# Patient Record
Sex: FEMALE | Race: WHITE | NOT HISPANIC OR LATINO | Employment: UNEMPLOYED
[De-identification: names, ages, dates, MRNs, and addresses within clinical notes are randomized per-mention and may not be internally consistent; named-entity substitution may affect disease eponyms.]

---

## 2017-10-08 ENCOUNTER — HEALTH MAINTENANCE LETTER (OUTPATIENT)
Age: 22
End: 2017-10-08

## 2020-12-23 NOTE — PROGRESS NOTES
"Subjective     Caprice LAFLEUR is a 25 year old female who presents to clinic today for the following health issues:    HPI         Concern - bruising on right shin  Onset: 2 months - 10/20/20  Description: slipped and ran into a picnic table   Intensity: moderate  Progression of Symptoms:  intermittent and waxing and waning  Accompanying Signs & Symptoms: none  Previous history of similar problem: none  Precipitating factors:        Worsened by: none  Alleviating factors:        Improved by: icing   Therapies tried and outcome:  none     - Happened 2 months ago, she hit her shin on the picnic table.  She had bruising immediately afterwards above and below the area she hit her shin.   - She says the pain has been on and off since then.  She'll notice it some more than other days.  She did just recently re-develop some bruising without new injury but it is starting to yellow.   - Occasionally will have some numbness/tingling down into the foot.  She'll get pains that will go up to the middle side of the knee and down into the foot as well.   - This is her first evaluation of the leg.     Review of Systems   Constitutional, musculoskeletal, neuro, skin systems are negative, except as otherwise noted.      Objective    /64   Pulse 94   Temp 98.1  F (36.7  C) (Temporal)   Resp 16   Ht 1.746 m (5' 8.75\")   Wt 76.7 kg (169 lb)   LMP 12/28/2020 (Exact Date)   SpO2 99%   BMI 25.14 kg/m    Body mass index is 25.14 kg/m .  Physical Exam   GENERAL: healthy, alert and no distress  MS: normal muscle tone and RLE - full active and passive ROM of the knee and ankle without significant pain, tenderness to palpation over the midshaft of the tibia and over the lateral malleolus. Non-tender over the knee joint, medial malleolus, foot.   SKIN: small yellow bruising mid shaft of the RLE.   NEURO: Normal strength and tone, mentation intact and speech normal    Xray -   XR RIGHT TIBIA AND FIBULA TWO VIEWS 12/28/2020 " 4:11 PM     HISTORY: Injury October anterior mid shaft tibia. Leg injury, right,  initial encounter.     COMPARISON: None.     FINDINGS: Subtle increased density in the subcutaneous adipose tissues  of the anterior mid shin on the lateral view only could represent  contusion/scarring. No acute fracture is identified. Visualized joint  spaces are grossly well-maintained.                                                                      IMPRESSION: Possible soft tissue contusion along the anterior mid  right shin on the lateral view. No underlying fracture.        Assessment & Plan     Caprice was seen today for bleeding/bruising.    Diagnoses and all orders for this visit:    Leg injury, right, initial encounter  -     XR Tibia & Fibula Right 2 Views; Future       X-ray obtained, no signs of previous fracture or acute fracture.  There is some swelling that is consistent with the current bruising. Recommended activity modification, NSAIDs consistently for 3-5 days.  Discussed bone contusions can take a while to completely resolve.  If not improving over the next 2-4 weeks recommend follow-up with Ortho.     Return in about 2 weeks (around 1/11/2021) for If not improving, sooner if worse or new concerns.     Options for treatment and follow-up care were reviewed with the patient and/or guardian. Patient and/or guardian engaged in the decision making process and verbalized understanding of the options discussed and agreed with the final plan.     Junior Roe PA-C  [employer]

## 2020-12-28 ENCOUNTER — ANCILLARY PROCEDURE (OUTPATIENT)
Dept: GENERAL RADIOLOGY | Facility: OTHER | Age: 25
End: 2020-12-28
Attending: PHYSICIAN ASSISTANT
Payer: COMMERCIAL

## 2020-12-28 ENCOUNTER — OFFICE VISIT (OUTPATIENT)
Dept: FAMILY MEDICINE | Facility: OTHER | Age: 25
End: 2020-12-28
Payer: COMMERCIAL

## 2020-12-28 VITALS
DIASTOLIC BLOOD PRESSURE: 64 MMHG | HEART RATE: 94 BPM | RESPIRATION RATE: 16 BRPM | OXYGEN SATURATION: 99 % | WEIGHT: 169 LBS | HEIGHT: 69 IN | TEMPERATURE: 98.1 F | SYSTOLIC BLOOD PRESSURE: 108 MMHG | BODY MASS INDEX: 25.03 KG/M2

## 2020-12-28 DIAGNOSIS — S89.91XA LEG INJURY, RIGHT, INITIAL ENCOUNTER: ICD-10-CM

## 2020-12-28 DIAGNOSIS — S89.91XA LEG INJURY, RIGHT, INITIAL ENCOUNTER: Primary | ICD-10-CM

## 2020-12-28 PROCEDURE — 99213 OFFICE O/P EST LOW 20 MIN: CPT | Performed by: PHYSICIAN ASSISTANT

## 2020-12-28 PROCEDURE — 73590 X-RAY EXAM OF LOWER LEG: CPT | Mod: RT | Performed by: RADIOLOGY

## 2020-12-28 ASSESSMENT — PATIENT HEALTH QUESTIONNAIRE - PHQ9
10. IF YOU CHECKED OFF ANY PROBLEMS, HOW DIFFICULT HAVE THESE PROBLEMS MADE IT FOR YOU TO DO YOUR WORK, TAKE CARE OF THINGS AT HOME, OR GET ALONG WITH OTHER PEOPLE: EXTREMELY DIFFICULT
SUM OF ALL RESPONSES TO PHQ QUESTIONS 1-9: 19
SUM OF ALL RESPONSES TO PHQ QUESTIONS 1-9: 19

## 2020-12-28 ASSESSMENT — MIFFLIN-ST. JEOR: SCORE: 1566.99

## 2020-12-28 ASSESSMENT — PAIN SCALES - GENERAL: PAINLEVEL: MODERATE PAIN (5)

## 2020-12-29 ENCOUNTER — TELEPHONE (OUTPATIENT)
Dept: FAMILY MEDICINE | Facility: OTHER | Age: 25
End: 2020-12-29

## 2020-12-29 ASSESSMENT — PATIENT HEALTH QUESTIONNAIRE - PHQ9: SUM OF ALL RESPONSES TO PHQ QUESTIONS 1-9: 19

## 2020-12-29 NOTE — TELEPHONE ENCOUNTER
LM for patient to return phone call to clinic about message below.  Maylin Colon CMA ([facility])      ----- Message from Junior Roe PA-C sent at 12/29/2020  7:59 AM CST -----  Please call patient.  The Radiologist report shows soft tissue bruise as is obvious and no signs of fracture which is good.  Recommend follow up with Ortho if not improving over the next 2-4 weeks but in the meantime utilize the dosing of ibuprofen discussed yesterday.     Junior Roe PA-C

## 2020-12-29 NOTE — TELEPHONE ENCOUNTER
Return call received from patient.  Message given as below.  Naomi Funes RN  [facility]

## 2021-02-21 ENCOUNTER — HEALTH MAINTENANCE LETTER (OUTPATIENT)
Age: 26
End: 2021-02-21
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
## 2021-06-29 ENCOUNTER — ANCILLARY PROCEDURE (OUTPATIENT)
Dept: GENERAL RADIOLOGY | Facility: OTHER | Age: 26
End: 2021-06-29
Attending: PHYSICIAN ASSISTANT
Payer: COMMERCIAL

## 2021-06-29 ENCOUNTER — OFFICE VISIT (OUTPATIENT)
Dept: FAMILY MEDICINE | Facility: OTHER | Age: 26
End: 2021-06-29
Payer: COMMERCIAL

## 2021-06-29 VITALS
RESPIRATION RATE: 14 BRPM | BODY MASS INDEX: 24.85 KG/M2 | DIASTOLIC BLOOD PRESSURE: 84 MMHG | OXYGEN SATURATION: 96 % | HEART RATE: 96 BPM | HEIGHT: 69 IN | TEMPERATURE: 98 F | WEIGHT: 167.8 LBS | SYSTOLIC BLOOD PRESSURE: 118 MMHG

## 2021-06-29 DIAGNOSIS — M54.42 ACUTE BILATERAL LOW BACK PAIN WITH LEFT-SIDED SCIATICA: ICD-10-CM

## 2021-06-29 DIAGNOSIS — M54.42 ACUTE BILATERAL LOW BACK PAIN WITH LEFT-SIDED SCIATICA: Primary | ICD-10-CM

## 2021-06-29 PROCEDURE — 72100 X-RAY EXAM L-S SPINE 2/3 VWS: CPT | Performed by: RADIOLOGY

## 2021-06-29 PROCEDURE — 99214 OFFICE O/P EST MOD 30 MIN: CPT | Performed by: PHYSICIAN ASSISTANT

## 2021-06-29 RX ORDER — PREDNISONE 20 MG/1
40 TABLET ORAL DAILY
Qty: 10 TABLET | Refills: 0 | Status: SHIPPED | OUTPATIENT
Start: 2021-06-29 | End: 2021-07-04

## 2021-06-29 RX ORDER — CYCLOBENZAPRINE HCL 5 MG
5-10 TABLET ORAL 3 TIMES DAILY PRN
Qty: 60 TABLET | Refills: 0 | Status: ON HOLD | OUTPATIENT
Start: 2021-06-29 | End: 2021-08-24

## 2021-06-29 ASSESSMENT — ANXIETY QUESTIONNAIRES
4. TROUBLE RELAXING: NEARLY EVERY DAY
2. NOT BEING ABLE TO STOP OR CONTROL WORRYING: MORE THAN HALF THE DAYS
7. FEELING AFRAID AS IF SOMETHING AWFUL MIGHT HAPPEN: SEVERAL DAYS
1. FEELING NERVOUS, ANXIOUS, OR ON EDGE: NEARLY EVERY DAY
5. BEING SO RESTLESS THAT IT IS HARD TO SIT STILL: SEVERAL DAYS
GAD7 TOTAL SCORE: 15
7. FEELING AFRAID AS IF SOMETHING AWFUL MIGHT HAPPEN: SEVERAL DAYS
3. WORRYING TOO MUCH ABOUT DIFFERENT THINGS: NEARLY EVERY DAY
6. BECOMING EASILY ANNOYED OR IRRITABLE: MORE THAN HALF THE DAYS

## 2021-06-29 ASSESSMENT — MIFFLIN-ST. JEOR: SCORE: 1570.13

## 2021-06-29 ASSESSMENT — PATIENT HEALTH QUESTIONNAIRE - PHQ9
10. IF YOU CHECKED OFF ANY PROBLEMS, HOW DIFFICULT HAVE THESE PROBLEMS MADE IT FOR YOU TO DO YOUR WORK, TAKE CARE OF THINGS AT HOME, OR GET ALONG WITH OTHER PEOPLE: VERY DIFFICULT
SUM OF ALL RESPONSES TO PHQ QUESTIONS 1-9: 17
SUM OF ALL RESPONSES TO PHQ QUESTIONS 1-9: 17

## 2021-06-29 ASSESSMENT — PAIN SCALES - GENERAL: PAINLEVEL: SEVERE PAIN (6)

## 2021-06-30 ASSESSMENT — PATIENT HEALTH QUESTIONNAIRE - PHQ9: SUM OF ALL RESPONSES TO PHQ QUESTIONS 1-9: 17

## 2021-06-30 ASSESSMENT — ANXIETY QUESTIONNAIRES: GAD7 TOTAL SCORE: 15

## 2021-07-13 ENCOUNTER — TELEPHONE (OUTPATIENT)
Dept: FAMILY MEDICINE | Facility: OTHER | Age: 26
End: 2021-07-13

## 2021-07-13 ENCOUNTER — ANCILLARY PROCEDURE (OUTPATIENT)
Dept: MRI IMAGING | Facility: CLINIC | Age: 26
End: 2021-07-13
Attending: PHYSICIAN ASSISTANT
Payer: COMMERCIAL

## 2021-07-13 DIAGNOSIS — M54.42 ACUTE BILATERAL LOW BACK PAIN WITH LEFT-SIDED SCIATICA: ICD-10-CM

## 2021-07-13 DIAGNOSIS — M54.42 ACUTE BILATERAL LOW BACK PAIN WITH LEFT-SIDED SCIATICA: Primary | ICD-10-CM

## 2021-07-13 DIAGNOSIS — M54.16 LUMBAR RADICULOPATHY: ICD-10-CM

## 2021-07-13 PROCEDURE — 72148 MRI LUMBAR SPINE W/O DYE: CPT | Performed by: RADIOLOGY

## 2021-07-13 NOTE — TELEPHONE ENCOUNTER
Patient was informed and would like to go to neurosurgery. Please paced appropriate referral.     Diane Turner RN, BSN  [place]  July 13, 2021

## 2021-07-13 NOTE — TELEPHONE ENCOUNTER
----- Message from Junior Roe PA-C sent at 7/13/2021 12:52 PM CDT -----  Please call patient. Her MRI showed fissure of the left disc at the L5-S1 which abuts the S1 nerve root on the left side which is likely causing her symptoms. If she is still having symptoms I recommend she see Neurosurgery for discussion of options.     Junior Roe PA-C

## 2021-07-19 ENCOUNTER — OFFICE VISIT (OUTPATIENT)
Dept: NEUROSURGERY | Facility: CLINIC | Age: 26
End: 2021-07-19
Attending: PHYSICIAN ASSISTANT
Payer: COMMERCIAL

## 2021-07-19 VITALS — OXYGEN SATURATION: 98 % | HEART RATE: 96 BPM | SYSTOLIC BLOOD PRESSURE: 144 MMHG | DIASTOLIC BLOOD PRESSURE: 94 MMHG

## 2021-07-19 DIAGNOSIS — M54.42 ACUTE BILATERAL LOW BACK PAIN WITH LEFT-SIDED SCIATICA: ICD-10-CM

## 2021-07-19 DIAGNOSIS — M54.16 LUMBAR RADICULOPATHY: Primary | ICD-10-CM

## 2021-07-19 DIAGNOSIS — Z01.818 PREOP TESTING: ICD-10-CM

## 2021-07-19 DIAGNOSIS — Z11.59 ENCOUNTER FOR SCREENING FOR OTHER VIRAL DISEASES: ICD-10-CM

## 2021-07-19 PROCEDURE — 99204 OFFICE O/P NEW MOD 45 MIN: CPT | Performed by: NEUROLOGICAL SURGERY

## 2021-07-19 PROCEDURE — G0463 HOSPITAL OUTPT CLINIC VISIT: HCPCS

## 2021-07-19 NOTE — PROGRESS NOTES
I have seen and examined the patient with Simona Mcguire NP and agree with the attached note.    25F w/ severe left sided radiculopathy and left L5-S1 disc herniation, failure to improve with non-surgical management.  Plan for MIS microdiscectomy.  Risks and benefits discussed

## 2021-07-19 NOTE — PATIENT INSTRUCTIONS
Patient Instructions    Surgery scheduled at [facility] for Microdiscectomy with Dr. Junior    Pre-Operative    Surgical risks: blood clots in the leg or lung, problems urinating, nerve damage, drainage from the incision, infection,stiffness    Pre-operative physical with primary care physician within 30 days of surgical date.     Likely same day procedure with discharge home day of surgery, may stay for 23 hour observation hospitalization for monitoring.       Shower procedure  o Please shower with antimicrobial soap the night before surgery and morning of surgery. Please refer to showering instruction sheet in folder.    Eating/Drinking  o Stop all solid foods 8 hours before surgery.  o Keep drinking clear liquids until 4 hours before surgery  - Clear liquids include water, clear juice, black coffee, or clear tea without milk, Gatorade, clear soda.     Medications  o Hold Aspirin, NSAIDs (Advil/Ibuprofen, Indocin, Naproxen,Nuprin,Relafen/Nabumetone, Diclofenac,Meloxicam, Aleve, Celebrex) x 7 days prior to surgical date  o You can take Tylenol (Acetaminophen) for pain, 1000 mg  - Do not exceed 3,000 mg per day   o Any other medications prescribed, please discuss with your primary care provider at your pre-operative physical     As part of preparation for your upcoming procedure you are required to have a test for the novel Coronavirus, COVID-19  o Please call the drive-up testing number to schedule your appointment. The test needs to be completed within 4 days (96) hours of surgery.   o Scheduling Number: [phone]     Post-Operative    Pain Management    Dealing with pain  o As your body heals, you might feel a stabbing, burning, or aching pain. You may also have some numbness.  o Everyone feels pain differently, we may ask you to rate your pain using a pain scale. This will let us know how much pain you feel.   o Keep in mind that medicine won't take away all of your pain. It helps to try other  ways to relax and ease pain.     Things to help with pain  o After surgery, we will give you medicine for your pain. These medications work well, but they can make you drowsy, itchy, or sick to your stomach. If we give you narcotics for pain, try to take the pills with food.   o For mild to moderate pain, you can take medication such as Tylenol. These can be used with narcotics, but make sure that your narcotic does not contain Tylenol.   - Do NOT drive while taking narcotic pain medication  - Do NOT drink alcohol while using any pain medication  o You can utilize ice as needed (no longer than 20 minutes at one time)    You may resume taking NSAIDs (ex. Ibuprofen, aleve, naproxen) 2 weeks after surgery as it may cause bleeding and interfere with bone healing     Incision Care  o No submerging incision in water such as pools, hot tubs, baths for at least 8 weeks or until incision is healed  o It is okay to shower, just pat the incision dry   o Remove dressing as instructed upon discharge  o Watch for signs of infection  - Redness, swelling, warmth, drainage, and fever of 101 degrees or higher  - [facility] [phone]    Bowel Care  o Many people have constipation (hard stools) after surgery.  To help prevent constipation: Drink plenty of fluid (8-10 glasses/day); Eat more fiber, such as whole grain bread, bran cereal, and fruits and vegetables; Stay active by walking; Over the counter stool softener may also help.      Activity Restrictions  o For the first 4-6 weeks, no lifting > 10 pounds, limited bending, twisting, or overhead reaching.  o Take stairs in moderation     Walking: Walking is the best way to start exercise after surgery. Take short frequent walks. You may gradually increase the distance as tolerated. If you feel pain, decrease your activity, but DO NOT stop walking. Walking will help you gain strength and prevent muscle soreness and spasms.   o Avoid bed rest and prolonged sitting for longer than  30 minutes (change positions frequently while awake)  o No contact sports until after follow up visit  o No high impact activities such as; running/jogging, snowmobile or 4 leach riding or any other recreational vehicles  o Please call the clinic if you develop any of the following symptoms:  - Swelling and/or warmth in one or both legs  - Pain or tenderness in your leg, ankle, foot, or arm   - Red or discolored skin     Post-Op Follow Up Appointments    2 week incision check with RN    6 week post op visit with Physical Assistant or Nurse Practitioner    3 months post op with Physical Assistant or Nurse Practitioner    Please call to schedule follow up appointment at [phone]    Resources    If you are currently employed, you will need to be off work for 2-4 weeks for post op recovery and healing.    Please fax any FMLA/short term disability paperwork to [phone]    You may call our clinic when you'd like to return to work and we can provide a work letter    To allow staff adequate time to complete paperwork, please send as soon as possible

## 2021-07-19 NOTE — PROGRESS NOTES
"Dr. Destin AVITIA [facility] Neurosurgery Clinic Visit      CC: back pain, leg pain/numbness     Primary care Provider: No Ref-Primary, Physician    Reason For Visit:   I was asked by Junior Roe PA-C to consult on the patient for: Lumbar radiculopathy, Acute bilateral low back pain with left-sided sciatica      HPI: Caprice Kline is a 25 year old female who presents for evaluation of back pain that started about 1 year ago when doing some housework. She was given oral steroids at that time. About 3 months ago she developed increased back pain with leg pain/numbess that continues to worsen. Pain is located in the bilateral low back and radiates to left>right legs to feet with numbness in this pattern as well. Reports mild weakness in BLE. Describes the pain as sharp, shooting, aching. Pain is worsened with prolonged standing and sitting. Pain is alleviated with lying flat. She has tried oral steroids, flexeril, tylenol, NSAIDS, yoga and acupuncture but symptoms persist. Denies any recent falls or saddle anesthesia, but she reports bladder incontinence x 2 that occurred on Friday.    Current pain: 6/10   At worst: 9/10    Past Medical History reviewed with patient during visit.  Past Surgical History reviewed with patient during visit.    Current Outpatient Medications   Medication     cyclobenzaprine (FLEXERIL) 5 MG tablet     No current facility-administered medications for this visit.       Allergies   Allergen Reactions     Metronidazole Anaphylaxis and Hives     Other reaction(s): Throat swelling     Other Drug Allergy (See Comments) Hives, Itching and Swelling     Throat swelling 6 hrs after exposure  Itching and hives 2 hrs after exposure     Hydrocodone-Acetaminophen Other (See Comments)     Sertraline Other (See Comments)     Patient was foggy and \"high\" feeling       Social History     Socioeconomic History     Marital status: Single     Spouse name: Not on file     Number of children: Not " on file     Years of education: Not on file     Highest education level: Not on file   Occupational History     Not on file   Tobacco Use     Smoking status: Never Smoker     Smokeless tobacco: Never Used   Substance and Sexual Activity     Alcohol use: Yes     Drug use: Never     Sexual activity: Yes     Partners: Male   Other Topics Concern     Not on file   Social History Narrative     Not on file     Social Determinants of Health     Financial Resource Strain:      Difficulty of Paying Living Expenses:    Food Insecurity:      Worried About Running Out of Food in the Last Year:      Ran Out of Food in the Last Year:    Transportation Needs:      Lack of Transportation (Medical):      Lack of Transportation (Non-Medical):    Physical Activity:      Days of Exercise per Week:      Minutes of Exercise per Session:    Stress:      Feeling of Stress :    Social Connections:      Frequency of Communication with Friends and Family:      Frequency of Social Gatherings with Friends and Family:      Attends Religion Services:      Active Member of Clubs or Organizations:      Attends Club or Organization Meetings:      Marital Status:    Intimate Partner Violence:      Fear of Current or Ex-Partner:      Emotionally Abused:      Physically Abused:      Sexually Abused:      ROS: 10 point ROS neg other than the symptoms noted above in the HPI.    Vital Signs: BP (!) 144/94   Pulse 96   LMP 06/23/2021 (Approximate)   SpO2 98%     Examination:  Constitutional:  Alert, well nourished, NAD.  HEENT: Normocephalic, atraumatic.   Pulmonary:  Without shortness of breath, normal effort.   Lymph: No lymphadenopathy to low back or LE.   Integumentary: Skin is free of rashes or lesions.   Cardiovascular:  No pitting edema of BLE.      Neurological:  Awake  Alert  Oriented x 3  Speech clear  Cranial nerves II - XII grossly intact  PERRL  EOMI  Face symmetric  Tongue midline  Motor exam   Hip Flexor:                 Right: 5/5   Left:  4/5  Quadriceps:               Right:  5/5  Left:  5/5  Hamstrings:               Right:  5/5  Left:  4/5  Gastroc Soleus:         Right:  5/5  Left:  5/5  Tib/Ant:                      Right:  5/5  Left:  4/5  EHL:                          Right:  5/5  Left:  4/5         Sensation normal to bilateral lower extremities.    Reflexes are 2+ in the patellar and Achilles. There is no clonus. Downgoing Babinski.    Musculoskeletal:  Gait: Able to stand from a seated position. Antalgic gait noted.   Tenderness to palpation of the lumbar spine.   Straight leg raise is positive on the left.     Imaging:   MR LUMBAR SPINE W/O CONTRAST 7/13/2021 8:35 AM                                                                   Impression:   Annular fissure and left central disc protrusion at L5-S1 abutting the  descending S1 nerve root.    Assessment/Plan:   Lumbar radiculopathy  Lumbar disc herniation     25 year old female who presents for evaluation of acute on chronic back and leg pain. Pain is located in the bilateral low back and radiates to left>right legs to feet with numbness in this pattern as well. She has tried oral steroids, flexeril, tylenol, NSAIDS, yoga and acupuncture but symptoms persist. MRI lumbar spine reveals a left L5-S1 disc protrusion abutting the S1 nerve root.     Dr. Junior also met with patient, reviewed imaging, and discussed conservative measures versus surgery which would include a minimally invasive left L5-S1 microdiscectomy. Patient would like to proceed with surgery. Risks and benefits discussed. Pre op education provided per nursing.    Advised patient to call our clinic with any questions or concerns. Discussed red flag symptoms and advised to seek medical attention if these develop. Patient voiced understanding and agreement.      Simona Snyder [facility]  [address] [address]  Tel [phone]  Pager  [phone]

## 2021-07-19 NOTE — NURSING NOTE
Reviewed pre- and post-operative instructions as outlined in the After Visit Summary/Patient Instructions with patient.   Surgery folder was given to patient    Patient Education Topic: Procedure with Dr. Junior     Learner(s): Patient    Knowledge Level: Basic    Readiness to Learn: Ready    Method:  Verbal explanation    Outcome: Able to verbalize instructions    Barriers to Learning: No barrier    Covid Testing: patient educated they will need to have a test for the novel Coronavirus, COVID-19.The test needs to be completed within 4 days (96) hours of surgery. Order Placed.    Scheduling Number: [phone]    Patient had the opportunity for questions to be answered. Advised Patient to call clinic with any questions/concerns. Gave patient antibacterial soap for pre-surgery skin preparation.

## 2021-07-19 NOTE — LETTER
7/19/2021         RE: Caprice Kline  [address] [facility] [address]        Dear Colleague,    Thank you for referring your patient, Caprice Kline, to the [facility] NEUROSURGERY [facility]INIC [place]. Please see a copy of my visit note below.    Dr. Destin Junior   [facility] Neurosurgery Clinic Visit      CC: back pain, leg pain/numbness     Primary care Provider: No Ref-Primary, Physician    Reason For Visit:   I was asked by Junior Roe PA-C to consult on the patient for: Lumbar radiculopathy, Acute bilateral low back pain with left-sided sciatica      HPI: Caprice Kline is a 25 year old female who presents for evaluation of back pain that started about 1 year ago when doing some housework. She was given oral steroids at that time. About 3 months ago she developed increased back pain with leg pain/numbess that continues to worsen. Pain is located in the bilateral low back and radiates to left>right legs to feet with numbness in this pattern as well. Reports mild weakness in BLE. Describes the pain as sharp, shooting, aching. Pain is worsened with prolonged standing and sitting. Pain is alleviated with lying flat. She has tried oral steroids, flexeril, tylenol, NSAIDS, yoga and acupuncture but symptoms persist. Denies any recent falls or saddle anesthesia, but she reports bladder incontinence x 2 that occurred on Friday.    Current pain: 6/10   At worst: 9/10    Past Medical History reviewed with patient during visit.  Past Surgical History reviewed with patient during visit.    Current Outpatient Medications   Medication     cyclobenzaprine (FLEXERIL) 5 MG tablet     No current facility-administered medications for this visit.       Allergies   Allergen Reactions     Metronidazole Anaphylaxis and Hives     Other reaction(s): Throat swelling     Other Drug Allergy (See Comments) Hives, Itching and Swelling     Throat swelling 6 hrs after exposure  Itching and hives 2 hrs  "after exposure     Hydrocodone-Acetaminophen Other (See Comments)     Sertraline Other (See Comments)     Patient was foggy and \"high\" feeling       Social History     Socioeconomic History     Marital status: Single     Spouse name: Not on file     Number of children: Not on file     Years of education: Not on file     Highest education level: Not on file   Occupational History     Not on file   Tobacco Use     Smoking status: Never Smoker     Smokeless tobacco: Never Used   Substance and Sexual Activity     Alcohol use: Yes     Drug use: Never     Sexual activity: Yes     Partners: Male   Other Topics Concern     Not on file   Social History Narrative     Not on file     Social Determinants of Health     Financial Resource Strain:      Difficulty of Paying Living Expenses:    Food Insecurity:      Worried About Running Out of Food in the Last Year:      Ran Out of Food in the Last Year:    Transportation Needs:      Lack of Transportation (Medical):      Lack of Transportation (Non-Medical):    Physical Activity:      Days of Exercise per Week:      Minutes of Exercise per Session:    Stress:      Feeling of Stress :    Social Connections:      Frequency of Communication with Friends and Family:      Frequency of Social Gatherings with Friends and Family:      Attends Orthodox Services:      Active Member of Clubs or Organizations:      Attends Club or Organization Meetings:      Marital Status:    Intimate Partner Violence:      Fear of Current or Ex-Partner:      Emotionally Abused:      Physically Abused:      Sexually Abused:      ROS: 10 point ROS neg other than the symptoms noted above in the HPI.    Vital Signs: BP (!) 144/94   Pulse 96   LMP 06/23/2021 (Approximate)   SpO2 98%     Examination:  Constitutional:  Alert, well nourished, NAD.  HEENT: Normocephalic, atraumatic.   Pulmonary:  Without shortness of breath, normal effort.   Lymph: No lymphadenopathy to low back or LE.   Integumentary: Skin is " free of rashes or lesions.   Cardiovascular:  No pitting edema of BLE.      Neurological:  Awake  Alert  Oriented x 3  Speech clear  Cranial nerves II - XII grossly intact  PERRL  EOMI  Face symmetric  Tongue midline  Motor exam   Hip Flexor:                 Right: 5/5  Left:  4/5  Quadriceps:               Right:  5/5  Left:  5/5  Hamstrings:               Right:  5/5  Left:  4/5  Gastroc Soleus:         Right:  5/5  Left:  5/5  Tib/Ant:                      Right:  5/5  Left:  4/5  EHL:                          Right:  5/5  Left:  4/5         Sensation normal to bilateral lower extremities.    Reflexes are 2+ in the patellar and Achilles. There is no clonus. Downgoing Babinski.    Musculoskeletal:  Gait: Able to stand from a seated position. Antalgic gait noted.   Tenderness to palpation of the lumbar spine.   Straight leg raise is positive on the left.     Imaging:   MR LUMBAR SPINE W/O CONTRAST 7/13/2021 8:35 AM                                                                   Impression:   Annular fissure and left central disc protrusion at L5-S1 abutting the  descending S1 nerve root.    Assessment/Plan:   Lumbar radiculopathy  Lumbar disc herniation     25 year old female who presents for evaluation of acute on chronic back and leg pain. Pain is located in the bilateral low back and radiates to left>right legs to feet with numbness in this pattern as well. She has tried oral steroids, flexeril, tylenol, NSAIDS, yoga and acupuncture but symptoms persist. MRI lumbar spine reveals a left L5-S1 disc protrusion abutting the S1 nerve root.     Dr. Junior also met with patient, reviewed imaging, and discussed conservative measures versus surgery which would include a minimally invasive left L5-S1 microdiscectomy. Patient would like to proceed with surgery. Risks and benefits discussed. Pre op education provided per nursing.    Advised patient to call our clinic with any questions or concerns. Discussed red flag  symptoms and advised to seek medical attention if these develop. Patient voiced understanding and agreement.      Simona Snyder CNP  [facility] Neurosurgery  [address] [address]  Tel [phone]  Pager [phone]        I have seen and examined the patient with Simona Mcguire NP and agree with the attached note.    25F w/ severe left sided radiculopathy and left L5-S1 disc herniation, failure to improve with non-surgical management.  Plan for MIS microdiscectomy.  Risks and benefits discussed      Again, thank you for allowing me to participate in the care of your patient.        Sincerely,        Destin Junior MD

## 2021-08-09 NOTE — PROGRESS NOTES
[address]  [facility] [address]  Phone: [phone]  Primary Provider: No Ref-Primary, Physician  Pre-op Performing Provider: INGE PURCELL    PREOPERATIVE EVALUATION:  Today's date: 8/10/2021    Caprice Kline is a 25 year old female who presents for a preoperative evaluation.    Surgical Information:  Surgery/Procedure: Minimally invasive left Lumbar 5 to Sacral 1 microdiscectomy   Surgery Location: [facility]  Surgeon: Dr. Junior  Surgery Date: 8/24/2021  Time of Surgery: 730AM  Where patient plans to recover: At home with family  Fax number for surgical facility: Note does not need to be faxed, will be available electronically in Epic.    Type of Anesthesia Anticipated: General    Assessment & Plan     The proposed surgical procedure is considered INTERMEDIATE risk.    Preop general physical exam  Lumbar radiculopathy  Acute bilateral low back pain with left-sided sciatica  - CBC with platelets  - CBC with platelets    Advanced care planning/counseling discussion      Risks and Recommendations:  The patient has the following additional risks and recommendations for perioperative complications:   - No identified additional risk factors other than previously addressed    Medication Instructions:  Patient is on no chronic medications Hold tylenol and flexeril day before and day of procedure    RECOMMENDATION:  APPROVAL GIVEN to proceed with proposed procedure, without further diagnostic evaluation.    Subjective     HPI related to upcoming procedure: Low back pain that has been ongoing for 1 year. Previously treated with oral steroids, pain and lef leg numbness continue to worsen. Plan for microdiscectomy for low of L5-S1. No new changes, saddle anesthesia, or bladder/ bowel changes.     Preop Questions 8/6/2021   1. Have you ever had a heart attack or stroke? No   2. Have you ever had surgery on your heart or blood vessels, such as a stent  placement, a coronary artery bypass, or surgery on an artery in your head, neck, heart, or legs? No   3. Do you have chest pain with activity? No   4. Do you have a history of  heart failure? No   5. Do you currently have a cold, bronchitis or symptoms of other infection? No   6. Do you have a cough, shortness of breath, or wheezing? No   7. Do you or anyone in your family have previous history of blood clots? No   8. Do you or does anyone in your family have a serious bleeding problem such as prolonged bleeding following surgeries or cuts? No   9. Have you ever had problems with anemia or been told to take iron pills? No   10. Have you had any abnormal blood loss such as black, tarry or bloody stools, or abnormal vaginal bleeding? No   11. Have you ever had a blood transfusion? No   12. Are you willing to have a blood transfusion if it is medically needed before, during, or after your surgery? Yes   13. Have you or any of your relatives ever had problems with anesthesia? No   14. Do you have sleep apnea, excessive snoring or daytime drowsiness? No   15. Do you have any artifical heart valves or other implanted medical devices like a pacemaker, defibrillator, or continuous glucose monitor? No   16. Do you have artificial joints? No   17. Are you allergic to latex? No   18. Is there any chance that you may be pregnant? No       Health Care Directive:  Patient does not have a Health Care Directive or Living Will: Discussed advance care planning with patient; information given to patient to review.    Preoperative Review of :   reviewed - no record of controlled substances prescribed.      Status of Chronic Conditions:  See problem list for active medical problems.  Problems all longstanding and stable, except as noted/documented.  See ROS for pertinent symptoms related to these conditions.      Review of Systems  Constitutional, neuro, ENT, endocrine, pulmonary, cardiac, gastrointestinal, genitourinary,  "musculoskeletal, integument and psychiatric systems are negative, except as otherwise noted.    Patient Active Problem List    Diagnosis Date Noted     Lumbar radiculopathy 07/19/2021     Priority: Medium     Added automatically from request for surgery [id number]        History reviewed. No pertinent past medical history.  History reviewed. No pertinent surgical history.  Current Outpatient Medications   Medication Sig Dispense Refill     cyclobenzaprine (FLEXERIL) 5 MG tablet Take 1-2 tablets (5-10 mg) by mouth 3 times daily as needed for muscle spasms 60 tablet 0       Allergies   Allergen Reactions     Metronidazole Anaphylaxis and Hives     Other reaction(s): Throat swelling     Other Drug Allergy (See Comments) Hives, Itching and Swelling     Throat swelling 6 hrs after exposure  Itching and hives 2 hrs after exposure     Hydrocodone-Acetaminophen Other (See Comments)     Sertraline Other (See Comments)     Patient was foggy and \"high\" feeling        Social History     Tobacco Use     Smoking status: Never Smoker     Smokeless tobacco: Never Used   Substance Use Topics     Alcohol use: Yes     History reviewed. No pertinent family history.  History   Drug Use Unknown         Objective     /84   Pulse 86   Temp 97.8  F (36.6  C) (Temporal)   Resp 18   Ht 1.75 m (5' 8.9\")   Wt 76.5 kg (168 lb 9.6 oz)   LMP 08/08/2021 (Approximate)   SpO2 99%   BMI 24.97 kg/m      Physical Exam    GENERAL APPEARANCE: healthy, alert and no distress. Patient standing bent over exam table due to pain.     EYES: EOMI, PERRL     HENT: ear canals and TM's normal and nose and mouth without ulcers or lesions     NECK: no adenopathy, no asymmetry, masses, or scars and thyroid normal to palpation     RESP: lungs clear to auscultation - no rales, rhonchi or wheezes     CV: regular rates and rhythm, normal S1 S2, no S3 or S4 and no murmur, click or rub     ABDOMEN:  soft, nontender, no HSM or masses and bowel sounds normal     MS: " extremities normal- no gross deformities noted, no evidence of inflammation in joints, FROM in all extremities.     SKIN: no suspicious lesions or rashes     NEURO: Normal strength and tone, sensory exam grossly normal, mentation intact and speech normal     PSYCH: mentation appears normal. and affect normal/bright     LYMPHATICS: No cervical adenopathy    No results for input(s): HGB, PLT, INR, NA, POTASSIUM, CR, A1C in the last 20814 hours.     Diagnostics:  Recent Results (from the past 24 hour(s))   CBC with platelets    Collection Time: 08/10/21 10:58 AM   Result Value Ref Range    WBC Count 3.3 (L) 4.0 - 11.0 10e3/uL    RBC Count 4.75 3.80 - 5.20 10e6/uL    Hemoglobin 14.4 11.7 - 15.7 g/dL    Hematocrit 41.7 35.0 - 47.0 %    MCV 88 78 - 100 fL    MCH 30.3 26.5 - 33.0 pg    MCHC 34.5 31.5 - 36.5 g/dL    RDW 12.8 10.0 - 15.0 %    Platelet Count 258 150 - 450 10e3/uL      No EKG required, no history of coronary heart disease, significant arrhythmia, peripheral arterial disease or other structural heart disease.    Revised Cardiac Risk Index (RCRI):  The patient has the following serious cardiovascular risks for perioperative complications:   - No serious cardiac risks = 0 points     RCRI Interpretation: 0 points: Class I (very low risk - 0.4% complication rate)            I, Junior Roe PA-C, was present with the Physician Assistant student who participated in the service and in the documentation of the note.  I have verified the history and personally performed the physical exam and medical decision making.  I agree with the assessment and plan of care as documented in the note.     STEWART MadrigalS2  Signed Electronically by: Junior Roe PA-C  Copy of this evaluation report is provided to requesting physician.

## 2021-08-10 ENCOUNTER — OFFICE VISIT (OUTPATIENT)
Dept: FAMILY MEDICINE | Facility: OTHER | Age: 26
End: 2021-08-10
Payer: COMMERCIAL

## 2021-08-10 VITALS
RESPIRATION RATE: 18 BRPM | DIASTOLIC BLOOD PRESSURE: 84 MMHG | TEMPERATURE: 97.8 F | HEART RATE: 86 BPM | BODY MASS INDEX: 24.97 KG/M2 | OXYGEN SATURATION: 99 % | WEIGHT: 168.6 LBS | HEIGHT: 69 IN | SYSTOLIC BLOOD PRESSURE: 100 MMHG

## 2021-08-10 DIAGNOSIS — Z71.89 ADVANCED CARE PLANNING/COUNSELING DISCUSSION: ICD-10-CM

## 2021-08-10 DIAGNOSIS — Z01.818 PREOP GENERAL PHYSICAL EXAM: Primary | ICD-10-CM

## 2021-08-10 DIAGNOSIS — M54.16 LUMBAR RADICULOPATHY: ICD-10-CM

## 2021-08-10 DIAGNOSIS — M54.42 ACUTE BILATERAL LOW BACK PAIN WITH LEFT-SIDED SCIATICA: ICD-10-CM

## 2021-08-10 LAB
ERYTHROCYTE [DISTWIDTH] IN BLOOD BY AUTOMATED COUNT: 12.8 % (ref 10–15)
HCT VFR BLD AUTO: 41.7 % (ref 35–47)
HGB BLD-MCNC: 14.4 G/DL (ref 11.7–15.7)
MCH RBC QN AUTO: 30.3 PG (ref 26.5–33)
MCHC RBC AUTO-ENTMCNC: 34.5 G/DL (ref 31.5–36.5)
MCV RBC AUTO: 88 FL (ref 78–100)
PLATELET # BLD AUTO: 258 10E3/UL (ref 150–450)
RBC # BLD AUTO: 4.75 10E6/UL (ref 3.8–5.2)
WBC # BLD AUTO: 3.3 10E3/UL (ref 4–11)

## 2021-08-10 PROCEDURE — 99214 OFFICE O/P EST MOD 30 MIN: CPT | Performed by: PHYSICIAN ASSISTANT

## 2021-08-10 PROCEDURE — 85027 COMPLETE CBC AUTOMATED: CPT | Performed by: PHYSICIAN ASSISTANT

## 2021-08-10 PROCEDURE — 36415 COLL VENOUS BLD VENIPUNCTURE: CPT | Performed by: PHYSICIAN ASSISTANT

## 2021-08-10 ASSESSMENT — MIFFLIN-ST. JEOR: SCORE: 1567.52

## 2021-08-10 ASSESSMENT — PAIN SCALES - GENERAL: PAINLEVEL: EXTREME PAIN (8)

## 2021-08-10 NOTE — PATIENT INSTRUCTIONS

## 2021-08-12 ENCOUNTER — VIRTUAL VISIT (OUTPATIENT)
Dept: FAMILY MEDICINE | Facility: OTHER | Age: 26
End: 2021-08-12
Payer: COMMERCIAL

## 2021-08-12 ENCOUNTER — NURSE TRIAGE (OUTPATIENT)
Dept: NURSING | Facility: CLINIC | Age: 26
End: 2021-08-12

## 2021-08-12 DIAGNOSIS — Z20.822 EXPOSURE TO COVID-19 VIRUS: Primary | ICD-10-CM

## 2021-08-12 PROCEDURE — 99213 OFFICE O/P EST LOW 20 MIN: CPT | Mod: GT | Performed by: FAMILY MEDICINE

## 2021-08-12 ASSESSMENT — PAIN SCALES - GENERAL: PAINLEVEL: EXTREME PAIN (8)

## 2021-08-12 NOTE — PROGRESS NOTES
Caprice is a 25 year old who is being evaluated via a billable telephone visit.      What phone number would you like to be contacted at? [phone]  How would you like to obtain your AVS? MyChart    Assessment & Plan     Exposure to COVID-19 virus  Advised testing in 3-5 days from exposure. Currently asymptomatic. Can return to work masked. Should self isolate if she develops any symptoms.   Discussed home care  Reportable signs and symptoms discussed  RTC if symptoms persist or fail to improve    - Asymptomatic COVID-19 Virus (Coronavirus) by PCR; Future         See Patient Instructions    No follow-ups on file.    Marti Valiente MD  [facility]   Caprice is a 25 year old who presents for the following health issues     HPI   Exposed to covid last night, wants to talk about next steps- having surgery in 2 weeks.      Review of Systems         Objective    Vitals - Patient Reported  Pain Score: Extreme Pain (8)  Pain Loc: Low Back        Physical Exam   healthy, alert and no distress  PSYCH: Alert and oriented times 3; coherent speech, normal   rate and volume, able to articulate logical thoughts, able   to abstract reason, no tangential thoughts, no hallucinations   or delusions  Her affect is normal  RESP: No cough, no audible wheezing, able to talk in full sentences  Remainder of exam unable to be completed due to telephone visits      Phone call duration : 6 minutes

## 2021-08-12 NOTE — TELEPHONE ENCOUNTER
Pt is calling in about being exposed to a person at work last night who was having symptoms of Covid 19, but has not yet been tested. This person is getting tested today.   Pt has been vaccinated for Covid 19. Pt has had symptoms of runny nose, cough, sore throat, that started about a week ago. Pt denies any chesty tightness, difficulty breathing, fever, body aches, chills,headache, or fatigue. Pt is also concerned because she is having surgery on 8/24/2021, and will need a test before that.   Care advice given, and per protocol pt should be evaluated for the need for Covid 19 testing, and was advised to do a virtual visit. Pt was transferred to scheduling to make a telephone or virtual visit with a provider. Pt was advised to call back if she develops chest tightness, difficulty breathing, of fever that goes over 103, or lasts for 3 days. Pt verbalized understanding.    Sadiq Ng RN on 8/12/2021 at 11:01 AM      COVID 19 Nurse Triage Plan/Patient Instructions    Please be aware that novel coronavirus (COVID-19) may be circulating in the community. If you develop symptoms such as fever, cough, or SOB or if you have concerns about the presence of another infection including coronavirus (COVID-19), please contact your health care provider or visit [email or web address].     Disposition/Instructions    Virtual Visit with provider recommended. Reference Visit Selection Guide.    Thank you for taking steps to prevent the spread of this virus.  o Limit your contact with others.  o Wear a simple mask to cover your cough.  o Wash your hands well and often.    Resources    [facility]: About COVID-19: [email or web address]    CDC: What to Do If You're Sick: www.cdc.gov/coronavirus/2019-ncov/about/steps-when-sick.html    CDC: Ending Home Isolation: www.cdc.gov/coronavirus/2019-ncov/hcp/disposition-in-home-patients.html     CDC: Caring for Someone:  www.cdc.gov/coronavirus/2019-ncov/if-you-are-sick/care-for-someone.html     [facility]: Interim Guidance for Hospital Discharge to Home: www.health.[facility].[place]/diseases/coronavirus/hcp/hospdischarge.pdf    [facility] clinical trials (COVID-19 research studies): [email or web address]     Below are the COVID-19 hotlines at the [place] Department of Health ([facility]). Interpreters are available.   o For health questions: Call 877-410-5924 or 1-975.497.1055 (7 a.m. to 7 p.m.)  o For questions about schools and childcare: Call 502-595-2848 or 1-131.613.9036 (7 a.m. to 7 p.m.)     Reason for Disposition    [1] COVID-19 infection suspected by caller or triager AND [2] mild symptoms (cough, fever, or others) AND [3] no complications or SOB    Additional Information    Negative: SEVERE difficulty breathing (e.g., struggling for each breath, speaks in single words)    Negative: Difficult to awaken or acting confused (e.g., disoriented, slurred speech)    Negative: Bluish (or gray) lips or face now    Negative: Shock suspected (e.g., cold/pale/clammy skin, too weak to stand, low BP, rapid pulse)    Negative: Sounds like a life-threatening emergency to the triager    Negative: [1] COVID-19 exposure AND [2] has not completed COVID-19 vaccine series AND [3] no symptoms    Negative: [1] COVID-19 exposure AND [2] completed COVID-19 vaccine series (fully vaccinated) AND [3] no symptoms    Negative: COVID-19 vaccine reaction suspected (e.g., fever, headache, muscle aches) occurring during days 1-3 after getting vaccine    Negative: COVID-19 vaccine, questions about    Negative: [1] COVID-19 vaccine series completed (fully vaccinated) in past 3 months AND [2] new-onset of COVID-19 symptoms BUT [3] no known exposure    Negative: [1] Had lab test confirmed COVID-19 infection within last 3 monthsAND [2] new-onset of COVID-19 symptoms BUT [3] no known exposure    Negative: [1] Lives with someone known to have  influenza (flu test positive) AND [2] flu-like symptoms (e.g., cough, runny nose, sore throat, SOB; with or without fever)    Negative: [1] Adult with possible COVID-19 symptoms AND [2] triager concerned about severity of symptoms or other causes    Negative: COVID-19 and breastfeeding, questions about    Negative: SEVERE or constant chest pain or pressure (Exception: mild central chest pain, present only when coughing)    Negative: MODERATE difficulty breathing (e.g., speaks in phrases, SOB even at rest, pulse 100-120)    Negative: [1] Headache AND [2] stiff neck (can't touch chin to chest)    Negative: MILD difficulty breathing (e.g., minimal/no SOB at rest, SOB with walking, pulse <100)    Negative: Chest pain or pressure    Negative: Patient sounds very sick or weak to the triager    Negative: Fever > 103 F (39.4 C)    Negative: [1] Fever > 101 F (38.3 C) AND [2] age > 60 years    Negative: [1] Fever > 100.0 F (37.8 C) AND [2] bedridden (e.g., nursing home patient, CVA, chronic illness, recovering from surgery)    Negative: [1] HIGH RISK patient (e.g., age > 64 years, diabetes, heart or lung disease, weak immune system) AND [2] new or worsening symptoms    Negative: [1] HIGH RISK patient AND [2] influenza is widespread in the community AND [3] ONE OR MORE respiratory symptoms: cough, sore throat, runny or stuffy nose    Negative: [1] HIGH RISK patient AND [2] influenza exposure within the last 7 days AND [3] ONE OR MORE respiratory symptoms: cough, sore throat, runny or stuffy nose    Negative: Fever present > 3 days (72 hours)    Negative: [1] Fever returns after gone for over 24 hours AND [2] symptoms worse or not improved    Negative: [1] Continuous (nonstop) coughing interferes with work or school AND [2] no improvement using cough treatment per protocol    Protocols used: CORONAVIRUS (COVID-19) DIAGNOSED OR [id number] 3.

## 2021-08-14 DIAGNOSIS — Z20.822 EXPOSURE TO COVID-19 VIRUS: ICD-10-CM

## 2021-08-14 PROCEDURE — U0005 INFEC AGEN DETEC AMPLI PROBE: HCPCS

## 2021-08-14 PROCEDURE — U0003 INFECTIOUS AGENT DETECTION BY NUCLEIC ACID (DNA OR RNA); SEVERE ACUTE RESPIRATORY SYNDROME CORONAVIRUS 2 (SARS-COV-2) (CORONAVIRUS DISEASE [COVID-19]), AMPLIFIED PROBE TECHNIQUE, MAKING USE OF HIGH THROUGHPUT TECHNOLOGIES AS DESCRIBED BY CMS-2020-01-R: HCPCS

## 2021-08-15 LAB — SARS-COV-2 RNA RESP QL NAA+PROBE: NEGATIVE

## 2021-08-16 NOTE — RESULT ENCOUNTER NOTE
MsAngela Raisa    Your recent test results are attached.  Negative COVID-19 test.    If you have any questions or concerns please contact me via My Chart or call the clinic at [phone]     Thank You  Marti Valiente MD.

## 2021-08-21 ENCOUNTER — LAB (OUTPATIENT)
Dept: LAB | Facility: CLINIC | Age: 26
End: 2021-08-21
Attending: NEUROLOGICAL SURGERY
Payer: COMMERCIAL

## 2021-08-21 DIAGNOSIS — Z11.59 ENCOUNTER FOR SCREENING FOR OTHER VIRAL DISEASES: ICD-10-CM

## 2021-08-21 PROCEDURE — U0003 INFECTIOUS AGENT DETECTION BY NUCLEIC ACID (DNA OR RNA); SEVERE ACUTE RESPIRATORY SYNDROME CORONAVIRUS 2 (SARS-COV-2) (CORONAVIRUS DISEASE [COVID-19]), AMPLIFIED PROBE TECHNIQUE, MAKING USE OF HIGH THROUGHPUT TECHNOLOGIES AS DESCRIBED BY CMS-2020-01-R: HCPCS

## 2021-08-21 PROCEDURE — U0005 INFEC AGEN DETEC AMPLI PROBE: HCPCS

## 2021-08-22 LAB — SARS-COV-2 RNA RESP QL NAA+PROBE: NEGATIVE

## 2021-08-23 ENCOUNTER — ANESTHESIA EVENT (OUTPATIENT)
Dept: SURGERY | Facility: CLINIC | Age: 26
End: 2021-08-23
Payer: COMMERCIAL

## 2021-08-23 NOTE — ANESTHESIA PREPROCEDURE EVALUATION
"Anesthesia Pre-Procedure Evaluation    Patient: Caprice Kline   MRN: [id number] : 1995        Preoperative Diagnosis: Lumbar radiculopathy [M54.16]   Procedure : Procedure(s):  Minimally invasive left Lumbar 5 to Sacral 1 microdiscectomy       No past medical history on file.   No past surgical history on file.   Allergies   Allergen Reactions     Metronidazole Anaphylaxis and Hives     Other reaction(s): Throat swelling     Other Drug Allergy (See Comments) Hives, Itching and Swelling     Throat swelling 6 hrs after exposure  Itching and hives 2 hrs after exposure     Hydrocodone-Acetaminophen Other (See Comments)     Sertraline Other (See Comments)     Patient was foggy and \"high\" feeling      Social History     Tobacco Use     Smoking status: Never Smoker     Smokeless tobacco: Never Used   Substance Use Topics     Alcohol use: Yes      Wt Readings from Last 1 Encounters:   08/10/21 76.5 kg (168 lb 9.6 oz)        Anesthesia Evaluation   Pt has had prior anesthetic.     No history of anesthetic complications       ROS/MED HX  ENT/Pulmonary:    (-) sleep apnea   Neurologic:       Cardiovascular:       METS/Exercise Tolerance:     Hematologic:       Musculoskeletal: Comment: Lumbar radiculopathy      GI/Hepatic:    (-) GERD   Renal/Genitourinary:       Endo:       Psychiatric/Substance Use:       Infectious Disease:       Malignancy:       Other:            Physical Exam    Airway        Mallampati: II   TM distance: > 3 FB   Neck ROM: full   Mouth opening: > 3 cm    Respiratory Devices and Support         Dental     Comment: Baby tooth left lower that is somewhat loose          Cardiovascular   cardiovascular exam normal          Pulmonary   pulmonary exam normal                OUTSIDE LABS:  CBC:   Lab Results   Component Value Date    WBC 3.3 (L) 08/10/2021    HGB 14.4 08/10/2021    HCT 41.7 08/10/2021     08/10/2021     BMP: No results found for: NA, POTASSIUM, CHLORIDE, CO2, BUN, CR, " GLC  COAGS: No results found for: PTT, INR, FIBR  POC: No results found for: BGM, HCG, HCGS  HEPATIC: No results found for: ALBUMIN, PROTTOTAL, ALT, AST, GGT, ALKPHOS, BILITOTAL, BILIDIRECT, MARI  OTHER: No results found for: PH, LACT, A1C, REAM, PHOS, MAG, LIPASE, AMYLASE, TSH, T4, T3, CRP, SED    Anesthesia Plan    ASA Status:  2   NPO Status:  NPO Appropriate    Anesthesia Type: General.     - Airway: ETT   Induction: Intravenous.   Maintenance: Balanced.        Consents    Anesthesia Plan(s) and associated risks, benefits, and realistic alternatives discussed. Questions answered and patient/representative(s) expressed understanding.     - Discussed with:  Patient         Postoperative Care    Pain management: IV analgesics, Multi-modal analgesia.   PONV prophylaxis: Ondansetron (or other 5HT-3), Background Propofol Infusion, Dexamethasone or Solumedrol     Comments:    H&P reviewed            Louie Jesus MD

## 2021-08-24 ENCOUNTER — ANESTHESIA (OUTPATIENT)
Dept: SURGERY | Facility: CLINIC | Age: 26
End: 2021-08-24
Payer: COMMERCIAL

## 2021-08-24 ENCOUNTER — HOSPITAL ENCOUNTER (OUTPATIENT)
Facility: CLINIC | Age: 26
Discharge: HOME | End: 2021-08-24
Attending: NEUROLOGICAL SURGERY | Admitting: NEUROLOGICAL SURGERY
Payer: COMMERCIAL

## 2021-08-24 ENCOUNTER — APPOINTMENT (OUTPATIENT)
Dept: GENERAL RADIOLOGY | Facility: CLINIC | Age: 26
End: 2021-08-24
Attending: NEUROLOGICAL SURGERY
Payer: COMMERCIAL

## 2021-08-24 VITALS
TEMPERATURE: 97.2 F | OXYGEN SATURATION: 96 % | DIASTOLIC BLOOD PRESSURE: 91 MMHG | HEART RATE: 90 BPM | RESPIRATION RATE: 14 BRPM | SYSTOLIC BLOOD PRESSURE: 128 MMHG | BODY MASS INDEX: 24.63 KG/M2 | HEIGHT: 69 IN | WEIGHT: 166.3 LBS

## 2021-08-24 DIAGNOSIS — M54.42 ACUTE BILATERAL LOW BACK PAIN WITH LEFT-SIDED SCIATICA: ICD-10-CM

## 2021-08-24 DIAGNOSIS — M54.16 LUMBAR RADICULOPATHY: ICD-10-CM

## 2021-08-24 DIAGNOSIS — Z98.890 S/P LUMBAR MICRODISCECTOMY: Primary | ICD-10-CM

## 2021-08-24 LAB — HCG UR QL: NEGATIVE

## 2021-08-24 PROCEDURE — 250N000009 HC RX 250: Performed by: NEUROLOGICAL SURGERY

## 2021-08-24 PROCEDURE — 710N000012 HC RECOVERY PHASE 2, PER MINUTE: Performed by: NEUROLOGICAL SURGERY

## 2021-08-24 PROCEDURE — 63030 LAMOT DCMPRN NRV RT 1 LMBR: CPT | Mod: LT | Performed by: NEUROLOGICAL SURGERY

## 2021-08-24 PROCEDURE — 250N000025 HC SEVOFLURANE, PER MIN: Performed by: NEUROLOGICAL SURGERY

## 2021-08-24 PROCEDURE — 250N000011 HC RX IP 250 OP 636: Performed by: ANESTHESIOLOGY

## 2021-08-24 PROCEDURE — 258N000003 HC RX IP 258 OP 636: Performed by: ANESTHESIOLOGY

## 2021-08-24 PROCEDURE — 250N000011 HC RX IP 250 OP 636: Performed by: NURSE ANESTHETIST, CERTIFIED REGISTERED

## 2021-08-24 PROCEDURE — 250N000013 HC RX MED GY IP 250 OP 250 PS 637: Performed by: PHYSICIAN ASSISTANT

## 2021-08-24 PROCEDURE — 81025 URINE PREGNANCY TEST: CPT | Performed by: ANESTHESIOLOGY

## 2021-08-24 PROCEDURE — 272N000001 HC OR GENERAL SUPPLY STERILE: Performed by: NEUROLOGICAL SURGERY

## 2021-08-24 PROCEDURE — 360N000084 HC SURGERY LEVEL 4 W/ FLUORO, PER MIN: Performed by: NEUROLOGICAL SURGERY

## 2021-08-24 PROCEDURE — 710N000009 HC RECOVERY PHASE 1, LEVEL 1, PER MIN: Performed by: NEUROLOGICAL SURGERY

## 2021-08-24 PROCEDURE — 250N000011 HC RX IP 250 OP 636: Performed by: PHYSICIAN ASSISTANT

## 2021-08-24 PROCEDURE — 250N000011 HC RX IP 250 OP 636: Performed by: NEUROLOGICAL SURGERY

## 2021-08-24 PROCEDURE — 999N000179 XR SURGERY CARM FLUORO LESS THAN 5 MIN W STILLS

## 2021-08-24 PROCEDURE — 999N000141 HC STATISTIC PRE-PROCEDURE NURSING ASSESSMENT: Performed by: NEUROLOGICAL SURGERY

## 2021-08-24 PROCEDURE — 370N000017 HC ANESTHESIA TECHNICAL FEE, PER MIN: Performed by: NEUROLOGICAL SURGERY

## 2021-08-24 PROCEDURE — 250N000009 HC RX 250: Performed by: NURSE ANESTHETIST, CERTIFIED REGISTERED

## 2021-08-24 PROCEDURE — 63030 LAMOT DCMPRN NRV RT 1 LMBR: CPT | Mod: AS | Performed by: PHYSICIAN ASSISTANT

## 2021-08-24 PROCEDURE — 250N000013 HC RX MED GY IP 250 OP 250 PS 637: Performed by: ANESTHESIOLOGY

## 2021-08-24 RX ORDER — FENTANYL CITRATE 50 UG/ML
INJECTION, SOLUTION INTRAMUSCULAR; INTRAVENOUS PRN
Status: DISCONTINUED | OUTPATIENT
Start: 2021-08-24 | End: 2021-08-24

## 2021-08-24 RX ORDER — ONDANSETRON 2 MG/ML
INJECTION INTRAMUSCULAR; INTRAVENOUS PRN
Status: DISCONTINUED | OUTPATIENT
Start: 2021-08-24 | End: 2021-08-24

## 2021-08-24 RX ORDER — LABETALOL HYDROCHLORIDE 5 MG/ML
10 INJECTION, SOLUTION INTRAVENOUS
Status: DISCONTINUED | OUTPATIENT
Start: 2021-08-24 | End: 2021-08-24 | Stop reason: HOSPADM

## 2021-08-24 RX ORDER — ACETAMINOPHEN 500 MG
500-1000 TABLET ORAL EVERY 6 HOURS PRN
COMMUNITY
End: 2022-06-22

## 2021-08-24 RX ORDER — HYDROMORPHONE HCL IN WATER/PF 6 MG/30 ML
0.2 PATIENT CONTROLLED ANALGESIA SYRINGE INTRAVENOUS EVERY 5 MIN PRN
Status: DISCONTINUED | OUTPATIENT
Start: 2021-08-24 | End: 2021-08-24 | Stop reason: HOSPADM

## 2021-08-24 RX ORDER — FENTANYL CITRATE 0.05 MG/ML
50 INJECTION, SOLUTION INTRAMUSCULAR; INTRAVENOUS
Status: DISCONTINUED | OUTPATIENT
Start: 2021-08-24 | End: 2021-08-24 | Stop reason: HOSPADM

## 2021-08-24 RX ORDER — METHOCARBAMOL 750 MG/1
750 TABLET, FILM COATED ORAL
Status: COMPLETED | OUTPATIENT
Start: 2021-08-24 | End: 2021-08-24

## 2021-08-24 RX ORDER — HYDROXYZINE HYDROCHLORIDE 10 MG/1
10 TABLET, FILM COATED ORAL
Status: DISCONTINUED | OUTPATIENT
Start: 2021-08-24 | End: 2021-08-24 | Stop reason: HOSPADM

## 2021-08-24 RX ORDER — PROPOFOL 10 MG/ML
INJECTION, EMULSION INTRAVENOUS CONTINUOUS PRN
Status: DISCONTINUED | OUTPATIENT
Start: 2021-08-24 | End: 2021-08-24

## 2021-08-24 RX ORDER — LIDOCAINE HYDROCHLORIDE 20 MG/ML
INJECTION, SOLUTION INFILTRATION; PERINEURAL PRN
Status: DISCONTINUED | OUTPATIENT
Start: 2021-08-24 | End: 2021-08-24

## 2021-08-24 RX ORDER — PROPOFOL 10 MG/ML
INJECTION, EMULSION INTRAVENOUS PRN
Status: DISCONTINUED | OUTPATIENT
Start: 2021-08-24 | End: 2021-08-24

## 2021-08-24 RX ORDER — MEPERIDINE HYDROCHLORIDE 25 MG/ML
12.5 INJECTION INTRAMUSCULAR; INTRAVENOUS; SUBCUTANEOUS
Status: DISCONTINUED | OUTPATIENT
Start: 2021-08-24 | End: 2021-08-24 | Stop reason: HOSPADM

## 2021-08-24 RX ORDER — KETOROLAC TROMETHAMINE 30 MG/ML
15-30 INJECTION, SOLUTION INTRAMUSCULAR; INTRAVENOUS ONCE
Status: DISCONTINUED | OUTPATIENT
Start: 2021-08-24 | End: 2021-08-24 | Stop reason: HOSPADM

## 2021-08-24 RX ORDER — CEFAZOLIN SODIUM 2 G/100ML
2 INJECTION, SOLUTION INTRAVENOUS
Status: COMPLETED | OUTPATIENT
Start: 2021-08-24 | End: 2021-08-24

## 2021-08-24 RX ORDER — BUPIVACAINE HYDROCHLORIDE AND EPINEPHRINE 5; 5 MG/ML; UG/ML
INJECTION, SOLUTION PERINEURAL PRN
Status: DISCONTINUED | OUTPATIENT
Start: 2021-08-24 | End: 2021-08-24 | Stop reason: HOSPADM

## 2021-08-24 RX ORDER — CYCLOBENZAPRINE HCL 5 MG
5-10 TABLET ORAL 3 TIMES DAILY PRN
Qty: 60 TABLET | Refills: 1 | Status: SHIPPED | OUTPATIENT
Start: 2021-08-24 | End: 2022-02-28

## 2021-08-24 RX ORDER — ONDANSETRON 4 MG/1
4 TABLET, ORALLY DISINTEGRATING ORAL
Status: DISCONTINUED | OUTPATIENT
Start: 2021-08-24 | End: 2021-08-24 | Stop reason: HOSPADM

## 2021-08-24 RX ORDER — OXYCODONE HYDROCHLORIDE 5 MG/1
5 TABLET ORAL EVERY 4 HOURS PRN
Status: DISCONTINUED | OUTPATIENT
Start: 2021-08-24 | End: 2021-08-24 | Stop reason: HOSPADM

## 2021-08-24 RX ORDER — AMOXICILLIN 250 MG
1-2 CAPSULE ORAL 2 TIMES DAILY
Qty: 30 TABLET | Refills: 0 | Status: SHIPPED | OUTPATIENT
Start: 2021-08-24 | End: 2021-09-22

## 2021-08-24 RX ORDER — OXYCODONE HYDROCHLORIDE 5 MG/1
5-10 TABLET ORAL
Status: COMPLETED | OUTPATIENT
Start: 2021-08-24 | End: 2021-08-24

## 2021-08-24 RX ORDER — SODIUM CHLORIDE, SODIUM LACTATE, POTASSIUM CHLORIDE, CALCIUM CHLORIDE 600; 310; 30; 20 MG/100ML; MG/100ML; MG/100ML; MG/100ML
INJECTION, SOLUTION INTRAVENOUS CONTINUOUS
Status: DISCONTINUED | OUTPATIENT
Start: 2021-08-24 | End: 2021-08-24 | Stop reason: HOSPADM

## 2021-08-24 RX ORDER — GABAPENTIN 300 MG/1
300 CAPSULE ORAL
Status: COMPLETED | OUTPATIENT
Start: 2021-08-24 | End: 2021-08-24

## 2021-08-24 RX ORDER — ONDANSETRON 2 MG/ML
4 INJECTION INTRAMUSCULAR; INTRAVENOUS EVERY 30 MIN PRN
Status: DISCONTINUED | OUTPATIENT
Start: 2021-08-24 | End: 2021-08-24 | Stop reason: HOSPADM

## 2021-08-24 RX ORDER — FENTANYL CITRATE 50 UG/ML
25 INJECTION, SOLUTION INTRAMUSCULAR; INTRAVENOUS EVERY 5 MIN PRN
Status: DISCONTINUED | OUTPATIENT
Start: 2021-08-24 | End: 2021-08-24 | Stop reason: HOSPADM

## 2021-08-24 RX ORDER — ACETAMINOPHEN 500 MG
1000 TABLET ORAL ONCE
Status: COMPLETED | OUTPATIENT
Start: 2021-08-24 | End: 2021-08-24

## 2021-08-24 RX ORDER — HYDROXYZINE HYDROCHLORIDE 25 MG/1
25 TABLET, FILM COATED ORAL
Status: DISCONTINUED | OUTPATIENT
Start: 2021-08-24 | End: 2021-08-24 | Stop reason: HOSPADM

## 2021-08-24 RX ORDER — ONDANSETRON 4 MG/1
4 TABLET, ORALLY DISINTEGRATING ORAL EVERY 30 MIN PRN
Status: DISCONTINUED | OUTPATIENT
Start: 2021-08-24 | End: 2021-08-24 | Stop reason: HOSPADM

## 2021-08-24 RX ORDER — GLYCOPYRROLATE 0.2 MG/ML
INJECTION, SOLUTION INTRAMUSCULAR; INTRAVENOUS PRN
Status: DISCONTINUED | OUTPATIENT
Start: 2021-08-24 | End: 2021-08-24

## 2021-08-24 RX ORDER — CEFAZOLIN SODIUM 2 G/100ML
2 INJECTION, SOLUTION INTRAVENOUS SEE ADMIN INSTRUCTIONS
Status: DISCONTINUED | OUTPATIENT
Start: 2021-08-24 | End: 2021-08-24 | Stop reason: HOSPADM

## 2021-08-24 RX ORDER — OXYCODONE HYDROCHLORIDE 5 MG/1
5-10 TABLET ORAL EVERY 4 HOURS PRN
Qty: 20 TABLET | Refills: 0 | Status: SHIPPED | OUTPATIENT
Start: 2021-08-24 | End: 2021-09-22

## 2021-08-24 RX ORDER — NEOSTIGMINE METHYLSULFATE 1 MG/ML
VIAL (ML) INJECTION PRN
Status: DISCONTINUED | OUTPATIENT
Start: 2021-08-24 | End: 2021-08-24

## 2021-08-24 RX ORDER — HYDRALAZINE HYDROCHLORIDE 20 MG/ML
2.5-5 INJECTION INTRAMUSCULAR; INTRAVENOUS EVERY 10 MIN PRN
Status: DISCONTINUED | OUTPATIENT
Start: 2021-08-24 | End: 2021-08-24 | Stop reason: HOSPADM

## 2021-08-24 RX ORDER — DEXAMETHASONE SODIUM PHOSPHATE 4 MG/ML
INJECTION, SOLUTION INTRA-ARTICULAR; INTRALESIONAL; INTRAMUSCULAR; INTRAVENOUS; SOFT TISSUE PRN
Status: DISCONTINUED | OUTPATIENT
Start: 2021-08-24 | End: 2021-08-24

## 2021-08-24 RX ORDER — EPHEDRINE SULFATE 50 MG/ML
INJECTION, SOLUTION INTRAMUSCULAR; INTRAVENOUS; SUBCUTANEOUS PRN
Status: DISCONTINUED | OUTPATIENT
Start: 2021-08-24 | End: 2021-08-24

## 2021-08-24 RX ORDER — METHYLPREDNISOLONE ACETATE 40 MG/ML
INJECTION, SUSPENSION INTRA-ARTICULAR; INTRALESIONAL; INTRAMUSCULAR; SOFT TISSUE PRN
Status: DISCONTINUED | OUTPATIENT
Start: 2021-08-24 | End: 2021-08-24 | Stop reason: HOSPADM

## 2021-08-24 RX ADMIN — FENTANYL CITRATE 50 MCG: 50 INJECTION, SOLUTION INTRAMUSCULAR; INTRAVENOUS at 07:56

## 2021-08-24 RX ADMIN — ROCURONIUM BROMIDE 50 MG: 10 INJECTION INTRAVENOUS at 07:45

## 2021-08-24 RX ADMIN — Medication 10 MG: at 08:37

## 2021-08-24 RX ADMIN — HYDROMORPHONE HYDROCHLORIDE 0.2 MG: 0.2 INJECTION, SOLUTION INTRAMUSCULAR; INTRAVENOUS; SUBCUTANEOUS at 10:10

## 2021-08-24 RX ADMIN — PROPOFOL 50 MCG/KG/MIN: 10 INJECTION, EMULSION INTRAVENOUS at 07:56

## 2021-08-24 RX ADMIN — GABAPENTIN 300 MG: 300 CAPSULE ORAL at 06:34

## 2021-08-24 RX ADMIN — OXYCODONE HYDROCHLORIDE 5 MG: 5 TABLET ORAL at 10:12

## 2021-08-24 RX ADMIN — HYDROMORPHONE HYDROCHLORIDE 0.5 MG: 1 INJECTION, SOLUTION INTRAMUSCULAR; INTRAVENOUS; SUBCUTANEOUS at 08:12

## 2021-08-24 RX ADMIN — SODIUM CHLORIDE, POTASSIUM CHLORIDE, SODIUM LACTATE AND CALCIUM CHLORIDE: 600; 310; 30; 20 INJECTION, SOLUTION INTRAVENOUS at 07:43

## 2021-08-24 RX ADMIN — Medication 5 MG: at 08:32

## 2021-08-24 RX ADMIN — LIDOCAINE HYDROCHLORIDE 80 MG: 20 INJECTION, SOLUTION INFILTRATION; PERINEURAL at 07:45

## 2021-08-24 RX ADMIN — NEOSTIGMINE METHYLSULFATE 3 MG: 1 INJECTION, SOLUTION INTRAVENOUS at 08:51

## 2021-08-24 RX ADMIN — ONDANSETRON 4 MG: 2 INJECTION INTRAMUSCULAR; INTRAVENOUS at 08:46

## 2021-08-24 RX ADMIN — HYDROMORPHONE HYDROCHLORIDE 0.2 MG: 0.2 INJECTION, SOLUTION INTRAMUSCULAR; INTRAVENOUS; SUBCUTANEOUS at 09:57

## 2021-08-24 RX ADMIN — PROPOFOL 200 MG: 10 INJECTION, EMULSION INTRAVENOUS at 07:45

## 2021-08-24 RX ADMIN — METHOCARBAMOL 750 MG: 750 TABLET ORAL at 11:08

## 2021-08-24 RX ADMIN — MIDAZOLAM 2 MG: 1 INJECTION INTRAMUSCULAR; INTRAVENOUS at 07:43

## 2021-08-24 RX ADMIN — CEFAZOLIN SODIUM 2 G: 2 INJECTION, SOLUTION INTRAVENOUS at 07:56

## 2021-08-24 RX ADMIN — HYDROMORPHONE HYDROCHLORIDE 0.2 MG: 0.2 INJECTION, SOLUTION INTRAMUSCULAR; INTRAVENOUS; SUBCUTANEOUS at 09:45

## 2021-08-24 RX ADMIN — GLYCOPYRROLATE 0.6 MG: 0.2 INJECTION, SOLUTION INTRAMUSCULAR; INTRAVENOUS at 08:51

## 2021-08-24 RX ADMIN — DEXAMETHASONE SODIUM PHOSPHATE 4 MG: 4 INJECTION, SOLUTION INTRA-ARTICULAR; INTRALESIONAL; INTRAMUSCULAR; INTRAVENOUS; SOFT TISSUE at 07:56

## 2021-08-24 RX ADMIN — FENTANYL CITRATE 50 MCG: 50 INJECTION, SOLUTION INTRAMUSCULAR; INTRAVENOUS at 07:45

## 2021-08-24 RX ADMIN — ACETAMINOPHEN 1000 MG: 500 TABLET, FILM COATED ORAL at 10:12

## 2021-08-24 ASSESSMENT — MIFFLIN-ST. JEOR: SCORE: 1554.74

## 2021-08-24 NOTE — DISCHARGE INSTRUCTIONS
Today you were given 975 mg of Tylenol at 10:12 a.m. The recommended daily maximum dose is 4000 mg.     Same Day Surgery Discharge Instructions for  Sedation and General Anesthesia       It's not unusual to feel dizzy, light-headed or faint for up to 24 hours after surgery or while taking pain medication.  If you have these symptoms: sit for a few minutes before standing and have someone assist you when you get up to walk or use the bathroom.      You should rest and relax for the next 24 hours. We recommend you make arrangements to have an adult stay with you for at least 24 hours after your discharge.  Avoid hazardous and strenuous activity.      DO NOT DRIVE any vehicle or operate mechanical equipment for 24 hours following the end of your surgery.  Even though you may feel normal, your reactions may be affected by the medication you have received.      Do not drink alcoholic beverages for 24 hours following surgery.       Slowly progress to your regular diet as you feel able. It's not unusual to feel nauseated and/or vomit after receiving anesthesia.  If you develop these symptoms, drink clear liquids (apple juice, ginger ale, broth, 7-up, etc. ) until you feel better.  If your nausea and vomiting persists for 24 hours, please notify your surgeon.        All narcotic pain medications, along with inactivity and anesthesia, can cause constipation. Drinking plenty of liquids and increasing fiber intake will help.      For any questions of a medical nature, call your surgeon.      Do not make important decisions for 24 hours.      If you had general anesthesia, you may have a sore throat for a couple of days related to the breathing tube used during surgery.  You may use Cepacol lozenges to help with this discomfort.  If it worsens or if you develop a fever, contact your surgeon.       If you feel your pain is not well managed with the pain medications prescribed by your surgeon, please contact your surgeon's office  to let them know so they can address your concerns.       CoVid 19 Information    We want to give you information regarding Covid. Please consult your primary care provider with any questions you might have.     Patient who have symptoms (cough, fever, or shortness of breath), need to isolate for 7 days from when symptoms started OR 72 hours after fever resolves (without fever reducing medications) AND improvement of respiratory symptoms (whichever is longer).      Isolate yourself at home (in own room/own bathroom if possible)    Do Not allow any visitors    Do Not go to work or school    Do Not go to Taoism,  centers, shopping, or other public places.    Do Not shake hands.    Avoid close and intimate contact with others (hugging, kissing).    Follow CDC recommendations for household cleaning of frequently touched services.     After the initial 7 days, continue to isolate yourself from household members as much as possible. To continue decrease the risk of community spread and exposure, you and any members of your household should limit activities in public for 14 days after starting home isolation.     You can reference the following CDC link for helpful home isolation/care tips:  https://www.cdc.gov/coronavirus/2019-ncov/downloads/10Things.pdf    Protect Others:    Cover Your Mouth and Nose with a mask, disposable tissue or wash cloth to avoid spreading germs to others.    Wash your hands and face frequently with soap and water    Call Your Primary Doctor If: Breathing difficulty develops or you become worse.    For more information about COVID19 and options for caring for yourself at home, please visit the CDC website at https://www.cdc.gov/coronavirus/2019-ncov/about/steps-when-sick.html  For more options for care at [facility], please visit our website at [email or web address]    [facility] at [facility]  Dr. Junior Discharge Instructions  Following Spine Surgery  [phone]  Monday - Friday; 8:00 AM - 4:00 PM    In General:   After you have had surgery on your spine, remember do not twist, or excessively flex or extend the area that you had surgery.  These activities can prevent healing.  Pain is normal and to be expected following surgery.  Please call our office to schedule your appointment follow up appointment.      Bowel Care:  Many people have constipation (hard stools) after surgery.  To help prevent constipation: Drink plenty of fluid (8-10 glasses/day); Eat more fiber, such as whole grain bread, bran cereal, and fruits and vegetables; Stay active by walking; Over the counter stool softener may also help.      Medications:  Spine surgery and pain management is unique to all patients.  You will generally be given medications for pain, muscle spasms or tightness, and for constipation during the immediate post op period.  It is important that you use these as prescribed.  Please remember to bring your pill bottles to all of your appointments. Avoid driving while taking narcotic pain medications.  Avoid alcoholic beverages while taking narcotic pain medications. You can use ice to areas of pain as needed, 20 minutes at a time.  Changing positions and walking will help loosen your muscles as well.  No NSAIDs (Ibuprofen, Advil, Motrin, Aleve, Naproxen) for 7 days.    Driving:  No driving while on narcotic pain medications.  It is state law not to drive while under the influence of a drug to a degree which renders you incapable of safely driving.  The narcotic medication you will be taking after surgery falls under this category.     Activity:   After surgery, most people feel less pain than they have had in a long time.  Walking and light activities will help you regain the use of your muscles.  You are encouraged to walk: start with short walks 5-10 minutes at a time for 4-5 times per day and increase as tolerated.  Stair climbing as tolerated,  we recommend you use the railing. No lifting greater than 10 pounds: approximately equal to one gallon of milk. No twisting, bending in the area you have had surgery. No housework, vacuuming, laundry, leaf raking, lawn mowing, or snow removal. Wear your brace (if ordered) as directed.    Showers:  If you have sutures or staples you may shower two days after surgery. It is ok to let water run over your incision but do not touch or scrub on the incision. Pat dry immediately after showering. If there is a dressing in place, you may remove it 2 days after surgery. If you were closed with Derma grace (glue), you may shower without covering the incision. No baths, hot tubs, or pool activity for at least 6 weeks.     Nutrition:  In general, your diet restrictions will not change with your surgery.  You may need to eat small frequent meals initially until your appetite returns.  Eat plenty of high fiber foods and drink plenty of fluids. If you do not have a fluid restriction from or prior to surgery, we recommend 6-8 (8oz) glasses of water per day. Other fluids are fine, but water is best. Nausea is not uncommon; it is a common side effect to many pain medications.  We recommend that you take the pain medications with food, if this does not improve your symptoms, please call us.     Smoking:  For proper healing it is required that you quit using all tobacco products.  This includes smoking, chewing, nicotine gums, and nicotine patches.  Follow-Up Appointment  Neurosurgery Appointment with Hazel Short PA-C or Hernan Major PA-C at the clinic in 6 weeks.  Call [phone] for appointment.  Call Dr. Junior at [phone] if these occur: Drainage from your incision, increased pain/redness/swelling, temperatures greater than 101.5, increased leg pain or swelling or unrelieved headaches    Go to the nearest Emergency Room if you experience: chest pain, shortness of breath, neck swelling or swallowing problems

## 2021-08-24 NOTE — ANESTHESIA CARE TRANSFER NOTE
Patient: Caprice Kline    Procedure(s):  Minimally invasive left Lumbar 5 to Sacral 1 microdiscectomy      Diagnosis: Lumbar radiculopathy [M54.16]  Diagnosis Additional Information: No value filed.    Anesthesia Type:   General     Note:    Oropharynx: oral airway in place and spontaneously breathing  Level of Consciousness: drowsy  Oxygen Supplementation: face mask  Level of Supplemental Oxygen (L/min / FiO2): 8  Independent Airway: airway patency satisfactory and stable  Dentition: dentition unchanged  Vital Signs Stable: post-procedure vital signs reviewed and stable  Report to RN Given: handoff report given  Patient transferred to: PACU  Comments: Neuromuscular blockade reversed after TOF 4/4, spontaneous respirations, adequate tidal volumes, followed commands to voice, oropharynx suctioned with soft flexible catheter, extubated atraumatically, extubated with suction, airway patent after extubation.  Oxygen via facemask at 8 liters per minute to PACU. Oxygen tubing connected to wall O2 in PACU, SpO2, NiBP, and EKG monitors and alarms on and functioning, Leonard Hugger warmer connected to patient gown, report on patient's clinical status given to PACU RN, RN questions answered.     Handoff Report: Identifed the Patient, Identified the Reponsible Provider, Reviewed the pertinent medical history, Discussed the surgical course, Reviewed Intra-OP anesthesia mangement and issues during anesthesia, Set expectations for post-procedure period and Allowed opportunity for questions and acknowledgement of understanding      Vitals:  Vitals Value Taken Time   BP     Temp     Pulse     Resp     SpO2         Electronically Signed By: JULIUS Rudolph CRNA  August 24, 2021  9:15 AM

## 2021-08-24 NOTE — ANESTHESIA PROCEDURE NOTES
Airway       Patient location during procedure: OR       Procedure Start/Stop Times: 8/24/2021 7:49 AM  Staff -        CRNA: Deandre Perez APRN CRNA       Performed By: CRNA  Consent for Airway        Urgency: elective  Indications and Patient Condition       Indications for airway management: immanuel-procedural       Induction type:intravenous       Mask difficulty assessment: 2 - vent by mask + OA or adjuvant +/- NMBA    Final Airway Details       Final airway type: endotracheal airway       Successful airway: ETT - single  Endotracheal Airway Details        ETT size (mm): 7.0       Cuffed: yes       Successful intubation technique: video laryngoscopy (elective)       VL Blade Size: Mcdonald 3       Grade View of Cords: 1       Adjucts: stylet       Position: Right       Measured from: lips       Secured at (cm): 22       Bite block used: Soft    Post intubation assessment        Placement verified by: capnometry and equal breath sounds        Number of attempts at approach: 1       Number of other approaches attempted: 0       Secured with: pink tape and plastic tape       Ease of procedure: easy       Dentition: Intact and Unchanged

## 2021-08-24 NOTE — ANESTHESIA POSTPROCEDURE EVALUATION
Patient: Caprice Kline    Procedure(s):  Minimally invasive left Lumbar 5 to Sacral 1 microdiscectomy      Diagnosis:Lumbar radiculopathy [M54.16]  Diagnosis Additional Information: No value filed.    Anesthesia Type:  General    Note:  Disposition: Outpatient   Postop Pain Control: Uneventful            Sign Out: Well controlled pain   PONV: No   Neuro/Psych: Uneventful            Sign Out: Acceptable/Baseline neuro status   Airway/Respiratory: Uneventful            Sign Out: Acceptable/Baseline resp. status   CV/Hemodynamics: Uneventful            Sign Out: Acceptable CV status; No obvious hypovolemia; No obvious fluid overload   Other NRE: NONE   DID A NON-ROUTINE EVENT OCCUR? No           Last vitals:  Vitals Value Taken Time   /86 08/24/21 1030   Temp 36.2  C (97.2  F) 08/24/21 1010   Pulse 89 08/24/21 1030   Resp 17 08/24/21 1030   SpO2 98 % 08/24/21 1030   Vitals shown include unvalidated device data.    Electronically Signed By: Louie Jesus MD  August 24, 2021  2:54 PM

## 2021-08-24 NOTE — OP NOTE
Date of surgery: 8/24/2021    Surgeon: Destin Junior MD  Assistant: SAUL Diane  Note: Hernan Major was present for and assisted with the entire surgery, and his/her role as an assistant was crucial for aid in positioning, exposure, suctioning, retraction, and closure    Preoperative diagnosis: Lumbar disc herniation  Postoperative diagnosis: Lumbar disc herniation    Procedure:  1.  Left L5-S1 MIS microdiscectomy  2.  Use of Medtronic MetrNusirt minimally invasive system  3.  Use of intraoperative microscope and fluoroscopy    EBL: 25 mL    Indications: 25 year-old female who presented with severe leg pain and disc herniation.  Underwent non-operative management with failure to improve.  Risks, benefits, indications, and alternatives were discussed with the patient and family in detail, and they wished to proceed.    Description of surgery: The patient was positioned prone.  Sterile prepping and draping procedures were performed.  Antibiotics were administered and timeout was performed.  A paramedian lumbar incision was performed.  The minimally invasive dilating system was used to dock on the hemilamina.  L5-S1 laminotomies and medial facetectomy were performed with the high speed drill.  The kerrison rongeur was used to remove the ligamentum flavum, and the thecal sac and nerve root were exposed.  The nerve root was retracted medially, and the extruded disk fragment was removed with the pituitary rongeurs.  Hemostasis was achieved and antibiotic irrigation was performed.  The fascia was closed with 0-Vicryl sutures, and the dermal layer was closed with 2-0 vicryl sutures.  The skin was closed with a running subcuticular stitch.  There were no intraprocedural complications.

## 2021-08-24 NOTE — OR NURSING
Dr. Junior requested the OR room temperature to be lowered below the 68-72 degree level.  Request submitted to [facility] facilities to accommodate this request.

## 2021-08-25 ENCOUNTER — TELEPHONE (OUTPATIENT)
Dept: NEUROSURGERY | Facility: CLINIC | Age: 26
End: 2021-08-25

## 2021-08-25 NOTE — TELEPHONE ENCOUNTER
Post-Op Call    DOS: 8/24/21  Surgery: Minimally invasive left Lumbar 5 to Sacral 1 microdiscectomy    Surgeon: Dr. Junior     Called patient for post-operative follow-up.     Attempted to reach out to patient, no answer. Left voice message for patient to call clinic back to further discuss.

## 2021-09-09 ENCOUNTER — VIRTUAL VISIT (OUTPATIENT)
Dept: NEUROSURGERY | Facility: CLINIC | Age: 26
End: 2021-09-09
Attending: NEUROLOGICAL SURGERY
Payer: COMMERCIAL

## 2021-09-09 ENCOUNTER — MYC MEDICAL ADVICE (OUTPATIENT)
Dept: NEUROSURGERY | Facility: CLINIC | Age: 26
End: 2021-09-09

## 2021-09-09 ENCOUNTER — TELEPHONE (OUTPATIENT)
Dept: NEUROSURGERY | Facility: CLINIC | Age: 26
End: 2021-09-09

## 2021-09-09 VITALS — BODY MASS INDEX: 25.16 KG/M2 | WEIGHT: 166 LBS | HEIGHT: 68 IN

## 2021-09-09 DIAGNOSIS — Z98.890 S/P LUMBAR MICRODISCECTOMY: Primary | ICD-10-CM

## 2021-09-09 DIAGNOSIS — M54.42 ACUTE BILATERAL LOW BACK PAIN WITH LEFT-SIDED SCIATICA: ICD-10-CM

## 2021-09-09 PROCEDURE — 999N000103 HC STATISTIC NO CHARGE FACILITY FEE: Mod: TEL

## 2021-09-09 PROCEDURE — 99207 PR NO CHARGE NURSE ONLY: CPT

## 2021-09-09 ASSESSMENT — MIFFLIN-ST. JEOR: SCORE: 1536.47

## 2021-09-09 ASSESSMENT — PAIN SCALES - GENERAL: PAINLEVEL: MILD PAIN (3)

## 2021-09-09 NOTE — PROGRESS NOTES
Patient presents for 2 week incision nurse visit check.     DOS: 8/24/21  Procedure: Minimally invasive left Lumbar 5 to Sacral 1 microdiscectomy    Surgeon: Destin Junior MD       Pt is doing well she states the first week was a little difficult but walking has helped. Pain is described as a sore muscle pain.  States she is getting used to using more muscles that she has not been using prior to surgery. Her pain is averaging a 2-3/10.    Tylenol BID and Flexeril at night. She has been off the narcotics for a week.     Encouraged icing for at least 5-7 times throughout the day for 20-30 minutes at a time, avoiding heat to the incision area. Discussed maximum dose of Acetaminophen in 24 hours.    Patient is walking frequently without difficulty. Legs examined in clinic; no redness, swelling, or warmth noted. Patient denies any pain in bilateral calves. Activity restrictions reinforced at this time as outlined the After Visit Summary.     Patient's appetite is normal  Bowel/bladder problems? No  Taking stool softeners? No  Discussed reasons for constipation post-operatively and encouraged stool softeners while taking narcotic pain medication.     Patient denies signs of infection at incision site. Incision inspected by boyfriend. Edges well-approximated. No redness, swelling, drainage, or warmth noted. Steri strips have all fallen off. Pt will send a picture through my chart of her incision.     Refills given at this appointment? Yes  Sent for x-rays after this appointment? No  Return to work discussed at this appointment? 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     All of patient's questions addressed today. She was instructed to call with any additional questions/concerns.

## 2021-09-09 NOTE — PATIENT INSTRUCTIONS
Instructions for Patient    Keep your incision clean and dry at all times.     No bathing, swimming, or submerging in water until incision is well healed.      No lifting greater than 10-15 pounds. No bending, twisting, or overhead reaching.    Return in 4 weeks for follow up appointment    For refills, please call our clinic. A nurse will call you back to obtain a pain assessment. Please call 3-4 business days before you run out so we can ensure there is a provider available at the location you prefer for .    Call the clinic or go to Emergency Room if you develop any new pain, drainage, swelling, or fever. Go to the Emergency Room if sudden onset of severe headache, weakness, confusion, change in level of consciousness, pain, or loss of movement.    Post-operative appointments    You will need a 6 week post op, 3 months post op, 6 months post op, 1 year post-op appointments     Please call clinic with any further questions or concerns    [facility]  [address] [address]  T:  [phone]  F:  [phone]

## 2021-09-09 NOTE — LETTER
9/9/2021         RE: Caprice Kline  [address] [address] [facility] [address]        Dear Colleague,    Thank you for referring your patient, Caprice Kline, to the [facility]. Please see a copy of my visit note below.    Patient presents for 2 week incision nurse visit check.     DOS: 8/24/21  Procedure: Minimally invasive left Lumbar 5 to Sacral 1 microdiscectomy    Surgeon: Destin Junior MD       Pt is doing well she states the first week was a little difficult but walking has helped. Pain is described as a sore muscle pain.  States she is getting used to using more muscles that she has not been using prior to surgery. Her pain is averaging a 2-3/10.    Tylenol BID and Flexeril at night. She has been off the narcotics for a week.     Encouraged icing for at least 5-7 times throughout the day for 20-30 minutes at a time, avoiding heat to the incision area. Discussed maximum dose of Acetaminophen in 24 hours.    Patient is walking frequently without difficulty. Legs examined in clinic; no redness, swelling, or warmth noted. Patient denies any pain in bilateral calves. Activity restrictions reinforced at this time as outlined the After Visit Summary.     Patient's appetite is normal  Bowel/bladder problems? No  Taking stool softeners? No  Discussed reasons for constipation post-operatively and encouraged stool softeners while taking narcotic pain medication.     Patient denies signs of infection at incision site. Incision inspected by boyfriend. Edges well-approximated. No redness, swelling, drainage, or warmth noted. Steri strips have all fallen off. Pt will send a picture through my chart of her incision.     Refills given at this appointment? Yes  Sent for x-rays after this appointment? No  Return to work discussed at this appointment? 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     All of patient's questions addressed  today. She was instructed to call with any additional questions/concerns.           Again, thank you for allowing me to participate in the care of your patient.        Sincerely,         Neurosurgery Nurse

## 2021-09-13 ENCOUNTER — MYC MEDICAL ADVICE (OUTPATIENT)
Dept: NEUROSURGERY | Facility: CLINIC | Age: 26
End: 2021-09-13

## 2021-09-13 DIAGNOSIS — Z98.890 S/P LUMBAR MICRODISCECTOMY: Primary | ICD-10-CM

## 2021-09-13 RX ORDER — METHYLPREDNISOLONE 4 MG
TABLET, DOSE PACK ORAL
Qty: 21 TABLET | Refills: 0 | Status: SHIPPED | OUTPATIENT
Start: 2021-09-13 | End: 2021-09-22

## 2021-09-13 NOTE — TELEPHONE ENCOUNTER
Per Harriet Adamson CNP, patient should take a Medrol Dosepack, then if symptoms don't get better, patient should come back in for evaluation.    Patient inform and agrees with the plan. Dosepack ordered and sent to pharmacy.    Ariana Holliday RN on 9/13/2021 at 4:33 PM

## 2021-09-19 ENCOUNTER — MYC MEDICAL ADVICE (OUTPATIENT)
Dept: NEUROSURGERY | Facility: CLINIC | Age: 26
End: 2021-09-19

## 2021-09-22 ENCOUNTER — OFFICE VISIT (OUTPATIENT)
Dept: NEUROSURGERY | Facility: CLINIC | Age: 26
End: 2021-09-22
Attending: NURSE PRACTITIONER
Payer: COMMERCIAL

## 2021-09-22 VITALS
HEART RATE: 85 BPM | TEMPERATURE: 97.7 F | SYSTOLIC BLOOD PRESSURE: 139 MMHG | WEIGHT: 170 LBS | OXYGEN SATURATION: 99 % | BODY MASS INDEX: 25.85 KG/M2 | DIASTOLIC BLOOD PRESSURE: 72 MMHG

## 2021-09-22 DIAGNOSIS — Z98.890 S/P LUMBAR MICRODISCECTOMY: Primary | ICD-10-CM

## 2021-09-22 PROCEDURE — G0463 HOSPITAL OUTPT CLINIC VISIT: HCPCS

## 2021-09-22 PROCEDURE — 99024 POSTOP FOLLOW-UP VISIT: CPT | Performed by: NURSE PRACTITIONER

## 2021-09-22 ASSESSMENT — PAIN SCALES - GENERAL: PAINLEVEL: SEVERE PAIN (6)

## 2021-09-22 NOTE — NURSING NOTE
"Caprice Kline is a 26 year old female who presents for:  Chief Complaint   Patient presents with     RECHECK     LBP; post surgery        Initial Vitals:  /72   Pulse 85   Temp 97.7  F (36.5  C)   Wt 170 lb (77.1 kg)   SpO2 99%   BMI 25.85 kg/m   Estimated body mass index is 25.85 kg/m  as calculated from the following:    Height as of 9/9/21: 5' 8\" (1.727 m).    Weight as of this encounter: 170 lb (77.1 kg).. Body surface area is 1.92 meters squared. BP completed using cuff size: regular  Severe Pain (6)    Nile Raymond MA    "

## 2021-09-22 NOTE — PATIENT INSTRUCTIONS
-Lumbar MRI ordered. Please contact [phone] to schedule. I will contact you with the results and further recommendations.  -Please contact our clinic with questions or concerns at [phone].

## 2021-09-22 NOTE — PROGRESS NOTES
[facility] Neurosurgery  Neurosurgery Follow Up:    HPI: 1 month s/p MIS left L5-S1 microdiscectomy with Dr. Junior on 8/24/2021. Was doing well with complete resolution of preoperative symptoms until 9/10/2021. She denies injury at the onset. She reports acute onset of left-sided low back pain that radiates down her left posterior thigh to the foot. She reports intermittent paresthesias in her left leg as well that disrupt her sleep. She reports some weakness in her leg as well. She had a MDP with no benefit. She states symptoms continue without improvement since onset.     Medical, surgical, family, and social history unchanged since prior exam.  Exam:  Constitutional:  Alert, well nourished, NAD.  HEENT: Normocephalic, atraumatic.   Pulm:  Without shortness of breath   CV:  No pitting edema of BLE.      Vital Signs:  /72   Pulse 85   Temp 97.7  F (36.5  C)   Wt 170 lb (77.1 kg)   SpO2 99%   BMI 25.85 kg/m      Neurological:  Awake  Alert  Oriented x 3  Motor exam:        IP Q DF PF EHL  R   5  5   5   5    5  L   4  5   5   5    5     Able to spontaneously move L/E bilaterally  Sensation intact throughout all L/E dermatomes    A/P: acute onset pain s/p MIS left L5-S1 microdiscectomy. Due to acute onset after complete resolution of symptoms, would recommend lumbar MRI at this time. I will contact her with the results and further recommendations. She verbalized understanding and agreement.  Patient Instructions   -Lumbar MRI ordered. Please contact [phone] to schedule. I will contact you with the results and further recommendations.  -Please contact our clinic with questions or concerns at [phone].      Harriet Adamson, THOMPSON  [facility]surgery  [address] [place] [address]  [address] [place] [address]  Tel [phone]  Fax [phone]

## 2021-09-22 NOTE — LETTER
9/22/2021         RE: Caprice Kline  [address] [facility] [address]        Dear Colleague,    Thank you for referring your patient, Caprice Kline, to the [facility] [place]. Please see a copy of my visit note below.    [facility]surgery  Neurosurgery Follow Up:    HPI: 1 month s/p MIS left L5-S1 microdiscectomy with Dr. Junior on 8/24/2021. Was doing well with complete resolution of preoperative symptoms until 9/10/2021. She denies injury at the onset. She reports acute onset of left-sided low back pain that radiates down her left posterior thigh to the foot. She reports intermittent paresthesias in her left leg as well that disrupt her sleep. She reports some weakness in her leg as well. She had a MDP with no benefit. She states symptoms continue without improvement since onset.     Medical, surgical, family, and social history unchanged since prior exam.  Exam:  Constitutional:  Alert, well nourished, NAD.  HEENT: Normocephalic, atraumatic.   Pulm:  Without shortness of breath   CV:  No pitting edema of BLE.      Vital Signs:  /72   Pulse 85   Temp 97.7  F (36.5  C)   Wt 170 lb (77.1 kg)   SpO2 99%   BMI 25.85 kg/m      Neurological:  Awake  Alert  Oriented x 3  Motor exam:        IP Q DF PF EHL  R   5  5   5   5    5  L   4  5   5   5    5     Able to spontaneously move L/E bilaterally  Sensation intact throughout all L/E dermatomes    A/P: acute onset pain s/p MIS left L5-S1 microdiscectomy. Due to acute onset after complete resolution of symptoms, would recommend lumbar MRI at this time. I will contact her with the results and further recommendations. She verbalized understanding and agreement.  Patient Instructions   -Lumbar MRI ordered. Please contact [phone] to schedule. I will contact you with the results and further recommendations.  -Please contact our clinic with questions or concerns at [phone].      Harriet Adamson, THOMPSON  M  [facility]surgery  [address] [place] [address]  Tel [phone]  Fax [phone]        Again, thank you for allowing me to participate in the care of your patient.        Sincerely,        Harriet Adamson NP

## 2021-09-26 ENCOUNTER — HEALTH MAINTENANCE LETTER (OUTPATIENT)
Age: 26
End: 2021-09-26

## 2021-09-27 ENCOUNTER — ANCILLARY PROCEDURE (OUTPATIENT)
Dept: MRI IMAGING | Facility: CLINIC | Age: 26
End: 2021-09-27
Attending: NURSE PRACTITIONER
Payer: COMMERCIAL

## 2021-09-27 DIAGNOSIS — Z98.890 S/P LUMBAR MICRODISCECTOMY: ICD-10-CM

## 2021-09-27 PROCEDURE — A9585 GADOBUTROL INJECTION: HCPCS | Mod: JW | Performed by: RADIOLOGY

## 2021-09-27 PROCEDURE — 72158 MRI LUMBAR SPINE W/O & W/DYE: CPT | Mod: TC | Performed by: RADIOLOGY

## 2021-09-27 RX ORDER — GADOBUTROL 604.72 MG/ML
7.5 INJECTION INTRAVENOUS ONCE
Status: COMPLETED | OUTPATIENT
Start: 2021-09-27 | End: 2021-09-27

## 2021-09-27 RX ADMIN — GADOBUTROL 7.5 ML: 604.72 INJECTION INTRAVENOUS at 12:58

## 2021-09-28 DIAGNOSIS — M54.16 LUMBAR RADICULOPATHY: ICD-10-CM

## 2021-09-28 DIAGNOSIS — Z98.890 S/P LUMBAR MICRODISCECTOMY: Primary | ICD-10-CM

## 2021-10-04 ENCOUNTER — MYC MEDICAL ADVICE (OUTPATIENT)
Dept: NEUROSURGERY | Facility: CLINIC | Age: 26
End: 2021-10-04

## 2021-10-08 ENCOUNTER — THERAPY VISIT (OUTPATIENT)
Dept: PHYSICAL THERAPY | Facility: CLINIC | Age: 26
End: 2021-10-08
Payer: COMMERCIAL

## 2021-10-08 DIAGNOSIS — M54.42 CHRONIC LEFT-SIDED LOW BACK PAIN WITH LEFT-SIDED SCIATICA: ICD-10-CM

## 2021-10-08 DIAGNOSIS — M54.16 LUMBAR RADICULOPATHY: ICD-10-CM

## 2021-10-08 DIAGNOSIS — Z98.890 S/P LUMBAR MICRODISCECTOMY: ICD-10-CM

## 2021-10-08 DIAGNOSIS — G89.29 CHRONIC LEFT-SIDED LOW BACK PAIN WITH LEFT-SIDED SCIATICA: ICD-10-CM

## 2021-10-08 DIAGNOSIS — Z47.89 AFTERCARE FOLLOWING SURGERY OF THE MUSCULOSKELETAL SYSTEM: ICD-10-CM

## 2021-10-08 PROCEDURE — 97162 PT EVAL MOD COMPLEX 30 MIN: CPT | Mod: GP | Performed by: PHYSICAL THERAPIST

## 2021-10-08 PROCEDURE — 97110 THERAPEUTIC EXERCISES: CPT | Mod: GP | Performed by: PHYSICAL THERAPIST

## 2021-10-08 NOTE — PROGRESS NOTES
"Physical Therapy Initial Evaluation  Subjective:  The history is provided by the patient. No  was used.   Patient Health History  Caprice Kline being seen for Physical therapy for pain after back surgery.     Problem began: 8/24/2021.   Problem occurred: Surgery   Pain is reported as 6/10 on pain scale.  General health as reported by patient is good.  Pertinent medical history includes: mental illness, migraines/headaches, numbness/tingling, pain at night/rest and weakness.     Medical allergies: none and other. Other medical allergies details: In medications shown on my chart.   Surgeries include:  Other. Other surgery history details: Microdiscectomy.    Current medications:  Muscle relaxants, sleep medication and other. Other medications details: Tylenol.    Current occupation is / manager.   Primary job tasks include:  Lifting/carrying, prolonged standing, repetitive tasks and other.   Other job/home tasks details: Teisting, bending.                Therapist Generated HPI Evaluation  Problem details: Pt reports pre surgically was \"walking slanted\" with constant pain up to a 10 and led to 08/24/2021 L5/S1 decompression.  Pt reports had two good weeks after surgery.  Has had return of L lower extremity symptoms that feel different compared to before surgery..         Type of problem:  Lumbar.    This is a chronic condition.  Condition occurred with:  Insidious onset.  Where condition occurred: for unknown reasons.  Patient reports pain:  Lumbar spine left.  and is constant.  Pain radiates to:  Knee left, thigh left and gluteals left (and occasionally to calf but usually only while lying down). Pain is the same all the time.  Since onset symptoms are gradually worsening.  Exacerbated by: L sidelying, prolonged sitting, prolonged walking.  Relieved by: ice, walking.  Imaging testing: see imaging in chart.    Restrictions due to condition include:  Currently not working due to present " "treatment.      Pt states her goal is to \"not be in pain.\"    Oswestry Score: (P) 38 %                 Objective:  Standing Alignment:        Lumbar deviations alignment: midline incisional scar tender to palpation.                           Lumbar/SI Evaluation  ROM:      Strength: TA MMT 1/5  Lumbar Myotomes:    T12-L3 (Hip Flex):  Left: 4+    Right: 5  L2-4 (Quads):  Left:  4+    Right:  5  L4 (Ankle DF):  Left:  4+    Right:  5  L5 (Great Toe Ext): Left: 4+    Right: 5   S1 (Toe Raise):  Left: 4+    Right: 5  Lumbar DTR's:    L4 (Quad):  Left:  2   Right:  2  S1 (Achilles):  Left:  2   Right:  2    Lumbar Dermtomes:  Lumbar dermatomes: sensation to light touch grossly WNL R but non dermatomaly pattern of reduced sensation L lower extremity diffusely.                Neural Tension/Mobility:    Left side:  SLR and SLR w/DF positive.     Right side:   SLR w/DF or SLR  negative.   Lumbar Palpation:  Palpation (lumbar): diffuse tenderness to palpation without precision L lower back.        Lumbar Provocation:      Left negative with:  PROM hip    Right negative with:  PROM hip                                                   [place] Lumbar Evaluation    Posture:  Sitting: fair  Standing: fair  Lordosis: Reduced  Lateral Shift: no  Correction of Posture: no effect    Movement Loss:  Flexion (Flex): major and pain  Extension (EXT): major and pain  Side Alexander R (SG R): major  Side Glide L (SG L): major and pain                                               ROS    Assessment/Plan:    Patient is a 26 year old female with lumbar complaints.    Patient has the following significant findings with corresponding treatment plan.                Diagnosis 1:  Back, leg pain s/p lumbar decompression  Pain -  hot/cold therapy  Decreased ROM/flexibility - manual therapy and therapeutic exercise  Decreased strength - therapeutic exercise and therapeutic activities  Decreased function - therapeutic activities  Impaired posture - " neuro re-education    Therapy Evaluation Codes:   1) History comprised of:   Personal factors that impact the plan of care:      Age, Time since onset of symptoms and Work status.    Comorbidity factors that impact the plan of care are:      Mental illness.     Medications impacting care: Muscle relaxant and Pain.  2) Examination of Body Systems comprised of:   Body structures and functions that impact the plan of care:      Lumbar spine.   Activity limitations that impact the plan of care are:      Sitting, Walking and Sleeping.  3) Clinical presentation characteristics are:   Evolving/Changing.  4) Decision-Making    Moderate complexity using standardized patient assessment instrument and/or measureable assessment of functional outcome.  Cumulative Therapy Evaluation is: Moderate complexity.    Previous and current functional limitations:  (See Goal Flow Sheet for this information)    Short term and Long term goals: (See Goal Flow Sheet for this information)     Communication ability:  Patient appears to be able to clearly communicate and understand verbal and written communication and follow directions correctly.  Treatment Explanation - The following has been discussed with the patient:   RX ordered/plan of care  Anticipated outcomes  Possible risks and side effects  This patient would benefit from PT intervention to resume normal activities.   Rehab potential is good.    Frequency:  1 X week, once daily  Duration:  for 8 weeks  Discharge Plan:  Achieve all LTG.  Independent in home treatment program.  Reach maximal therapeutic benefit.    Please refer to the daily flowsheet for treatment today, total treatment time and time spent performing 1:1 timed codes.

## 2021-10-08 NOTE — Clinical Note
Pt will be following with you based upon schedule.  Will plan to connect with you before you see her.

## 2021-10-13 ENCOUNTER — THERAPY VISIT (OUTPATIENT)
Dept: PHYSICAL THERAPY | Facility: CLINIC | Age: 26
End: 2021-10-13
Payer: COMMERCIAL

## 2021-10-13 DIAGNOSIS — M54.42 CHRONIC LEFT-SIDED LOW BACK PAIN WITH LEFT-SIDED SCIATICA: ICD-10-CM

## 2021-10-13 DIAGNOSIS — G89.29 CHRONIC LEFT-SIDED LOW BACK PAIN WITH LEFT-SIDED SCIATICA: ICD-10-CM

## 2021-10-13 DIAGNOSIS — Z47.89 AFTERCARE FOLLOWING SURGERY OF THE MUSCULOSKELETAL SYSTEM: ICD-10-CM

## 2021-10-13 PROCEDURE — 97112 NEUROMUSCULAR REEDUCATION: CPT | Mod: GP | Performed by: PHYSICAL THERAPIST

## 2021-10-13 PROCEDURE — 97110 THERAPEUTIC EXERCISES: CPT | Mod: GP | Performed by: PHYSICAL THERAPIST

## 2021-10-13 ASSESSMENT — PATIENT HEALTH QUESTIONNAIRE - PHQ9: SUM OF ALL RESPONSES TO PHQ QUESTIONS 1-9: 18

## 2021-10-14 ASSESSMENT — PATIENT HEALTH QUESTIONNAIRE - PHQ9: SUM OF ALL RESPONSES TO PHQ QUESTIONS 1-9: 18

## 2021-10-15 ENCOUNTER — OFFICE VISIT (OUTPATIENT)
Dept: FAMILY MEDICINE | Facility: OTHER | Age: 26
End: 2021-10-15
Payer: COMMERCIAL

## 2021-10-15 VITALS
OXYGEN SATURATION: 94 % | HEART RATE: 99 BPM | HEIGHT: 69 IN | SYSTOLIC BLOOD PRESSURE: 126 MMHG | RESPIRATION RATE: 14 BRPM | BODY MASS INDEX: 26.07 KG/M2 | DIASTOLIC BLOOD PRESSURE: 84 MMHG | TEMPERATURE: 99.2 F | WEIGHT: 176 LBS

## 2021-10-15 DIAGNOSIS — F33.2 SEVERE EPISODE OF RECURRENT MAJOR DEPRESSIVE DISORDER, WITHOUT PSYCHOTIC FEATURES (H): ICD-10-CM

## 2021-10-15 DIAGNOSIS — Z00.00 ROUTINE GENERAL MEDICAL EXAMINATION AT A HEALTH CARE FACILITY: Primary | ICD-10-CM

## 2021-10-15 DIAGNOSIS — D72.819 LEUKOPENIA, UNSPECIFIED TYPE: ICD-10-CM

## 2021-10-15 DIAGNOSIS — Z28.21 INFLUENZA VACCINATION DECLINED BY PATIENT: ICD-10-CM

## 2021-10-15 DIAGNOSIS — Z12.4 PAP SMEAR FOR CERVICAL CANCER SCREENING: ICD-10-CM

## 2021-10-15 LAB
BASOPHILS # BLD AUTO: 0 10E3/UL (ref 0–0.2)
BASOPHILS NFR BLD AUTO: 1 %
EOSINOPHIL # BLD AUTO: 0.1 10E3/UL (ref 0–0.7)
EOSINOPHIL NFR BLD AUTO: 2 %
ERYTHROCYTE [DISTWIDTH] IN BLOOD BY AUTOMATED COUNT: 12.8 % (ref 10–15)
HCT VFR BLD AUTO: 40.7 % (ref 35–47)
HGB BLD-MCNC: 14.2 G/DL (ref 11.7–15.7)
LYMPHOCYTES # BLD AUTO: 1.5 10E3/UL (ref 0.8–5.3)
LYMPHOCYTES NFR BLD AUTO: 36 %
MCH RBC QN AUTO: 30.9 PG (ref 26.5–33)
MCHC RBC AUTO-ENTMCNC: 34.9 G/DL (ref 31.5–36.5)
MCV RBC AUTO: 89 FL (ref 78–100)
MONOCYTES # BLD AUTO: 0.4 10E3/UL (ref 0–1.3)
MONOCYTES NFR BLD AUTO: 10 %
NEUTROPHILS # BLD AUTO: 2.1 10E3/UL (ref 1.6–8.3)
NEUTROPHILS NFR BLD AUTO: 51 %
PLATELET # BLD AUTO: 263 10E3/UL (ref 150–450)
RBC # BLD AUTO: 4.6 10E6/UL (ref 3.8–5.2)
WBC # BLD AUTO: 4.2 10E3/UL (ref 4–11)

## 2021-10-15 PROCEDURE — 36415 COLL VENOUS BLD VENIPUNCTURE: CPT | Performed by: PHYSICIAN ASSISTANT

## 2021-10-15 PROCEDURE — 99213 OFFICE O/P EST LOW 20 MIN: CPT | Mod: 25 | Performed by: PHYSICIAN ASSISTANT

## 2021-10-15 PROCEDURE — G0145 SCR C/V CYTO,THINLAYER,RESCR: HCPCS | Performed by: PHYSICIAN ASSISTANT

## 2021-10-15 PROCEDURE — 99395 PREV VISIT EST AGE 18-39: CPT | Performed by: PHYSICIAN ASSISTANT

## 2021-10-15 PROCEDURE — 85025 COMPLETE CBC W/AUTO DIFF WBC: CPT | Performed by: PHYSICIAN ASSISTANT

## 2021-10-15 RX ORDER — FLUOXETINE 10 MG/1
CAPSULE ORAL
Qty: 60 CAPSULE | Refills: 1 | Status: SHIPPED | OUTPATIENT
Start: 2021-10-15 | End: 2021-12-01 | Stop reason: DRUGHIGH

## 2021-10-15 ASSESSMENT — PAIN SCALES - GENERAL: PAINLEVEL: MODERATE PAIN (4)

## 2021-10-15 ASSESSMENT — ENCOUNTER SYMPTOMS
HEMATOCHEZIA: 0
ABDOMINAL PAIN: 0
JOINT SWELLING: 0
PALPITATIONS: 0
HEARTBURN: 0
CONSTIPATION: 0
NERVOUS/ANXIOUS: 1
FREQUENCY: 0
HEMATURIA: 0
DYSURIA: 0
SHORTNESS OF BREATH: 0
EYE PAIN: 0
HEADACHES: 0
ARTHRALGIAS: 0
NAUSEA: 0
SORE THROAT: 0
COUGH: 0
DIZZINESS: 0
MYALGIAS: 0
FEVER: 0
BREAST MASS: 0
CHILLS: 0
PARESTHESIAS: 0
WEAKNESS: 1
DIARRHEA: 0

## 2021-10-15 ASSESSMENT — PATIENT HEALTH QUESTIONNAIRE - PHQ9
SUM OF ALL RESPONSES TO PHQ QUESTIONS 1-9: 18
10. IF YOU CHECKED OFF ANY PROBLEMS, HOW DIFFICULT HAVE THESE PROBLEMS MADE IT FOR YOU TO DO YOUR WORK, TAKE CARE OF THINGS AT HOME, OR GET ALONG WITH OTHER PEOPLE: VERY DIFFICULT
SUM OF ALL RESPONSES TO PHQ QUESTIONS 1-9: 18

## 2021-10-15 ASSESSMENT — MIFFLIN-ST. JEOR: SCORE: 1592.96

## 2021-10-15 NOTE — PROGRESS NOTES
SUBJECTIVE:   CC: Caprice Kline is an 26 year old woman who presents for preventive health visit.     Patient has been advised of split billing requirements and indicates understanding: Yes     Healthy Habits:     Getting at least 3 servings of Calcium per day:  Yes    Bi-annual eye exam:  Yes    Dental care twice a year:  NO    Sleep apnea or symptoms of sleep apnea:  None    Diet:  Regular (no restrictions)    Frequency of exercise:  4-5 days/week    Duration of exercise:  15-30 minutes    Taking medications regularly:  Yes    Medication side effects:  None    PHQ-2 Total Score: 4    Additional concerns today:  No     Recheck CBC from low white count at her preop appointment.       Has had long standing history of depression and anxiety.  Has been on and off medication all her life.  Notices anxiety is pretty severe.    Zoloft - didn't work  Prozac - no bad reactions  Citalopram - not sure   Buspar - helped anxiety a little bit.   Lexapro - thinks so but not on long enough      Today's PHQ-2 Score:   PHQ-2 ( 1999 [employer]) 10/15/2021   Q1: Little interest or pleasure in doing things 2   Q2: Feeling down, depressed or hopeless 2   PHQ-2 Score 4   Q1: Little interest or pleasure in doing things More than half the days   Q2: Feeling down, depressed or hopeless More than half the days   PHQ-2 Score 4     Abuse: Current or Past (Physical, Sexual or Emotional) - No  Do you feel safe in your environment? Yes    Social History     Tobacco Use     Smoking status: Former Smoker     Packs/day: 0.00     Years: 0.00     Pack years: 0.00     Smokeless tobacco: Never Used   Substance Use Topics     Alcohol use: Yes     Comment: occasional     Alcohol Use 10/15/2021   Prescreen: >3 drinks/day or >7 drinks/week? No     Reviewed orders with patient.  Reviewed health maintenance and updated orders accordingly - Yes  BP Readings from Last 3 Encounters:   10/15/21 126/84   09/22/21 139/72   08/24/21 (!) 128/91    Wt Readings from  Last 3 Encounters:   10/15/21 79.8 kg (176 lb)   09/22/21 77.1 kg (170 lb)   09/09/21 75.3 kg (166 lb)                  Patient Active Problem List   Diagnosis     Lumbar radiculopathy     Chronic left-sided low back pain with left-sided sciatica     Aftercare following surgery of the musculoskeletal system     Severe episode of recurrent major depressive disorder, without psychotic features (H)     Past Surgical History:   Procedure Laterality Date     DISCECTOMY LUMBAR POSTERIOR MICROSCOPIC ONE LEVEL Left 8/24/2021    Procedure: Minimally invasive left Lumbar 5 to Sacral 1 microdiscectomy  ;  Surgeon: Destin Junior MD;  Location: [facility] OR     GENITOURINARY SURGERY  2012    Fallopian tube removal and cyst removal     GYN SURGERY      cystectomy - fallopian tube     HEAD & NECK SURGERY      tonsillectomy     HEAD & NECK SURGERY      wisdom teeth extraction       Social History     Tobacco Use     Smoking status: Former Smoker     Packs/day: 0.00     Years: 0.00     Pack years: 0.00     Smokeless tobacco: Never Used   Substance Use Topics     Alcohol use: Yes     Comment: occasional     Family History   Problem Relation Age of Onset     Diabetes Maternal Grandmother      Hypertension Maternal Grandmother      Colon Cancer Maternal Grandmother      Diabetes Paternal Grandmother      Hypertension Mother      Substance Abuse Father          Current Outpatient Medications   Medication Sig Dispense Refill     acetaminophen (TYLENOL) 500 MG tablet Take 500-1,000 mg by mouth every 6 hours as needed for mild pain       cyclobenzaprine (FLEXERIL) 5 MG tablet Take 1-2 tablets (5-10 mg) by mouth 3 times daily as needed for muscle spasms 60 tablet 1     FLUoxetine (PROZAC) 10 MG capsule Take 1 capsule daily for a week and then increase to 2 capsules daily. 60 capsule 1     Allergies   Allergen Reactions     Metronidazole Anaphylaxis and Hives     Other reaction(s): Throat swelling     Other Drug Allergy (See Comments) Hives,  "Itching and Swelling     Throat swelling 6 hrs after exposure  Itching and hives 2 hrs after exposure     Hydrocodone-Acetaminophen Other (See Comments)     Sertraline Other (See Comments)     Patient was foggy and \"high\" feeling       Breast Cancer Screening:    FHS-7:   Breast CA Risk Assessment (FHS-7) 10/13/2021 10/15/2021   Did any of your first-degree relatives have breast or ovarian cancer? Unknown Unknown   Did any of your relatives have bilateral breast cancer? Unknown Unknown   Did any man in your family have breast cancer? No No   Did any woman in your family have breast and ovarian cancer? Unknown Unknown   Did any woman in your family have breast cancer before age 50 y? Yes Yes   Do you have 2 or more relatives with breast and/or ovarian cancer? No No   Do you have 2 or more relatives with breast and/or bowel cancer? Yes Yes       Patient under 40 years of age: Routine Mammogram Screening not recommended.   Pertinent mammograms are reviewed under the imaging tab.    History of abnormal Pap smear: NO - age 21-29 PAP every 3 years recommended     Reviewed and updated as needed this visit by clinical staff  Tobacco  Allergies  Meds   Med Hx  Surg Hx  Fam Hx  Soc Hx        Reviewed and updated as needed this visit by Provider                    Review of Systems   Constitutional: Negative for chills and fever.   HENT: Negative for congestion, ear pain, hearing loss and sore throat.    Eyes: Negative for pain and visual disturbance.   Respiratory: Negative for cough and shortness of breath.    Cardiovascular: Negative for chest pain, palpitations and peripheral edema.   Gastrointestinal: Negative for abdominal pain, constipation, diarrhea, heartburn, hematochezia and nausea.   Breasts:  Negative for tenderness, breast mass and discharge.   Genitourinary: Negative for dysuria, frequency, genital sores, hematuria, pelvic pain, urgency, vaginal bleeding and vaginal discharge.   Musculoskeletal: Negative " "for arthralgias, joint swelling and myalgias.   Skin: Negative for rash.   Neurological: Positive for weakness. Negative for dizziness, headaches and paresthesias.   Psychiatric/Behavioral: Negative for mood changes. The patient is nervous/anxious.           OBJECTIVE:   /84   Pulse 99   Temp 99.2  F (37.3  C) (Temporal)   Resp 14   Ht 1.745 m (5' 8.7\")   Wt 79.8 kg (176 lb)   LMP 09/20/2021 (Exact Date)   SpO2 94%   BMI 26.22 kg/m    Physical Exam  GENERAL: healthy, alert and no distress  EYES: Eyes grossly normal to inspection, PERRL and conjunctivae and sclerae normal  HENT: ear canals and TM's normal, nose and mouth without ulcers or lesions  NECK: no adenopathy, no asymmetry, masses, or scars and thyroid normal to palpation  RESP: lungs clear to auscultation - no rales, rhonchi or wheezes  BREAST: normal without masses, tenderness or nipple discharge and no palpable axillary masses or adenopathy  CV: regular rate and rhythm, normal S1 S2, no S3 or S4, no murmur, click or rub, no peripheral edema and peripheral pulses strong  ABDOMEN: soft, nontender, no hepatosplenomegaly, no masses and bowel sounds normal   (female): normal female external genitalia, normal urethral meatus, vaginal mucosa pink, moist, well rugated, and normal cervix/adnexa/uterus without masses or discharge  MS: no gross musculoskeletal defects noted, no edema  SKIN: no suspicious lesions or rashes  NEURO: Normal strength and tone, mentation intact and speech normal  PSYCH: mentation appears normal, affect normal/bright    Diagnostic Test Results:  Labs reviewed in Epic  Results for orders placed or performed in visit on 10/15/21 (from the past 24 hour(s))   CBC with platelets and differential    Narrative    The following orders were created for panel order CBC with platelets and differential.  Procedure                               Abnormality         Status                     ---------                               " -----------         ------                     CBC with platelets and d...[[id number]]                      Final result                 Please view results for these tests on the individual orders.   CBC with platelets and differential   Result Value Ref Range    WBC Count 4.2 4.0 - 11.0 10e3/uL    RBC Count 4.60 3.80 - 5.20 10e6/uL    Hemoglobin 14.2 11.7 - 15.7 g/dL    Hematocrit 40.7 35.0 - 47.0 %    MCV 89 78 - 100 fL    MCH 30.9 26.5 - 33.0 pg    MCHC 34.9 31.5 - 36.5 g/dL    RDW 12.8 10.0 - 15.0 %    Platelet Count 263 150 - 450 10e3/uL    % Neutrophils 51 %    % Lymphocytes 36 %    % Monocytes 10 %    % Eosinophils 2 %    % Basophils 1 %    Absolute Neutrophils 2.1 1.6 - 8.3 10e3/uL    Absolute Lymphocytes 1.5 0.8 - 5.3 10e3/uL    Absolute Monocytes 0.4 0.0 - 1.3 10e3/uL    Absolute Eosinophils 0.1 0.0 - 0.7 10e3/uL    Absolute Basophils 0.0 0.0 - 0.2 10e3/uL       ASSESSMENT/PLAN:       ICD-10-CM    1. Routine general medical examination at a health care facility  Z00.00    2. Pap smear for cervical cancer screening  Z12.4 Pap screen reflex to HPV if ASCUS - recommend age 25 - 29   3. Leukopenia, unspecified type  D72.819 CBC with platelets and differential     CBC with platelets and differential   4. Severe episode of recurrent major depressive disorder, without psychotic features (H)  F33.2 FLUoxetine (PROZAC) 10 MG capsule   5. Influenza vaccination declined by patient  Z28.21      Mental Health:  - Will restart on Prozac. 10 mg once daily for a week then increase to 20 mg daily.   Discussed with patient the risks and benefits of anti-depressant/anti-anxiety medications. We discussed the common side effects with the medication that most resolve within 2 weeks of starting the medication.  We discussed the black box warning of increased risk of suicidal ideation.  Patient today is not experiencing suicidal ideation.  Discussed importance of taking medication once daily and not missing doses.  Also how we  "slowly titrate medication to limit side effects to the most effective dose and if they are ever going to stop the medication they should taper off the medication.  Reminded patient these medications can take 4-6 weeks to see their maximum potential.  Recommend titration to highest dose tolerated before switching medication types.    - Encouraged counseling in addition to medication management.       Patient has been advised of split billing requirements and indicates understanding: Yes  COUNSELING:  Reviewed preventive health counseling, as reflected in patient instructions    Estimated body mass index is 26.22 kg/m  as calculated from the following:    Height as of this encounter: 1.745 m (5' 8.7\").    Weight as of this encounter: 79.8 kg (176 lb).    Weight management plan: Discussed healthy diet and exercise guidelines    She reports that she has quit smoking. She smoked 0.00 packs per day for 0.00 years. She has never used smokeless tobacco.      Counseling Resources:  ATP IV Guidelines  Pooled Cohorts Equation Calculator  Breast Cancer Risk Calculator  BRCA-Related Cancer Risk Assessment: FHS-7 Tool  FRAX Risk Assessment  ICSI Preventive Guidelines  Dietary Guidelines for Americans, 2010  USDA's MyPlate  ASA Prophylaxis  Lung CA Screening    Junior Roe PA-C  [facility]  Answers for HPI/ROS submitted by the patient on 10/13/2021  If you checked off any problems, how difficult have these problems made it for you to do your work, take care of things at home, or get along with other people?: Very difficult  PHQ9 TOTAL SCORE: 18      "

## 2021-10-15 NOTE — LETTER
My Depression Action Plan  Name: Caprice Kline   Date of Birth 1995  Date: 10/15/2021    My doctor: No Ref-Primary, Physician   My clinic: [address]  [facility] [id number] [phone]          GREEN    ZONE   Good Control    What it looks like:     Things are going generally well. You have normal ups and downs. You may even feel depressed from time to time, but bad moods usually last less than a day.   What you need to do:  1. Continue to care for yourself (see self care plan)  2. Check your depression survival kit and update it as needed  3. Follow your physician s recommendations including any medication.  4. Do not stop taking medication unless you consult with your physician first.           YELLOW         ZONE Getting Worse    What it looks like:     Depression is starting to interfere with your life.     It may be hard to get out of bed; you may be starting to isolate yourself from others.    Symptoms of depression are starting to last most all day and this has happened for several days.     You may have suicidal thoughts but they are not constant.   What you need to do:     1. Call your care team. Your response to treatment will improve if you keep your care team informed of your progress. Yellow periods are signs an adjustment may need to be made.     2. Continue your self-care.  Just get dressed and ready for the day.  Don't give yourself time to talk yourself out of it.    3. Talk to someone in your support network.    4. Open up your Depression Self-Care Plan/Wellness Kit.           RED    ZONE Medical Alert - Get Help    What it looks like:     Depression is seriously interfering with your life.     You may experience these or other symptoms: You can t get out of bed most days, can t work or engage in other necessary activities, you have trouble taking care of basic hygiene, or basic responsibilities, thoughts of suicide or  death that will not go away, self-injurious behavior.     What you need to do:  1. Call your care team and request a same-day appointment. If they are not available (weekends or after hours) call your local crisis line, emergency room or 911.          Depression Self-Care Plan / Wellness Kit    Many people find that medication and therapy are helpful treatments for managing depression. In addition, making small changes to your everyday life can help to boost your mood and improve your wellbeing. Below are some tips for you to consider. Be sure to talk with your medical provider and/or behavioral health consultant if your symptoms are worsening or not improving.     Sleep   Sleep hygiene  means all of the habits that support good, restful sleep. It includes maintaining a consistent bedtime and wake time, using your bedroom only for sleeping or sex, and keeping the bedroom dark and free of distractions like a computer, smartphone, or television.     Develop a Healthy Routine  Maintain good hygiene. Get out of bed in the morning, make your bed, brush your teeth, take a shower, and get dressed. Don t spend too much time viewing media that makes you feel stressed. Find time to relax each day.    Exercise  Get some form of exercise every day. This will help reduce pain and release endorphins, the  feel good  chemicals in your brain. It can be as simple as just going for a walk or doing some gardening, anything that will get you moving.      Diet  Strive to eat healthy foods, including fruits and vegetables. Drink plenty of water. Avoid excessive sugar, caffeine, alcohol, and other mood-altering substances.     Stay Connected with Others  Stay in touch with friends and family members.    Manage Your Mood  Try deep breathing, massage therapy, biofeedback, or meditation. Take part in fun activities when you can. Try to find something to smile about each day.     Psychotherapy  Be open to working with a therapist if your  provider recommends it.     Medication  Be sure to take your medication as prescribed. Most anti-depressants need to be taken every day. It usually takes several weeks for medications to work. Not all medicines work for all people. It is important to follow-up with your provider to make sure you have a treatment plan that is working for you. Do not stop your medication abruptly without first discussing it with your provider.    Crisis Resources   These hotlines are for both adults and children. They and are open 24 hours a day, 7 days a week unless noted otherwise.      National Suicide Prevention Lifeline   1-774-808-YBPK (6073)      Crisis Text Line    www.crisistextline.org  Text HOME to 658195 from anywhere in the [place], anytime, about any type of crisis. A live, trained crisis counselor will receive the text and respond quickly.      [facility] Lifeline for LGBTQ Youth  A national crisis intervention and suicide lifeline for LGBTQ youth under 25. Provides a safe place to talk without judgement. Call 1-817.131.5693; text START to 046872 or visit www.thetrevorproject.org to talk to a trained counselor.      For [facility] crisis numbers, visit the [facility] website at:  https://[place].gov/dhs/people-we-serve/adults/health-care/mental-health/resources/crisis-contacts.jsp

## 2021-10-20 ENCOUNTER — THERAPY VISIT (OUTPATIENT)
Dept: PHYSICAL THERAPY | Facility: CLINIC | Age: 26
End: 2021-10-20
Payer: COMMERCIAL

## 2021-10-20 DIAGNOSIS — Z47.89 AFTERCARE FOLLOWING SURGERY OF THE MUSCULOSKELETAL SYSTEM: ICD-10-CM

## 2021-10-20 DIAGNOSIS — M54.42 CHRONIC LEFT-SIDED LOW BACK PAIN WITH LEFT-SIDED SCIATICA: ICD-10-CM

## 2021-10-20 DIAGNOSIS — G89.29 CHRONIC LEFT-SIDED LOW BACK PAIN WITH LEFT-SIDED SCIATICA: ICD-10-CM

## 2021-10-20 LAB
BKR LAB AP GYN ADEQUACY: NORMAL
BKR LAB AP GYN INTERPRETATION: NORMAL
BKR LAB AP HPV REFLEX: NORMAL
BKR LAB AP PREVIOUS ABNORMAL: NORMAL
PATH REPORT.COMMENTS IMP SPEC: NORMAL
PATH REPORT.RELEVANT HX SPEC: NORMAL

## 2021-10-20 PROCEDURE — 97140 MANUAL THERAPY 1/> REGIONS: CPT | Mod: GP | Performed by: PHYSICAL THERAPIST

## 2021-10-20 PROCEDURE — 97110 THERAPEUTIC EXERCISES: CPT | Mod: GP | Performed by: PHYSICAL THERAPIST

## 2021-10-20 PROCEDURE — 97112 NEUROMUSCULAR REEDUCATION: CPT | Mod: GP | Performed by: PHYSICAL THERAPIST

## 2021-10-25 ENCOUNTER — MYC MEDICAL ADVICE (OUTPATIENT)
Dept: FAMILY MEDICINE | Facility: OTHER | Age: 26
End: 2021-10-25

## 2021-10-26 NOTE — TELEPHONE ENCOUNTER
Patient saw you on 10/15, anxiety reviewed and restarted on Prozac 10mg x 1 week and then increase to 20mg. Also encouraged counseling.     Pt messaged today reporting that her anxiety has not improved over the last 10 days.     Any other recommendations?

## 2021-10-26 NOTE — TELEPHONE ENCOUNTER
If she thinks her anxiety is worse due to the medication then we should taper off and try something else.     Yes it takes about 4-6 weeks to see max benefit so it hasn't been that long yet.     If she wants to increase another 10 mg after being on 20 mg for a week she can.     Junior Roe PA-C

## 2021-10-26 NOTE — TELEPHONE ENCOUNTER
RN Triage    Patient Contact    Attempt # 1    Was call answered?  No.  Left message on voicemail with information to call me back. [employer] message sent with Junior Roe's message.    [facility]

## 2021-10-29 ENCOUNTER — MYC MEDICAL ADVICE (OUTPATIENT)
Dept: FAMILY MEDICINE | Facility: OTHER | Age: 26
End: 2021-10-29

## 2021-10-29 DIAGNOSIS — F33.2 SEVERE EPISODE OF RECURRENT MAJOR DEPRESSIVE DISORDER, WITHOUT PSYCHOTIC FEATURES (H): Primary | ICD-10-CM

## 2021-11-02 RX ORDER — BUPROPION HYDROCHLORIDE 150 MG/1
150 TABLET ORAL EVERY MORNING
Qty: 60 TABLET | Refills: 0 | Status: SHIPPED | OUTPATIENT
Start: 2021-11-02 | End: 2021-11-05

## 2021-11-04 ENCOUNTER — MYC MEDICAL ADVICE (OUTPATIENT)
Dept: FAMILY MEDICINE | Facility: OTHER | Age: 26
End: 2021-11-04

## 2021-11-04 DIAGNOSIS — F33.2 SEVERE EPISODE OF RECURRENT MAJOR DEPRESSIVE DISORDER, WITHOUT PSYCHOTIC FEATURES (H): ICD-10-CM

## 2021-11-05 RX ORDER — BUPROPION HYDROCHLORIDE 150 MG/1
150 TABLET ORAL EVERY MORNING
Qty: 60 TABLET | Refills: 0 | Status: SHIPPED | OUTPATIENT
Start: 2021-11-05 | End: 2021-12-01 | Stop reason: SINTOL

## 2021-11-09 ENCOUNTER — MYC MEDICAL ADVICE (OUTPATIENT)
Dept: FAMILY MEDICINE | Facility: OTHER | Age: 26
End: 2021-11-09

## 2021-11-09 DIAGNOSIS — F33.2 SEVERE EPISODE OF RECURRENT MAJOR DEPRESSIVE DISORDER, WITHOUT PSYCHOTIC FEATURES (H): ICD-10-CM

## 2021-11-11 ENCOUNTER — TELEPHONE (OUTPATIENT)
Dept: FAMILY MEDICINE | Facility: OTHER | Age: 26
End: 2021-11-11

## 2021-11-11 ENCOUNTER — E-VISIT (OUTPATIENT)
Dept: URGENT CARE | Facility: URGENT CARE | Age: 26
End: 2021-11-11
Payer: COMMERCIAL

## 2021-11-11 ENCOUNTER — MYC MEDICAL ADVICE (OUTPATIENT)
Dept: FAMILY MEDICINE | Facility: OTHER | Age: 26
End: 2021-11-11

## 2021-11-11 DIAGNOSIS — Z20.822 SUSPECTED COVID-19 VIRUS INFECTION: Primary | ICD-10-CM

## 2021-11-11 PROCEDURE — 99207 PR NO CHARGE LOS: CPT | Performed by: PHYSICIAN ASSISTANT

## 2021-11-11 NOTE — TELEPHONE ENCOUNTER
Patient informed of providers notes below.  Will stop taking medication and follow up after a few days via mychart or call or sooner if symptoms to not improve    Albin Eisenberg, JORDANN, RN, PHN  [facility] ~ Registered Nurse  Clinic Triage ~ [facility]  November 11, 2021

## 2021-11-11 NOTE — TELEPHONE ENCOUNTER
If symptoms developed with starting or changing dose of medication then it can be medication related.  If it is not associated then less likely and could be viral.  If continuously vomiting should be seen to discuss options for managing the symptoms.  If due to medication starting or adjustment can reduce down to previous dose or discontinue if on lowest dose.     Junior Roe PA-C

## 2021-11-11 NOTE — PATIENT INSTRUCTIONS
Dear Caprice Kline,    Your symptoms show that you may have coronavirus (COVID-19). This illness can cause fever, cough and trouble breathing. Many people get a mild case and get better on their own. Some people can get very sick.    Will I be tested for COVID-19?  We would like to test you for Covid-19 virus. I have placed orders for this test.     To schedule: go to your [employer] home page and scroll down to the section that says  You have an appointment that needs to be scheduled  and click the large green button that says  Schedule Now  and follow the steps to find the next available openings.    If you are unable to complete these [employer] scheduling steps, please call [phone] to schedule your testing.     Return to work/school/ guidance:  Please let your workplace manager and staffing office know when your quarantine ends     We can t give you an exact date as it depends on the above. You can calculate this on your own or work with your manager/staffing office to calculate this. (For example if you were exposed on 10/4, you would have to quarantine for 14 full days. That would be through 10/18. You could return on 10/19.)      If you receive a positive COVID-19 test result, follow the guidance of the those who are giving you the results. Usually the return to work is 10 (or in some cases 20 days from symptom onset.) If you work at [facility], you must also be cleared by Employee Occupational Health and Safety to return to work.        If you receive a negative COVID-19 test result and did not have a high risk exposure to someone with a known positive COVID-19 test, you can return to work once you're free of fever for 24 hours without fever-reducing medication and your symptoms are improving or resolved.      If you receive a negative COVID-19 test and If you had a high risk exposure to someone who has tested positive for COVID-19 then you can return to work 14 days after your last contact  with the positive individual    Note: If you have ongoing exposure to the covid positive person, this quarantine period may be more than 14 days. (For example, if you are continued to be exposed to your child who tested positive and cannot isolate from them, then the quarantine of 7-14 days can't start until your child is no longer contagious. This is typically 10 days from onset of the child's symptoms. So the total duration may be 17-24 days in this case.)    Sign up for [employer].   We know it's scary to hear that you might have COVID-19. We want to track your symptoms to make sure you're okay over the next 2 weeks. Please look for an email from [employer]--this is a free, online program that we'll use to keep in touch. To sign up, follow the link in the email you will receive. Learn more at [email or web address]    How can I take care of myself?    Get lots of rest. Drink extra fluids (unless a doctor has told you not to)    Take Tylenol (acetaminophen) or ibuprofen for fever or pain. If you have liver or kidney problems, ask your family doctor if it's okay to take Tylenol o ibuprofen    If you have other health problems (like cancer, heart failure, an organ transplant or severe kidney disease): Call your specialty clinic if you don't feel better in the next 2 days.    Know when to call 911. Emergency warning signs include:  o Trouble breathing or shortness of breath  o Pain or pressure in the chest that doesn't go away  o Feeling confused like you haven't felt before, or not being able to wake up  o Bluish-colored lips or face    Where can I get more information?   [employer] [place] - About COVID-19:   [email or web address]    CDC - What to Do If You're Sick:   www.cdc.gov/coronavirus/2019-ncov/about/steps-when-sick.html    November 11, 2021  RE:  Caprice Kline                                                                                                                  [address]  [facility] [address]      To whom it may concern:    I evaluated Caprice Kline on November 11, 2021. Caprice Kline should be excused from work/school.     They should let their workplace manager and staffing office know when their quarantine ends.    We can not give an exact date as it depends on the information below. They can calculate this on their own or work with their manager/staffing office to calculate this. (For example if they were exposed on 10/04, they would have to quarantine for 14 full days. That would be through 10/18. They could return on 10/19.)    Quarantine Guidelines:      If patient receives a positive COVID-19 test result, they should follow the guidance of those who are giving the results. Usually the return to work is 10 (or in some cases 20 days from symptom onset.) If they work at [employer], they must be cleared by Employee Occupational Health and Safety to return to work.        If patient receives a negative COVID-19 test result and did not have a high risk exposure to someone with a known positive COVID-19 test, they can return to work once they're free of fever for 24 hours without fever-reducing medication and their symptoms are improving or resolved.      If patient receives a negative COVID-19 test and if they had a high risk exposure to someone who has tested positive for COVID-19 then they can return to work 14 days after their last contact with the positive individual    Note: If there is ongoing exposure to the covid positive person, this quarantine period may be longer than 14 days. (For example, if they are continually exposed to their child, who tested positive and cannot isolate from them, then the quarantine of 7-14 days can't start until their child is no longer contagious. This is typically 10 days from onset to the child's symptoms. So the total duration may be 17-24 days in this case.)     Sincerely,  Annie Wright PA-C

## 2021-11-11 NOTE — TELEPHONE ENCOUNTER
S-(situation): Nausea/vomiting and headache     B-(background): Started on Tuesday evening 11/9/21    A-(assessment): can't keep anything down-not even water; no possibility that she could be pregnant; rates headache at 4-5/10; taking anti nausea OTC med; taking tylenol for the headache; helps somewhat; started taking the wellbutrin on Sunday; has been taking it daily at approximately 11 but on Tuesday had to take it at 4pm      R-(recommendations): Stay hydrated; try Pedialyte; if develop other symptoms, or worsening pain, go to urgent care or ER; discussed that this could be viral but will check with provider regarding potential side effect from medication.     Minerva Egan RN on 11/11/2021 at 12:14 PM

## 2021-11-12 ENCOUNTER — MYC MEDICAL ADVICE (OUTPATIENT)
Dept: FAMILY MEDICINE | Facility: OTHER | Age: 26
End: 2021-11-12
Payer: COMMERCIAL

## 2021-11-12 RX ORDER — FLUOXETINE 10 MG/1
CAPSULE ORAL
Qty: 60 CAPSULE | Refills: 1 | OUTPATIENT
Start: 2021-11-12

## 2021-11-12 NOTE — TELEPHONE ENCOUNTER
Pending Prescriptions:                       Disp   Refills    FLUoxetine (PROZAC) 10 MG capsule          60 cap*1        Sig: Take 1 capsule daily for a week and then increase to           2 capsules daily.    Routing refill request to provider for review/approval because:  Labs out of range:  PHQ-9 score:    PHQ 10/15/2021   PHQ-9 Total Score 18   Q9: Thoughts of better off dead/self-harm past 2 weeks Not at all

## 2021-11-30 ENCOUNTER — MYC MEDICAL ADVICE (OUTPATIENT)
Dept: FAMILY MEDICINE | Facility: OTHER | Age: 26
End: 2021-11-30
Payer: COMMERCIAL

## 2021-11-30 DIAGNOSIS — F33.2 SEVERE EPISODE OF RECURRENT MAJOR DEPRESSIVE DISORDER, WITHOUT PSYCHOTIC FEATURES (H): Primary | ICD-10-CM

## 2021-11-30 NOTE — TELEPHONE ENCOUNTER
Routing to provider for review-    Patient is taking more than prescribed dose. Needs refill and looking for dose increase.     Patient is almost out of medication due to taking more than prescribed.    Advised patient to schedule an appointment for virtual visit to discuss. May need refill to get to that visit.     Patient stopped taking Wellbutrin. See [employer] message.    [facility]

## 2021-12-01 RX ORDER — BUSPIRONE HYDROCHLORIDE 5 MG/1
5 TABLET ORAL 2 TIMES DAILY
Qty: 60 TABLET | Refills: 0 | Status: SHIPPED | OUTPATIENT
Start: 2021-12-01 | End: 2021-12-27

## 2021-12-01 RX ORDER — FLUOXETINE 40 MG/1
40 CAPSULE ORAL DAILY
Qty: 30 CAPSULE | Refills: 2 | Status: SHIPPED | OUTPATIENT
Start: 2021-12-01 | End: 2022-01-23

## 2021-12-02 NOTE — PROGRESS NOTES
"Cparice is a 26 year old who is being evaluated via a billable video visit.      How would you like to obtain your AVS? MyChart  If the video visit is dropped, the invitation should be resent by: Text to cell phone: [phone]  Will anyone else be joining your video visit? No  {If patient encounters technical issues they should call [phone] :303406}    Video Start Time: {video visit start/end time for provider to select:495928}    {PROVIDER CHARTING PREFERENCE:470677}    Subjective   Caprice is a 26 year old who presents for the following health issues     HPI     Depression and Anxiety Follow-Up    How are you doing with your depression since your last visit? Improved     How are you doing with your anxiety since your last visit?  Improved     Are you having other symptoms that might be associated with depression or anxiety? No    Have you had a significant life event? No     Do you have any concerns with your use of alcohol or other drugs? No    Social History     Tobacco Use     Smoking status: Former Smoker     Packs/day: 0.00     Years: 0.00     Pack years: 0.00     Smokeless tobacco: Never Used   Vaping Use     Vaping Use: Some days   Substance Use Topics     Alcohol use: Yes     Comment: occasional     Drug use: Never     PHQ 6/29/2021 10/13/2021 10/15/2021   PHQ-9 Total Score 17 18 18   Q9: Thoughts of better off dead/self-harm past 2 weeks Not at all Not at all Not at all     AKI-7 SCORE 6/29/2021   Total Score 15 (severe anxiety)   Total Score 15     {Last PHQ9 or GAD7 Responses (Optional):310417}    Suicide Assessment Five-step Evaluation and Treatment (SAFE-T)    {Provider  Link to Depression Care Package SmartSet :1    {additonal problems for provider to add (Optional):803773}    Review of Systems   {ROS COMP (Optional):882404}      Objective           Vitals:  No vitals were obtained today due to virtual visit.    Physical Exam   {video visit exam brief selected:448576::\"GENERAL: Healthy, alert and " Serafin Jensen is a 76year old male   HPI:   Pt.presents for the following problems. Rafat comes in for physical.    He feels well. He will see Dr. Kraig Glaser sometime this year for his yearly visit.   He does have history of elevated coronary artery c "no distress\",\"EYES: Eyes grossly normal to inspection.  No discharge or erythema, or obvious scleral/conjunctival abnormalities.\",\"RESP: No audible wheeze, cough, or visible cyanosis.  No visible retractions or increased work of breathing.  \",\"SKIN: Visible skin clear. No significant rash, abnormal pigmentation or lesions.\",\"NEURO: Cranial nerves grossly intact.  Mentation and speech appropriate for age.\",\"PSYCH: Mentation appears normal, affect normal/bright, judgement and insight intact, normal speech and appearance well-groomed.\"}    {Diagnostic Test Results (Optional):315943}    {AMBULATORY ATTESTATION (Optional):307410}        Video-Visit Details    Type of service:  Video Visit    Video End Time:{video visit start/end time for provider to select:152948}    Originating Location (pt. Location): {video visit patient location:992381::\"Home\"}    Distant Location (provider location):  [facility]     Platform used for Video Visit: {Virtual Visit Platforms:449936::\"Pavlok\"}  " Mother    • Pulmonary Disease Sister    • Other (MVA) Brother       Social History:  Social History    Tobacco Use      Smoking status: Never Smoker      Smokeless tobacco: Never Used    Vaping Use      Vaping Use: Never used    Alcohol use: Yes      Comme maintenance  He has had Covid vaccination. 5. Prostate cancer screening  Check PSA.  - PSA SCREEN; Future    This visit was 30 minutes. I spent 10 minutes before visit preparing and reviewing old records.   Greater than 50% of the visit was engaged in c

## 2021-12-03 ENCOUNTER — VIRTUAL VISIT (OUTPATIENT)
Dept: FAMILY MEDICINE | Facility: OTHER | Age: 26
End: 2021-12-03
Payer: COMMERCIAL

## 2021-12-03 DIAGNOSIS — Z91.199 FAILURE TO ATTEND APPOINTMENT: Primary | ICD-10-CM

## 2021-12-03 ASSESSMENT — ANXIETY QUESTIONNAIRES
5. BEING SO RESTLESS THAT IT IS HARD TO SIT STILL: NEARLY EVERY DAY
IF YOU CHECKED OFF ANY PROBLEMS ON THIS QUESTIONNAIRE, HOW DIFFICULT HAVE THESE PROBLEMS MADE IT FOR YOU TO DO YOUR WORK, TAKE CARE OF THINGS AT HOME, OR GET ALONG WITH OTHER PEOPLE: VERY DIFFICULT
1. FEELING NERVOUS, ANXIOUS, OR ON EDGE: NEARLY EVERY DAY
3. WORRYING TOO MUCH ABOUT DIFFERENT THINGS: NEARLY EVERY DAY
GAD7 TOTAL SCORE: 20
6. BECOMING EASILY ANNOYED OR IRRITABLE: MORE THAN HALF THE DAYS
7. FEELING AFRAID AS IF SOMETHING AWFUL MIGHT HAPPEN: NEARLY EVERY DAY
2. NOT BEING ABLE TO STOP OR CONTROL WORRYING: NEARLY EVERY DAY

## 2021-12-03 ASSESSMENT — PATIENT HEALTH QUESTIONNAIRE - PHQ9: 5. POOR APPETITE OR OVEREATING: NEARLY EVERY DAY

## 2021-12-03 ASSESSMENT — PAIN SCALES - GENERAL: PAINLEVEL: MODERATE PAIN (5)

## 2021-12-04 ASSESSMENT — ANXIETY QUESTIONNAIRES: GAD7 TOTAL SCORE: 20

## 2021-12-04 ASSESSMENT — PATIENT HEALTH QUESTIONNAIRE - PHQ9: SUM OF ALL RESPONSES TO PHQ QUESTIONS 1-9: 10

## 2021-12-22 PROBLEM — M54.42 CHRONIC LEFT-SIDED LOW BACK PAIN WITH LEFT-SIDED SCIATICA: Status: RESOLVED | Noted: 2021-10-08 | Resolved: 2021-12-22

## 2021-12-22 PROBLEM — G89.29 CHRONIC LEFT-SIDED LOW BACK PAIN WITH LEFT-SIDED SCIATICA: Status: RESOLVED | Noted: 2021-10-08 | Resolved: 2021-12-22

## 2021-12-22 PROBLEM — Z47.89 AFTERCARE FOLLOWING SURGERY OF THE MUSCULOSKELETAL SYSTEM: Status: RESOLVED | Noted: 2021-10-08 | Resolved: 2021-12-22

## 2021-12-22 NOTE — PROGRESS NOTES
Discharge Note    Progress reporting period is from initial evaluation date (please see noted date below) to Oct 20, 2021.  Linked Episodes   Type: Episode: Status: Noted: Resolved: Last update: Updated by:   PHYSICAL THERAPY Back [id number] Active 10/8/2021  10/20/2021  1:07 PM Enrqiue Forde, PT      Comments:       Caprice failed to follow up and current status is unknown, she was dealing with some other health issues.  Please see information below for last relevant information on current status.  Patient seen for 3 visits.    SUBJECTIVE  Subjective changes noted by patient:  The following is from last visit seen-States the exercises were all fine but that she feels about the same  States sleep is really difficult, trouble shooting for positions today and will try her recliner. The pain is constant L lateral hip, aches and intermittent sharp pain.   .  Current pain level is  (3-4/10).     Previous pain level was  6/10.   Changes in function:  Yes (See Goal flowsheet attached for changes in current functional level)  Adverse reaction to treatment or activity: None    OBJECTIVE  Changes noted in objective findings: The following is from last visit seen-8 wks PO progressing balance and core ex. Added MFR to lumbar paraspinals     ASSESSMENT/PLAN  Diagnosis: Back, leg pain L   Updated problem list and treatment plan:   Continue with HEP  STG/LTGs have been met or progress has been made towards goals:  Yes, please see goal flowsheet for most current information  Assessment of Progress: current status is unknown.    Last current status: Pt is progressing slower than anticipated   Self Management Plans:  HEP  I have re-evaluated this patient and find that the nature, scope, duration and intensity of the therapy is appropriate for the medical condition of the patient.  Caprice continues to require the following intervention to meet STG and LTG's:  HEP.    Recommendations:  Discharge with current home program.  Patient to  follow up with MD as needed.    Please refer to the daily flowsheet for treatment today, total treatment time and time spent performing 1:1 timed codes.

## 2021-12-29 ENCOUNTER — MYC MEDICAL ADVICE (OUTPATIENT)
Dept: FAMILY MEDICINE | Facility: OTHER | Age: 26
End: 2021-12-29

## 2021-12-29 ENCOUNTER — VIRTUAL VISIT (OUTPATIENT)
Dept: FAMILY MEDICINE | Facility: OTHER | Age: 26
End: 2021-12-29
Payer: COMMERCIAL

## 2021-12-29 DIAGNOSIS — U07.1 INFECTION DUE TO 2019 NOVEL CORONAVIRUS: Primary | ICD-10-CM

## 2021-12-29 PROCEDURE — 99214 OFFICE O/P EST MOD 30 MIN: CPT | Mod: GT | Performed by: PHYSICIAN ASSISTANT

## 2021-12-29 RX ORDER — CODEINE PHOSPHATE AND GUAIFENESIN 10; 100 MG/5ML; MG/5ML
1-2 SOLUTION ORAL AT BEDTIME
Qty: 100 ML | Refills: 0 | Status: SHIPPED | OUTPATIENT
Start: 2021-12-29 | End: 2022-02-09

## 2021-12-29 RX ORDER — PREDNISONE 20 MG/1
40 TABLET ORAL DAILY
Qty: 10 TABLET | Refills: 0 | Status: SHIPPED | OUTPATIENT
Start: 2021-12-29 | End: 2022-01-03

## 2021-12-29 RX ORDER — BENZONATATE 100 MG/1
100 CAPSULE ORAL 3 TIMES DAILY PRN
Qty: 30 CAPSULE | Refills: 0 | Status: SHIPPED | OUTPATIENT
Start: 2021-12-29 | End: 2022-02-09

## 2021-12-29 ASSESSMENT — PAIN SCALES - GENERAL: PAINLEVEL: MODERATE PAIN (4)

## 2021-12-29 NOTE — PROGRESS NOTES
Caprice is a 26 year old who is being evaluated via a billable video visit.      How would you like to obtain your AVS? MyChart  If the video visit is dropped, the invitation should be resent by: Text to cell phone: [phone]  Will anyone else be joining your video visit? No    Video Start Time: 9:34 AM    Assessment & Plan     Infection due to 2019 novel coronavirus  At this time she has mild COVID symptoms she is very bothered by the cough and generalized bodyaches.  We will try to suppress cough with Tessalon and ok to use Cheratussin at bedtime only advised no driving on medication.  OK to Use OTC cough suppressants as needed.  Did send over prednisone as she is already using albuterol inhaler she has 3-4 times per day.  Discussed risks and side effects with steroids, discussed how to properly take medication.  At this time low suspicion for any other bacterial illnesses.  Can also use decongestants to help with congestion. Discussed symptoms which warrant re-evaluation, encouraged isolation until fever free 24 hours at this point and significantly improved.    - benzonatate (TESSALON) 100 MG capsule; Take 1 capsule (100 mg) by mouth 3 times daily as needed for cough  - guaiFENesin-codeine (ROBITUSSIN AC) 100-10 MG/5ML solution; Take 5-10 mLs by mouth At Bedtime  - predniSONE (DELTASONE) 20 MG tablet; Take 2 tablets (40 mg) by mouth daily for 5 days      No follow-ups on file.    Options for treatment and follow-up care were reviewed with the patient and/or guardian. Patient and/or guardian engaged in the decision making process and verbalized understanding of the options discussed and agreed with the final plan.    Junior Roe PA-C  [facility]    Hector Vasques is a 26 year old who presents for the following health issues     HPI   Concern for COVID-19  About how many days ago did these symptoms start? 6 days-Patient did at home Covid test which was Positive  Is this your first  "visit for this illness? Yes  In the 14 days before your symptoms started, have you had close contact with someone with COVID-19 (Coronavirus)? Mom took at home test same day and was positive  Do you have a fever or chills? Yes, the highest temperature was 102.3  Are you having new or worsening difficulty breathing? No  Do you have new or worsening cough? Yes, it's a dry cough.   Have you had any new or unexplained body aches? YES    Have you experienced any of the following NEW symptoms?    Headache: YES    Sore throat: YES    Loss of taste or smell: YES - on some things.     Chest pain: No    Diarrhea: YES    Rash: YES-left wrist yesterday but has now resolved  What treatments have you tried? Dayquil, Theraflu, Mucinex, Tylenol  Who do you live with? Boyfriend  Are you, or a household member, a healthcare worker or a ? No  Do you live in a nursing home, group home, or shelter? No  Do you have a way to get food/medications if quarantined? Yes, I have a friend or family member who can help me.    - Started last Wednesday.  Had a fever and congestion and then has \"gone down hill\" since then .  - Chest has felt fine.   - Very congested.   - Last fever yesterday @ 101.  It will go down a little.  Taking Tylenol 1000 mg every 7 hours or so.   - nausea/vomiting only with taking Mucinex.   - Diarrhea couple of times per day, no blood.   - no concerns with urination.     Review of Systems   Constitutional, HEENT, cardiovascular, pulmonary, gi and gu systems are negative, except as otherwise noted.      Objective    Vitals - Patient Reported  Pain Score: Moderate Pain (4)  Pain Loc:  (bodyaches)      Vitals:  No vitals were obtained today due to virtual visit.    Physical Exam   GENERAL: alert and no distress  EYES: Eyes grossly normal to inspection.  No discharge or erythema, or obvious scleral/conjunctival abnormalities.  RESP: intermittent dry cough, no wheezing, able to talk in full sentences.   SKIN: " Visible skin clear. No significant rash, abnormal pigmentation or lesions.  NEURO: Cranial nerves grossly intact.  Mentation and speech appropriate for age.  PSYCH: Mentation appears normal, affect normal/bright, judgement and insight intact, normal speech and appearance well-groomed.          Video-Visit Details    Type of service:  Video Visit    Video End Time:9:43 AM    Originating Location (pt. Location): Home    Distant Location (provider location):  [facility]     Platform used for Video Visit: [employer]

## 2022-01-03 RX ORDER — ONDANSETRON 4 MG/1
4 TABLET, FILM COATED ORAL EVERY 8 HOURS PRN
Qty: 10 TABLET | Refills: 0 | Status: SHIPPED | OUTPATIENT
Start: 2022-01-03 | End: 2022-01-04

## 2022-01-04 RX ORDER — ONDANSETRON 4 MG/1
4 TABLET, FILM COATED ORAL EVERY 8 HOURS PRN
Qty: 10 TABLET | Refills: 0 | Status: SHIPPED | OUTPATIENT
Start: 2022-01-04 | End: 2022-02-28

## 2022-01-23 ENCOUNTER — MYC MEDICAL ADVICE (OUTPATIENT)
Dept: FAMILY MEDICINE | Facility: OTHER | Age: 27
End: 2022-01-23
Payer: COMMERCIAL

## 2022-01-23 DIAGNOSIS — F33.2 SEVERE EPISODE OF RECURRENT MAJOR DEPRESSIVE DISORDER, WITHOUT PSYCHOTIC FEATURES (H): ICD-10-CM

## 2022-01-23 RX ORDER — BUSPIRONE HYDROCHLORIDE 15 MG/1
15 TABLET ORAL 2 TIMES DAILY
Qty: 180 TABLET | Refills: 0 | Status: SHIPPED | OUTPATIENT
Start: 2022-01-23 | End: 2022-02-09 | Stop reason: DRUGHIGH

## 2022-01-23 RX ORDER — FLUOXETINE 40 MG/1
40 CAPSULE ORAL DAILY
Qty: 90 CAPSULE | Refills: 0 | Status: SHIPPED | OUTPATIENT
Start: 2022-01-23 | End: 2022-02-09

## 2022-02-09 ENCOUNTER — VIRTUAL VISIT (OUTPATIENT)
Dept: FAMILY MEDICINE | Facility: OTHER | Age: 27
End: 2022-02-09
Payer: COMMERCIAL

## 2022-02-09 DIAGNOSIS — F33.2 SEVERE EPISODE OF RECURRENT MAJOR DEPRESSIVE DISORDER, WITHOUT PSYCHOTIC FEATURES (H): ICD-10-CM

## 2022-02-09 PROCEDURE — 99214 OFFICE O/P EST MOD 30 MIN: CPT | Mod: GT | Performed by: PHYSICIAN ASSISTANT

## 2022-02-09 RX ORDER — ALPRAZOLAM 0.5 MG
0.5 TABLET ORAL 3 TIMES DAILY PRN
Qty: 30 TABLET | Refills: 0 | Status: SHIPPED | OUTPATIENT
Start: 2022-02-09 | End: 2022-02-28

## 2022-02-09 RX ORDER — BUSPIRONE HYDROCHLORIDE 30 MG/1
30 TABLET ORAL 2 TIMES DAILY
Qty: 60 TABLET | Refills: 1 | Status: SHIPPED | OUTPATIENT
Start: 2022-02-09 | End: 2022-02-28

## 2022-02-09 RX ORDER — FLUOXETINE 40 MG/1
40 CAPSULE ORAL DAILY
Qty: 30 CAPSULE | Refills: 1 | Status: SHIPPED | OUTPATIENT
Start: 2022-02-09 | End: 2022-02-28

## 2022-02-09 ASSESSMENT — ANXIETY QUESTIONNAIRES
2. NOT BEING ABLE TO STOP OR CONTROL WORRYING: NEARLY EVERY DAY
IF YOU CHECKED OFF ANY PROBLEMS ON THIS QUESTIONNAIRE, HOW DIFFICULT HAVE THESE PROBLEMS MADE IT FOR YOU TO DO YOUR WORK, TAKE CARE OF THINGS AT HOME, OR GET ALONG WITH OTHER PEOPLE: EXTREMELY DIFFICULT
7. FEELING AFRAID AS IF SOMETHING AWFUL MIGHT HAPPEN: NEARLY EVERY DAY
1. FEELING NERVOUS, ANXIOUS, OR ON EDGE: NEARLY EVERY DAY
5. BEING SO RESTLESS THAT IT IS HARD TO SIT STILL: NEARLY EVERY DAY
GAD7 TOTAL SCORE: 21
3. WORRYING TOO MUCH ABOUT DIFFERENT THINGS: NEARLY EVERY DAY
6. BECOMING EASILY ANNOYED OR IRRITABLE: NEARLY EVERY DAY

## 2022-02-09 ASSESSMENT — PAIN SCALES - GENERAL: PAINLEVEL: NO PAIN (0)

## 2022-02-09 ASSESSMENT — PATIENT HEALTH QUESTIONNAIRE - PHQ9
SUM OF ALL RESPONSES TO PHQ QUESTIONS 1-9: 24
5. POOR APPETITE OR OVEREATING: NEARLY EVERY DAY

## 2022-02-09 NOTE — PROGRESS NOTES
Caprice is a 26 year old who is being evaluated via a billable video visit.      How would you like to obtain your AVS? MyChart  If the video visit is dropped, the invitation should be resent by: Text to cell phone: [phone]   Will anyone else be joining your video visit? No    Video Start Time: 1:54 PM    Assessment & Plan     Severe episode of recurrent major depressive disorder, without psychotic features (H)  Her mental health has worsened, triggered by an event but likely was not doing well prior.   We are going to try and increase Fluoxetine once more as well as the Buspar and if we aren't seeing benefit we are going to taper off Fluoxetine and start on Venlafaxine while maintaining on Buspar.   Did send over short course of Xanax to use PRN for severe anxiety and hopefully this will help her avoid vomiting which I fear is causing worsened mental health because she isn't actually absorbing her Fluoxetine capsule before this happens.   We discussed how to properly take the Xanax as we as risks with medication including dependency, respiratory depression and death.   She has been out of work at this time for the last week, if she continues to be out encouraged to talk with HR about any potential for FMLA or other leave options while we try to stabilize her mental health.   - FLUoxetine (PROZAC) 20 MG capsule; Take with 40 mg capsule daily.  - FLUoxetine (PROZAC) 40 MG capsule; Take 1 capsule (40 mg) by mouth daily Take with 20 mg capsule daily.  - busPIRone HCl (BUSPAR) 30 MG tablet; Take 1 tablet (30 mg) by mouth 2 times daily    Return in about 4 weeks (around 3/9/2022) for Medication Re-check.    Options for treatment and follow-up care were reviewed with the patient and/or guardian. Patient and/or guardian engaged in the decision making process and verbalized understanding of the options discussed and agreed with the final plan.    Junior Roe PA-C  [facility]    Caprice is a 26 year old who presents for the following health issues     HPI     Depression and Anxiety Follow-Up    How are you doing with your depression since your last visit? Worsened     How are you doing with your anxiety since your last visit?  Worsened     Are you having other symptoms that might be associated with depression or anxiety? Yes:  shaking, no motivation, don't want to get out of bed     Have you had a significant life event? No     Do you have any concerns with your use of alcohol or other drugs? Yes:  has been drink more with depression      Hasn't worked in a week.     Just took a shower after a week for the first time.     Doesn't want to kill herself but doesn't want to feel like this, she feels numb.     Increased Buspar in January, her anxiety is still very high.     Anxious a lot and is vomiting from it.     Social History     Tobacco Use     Smoking status: Former Smoker     Packs/day: 0.00     Years: 0.00     Pack years: 0.00     Smokeless tobacco: Never Used   Vaping Use     Vaping Use: Some days     Substances: Nicotine     Devices: mascotsecretble tank   Substance Use Topics     Alcohol use: Yes     Comment: 2 drinks every other day      Drug use: Never     PHQ 10/15/2021 12/3/2021 2/9/2022   PHQ-9 Total Score 18 10 24   Q9: Thoughts of better off dead/self-harm past 2 weeks Not at all Not at all Not at all     AKI-7 SCORE 6/29/2021 12/3/2021 2/9/2022   Total Score 15 (severe anxiety) - -   Total Score 15 20 21     Last PHQ-9 2/9/2022   1.  Little interest or pleasure in doing things 3   2.  Feeling down, depressed, or hopeless 3   3.  Trouble falling or staying asleep, or sleeping too much 3   4.  Feeling tired or having little energy 3   5.  Poor appetite or overeating 3   6.  Feeling bad about yourself 3   7.  Trouble concentrating 3   8.  Moving slowly or restless 3   Q9: Thoughts of better off dead/self-harm past 2 weeks 0   PHQ-9 Total Score 24   Difficulty at work, home, or with  people Extremely dIfficult     AKI-7  2/9/2022   1. Feeling nervous, anxious, or on edge 3   2. Not being able to stop or control worrying 3   3. Worrying too much about different things 3   4. Trouble relaxing 3   5. Being so restless that it is hard to sit still 3   6. Becoming easily annoyed or irritable 3   7. Feeling afraid, as if something awful might happen 3   AKI-7 Total Score 21   If you checked any problems, how difficult have they made it for you to do your work, take care of things at home, or get along with other people? Extremely difficult       Review of Systems   Constitutional, GI,  and psych systems are negative, except as otherwise noted.      Objective    Vitals - Patient Reported  Pain Score: No Pain (0)      Vitals:  No vitals were obtained today due to virtual visit.    Physical Exam   GENERAL: Healthy, alert and no distress  EYES: Eyes grossly normal to inspection.  No discharge or erythema, or obvious scleral/conjunctival abnormalities.  RESP: No audible wheeze, cough, or visible cyanosis.  No visible retractions or increased work of breathing.    SKIN: Visible skin clear. No significant rash, abnormal pigmentation or lesions.  NEURO: Cranial nerves grossly intact.  Mentation and speech appropriate for age.  PSYCH: Mentation appears flat, affect normal/bright, judgement and insight intact, normal speech and appearance well-groomed.        Video-Visit Details    Type of service:  Video Visit    Video End Time:2:10 PM    Originating Location (pt. Location): Home    Distant Location (provider location):  [facility]     Platform used for Video Visit: [employer]

## 2022-02-10 ASSESSMENT — ANXIETY QUESTIONNAIRES: GAD7 TOTAL SCORE: 21

## 2022-02-23 ASSESSMENT — ANXIETY QUESTIONNAIRES
2. NOT BEING ABLE TO STOP OR CONTROL WORRYING: NEARLY EVERY DAY
6. BECOMING EASILY ANNOYED OR IRRITABLE: NEARLY EVERY DAY
3. WORRYING TOO MUCH ABOUT DIFFERENT THINGS: NEARLY EVERY DAY
4. TROUBLE RELAXING: NEARLY EVERY DAY
GAD7 TOTAL SCORE: 21
7. FEELING AFRAID AS IF SOMETHING AWFUL MIGHT HAPPEN: NEARLY EVERY DAY
7. FEELING AFRAID AS IF SOMETHING AWFUL MIGHT HAPPEN: NEARLY EVERY DAY
GAD7 TOTAL SCORE: 21
1. FEELING NERVOUS, ANXIOUS, OR ON EDGE: NEARLY EVERY DAY
GAD7 TOTAL SCORE: 21
5. BEING SO RESTLESS THAT IT IS HARD TO SIT STILL: NEARLY EVERY DAY

## 2022-02-23 ASSESSMENT — PATIENT HEALTH QUESTIONNAIRE - PHQ9
SUM OF ALL RESPONSES TO PHQ QUESTIONS 1-9: 24
SUM OF ALL RESPONSES TO PHQ QUESTIONS 1-9: 24
10. IF YOU CHECKED OFF ANY PROBLEMS, HOW DIFFICULT HAVE THESE PROBLEMS MADE IT FOR YOU TO DO YOUR WORK, TAKE CARE OF THINGS AT HOME, OR GET ALONG WITH OTHER PEOPLE: EXTREMELY DIFFICULT

## 2022-02-24 ASSESSMENT — ANXIETY QUESTIONNAIRES: GAD7 TOTAL SCORE: 21

## 2022-02-24 ASSESSMENT — PATIENT HEALTH QUESTIONNAIRE - PHQ9: SUM OF ALL RESPONSES TO PHQ QUESTIONS 1-9: 24

## 2022-02-28 ENCOUNTER — VIRTUAL VISIT (OUTPATIENT)
Dept: FAMILY MEDICINE | Facility: OTHER | Age: 27
End: 2022-02-28
Payer: COMMERCIAL

## 2022-02-28 DIAGNOSIS — F33.2 SEVERE EPISODE OF RECURRENT MAJOR DEPRESSIVE DISORDER, WITHOUT PSYCHOTIC FEATURES (H): ICD-10-CM

## 2022-02-28 PROCEDURE — 99213 OFFICE O/P EST LOW 20 MIN: CPT | Mod: GT | Performed by: PHYSICIAN ASSISTANT

## 2022-02-28 RX ORDER — LAMOTRIGINE 100 MG/1
100 TABLET ORAL 2 TIMES DAILY
COMMUNITY
Start: 2022-02-17

## 2022-02-28 RX ORDER — FLUOXETINE 40 MG/1
40 CAPSULE ORAL DAILY
Qty: 90 CAPSULE | Refills: 1 | Status: SHIPPED | OUTPATIENT
Start: 2022-02-28 | End: 2023-02-01

## 2022-02-28 RX ORDER — ALPRAZOLAM 0.5 MG
.5-1 TABLET ORAL 3 TIMES DAILY PRN
Qty: 60 TABLET | Refills: 0 | Status: SHIPPED | OUTPATIENT
Start: 2022-02-28 | End: 2022-04-14

## 2022-02-28 RX ORDER — BUSPIRONE HYDROCHLORIDE 15 MG/1
30 TABLET ORAL 2 TIMES DAILY
Qty: 120 TABLET | Refills: 2 | Status: SHIPPED | OUTPATIENT
Start: 2022-02-28 | End: 2022-04-15

## 2022-02-28 RX ORDER — LAMOTRIGINE 25-50-100
KIT ORAL
COMMUNITY
Start: 2022-02-16 | End: 2022-06-22

## 2022-02-28 ASSESSMENT — PAIN SCALES - GENERAL: PAINLEVEL: NO PAIN (0)

## 2022-02-28 NOTE — PROGRESS NOTES
Assessment & Plan     Metrorrhagia  No formal diagnosis of PCOS with labs that she recalls, will check below and her questions are discussed. Reviewed possible etiologies of the irregular bleeding and management options and also options to help manage her dysmenorrhea, not interested in hormonal management, has desired hysterectomy for management of her painful cycles for over 10 years. Will also get pelvic ultrasound and then she would like a recommendation on follow up with GYN physician that would at least listen to her desire for surgery. Once labs and ultrasound results available, will follow up with patient and she will schedule surgery consult per recommendation. Patient is given an opportunity to ask questions and have them answered.  - TSH with free T4 reflex; Future  - Follicle stimulating hormone; Future  - Luteinizing Hormone, Adult; Future  - Prolactin; Future  - Testosterone Free and Total; Future  - US Pelvic Transabdominal and Transvaginal; Future  - Prolactin  - Luteinizing Hormone, Adult  - Follicle stimulating hormone  - TSH with free T4 reflex  - Testosterone Free and Total    Dysmenorrhea  See above  - US Pelvic Transabdominal and Transvaginal; Future    JULIUS Ramsay [facility]    Hector Vasques is a 26 year old who presents for the following health issues    HPI     Irregular menses    History of large ovarian cyst age 16-had cystectomy and right salpingectomy. Patient states was told after multiple ED visits as a teenager for cycle issues that she has PCOS-does not recall if she had any hormonal work up in the past for her cycles. Cycles are usually about Q 40 days. Longstanding history of significant pain with her cycles-has had a questionable diagnosis of endometriosis, but had declined pursuing laparoscopy in the past.   For the last 3 cycles, they have been Q 60-65 days. Heavier for 1 days, last 3-4 days total. Still has cramping monthly even  "if she does not bleed. No intermenstrual bleeding.   Patient does not desire pregnancy ever. History of SAB x 2-no D&C was needed.  Patient has tried hormonal medication in the past and had side effects and does not desire to do any hormones going forward.   Pain with her cycles occurs just prior to menses onset, all during the flow and for a few days after. This occurs even on the months she does not bleed. Has vomiting, can be bedridden during those few days.    Review of Systems   Constitutional, HEENT, cardiovascular, pulmonary, gi and gu systems are negative, except as otherwise noted.      Objective    /83 (BP Location: Right arm, Patient Position: Sitting, Cuff Size: Adult Large)   Pulse 83   Ht 1.746 m (5' 8.75\")   Wt 87.7 kg (193 lb 6.4 oz)   LMP 02/24/2022 (Exact Date)   SpO2 95%   BMI 28.77 kg/m    Body mass index is 28.77 kg/m .  Physical Exam   GENERAL: healthy, alert and no distress   (female): normal female external genitalia, normal urethral meatus , vaginal mucosa pink, moist, well rugated and normal cervix, adnexae, and uterus without masses-presence of mild suprapubic tenderness.  MS: no gross musculoskeletal defects noted, no edema  SKIN: no suspicious lesions or rashes  PSYCH: mentation appears normal, affect normal/bright    "

## 2022-02-28 NOTE — PROGRESS NOTES
Caprice is a 26 year old who is being evaluated via a billable video visit.      How would you like to obtain your AVS? MyChart  If the video visit is dropped, the invitation should be resent by: Text to cell phone: [phone]   Will anyone else be joining your video visit? No    Video Start Time: 3:28 PM    Assessment & Plan     Severe episode of recurrent major depressive disorder, without psychotic features (H)  Will for the most part be seeing Psychiatry for her medication management, will provide refills of current medications for now but will defer to Psychiatry for adjustments in these medications as they go along with increasing the Lamictal.  For now did refill the Xanax to help her sleep but again something she will be circling back to Psychiatry about.  Refilled her other doses without adjustments.   - ALPRAZolam (XANAX) 0.5 MG tablet; Take 1-2 tablets (0.5-1 mg) by mouth 3 times daily as needed for anxiety or sleep  - busPIRone (BUSPAR) 15 MG tablet; Take 2 tablets (30 mg) by mouth 2 times daily  - FLUoxetine (PROZAC) 20 MG capsule; Take with 40 mg capsule daily.  - FLUoxetine (PROZAC) 40 MG capsule; Take 1 capsule (40 mg) by mouth daily Take with 20 mg capsule daily.      Options for treatment and follow-up care were reviewed with the patient and/or guardian. Patient and/or guardian engaged in the decision making process and verbalized understanding of the options discussed and agreed with the final plan.    Junior Roe PA-C  [facility]   Caprice is a 26 year old who presents for the following health issues     History of Present Illness       Mental Health Follow-up:  Patient presents to follow-up on Depression & Anxiety.Patient's depression since last visit has been:  No change  The patient is having other symptoms associated with depression.  Patient's anxiety since last visit has been:  No change  The patient is having other symptoms associated with  anxiety.  Any significant life events: No  Patient is feeling anxious or having panic attacks.  Patient has no concerns about alcohol or drug use.     Social History  Tobacco Use    Smoking status: Former Smoker      Packs/day: 0.00      Years: 0.00      Pack years: 0    Smokeless tobacco: Never Used  Vaping Use    Vaping Use: Some days      Substances: Nicotine      Devices: Refillable tank  Alcohol use: Yes    Comment: 2 drinks every other day   Drug use: Never      Today's PHQ-9         PHQ-9 Total Score:     (P) 24   PHQ-9 Q9 Thoughts of better off dead/self-harm past 2 weeks :   (P) Not at all   Thoughts of suicide or self harm:      Self-harm Plan:        Self-harm Action:          Safety concerns for self or others:         Reason for visit:  Medication update  Symptom onset:  More than a month  Symptom intensity:  Severe  Symptom progression:  Worsening  Had these symptoms before:  Yes  Has tried/received treatment for these symptoms:  Yes  Previous treatment was successful:  No    She eats 0-1 servings of fruits and vegetables daily.She consumes 2 sweetened beverage(s) daily.She exercises with enough effort to increase her heart rate 9 or less minutes per day.  She exercises with enough effort to increase her heart rate 3 or less days per week.   She is taking medications regularly.     - Saw a psychiatrist and diagnosed her with Bipolar Type 2 and Borderline personality disorder.  They did ADHD testing and tomorrow has additional testing for this as well.    - Lamotrigine was prescribed, it has been about 2 weeks, currently on 25 mg, she thinks this week she has started to feel it a little bit more.   - Isn't feeling as linda.   - Still having hard time sleeping. Waking up 30 times per night.   - Once up to Lamtrigine up to 100 mg probably will stop the Buspar.  For now leaving the Fluoxetine alone.   - Did talk with Psychiatrist about the sleep.   - Took 1-2 tabs of the Xanax at bedtime.  Does help her  get at least 3 hours of sleep.    - Is back to work today, this is her first time back in 2.5 weeks, she told her work her diagnosis and they are working with her.      Review of Systems   Constitutional,  and psych systems are negative, except as otherwise noted.      Objective           Vitals:  No vitals were obtained today due to virtual visit.    Physical Exam   GENERAL: Healthy, alert and no distress  EYES: Eyes grossly normal to inspection.  No discharge or erythema, or obvious scleral/conjunctival abnormalities.  RESP: No audible wheeze, cough, or visible cyanosis.  No visible retractions or increased work of breathing.    SKIN: Visible skin clear. No significant rash, abnormal pigmentation or lesions.  NEURO: Cranial nerves grossly intact.  Mentation and speech appropriate for age.  PSYCH: Mentation appears normal, affect normal/bright, judgement and insight intact, normal speech and appearance well-groomed.            Video-Visit Details    Type of service:  Video Visit    Video End Time:3:37 PM    Originating Location (pt. Location): Home    Distant Location (provider location):  [facility]     Platform used for Video Visit: [employer]  Answers for HPI/ROS submitted by the patient on 2/23/2022  If you checked off any problems, how difficult have these problems made it for you to do your work, take care of things at home, or get along with other people?: Extremely difficult  PHQ9 TOTAL SCORE: 24  AKI 7 TOTAL SCORE: 21

## 2022-03-01 ENCOUNTER — OFFICE VISIT (OUTPATIENT)
Dept: OBGYN | Facility: CLINIC | Age: 27
End: 2022-03-01
Payer: COMMERCIAL

## 2022-03-01 VITALS
HEIGHT: 69 IN | SYSTOLIC BLOOD PRESSURE: 124 MMHG | DIASTOLIC BLOOD PRESSURE: 83 MMHG | WEIGHT: 193.4 LBS | HEART RATE: 83 BPM | OXYGEN SATURATION: 95 % | BODY MASS INDEX: 28.64 KG/M2

## 2022-03-01 DIAGNOSIS — N94.6 DYSMENORRHEA: ICD-10-CM

## 2022-03-01 DIAGNOSIS — N92.1 METRORRHAGIA: Primary | ICD-10-CM

## 2022-03-01 LAB
FSH SERPL-ACNC: 6.4 IU/L
LH SERPL-ACNC: 5 IU/L
PROLACTIN SERPL-MCNC: 16 UG/L (ref 3–27)
TSH SERPL DL<=0.005 MIU/L-ACNC: 1.06 MU/L (ref 0.4–4)

## 2022-03-01 PROCEDURE — 36415 COLL VENOUS BLD VENIPUNCTURE: CPT | Performed by: NURSE PRACTITIONER

## 2022-03-01 PROCEDURE — 84270 ASSAY OF SEX HORMONE GLOBUL: CPT | Performed by: NURSE PRACTITIONER

## 2022-03-01 PROCEDURE — 84443 ASSAY THYROID STIM HORMONE: CPT | Performed by: NURSE PRACTITIONER

## 2022-03-01 PROCEDURE — 84146 ASSAY OF PROLACTIN: CPT | Performed by: NURSE PRACTITIONER

## 2022-03-01 PROCEDURE — 84403 ASSAY OF TOTAL TESTOSTERONE: CPT | Performed by: NURSE PRACTITIONER

## 2022-03-01 PROCEDURE — 99203 OFFICE O/P NEW LOW 30 MIN: CPT | Performed by: NURSE PRACTITIONER

## 2022-03-01 PROCEDURE — 83001 ASSAY OF GONADOTROPIN (FSH): CPT | Performed by: NURSE PRACTITIONER

## 2022-03-01 PROCEDURE — 83002 ASSAY OF GONADOTROPIN (LH): CPT | Performed by: NURSE PRACTITIONER

## 2022-03-01 ASSESSMENT — PAIN SCALES - GENERAL: PAINLEVEL: NO PAIN (0)

## 2022-03-01 NOTE — BRIEF OP NOTE
[facility]    Brief Operative Note    Pre-operative diagnosis: Lumbar radiculopathy [M54.16]  Post-operative diagnosis Same as pre-operative diagnosis    Procedure: Procedure(s):  Minimally invasive left Lumbar 5 to Sacral 1 microdiscectomy    Surgeon: Surgeon(s) and Role:     * Destin Junior MD - Primary     * Hernan Major PA-C - Assisting  Anesthesia: General   Estimated blood loss: * 20 mL  Drains: None  Specimens: * No specimens in log *  Findings:   None.  Complications: None.  Implants: * No implants in log *      Hernan AVITIA [facility]  [facility]  [address] [place] [id number]    Tel [phone]  Pager [phone]    
Not applicable

## 2022-03-02 LAB — SHBG SERPL-SCNC: 51 NMOL/L (ref 30–135)

## 2022-03-03 ENCOUNTER — MYC MEDICAL ADVICE (OUTPATIENT)
Dept: FAMILY MEDICINE | Facility: OTHER | Age: 27
End: 2022-03-03
Payer: COMMERCIAL

## 2022-03-03 LAB
TESTOST FREE SERPL-MCNC: 0.57 NG/DL
TESTOST SERPL-MCNC: 42 NG/DL (ref 8–60)

## 2022-03-04 ENCOUNTER — MYC MEDICAL ADVICE (OUTPATIENT)
Dept: FAMILY MEDICINE | Facility: OTHER | Age: 27
End: 2022-03-04

## 2022-03-04 ENCOUNTER — VIRTUAL VISIT (OUTPATIENT)
Dept: FAMILY MEDICINE | Facility: OTHER | Age: 27
End: 2022-03-04
Payer: COMMERCIAL

## 2022-03-04 DIAGNOSIS — R11.0 NAUSEA: Primary | ICD-10-CM

## 2022-03-04 DIAGNOSIS — R41.840 INATTENTION: Primary | ICD-10-CM

## 2022-03-04 PROCEDURE — 99213 OFFICE O/P EST LOW 20 MIN: CPT | Mod: GT | Performed by: PHYSICIAN ASSISTANT

## 2022-03-04 RX ORDER — ATOMOXETINE 40 MG/1
40 CAPSULE ORAL DAILY
Qty: 60 CAPSULE | Refills: 0 | Status: SHIPPED | OUTPATIENT
Start: 2022-03-04 | End: 2023-12-18

## 2022-03-04 NOTE — PROGRESS NOTES
Caprice is a 26 year old who is being evaluated via a billable video visit.      How would you like to obtain your AVS? [employer]  If the video visit is dropped, the invitation should be resent by: Text to cell phone: [phone]   Will anyone else be joining your video visit? No    Video Start Time: 2:18 PM    Assessment & Plan     Inattention  She has symptoms of ADHD but didn't quite meet all the diagnostic criteria for ADHD so they recommended she start on Strattera to see if this helps her symptoms.   We discussed less common more severe adverse reactions as well as more common side effects.   We discussed how to take medication.   Will start at 40 mg and can increase dose up as needed and tolerating. She will update provider via [employer].   She is aware that increased suicidal ideation, mood fluctuations are potential with medication but rare.   - atomoxetine (STRATTERA) 40 MG capsule; Take 1 capsule (40 mg) by mouth daily    Return in about 2 weeks (around 3/18/2022) for Medication Re-check.    Options for treatment and follow-up care were reviewed with the patient and/or guardian. Patient and/or guardian engaged in the decision making process and verbalized understanding of the options discussed and agreed with the final plan.    Junior Roe PA-C  [facility]   Caprice is a 26 year old who presents for the following health issues     HPI     WANTING TO TALK ABOUT STARTING STRATTERA. SHE SAYS SHE HAS TALKED TO YOU ABOUT FOCUSING ISSUES IN THE PAST. SYMPTOMS ARE ABOUT THE SAME. SHE DID HAVE THE ADHD TESTING, SHE WAS NOT DIAGNOSED WITH ADHD BUT WAS RECOMMENDED TO TALK TO HER CARE TEAM ABOUT STARTING THIS MEDICATION.     Review of Systems   Constitutional, psych  systems are negative, except as otherwise noted.      Objective           Vitals:  No vitals were obtained today due to virtual visit.    Physical Exam   GENERAL: Healthy, alert and no distress  EYES: Eyes grossly  normal to inspection.  No discharge or erythema, or obvious scleral/conjunctival abnormalities.  RESP: No audible wheeze, cough, or visible cyanosis.  No visible retractions or increased work of breathing.    SKIN: Visible skin clear. No significant rash, abnormal pigmentation or lesions.  NEURO: Cranial nerves grossly intact.  Mentation and speech appropriate for age.  PSYCH: Mentation appears normal, affect normal/bright, judgement and insight intact, normal speech and appearance well-groomed.            Video-Visit Details    Type of service:  Video Visit    Video End Time:2:23 PM    Originating Location (pt. Location): Home    Distant Location (provider location):  [facility]     Platform used for Video Visit: [employer]

## 2022-03-06 RX ORDER — ONDANSETRON 4 MG/1
4 TABLET, FILM COATED ORAL EVERY 8 HOURS PRN
Qty: 30 TABLET | Refills: 0 | Status: SHIPPED | OUTPATIENT
Start: 2022-03-06 | End: 2022-05-23

## 2022-03-09 ENCOUNTER — MYC MEDICAL ADVICE (OUTPATIENT)
Dept: NEUROSURGERY | Facility: CLINIC | Age: 27
End: 2022-03-09
Payer: COMMERCIAL

## 2022-03-10 ENCOUNTER — MYC MEDICAL ADVICE (OUTPATIENT)
Dept: FAMILY MEDICINE | Facility: OTHER | Age: 27
End: 2022-03-10
Payer: COMMERCIAL

## 2022-03-10 DIAGNOSIS — M54.42 ACUTE BILATERAL LOW BACK PAIN WITH LEFT-SIDED SCIATICA: ICD-10-CM

## 2022-03-10 RX ORDER — CYCLOBENZAPRINE HCL 5 MG
5-10 TABLET ORAL 3 TIMES DAILY PRN
Qty: 60 TABLET | Refills: 1 | Status: SHIPPED | OUTPATIENT
Start: 2022-03-10 | End: 2022-05-23

## 2022-03-10 NOTE — TELEPHONE ENCOUNTER
Please see [employer] message. Not on active med list. Would you like a visit (in person or virtual) to discuss?     Minerva Egan, JORDANN, RN, PHN  Registered Nurse-Clinic Triage  [facility]  3/10/2022 at 11:49 AM

## 2022-03-12 ENCOUNTER — MYC MEDICAL ADVICE (OUTPATIENT)
Dept: FAMILY MEDICINE | Facility: OTHER | Age: 27
End: 2022-03-12
Payer: COMMERCIAL

## 2022-03-12 DIAGNOSIS — R41.840 INATTENTION: Primary | ICD-10-CM

## 2022-03-14 DIAGNOSIS — F33.2 SEVERE EPISODE OF RECURRENT MAJOR DEPRESSIVE DISORDER, WITHOUT PSYCHOTIC FEATURES (H): ICD-10-CM

## 2022-03-15 ENCOUNTER — ANCILLARY PROCEDURE (OUTPATIENT)
Dept: ULTRASOUND IMAGING | Facility: CLINIC | Age: 27
End: 2022-03-15
Attending: NURSE PRACTITIONER
Payer: COMMERCIAL

## 2022-03-15 DIAGNOSIS — N94.6 DYSMENORRHEA: ICD-10-CM

## 2022-03-15 DIAGNOSIS — N92.1 METRORRHAGIA: ICD-10-CM

## 2022-03-15 PROCEDURE — 76830 TRANSVAGINAL US NON-OB: CPT | Performed by: RADIOLOGY

## 2022-03-15 PROCEDURE — 76856 US EXAM PELVIC COMPLETE: CPT | Performed by: RADIOLOGY

## 2022-03-15 RX ORDER — ATOMOXETINE 80 MG/1
80 CAPSULE ORAL DAILY
Qty: 30 CAPSULE | Refills: 1 | Status: SHIPPED | OUTPATIENT
Start: 2022-03-15 | End: 2022-06-22

## 2022-04-14 ENCOUNTER — MYC MEDICAL ADVICE (OUTPATIENT)
Dept: FAMILY MEDICINE | Facility: OTHER | Age: 27
End: 2022-04-14
Payer: MEDICAID

## 2022-04-14 DIAGNOSIS — F33.2 SEVERE EPISODE OF RECURRENT MAJOR DEPRESSIVE DISORDER, WITHOUT PSYCHOTIC FEATURES (H): ICD-10-CM

## 2022-04-15 RX ORDER — BUSPIRONE HYDROCHLORIDE 15 MG/1
30 TABLET ORAL 2 TIMES DAILY
Qty: 120 TABLET | Refills: 0 | Status: SHIPPED | OUTPATIENT
Start: 2022-04-15 | End: 2023-07-26

## 2022-04-15 NOTE — TELEPHONE ENCOUNTER
"Pending Prescriptions:                       Disp   Refills    busPIRone (BUSPAR) 15 MG tablet            120 ta*0        Sig: Take 2 tablets (30 mg) by mouth 2 times daily    Routing refill request to provider for review/approval because: *Patient has one month left, requesting one additional refill to last until her Psych appointment.   PHQ-9 score:    PHQ 2/23/2022   PHQ-9 Total Score 24   Q9: Thoughts of better off dead/self-harm past 2 weeks Not at all       Requested Prescriptions   Pending Prescriptions Disp Refills    busPIRone (BUSPAR) 15 MG tablet 120 tablet 0     Sig: Take 2 tablets (30 mg) by mouth 2 times daily        Atypical Antidepressants Protocol Failed - 4/15/2022  8:19 AM        Failed - Patient has PHQ-9 score less than 5 in past 6 months.     Please review last PHQ-9 score.           Passed - Medication active on med list        Passed - Patient is age 18 or older        Passed - No active pregnancy on record        Passed - No positive pregnancy test in past 12 mos        Passed - Recent (6 mo) or future (30 days) visit within the authorizing provider's specialty     Patient had office visit in the last 6 months or has a visit in the next 30 days with authorizing provider or within the authorizing provider's specialty.  See \"Patient Info\" tab in inbasket, or \"Choose Columns\" in Meds & Orders section of the refill encounter.                      "

## 2022-04-25 ENCOUNTER — ANCILLARY PROCEDURE (OUTPATIENT)
Dept: GENERAL RADIOLOGY | Facility: CLINIC | Age: 27
End: 2022-04-25
Attending: NURSE PRACTITIONER
Payer: MEDICAID

## 2022-04-25 ENCOUNTER — OFFICE VISIT (OUTPATIENT)
Dept: URGENT CARE | Facility: URGENT CARE | Age: 27
End: 2022-04-25
Payer: MEDICAID

## 2022-04-25 VITALS
HEART RATE: 95 BPM | TEMPERATURE: 98.9 F | SYSTOLIC BLOOD PRESSURE: 136 MMHG | OXYGEN SATURATION: 98 % | BODY MASS INDEX: 28.71 KG/M2 | DIASTOLIC BLOOD PRESSURE: 89 MMHG | WEIGHT: 193 LBS

## 2022-04-25 DIAGNOSIS — S92.345A CLOSED NONDISPLACED FRACTURE OF FOURTH METATARSAL BONE OF LEFT FOOT, INITIAL ENCOUNTER: Primary | ICD-10-CM

## 2022-04-25 DIAGNOSIS — M79.672 LEFT FOOT PAIN: ICD-10-CM

## 2022-04-25 PROCEDURE — 73630 X-RAY EXAM OF FOOT: CPT | Mod: LT | Performed by: RADIOLOGY

## 2022-04-25 PROCEDURE — 99214 OFFICE O/P EST MOD 30 MIN: CPT | Performed by: NURSE PRACTITIONER

## 2022-04-25 RX ORDER — OXYCODONE AND ACETAMINOPHEN 5; 325 MG/1; MG/1
1 TABLET ORAL EVERY 6 HOURS PRN
Qty: 12 TABLET | Refills: 0 | Status: SHIPPED | OUTPATIENT
Start: 2022-04-25 | End: 2022-04-28

## 2022-04-25 NOTE — PROGRESS NOTES
SUBJECTIVE:    Caprice Kline is a 26 year old female who is seen for left foot pain 1 day    Injury: Was intoxicated yesterday at a drag show and unsure how injured but when got home throbbing pain 7/10  Pain is top of foot into toes  Notices when puts shoes on or walks  Taking ibuprofen not helping much  Pain is better with:  Rest    Associated Features: None  Prior history of related problems: none    Patient's past medical, surgical, social and family histories reviewed.    Past medical history notable for: reviewed on the up to date problem list in the chart    REVIEW OF SYSTEMS:  CONSTITUTIONAL:NEGATIVE for fever, chills, change in weight  INTEGUMENTARY/SKIN: NEGATIVE for worrisome rashes, moles or lesions  MUSCULOSKELETAL:See HPI above  NEURO: NEGATIVE for weakness, dizziness or paresthesias    /89   Pulse 95   Temp 98.9  F (37.2  C) (Tympanic)   Wt 87.5 kg (193 lb)   LMP 03/29/2022   SpO2 98%   BMI 28.71 kg/m      EXAM:  GENERAL APPEARANCE: alert and no distress   GAIT: antalgic  SKIN: no suspicious lesions or rashes  NEURO: Normal strength and tone, mentation intact and speech normal  PSYCH:  mentation appears normal and affect normal/bright  MUSCULOSKELETAL: LEFT FOOT : Inspection:  no swelling  Tender::top of foot v2-3-4 metatarsal      ASSESSMENT/PLAN  (S92.324A) Closed nondisplaced fracture of fourth metatarsal bone of left foot, initial encounter  (primary encounter diagnosis)      X-RAY INTERPRETATION  IMPRESSION: Acute slightly impacted fracture at the fourth metatarsal  neck. No discrete fracture lucency. Normal joint spacing.    Plan: Rest ice elevated  Boot given  Ortho referral placed to follow up    Not allergic reaction to percocet, got headache with hydrocodone  Given #12 for breakthrough pain management and before bed.   Can cause drowsiness do not drink alcohol or drive on medication      Jennifer Skinner, APRN CNP

## 2022-04-28 ENCOUNTER — OFFICE VISIT (OUTPATIENT)
Dept: PODIATRY | Facility: CLINIC | Age: 27
End: 2022-04-28
Payer: MEDICAID

## 2022-04-28 VITALS — SYSTOLIC BLOOD PRESSURE: 140 MMHG | HEART RATE: 78 BPM | DIASTOLIC BLOOD PRESSURE: 80 MMHG

## 2022-04-28 DIAGNOSIS — S92.345A CLOSED NONDISPLACED FRACTURE OF FOURTH METATARSAL BONE OF LEFT FOOT, INITIAL ENCOUNTER: ICD-10-CM

## 2022-04-28 DIAGNOSIS — S93.602A SPRAIN OF LEFT FOOT, INITIAL ENCOUNTER: Primary | ICD-10-CM

## 2022-04-28 PROBLEM — F31.81 BIPOLAR 2 DISORDER, MAJOR DEPRESSIVE EPISODE (H): Status: ACTIVE | Noted: 2022-04-28

## 2022-04-28 PROBLEM — F60.3 BORDERLINE PERSONALITY DISORDER IN ADULT (H): Status: ACTIVE | Noted: 2022-04-28

## 2022-04-28 PROCEDURE — 99203 OFFICE O/P NEW LOW 30 MIN: CPT | Mod: 57 | Performed by: PODIATRIST

## 2022-04-28 PROCEDURE — 28470 CLTX METATARSAL FX WO MNP EA: CPT | Mod: LT | Performed by: PODIATRIST

## 2022-04-28 NOTE — PROGRESS NOTES
"Subjective:    Pt is seen today in consult from Jennifer Skinner for 4th met fx left foot.  Date of injury 4/24/2022.  Patient intoxicated and of sure of mechanism points to dorsal distal foot.  Went to clinic and diagnosed with fourth metatarsal fracture.  She has been walking in this boot.  Works as a .  Tried to work yesterday and after 2 hours had to go home as foot painful and swollen.  She has had edema.  She denies erythema ecchymosis or weakness.      ROS:  See above          Allergies   Allergen Reactions     Metronidazole Anaphylaxis and Hives     Other reaction(s): Throat swelling     Other Drug Allergy (See Comments) Hives, Itching and Swelling     Throat swelling 6 hrs after exposure  Itching and hives 2 hrs after exposure     Hydrocodone-Acetaminophen Other (See Comments)     Sertraline Other (See Comments)     Patient was foggy and \"high\" feeling       Current Outpatient Medications   Medication Sig Dispense Refill     acetaminophen (TYLENOL) 500 MG tablet Take 500-1,000 mg by mouth every 6 hours as needed for mild pain       ALPRAZolam (XANAX) 0.5 MG tablet Take 1-2 tablets (0.5-1 mg) by mouth 3 times daily as needed for anxiety or sleep 60 tablet 0     atomoxetine (STRATTERA) 40 MG capsule Take 1 capsule (40 mg) by mouth daily 60 capsule 0     atomoxetine (STRATTERA) 80 MG capsule Take 1 capsule (80 mg) by mouth daily 30 capsule 1     busPIRone (BUSPAR) 15 MG tablet Take 2 tablets (30 mg) by mouth 2 times daily 120 tablet 0     cyclobenzaprine (FLEXERIL) 5 MG tablet Take 1-2 tablets (5-10 mg) by mouth 3 times daily as needed for muscle spasms 60 tablet 1     FLUoxetine (PROZAC) 20 MG capsule TAKE 1 CAPSULE BY MOUTH WITH 40 MG DAILY 30 capsule 0     FLUoxetine (PROZAC) 40 MG capsule Take 1 capsule (40 mg) by mouth daily Take with 20 mg capsule daily. 90 capsule 1     lamoTRIgine (LAMICTAL) 100 MG tablet Take 100 mg by mouth daily       lamoTRIgine 25 & 50 & 100 MG KIT 25 mg daily, Thursday 3/3 " "50 mg daily for 2 weeks then increase to 100 mg daily       ondansetron (ZOFRAN) 4 MG tablet Take 1 tablet (4 mg) by mouth every 8 hours as needed for nausea 30 tablet 0     oxyCODONE-acetaminophen (PERCOCET) 5-325 MG tablet Take 1 tablet by mouth every 6 hours as needed for pain 12 tablet 0       Patient Active Problem List   Diagnosis     Lumbar radiculopathy     Severe episode of recurrent major depressive disorder, without psychotic features (H)     Suicide attempt (H)     Bipolar 2 disorder, major depressive episode (H)     Borderline personality disorder in adult (H)       Past Medical History:   Diagnosis Date     Complication of anesthesia     \"freaking out when I wake up\"     Depressive disorder 2011       Past Surgical History:   Procedure Laterality Date     DISCECTOMY LUMBAR POSTERIOR MICROSCOPIC ONE LEVEL Left 08/24/2021    Procedure: Minimally invasive left Lumbar 5 to Sacral 1 microdiscectomy  ;  Surgeon: Destin Junior MD;  Location: [facility] OR     GYN SURGERY Right     cystectomy - with salpingectomy     HEAD & NECK SURGERY      tonsillectomy     HEAD & NECK SURGERY      wisdom teeth extraction       Family History   Problem Relation Age of Onset     Diabetes Maternal Grandmother      Hypertension Maternal Grandmother      Colon Cancer Maternal Grandmother      Diabetes Paternal Grandmother      Hypertension Mother      Substance Abuse Father        Social History     Tobacco Use     Smoking status: Former Smoker     Packs/day: 0.00     Years: 0.00     Pack years: 0.00     Smokeless tobacco: Never Used   Substance Use Topics     Alcohol use: Yes     Comment: 2 drinks every other day          Exam:    Vitals: BP (!) 140/80   Pulse 78   LMP 03/29/2022   BMI: There is no height or weight on file to calculate BMI.  Height: Data Unavailable    Constitutional/ general:  Pt is in no apparent distress, appears well-nourished.  Cooperative with history and physical exam.     Psych:  The patient answered " questions appropriately.  Normal affect.  Seems to have reasonable expectations, in terms of treatment.     Lungs:  Non labored breathing, non labored speech. No cough.  No audible wheezing. Even, quiet breathing.       Vascular:  positive pedal pulses bilaterally for both the DP and PT arteries.  CFT < 3 sec.  negative ankle edema.  positive pedal hair growth.    Neuro:  Alert and oriented x 3. Coordinated gait.  Light touch sensation is intact     Derm: Normal texture and turgor.  No erythema, ecchymosis, or cyanosis.      Musculoskeletal:    Lower extremity muscle strength is normal.  Patient is ambulatory without an assistive device or brace.  No gross deformities.  Normal arch.  No pain on palpation rear foot.  No pain on palpation dorsum of navicular.  Pain on palpation dorsum of tarsometatarsal joint  with more pain lateral.  Pain entire forefoot worse left fourth metatarsal neck.    Radiographic Exam:  X-Ray Findings:  I personally reviewed the films.    XR FOOT LEFT G/E 3 VIEWS 4/25/2022 11:13 AM      HISTORY: Left foot pain     COMPARISON: None.                                                                      IMPRESSION: Acute slightly impacted fracture at the fourth metatarsal  neck. No discrete fracture lucency. Normal joint spacing.    Assessment:    Left fourth metatarsal neck fracture  Left foot sprain    Plan:  X-rays personally reviewed.  Discussed etiology and treatment options with the patient in detail.  She is in puts too much pressure on forefoot causing pain.  Will dispense plantarflexed cam walker today.  Patient to wear this at all times while walking.  When not walking patient will take this off and do ROM to prevent blood clot and joint stiffness.  Patient will not sleep with this on.  Discussed with patient that boot only for walking around house.  She should mostly be nonweightbearing.  Instructed on elevating her foot and doing gentle range of motion.  She can ice this.  We gave her  an Ace bandage to use this compression.  Discussed will take 6 weeks to heal in which she will not be able to do her job during this time.  Return to clinic in 1 week for repeat x-ray to ensure no occult bone pathology and ensure pain at tarsometatarsal joint getting better.  Fracture care.  Thank you for allowing me participate in the care of this patient.        Alex Valdovinos DPM, FACFAS

## 2022-04-28 NOTE — PATIENT INSTRUCTIONS
We wish you continued good healing. If you have any questions or concerns, please do not hesitate to contact us at  [phone]    [employer] (secure e-mail communication and access to your chart) to send a message or to make an appointment.    Please remember to call and schedule a follow up appointment if one was recommended at your earliest convenience.     PODIATRY CLINIC HOURS  TELEPHONE NUMBER    Dr. Alex ANGELAPALEJANDRO [facility]        Clinics:  [facility]ry Ashley CMA   Tuesday 1PM-6PM  [place]  Wednesday 745AM-330PM  [place]  Thursday/Friday 745AM-230PM  [facility] APPOINTMENTS  [phone]    [place] APPOINTMENTS  [phone]        If you need a medication refill, please contact us you may need lab work and/or a follow up visit prior to your refill (i.e. Antifungal medications).  If MRI needed please call Imaging at [phone] or [phone]  HOW DO I GET MY KNEE SCOOTER? Knee scooters can be picked up at ANY Medical Supply stores with your knee scooter Prescription.  OR  Bring your signed prescription to an [facility] showroom.

## 2022-04-28 NOTE — LETTER
"    4/28/2022         RE: Caprice Kline  [address] [facility] [address]        Dear Colleague,    Thank you for referring your patient, Caprice Kline, to the [facility]. Please see a copy of my visit note below.    Subjective:    Pt is seen today in consult from Jennifer Skinner for 5th met fx left foot.  Date of injury 4/24/2022.  Patient intoxicated and of sure of mechanism points to dorsal distal foot.  Went to clinic and diagnosed with fourth metatarsal fracture.  She has been walking in this boot.  Works as a .  Tried to work yesterday and after 2 hours had to go home as foot painful and swollen.  She has had edema.  She denies erythema ecchymosis or weakness.      ROS:  See above          Allergies   Allergen Reactions     Metronidazole Anaphylaxis and Hives     Other reaction(s): Throat swelling     Other Drug Allergy (See Comments) Hives, Itching and Swelling     Throat swelling 6 hrs after exposure  Itching and hives 2 hrs after exposure     Hydrocodone-Acetaminophen Other (See Comments)     Sertraline Other (See Comments)     Patient was foggy and \"high\" feeling       Current Outpatient Medications   Medication Sig Dispense Refill     acetaminophen (TYLENOL) 500 MG tablet Take 500-1,000 mg by mouth every 6 hours as needed for mild pain       ALPRAZolam (XANAX) 0.5 MG tablet Take 1-2 tablets (0.5-1 mg) by mouth 3 times daily as needed for anxiety or sleep 60 tablet 0     atomoxetine (STRATTERA) 40 MG capsule Take 1 capsule (40 mg) by mouth daily 60 capsule 0     atomoxetine (STRATTERA) 80 MG capsule Take 1 capsule (80 mg) by mouth daily 30 capsule 1     busPIRone (BUSPAR) 15 MG tablet Take 2 tablets (30 mg) by mouth 2 times daily 120 tablet 0     cyclobenzaprine (FLEXERIL) 5 MG tablet Take 1-2 tablets (5-10 mg) by mouth 3 times daily as needed for muscle spasms 60 tablet 1     FLUoxetine (PROZAC) 20 MG capsule TAKE 1 CAPSULE BY MOUTH WITH 40 MG DAILY 30 capsule 0     " "FLUoxetine (PROZAC) 40 MG capsule Take 1 capsule (40 mg) by mouth daily Take with 20 mg capsule daily. 90 capsule 1     lamoTRIgine (LAMICTAL) 100 MG tablet Take 100 mg by mouth daily       lamoTRIgine 25 & 50 & 100 MG KIT 25 mg daily, Thursday 3/3 50 mg daily for 2 weeks then increase to 100 mg daily       ondansetron (ZOFRAN) 4 MG tablet Take 1 tablet (4 mg) by mouth every 8 hours as needed for nausea 30 tablet 0     oxyCODONE-acetaminophen (PERCOCET) 5-325 MG tablet Take 1 tablet by mouth every 6 hours as needed for pain 12 tablet 0       Patient Active Problem List   Diagnosis     Lumbar radiculopathy     Severe episode of recurrent major depressive disorder, without psychotic features (H)     Suicide attempt (H)     Bipolar 2 disorder, major depressive episode (H)     Borderline personality disorder in adult (H)       Past Medical History:   Diagnosis Date     Complication of anesthesia     \"freaking out when I wake up\"     Depressive disorder 2011       Past Surgical History:   Procedure Laterality Date     DISCECTOMY LUMBAR POSTERIOR MICROSCOPIC ONE LEVEL Left 08/24/2021    Procedure: Minimally invasive left Lumbar 5 to Sacral 1 microdiscectomy  ;  Surgeon: Destin Junior MD;  Location: [facility] OR     GYN SURGERY Right     cystectomy - with salpingectomy     HEAD & NECK SURGERY      tonsillectomy     HEAD & NECK SURGERY      wisdom teeth extraction       Family History   Problem Relation Age of Onset     Diabetes Maternal Grandmother      Hypertension Maternal Grandmother      Colon Cancer Maternal Grandmother      Diabetes Paternal Grandmother      Hypertension Mother      Substance Abuse Father        Social History     Tobacco Use     Smoking status: Former Smoker     Packs/day: 0.00     Years: 0.00     Pack years: 0.00     Smokeless tobacco: Never Used   Substance Use Topics     Alcohol use: Yes     Comment: 2 drinks every other day          Exam:    Vitals: BP (!) 140/80   Pulse 78   LMP 03/29/2022 "   BMI: There is no height or weight on file to calculate BMI.  Height: Data Unavailable    Constitutional/ general:  Pt is in no apparent distress, appears well-nourished.  Cooperative with history and physical exam.     Psych:  The patient answered questions appropriately.  Normal affect.  Seems to have reasonable expectations, in terms of treatment.     Lungs:  Non labored breathing, non labored speech. No cough.  No audible wheezing. Even, quiet breathing.       Vascular:  positive pedal pulses bilaterally for both the DP and PT arteries.  CFT < 3 sec.  negative ankle edema.  positive pedal hair growth.    Neuro:  Alert and oriented x 3. Coordinated gait.  Light touch sensation is intact     Derm: Normal texture and turgor.  No erythema, ecchymosis, or cyanosis.      Musculoskeletal:    Lower extremity muscle strength is normal.  Patient is ambulatory without an assistive device or brace.  No gross deformities.  Normal arch.  No pain on palpation rear foot.  No pain on palpation dorsum of navicular.  Pain on palpation dorsum of tarsometatarsal joint  with more pain lateral.  Pain entire forefoot worse left fourth metatarsal neck.    Radiographic Exam:  X-Ray Findings:  I personally reviewed the films.    XR FOOT LEFT G/E 3 VIEWS 4/25/2022 11:13 AM      HISTORY: Left foot pain     COMPARISON: None.                                                                      IMPRESSION: Acute slightly impacted fracture at the fourth metatarsal  neck. No discrete fracture lucency. Normal joint spacing.    Assessment:    Left fourth metatarsal neck fracture  Left foot sprain    Plan:  X-rays personally reviewed.  Discussed etiology and treatment options with the patient in detail.  She is in puts too much pressure on forefoot causing pain.  Will dispense plantarflexed cam walker today.  Patient to wear this at all times while walking.  When not walking patient will take this off and do ROM to prevent blood clot and joint  stiffness.  Patient will not sleep with this on.  Discussed with patient that boot only for walking around house.  She should mostly be nonweightbearing.  Instructed on elevating her foot and doing gentle range of motion.  She can ice this.  We gave her an Ace bandage to use this compression.  Discussed will take 6 weeks to heal in which she will not be able to do her job during this time.  Return to clinic in 1 week for repeat x-ray to ensure no occult bone pathology and ensure pain at tarsometatarsal joint getting better.  Fracture care.  Thank you for allowing me participate in the care of this patient.        Alex Valdovinos DPM, FACFAS        Again, thank you for allowing me to participate in the care of your patient.        Sincerely,        Alex Valdovinos DPM

## 2022-05-05 ENCOUNTER — OFFICE VISIT (OUTPATIENT)
Dept: PODIATRY | Facility: CLINIC | Age: 27
End: 2022-05-05
Payer: MEDICAID

## 2022-05-05 ENCOUNTER — ANCILLARY PROCEDURE (OUTPATIENT)
Dept: GENERAL RADIOLOGY | Facility: CLINIC | Age: 27
End: 2022-05-05
Attending: PODIATRIST
Payer: MEDICAID

## 2022-05-05 VITALS — DIASTOLIC BLOOD PRESSURE: 96 MMHG | HEART RATE: 100 BPM | SYSTOLIC BLOOD PRESSURE: 139 MMHG

## 2022-05-05 DIAGNOSIS — S93.602A SPRAIN OF LEFT FOOT, INITIAL ENCOUNTER: ICD-10-CM

## 2022-05-05 DIAGNOSIS — S92.345A CLOSED NONDISPLACED FRACTURE OF FOURTH METATARSAL BONE OF LEFT FOOT, INITIAL ENCOUNTER: ICD-10-CM

## 2022-05-05 DIAGNOSIS — S92.345A CLOSED NONDISPLACED FRACTURE OF FOURTH METATARSAL BONE OF LEFT FOOT, INITIAL ENCOUNTER: Primary | ICD-10-CM

## 2022-05-05 PROCEDURE — 73630 X-RAY EXAM OF FOOT: CPT | Mod: TC | Performed by: RADIOLOGY

## 2022-05-05 PROCEDURE — 99213 OFFICE O/P EST LOW 20 MIN: CPT | Mod: 25 | Performed by: PODIATRIST

## 2022-05-05 NOTE — LETTER
"    5/5/2022         RE: Caprice Kline  [address] [facility] [address]        Dear Colleague,    Thank you for referring your patient, Caprice Kline, to the [facility]. Please see a copy of my visit note below.    Subjective:    4/28/22   Pt is seen today in consult from Jennifer Skinner for 5th met fx left foot.  Date of injury 4/24/2022.  Patient intoxicated and of sure of mechanism points to dorsal distal foot.  Went to clinic and diagnosed with fourth metatarsal fracture.  She has been walking in this boot.  Works as a .  Tried to work yesterday and after 2 hours had to go home as foot painful and swollen.  She has had edema.  She denies erythema ecchymosis or weakness.    5/5/22 patient is 11 days s/p left fourth metatarsal neck fracture on 4/24/2022.  Patient has been using plantarflexed CAM Walker.  She states she sleepwalks every night and not wearing this at night and so foot hurts in the morning.  Pain decreasing slightly.      ROS:  See above          Allergies   Allergen Reactions     Metronidazole Anaphylaxis and Hives     Other reaction(s): Throat swelling     Other Drug Allergy (See Comments) Hives, Itching and Swelling     Throat swelling 6 hrs after exposure  Itching and hives 2 hrs after exposure     Hydrocodone-Acetaminophen Other (See Comments)     Sertraline Other (See Comments)     Patient was foggy and \"high\" feeling       Current Outpatient Medications   Medication Sig Dispense Refill     acetaminophen (TYLENOL) 500 MG tablet Take 500-1,000 mg by mouth every 6 hours as needed for mild pain       ALPRAZolam (XANAX) 0.5 MG tablet Take 1-2 tablets (0.5-1 mg) by mouth 3 times daily as needed for anxiety or sleep 60 tablet 0     atomoxetine (STRATTERA) 40 MG capsule Take 1 capsule (40 mg) by mouth daily 60 capsule 0     atomoxetine (STRATTERA) 80 MG capsule Take 1 capsule (80 mg) by mouth daily 30 capsule 1     busPIRone (BUSPAR) 15 MG tablet Take 2 tablets (30 " "mg) by mouth 2 times daily 120 tablet 0     cyclobenzaprine (FLEXERIL) 5 MG tablet Take 1-2 tablets (5-10 mg) by mouth 3 times daily as needed for muscle spasms 60 tablet 1     FLUoxetine (PROZAC) 20 MG capsule TAKE 1 CAPSULE BY MOUTH WITH 40 MG DAILY 30 capsule 0     FLUoxetine (PROZAC) 40 MG capsule Take 1 capsule (40 mg) by mouth daily Take with 20 mg capsule daily. 90 capsule 1     lamoTRIgine (LAMICTAL) 100 MG tablet Take 100 mg by mouth daily       lamoTRIgine 25 & 50 & 100 MG KIT 25 mg daily, Thursday 3/3 50 mg daily for 2 weeks then increase to 100 mg daily       ondansetron (ZOFRAN) 4 MG tablet Take 1 tablet (4 mg) by mouth every 8 hours as needed for nausea 30 tablet 0       Patient Active Problem List   Diagnosis     Lumbar radiculopathy     Severe episode of recurrent major depressive disorder, without psychotic features (H)     Suicide attempt (H)     Bipolar 2 disorder, major depressive episode (H)     Borderline personality disorder in adult (H)       Past Medical History:   Diagnosis Date     Complication of anesthesia     \"freaking out when I wake up\"     Depressive disorder 2011       Past Surgical History:   Procedure Laterality Date     DISCECTOMY LUMBAR POSTERIOR MICROSCOPIC ONE LEVEL Left 08/24/2021    Procedure: Minimally invasive left Lumbar 5 to Sacral 1 microdiscectomy  ;  Surgeon: Destin Junior MD;  Location: [facility] OR     GYN SURGERY Right     cystectomy - with salpingectomy     HEAD & NECK SURGERY      tonsillectomy     HEAD & NECK SURGERY      wisdom teeth extraction       Family History   Problem Relation Age of Onset     Diabetes Maternal Grandmother      Hypertension Maternal Grandmother      Colon Cancer Maternal Grandmother      Diabetes Paternal Grandmother      Hypertension Mother      Substance Abuse Father        Social History     Tobacco Use     Smoking status: Former Smoker     Packs/day: 0.00     Years: 0.00     Pack years: 0.00     Smokeless tobacco: Never Used "   Substance Use Topics     Alcohol use: Yes     Comment: 2 drinks every other day          Exam:    Vitals: LMP 03/29/2022   BMI: There is no height or weight on file to calculate BMI.  Height: Data Unavailable    Constitutional/ general:  Pt is in no apparent distress, appears well-nourished.  Cooperative with history and physical exam.     Psych:  The patient answered questions appropriately.  Normal affect.  Seems to have reasonable expectations, in terms of treatment.     Lungs:  Non labored breathing, non labored speech. No cough.  No audible wheezing. Even, quiet breathing.       Vascular:  positive pedal pulses bilaterally for both the DP and PT arteries.  CFT < 3 sec.  negative ankle edema.  positive pedal hair growth.    Neuro:  Alert and oriented x 3. Coordinated gait.  Light touch sensation is intact     Derm: Normal texture and turgor.  No erythema, ecchymosis, or cyanosis.      Musculoskeletal:    Lower extremity muscle strength is normal.  Patient is ambulatory without an assistive device or brace.  No gross deformities.  Normal arch.  No pain on palpation rear foot.  No pain on palpation dorsum of navicular.  Pain on palpation dorsum of tarsometatarsal joint  with more pain lateral.  Pain here is much less than last visit.  There is only scant edema here now.  With vigorous loading the first metatarsal head no pain at the tarsometatarsal joint.  pain entire forefoot worse left fourth metatarsal neck and this is slightly less than last visit.    Radiographic Exam: Fracture left fourth metatarsal neck stable with just slight lateral rotation    Plan:  X-rays taken today of left foot.  Discussed fracture has perhaps slight increase in rotation.  Much less edema and pain at the tarsometatarsal joint and doubt significant Lisfranc's injury.  She will just continue to watch this.  If sleep watching discussed patient should probably wear cam walker in bed at night.  She will be careful not to fall.   Encouraged her to take this off and do range of motion.  Return to clinic in 4 weeks.  Fracture care.        Alex Valdovinos DPM, FACFAS        Again, thank you for allowing me to participate in the care of your patient.        Sincerely,        Alex Valdovinos DPM

## 2022-05-05 NOTE — PROGRESS NOTES
"Subjective:    4/28/22   Pt is seen today in consult from Jennifer Skinner for 5th met fx left foot.  Date of injury 4/24/2022.  Patient intoxicated and of sure of mechanism points to dorsal distal foot.  Went to clinic and diagnosed with fourth metatarsal fracture.  She has been walking in this boot.  Works as a .  Tried to work yesterday and after 2 hours had to go home as foot painful and swollen.  She has had edema.  She denies erythema ecchymosis or weakness.    5/5/22 patient is 11 days s/p left fourth metatarsal neck fracture on 4/24/2022.  Patient has been using plantarflexed CAM Walker.  She states she sleepwalks every night and not wearing this at night and so foot hurts in the morning.  Pain decreasing slightly.      ROS:  See above          Allergies   Allergen Reactions     Metronidazole Anaphylaxis and Hives     Other reaction(s): Throat swelling     Other Drug Allergy (See Comments) Hives, Itching and Swelling     Throat swelling 6 hrs after exposure  Itching and hives 2 hrs after exposure     Hydrocodone-Acetaminophen Other (See Comments)     Sertraline Other (See Comments)     Patient was foggy and \"high\" feeling       Current Outpatient Medications   Medication Sig Dispense Refill     acetaminophen (TYLENOL) 500 MG tablet Take 500-1,000 mg by mouth every 6 hours as needed for mild pain       ALPRAZolam (XANAX) 0.5 MG tablet Take 1-2 tablets (0.5-1 mg) by mouth 3 times daily as needed for anxiety or sleep 60 tablet 0     atomoxetine (STRATTERA) 40 MG capsule Take 1 capsule (40 mg) by mouth daily 60 capsule 0     atomoxetine (STRATTERA) 80 MG capsule Take 1 capsule (80 mg) by mouth daily 30 capsule 1     busPIRone (BUSPAR) 15 MG tablet Take 2 tablets (30 mg) by mouth 2 times daily 120 tablet 0     cyclobenzaprine (FLEXERIL) 5 MG tablet Take 1-2 tablets (5-10 mg) by mouth 3 times daily as needed for muscle spasms 60 tablet 1     FLUoxetine (PROZAC) 20 MG capsule TAKE 1 CAPSULE BY MOUTH WITH 40 MG " "DAILY 30 capsule 0     FLUoxetine (PROZAC) 40 MG capsule Take 1 capsule (40 mg) by mouth daily Take with 20 mg capsule daily. 90 capsule 1     lamoTRIgine (LAMICTAL) 100 MG tablet Take 100 mg by mouth daily       lamoTRIgine 25 & 50 & 100 MG KIT 25 mg daily, Thursday 3/3 50 mg daily for 2 weeks then increase to 100 mg daily       ondansetron (ZOFRAN) 4 MG tablet Take 1 tablet (4 mg) by mouth every 8 hours as needed for nausea 30 tablet 0       Patient Active Problem List   Diagnosis     Lumbar radiculopathy     Severe episode of recurrent major depressive disorder, without psychotic features (H)     Suicide attempt (H)     Bipolar 2 disorder, major depressive episode (H)     Borderline personality disorder in adult (H)       Past Medical History:   Diagnosis Date     Complication of anesthesia     \"freaking out when I wake up\"     Depressive disorder 2011       Past Surgical History:   Procedure Laterality Date     DISCECTOMY LUMBAR POSTERIOR MICROSCOPIC ONE LEVEL Left 08/24/2021    Procedure: Minimally invasive left Lumbar 5 to Sacral 1 microdiscectomy  ;  Surgeon: Destin Junior MD;  Location: [facility] OR     GYN SURGERY Right     cystectomy - with salpingectomy     HEAD & NECK SURGERY      tonsillectomy     HEAD & NECK SURGERY      wisdom teeth extraction       Family History   Problem Relation Age of Onset     Diabetes Maternal Grandmother      Hypertension Maternal Grandmother      Colon Cancer Maternal Grandmother      Diabetes Paternal Grandmother      Hypertension Mother      Substance Abuse Father        Social History     Tobacco Use     Smoking status: Former Smoker     Packs/day: 0.00     Years: 0.00     Pack years: 0.00     Smokeless tobacco: Never Used   Substance Use Topics     Alcohol use: Yes     Comment: 2 drinks every other day          Exam:    Vitals: LMP 03/29/2022   BMI: There is no height or weight on file to calculate BMI.  Height: Data Unavailable    Constitutional/ general:  Pt is in no " apparent distress, appears well-nourished.  Cooperative with history and physical exam.     Psych:  The patient answered questions appropriately.  Normal affect.  Seems to have reasonable expectations, in terms of treatment.     Lungs:  Non labored breathing, non labored speech. No cough.  No audible wheezing. Even, quiet breathing.       Vascular:  positive pedal pulses bilaterally for both the DP and PT arteries.  CFT < 3 sec.  negative ankle edema.  positive pedal hair growth.    Neuro:  Alert and oriented x 3. Coordinated gait.  Light touch sensation is intact     Derm: Normal texture and turgor.  No erythema, ecchymosis, or cyanosis.      Musculoskeletal:    Lower extremity muscle strength is normal.  Patient is ambulatory without an assistive device or brace.  No gross deformities.  Normal arch.  No pain on palpation rear foot.  No pain on palpation dorsum of navicular.  Pain on palpation dorsum of tarsometatarsal joint  with more pain lateral.  Pain here is much less than last visit.  There is only scant edema here now.  With vigorous loading the first metatarsal head no pain at the tarsometatarsal joint.  pain entire forefoot worse left fourth metatarsal neck and this is slightly less than last visit.    Radiographic Exam: Fracture left fourth metatarsal neck stable with just slight lateral rotation    Plan:  X-rays taken today of left foot.  Discussed fracture has perhaps slight increase in rotation.  Much less edema and pain at the tarsometatarsal joint and doubt significant Lisfranc's injury.  She will just continue to watch this.  If sleep watching discussed patient should probably wear cam walker in bed at night.  She will be careful not to fall.  Encouraged her to take this off and do range of motion.  Return to clinic in 4 weeks.  Fracture care.        Alex Valdovinos, CHIRAG, FACFAS

## 2022-05-05 NOTE — PATIENT INSTRUCTIONS
We wish you continued good healing. If you have any questions or concerns, please do not hesitate to contact us at  [phone]    [employer] (secure e-mail communication and access to your chart) to send a message or to make an appointment.    Please remember to call and schedule a follow up appointment if one was recommended at your earliest convenience.     PODIATRY CLINIC HOURS  TELEPHONE NUMBER    Dr. Alex ANGELAPALEJANDRO [facility]        Clinics:  Larry Ashley CMA   Tuesday 1PM-6PM  [place]  Wednesday 745AM-330PM  [place]  Thursday/Friday 745AM-230PM  [place] [facility]/[place] APPOINTMENTS  [phone]    [place] APPOINTMENTS  [phone]        If you need a medication refill, please contact us you may need lab work and/or a follow up visit prior to your refill (i.e. Antifungal medications).  If MRI needed please call Imaging at [phone] or [phone]  HOW DO I GET MY KNEE SCOOTER? Knee scooters can be picked up at ANY Medical Supply stores with your knee scooter Prescription.  OR  Bring your signed prescription to an [facility] showroom.

## 2022-05-22 DIAGNOSIS — R11.0 NAUSEA: ICD-10-CM

## 2022-05-23 RX ORDER — ONDANSETRON 4 MG/1
TABLET, FILM COATED ORAL
Qty: 30 TABLET | Refills: 0 | Status: SHIPPED | OUTPATIENT
Start: 2022-05-23 | End: 2022-06-21

## 2022-06-19 ASSESSMENT — PATIENT HEALTH QUESTIONNAIRE - PHQ9
SUM OF ALL RESPONSES TO PHQ QUESTIONS 1-9: 20
SUM OF ALL RESPONSES TO PHQ QUESTIONS 1-9: 20
10. IF YOU CHECKED OFF ANY PROBLEMS, HOW DIFFICULT HAVE THESE PROBLEMS MADE IT FOR YOU TO DO YOUR WORK, TAKE CARE OF THINGS AT HOME, OR GET ALONG WITH OTHER PEOPLE: EXTREMELY DIFFICULT

## 2022-06-21 DIAGNOSIS — M54.42 ACUTE BILATERAL LOW BACK PAIN WITH LEFT-SIDED SCIATICA: ICD-10-CM

## 2022-06-21 DIAGNOSIS — R11.0 NAUSEA: ICD-10-CM

## 2022-06-21 RX ORDER — ONDANSETRON 4 MG/1
TABLET, FILM COATED ORAL
Qty: 30 TABLET | Refills: 0 | Status: SHIPPED | OUTPATIENT
Start: 2022-06-21 | End: 2022-06-22 | Stop reason: ALTCHOICE

## 2022-06-21 RX ORDER — CYCLOBENZAPRINE HCL 5 MG
TABLET ORAL
Qty: 60 TABLET | Refills: 0 | Status: SHIPPED | OUTPATIENT
Start: 2022-06-21 | End: 2022-07-12

## 2022-06-22 ENCOUNTER — VIRTUAL VISIT (OUTPATIENT)
Dept: FAMILY MEDICINE | Facility: OTHER | Age: 27
End: 2022-06-22
Payer: MEDICAID

## 2022-06-22 DIAGNOSIS — M54.42 ACUTE BILATERAL LOW BACK PAIN WITH LEFT-SIDED SCIATICA: ICD-10-CM

## 2022-06-22 DIAGNOSIS — Z80.0 FH: COLON CANCER: ICD-10-CM

## 2022-06-22 DIAGNOSIS — R41.840 INATTENTION: ICD-10-CM

## 2022-06-22 DIAGNOSIS — T78.02XA ANAPHYLACTIC SHOCK DUE TO SHELLFISH, INITIAL ENCOUNTER: ICD-10-CM

## 2022-06-22 DIAGNOSIS — F31.81 BIPOLAR II DISORDER (H): Primary | ICD-10-CM

## 2022-06-22 DIAGNOSIS — R11.0 NAUSEA: ICD-10-CM

## 2022-06-22 PROCEDURE — 99214 OFFICE O/P EST MOD 30 MIN: CPT | Mod: 95 | Performed by: PHYSICIAN ASSISTANT

## 2022-06-22 RX ORDER — ONDANSETRON 4 MG/1
4 TABLET, ORALLY DISINTEGRATING ORAL EVERY 8 HOURS PRN
Qty: 30 TABLET | Refills: 0 | Status: SHIPPED | OUTPATIENT
Start: 2022-06-22 | End: 2022-07-12

## 2022-06-22 RX ORDER — EPINEPHRINE 0.3 MG/.3ML
0.3 INJECTION SUBCUTANEOUS PRN
Qty: 2 EACH | Refills: 3 | Status: SHIPPED | OUTPATIENT
Start: 2022-06-22 | End: 2024-02-28

## 2022-06-22 RX ORDER — PRAZOSIN HYDROCHLORIDE 2 MG/1
2 CAPSULE ORAL AT BEDTIME
COMMUNITY
Start: 2022-06-16 | End: 2023-07-26

## 2022-06-22 RX ORDER — DOXEPIN HYDROCHLORIDE 10 MG/1
10 CAPSULE ORAL AT BEDTIME
COMMUNITY
Start: 2022-06-16

## 2022-06-22 ASSESSMENT — PATIENT HEALTH QUESTIONNAIRE - PHQ9
SUM OF ALL RESPONSES TO PHQ QUESTIONS 1-9: 20
10. IF YOU CHECKED OFF ANY PROBLEMS, HOW DIFFICULT HAVE THESE PROBLEMS MADE IT FOR YOU TO DO YOUR WORK, TAKE CARE OF THINGS AT HOME, OR GET ALONG WITH OTHER PEOPLE: EXTREMELY DIFFICULT

## 2022-06-22 NOTE — PROGRESS NOTES
Caprice is a 26 year old who is being evaluated via a billable video visit.      How would you like to obtain your AVS? MyChart  If the video visit is dropped, the invitation should be resent by: Text to cell phone: [phone]  Will anyone else be joining your video visit? No      Assessment & Plan     Bipolar II disorder (H)  Will continue to see Psychiatrist, updated medications  She likely was put on Quetiapine but waiting on confirmation to add to list as she wasn't entirely sure the name.     Inattention  Seeing Psychiatrist for this and on strattera.     Nausea  Taking PRN and will switch to ODT version, refill PRN.   - ondansetron (ZOFRAN ODT) 4 MG ODT tab; Take 1 tablet (4 mg) by mouth every 8 hours as needed for nausea    Acute bilateral low back pain with left-sided sciatica  Uses cyclobenzaprine after work, continues to stretch. Refill PRN.     FH: colon cancer  Will need screening at 35, grandmother had colon cancer and now mom has precancerous polyps    Anaphylactic shock due to shellfish, initial encounter  Given due to symptoms related to shellfish intake, she can have on hand if needed.   - EPINEPHrine (ANY BX GENERIC EQUIV) 0.3 MG/0.3ML injection 2-pack; Inject 0.3 mLs (0.3 mg) into the muscle as needed for anaphylaxis    Return in about 6 months (around 12/22/2022) for Medication Re-check.    Options for treatment and follow-up care were reviewed with the patient and/or guardian. Patient and/or guardian engaged in the decision making process and verbalized understanding of the options discussed and agreed with the final plan.    Junior Roe PA-C  [facility]    Hector Vasques is a 26 year old, presenting for the following health issues:  No chief complaint on file.      History of Present Illness       Reason for visit:  Psych is moving to a nee location that i refuse to go to, updates on health    She eats 0-1 servings of fruits and vegetables daily.She consumes 1  "sweetened beverage(s) daily.She exercises with enough effort to increase her heart rate 9 or less minutes per day.  She exercises with enough effort to increase her heart rate 3 or less days per week. She is missing 2 dose(s) of medications per week.  She is not taking prescribed medications regularly due to remembering to take and other.    Today's PHQ-9         PHQ-9 Total Score: 20    PHQ-9 Q9 Thoughts of better off dead/self-harm past 2 weeks :   Not at all    How difficult have these problems made it for you to do your work, take care of things at home, or get along with other people: Extremely difficult     - Mom had her colonoscopy and she had precancerous polyps, her Dr said to have her both on high fiber diet and getting colonoscopies at 35.   - Updated allergies, shellfish and fish and her fish oil causing her symptoms.  Has felt better off fish oil.    - Psychiatrist was going to be moving to [facility] but he isn't going to be moving to [facility] but he is going to move somewhere else so she is ok to stay with him.  She really likes his psychiatrist.  [facility] has been \"interesting\".  Yesterday on their call her mood stabilizer was increased.  She still has issues with insomnia so she is being given quetiapine.    - If in the next 4 weeks they will try to taper off Buspar and will consider fluoxetine in the future as well.   - Her back has been worse recently because she is working more.  She takes it after working.  But it doesn't feel like there is anything major it just hurts.  Still doing stretches from physical therapy.    - Takes the Zofran PRN.  She is taking them a couple of times per week.  Sometimes doesn't need it at all.  Would like to switch to ODT.     Review of Systems   Constitutional, HEENT, cardiovascular, pulmonary, GI, , musculoskeletal, neuro, skin, endocrine and psych systems are negative, except as otherwise noted.      Objective           Vitals:  No vitals were obtained today due to " virtual visit.    Physical Exam   GENERAL: Healthy, alert and no distress  EYES: Eyes grossly normal to inspection.  No discharge or erythema, or obvious scleral/conjunctival abnormalities.  RESP: No audible wheeze, cough, or visible cyanosis.  No visible retractions or increased work of breathing.    SKIN: Visible skin clear. No significant rash, abnormal pigmentation or lesions.  NEURO: Cranial nerves grossly intact.  Mentation and speech appropriate for age.  PSYCH: Mentation appears normal, affect normal/bright, judgement and insight intact, normal speech and appearance well-groomed.      Video-Visit Details    Video Start Time: 1:56 PM    Type of service:  Video Visit    Video End Time:2:09 PM    Originating Location (pt. Location): Home    Distant Location (provider location):  [facility]     Platform used for Video Visit: Ornis  Angela.

## 2022-07-06 DIAGNOSIS — F33.2 SEVERE EPISODE OF RECURRENT MAJOR DEPRESSIVE DISORDER, WITHOUT PSYCHOTIC FEATURES (H): ICD-10-CM

## 2022-07-06 RX ORDER — ALPRAZOLAM 0.5 MG
TABLET ORAL
Qty: 30 TABLET | OUTPATIENT
Start: 2022-07-06

## 2022-07-06 NOTE — TELEPHONE ENCOUNTER
Pending Prescriptions:                       Disp   Refills    ALPRAZolam (XANAX) 0.5 MG tablet [Pharmacy*30 tab*         Sig: TAKE 1 TO 2 TABLETS(0.5 TO 1 MG) BY MOUTH THREE TIMES           DAILY AS NEEDED FOR ANXIETY OR SLEEP    Routing refill request to provider for review/approval because:  Drug not on the [facility] refill protocol     Requested Prescriptions   Pending Prescriptions Disp Refills    ALPRAZolam (XANAX) 0.5 MG tablet [Pharmacy Med Name: ALPRAZOLAM 0.5MG TABLETS] 30 tablet      Sig: TAKE 1 TO 2 TABLETS(0.5 TO 1 MG) BY MOUTH THREE TIMES DAILY AS NEEDED FOR ANXIETY OR SLEEP        There is no refill protocol information for this order

## 2022-07-11 NOTE — TELEPHONE ENCOUNTER
LM for the patient to return call.   Please find out if the patient has a psychiatrist. If so, have they been refilling her Alprazolam?     JET Lynne, RN, PHN   [place]  July 11, 2022

## 2022-08-23 ENCOUNTER — OFFICE VISIT (OUTPATIENT)
Dept: URGENT CARE | Facility: URGENT CARE | Age: 27
End: 2022-08-23
Payer: MEDICAID

## 2022-08-23 VITALS
TEMPERATURE: 98.8 F | DIASTOLIC BLOOD PRESSURE: 60 MMHG | SYSTOLIC BLOOD PRESSURE: 110 MMHG | BODY MASS INDEX: 29.69 KG/M2 | RESPIRATION RATE: 14 BRPM | WEIGHT: 199.6 LBS | HEART RATE: 92 BPM | OXYGEN SATURATION: 98 %
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
## 2022-08-23 DIAGNOSIS — S99.912A ANKLE INJURY, LEFT, INITIAL ENCOUNTER: ICD-10-CM

## 2022-08-23 DIAGNOSIS — T14.8XXA CONTUSION OF BONE: Primary | ICD-10-CM

## 2022-08-23 DIAGNOSIS — S89.91XA INJURY OF RIGHT KNEE, INITIAL ENCOUNTER: ICD-10-CM

## 2022-08-23 PROCEDURE — 96372 THER/PROPH/DIAG INJ SC/IM: CPT | Performed by: NURSE PRACTITIONER

## 2022-08-23 PROCEDURE — 99214 OFFICE O/P EST MOD 30 MIN: CPT | Mod: 25 | Performed by: NURSE PRACTITIONER

## 2022-08-23 RX ORDER — KETOROLAC TROMETHAMINE 30 MG/ML
30 INJECTION, SOLUTION INTRAMUSCULAR; INTRAVENOUS ONCE
Status: COMPLETED | OUTPATIENT
Start: 2022-08-23 | End: 2022-08-23

## 2022-08-23 RX ADMIN — KETOROLAC TROMETHAMINE 30 MG: 30 INJECTION, SOLUTION INTRAMUSCULAR; INTRAVENOUS at 16:56

## 2022-08-23 NOTE — PROGRESS NOTES
Assessment & Plan     Contusion of bone  - Crutches Order for DME - ONLY FOR DME    Injury of right knee, initial encounter    - ketorolac (TORADOL) injection 30 mg    Fall, initial encounter    - XR Knee Right 3 Views  - XR Tibia and Fibula Right 2 Views  - XR Ankle Left G/E 3 Views    Ankle injury, left, initial encounter          She is given 30 mg IM ketorolac during visit with minimal relief. Reviewed xray images and results during visit showing no obvious fracture or dislocation. Discussed bone contusions and recommend RICE: rest, ice, compress, elevate, tylenol and ibuprofen as needed, weight bearing as tolerated.  Do not take any NSAIDs (ibuprofen, advil, aleve) until after 1am tomorrow morning due to ketorolac injection. She is fitted with crutches, left ace wrap for ankle and right knee sleeve as needed.     Follow-up with PCP if symptoms persist for 2 weeks, and sooner if symptoms worsen or new symptoms develop.     Discussed red flag symptoms which warrant immediate visit in emergency room    All questions were answered and patient verbalized understanding. AVS reviewed with patient.     Eri Landaverde, DNP, APRN, CNP 8/23/2022 5:12 PM  [facility]          Hector Vasques is a 26 year old female who presents to clinic today for the following health issues:  Chief Complaint   Patient presents with     Fall     Tripped over a long board last night and hurt right knee and left ankle     MS Injury/Pain    Onset of symptoms was 1 day(s) ago.  Location: right knee and left ankle  Context: The injury happened while tripping over a long board and fell skinning her left ankle and injuring right knee  Course of symptoms is worsening.    Severity severe  Current and Associated symptoms: Pain, Swelling, Bruising, Redness and Decreased range of motion  Denies  Warmth  Aggravating Factors: walking and weight-bearing  Therapies to improve symptoms include: tylenol this morning doesn't  seem to help much  This is not the first time this type of problem has occurred for this patient. She broke her 4th metatarsal 4 months ago.       Problem list, Medication list, Allergies, and Medical history reviewed in EPIC.    ROS:  Review of systems negative except for noted above        Objective    /60   Pulse 92   Temp 98.8  F (37.1  C) (Tympanic)   Resp 14   Wt 90.5 kg (199 lb 9.6 oz)   SpO2 98%   BMI 29.69 kg/m    Physical Exam  Constitutional:       General: She is not in acute distress.     Appearance: She is not toxic-appearing or diaphoretic.   Cardiovascular:      Pulses: Normal pulses.   Musculoskeletal:      Right knee: Swelling present. Decreased range of motion. No LCL laxity, MCL laxity, ACL laxity or PCL laxity.      Instability Tests: Anterior drawer test negative. Posterior drawer test negative.      Left knee: Normal.      Right lower leg: Normal.      Left lower leg: Swelling and bony tenderness present.      Right ankle: Normal.      Left ankle: Swelling present. Tenderness present over the medial malleolus. Decreased range of motion.      Comments: Mild swelling left ankle and right lower leg and right knee.    Skin:     General: Skin is warm and dry.      Findings: Bruising present.      Comments: Abrasions right anterior shin and left anterior ankle. Bruising right knee   Neurological:      Mental Status: She is alert.      Sensory: No sensory deficit.      Gait: Gait abnormal.      Comments: Walking slowly with a limp          X-ray left ankle, right knee, right tib/fib was performed and reviewed independently by myself showing no obvious fracture or dislocation  Radiologist impression:   Results for orders placed or performed in visit on 08/23/22   XR Ankle Left G/E 3 Views     Status: None    Narrative    EXAM: XR TIBIA AND FIBULA RIGHT 2 VIEWS, XR ANKLE LEFT G/E 3 VIEWS  LOCATION: [facility]  DATE/TIME: 8/23/2022 4:37 PM    INDICATION: Fall and  pain.  COMPARISON: None.      Impression    IMPRESSION: Normal tibia, fibula and ankle. No fracture or malalignment. Bone island in the calcaneus.   Results for orders placed or performed in visit on 08/23/22   XR Knee Right 3 Views     Status: None    Narrative    EXAM: XR KNEE RIGHT 3 VIEWS  LOCATION: [facility]  DATE/TIME: 8/23/2022 4:33 PM    INDICATION: fell pain througout knee  COMPARISON: None.      Impression    IMPRESSION: Normal joint spaces and alignment. No fracture or joint effusion.   Results for orders placed or performed in visit on 08/23/22   XR Tibia and Fibula Right 2 Views     Status: None    Narrative    EXAM: XR TIBIA AND FIBULA RIGHT 2 VIEWS, XR ANKLE LEFT G/E 3 VIEWS  LOCATION: [facility] [place]  DATE/TIME: 8/23/2022 4:37 PM    INDICATION: Fall and pain.  COMPARISON: None.      Impression    IMPRESSION: Normal tibia, fibula and ankle. No fracture or malalignment. Bone island in the calcaneus.

## 2022-08-23 NOTE — PATIENT INSTRUCTIONS
Do not take any NSAIDs (ibuprofen, advil, aleve) until after 1am tomorrow morning due to ketorolac injection

## 2022-10-04 DIAGNOSIS — R11.0 NAUSEA: ICD-10-CM

## 2022-10-04 DIAGNOSIS — M54.42 ACUTE BILATERAL LOW BACK PAIN WITH LEFT-SIDED SCIATICA: ICD-10-CM

## 2022-10-04 RX ORDER — ONDANSETRON 4 MG/1
TABLET, ORALLY DISINTEGRATING ORAL
Qty: 30 TABLET | Refills: 0 | Status: SHIPPED | OUTPATIENT
Start: 2022-10-04 | End: 2022-12-02

## 2022-10-04 RX ORDER — CYCLOBENZAPRINE HCL 5 MG
TABLET ORAL
Qty: 60 TABLET | Refills: 0 | Status: SHIPPED | OUTPATIENT
Start: 2022-10-04 | End: 2022-12-02

## 2022-11-06 ENCOUNTER — E-VISIT (OUTPATIENT)
Dept: FAMILY MEDICINE | Facility: OTHER | Age: 27
End: 2022-11-06
Payer: COMMERCIAL

## 2022-11-06 DIAGNOSIS — Z53.9 ERRONEOUS ENCOUNTER--DISREGARD: Primary | ICD-10-CM

## 2022-11-09 ENCOUNTER — VIRTUAL VISIT (OUTPATIENT)
Dept: FAMILY MEDICINE | Facility: OTHER | Age: 27
End: 2022-11-09
Payer: COMMERCIAL

## 2022-11-09 ENCOUNTER — TELEPHONE (OUTPATIENT)
Dept: FAMILY MEDICINE | Facility: OTHER | Age: 27
End: 2022-11-09

## 2022-11-09 DIAGNOSIS — Z53.9 NO SHOW: Primary | ICD-10-CM

## 2022-11-09 ASSESSMENT — PATIENT HEALTH QUESTIONNAIRE - PHQ9
SUM OF ALL RESPONSES TO PHQ QUESTIONS 1-9: 19
SUM OF ALL RESPONSES TO PHQ QUESTIONS 1-9: 19
10. IF YOU CHECKED OFF ANY PROBLEMS, HOW DIFFICULT HAVE THESE PROBLEMS MADE IT FOR YOU TO DO YOUR WORK, TAKE CARE OF THINGS AT HOME, OR GET ALONG WITH OTHER PEOPLE: VERY DIFFICULT

## 2022-11-09 NOTE — PROGRESS NOTES
Provider attempted to connect with patient through [employer] and through [employer] multiple times including attempting to call the patient on her listed number.  There was no response by the patient.      She has significant issues with her mental health and reviewed her last hospitalization which is concerning that she is not connecting.  Will want to reschedule her as soon as possible.  Will also send Nurse Triage note to have them attempt to reach out to her as well. She does have a significant other she lives with that has been helpful in making sure she is safe.     Junior Roe PA-C

## 2022-11-09 NOTE — TELEPHONE ENCOUNTER
Attempt #1    Left message for Caprice to call back our triage nurse line so we can assess her mental health.  She was a no show 11/9/22 visit.    Please transfer to triage with call back.    Malou Simeon RN  [facility] ~ Registered Nurse  Clinic Triage ~ [place] [facility] [place]  November 9, 2022

## 2022-11-09 NOTE — TELEPHONE ENCOUNTER
Patient was a no show for her appointment and has significant history of mental health issues.  Please attempt to contact to make sure she is ok.     Junior Roe PA-C

## 2022-11-10 NOTE — TELEPHONE ENCOUNTER
Left message on answering machine for patient to call back and ask to speak with any RN at primary clinic [phone].  Mary Jo PUGA RN

## 2022-11-14 NOTE — TELEPHONE ENCOUNTER
Patient Contact    Attempt # 3    Was call answered?  No.  Left message on voicemail with information to call the clinic back.     Your [employer] message sent.     JORDAN BrumfieldN, RN, PHN  Registered Nurse-Clinic Triage  [facility]  11/14/2022 at 9:37 AM

## 2022-11-24 ENCOUNTER — E-VISIT (OUTPATIENT)
Dept: FAMILY MEDICINE | Facility: OTHER | Age: 27
End: 2022-11-24
Payer: COMMERCIAL

## 2022-11-24 ENCOUNTER — MYC MEDICAL ADVICE (OUTPATIENT)
Dept: FAMILY MEDICINE | Facility: OTHER | Age: 27
End: 2022-11-24

## 2022-11-24 DIAGNOSIS — Z53.9 ERRONEOUS ENCOUNTER--DISREGARD: Primary | ICD-10-CM

## 2022-12-01 DIAGNOSIS — R11.0 NAUSEA: ICD-10-CM

## 2022-12-01 DIAGNOSIS — M54.42 ACUTE BILATERAL LOW BACK PAIN WITH LEFT-SIDED SCIATICA: ICD-10-CM

## 2022-12-02 RX ORDER — CYCLOBENZAPRINE HCL 5 MG
TABLET ORAL
Qty: 60 TABLET | Refills: 0 | Status: SHIPPED | OUTPATIENT
Start: 2022-12-02 | End: 2023-01-25

## 2022-12-02 RX ORDER — ONDANSETRON 4 MG/1
TABLET, ORALLY DISINTEGRATING ORAL
Qty: 30 TABLET | Refills: 0 | Status: SHIPPED | OUTPATIENT
Start: 2022-12-02 | End: 2023-01-25

## 2022-12-18 ENCOUNTER — MYC MEDICAL ADVICE (OUTPATIENT)
Dept: FAMILY MEDICINE | Facility: OTHER | Age: 27
End: 2022-12-18

## 2023-01-08 ENCOUNTER — HEALTH MAINTENANCE LETTER (OUTPATIENT)
Age: 28
End: 2023-01-08

## 2023-01-24 DIAGNOSIS — M54.42 ACUTE BILATERAL LOW BACK PAIN WITH LEFT-SIDED SCIATICA: ICD-10-CM

## 2023-01-24 DIAGNOSIS — R11.0 NAUSEA: ICD-10-CM

## 2023-01-25 RX ORDER — ONDANSETRON 4 MG/1
TABLET, ORALLY DISINTEGRATING ORAL
Qty: 30 TABLET | Refills: 0 | Status: SHIPPED | OUTPATIENT
Start: 2023-01-25 | End: 2023-03-30

## 2023-01-25 RX ORDER — CYCLOBENZAPRINE HCL 5 MG
TABLET ORAL
Qty: 60 TABLET | Refills: 0 | Status: SHIPPED | OUTPATIENT
Start: 2023-01-25 | End: 2023-02-28

## 2023-01-31 ASSESSMENT — ENCOUNTER SYMPTOMS
SORE THROAT: 1
EYE PAIN: 0
DIZZINESS: 1
HEMATURIA: 0
WEAKNESS: 1
DYSURIA: 0
ABDOMINAL PAIN: 0
COUGH: 1
HEMATOCHEZIA: 0
BREAST MASS: 0
NAUSEA: 1
MYALGIAS: 1
PARESTHESIAS: 0
HEARTBURN: 0
NERVOUS/ANXIOUS: 1
ARTHRALGIAS: 1
JOINT SWELLING: 0
PALPITATIONS: 0
CONSTIPATION: 0
DIARRHEA: 1
FEVER: 0
FREQUENCY: 0
CHILLS: 0
SHORTNESS OF BREATH: 0
HEADACHES: 1

## 2023-01-31 ASSESSMENT — PATIENT HEALTH QUESTIONNAIRE - PHQ9
10. IF YOU CHECKED OFF ANY PROBLEMS, HOW DIFFICULT HAVE THESE PROBLEMS MADE IT FOR YOU TO DO YOUR WORK, TAKE CARE OF THINGS AT HOME, OR GET ALONG WITH OTHER PEOPLE: VERY DIFFICULT
SUM OF ALL RESPONSES TO PHQ QUESTIONS 1-9: 18
SUM OF ALL RESPONSES TO PHQ QUESTIONS 1-9: 18

## 2023-02-01 ENCOUNTER — MYC MEDICAL ADVICE (OUTPATIENT)
Dept: FAMILY MEDICINE | Facility: OTHER | Age: 28
End: 2023-02-01
Payer: COMMERCIAL

## 2023-02-01 ENCOUNTER — E-VISIT (OUTPATIENT)
Dept: FAMILY MEDICINE | Facility: OTHER | Age: 28
End: 2023-02-01
Payer: COMMERCIAL

## 2023-02-01 DIAGNOSIS — J02.9 SORE THROAT: Primary | ICD-10-CM

## 2023-02-01 DIAGNOSIS — U07.1 INFECTION DUE TO 2019 NOVEL CORONAVIRUS: ICD-10-CM

## 2023-02-01 PROCEDURE — 99422 OL DIG E/M SVC 11-20 MIN: CPT | Performed by: PHYSICIAN ASSISTANT

## 2023-02-01 NOTE — TELEPHONE ENCOUNTER
Provider E-Visit time total (minutes): 15 min.       Did end up calling patient. Discussed risks with Paxlovid, potential side effects, Paxlovid Rebound. She has no liver or kidney disease. She is on Seroquel, no longer on Alprazolam.  She is going to reduce her Seroquel dose because she uses it purely for sleep and do this while on the Paxlovid due to interaction.     Discussed OTC management of symptoms. Still waiting on strep testing if positive will send antibiotic.     Junior Roe PA-C

## 2023-02-07 ENCOUNTER — OFFICE VISIT (OUTPATIENT)
Dept: FAMILY MEDICINE | Facility: OTHER | Age: 28
End: 2023-02-07
Payer: COMMERCIAL

## 2023-02-07 VITALS
TEMPERATURE: 98.8 F | RESPIRATION RATE: 18 BRPM | DIASTOLIC BLOOD PRESSURE: 72 MMHG | BODY MASS INDEX: 30.07 KG/M2 | HEIGHT: 69 IN | HEART RATE: 97 BPM | OXYGEN SATURATION: 96 % | SYSTOLIC BLOOD PRESSURE: 102 MMHG | WEIGHT: 203 LBS

## 2023-02-07 DIAGNOSIS — L91.8 SKIN TAG: ICD-10-CM

## 2023-02-07 DIAGNOSIS — Z23 NEED FOR IMMUNIZATION AGAINST INFLUENZA: ICD-10-CM

## 2023-02-07 DIAGNOSIS — F31.81 BIPOLAR II DISORDER (H): ICD-10-CM

## 2023-02-07 DIAGNOSIS — F10.20 ALCOHOL USE DISORDER, MODERATE, DEPENDENCE (H): ICD-10-CM

## 2023-02-07 DIAGNOSIS — L65.9 HAIR LOSS: ICD-10-CM

## 2023-02-07 DIAGNOSIS — Z00.00 ROUTINE GENERAL MEDICAL EXAMINATION AT A HEALTH CARE FACILITY: Primary | ICD-10-CM

## 2023-02-07 DIAGNOSIS — G89.29 CHRONIC BILATERAL LOW BACK PAIN WITH LEFT-SIDED SCIATICA: ICD-10-CM

## 2023-02-07 DIAGNOSIS — U07.1 INFECTION DUE TO 2019 NOVEL CORONAVIRUS: ICD-10-CM

## 2023-02-07 DIAGNOSIS — M54.42 CHRONIC BILATERAL LOW BACK PAIN WITH LEFT-SIDED SCIATICA: ICD-10-CM

## 2023-02-07 DIAGNOSIS — M54.16 LUMBAR RADICULOPATHY: ICD-10-CM

## 2023-02-07 LAB
ANION GAP SERPL CALCULATED.3IONS-SCNC: 14 MMOL/L (ref 7–15)
BASOPHILS # BLD AUTO: 0 10E3/UL (ref 0–0.2)
BASOPHILS NFR BLD AUTO: 1 %
BUN SERPL-MCNC: 7.4 MG/DL (ref 6–20)
CALCIUM SERPL-MCNC: 9.8 MG/DL (ref 8.6–10)
CHLORIDE SERPL-SCNC: 101 MMOL/L (ref 98–107)
CREAT SERPL-MCNC: 0.77 MG/DL (ref 0.51–0.95)
DEPRECATED HCO3 PLAS-SCNC: 27 MMOL/L (ref 22–29)
EOSINOPHIL # BLD AUTO: 0.1 10E3/UL (ref 0–0.7)
EOSINOPHIL NFR BLD AUTO: 3 %
ERYTHROCYTE [DISTWIDTH] IN BLOOD BY AUTOMATED COUNT: 12.8 % (ref 10–15)
GFR SERPL CREATININE-BSD FRML MDRD: >90 ML/MIN/1.73M2
GLUCOSE SERPL-MCNC: 89 MG/DL (ref 70–99)
HCT VFR BLD AUTO: 41.1 % (ref 35–47)
HGB BLD-MCNC: 14 G/DL (ref 11.7–15.7)
IRON BINDING CAPACITY (ROCHE): 259 UG/DL (ref 240–430)
IRON SATN MFR SERPL: 25 % (ref 15–46)
IRON SERPL-MCNC: 66 UG/DL (ref 37–145)
LYMPHOCYTES # BLD AUTO: 1.3 10E3/UL (ref 0.8–5.3)
LYMPHOCYTES NFR BLD AUTO: 32 %
MCH RBC QN AUTO: 30 PG (ref 26.5–33)
MCHC RBC AUTO-ENTMCNC: 34.1 G/DL (ref 31.5–36.5)
MCV RBC AUTO: 88 FL (ref 78–100)
MONOCYTES # BLD AUTO: 0.5 10E3/UL (ref 0–1.3)
MONOCYTES NFR BLD AUTO: 12 %
NEUTROPHILS # BLD AUTO: 2.1 10E3/UL (ref 1.6–8.3)
NEUTROPHILS NFR BLD AUTO: 53 %
PLATELET # BLD AUTO: 234 10E3/UL (ref 150–450)
POTASSIUM SERPL-SCNC: 4 MMOL/L (ref 3.4–5.3)
RBC # BLD AUTO: 4.66 10E6/UL (ref 3.8–5.2)
SODIUM SERPL-SCNC: 142 MMOL/L (ref 136–145)
TSH SERPL DL<=0.005 MIU/L-ACNC: 2.61 UIU/ML (ref 0.3–4.2)
WBC # BLD AUTO: 3.9 10E3/UL (ref 4–11)

## 2023-02-07 PROCEDURE — 36415 COLL VENOUS BLD VENIPUNCTURE: CPT | Performed by: PHYSICIAN ASSISTANT

## 2023-02-07 PROCEDURE — 90471 IMMUNIZATION ADMIN: CPT | Performed by: PHYSICIAN ASSISTANT

## 2023-02-07 PROCEDURE — 80048 BASIC METABOLIC PNL TOTAL CA: CPT | Performed by: PHYSICIAN ASSISTANT

## 2023-02-07 PROCEDURE — 85025 COMPLETE CBC W/AUTO DIFF WBC: CPT | Performed by: PHYSICIAN ASSISTANT

## 2023-02-07 PROCEDURE — 96127 BRIEF EMOTIONAL/BEHAV ASSMT: CPT | Performed by: PHYSICIAN ASSISTANT

## 2023-02-07 PROCEDURE — 99395 PREV VISIT EST AGE 18-39: CPT | Mod: 25 | Performed by: PHYSICIAN ASSISTANT

## 2023-02-07 PROCEDURE — 99214 OFFICE O/P EST MOD 30 MIN: CPT | Mod: CS | Performed by: PHYSICIAN ASSISTANT

## 2023-02-07 PROCEDURE — 83550 IRON BINDING TEST: CPT | Performed by: PHYSICIAN ASSISTANT

## 2023-02-07 PROCEDURE — 90686 IIV4 VACC NO PRSV 0.5 ML IM: CPT | Performed by: PHYSICIAN ASSISTANT

## 2023-02-07 PROCEDURE — 84443 ASSAY THYROID STIM HORMONE: CPT | Performed by: PHYSICIAN ASSISTANT

## 2023-02-07 PROCEDURE — 11200 RMVL SKIN TAGS UP TO&INC 15: CPT | Performed by: PHYSICIAN ASSISTANT

## 2023-02-07 PROCEDURE — 83540 ASSAY OF IRON: CPT | Performed by: PHYSICIAN ASSISTANT

## 2023-02-07 RX ORDER — CLONAZEPAM 0.5 MG/1
TABLET ORAL
COMMUNITY
Start: 2023-01-24 | End: 2023-07-26

## 2023-02-07 RX ORDER — LIDOCAINE HYDROCHLORIDE AND EPINEPHRINE 10; 10 MG/ML; UG/ML
2 INJECTION, SOLUTION INFILTRATION; PERINEURAL ONCE
Status: COMPLETED | OUTPATIENT
Start: 2023-02-07 | End: 2023-02-07

## 2023-02-07 RX ORDER — DOXEPIN HYDROCHLORIDE 10 MG/1
10 CAPSULE ORAL AT BEDTIME
COMMUNITY
End: 2023-07-26

## 2023-02-07 RX ADMIN — LIDOCAINE HYDROCHLORIDE AND EPINEPHRINE 0.75 ML: 10; 10 INJECTION, SOLUTION INFILTRATION; PERINEURAL at 08:14

## 2023-02-07 ASSESSMENT — ENCOUNTER SYMPTOMS
DIARRHEA: 1
NAUSEA: 1
ARTHRALGIAS: 1
CONSTIPATION: 0
HEMATURIA: 0
JOINT SWELLING: 0
FREQUENCY: 0
MYALGIAS: 1
DYSURIA: 0
PARESTHESIAS: 0
FEVER: 0
HEARTBURN: 0
WEAKNESS: 1
CHILLS: 0
SHORTNESS OF BREATH: 0
PALPITATIONS: 0
COUGH: 1
HEADACHES: 1
DIZZINESS: 1
HEMATOCHEZIA: 0
BREAST MASS: 0
SORE THROAT: 1
EYE PAIN: 0
ABDOMINAL PAIN: 0
NERVOUS/ANXIOUS: 1

## 2023-02-07 ASSESSMENT — PATIENT HEALTH QUESTIONNAIRE - PHQ9: 5. POOR APPETITE OR OVEREATING: NEARLY EVERY DAY

## 2023-02-07 ASSESSMENT — ANXIETY QUESTIONNAIRES
7. FEELING AFRAID AS IF SOMETHING AWFUL MIGHT HAPPEN: NEARLY EVERY DAY
IF YOU CHECKED OFF ANY PROBLEMS ON THIS QUESTIONNAIRE, HOW DIFFICULT HAVE THESE PROBLEMS MADE IT FOR YOU TO DO YOUR WORK, TAKE CARE OF THINGS AT HOME, OR GET ALONG WITH OTHER PEOPLE: EXTREMELY DIFFICULT
3. WORRYING TOO MUCH ABOUT DIFFERENT THINGS: NEARLY EVERY DAY
5. BEING SO RESTLESS THAT IT IS HARD TO SIT STILL: NEARLY EVERY DAY
1. FEELING NERVOUS, ANXIOUS, OR ON EDGE: NEARLY EVERY DAY
GAD7 TOTAL SCORE: 21
2. NOT BEING ABLE TO STOP OR CONTROL WORRYING: NEARLY EVERY DAY
GAD7 TOTAL SCORE: 21
6. BECOMING EASILY ANNOYED OR IRRITABLE: NEARLY EVERY DAY

## 2023-02-07 ASSESSMENT — PAIN SCALES - GENERAL: PAINLEVEL: SEVERE PAIN (7)

## 2023-02-07 NOTE — PROGRESS NOTES
SUBJECTIVE:   CC: Caprice is an 27 year old who presents for preventive health visit.   Patient has been advised of split billing requirements and indicates understanding: Yes  Healthy Habits:     Getting at least 3 servings of Calcium per day:  Yes    Bi-annual eye exam:  Yes    Dental care twice a year:  NO    Sleep apnea or symptoms of sleep apnea:  Daytime drowsiness    Diet:  Other    Frequency of exercise:  2-3 days/week    Duration of exercise:  30-45 minutes    Taking medications regularly:  No    Barriers to taking medications:  Other    Medication side effects:  Not applicable    PHQ-2 Total Score: 4    Additional concerns today:  Yes          Depression and Anxiety Follow-Up    How are you doing with your depression since your last visit? No change    How are you doing with your anxiety since your last visit?  Worsened    Are you having other symptoms that might be associated with depression or anxiety? No    Have you had a significant life event? Grief or Loss and OTHER: suicide attempt x5 in 3 months: currently in treatment/therapy     Do you have any concerns with your use of alcohol or other drugs? No    Social History     Tobacco Use     Smoking status: Former     Packs/day: 0.00     Years: 0.00     Pack years: 0.00     Types: Cigarettes     Smokeless tobacco: Never   Vaping Use     Vaping Use: Every day     Substances: Nicotine     Devices: Refillable tank   Substance Use Topics     Alcohol use: Yes     Comment: 2 drinks every other day      Drug use: Never     PHQ 6/19/2022 11/9/2022 1/31/2023   PHQ-9 Total Score 20 19 18   Q9: Thoughts of better off dead/self-harm past 2 weeks Not at all Several days Not at all   F/U: Thoughts of suicide or self-harm - Yes -   F/U: Self harm-plan - Yes -   F/U: Self-harm action - Yes -   F/U: Safety concerns - No -     AKI-7 SCORE 2/9/2022 2/23/2022 2/7/2023   Total Score - 21 (severe anxiety) -   Total Score 21 21 21     Last PHQ-9 1/31/2023   1Angela George  interest or pleasure in doing things 2   2.  Feeling down, depressed, or hopeless 2   3.  Trouble falling or staying asleep, or sleeping too much 3   4.  Feeling tired or having little energy 3   5.  Poor appetite or overeating 3   6.  Feeling bad about yourself 1   7.  Trouble concentrating 3   8.  Moving slowly or restless 1   Q9: Thoughts of better off dead/self-harm past 2 weeks 0   PHQ-9 Total Score 18   Difficulty at work, home, or with people -   In the past two weeks have you had thoughts of suicide or self harm? -   Do you have concerns about your personal safety or the safety of others? -   In the past 2 weeks have you thought about a plan or had intention to harm yourself? -   In the past 2 weeks have you acted on these thoughts in any way? -     AKI-7  2/7/2023   1. Feeling nervous, anxious, or on edge 3   2. Not being able to stop or control worrying 3   3. Worrying too much about different things 3   4. Trouble relaxing 3   5. Being so restless that it is hard to sit still 3   6. Becoming easily annoyed or irritable 3   7. Feeling afraid, as if something awful might happen 3   AKI-7 Total Score 21   If you checked any problems, how difficult have they made it for you to do your work, take care of things at home, or get along with other people? Extremely difficult       Suicide Assessment Five-step Evaluation and Treatment (SAFE-T)      Today's PHQ-2 Score:   PHQ-2 ( 1999 [employer]) 1/31/2023   Q1: Little interest or pleasure in doing things 2   Q2: Feeling down, depressed or hopeless 2   PHQ-2 Score 4   PHQ-2 Total Score (12-17 Years)- Positive if 3 or more points; Administer PHQ-A if positive -   Q1: Little interest or pleasure in doing things More than half the days   Q2: Feeling down, depressed or hopeless More than half the days   PHQ-2 Score 4           Social History     Tobacco Use     Smoking status: Former     Packs/day: 0.00     Years: 0.00     Pack years: 0.00     Types: Cigarettes      Smokeless tobacco: Never   Substance Use Topics     Alcohol use: Yes     Comment: 2 drinks every other day          Alcohol Use 1/31/2023   Prescreen: >3 drinks/day or >7 drinks/week? No       Reviewed orders with patient.  Reviewed health maintenance and updated orders accordingly - Yes  BP Readings from Last 3 Encounters:   02/07/23 102/72   08/23/22 110/60   05/05/22 (!) 139/96    Wt Readings from Last 3 Encounters:   02/07/23 92.1 kg (203 lb)   08/23/22 90.5 kg (199 lb 9.6 oz)   04/25/22 87.5 kg (193 lb)                  Patient Active Problem List   Diagnosis     Lumbar radiculopathy     Severe episode of recurrent major depressive disorder, without psychotic features (H)     Suicide attempt (H)     Bipolar II disorder (H)     Borderline personality disorder in adult (H)     FH: colon cancer     Anaphylactic shock due to shellfish, initial encounter     Alcohol use disorder, moderate, dependence (H)     Past Surgical History:   Procedure Laterality Date     DISCECTOMY LUMBAR POSTERIOR MICROSCOPIC ONE LEVEL Left 08/24/2021    Procedure: Minimally invasive left Lumbar 5 to Sacral 1 microdiscectomy  ;  Surgeon: Destin Junior MD;  Location: [facility] OR     GYN SURGERY Right     cystectomy - with salpingectomy     HEAD & NECK SURGERY      tonsillectomy     HEAD & NECK SURGERY      wisdom teeth extraction       Social History     Tobacco Use     Smoking status: Former     Packs/day: 0.00     Years: 0.00     Pack years: 0.00     Types: Cigarettes     Smokeless tobacco: Never   Substance Use Topics     Alcohol use: Yes     Comment: 2 drinks every other day      Family History   Problem Relation Age of Onset     Hypertension Mother      Colon Polyps Mother 50     Substance Abuse Father      Colon Cancer Father 60     Diabetes Maternal Grandmother      Hypertension Maternal Grandmother      Colon Cancer Maternal Grandmother      Diabetes Paternal Grandmother          Current Outpatient Medications   Medication Sig  "Dispense Refill     atomoxetine (STRATTERA) 40 MG capsule Take 1 capsule (40 mg) by mouth daily 60 capsule 0     busPIRone (BUSPAR) 15 MG tablet Take 2 tablets (30 mg) by mouth 2 times daily 120 tablet 0     cyclobenzaprine (FLEXERIL) 5 MG tablet TAKE 1 TO 2 TABLETS(5 TO 10 MG) BY MOUTH THREE TIMES DAILY AS NEEDED FOR MUSCLE SPASMS 60 tablet 0     doxepin (SINEQUAN) 10 MG capsule Take 10 mg by mouth nightly as needed       EPINEPHrine (ANY BX GENERIC EQUIV) 0.3 MG/0.3ML injection 2-pack Inject 0.3 mLs (0.3 mg) into the muscle as needed for anaphylaxis (Patient taking differently: Inject into the muscle as needed for anaphylaxis) 2 each 3     lamoTRIgine (LAMICTAL) 100 MG tablet Take 200 mg by mouth daily       ondansetron (ZOFRAN ODT) 4 MG ODT tab DISSOLVE 1 TABLET(4 MG) ON THE TONGUE EVERY 8 HOURS AS NEEDED FOR NAUSEA 30 tablet 0     prazosin (MINIPRESS) 2 MG capsule Take 2 mg by mouth At Bedtime       QUEtiapine (SEROQUEL) 50 MG tablet Take 100 mg by mouth daily       clonazePAM (KLONOPIN) 0.5 MG tablet        doxepin (SINEQUAN) 10 MG capsule Take 10 mg by mouth At Bedtime       metFORMIN (GLUCOPHAGE) 500 MG tablet        Allergies   Allergen Reactions     Metronidazole Anaphylaxis and Hives     Other reaction(s): Throat swelling     Other Drug Allergy (See Comments) Hives, Itching and Swelling     Throat swelling 6 hrs after exposure  Itching and hives 2 hrs after exposure     Fish-Derived Products      Stopped fish oil and less stomach discomfort  Rash after eating fish.      Hydrocodone-Acetaminophen Other (See Comments)     Sertraline Other (See Comments)     Patient was foggy and \"high\" feeling     Shellfish-Derived Products      Lips get numb, throat gets scratchy, rashes       Breast Cancer Screening:    FHS-7:   Breast CA Risk Assessment (FHS-7) 10/13/2021 10/15/2021 1/31/2023   Did any of your first-degree relatives have breast or ovarian cancer? Unknown Unknown Yes   Did any of your relatives have " bilateral breast cancer? Unknown Unknown Unknown   Did any man in your family have breast cancer? No No No   Did any woman in your family have breast and ovarian cancer? Unknown Unknown Unknown   Did any woman in your family have breast cancer before age 50 y? Yes Yes Unknown   Do you have 2 or more relatives with breast and/or ovarian cancer? No No No   Do you have 2 or more relatives with breast and/or bowel cancer? Yes Yes Yes       Patient under 40 years of age: Routine Mammogram Screening not recommended.   Pertinent mammograms are reviewed under the imaging tab.    History of abnormal Pap smear: NO - age 21-29 PAP every 3 years recommended  PAP / HPV Latest Ref Rng & Units 10/15/2021   PAP   Negative for Intraepithelial Lesion or Malignancy (NILM)     Reviewed and updated as needed this visit by clinical staff   Tobacco  Allergies  Meds              Reviewed and updated as needed this visit by Provider                     Review of Systems   Constitutional: Negative for chills and fever.   HENT: Positive for congestion and sore throat. Negative for ear pain and hearing loss.    Eyes: Positive for visual disturbance. Negative for pain.   Respiratory: Positive for cough. Negative for shortness of breath.    Cardiovascular: Negative for chest pain, palpitations and peripheral edema.   Gastrointestinal: Positive for diarrhea and nausea. Negative for abdominal pain, constipation, heartburn and hematochezia.   Breasts:  Positive for tenderness. Negative for breast mass and discharge.   Genitourinary: Negative for dysuria, frequency, genital sores, hematuria, pelvic pain, urgency, vaginal bleeding and vaginal discharge.   Musculoskeletal: Positive for arthralgias and myalgias. Negative for joint swelling.   Skin: Negative for rash.   Neurological: Positive for dizziness, weakness and headaches. Negative for paresthesias.   Psychiatric/Behavioral: Positive for mood changes. The patient is nervous/anxious.            OBJECTIVE:   LMP 01/23/2023 (Exact Date)   Physical Exam  GENERAL: healthy, alert and no distress  EYES: Eyes grossly normal to inspection, PERRL and conjunctivae and sclerae normal  HENT: ear canals and TM's normal, nose and mouth without ulcers or lesions  NECK: no adenopathy, no asymmetry, masses, or scars and thyroid normal to palpation  RESP: lungs clear to auscultation - no rales, rhonchi or wheezes  BREAST: normal without masses, tenderness or nipple discharge and no palpable axillary masses or adenopathy  CV: regular rate and rhythm, normal S1 S2, no S3 or S4, no murmur, click or rub, no peripheral edema and peripheral pulses strong  ABDOMEN: soft, nontender, no hepatosplenomegaly, no masses and bowel sounds normal  MS: no gross musculoskeletal defects noted, no edema  SKIN: no suspicious lesions or rashes and Right breast there are two skin tags present.   NEURO: Normal strength and tone, mentation intact and speech normal  PSYCH: mentation appears normal, affect normal/bright    Diagnostic Test Results:  Labs reviewed in Epic  No results found for this or any previous visit (from the past 24 hour(s)).    ASSESSMENT/PLAN:       ICD-10-CM    1. Routine general medical examination at a health care facility  Z00.00       2. Need for immunization against influenza  Z23 INFLUENZA VACCINE IM > 6 MONTHS VALENT IIV4 (AFLURIA/FLUZONE)      3. Hair loss  L65.9 CBC with platelets and differential     TSH with free T4 reflex     Basic metabolic panel  (Ca, Cl, CO2, Creat, Gluc, K, Na, BUN)     Iron and iron binding capacity      4. Skin tag  L91.8 lidocaine 1% with EPINEPHrine 1:100,000 injection 2 mL     Removal of up to 15 skin tags      5. Alcohol use disorder, moderate, dependence (H)  F10.20       6. Bipolar II disorder (H)  F31.81       7. Lumbar radiculopathy  M54.16       8. Chronic bilateral low back pain with left-sided sciatica  M54.42     G89.29       9. Infection due to 2019 novel coronavirus  U07.1          Hair loss:  - Suspect this is multifactorial including medication, nutrition intake, now  Has COVID.   - Will check labs.   - Encouraged to improve diet, increase in protein and healthy fats.   - Sounds like she is probably deficient in complex carbs as well as general vitamins and minerals.     Skin tag:  Procedure:  Discussed the risks and benefits with patient.  Patient signed consent.  The area was cleansed with Chlorhexidine  The area was anesthestized using 0.75 cc's of 1 % lidocaine with epinephrine distributed evenly between the two sites.  The lesions were grasped and removed with dermablade.  Bacitracin ointment and bandage applied.  Hemostasis achieved.  Patient tolerated the procedure well.     Wound Care:  Monitor for skin infection - redness, swelling, drainage, pain, fever.  Keep covered for next 24 hours and then ok to clean.  Keep clean and dry.  No scrubbing over the area required.      Alcohol use:  Reports she is doing well, only drinks socially at this point.     Bipiolar disorder:  - Sees therapist weekly and is meeting with psychiatrist every 3 months.   - Still struggling a lot with anxiety overnight. She does note seroquel will help her fall asleep but she is waking up with crippling anxiety. Clonazepam doesn't even help her.     Back pain:  - Has had two falls this winter, one just today. She is noting more back pain. She has symptoms mainly on left which is the side she had the procedure on but also on the right side.  Gets numbness in both buttocks.   - Will reach out to Neurosurgery to see if they want to see her first or get an MRI first.   - They would like to get MRI and recommended physical therapy as well and then get back in to see them.     COVID:  - Doing well, has been healing, has some ETD, recommended nasal steroids.   - Recommended COVID vaccine in about a month after her symptoms are fully resolved.     Patient has been advised of split billing requirements and indicates  "understanding: Yes      COUNSELING:  Reviewed preventive health counseling, as reflected in patient instructions      BMI:   Estimated body mass index is 29.69 kg/m  as calculated from the following:    Height as of 3/1/22: 1.746 m (5' 8.75\").    Weight as of 8/23/22: 90.5 kg (199 lb 9.6 oz).   Weight management plan: Discussed healthy diet and exercise guidelines      She reports that she has quit smoking. Her smoking use included cigarettes. She has never used smokeless tobacco.      Junior Roe PA-C  [facility]  Answers for HPI/ROS submitted by the patient on 1/31/2023  If you checked off any problems, how difficult have these problems made it for you to do your work, take care of things at home, or get along with other people?: Very difficult  PHQ9 TOTAL SCORE: 18      "

## 2023-02-07 NOTE — Clinical Note
HERB Larios.   Caprice had surgery a bit over a year ago. She reports continued pain, has sustained a couple of falls, CT was negative for any fractures but she has symptoms mainly on the left but starting to experience it bilaterally with numbness. Do you guys just want to see her again or should I repeat the MRI first?  Thanks  Junior

## 2023-02-28 ENCOUNTER — ANCILLARY PROCEDURE (OUTPATIENT)
Dept: MRI IMAGING | Facility: CLINIC | Age: 28
End: 2023-02-28
Attending: PHYSICIAN ASSISTANT
Payer: COMMERCIAL

## 2023-02-28 DIAGNOSIS — M54.42 CHRONIC BILATERAL LOW BACK PAIN WITH LEFT-SIDED SCIATICA: ICD-10-CM

## 2023-02-28 DIAGNOSIS — M54.16 LUMBAR RADICULOPATHY: ICD-10-CM

## 2023-02-28 DIAGNOSIS — M54.42 ACUTE BILATERAL LOW BACK PAIN WITH LEFT-SIDED SCIATICA: ICD-10-CM

## 2023-02-28 DIAGNOSIS — G89.29 CHRONIC BILATERAL LOW BACK PAIN WITH LEFT-SIDED SCIATICA: ICD-10-CM

## 2023-02-28 PROCEDURE — A9585 GADOBUTROL INJECTION: HCPCS | Performed by: STUDENT IN AN ORGANIZED HEALTH CARE EDUCATION/TRAINING PROGRAM

## 2023-02-28 PROCEDURE — 72158 MRI LUMBAR SPINE W/O & W/DYE: CPT | Mod: GC | Performed by: STUDENT IN AN ORGANIZED HEALTH CARE EDUCATION/TRAINING PROGRAM

## 2023-02-28 RX ORDER — CYCLOBENZAPRINE HCL 5 MG
TABLET ORAL
Qty: 60 TABLET | Refills: 0 | Status: SHIPPED | OUTPATIENT
Start: 2023-02-28 | End: 2023-03-30

## 2023-02-28 RX ORDER — GADOBUTROL 604.72 MG/ML
10 INJECTION INTRAVENOUS ONCE
Status: COMPLETED | OUTPATIENT
Start: 2023-02-28 | End: 2023-02-28

## 2023-02-28 RX ADMIN — GADOBUTROL 9 ML: 604.72 INJECTION INTRAVENOUS at 09:36

## 2023-03-08 ENCOUNTER — THERAPY VISIT (OUTPATIENT)
Dept: PHYSICAL THERAPY | Facility: CLINIC | Age: 28
End: 2023-03-08
Payer: COMMERCIAL

## 2023-03-08 DIAGNOSIS — M54.42 LEFT-SIDED LOW BACK PAIN WITH LEFT-SIDED SCIATICA: Primary | ICD-10-CM

## 2023-03-08 DIAGNOSIS — M54.16 LUMBAR RADICULOPATHY: ICD-10-CM

## 2023-03-08 DIAGNOSIS — G89.29 CHRONIC BILATERAL LOW BACK PAIN WITH LEFT-SIDED SCIATICA: ICD-10-CM

## 2023-03-08 DIAGNOSIS — M54.42 CHRONIC BILATERAL LOW BACK PAIN WITH LEFT-SIDED SCIATICA: ICD-10-CM

## 2023-03-08 PROCEDURE — 97530 THERAPEUTIC ACTIVITIES: CPT | Mod: GP

## 2023-03-08 PROCEDURE — 97110 THERAPEUTIC EXERCISES: CPT | Mod: GP

## 2023-03-08 PROCEDURE — 97161 PT EVAL LOW COMPLEX 20 MIN: CPT | Mod: GP

## 2023-03-08 NOTE — PROGRESS NOTES
[facility]    OUTPATIENT Physical Therapy ORTHOPEDIC EVALUATION  PLAN OF TREATMENT FOR OUTPATIENT REHABILITATION  (COMPLETE FOR INITIAL CLAIMS ONLY)  Patient's Last Name, First Name, M.I.  YOB: 1995  Caprice Kline    Provider s Name:  [facility]   Medical Record No.  [id number]   Start of Care Date:  03/08/23   Onset Date:   01/14/23   Treatment Diagnosis:  L side lumbar radiculopathy Medical Diagnosis:     Lumbar radiculopathy  Chronic bilateral low back pain with left-sided sciatica  Left-sided low back pain with left-sided sciatica       Goals:     03/08/23 0500   Body Part   Goals listed below are for LBP   Goal #1   Goal #1 standing   Previous Functional Level No restrictions;Hours patient could stand   Performance level 2+ hours   Current Functional Level Hours patient can stand   Performance level 1 hour 7/10 pain   STG Target Performance Hours patient will be able to stand   Performance level 1 hour 3/10 pain or less   Rationale for housekeeping tasks such as vacuuming, bed making, mowing, gardening;for personal hygiene;for safe household ambulation;for safe community ambulation   Due date 04/12/23   LTG Target Performance Hours patient will be able to stand   Performance Level 2+ hours with 2/10 pain or less   Rationale for housekeeping tasks such as vacuuming, bed making, mowing;for personal hygiene;for meal preparation;for safe community ambulation   Due date 05/17/23   Goal #2   Goal #2 sitting   Previous Functional Level No restrictions;Hours patient could sit   Performance level 2+ hours   Current Functional Level Hours patient can sit   Performance level 2 hours with 7/10 pain   STG Target Performance Hours patient will be able to sit   Performance level 1 hours with 1/10 pain or less   Rationale to allow rest from standing;for community  transportation;for job requirements in their work place   Due date 04/12/23   LTG Target Performance Hours patient will be able to sit   Performance Level 2 hours with 1/10 pain or less   Rationale to allow rest from standing;for community transportation;for job requirements in their work place   Due date 05/17/23         Therapy Frequency:  1x/wk progressing to 1 x every other week  Predicted Duration of Therapy Intervention:  2-3 months    Martina Castillo, PT                 I CERTIFY THE NEED FOR THESE SERVICES FURNISHED UNDER        THIS PLAN OF TREATMENT AND WHILE UNDER MY CARE     (Physician attestation of this document indicates review and certification of the therapy plan).                     Certification Date From:  03/08/23   Certification Date To:  06/05/23    Referring Provider:  Junior Roe    Initial Assessment        See Epic Evaluation SOC Date: 03/08/23

## 2023-03-08 NOTE — PROGRESS NOTES
Physical Therapy Initial Evaluation    Therapist Assessment: Caprice Kline is a 27 year old female patient presenting to Physical Therapy with L>R LBP with L LE referred symptoms. Patient demonstrates pain with standing forward flexion (in L LE), pain with standing extension, pain with L side glide, and decreased L2 and L4 dermatomes L side, and + slump and SLR L side. Signs and symptoms are consistent with L side lumbar radiculopathy. 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 Skilled PT services are necessary in order to reduce impairments and improve independent function.    Subjective:  The history is provided by the patient.   Patient Health History  Caprice Kline being seen for Lower back pain, left sided sciatica pain/ numbness.     Problem began: 2/6/2020.   Problem occurred: Moving a laundry basket wrong, had surgery, cobsistent pain since. Fell twice of ice this winter.   Pain is reported as 8/10 on pain scale.  General health as reported by patient is good.  Pertinent medical history includes: depression and numbness/tingling.     Medical allergies: other. Other medical allergies details: Vicodin, flagyl, fish/seafood, zoloft.   Surgeries include:  Orthopedic surgery.    Current medications:  Anti-depressants, anti-inflammatory, high blood pressure medication, muscle relaxants and sleep medication.    Current occupation is Student.   Primary job tasks include:  Computer work and prolonged sitting.                  Therapist Generated HPI Evaluation  Problem details: Patient reports to outpatient physical therapy with L side LBP with L side LE referred symptoms including numbness, tingling, and sharp/aching pain. Pain started on 1/14/2023 when patient fell on her L side on ice and has had L LB pain and numbness into LE ever since (travels to her knee). Patient also fell on  2/7/2023 prior to doctor's visit. She also experienced pain while moving a laundry basket (pre-surgery). Of note, patient had minimally invasive left Lumbar 5 to Sacral 1 microdiscectomy on 8/24/2021. Patient reports that getting up out of bed is painful, sitting at office desk chair, or standing for extended periods of time. Patient is receiving treatment/therapy 3x/wk in the morning and online school every day. Patient has a treadmill at home and has been trying to do more. She would also like to get back to working out (Strength training). Patient reports that when she fell she also landed on L shoulder and as a result has been getting headaches from referred pain.    Lumbar MRI: 2/28/2023  IMPRESSION:   1. L5-S1 hemilaminectomy, microdiscectomy changes. Left facet  periarticular, left epidural space granulation tissue formation  surrounding the left descending S1 within the lateral recess without  significant compression or abnormal nerve signal. Previous  postoperative fluid collection has resolved.  2. No substantial degenerative change at any other level within the  lumbar spine.    Goals:  1) sit for increased time (able to currently sit for 2 hours)  2) sleep better (up every 1-2 hours)   3) standing (maybe 1 hour before the pain is unbearable).         Type of problem:  Lumbar.    This is a chronic condition.  Condition occurred with:  A fall/slip.  Where condition occurred: in the community.  Patient reports pain:  Lower lumbar spine, lumbar spine left and lumbar spine right.  Pain is described as sharp and aching (numbness/tingling)   Pain radiates to:  Knee left, gluteals left and thigh left.   Since onset symptoms are gradually worsening.  Symptoms are exacerbated by bending, certain positions, standing and sitting  and relieved by heat, ice, NSAID's and muscle relaxants.  Special tests included:  MRI.  Previous treatment includes surgery and physical therapy.   Restrictions due to condition include:   Working in normal job without restrictions.  Barriers include:  None as reported by patient.                        Objective:    Gait:  Decreased lumbar/trunk rotation, decreased step length, decreased speed.   Assistive Devices:  None                 Lumbar/SI Evaluation  ROM:    AROM Lumbar:   Flexion:            Limited, + L thigh  Ext:                    Limited, pain central LB; slight improved motion post 2 x 10 prone press-ups   Side Bend:        Left:  WNL    Right:  + L side  Rotation:           Left:     Right:   Side Glide:        Left:  + L side, decreased pain post 2 x 10 prone press-ups    Right:  WNL            Lumbar DTR's:  normal      Cord Signs:  not assessed    Lumbar Dermtomes:        L2 Left:  Hypo-light touch     L2 Right:  Normal-light touch  L3 Left:  Normal-light touch     L3 Right:  Normal-light touch  L4 Left:  Hypo-light touch and normal-light touch       L4 Right:  Normal-light touch  L5 Left:  Normal-light touch     L5 Right:  Normal-light touch  S1 Left:  Normal-light touch     S1 Right:  Normal-light touch  Neural Tension/Mobility:    Left side:  SLR w/DF and Slump positive.   Right side:   SLR w/DF positive.  Right side:   Slump  negative.   Lumbar Palpation:  not assessed      Functional Tests:  Core strength and proprioception lumbar: 2 x 10 prone press-ups: unable to get to terminal elbow extension, improvement in lumabr extension and symptoms with L side glide.        Lumbar Provocation:  not assessed        SI joint/Sacrum:    Not assessed                                            Hip Evaluation  Hip PROM:  Hip PROM:  Left Hip:    Not assessed  Right Hip:  Not assessed                          Hip Strength:    Flexion:   Left: 4+/5   -  Pain:  Right: 4+/5   +  Pain:                      Abduction:  Left: 4/5    -   Pain:                  Hip Special Testing:   Not Assessed          Functional Testing:  Functional test hip: bridge: unable due to pain (slight lift of glutes off  table, unable to do reps due to pain)                       General     ROS    Assessment/Plan:    Patient is a 27 year old female with lumbar complaints.    Patient has the following significant findings with corresponding treatment plan.                Diagnosis 1:  L side LBP with L LE referred symptoms  Pain -  hot/cold therapy, electric stimulation, mechanical traction, manual therapy, STS, splint/taping/bracing/orthotics, self management, education, directional preference exercise and home program  Decreased ROM/flexibility - manual therapy, therapeutic exercise, therapeutic activity and home program  Decreased strength - therapeutic exercise, therapeutic activities and home program  Impaired gait - gait training, assistive devices and home program  Impaired muscle performance - neuro re-education and home program  Decreased function - therapeutic activities and home program  Impaired posture - neuro re-education, therapeutic activities and home program    Therapy Evaluation Codes:     Cumulative Therapy Evaluation is: Low complexity.    Previous and current functional limitations:  (See Goal Flow Sheet for this information)    Short term and Long term goals: (See Goal Flow Sheet for this information)     Communication ability:  Patient appears to be able to clearly communicate and understand verbal and written communication and follow directions correctly.  Treatment Explanation - The following has been discussed with the patient:   RX ordered/plan of care  Anticipated outcomes  Possible risks and side effects  This patient would benefit from PT intervention to resume normal activities.   Rehab potential is good.    Frequency:  1 X week, once daily progressing to 1 x every other week  Duration:  for 2-3 months  Discharge Plan:  Achieve all LTG.  Independent in home treatment program.  Reach maximal therapeutic benefit.    Please refer to the daily flowsheet for treatment today, total treatment time and time  spent performing 1:1 timed codes.

## 2023-03-30 DIAGNOSIS — M54.42 ACUTE BILATERAL LOW BACK PAIN WITH LEFT-SIDED SCIATICA: ICD-10-CM

## 2023-03-30 RX ORDER — CYCLOBENZAPRINE HCL 5 MG
TABLET ORAL
Qty: 60 TABLET | Refills: 0 | Status: SHIPPED | OUTPATIENT
Start: 2023-03-30 | End: 2023-05-05

## 2023-03-30 NOTE — TELEPHONE ENCOUNTER
Pending Prescriptions:                       Disp   Refills    cyclobenzaprine (FLEXERIL) 5 MG tablet [Ph*60 tab*0        Sig: TAKE 1 TO 2 TABLETS(5 TO 10 MG) BY MOUTH THREE TIMES           DAILY AS NEEDED FOR MUSCLE SPASMS    Routing refill request to provider for review/approval because:  Drug not on the [facility] refill protocol

## 2023-03-31 ENCOUNTER — TELEPHONE (OUTPATIENT)
Dept: NEUROSURGERY | Facility: CLINIC | Age: 28
End: 2023-03-31
Payer: COMMERCIAL

## 2023-04-06 NOTE — TELEPHONE ENCOUNTER
3rd attempt to reschedule appointment on 4-24-23 due to provider not in clinic. Offered to reschedule with another provider or a later date with same provider. A My-chart message was also sent

## 2023-05-04 DIAGNOSIS — R11.0 NAUSEA: ICD-10-CM

## 2023-05-04 DIAGNOSIS — M54.42 ACUTE BILATERAL LOW BACK PAIN WITH LEFT-SIDED SCIATICA: ICD-10-CM

## 2023-05-04 RX ORDER — ONDANSETRON 4 MG/1
TABLET, ORALLY DISINTEGRATING ORAL
Qty: 30 TABLET | Refills: 0 | Status: SHIPPED | OUTPATIENT
Start: 2023-05-04 | End: 2023-06-06

## 2023-05-04 NOTE — TELEPHONE ENCOUNTER
Pending Prescriptions:                       Disp   Refills    cyclobenzaprine (FLEXERIL) 5 MG tablet [Ph*60 tab*0        Sig: TAKE 1 TO 2 TABLETS(5 TO 10 MG) BY MOUTH THREE TIMES           DAILY AS NEEDED FOR MUSCLE SPASMS    Routing refill request to provider for review/approval because:  Drug not on the [facility] [place] [facility] to the [facility] refill protocol     cyclobenzaprine (FLEXERIL) 5 MG tablet 60 tablet 0 3/30/2023  No   Sig: TAKE 1 TO 2 TABLETS(5 TO 10 MG) BY MOUTH THREE TIMES DAILY AS NEEDED FOR MUSCLE SPASMS   Sent to pharmacy as: Cyclobenzaprine HCl 5 MG Oral Tablet (FLEXERIL)   Class: E-Prescribe   Order: [id number]   E-Prescribing Status: Receipt confirmed by pharmacy (3/30/2023  3:35 PM CDT)     Minerva Egan RN on 5/4/2023 at 3:15 PM

## 2023-05-05 RX ORDER — CYCLOBENZAPRINE HCL 5 MG
TABLET ORAL
Qty: 60 TABLET | Refills: 0 | Status: SHIPPED | OUTPATIENT
Start: 2023-05-05 | End: 2023-06-06

## 2023-06-06 DIAGNOSIS — R11.0 NAUSEA: ICD-10-CM

## 2023-06-06 DIAGNOSIS — M54.42 ACUTE BILATERAL LOW BACK PAIN WITH LEFT-SIDED SCIATICA: ICD-10-CM

## 2023-06-06 RX ORDER — ONDANSETRON 4 MG/1
TABLET, ORALLY DISINTEGRATING ORAL
Qty: 30 TABLET | Refills: 0 | Status: SHIPPED | OUTPATIENT
Start: 2023-06-06 | End: 2023-07-11

## 2023-06-06 RX ORDER — CYCLOBENZAPRINE HCL 5 MG
TABLET ORAL
Qty: 60 TABLET | Refills: 0 | Status: SHIPPED | OUTPATIENT
Start: 2023-06-06 | End: 2023-07-11

## 2023-07-11 ENCOUNTER — MYC MEDICAL ADVICE (OUTPATIENT)
Dept: FAMILY MEDICINE | Facility: OTHER | Age: 28
End: 2023-07-11
Payer: COMMERCIAL

## 2023-07-11 DIAGNOSIS — M54.42 ACUTE BILATERAL LOW BACK PAIN WITH LEFT-SIDED SCIATICA: ICD-10-CM

## 2023-07-11 DIAGNOSIS — R11.0 NAUSEA: ICD-10-CM

## 2023-07-11 RX ORDER — ONDANSETRON 4 MG/1
TABLET, ORALLY DISINTEGRATING ORAL
Qty: 30 TABLET | Refills: 0 | Status: SHIPPED | OUTPATIENT
Start: 2023-07-11 | End: 2023-07-31

## 2023-07-11 RX ORDER — CYCLOBENZAPRINE HCL 5 MG
TABLET ORAL
Qty: 60 TABLET | Refills: 0 | Status: SHIPPED | OUTPATIENT
Start: 2023-07-11 | End: 2023-07-31

## 2023-07-26 ENCOUNTER — TELEPHONE (OUTPATIENT)
Dept: FAMILY MEDICINE | Facility: OTHER | Age: 28
End: 2023-07-26
Payer: COMMERCIAL

## 2023-07-26 ENCOUNTER — VIRTUAL VISIT (OUTPATIENT)
Dept: PHARMACY | Facility: CLINIC | Age: 28
End: 2023-07-26
Payer: COMMERCIAL

## 2023-07-26 VITALS — HEIGHT: 70 IN | BODY MASS INDEX: 29.06 KG/M2 | WEIGHT: 203 LBS

## 2023-07-26 DIAGNOSIS — F90.9 ATTENTION DEFICIT HYPERACTIVITY DISORDER (ADHD), UNSPECIFIED ADHD TYPE: ICD-10-CM

## 2023-07-26 DIAGNOSIS — M54.16 LUMBAR RADICULOPATHY: ICD-10-CM

## 2023-07-26 DIAGNOSIS — F31.81 BIPOLAR II DISORDER (H): ICD-10-CM

## 2023-07-26 DIAGNOSIS — G47.00 INSOMNIA, UNSPECIFIED TYPE: ICD-10-CM

## 2023-07-26 PROCEDURE — 99605 MTMS BY PHARM NP 15 MIN: CPT | Mod: VID | Performed by: PHARMACIST

## 2023-07-26 PROCEDURE — 99607 MTMS BY PHARM ADDL 15 MIN: CPT | Mod: VID | Performed by: PHARMACIST

## 2023-07-26 RX ORDER — TOPIRAMATE 100 MG/1
100 TABLET, FILM COATED ORAL 2 TIMES DAILY
COMMUNITY
End: 2023-09-27

## 2023-07-26 RX ORDER — PREGABALIN 150 MG/1
150 CAPSULE ORAL 3 TIMES DAILY
COMMUNITY
End: 2023-09-27

## 2023-07-26 RX ORDER — LIRAGLUTIDE 6 MG/ML
INJECTION, SOLUTION SUBCUTANEOUS
Qty: 15 ML | Refills: 2 | Status: SHIPPED | OUTPATIENT
Start: 2023-07-26 | End: 2023-08-24

## 2023-07-26 RX ORDER — TOPIRAMATE 50 MG/1
50 TABLET, FILM COATED ORAL 2 TIMES DAILY
COMMUNITY
Start: 2023-06-11 | End: 2023-07-26

## 2023-07-26 RX ORDER — QUETIAPINE FUMARATE 300 MG/1
300 TABLET, FILM COATED ORAL AT BEDTIME
COMMUNITY
Start: 2023-07-24

## 2023-07-26 RX ORDER — PRAZOSIN HYDROCHLORIDE 2 MG/1
2 CAPSULE ORAL 2 TIMES DAILY
COMMUNITY

## 2023-07-26 RX ORDER — LIRAGLUTIDE 6 MG/ML
INJECTION, SOLUTION SUBCUTANEOUS
Qty: 15 ML | Refills: 2 | OUTPATIENT
Start: 2023-07-26 | End: 2023-07-26

## 2023-07-26 ASSESSMENT — ANXIETY QUESTIONNAIRES
8. IF YOU CHECKED OFF ANY PROBLEMS, HOW DIFFICULT HAVE THESE MADE IT FOR YOU TO DO YOUR WORK, TAKE CARE OF THINGS AT HOME, OR GET ALONG WITH OTHER PEOPLE?: EXTREMELY DIFFICULT
5. BEING SO RESTLESS THAT IT IS HARD TO SIT STILL: NEARLY EVERY DAY
IF YOU CHECKED OFF ANY PROBLEMS ON THIS QUESTIONNAIRE, HOW DIFFICULT HAVE THESE PROBLEMS MADE IT FOR YOU TO DO YOUR WORK, TAKE CARE OF THINGS AT HOME, OR GET ALONG WITH OTHER PEOPLE: EXTREMELY DIFFICULT
4. TROUBLE RELAXING: NEARLY EVERY DAY
1. FEELING NERVOUS, ANXIOUS, OR ON EDGE: NEARLY EVERY DAY
2. NOT BEING ABLE TO STOP OR CONTROL WORRYING: NEARLY EVERY DAY
GAD7 TOTAL SCORE: 21
6. BECOMING EASILY ANNOYED OR IRRITABLE: NEARLY EVERY DAY
GAD7 TOTAL SCORE: 21
7. FEELING AFRAID AS IF SOMETHING AWFUL MIGHT HAPPEN: NEARLY EVERY DAY
3. WORRYING TOO MUCH ABOUT DIFFERENT THINGS: NEARLY EVERY DAY
7. FEELING AFRAID AS IF SOMETHING AWFUL MIGHT HAPPEN: NEARLY EVERY DAY

## 2023-07-26 ASSESSMENT — PATIENT HEALTH QUESTIONNAIRE - PHQ9: SUM OF ALL RESPONSES TO PHQ QUESTIONS 1-9: 24

## 2023-07-26 ASSESSMENT — PAIN SCALES - GENERAL: PAINLEVEL: MODERATE PAIN (5)

## 2023-07-26 NOTE — TELEPHONE ENCOUNTER
PA Initiation    Medication: SAXENDA 18 MG/3ML SC SOPN  Insurance Company: [employer] - Phone [phone] Fax [phone]  Pharmacy Filling the Rx:    Filling Pharmacy Phone:    Filling Pharmacy Fax:    Start Date: 7/26/2023    Key: [id number]

## 2023-07-26 NOTE — PROGRESS NOTES
Medication Therapy Management (MTM) Encounter    ASSESSMENT:                            Medication Adherence/Access: No issues identified    Weight Management:   Needs improvement. Would benefit from starting Saxenda. Typical first line therapy per insurance - stimulant phentermine would not be appropriate due to already being on atomoxetine for ADHD and history of insomnia and bipolar disorder. Will complete Prior Authorization for Saxenda. Due to lack of efficacy with topiramate, encouraged discussion with psychiatry regarding taper off topiramate.     ADHD:   Unimproved, to follow up with psychiatry as planned.     Insomnia:   Unimproved, to follow up with psychiatry as planned.     Bipolar Disorder:   Defer to psychiatry as do not have access to patient's history. If having greater issues of hypomanic/manic episodes then would consider adjustment of therapy.    Back Pain:   Stable.      PLAN:                            Follow up with psychiatry as planned. Discuss with psychiatry potential to taper off topiramate due to lack of efficacy.   Consider starting Saxenda. Will start Prior Authorization for this.     Follow-up: Return in about 6 weeks (around 9/6/2023) for Medication Therapy Management Pharmacist Visit, Call [phone] to schedule.    SUBJECTIVE/OBJECTIVE:                          Caprice Kline is a 27 year old female contacted via secure video for an initial visit. She was referred to me from self.      Reason for visit: interested in discussing weight loss medication(s).    Allergies/ADRs: Reviewed in chart  Past Medical History: Reviewed in chart  Tobacco: She reports that she has been smoking cigarettes and vaping device. She has never used smokeless tobacco.   Alcohol: not currently using  Caffeine: 2-3 energy drinks daily, Celsius     Medication Adherence/Access:   Patient uses pill box(es).  Patient takes medications 3 time(s) per day.   Per patient, misses medication 0 times per week.  "    Weight Management:   Topiramate 150 mg twice daily (prescribed by psychiatry)    Prescribed topiramate for weight loss and has been working up to this current dose. Has not noticed improvement with weight since starting topiramate. Wanting to look at other options. Greater weight gain since starting Seroquel, needing to remain on from psych perspective. Patient has no history of pancreatitis. Patient has no personal or family history of medullary thyroid carcinoma or MEN2.   Diet/Eating Habits: Patient reports breakfast: shake (fruit, protein probiotic powder, spinach); Snacks: fruits and vegetables; dinner: Wrap (chicken, spinach, cheese).  Taste perversion with carbonated beverages. She is on WW, has been actively trying to lose weight.   Exercise/Activity: Patient reports moves a lot with walking with her job, works in [employer]. On days off she will walk.   Tried/Failed/Contraindicated Medications: metformin: unmanageable side effect     Current weight today: 203 lbs 0 oz    Wt Readings from Last 4 Encounters:   07/26/23 203 lb (92.1 kg)   02/07/23 203 lb (92.1 kg)   08/23/22 199 lb 9.6 oz (90.5 kg)   04/25/22 193 lb (87.5 kg)     Estimated body mass index is 29.34 kg/m  as calculated from the following:    Height as of this encounter: 5' 9.75\" (1.772 m).    Weight as of this encounter: 203 lb (92.1 kg).    ADHD:   Atomoxetine 40 mg twice daily    Found greater efficacy of use when first starting, and now losing efficacy. She plans on discussing further with psychiatrist.     Insomnia:   Doxepin 10 mg nightly   Prazosin 2 mg twice daily     She plans to switch from doxepin to mirtazapine at next follow up to help with sleep. Doxepin not working. Trazodone gave her nightmares through the prazosin. Prazosin at baseline helpful for nightmares. Melatonin and diphenhydramine not helpful. Issues of falling and staying asleep.  No medication side effects reported, no dizziness or lightheadedness.     Bipolar " "Disorder:   Lamotrigine 100 mg twice daily  Quetiapine 300 mg daily bedtime     Overall feels she is doing \"okay\". Last \"manic episode\" was 3 weeks ago, she will spend all of her money, hyperfocus on something. This time was focusing on school. She is working full time and going to school full time. Then after her manic episode transitioned to feeling more depressive episode and currently still feels she is down but \"working to get out of it\". Talks to therapist weekly, feels has good rapport with them.  Follows by psychiatrist, Onur Curtis, [facility].    Back Pain:   Pregabalin 150 mg three times daily (for anxiety)  Cyclobenzaprine 5 mg nightly as needed    She has history of back surgery. 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 She has found pregabalin helpful for nerve pain. Uses cyclobenzaprine as needed at night for muscle spasms.     Today's Vitals: Ht 5' 9.75\" (1.772 m)   Wt 203 lb (92.1 kg)   BMI 29.34 kg/m    ----------------      I spent 45 minutes with this patient today. All changes were made via verbal approval with JEFF Lynch. A copy of the visit note was provided to the patient's provider(s).    A summary of these recommendations was sent via [employer].    Lauren Bloch, PharmD, BCACP   Medication Therapy Management Pharmacist   [facility]    Telemedicine Visit Details  Type of service:  Video Conference via MeeVee  Start Time:  1:30 PM  End Time:  2:15 PM     Medication Therapy Recommendations  BMI 29.0-29.9,adult    Current Medication: topiramate (TOPAMAX) 100 MG tablet   Rationale: More effective medication available - Ineffective medication - Effectiveness   Recommendation: Change Medication - Saxenda 18 MG/3ML Sopn   Status: Accepted per CPA                "

## 2023-07-26 NOTE — NURSING NOTE
Is the patient currently in the state of [place]? YES    Visit mode:VIDEO    If the visit is dropped, the patient can be reconnected by: VIDEO VISIT: Text to cell phone: [phone]    Will anyone else be joining the visit? NO      How would you like to obtain your AVS? MyChart    Are changes needed to the allergy or medication list? NO    Reason for visit: Consult      Score high PHQ2&9, decline triage nurse, pt seeing psych and therapist for depression already.

## 2023-07-26 NOTE — PROGRESS NOTES
Virtual Visit Details    Originating Location (pt. Location): Home    Distant Location (provider location):  Off-site  Platform used for Video Visit: Tiffanie

## 2023-07-26 NOTE — TELEPHONE ENCOUNTER
"[facility] PA REQUEST    Prior Authorization Retail Medication Request    Medication/Dose: Saxenda  ICD code (if different than what is on RX):    Previously Tried and Failed:  diet (low calorie diet following Weight Watchers) and exercise for at least 3 months without successful sustainable weight loss.   Rationale:  Caprice Kline is a 27 year old female with a diagnosis of overweight (BMI 27-29.9 kg/m2) with the following co-morbidities related to weight - lumbar radiculopathy, chronic back pain/neuropathy.  Stimulants such as Phentermine are contraindicated due to current psychiatric status.     Estimated body mass index is 29.34 kg/m  as calculated from the following:    Height as of an earlier encounter on 7/26/23: 5' 9.75\" (1.772 m).    Weight as of an earlier encounter on 7/26/23: 203 lb (92.1 kg).      Insurance Name:  [facility]  Insurance ID:        Pharmacy Information (if different than what is on RX)  Name:    Phone:      Lauren Bloch, PharmD, BCACP   Medication Therapy Management Pharmacist   [facility]          "

## 2023-07-27 NOTE — TELEPHONE ENCOUNTER
Prior Authorization Approval    Medication: SAXENDA 18 MG/3ML SC SOPN  Authorization Effective Date: 6/26/2023  Authorization Expiration Date: 1/23/2024  Approved Dose/Quantity: 15ml/30 days   Reference #: [id number]   Insurance Company: [employer] - Phone [phone] Fax [phone]  Expected CoPay: 3.00     CoPay Card Available:      Financial Assistance Needed: no  Which Pharmacy is filling the prescription:    Pharmacy Notified:    Patient Notified:

## 2023-07-31 ENCOUNTER — VIRTUAL VISIT (OUTPATIENT)
Dept: FAMILY MEDICINE | Facility: OTHER | Age: 28
End: 2023-07-31
Payer: COMMERCIAL

## 2023-07-31 DIAGNOSIS — R11.0 NAUSEA: ICD-10-CM

## 2023-07-31 DIAGNOSIS — M54.42 ACUTE BILATERAL LOW BACK PAIN WITH LEFT-SIDED SCIATICA: ICD-10-CM

## 2023-07-31 PROCEDURE — 99213 OFFICE O/P EST LOW 20 MIN: CPT | Mod: VID | Performed by: PHYSICIAN ASSISTANT

## 2023-07-31 RX ORDER — GABAPENTIN 600 MG/1
TABLET ORAL
COMMUNITY
Start: 2023-06-01 | End: 2023-07-31 | Stop reason: ALTCHOICE

## 2023-07-31 RX ORDER — LAMOTRIGINE 200 MG/1
TABLET ORAL
COMMUNITY
Start: 2023-07-24 | End: 2023-12-18

## 2023-07-31 RX ORDER — ONDANSETRON 4 MG/1
TABLET, ORALLY DISINTEGRATING ORAL
Qty: 90 TABLET | Refills: 0 | Status: SHIPPED | OUTPATIENT
Start: 2023-07-31 | End: 2024-02-28

## 2023-07-31 RX ORDER — CYCLOBENZAPRINE HCL 5 MG
TABLET ORAL
Qty: 90 TABLET | Refills: 0 | Status: SHIPPED | OUTPATIENT
Start: 2023-07-31 | End: 2023-08-28

## 2023-07-31 ASSESSMENT — PATIENT HEALTH QUESTIONNAIRE - PHQ9
SUM OF ALL RESPONSES TO PHQ QUESTIONS 1-9: 24
10. IF YOU CHECKED OFF ANY PROBLEMS, HOW DIFFICULT HAVE THESE PROBLEMS MADE IT FOR YOU TO DO YOUR WORK, TAKE CARE OF THINGS AT HOME, OR GET ALONG WITH OTHER PEOPLE: EXTREMELY DIFFICULT
SUM OF ALL RESPONSES TO PHQ QUESTIONS 1-9: 24

## 2023-07-31 NOTE — PROGRESS NOTES
Caprice is a 27 year old who is being evaluated via a billable video visit.      How would you like to obtain your AVS? MyChart  If the video visit is dropped, the invitation should be resent by: Text to cell phone: [phone]  Will anyone else be joining your video visit? No      Assessment & Plan     Nausea  Stable on once daily, typically taking in the AM with her medications because of the nausea.    - ondansetron (ZOFRAN ODT) 4 MG ODT tab; DISSOLVE 1 TABLET(4 MG) ON THE TONGUE EVERY 8 HOURS AS NEEDED FOR NAUSEA    Acute bilateral low back pain with left-sided sciatica  Still present.   Will see if Neurosurgery can get rescheduled with her.   Continue Cyclobenzarpine once daily with additional dose daily if needed.    - cyclobenzaprine (FLEXERIL) 5 MG tablet; TAKE 1 TO 2 TABLETS(5 TO 10 MG) BY MOUTH THREE TIMES DAILY AS NEEDED FOR MUSCLE SPASMS    Follow-up in 6 months/February 2024 for physical.     Options for treatment and follow-up care were reviewed with the patient and/or guardian. Patient and/or guardian engaged in the decision making process and verbalized understanding of the options discussed and agreed with the final plan.      Junior Roe PA-C  [facility]    Hector Vasques is a 27 year old, presenting for the following health issues:  No chief complaint on file.    History of Present Illness       Reason for visit:  Follow up    She eats 4 or more servings of fruits and vegetables daily.She consumes 1 sweetened beverage(s) daily.She exercises with enough effort to increase her heart rate 10 to 19 minutes per day.  She exercises with enough effort to increase her heart rate 4 days per week.   She is taking medications regularly.     - Things have been going well, medication is going well.   - She changed one of her medications to Lyrica.  Working better than the Gabapentin  - Going to therapy every week. Psychiatrist every month.   - started a new job.   - Hasn't had  her suicidal thoughts, but still depressed.    - Did have a manic episode recently.    - Back still hurts, she wasn't happy with the neurosurgeon rescheduling with her. She did the physical therapy and then was going to see them after the MRI but they kept canceling/rescheduling.    - Taking the Cyclobenzaprine at least once per day at night, sometimes up to twice per day - happens a couple times per week.   - Zofran - the nausea is off and on, some days she'll get nauseated more than others.  She is taking the Zofran once per day.  No side effects.  Takes it in the AM so she can get her pills down.       Review of Systems   Constitutional, cardiovascular, pulmonary, GI, , musculoskeletal, neuro, skin, endocrine and psych systems are negative, except as otherwise noted.      Objective           Vitals:  No vitals were obtained today due to virtual visit.    Physical Exam   GENERAL: Healthy, alert and no distress  EYES: Eyes grossly normal to inspection.  No discharge or erythema, or obvious scleral/conjunctival abnormalities.  RESP: No audible wheeze, cough, or visible cyanosis.  No visible retractions or increased work of breathing.    SKIN: Visible skin clear. No significant rash, abnormal pigmentation or lesions.  NEURO: Cranial nerves grossly intact.  Mentation and speech appropriate for age.  PSYCH: mentation appears normal, affect flat, judgement and insight intact, and appearance well groomed          Video-Visit Details    Type of service:  Video Visit   Video Start Time: 11:08 AM  Video End Time:11:20 AM    Originating Location (pt. Location): Home    Distant Location (provider location):  Off-site  Platform used for Video Visit: Engrade

## 2023-07-31 NOTE — Clinical Note
Toni Larios.  Can you have someone on your team reach out to reschedule Caprice, it sounds like she had to have her appt rescheduled a couple of times and she wasn't in a great space mentally to take the changes and reschedule.   She is ready now.   Thanks,  Junior

## 2023-08-02 DIAGNOSIS — Z51.81 ENCOUNTER FOR THERAPEUTIC DRUG MONITORING: Primary | ICD-10-CM

## 2023-08-02 DIAGNOSIS — F31.81 BIPOLAR II DISORDER (H): ICD-10-CM

## 2023-08-04 NOTE — PATIENT INSTRUCTIONS
"Recommendations from today's MTM visit:                                                    MTM (medication therapy management) is a service provided by a clinical pharmacist designed to help you get the most of out of your medicines.      Follow up with psychiatry as planned. Discuss with psychiatry potential to taper off topiramate due to lack of efficacy.   Consider starting Saxenda. Will start Prior Authorization for this.     Follow-up: Return in about 6 weeks (around 9/6/2023) for Medication Therapy Management Pharmacist Visit, Call [phone] to schedule.    It was great speaking with you today.  I value your experience and would be very thankful for your time in providing feedback in our clinic survey. In the next few days, you may receive an email or text message from [employer] with a link to a survey related to your  clinical pharmacist.\"     To schedule another appointment with your MTM pharmacist, please call [facility] Weight Management Scheduling at [phone].     My Clinical Pharmacist's contact information:                                                      Please feel free to contact me with any questions or concerns you have.      Lauren Bloch, PharmD  Medication Therapy Management Pharmacist   [facility]      Saxenda Dosing:   Start Saxenda: It is a subcutaneous injection that you inject once daily and titrate the dose slowly over time. Make sure inject the same time each day. Use a new pen needle for each injection. There are two safety caps on each pen needle, make sure you remove them both before doing your injection.   Week 1: Inject 0.6 mg once daily  Week 2: If tolerating, increase to 1.2 mg once daily   Week 3: If tolerating, increase to 1.8 mg once daily   Week 4: If tolerating, increase to 2.4 mg once daily   Week 5 & on: If tolerating, increase to 3 mg once daily   *If you are having some nausea or other side effects to " where you are hesitant to move up to the next dose, stay at the same dose you are on for an additional week to see if side effect(s) improves/resolves. Make sure to take this time to hydrate and ensure you are drinking at least 64 oz water per day.     Saxenda Administration Video:   https://[email or web address]/about-saxenda/how-to-use-the-pen.html     Saxenda Storage and Stability:   Store new, unused Saxenda pens in the refrigerator. After first use, store in a refrigerator or at room temperature. Pens in use should be thrown away after 30 days even if they still have Saxenda left in them. All used Saxenda pens can be thrown away in the trash. Used pen needles need to be stored in a sharp container. A sharps container is available at any retail store or you can make your own sharps container - information in link below.   [email or web address]/sites/default/files/w-[email or web address].pdf    Saxenda Common Side Effects:   Nausea, diarrhea, constipation, headache, tiredness (fatigue), dizziness, stomach upset/pain. Less commonly, Saxenda can cause low blood sugar (symptoms: shaky, dizzy, sweaty, agitation). Please reach out to the care team should you feel like this is occurring. It is important to ensure that you are eating consistent meals and not skipping meals. Ensure you are getting at least 64 oz water daily.

## 2023-08-16 ENCOUNTER — LAB (OUTPATIENT)
Dept: LAB | Facility: CLINIC | Age: 28
End: 2023-08-16
Payer: COMMERCIAL

## 2023-08-16 DIAGNOSIS — F31.81 BIPOLAR II DISORDER (H): ICD-10-CM

## 2023-08-16 DIAGNOSIS — Z51.81 ENCOUNTER FOR THERAPEUTIC DRUG MONITORING: ICD-10-CM

## 2023-08-16 LAB
BASOPHILS # BLD AUTO: 0 10E3/UL (ref 0–0.2)
BASOPHILS NFR BLD AUTO: 1 %
EOSINOPHIL # BLD AUTO: 0 10E3/UL (ref 0–0.7)
EOSINOPHIL NFR BLD AUTO: 0 %
ERYTHROCYTE [DISTWIDTH] IN BLOOD BY AUTOMATED COUNT: 12.5 % (ref 10–15)
HCG UR QL: NEGATIVE
HCT VFR BLD AUTO: 42.9 % (ref 35–47)
HGB BLD-MCNC: 14.5 G/DL (ref 11.7–15.7)
IMM GRANULOCYTES # BLD: 0 10E3/UL
IMM GRANULOCYTES NFR BLD: 0 %
LITHIUM SERPL-SCNC: 0.7 MMOL/L (ref 0.6–1.2)
LYMPHOCYTES # BLD AUTO: 0.9 10E3/UL (ref 0.8–5.3)
LYMPHOCYTES NFR BLD AUTO: 26 %
MCH RBC QN AUTO: 28.8 PG (ref 26.5–33)
MCHC RBC AUTO-ENTMCNC: 33.8 G/DL (ref 31.5–36.5)
MCV RBC AUTO: 85 FL (ref 78–100)
MONOCYTES # BLD AUTO: 0.3 10E3/UL (ref 0–1.3)
MONOCYTES NFR BLD AUTO: 8 %
NEUTROPHILS # BLD AUTO: 2.3 10E3/UL (ref 1.6–8.3)
NEUTROPHILS NFR BLD AUTO: 65 %
PLATELET # BLD AUTO: 226 10E3/UL (ref 150–450)
RBC # BLD AUTO: 5.04 10E6/UL (ref 3.8–5.2)
WBC # BLD AUTO: 3.6 10E3/UL (ref 4–11)

## 2023-08-16 PROCEDURE — 81025 URINE PREGNANCY TEST: CPT

## 2023-08-16 PROCEDURE — 36415 COLL VENOUS BLD VENIPUNCTURE: CPT

## 2023-08-16 PROCEDURE — 80053 COMPREHEN METABOLIC PANEL: CPT

## 2023-08-16 PROCEDURE — 84443 ASSAY THYROID STIM HORMONE: CPT

## 2023-08-16 PROCEDURE — 80178 ASSAY OF LITHIUM: CPT

## 2023-08-16 PROCEDURE — 85025 COMPLETE CBC W/AUTO DIFF WBC: CPT

## 2023-08-17 LAB
ALBUMIN SERPL BCG-MCNC: 4.7 G/DL (ref 3.5–5.2)
ALP SERPL-CCNC: 63 U/L (ref 35–104)
ALT SERPL W P-5'-P-CCNC: 10 U/L (ref 0–50)
ANION GAP SERPL CALCULATED.3IONS-SCNC: 13 MMOL/L (ref 7–15)
AST SERPL W P-5'-P-CCNC: 20 U/L (ref 0–45)
BILIRUB SERPL-MCNC: 0.5 MG/DL
BUN SERPL-MCNC: 9.2 MG/DL (ref 6–20)
CALCIUM SERPL-MCNC: 9.8 MG/DL (ref 8.6–10)
CHLORIDE SERPL-SCNC: 108 MMOL/L (ref 98–107)
CREAT SERPL-MCNC: 0.85 MG/DL (ref 0.51–0.95)
DEPRECATED HCO3 PLAS-SCNC: 21 MMOL/L (ref 22–29)
GFR SERPL CREATININE-BSD FRML MDRD: >90 ML/MIN/1.73M2
GLUCOSE SERPL-MCNC: 88 MG/DL (ref 70–99)
POTASSIUM SERPL-SCNC: 3.8 MMOL/L (ref 3.4–5.3)
PROT SERPL-MCNC: 7.2 G/DL (ref 6.4–8.3)
SODIUM SERPL-SCNC: 142 MMOL/L (ref 136–145)
TSH SERPL DL<=0.005 MIU/L-ACNC: 2.12 UIU/ML (ref 0.3–4.2)

## 2023-08-24 ENCOUNTER — VIRTUAL VISIT (OUTPATIENT)
Dept: PHARMACY | Facility: CLINIC | Age: 28
End: 2023-08-24
Payer: COMMERCIAL

## 2023-08-24 VITALS — HEIGHT: 69 IN | BODY MASS INDEX: 28.14 KG/M2 | WEIGHT: 190 LBS

## 2023-08-24 DIAGNOSIS — K59.03 DRUG-INDUCED CONSTIPATION: ICD-10-CM

## 2023-08-24 PROCEDURE — 99606 MTMS BY PHARM EST 15 MIN: CPT | Mod: VID | Performed by: PHARMACIST

## 2023-08-24 PROCEDURE — 99607 MTMS BY PHARM ADDL 15 MIN: CPT | Performed by: PHARMACIST

## 2023-08-24 RX ORDER — POLYETHYLENE GLYCOL 3350 17 G/17G
1 POWDER, FOR SOLUTION ORAL DAILY
COMMUNITY
End: 2024-09-09

## 2023-08-24 RX ORDER — SEMAGLUTIDE 0.25 MG/.5ML
0.25 INJECTION, SOLUTION SUBCUTANEOUS WEEKLY
Qty: 2 ML | Refills: 0 | Status: SHIPPED | OUTPATIENT
Start: 2023-08-24 | End: 2023-08-28

## 2023-08-24 RX ORDER — BISACODYL 5 MG/1
5 TABLET, DELAYED RELEASE ORAL DAILY PRN
COMMUNITY

## 2023-08-24 ASSESSMENT — PATIENT HEALTH QUESTIONNAIRE - PHQ9: SUM OF ALL RESPONSES TO PHQ QUESTIONS 1-9: 19

## 2023-08-24 ASSESSMENT — PAIN SCALES - GENERAL: PAINLEVEL: MODERATE PAIN (5)

## 2023-08-24 NOTE — PROGRESS NOTES
Medication Therapy Management (MTM) Encounter    ASSESSMENT:                            Medication Adherence/Access: No issues identified    Weight Management:   Would benefit from stopping Saxenda due to side effects. Could consider transitioning to Wegovy in the future, but would want to have bowel movements back to normal before considering.     Constipation:   Needs improvement. See above. Could be that senna-docusate may be more effective if utilized bisacodyl consistently in the past. Hopefully Saxenda stop will assist with improving constipation.     PLAN:                            Stop Saxenda  Will see if Wegovy will be covered, will start Prior Authorization. Do not start Wegovy until bowel movements back to baseline.     Follow-up: Return in about 4 weeks (around 9/21/2023) for Medication Therapy Management Pharmacist Sirena .    SUBJECTIVE/OBJECTIVE:                          Caprice Kline is a 27 year old female contacted via secure video for a follow-up visit from 7/26/2023.       Reason for visit: Saxenda follow up.    Allergies/ADRs: Reviewed in chart  Past Medical History: Reviewed in chart  Tobacco: She reports that she has been smoking cigarettes and vaping device. She has never used smokeless tobacco.Nicotine/Tobacco Cessation Plan:   Information offered: Patient not interested at this time  Alcohol: not currently using    Medication Adherence/Access: no issues reported    Weight Management:   Saxenda 1.2 mg daily   Topiramate 150 mg twice daily (prescribed by psychiatry)    Medication side effects: constipation. She tried going up to higher dosing but had side effects so went back down. She is happy with her weight loss as she hasn't seen this in a while. However, reports that constipation and bloating are very bothersome. In 2 weeks, she has had 2 very small pebble like bowel movements even with the below bowel regimen.   Diet/Eating Habits: Patient reports breakfast: shake (fruit, protein  "probiotic powder, spinach); Snacks: fruits and vegetables; dinner: Wrap (chicken, spinach, cheese).  Taste perversion with carbonated beverages. She is on WW, has been actively trying to lose weight.   Exercise/Activity: Patient reports moves a lot with walking with her job, works in [employer]. On days off she will walk.   Tried/Failed/Contraindicated Medications: metformin: unmanageable side effect     Current weight today: 190 lb   Initial Consult Weight: 203 lb   Wt Readings from Last 4 Encounters:   08/24/23 190 lb (86.2 kg)   07/26/23 203 lb (92.1 kg)   02/07/23 203 lb (92.1 kg)   08/23/22 199 lb 9.6 oz (90.5 kg)     Constipation:   Miralax 2 capfuls once daily   Bisacodyl 1 tablet daily     Getting in adequate water intake daily, 80 oz water daily. She is consistent with getting in fruits, vegetables as well daily. She reports that she had greater issues of constipation than previously thought prior to starting Saxenda. She at her baseline was having bowel movement every 2-3 days. Now she is having bowel movement every 4-5 days, very little and straining when going. She has been using bisacodyl longstanding, historically at times once daily and others as needed. Increased from Miralax 1 capful daily to 2 capfuls daily when started Saxenda.     Today's Vitals: Ht 5' 9\" (1.753 m)   Wt 190 lb (86.2 kg)   LMP 07/08/2023 (Exact Date)   BMI 28.06 kg/m    ----------------    I spent 15 minutes with this patient today. All changes were made via collaborative practice agreement with Junior Roe PA-C. A copy of the visit note was provided to the patient's provider(s).    A summary of these recommendations was sent via [employer].    Lauren Bloch, PharmD, BCACP   Medication Therapy Management Pharmacist   [facility]    Telemedicine Visit Details  Type of service:  Video Conference via [employer]  Start Time:  3:00 PM  End Time:  3:15 PM     Medication Therapy Recommendations  BMI " 29.0-29.9,adult    Current Medication: liraglutide - Weight Management (SAXENDA) 18 MG/3ML pen (Discontinued)   Rationale: Undesirable effect - Adverse medication event - Safety   Recommendation: Discontinue Medication   Status: Accepted per CPA

## 2023-08-24 NOTE — PROGRESS NOTES
Virtual Visit Details    Type of service:  Video Visit     Originating Location (pt. Location): Home    Distant Location (provider location):  Off-site  Platform used for Video Visit: Tiffanie

## 2023-08-24 NOTE — NURSING NOTE
Is the patient currently in the state of [place]? YES    Visit mode:VIDEO    If the visit is dropped, the patient can be reconnected by: VIDEO VISIT: Text to cell phone:   Telephone Information:   Mobile [phone]       Will anyone else be joining the visit? NO  (If patient encounters technical issues they should call [phone] :[phone])    How would you like to obtain your AVS? MyChart    Are changes needed to the allergy or medication list? Yes Pt states started Lithium 600mg capsule nightly and Lyrica 200mg capsules 3x daily increase.    Reason for visit: RECHECK    Depression Response    Patient completed the PHQ-9 assessment for depression and scored >9? Yes  Question 9 on the PHQ-9 was positive for suicidality? Yes  Does patient have current mental health provider? Yes    Is this a virtual visit? Yes   Does patient have suicidal ideation (positive question 9)? No - offer to place Mental Health Referral.  Patient declined referral/not needed    I personally notified the following: visit provider    Bhupinder PADRON

## 2023-08-26 NOTE — PATIENT INSTRUCTIONS
"Recommendations from today's MTM visit:                                                    MTM (medication therapy management) is a service provided by a clinical pharmacist designed to help you get the most of out of your medicines.      Stop Saxenda  Will see if Wegovy will be covered in future, will start Prior Authorization. Do not start Wegovy until bowel movements back to baseline.     It was great speaking with you today.  I value your experience and would be very thankful for your time in providing feedback in our clinic survey. In the next few days, you may receive an email or text message from [employer] with a link to a survey related to your  clinical pharmacist.\"     To schedule another appointment with your MTM pharmacist, please call [facility] Weight Management Scheduling at [phone].     My Clinical Pharmacist's contact information:                                                      Please feel free to contact me with any questions or concerns you have.      Lauren Bloch, PharmD  Medication Therapy Management Pharmacist   [facility]             "

## 2023-08-28 DIAGNOSIS — M54.42 ACUTE BILATERAL LOW BACK PAIN WITH LEFT-SIDED SCIATICA: ICD-10-CM

## 2023-08-28 RX ORDER — CYCLOBENZAPRINE HCL 5 MG
TABLET ORAL
Qty: 90 TABLET | Refills: 0 | Status: SHIPPED | OUTPATIENT
Start: 2023-08-28 | End: 2023-09-27

## 2023-09-12 ENCOUNTER — MYC MEDICAL ADVICE (OUTPATIENT)
Dept: FAMILY MEDICINE | Facility: OTHER | Age: 28
End: 2023-09-12

## 2023-09-12 ENCOUNTER — OFFICE VISIT (OUTPATIENT)
Dept: NEUROSURGERY | Facility: CLINIC | Age: 28
End: 2023-09-12
Payer: COMMERCIAL

## 2023-09-12 VITALS
SYSTOLIC BLOOD PRESSURE: 115 MMHG | OXYGEN SATURATION: 98 % | RESPIRATION RATE: 16 BRPM | WEIGHT: 198 LBS | BODY MASS INDEX: 29.24 KG/M2 | HEART RATE: 84 BPM | DIASTOLIC BLOOD PRESSURE: 82 MMHG

## 2023-09-12 DIAGNOSIS — M54.42 CHRONIC LEFT-SIDED LOW BACK PAIN WITH LEFT-SIDED SCIATICA: Primary | ICD-10-CM

## 2023-09-12 DIAGNOSIS — G89.29 CHRONIC LEFT-SIDED LOW BACK PAIN WITH LEFT-SIDED SCIATICA: Primary | ICD-10-CM

## 2023-09-12 PROCEDURE — 99213 OFFICE O/P EST LOW 20 MIN: CPT | Performed by: NURSE PRACTITIONER

## 2023-09-12 ASSESSMENT — PAIN SCALES - GENERAL: PAINLEVEL: SEVERE PAIN (7)

## 2023-09-12 NOTE — LETTER
9/12/2023         RE: Caprice Kline  [address] [address]        Dear Colleague,    Thank you for referring your patient, Caprice Kline, to the [facility]. Please see a copy of my visit note below.    [facility] Neurosurgery  Neurosurgery Follow Up:    HPI: 28F with a history of MIS left L5-S1 microdiscectomy with Dr. Junior on 8/24/2021.  Was doing well up until this winter when she had 2 falls on the ice.  She now reports left-sided low back pain which radiates to the left anterolateral thigh. She reports associated paresthesias.  No overt weakness.  No bowel or bladder incontinence.  She had a lumbar MRI in February without concerning findings.  She states pain continues to worsen.    Medical, surgical, family, and social history unchanged since prior exam.  Exam:  Constitutional:  Alert, well nourished, NAD.  HEENT: Normocephalic, atraumatic.   Pulm:  Without shortness of breath   CV:  No pitting edema of BLE.      Vital Signs:  /82   Pulse 84   Resp 16   Wt 198 lb (89.8 kg)   LMP 07/08/2023 (Exact Date)   SpO2 98%   BMI 29.24 kg/m      Neurological:  Awake  Alert  Oriented x 3  Motor exam:        IP Q DF PF EHL  R   5  5   5   5    5  L   5  5   5   5    5     Able to spontaneously move L/E bilaterally  Sensation intact throughout all L/E dermatomes    SI joint testing:  Michael: Positive on the left     Imaging:   Lumbar MRI 2/28/2023  IMPRESSION:   1. L5-S1 hemilaminectomy, microdiscectomy changes. Left facet  periarticular, left epidural space granulation tissue formation  surrounding the left descending S1 within the lateral recess without  significant compression or abnormal nerve signal. Previous  postoperative fluid collection has resolved.  2. No substantial degenerative change at any other level within the  lumbar spine.    A/P: SI joint pain  Chronic left-sided low back pain with left-sided sciatica  We will proceed with diagnostic  and therapeutic left SI joint injection at this time.  She will contact us if symptoms persist or worsen.  Could consider referral to Dr. Jeffery Webster in the future.  She verbalized understanding and agreement with the plan.  Patient Instructions   -Left SI joint injection ordered. They will contact you to schedule.   -Please contact our office if symptoms persist or worsen.  -Please contact our clinic with questions or concerns at [phone].    Harriet Adamson CNP  [address] [address]  Tel [phone]  Fax [phone]      Again, thank you for allowing me to participate in the care of your patient.        Sincerely,        Harriet Adamson, PHI

## 2023-09-12 NOTE — PROGRESS NOTES
[facility] Neurosurgery  Neurosurgery Follow Up:    HPI: 28F with a history of MIS left L5-S1 microdiscectomy with Dr. Junior on 8/24/2021.  Was doing well up until this winter when she had 2 falls on the ice.  She now reports left-sided low back pain which radiates to the left anterolateral thigh. She reports associated paresthesias.  No overt weakness.  No bowel or bladder incontinence.  She had a lumbar MRI in February without concerning findings.  She states pain continues to worsen.    Medical, surgical, family, and social history unchanged since prior exam.  Exam:  Constitutional:  Alert, well nourished, NAD.  HEENT: Normocephalic, atraumatic.   Pulm:  Without shortness of breath   CV:  No pitting edema of BLE.      Vital Signs:  /82   Pulse 84   Resp 16   Wt 198 lb (89.8 kg)   LMP 07/08/2023 (Exact Date)   SpO2 98%   BMI 29.24 kg/m      Neurological:  Awake  Alert  Oriented x 3  Motor exam:        IP Q DF PF EHL  R   5  5   5   5    5  L   5  5   5   5    5     Able to spontaneously move L/E bilaterally  Sensation intact throughout all L/E dermatomes    SI joint testing:  Michael: Positive on the left     Imaging:   Lumbar MRI 2/28/2023  IMPRESSION:   1. L5-S1 hemilaminectomy, microdiscectomy changes. Left facet  periarticular, left epidural space granulation tissue formation  surrounding the left descending S1 within the lateral recess without  significant compression or abnormal nerve signal. Previous  postoperative fluid collection has resolved.  2. No substantial degenerative change at any other level within the  lumbar spine.    A/P: SI joint pain  Chronic left-sided low back pain with left-sided sciatica  We will proceed with diagnostic and therapeutic left SI joint injection at this time.  She will contact us if symptoms persist or worsen.  Could consider referral to Dr. Jeffery Webster in the future.  She verbalized understanding and agreement with the plan.  Patient Instructions   -Left SI  joint injection ordered. They will contact you to schedule.   -Please contact our office if symptoms persist or worsen.  -Please contact our clinic with questions or concerns at [phone].    Harriet Adamson [address] [address]  Tel [phone]  Fax [phone]

## 2023-09-12 NOTE — PATIENT INSTRUCTIONS
-Left SI joint injection ordered. They will contact you to schedule.   -Please contact our office if symptoms persist or worsen.  -Please contact our clinic with questions or concerns at [phone].

## 2023-09-14 NOTE — PROGRESS NOTES
This encounter was opened in error. Please disregard.   unclear Xanax, Ambien, Depakote, Buspar, Gabapentin, Propranolol

## 2023-09-26 DIAGNOSIS — M54.42 ACUTE BILATERAL LOW BACK PAIN WITH LEFT-SIDED SCIATICA: ICD-10-CM

## 2023-09-27 ENCOUNTER — MYC MEDICAL ADVICE (OUTPATIENT)
Dept: FAMILY MEDICINE | Facility: OTHER | Age: 28
End: 2023-09-27
Payer: COMMERCIAL

## 2023-09-27 ENCOUNTER — NURSE TRIAGE (OUTPATIENT)
Dept: FAMILY MEDICINE | Facility: OTHER | Age: 28
End: 2023-09-27

## 2023-09-27 ENCOUNTER — VIRTUAL VISIT (OUTPATIENT)
Dept: FAMILY MEDICINE | Facility: OTHER | Age: 28
End: 2023-09-27
Payer: COMMERCIAL

## 2023-09-27 DIAGNOSIS — G43.909 MIGRAINE WITHOUT STATUS MIGRAINOSUS, NOT INTRACTABLE, UNSPECIFIED MIGRAINE TYPE: ICD-10-CM

## 2023-09-27 DIAGNOSIS — U07.1 INFECTION DUE TO 2019 NOVEL CORONAVIRUS: Primary | ICD-10-CM

## 2023-09-27 PROCEDURE — 99214 OFFICE O/P EST MOD 30 MIN: CPT | Mod: VID | Performed by: PHYSICIAN ASSISTANT

## 2023-09-27 RX ORDER — RIZATRIPTAN BENZOATE 5 MG/1
5 TABLET ORAL
Qty: 12 TABLET | Refills: 1 | Status: SHIPPED | OUTPATIENT
Start: 2023-09-27 | End: 2024-01-30

## 2023-09-27 RX ORDER — HYDROXYZINE PAMOATE 50 MG/1
CAPSULE ORAL
COMMUNITY
Start: 2023-09-08

## 2023-09-27 RX ORDER — BENZONATATE 100 MG/1
100 CAPSULE ORAL 3 TIMES DAILY PRN
Qty: 30 CAPSULE | Refills: 0 | Status: SHIPPED | OUTPATIENT
Start: 2023-09-27 | End: 2023-12-18

## 2023-09-27 RX ORDER — ALBUTEROL SULFATE 90 UG/1
2 AEROSOL, METERED RESPIRATORY (INHALATION) EVERY 6 HOURS PRN
Qty: 18 G | Refills: 0 | Status: SHIPPED | OUTPATIENT
Start: 2023-09-27

## 2023-09-27 RX ORDER — PREGABALIN 200 MG/1
CAPSULE ORAL
COMMUNITY
Start: 2023-08-22 | End: 2024-08-19

## 2023-09-27 RX ORDER — LITHIUM CARBONATE 600 MG/1
CAPSULE ORAL
COMMUNITY
Start: 2023-09-15

## 2023-09-27 RX ORDER — CYCLOBENZAPRINE HCL 5 MG
TABLET ORAL
Qty: 90 TABLET | Refills: 0 | Status: SHIPPED | OUTPATIENT
Start: 2023-09-27 | End: 2023-10-17

## 2023-09-27 ASSESSMENT — PATIENT HEALTH QUESTIONNAIRE - PHQ9
SUM OF ALL RESPONSES TO PHQ QUESTIONS 1-9: 19
SUM OF ALL RESPONSES TO PHQ QUESTIONS 1-9: 19
10. IF YOU CHECKED OFF ANY PROBLEMS, HOW DIFFICULT HAVE THESE PROBLEMS MADE IT FOR YOU TO DO YOUR WORK, TAKE CARE OF THINGS AT HOME, OR GET ALONG WITH OTHER PEOPLE: EXTREMELY DIFFICULT

## 2023-09-27 NOTE — TELEPHONE ENCOUNTER
Saw patient for Visit today. Treatments given. Discussed symptoms that warrant emergent evaluation in the meantime.     Junior Roe PA-C

## 2023-09-27 NOTE — TELEPHONE ENCOUNTER
"S-(situation): Patient is being transferred to triage due to \"chest pain\" when answering questions for virtual visit scheduled this afternoon.      B-(background): Patient stated she felt this chest congestion and tightness the last time she had Covid.      A-(assessment): Patient has positive Covid test and has same symptoms as prior diagnosis with Covid with chest tightness.    R-(recommendations): Per protocol to be seen today.  To keep virtual visit as scheduled today.  Advised patient of home care remedies per protocol.  Advised patient to seek urgent/ emergency care for worsening symptoms per protocol.  Patient stated understanding.    Will forward to PCP for review.    Reason for Disposition   Patient wants to be seen    Additional Information   Negative: SEVERE difficulty breathing (e.g., struggling for each breath, speaks in single words)   Negative: Difficult to awaken or acting confused (e.g., disoriented, slurred speech)   Negative: Shock suspected (e.g., cold/pale/clammy skin, too weak to stand, low BP, rapid pulse)   Negative: Passed out (i.e., lost consciousness, collapsed and was not responding)   Negative: Chest pain lasting longer than 5 minutes and over 44 years old   Negative: Chest pain lasting longer than 5 minutes, over 30 years old, and at least one cardiac risk factor (e.g., diabetes mellitus, high blood pressure, high cholesterol, smoker, or strong family history of heart disease)   Negative: Chest pain lasting longer than 5 minutes and history of heart disease (i.e., angina, heart attack, heart failure, bypass surgery, takes nitroglycerin)   Negative: Chest pain lasting longer than 5 minutes and pain is crushing, pressure-like, or heavy   Negative: Heart beating < 50 beats per minute OR > 140 beats per minute   Negative: Visible sweat on face or sweat dripping down face   Negative: Sounds like a life-threatening emergency to the triager   Negative: Followed an injury to chest   Negative: " SEVERE chest pain   Negative: Pain also in shoulder(s) or arm(s) or jaw   Negative: Difficulty breathing   Negative: Cocaine use within last 3 days   Negative: Major surgery in the past month   Negative: Hip or leg fracture (broken bone) in past month (or had cast on leg or ankle in past month)   Negative: Illness requiring prolonged bedrest in past month (e.g., immobilization, long hospital stay)   Negative: Long-distance travel in past month (e.g., car, bus, train, plane; with trip lasting 6 or more hours)   Negative: History of prior 'blood clot' in leg or lungs (i.e., deep vein thrombosis, pulmonary embolism)   Negative: History of inherited increased risk of blood clots (e.g., Factor 5 Leiden, Anti-thrombin 3, Protein C or Protein S deficiency, Prothrombin mutation)   Negative: Cancer treatment in the past two months (or has cancer now)   Negative: Heart beating irregularly or very rapidly   Negative: Chest pain lasting longer than 5 minutes and occurred in last 3 days (72 hours) (Exception: Feels exactly the same as previously diagnosed heartburn and has accompanying sour taste in mouth.)   Negative: Chest pain or 'angina' comes and goes and is happening more often (increasing in frequency) or getting worse (increasing in severity) (Exception: Chest pains that last only a few seconds.)   Negative: Dizziness or lightheadedness   Negative: Coughing up blood   Negative: Patient sounds very sick or weak to the triager   Negative: Patient says chest pain feels exactly the same as previously diagnosed 'heartburn' and describes burning in chest and accompanying sour taste in mouth   Negative: Fever > 100.4 F (38.0 C)   Negative: Chest pain(s) lasting a few seconds persists > 3 days   Negative: Rash in same area as pain (may be described as 'small blisters')   Negative: All other patients with chest pain (Exception: Fleeting chest pain lasting a few seconds.)    Answer Assessment - Initial Assessment Questions  1.  "LOCATION: \"Where does it hurt?\"        Above the neck area in the sternum    2. RADIATION: \"Does the pain go anywhere else?\" (e.g., into neck, jaw, arms, back)      No    3. ONSET: \"When did the chest pain begin?\" (Minutes, hours or days)       Last night    4. PATTERN: \"Does the pain come and go, or has it been constant since it started?\"  \"Does it get worse with exertion?\"       Constant    5. DURATION: \"How long does it last\" (e.g., seconds, minutes, hours)      Constant    6. SEVERITY: \"How bad is the pain?\"  (e.g., Scale 1-10; mild, moderate, or severe)     - MILD (1-3): doesn't interfere with normal activities      - MODERATE (4-7): interferes with normal activities or awakens from sleep     - SEVERE (8-10): excruciating pain, unable to do any normal activities        4-5/10    7. CARDIAC RISK FACTORS: \"Do you have any history of heart problems or risk factors for heart disease?\" (e.g., angina, prior heart attack; diabetes, high blood pressure, high cholesterol, smoker, or strong family history of heart disease)      E-cig    8. PULMONARY RISK FACTORS: \"Do you have any history of lung disease?\"  (e.g., blood clots in lung, asthma, emphysema, birth control pills)      No    9. CAUSE: \"What do you think is causing the chest pain?\"      Unknown - sick, coughing, chest congestion    10. OTHER SYMPTOMS: \"Do you have any other symptoms?\" (e.g., dizziness, nausea, vomiting, sweating, fever, difficulty breathing, cough)        Cough, sweating, fever, body aches    11. PREGNANCY: \"Is there any chance you are pregnant?\" \"When was your last menstrual period?\"        No    Protocols used: Chest Pain-A-[place]  Jennifer Goss RN    "

## 2023-09-27 NOTE — COMMUNITY RESOURCES LIST (ENGLISH)
09/27/2023   [facility]  N/A  For questions about this resource list or additional care needs, please contact your primary care clinic or care manager.  Phone: [phone]   Email: N/A   Address: [address] [place] [id number]   Hours: N/A        Food and Nutrition       Food pantry  1  [facility] ([facility] Food Shelf) Distance: 0.78 miles      Pickup   [address] [place] [address]  Language: English  Hours: Mon 10:00 AM - 3:00 PM , Tue 12:00 PM - 5:00 PM , Wed 10:00 AM - 3:00 PM , Thu 2:00 PM - 7:00 PM  Fees: Free   Phone: (795) 623-3188 Email: support@[facility].[employer] Website: http://[facility].org     2  [facility] - Family Table Meals - [facility] Food [facility] Distance: 3.16 miles      Pickup   [address] [place] [address]  Language: English  Hours: Wed 12:00 PM - 2:00 PM  Fees: Free   Phone: (196) 275-8649 Email: Urbita@[facility].[employer] Website: http://www.[employer].org     SNAP application assistance  3  [facility] Distance: 7.66 miles      Phone/Virtual   [address] [place] [address]  Language: English, Zimbabwean  Hours: Mon 9:00 AM - 1:00 PM , Tue - Thu 9:00 AM - 4:00 PM , Fri 9:00 AM - 1:00 PM  Fees: Free   Phone: (844) 910-1771 Email: info@[employer].org Website: http://www.[employer].org/     4  [facility] Economic Assistance Department Distance: 7.9 miles      Phone/Virtual   [address] [address]  Language: English  Hours: Mon - Fri 8:15 AM - 4:00 PM  Fees: Free   Phone: (945) 788-8793 Email: paperwork@[employer]. Website: http://www.[facility]./193/Economic-Assistance     Soup kitchen or free meals  5  [employer] - [facility] Meals Distance: 1.42 miles      In-Person   [address] [place] [address]  Language: English  Hours: Wed 5:30 PM - 6:15 PM  Fees: Free   Phone: (688) 835-6574 Email: zo@[facility]  Website: http://www.[employer].org/     6  [facility] - Family Table Meals - Family Table Meal Distance: 3.16 miles      [facility]   [address] [place] [address]  Language: English  Hours: Thu 5:00 PM - 6:30 PM  Fees: Free   Phone: (803) 237-8919 Email: dilan@[employer] Website: http://www.[facility].[employer]          Important Numbers & Websites       Emergency Services   911  [facility] Services   311  Poison Control   (469) 736-6171  Suicide Prevention Lifeline   (458) 750-6113 (TALK)  Child Abuse Hotline   (621) 237-6919 (4-A-Child)  Sexual Assault Hotline   (283) 816-7209 (HOPE)  National Runaway Safeline   (343) 752-2009 (RUNAWAY)  All-Options Talkline   (163) 920-6477  Substance Abuse Referral   (918) 407-7514 (HELP)

## 2023-09-27 NOTE — PROGRESS NOTES
Caprice is a 28 year old who is being evaluated via a billable video visit.      How would you like to obtain your AVS? MyChart  If the video visit is dropped, the invitation should be resent by: Text to cell phone: [phone]  Will anyone else be joining your video visit? No    Assessment & Plan     Infection due to 2019 novel coronavirus  Positive for COVID, reviewed Paxlovid side effects,she has tolerated in the past. She is aware of the risk of quetiapine toxicity with concurrent use with Paxlovid.  Will also send inhaler and tessalon to use for symptoms, continue with OTC management as well. If not feeling significantly improved by Friday she will let us know so we can get a note for her for work for the weekend, work has 5 day policy for covid.   - nirmatrelvir and ritonavir (PAXLOVID) 300 mg/100 mg therapy pack; Take 3 tablets by mouth 2 times daily for 5 days (Take 2 Nirmatrelvir tablets and 1 Ritonavir tablet twice daily for 5 days)  - albuterol (PROAIR HFA/PROVENTIL HFA/VENTOLIN HFA) 108 (90 Base) MCG/ACT inhaler; Inhale 2 puffs into the lungs every 6 hours as needed for shortness of breath, wheezing or cough  - benzonatate (TESSALON) 100 MG capsule; Take 1 capsule (100 mg) by mouth 3 times daily as needed for cough    Migraine  - She has had some relief with Sumatriptan in the past  - Will start her on Rizatriptan but wait until done with Paxlovid.   - If having to use medication > 1 time per week we will consider starting on propranolol for preventative medication.     Options for treatment and follow-up care were reviewed with the patient and/or guardian. Patient and/or guardian engaged in the decision making process and verbalized understanding of the options discussed and agreed with the final plan.      Junior Roe PA-C  [facility]   Caprice is a 28 year old, presenting for the following health issues:  Covid Concern        9/27/2023     2:46 PM   Additional  "Questions   Roomed by Holley AVITIA       Patient sent to triage due to positive answers for breathing difficulty and chest pain.    History of Present Illness       Headaches:   Since the patient's last clinic visit, headaches are: worsened  The patient is getting headaches:  4-5 days out of the week  She is not able to do normal daily activities when she has a migraine.  The patient is taking the following rescue/relief medications:  Ibuprofen (Advil, Motrin) and Tylenol   Patient states \"I get only a small amount of relief\" from the rescue/relief medications.   The patient is taking the following medications to prevent migraines:  No medications to prevent migraines  In the past 4 weeks, the patient has gone to an Urgent Care or Emergency Room 0 times times due to headaches.    Reason for visit:  Covid positive  Symptom onset:  1-3 days ago  Symptoms include:  Congestion, severe sore throat, mild cough, fever/ sweatng, cold sweats, chills, headache, chest heaviness  Symptom intensity:  Moderate  Symptom progression:  Worsening  Had these symptoms before:  Yes  Has tried/received treatment for these symptoms:  Yes  Previous treatment was successful:  Yes  Prior treatment description:  Paxlovid  What makes it worse:  Everything  What makes it better:  No    She eats 2-3 servings of fruits and vegetables daily.She consumes 2 sweetened beverage(s) daily.She exercises with enough effort to increase her heart rate 30 to 60 minutes per day.  She exercises with enough effort to increase her heart rate 4 days per week.   She is taking medications regularly.         COVID-19 Symptom Review  How many days ago did these symptoms start? 2 days, tested positive yesterday    Are any of the following symptoms significant for you?  New or worsening difficulty breathing? Yes  Please describe what kind of difficulty you are having breathing:No dyspnea, or dyspnea at patients normal baseline  Worsening cough? Yes, I am coughing up " mucus.  Fever or chills? Yes, the highest temperature was 101.2  Headache: YES  Sore throat: YES  Chest pain: YES- pressure  Diarrhea: No  Body aches? YES    What treatments has patient tried? Guaifenesin (mucinex) and dayquil/nyquil   Does patient live in a nursing home, group home, or shelter? No  Does patient have a way to get food/medications during quarantined? Yes, I have a friend or family member who can help me.    - Started on Monday.     - Has been getting migraines 3-4 times per week started about 1 month ago.  No changes in medications at that time.  No changes in stress.       Review of Systems   Constitutional, HEENT, cardiovascular, pulmonary, gi and gu systems are negative, except as otherwise noted.      Objective           Vitals:  No vitals were obtained today due to virtual visit.    Physical Exam   GENERAL: healthy, alert, and no distress  EYES: Eyes grossly normal to inspection.  No discharge or erythema, or obvious scleral/conjunctival abnormalities.  RESP: Coughing, dry.  No audible wheeze, or visible cyanosis.  No visible retractions or increased work of breathing.    SKIN: Visible skin clear. No significant rash, abnormal pigmentation or lesions.  NEURO: Cranial nerves grossly intact.  Mentation and speech appropriate for age.  PSYCH: Mentation appears normal, affect normal/bright, judgement and insight intact, normal speech and appearance well-groomed.          Video-Visit Details    Type of service:  Video Visit   Video Start Time: 3:38 PM  Video End Time:3:46 PM    Originating Location (pt. Location): Home    Distant Location (provider location):  Off-site  Platform used for Video Visit: [employer]

## 2023-10-10 ENCOUNTER — ANCILLARY ORDERS (OUTPATIENT)
Dept: PALLIATIVE MEDICINE | Facility: CLINIC | Age: 28
End: 2023-10-10

## 2023-10-10 DIAGNOSIS — M53.3 SI (SACROILIAC) JOINT DYSFUNCTION: Primary | ICD-10-CM

## 2023-10-17 DIAGNOSIS — M54.42 ACUTE BILATERAL LOW BACK PAIN WITH LEFT-SIDED SCIATICA: ICD-10-CM

## 2023-10-17 RX ORDER — CYCLOBENZAPRINE HCL 5 MG
TABLET ORAL
Qty: 90 TABLET | Refills: 0 | Status: SHIPPED | OUTPATIENT
Start: 2023-10-17 | End: 2023-11-30

## 2023-10-24 ENCOUNTER — RADIOLOGY INJECTION OFFICE VISIT (OUTPATIENT)
Dept: PALLIATIVE MEDICINE | Facility: CLINIC | Age: 28
End: 2023-10-24
Attending: NURSE PRACTITIONER
Payer: COMMERCIAL

## 2023-10-24 VITALS — OXYGEN SATURATION: 98 % | SYSTOLIC BLOOD PRESSURE: 124 MMHG | DIASTOLIC BLOOD PRESSURE: 87 MMHG | HEART RATE: 72 BPM

## 2023-10-24 DIAGNOSIS — M53.3 SI (SACROILIAC) JOINT DYSFUNCTION: ICD-10-CM

## 2023-10-24 DIAGNOSIS — M54.42 CHRONIC LEFT-SIDED LOW BACK PAIN WITH LEFT-SIDED SCIATICA: ICD-10-CM

## 2023-10-24 DIAGNOSIS — G89.29 CHRONIC LEFT-SIDED LOW BACK PAIN WITH LEFT-SIDED SCIATICA: ICD-10-CM

## 2023-10-24 PROCEDURE — 27096 INJECT SACROILIAC JOINT: CPT | Mod: LT | Performed by: PAIN MEDICINE

## 2023-10-24 RX ORDER — TRIAMCINOLONE ACETONIDE 40 MG/ML
40 INJECTION, SUSPENSION INTRA-ARTICULAR; INTRAMUSCULAR ONCE
Status: COMPLETED | OUTPATIENT
Start: 2023-10-24 | End: 2023-10-24

## 2023-10-24 RX ADMIN — TRIAMCINOLONE ACETONIDE 40 MG: 40 INJECTION, SUSPENSION INTRA-ARTICULAR; INTRAMUSCULAR at 14:12

## 2023-10-24 ASSESSMENT — PAIN SCALES - GENERAL
PAINLEVEL: MODERATE PAIN (5)
PAINLEVEL: SEVERE PAIN (7)

## 2023-10-24 NOTE — NURSING NOTE
Discharge Information    IV Discontiued Time:  NA    Amount of Fluid Infused:  NA    Discharge Criteria = When patient returns to baseline or as per MD order    Consciousness:  Pt is fully awake    Circulation:  BP +/- 20% of pre-procedure level    Respiration:  Patient is able to breathe deeply    O2 Sat:  Patient is able to maintain O2 Sat >92% on room air    Activity:  Moves 4 extremities on command    Ambulation:  Patient is able to stand and walk or stand and pivot into wheelchair    Dressing:  Clean/dry or No Dressing    Notes:   Discharge instructions and AVS given to patient    Patient meets criteria for discharge?  YES    Admitted to PCU?  No    Responsible adult present to accompany patient home?  Yes    Signature/Title:    courtney gordon RN  RN Care Coordinator  [facility]

## 2023-10-24 NOTE — NURSING NOTE
Pre-procedure Intake  If YES to any questions or NO to having a   Please complete laminated checklist and leave on the computer keyboard for Provider, verbally inform provider if able.    For SCS Trial, RFA's or any sedation procedure:  Have you been fasting? NA  If yes, for how long?     Are you taking any any blood thinners such as Coumadin, Warfarin, Jantoven, Pradaxa Xarelto, Eliquis, Edoxaban, Enoxaparin, Lovenox, Heparin, Arixtra, Fondaparinux, or Fragmin? OR Antiplatelet medication such as Plavix, Brilinta, or Effient?   No   If yes, when did you take your last dose?     Do you take aspirin?  No  If cervical procedure, have you held aspirin for 6 days?       Do you have any allergies to contrast dye, iodine, steroid and/or numbing medications?  NO    Are you currently taking antibiotics or have an active infection?  NO    Have you had a fever/elevated temperature within the past week? NO    Are you currently taking oral steroids? NO    Do you have a ? Yes    Are you pregnant or breastfeeding?  NO    Have you received the COVID-19 vaccine? Yes  If yes, was it your 1st, 2nd or only dose needed?   Date of most recent vaccine: 4/3/21    Notify provider and RNs if systolic BP >170, diastolic BP >100, P >100 or O2 sats < 90%

## 2023-10-24 NOTE — PROGRESS NOTES
Pre procedure Diagnosis: SI joint dysfunction    Post procedure Diagnosis: Same  Procedure performed: left SI joint injection  Anesthesia: none  Complications: none  Operators: Moi Gary MD     Indications:   Caprice Kline is a 28 year old female. The patient has a history of left upper buttocks pain. Other conservative treatments prior to injection include meds/pt/injections .    Options/alternatives, benefits and risks were discussed with the patient including bleeding, infection, tissue trauma, exposure to radiation, reaction to medications including seizure, nerve injury, weakness, and numbness.  Questions were answered to her satisfaction and she agrees to proceed. Voluntary informed consent was obtained and signed.     Vitals were reviewed: Yes  Allergies were reviewed:  Yes   Medications were reviewed:  Yes   Pre-procedure pain score: 7/10    Procedure:  After obtaining signed informed consent, the patient was brought into the procedure suite and was placed in a prone position on the procedure table.   A Pause for the Cause was performed.  The patient was prepped and draped in the usual sterile fashion.     After identifying the left SI joint, the C-arm was rotated obliquely to obtain the best view of the inferior angle of the joint.  Then 4 ml of Lidocaine 1%  was used to anesthetize the skin at a skin entry site coaxial with the fluoroscopy beam at this location.  A 22 gauge 3.5 inch needle was advanced under intermittent fluoroscopy until it was felt to enter the SI joint.  Aspiration was negative.    A total of 1ml of Omnipaque-300 was injected, confirming appropriate position, with spread into the intraarticular space, with no intravascular uptake noted.  9ml of Omnipaque was wasted. Location was verified in lateral view.    2 ml of 0.5% bupivacaine with 40mg of Kenalog was injected.  The needle was removed.     Hemostasis was achieved, the area was cleaned, and bandaids were placed when  appropriate.  The patient tolerated the procedure well, and was taken to the recovery room.  Images were saved to PACS.    Post-procedure pain score: 5/10  Follow-up includes:   -f/u phone call in one week  -f/u with referring Physician     Moi Gary MD  [facility]

## 2023-10-24 NOTE — PATIENT INSTRUCTIONS
[facility]   Procedure Discharge Instructions    Today you saw:  Dr. Moi Gary,           You had an:    sacroiliac joint injection        Be cautious when walking. Numbness and/or weakness in the lower extremities may occur for up to 6-8 hours after the procedure due to effect of the local anesthetic  Do not drive for 6 hours. The effect of the local anesthetic could slow your reflexes.   You may resume your regular activities after 24 hours  Avoid strenuous activity for the first 24 hours  You may shower, however avoid swimming, tub baths or hot tubs for 24 hours following your procedure  You may have a mild to moderate increase in pain for several days following the injection.  It may take up to 14 days for the steroid medication to start working although you may feel the effect as early as a few days after the procedure.     You may use ice packs for 10-15 minutes, 3 to 4 times a day at the injection site for comfort  Do not use heat to painful areas for 6 to 8 hours. This will give the local anesthetic time to wear off and prevent you from accidentally burning your skin.   Unless you have been directed to avoid the use of anti-inflammatory medications (NSAIDS), you may use medications such as ibuprofen, Aleve or Tylenol for pain control if needed.   If you were fasting, you may resume your normal diet and medications after the procedure  If you have diabetes, check your blood sugar more frequently than usual as your blood sugar may be higher than normal for 10-14 days following a steroid injection. Contact your doctor who manages your diabetes if your blood sugar is higher than usual  Possible side effects of steroids that you may experience include flushing, elevated blood pressure, increased appetite, mild headaches and restlessness.  All of these symptoms will get better with time.  If you experience any of the following, call the Pain Clinic during work hours (Mon-Friday  8-4:30 pm) at [phone] or the Provider Line after hours at [phone]:  -Fever over 100 degree F  -Swelling, bleeding, redness, drainage, warmth at the injection site  -Progressive weakness or numbness in your legs or arms  -Loss of bowel or bladder function  -Unusual headache that is not relieved by Tylenol or other pain reliever  -Unusual new onset of pain that is not improving

## 2023-11-10 NOTE — TELEPHONE ENCOUNTER
Action    Action Taken Images in PACS     SPINE PATIENTS - NEW PROTOCOL PREVISIT    RECORDS RECEIVED FROM: Internal   REASON FOR VISIT: Chronic left-sided low back pain with left-sided sciatica, Sacroiliac joint pain    Date of Appt: 12/12/2023   NOTES (FOR ALL VISITS) STATUS DETAILS   OFFICE NOTE from referring provider Internal 09/12/2023 Dr Adamson [facility]    OFFICE NOTE from other specialist N/A    DISCHARGE SUMMARY from hospital N/A    DISCHARGE REPORT from ER N/A    OPERATIVE REPORT Internal 08/24/2021 Minimally invasive left Lumbar 5 to Sacral 1 microdiscectomy    EMG REPORT N/A    MEDICATION LIST N/A    IMAGING  (FOR ALL VISITS)     MRI (HEAD, NECK, SPINE) Internal 02/28/2023 lumbar spine    XRAY (SPINE) *NEUROSURGERY* N/A    CT (HEAD, NECK, SPINE) Received 01/14/2023 lumbar spine

## 2023-11-30 DIAGNOSIS — M54.42 ACUTE BILATERAL LOW BACK PAIN WITH LEFT-SIDED SCIATICA: ICD-10-CM

## 2023-11-30 RX ORDER — CYCLOBENZAPRINE HCL 5 MG
TABLET ORAL
Qty: 90 TABLET | Refills: 0 | Status: SHIPPED | OUTPATIENT
Start: 2023-11-30 | End: 2023-12-18

## 2023-11-30 NOTE — PROGRESS NOTES
"    SUBJECTIVE:  HPI:  Caprice Kline  Is a 28 year old female who presents today for new patient evaluation of chronic low back pain with reported left sciatica upon referral from neurosurgical PA Harriet Adamson, whose 9/12/2023 office note records:  \"28F with a history of MIS left L5-S1 microdiscectomy with Dr. Junior on 8/24/2021.  Was doing well up until this winter when she had 2 falls on the ice.  She now reports left-sided low back pain which radiates to the left anterolateral thigh. She reports associated paresthesias.  No overt weakness.  No bowel or bladder incontinence.  She had a lumbar MRI in February without concerning findings.  She states pain continues to worsen.\".  She was neurologically intact.  Left SI joint injection and referral to me to evaluate for Pelvic Joint Dysfunction ordered.    Lumbar MRI 2/20/2023: Status post left L5-S1 decompression with no high-level stenosis or impingement.  10/24/2023 left SI joint injection: Pain score 7/10-5/10    History today:  Left SI joint injection 10/24/2023 (Dr. Gary): Pain scores 7/10-5/10.  Nontherapeutic.  Caprice says this was a \"all-around awful experience.  She reports that after her MRI decompression she could \"stand straight again\" and they left leg pain went away \"for a while\".  She has dysesthesia.  Left thigh and shin since last winter but no true sensory loss, no motor deficit, no bowel or bladder dysfunction or saddle anesthesia.  Right around September the pain started moving over the right posterior pelvis as well.    She had 2 falls last winter and she recalls landing hard on her left buttock at least and one of them.  The CT scan of her lumbar spine on 1/14/2023 was negative for fracture or significant stenosis.  The above-noted lumbar MRI was done February 2023.  Her family doctor has her on numerous medications including cyclobenzaprine 5 mg, and pregabalin 200 mg.  She does not like the side effects.    SYMPTOMS WORSENED WITH " "sitting with weight on her left pelvis.  \"Everything\" normal activities work etc.    SYMPTOMS IMPROVED WITH nothing in particular    Pain score and diagram reviewed.  See questionnaire.      ROS: .  Otherwise negative for bowel/bladder dysfunction, balance changes, headache, leg pain/numbness/weakness, fevers, chills, night sweats, unexplained weight loss;  otherwise unremarkable.   See the patient's intake questionnaire from today for details.    Treatment to Date: As above    MEDICATIONS:  Reviewed.    ALLERGIES:  Reviewed.     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  She is in a long-term relationship.  They have no children.  Sports hobbies and activities: Reading, watching TV, hanging out with her boyfriend.  Prior to her 2021 surgery, she played basketball and volleyball but not since.      OBJECTIVE:    IMAGING: Images and reports reviewed.    MR LUMBAR SPINE W/O & W CONTRAST  2/28/2023     COMPARISON:  MRI 7/13/2021, 9/27/2021                                                                  IMPRESSION:   1. L5-S1 hemilaminectomy, microdiscectomy changes. Left facet  periarticular, left epidural space granulation tissue formation  surrounding the left descending S1 within the lateral recess without  significant compression or abnormal nerve signal. Previous  postoperative fluid collection has resolved.  2. No substantial degenerative change at any other level within the  lumbar spine.       EXAMINATION:    --CONSTITUTIONAL:   She appears uncomfortable and sits unweighting her left buttock.  The patient is well nourished and well groomed.  Elevated BMI.  She rises with " "pushoff and moves stiffly and slowly about the room  --SKIN:  Skin over the face, bilateral lower extremities, and posterior torso is clean, dry, intact without rashes.  Left lower lumbar MIS scar well-healed and nontender  --GAIT:  is non-antalgic. Flat foot, heel and toe walking:  normal   .  Squat and rise    one quarter and very painful.  --STANDING EXAMINATION:    Symmetry of spine/pelvis   unremarkable   .      Range of motion forward flexion hands to mid shins with pain.  Extension 50% restricted and painful..   Standing flexion    positive left.    Evert's sign   negative    .     Stork test   negative    .  Positive left Evert finger sign.  --NEUROLOGICAL:    SENSATION to light touch is diminished over the left SI joint, Altargo present in the left anterior shin and thigh, otherwise normal in her legs and feet.    REFLEXES:  patellar++ right, 2+ left, and achilles 2+.  Babinski is negative. No clonus.  MANUAL MOTOR TESTING:  L1- S1 Myotomes, Femoral, Obturator, Peroneal and Tibial nerves 5/5 but she has a \"stuttering\" ability to resist me left greater than right leg with multiple myotomes, with pumping efforts rather than steady pressure.  DURAL STRETCH TESTS:  SLR negative right and on the left positive for left anterior thigh pain.  --PELVIC/HIP JOINTS:                Long Sitting    not done but left long supine.    Spring testing positive equally SI joints left cell L5 and L4.      PELVIC ALIGNMENT left inferior innominate shear.      --ABDOMINAL:  Non-distended.  Nontender  --VASCULAR: Lower extremity capillary refill, temperature and color normal.       Procedure note-OMT:  Manual medicine restore normal pelvic alignment.    She had marginally improved squatting ability.  Forward flexion was unchanged.  Better sensation to light touch in the left cluneal box area.  Standing flexion test became negative.  Still quite tender to spring test over both SI joints.  She said that her chiropractor had done " similar adjustments on her pelvis but none since her falls last winter.  I noticed she was not unweighting her left glut while sitting afterwards.  She said it felt a bit better to do that.  She also was limping last on her left leg with walking.      ASSESSMENT: Caprice Kline is a 28 year old female who presents  today for new patient evaluation of:    Chronic low back pain  Pelvic Joint Dysfunction manifesting as a left inferior innominate shear-indeterminate response to OMT  8/24/2021 left L5-S1 MIS discectomy/decompression  Sensory deficit in the left cluneal box-improved after OMT.  Altered sensation in her left anterior thigh and shin.      PLAN:  Pelvic stabilization PT.  Pelvic Joint Dysfunction self-correction each morning.  SI belt for 6 weeks when not lying down.  Follow-up in 4 months.  If we are successful in reducing her pain, I would like to see if we can get her off the cyclobenzaprine and pregabalin due to sedating side effects with minimal improvement in symptoms.    Advised patient to call or return early if symptoms worsen, or having problems controlling bladder and bowel function or worsening leg weakness.     Please note: Voice recognition software was used in this dictation.  It may therefore contain typographical errors.    Jeffery Webster MD

## 2023-12-12 ENCOUNTER — PRE VISIT (OUTPATIENT)
Dept: NEUROSURGERY | Facility: CLINIC | Age: 28
End: 2023-12-12

## 2023-12-12 ENCOUNTER — OFFICE VISIT (OUTPATIENT)
Dept: NEUROSURGERY | Facility: CLINIC | Age: 28
End: 2023-12-12
Attending: NURSE PRACTITIONER
Payer: COMMERCIAL

## 2023-12-12 VITALS
HEART RATE: 89 BPM | DIASTOLIC BLOOD PRESSURE: 84 MMHG | SYSTOLIC BLOOD PRESSURE: 122 MMHG | WEIGHT: 191 LBS | BODY MASS INDEX: 28.21 KG/M2

## 2023-12-12 DIAGNOSIS — M54.42 CHRONIC LEFT-SIDED LOW BACK PAIN WITH LEFT-SIDED SCIATICA: ICD-10-CM

## 2023-12-12 DIAGNOSIS — M53.3 SACROILIAC JOINT PAIN: ICD-10-CM

## 2023-12-12 DIAGNOSIS — G89.29 CHRONIC LEFT-SIDED LOW BACK PAIN WITH LEFT-SIDED SCIATICA: ICD-10-CM

## 2023-12-12 PROCEDURE — 99204 OFFICE O/P NEW MOD 45 MIN: CPT | Mod: 25 | Performed by: PREVENTIVE MEDICINE

## 2023-12-12 PROCEDURE — 98925 OSTEOPATH MANJ 1-2 REGIONS: CPT | Performed by: PREVENTIVE MEDICINE

## 2023-12-12 RX ORDER — LISDEXAMFETAMINE DIMESYLATE 70 MG/1
CAPSULE ORAL
COMMUNITY
Start: 2023-12-04 | End: 2024-06-25 | Stop reason: ALTCHOICE

## 2023-12-12 NOTE — LETTER
"    12/12/2023         RE: Caprice Kline  [address] [facility] [address]        Dear Colleague,    Thank you for referring your patient, Caprice Kline, to the [facility] [place]. Please see a copy of my visit note below.        SUBJECTIVE:  HPI:  Caprice Kline  Is a 28 year old female who presents today for new patient evaluation of chronic low back pain with reported left sciatica upon referral from neurosurgical PA Harriet Adamson, whose 9/12/2023 office note records:  \"28F with a history of MIS left L5-S1 microdiscectomy with Dr. Junior on 8/24/2021.  Was doing well up until this winter when she had 2 falls on the ice.  She now reports left-sided low back pain which radiates to the left anterolateral thigh. She reports associated paresthesias.  No overt weakness.  No bowel or bladder incontinence.  She had a lumbar MRI in February without concerning findings.  She states pain continues to worsen.\".  She was neurologically intact.  Left SI joint injection and referral to me to evaluate for Pelvic Joint Dysfunction ordered.    Lumbar MRI 2/20/2023: Status post left L5-S1 decompression with no high-level stenosis or impingement.  10/24/2023 left SI joint injection: Pain score 7/10-5/10    History today:  Left SI joint injection 10/24/2023 (Dr. Gary): Pain scores 7/10-5/10.  Nontherapeutic.  Caprice says this was a \"all-around awful experience.  She reports that after her MRI decompression she could \"stand straight again\" and they left leg pain went away \"for a while\".  She has dysesthesia.  Left thigh and shin since last winter but no true sensory loss, no motor deficit, no bowel or bladder dysfunction or saddle anesthesia.  Right around September the pain started moving over the right posterior pelvis as well.    She had 2 falls last winter and she recalls landing hard on her left buttock at least and one of them.  The CT scan of her lumbar spine on 1/14/2023 was negative " "for fracture or significant stenosis.  The above-noted lumbar MRI was done February 2023.  She is working with nurse practitioner Sheeba Fernandez in the [facility] who has her on gabapentin with escalating doses, cyclobenzaprine and baclofen.  She does not like them because of side effects.      SYMPTOMS WORSENED WITH sitting with weight on her left pelvis.  \"Everything\" normal activities work etc.    SYMPTOMS IMPROVED WITH nothing in particular    Pain score and diagram reviewed.  See questionnaire.      ROS: .  Otherwise negative for bowel/bladder dysfunction, balance changes, headache, leg pain/numbness/weakness, fevers, chills, night sweats, unexplained weight loss;  otherwise unremarkable.   See the patient's intake questionnaire from today for details.    Treatment to Date: As above    MEDICATIONS:  Reviewed.    ALLERGIES:  Reviewed.     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  She is in a long-term relationship.  They have no children.  Sports hobbies and activities: Reading, watching TV, hanging out with her boyfriend.  Prior to her 2021 surgery, she played basketball and volleyball but not since.      OBJECTIVE:    IMAGING: Images and reports reviewed.    MR LUMBAR SPINE W/O & W CONTRAST  2/28/2023     COMPARISON:  MRI 7/13/2021, 9/27/2021                                                                  IMPRESSION:   1. L5-S1 hemilaminectomy, microdiscectomy changes. Left facet  periarticular, left epidural space granulation tissue formation  surrounding the left descending S1 within the lateral recess without  significant compression or abnormal nerve signal. " "Previous  postoperative fluid collection has resolved.  2. No substantial degenerative change at any other level within the  lumbar spine.       EXAMINATION:    --CONSTITUTIONAL:   She appears uncomfortable and sits unweighting her left buttock.  The patient is well nourished and well groomed.  Elevated BMI.  She rises with pushoff and moves stiffly and slowly about the room  --SKIN:  Skin over the face, bilateral lower extremities, and posterior torso is clean, dry, intact without rashes.  Left lower lumbar MIS scar well-healed and nontender  --GAIT:  is non-antalgic. Flat foot, heel and toe walking:  normal   .  Squat and rise    one quarter and very painful.  --STANDING EXAMINATION:    Symmetry of spine/pelvis   unremarkable   .      Range of motion forward flexion hands to mid shins with pain.  Extension 50% restricted and painful..   Standing flexion    positive left.    Evert's sign   negative    .     Stork test   negative    .  Positive left Evert finger sign.  --NEUROLOGICAL:    SENSATION to light touch is diminished over the left SI joint, Altargo present in the left anterior shin and thigh, otherwise normal in her legs and feet.    REFLEXES:  patellar++ right, 2+ left, and achilles 2+.  Babinski is negative. No clonus.  MANUAL MOTOR TESTING:  L1- S1 Myotomes, Femoral, Obturator, Peroneal and Tibial nerves 5/5 but she has a \"stuttering\" ability to resist me left greater than right leg with multiple myotomes, with pumping efforts rather than steady pressure.  DURAL STRETCH TESTS:  SLR negative right and on the left positive for left anterior thigh pain.  --PELVIC/HIP JOINTS:                Long Sitting    not done but left long supine.    Spring testing positive equally SI joints left cell L5 and L4.      PELVIC ALIGNMENT left inferior innominate shear.      --ABDOMINAL:  Non-distended.  Nontender  --VASCULAR: Lower extremity capillary refill, temperature and color normal.       Procedure " note-OMT:  Manual medicine restore normal pelvic alignment.    She had marginally improved squatting ability.  Forward flexion was unchanged.  Better sensation to light touch in the left cluneal box area.  Standing flexion test became negative.  Still quite tender to spring test over both SI joints.  She said that her chiropractor had done similar adjustments on her pelvis but none since her falls last winter.  I noticed she was not unweighting her left glut while sitting afterwards.  She said it felt a bit better to do that.  She also was limping last on her left leg with walking.      ASSESSMENT: Caprice Kline is a 28 year old female who presents  today for new patient evaluation of:    Chronic low back pain  Pelvic Joint Dysfunction manifesting as a left inferior innominate shear-indeterminate response to OMT  8/24/2021 left L5-S1 MIS discectomy/decompression  Sensory deficit in the left cluneal box-improved after OMT.  Altered sensation in her left anterior thigh and shin.      PLAN:  Pelvic stabilization PT.  Pelvic Joint Dysfunction self-correction each morning.  SI belt for 6 weeks when not lying down.  Follow-up in 4 months.  If we are successful in reducing her pain, I would like to see if we can get her off the baclofen and cyclobenzaprine and gabapentin due to sedating side effects with minimal improvement in symptoms.    Advised patient to call or return early if symptoms worsen, or having problems controlling bladder and bowel function or worsening leg weakness.     Please note: Voice recognition software was used in this dictation.  It may therefore contain typographical errors.    Jeffery Webster MD             Again, thank you for allowing me to participate in the care of your patient.        Sincerely,        Jeffery Webster MD

## 2023-12-12 NOTE — PATIENT INSTRUCTIONS
"Caprice I am glad you came in so we could address the Pelvic Joint Dysfunction that I confirmed today.  It is a little unclear how you going to respond but we have to take this slowly.  I do want you to buy and wear an SI belt for 6 weeks when you are not lying down and if your therapist continues to have to adjust your pelvis, you may need to wear it longer.  I would prefer to see you back here in 4 months and hope you have a happy holiday season.  See the assessment and plan below for further details of our discussion today, and also see the Pelvic Joint Dysfunction self-correction instructions below.    ASSESSMENT: Caprice Kline is a 28 year old female who presents  today for new patient evaluation of:    Chronic low back pain  Pelvic Joint Dysfunction manifesting as a left inferior innominate shear-indeterminate response to OMT  8/24/2021 left L5-S1 MIS discectomy/decompression  Sensory deficit in the left cluneal box-improved after OMT.  Altered sensation in her left anterior thigh and shin.      PLAN:  Pelvic stabilization PT.  Pelvic Joint Dysfunction self-correction each morning.  SI belt for 6 weeks when not lying down.  Follow-up in 4 months.        PELVIC JOINT SELF CORRECTION EXERCISES      It is best to do this first thing in the morning, and can be repeated once or twice during the day.    \"SHOTGUN\" TECHNIQUE:  This loosens up the front and back of the pelvis.  Do this before the Broomstick exercise.  Use on object such as a rectangular laundry basket.  Lie on your back with your knees bent, feet together and flat on the floor.    Spread your knees approximately 12-24 inches around the outside of an upright laundry basket.  Squeeze your knees together, breathing as you do this.    Concentrate on keeping your buttocks relaxed and on the ground.    A brief discomfort in the front of the pelvis and even a popping sound is normal.  Hold the squeeze for 3-5 seconds.    Relax for 3-5 seconds.    Repeat 2 " more times.    Now reverse your knee position to the inside of the upside down laundry basket while still lying with your knees bent up, feet on the ground.  Pull your knees apart, breathing easy as you do this.  Hold the squeeze for 3-5 seconds.    Relax for 3-5 seconds.    Repeat 2 more times.    BROOMSTICK EXERCISE:  Lie on your back with your knees bent.  Slide a broomstick or similar object above one knee, and below the opposite knee.  Firmly hold the stick with your hands will bringing your knees closed to your belly, preferably high enough that your back can rest flat on the ground.  Still holding the stick, scissors your legs against the stick, breathing as you do this.    (Push down to your foot with leg on top of the broomstick, and lift up to your chin with the leg below the broomstick).  Hold the squeeze for 3-5 seconds.    Relax for 3-5 seconds.    Repeat 2 more times.  Switch the position of the broomstick above the other knee, and below the first knee.  Repeat the exercise as above in this new position.

## 2023-12-13 ENCOUNTER — MYC MEDICAL ADVICE (OUTPATIENT)
Dept: NEUROSURGERY | Facility: CLINIC | Age: 28
End: 2023-12-13
Payer: COMMERCIAL

## 2023-12-14 NOTE — TELEPHONE ENCOUNTER
Called and spoke with patient. Relayed the following information from Dr. Webster:    I have addended my note reflecting this corrected information.  I will have her talk to her family doctor about any medication management given of the variety and number of medications she is on.  It is best to have 1 doctor managing all her medications.  I did not appreciate any significant muscle spasm on exam.  Please give her a call and inform her.  Thank you     Patient stated understanding and had no further questions at this time.

## 2023-12-17 ASSESSMENT — PATIENT HEALTH QUESTIONNAIRE - PHQ9
SUM OF ALL RESPONSES TO PHQ QUESTIONS 1-9: 14
10. IF YOU CHECKED OFF ANY PROBLEMS, HOW DIFFICULT HAVE THESE PROBLEMS MADE IT FOR YOU TO DO YOUR WORK, TAKE CARE OF THINGS AT HOME, OR GET ALONG WITH OTHER PEOPLE: VERY DIFFICULT
SUM OF ALL RESPONSES TO PHQ QUESTIONS 1-9: 14

## 2023-12-18 ENCOUNTER — VIRTUAL VISIT (OUTPATIENT)
Dept: FAMILY MEDICINE | Facility: OTHER | Age: 28
End: 2023-12-18
Payer: COMMERCIAL

## 2023-12-18 DIAGNOSIS — M54.42 ACUTE BILATERAL LOW BACK PAIN WITH LEFT-SIDED SCIATICA: Primary | ICD-10-CM

## 2023-12-18 PROCEDURE — 99214 OFFICE O/P EST MOD 30 MIN: CPT | Mod: VID | Performed by: PHYSICIAN ASSISTANT

## 2023-12-18 RX ORDER — MELOXICAM 7.5 MG/1
7.5-15 TABLET ORAL DAILY
Qty: 90 TABLET | Refills: 0 | Status: SHIPPED | OUTPATIENT
Start: 2023-12-18 | End: 2024-01-05

## 2023-12-18 RX ORDER — METHOCARBAMOL 500 MG/1
500 TABLET, FILM COATED ORAL 3 TIMES DAILY PRN
Qty: 90 TABLET | Refills: 1 | Status: SHIPPED | OUTPATIENT
Start: 2023-12-18 | End: 2024-01-05 | Stop reason: DRUGHIGH

## 2023-12-18 NOTE — COMMUNITY RESOURCES LIST (ENGLISH)
12/18/2023   [facility]  N/A  For questions about this resource list or additional care needs, please contact your primary care clinic or care manager.  Phone: [phone]   Email: N/A   Address: [address] [place] [address]   Hours: N/A        Transportation       Free or low-cost transportation  1  [employer] Distance: 11.49 miles      In-Person   [address] [place] [address]  Language: English  Hours: Mon - Fri 6:00 AM - 6:00 PM  Fees: Insurance, Self Pay   Phone: (526) 949-8749 Email: info@indidebt Website: http://www.[employer]     2  [facility] - [employer] - Free or low-cost transportation Distance: 15.91 miles      In-Person   [address] [place] [address]  Language: English  Hours: Tue 9:30 AM - 11:30 AM , Thu 9:30 AM - 11:30 AM  Fees: Free   Phone: (717) 827-3723 Email: info@[facility].HTP Website: http://www.HeadSprout/     Transportation to medical appointments  3  [facility]  [employer] ([employer]) Distance: 8.9 miles      In-Person   [address] [place] [address]  Language: Irish, Sinhala, English, Gujarati, Duc, Bulgarian, Yoruba, Korean, Ukrainian, Polish  Hours: Mon - Wed 9:00 AM - 5:00 PM , Thu 12:00 PM - 6:00 PM , Fri 9:00 AM - 5:00 PM , Sun 10:30 AM - 2:00 PM Appt. Only  Fees: Free   Phone: (894) 238-1275 Email: info@sewa-aifw.org Website: https://www.sewa-aifw.org/     4  [employer] Distance: 9.44 miles      In-Person   [address] [facility] [address] [place] [address]  Language: English  Hours: Mon - Sat 4:00 AM - 6:00 PM  Fees: Insurance, Self Pay   Phone: (827) 448-3301 Email: [email or web address] Website: [email or web address]/[email or web address]?returnUrl=%2FHelpMeConnect%2FSearch%2FBasicNeeds%2FTransportation%2FTransportationServices%3Fstart%3D40          Important  Numbers & Websites       Emergency Services   911  [facility]   311  Poison Control   (835) 422-9547  Suicide Prevention Lifeline   (835) 945-8630 (TALK)  Child Abuse Hotline   (814) 474-2918 (4-A-Child)  Sexual Assault Hotline   (984) 322-4646 (HOPE)  National Runaway Safeline   (576) 474-7622 (RUNAWAY)  All-Options Talkline   (984) 125-7664  Substance Abuse Referral   (961) 420-5966 (HELP)

## 2023-12-18 NOTE — PROGRESS NOTES
"    Instructions Relayed to Patient by Virtual Roomer:     Patient is active on [email or web address]:   Relayed following to patient: \"It looks like you are active on [email or web address], are you able to join the visit this way? If not, do you need us to send you a link now or would you like your provider to send a link via text or email when they are ready to initiate the visit?\"    Reminded patient to ensure they were logged on to virtual visit by arrival time listed. Documented in appointment notes if patient had flexibility to initiate visit sooner than arrival time. If pediatric virtual visit, ensured pediatric patient along with parent/guardian will be present for video visit.     Patient offered the website [email or web address] and/or phone number to wutabout Help line: [phone]    Caprice is a 28 year old who is being evaluated via a billable video visit.      How would you like to obtain your AVS? Webtalk  If the video visit is dropped, the invitation should be resent by: Text to cell phone: [phone]  Will anyone else be joining your video visit? No      Assessment & Plan     Acute bilateral low back pain with left-sided sciatica  We are going to stop the Cyclobenzaprine as this doesn't seem to be helping her anymore.   She is going to ask her Psychiatrist about adding on Duloxetine would start at 20 mg BID and titrate as tolerating  Would NOT stop her pregabalin at this point as she notes it is more for her anxiety.   We will switch her muscle relaxer to Methocarbamol, starting at 500 mg TID as needed and if needed can increase to 750 mg if tolerating.   Will start her on Meloxicam and see if this helps more, again starting at 7.5 mg and can titrate up to 15 mg if tolerating. There is a drug interaction with her lithium as the effects of lithium could be increased so she will monitor for side effects of this which were discussed and if any concerns for this interaction we can discontinue meloxicam and see if " there are other NSAIDs we could use or trial.   Consider pain clinic in the future as well.   - meloxicam (MOBIC) 7.5 MG tablet; Take 1-2 tablets (7.5-15 mg) by mouth daily  - methocarbamol (ROBAXIN) 500 MG tablet; Take 1 tablet (500 mg) by mouth 3 times daily as needed for muscle spasms      Options for treatment and follow-up care were reviewed with the patient and/or guardian. Patient and/or guardian engaged in the decision making process and verbalized understanding of the options discussed and agreed with the final plan.      Junior Roe PA-C  [facility] ROBERT Vasques is a 28 year old, presenting for the following health issues:  Pain Management and Recheck Medication        12/18/2023    10:12 AM   Additional Questions   Roomed by Maik EASTON   Accompanied by Self         12/18/2023    10:12 AM   Patient Reported Additional Medications   Patient reports taking the following new medications None       History of Present Illness       Back Pain:  She presents for follow up of back pain. Patient's back pain is a chronic problem.  Location of back pain:  Right lower back, left lower back, left buttock, right hip, left hip, right side of waist and left side of waist  Description of back pain: burning, cramping, sharp, shooting and stabbing  Back pain spreads: right buttocks, left buttocks, left thigh and left knee    Since patient first noticed back pain, pain is: rapidly worsening  Does back pain interfere with her job:  Yes       She eats 2-3 servings of fruits and vegetables daily.She consumes 2 sweetened beverage(s) daily.She exercises with enough effort to increase her heart rate 20 to 29 minutes per day.  She exercises with enough effort to increase her heart rate 4 days per week.   She is taking medications regularly.       Medication Followup of Cyclobenzaprine  Taking Medication as prescribed: yes  Side Effects:  None  Medication Helping Symptoms:  NO    Pain  "History:  When did you first notice your pain? 2018 or 2019, Back surgery 2021, A little under a year after it started hurting again   Have you seen this provider for your pain in the past? Yes   Where in your body do you have pain? Back and Hip Pain  Are you seeing anyone else for your pain? No        8/24/2023     2:45 PM 9/27/2023     1:33 PM 12/17/2023     8:16 PM   PHQ-9 SCORE   PHQ-9 Total Score MyChart  19 (Moderately severe depression) 14 (Moderate depression)   PHQ-9 Total Score 19 19 14           2/23/2022    10:46 AM 2/7/2023     7:32 AM 7/26/2023     3:34 PM   AKI-7 SCORE   Total Score 21 (severe anxiety)  21 (severe anxiety)   Total Score 21 21 21           12/18/2023    10:11 AM   PEG Score   PEG Total Score 7.67       Chronic Pain Follow Up:    - Pain is getting worse.   - Her pain has been more in the left hip region with numbness going down the leg. This started over 2 months ago.   - Her right side is now starting to hurt as well   - Is working with Dr. Webster, he did some manipulations. She tried a steroid injection prior to seeing him and that was \"awful\".   - She has been doing physical therapy, she is going to be doing manipulation every week.   - She will see Dr. Webster again in March.   - Per Caprice she feels like the cyclobenzaprine is not working well anymore.   - She is not on pregabalin for pain she is on it for anxiety.  No recent adjustments for this.  She started to take it 4 times per day and went down in dosing so taking 550 mg per day total instead of 600 mg.  That adjustment was about 1-2 months ago.   - Has not been seen by Pain clinics.   - Seeing psychiatrist every 2 weeks for the last couple of months to adjust dose for Vyvanse. Rest of her medications have been stable.  Mental health is relatively stable.  She is working.    PDMP Review         Value Time User    State PDMP site checked  Yes 8/10/2021  7:58 PM Junior Roe PA-C          Last CSA Agreement:   CSA -- Patient Level: "    CSA: None found at the patient level.         Review of Systems   Constitutional, psych, msk, skin, neuro systems are negative, except as otherwise noted.      Objective           Vitals:  No vitals were obtained today due to virtual visit.    Physical Exam   GENERAL: Healthy, alert and no distress  EYES: Eyes grossly normal to inspection.  No discharge or erythema, or obvious scleral/conjunctival abnormalities.  RESP: No audible wheeze, cough, or visible cyanosis.  No visible retractions or increased work of breathing.    SKIN: Visible skin clear. No significant rash, abnormal pigmentation or lesions.  NEURO: Cranial nerves grossly intact.  Mentation and speech appropriate for age.  PSYCH: Mentation appears normal, affect normal/bright, judgement and insight intact, normal speech and appearance well-groomed.          Video-Visit Details    Type of service:  Video Visit   Video Start Time: 10:19 AM  Video End Time:10:29 AM    Originating Location (pt. Location): Home    Distant Location (provider location):  Off-site  Platform used for Video Visit: MashupsWell

## 2023-12-21 ENCOUNTER — THERAPY VISIT (OUTPATIENT)
Dept: PHYSICAL THERAPY | Facility: CLINIC | Age: 28
End: 2023-12-21
Attending: PREVENTIVE MEDICINE
Payer: COMMERCIAL

## 2023-12-21 DIAGNOSIS — G89.29 CHRONIC LEFT-SIDED LOW BACK PAIN WITH LEFT-SIDED SCIATICA: ICD-10-CM

## 2023-12-21 DIAGNOSIS — M54.42 CHRONIC LEFT-SIDED LOW BACK PAIN WITH LEFT-SIDED SCIATICA: ICD-10-CM

## 2023-12-21 DIAGNOSIS — M53.3 SACROILIAC JOINT PAIN: ICD-10-CM

## 2023-12-21 PROCEDURE — 97161 PT EVAL LOW COMPLEX 20 MIN: CPT | Mod: GP

## 2023-12-21 PROCEDURE — 97110 THERAPEUTIC EXERCISES: CPT | Mod: GP

## 2023-12-21 NOTE — PROGRESS NOTES
"PHYSICAL THERAPY EVALUATION  Type of Visit: Evaluation    See electronic medical record for Abuse and Falls Screening details.    Subjective       Presenting condition or subjective complaint: Lower back and hip pain  Date of onset: 12/12/23    Relevant medical history: Depression; Mental Illness; Migraines or headaches; Numbness or tingling in perianal area; Pain at night or rest; Severe headaches; Sleep disorder like apnea; Vision problems   Dates & types of surgery: Oophorechtomy 2011? 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  She has had numbness going down her leg a lot.  She reports that there are times where it's so bad she needs helping getting up and transferring.  She reports it has started moving to the right side as well. She says yesterday when she was leaving work both of her legs were numb. She reports her back hurts. She says her pain was comparable to prior to her micro-disctomy just prior to the procedure. She reports the micro-discectomy helped a little bit and she was able to stand up straight over the year.  However the pain returned, so she got another MRI which showed \"fluid\" in there.  Now she " just deals with the pain.  She reports that he assessed her low back it hurt and reports a lack of sensation during some of the assessment. After the OMT performed by Dr. Webster, she reports only minimal and mild relief for only 1-2 hours. She reports getting no relief from an SI joint injection and the procedure was awful. Reports doing PT in the past including squats, lunges and step ups without success.     Patient goals for therapy: Be able to live without pain    Pain assessment:      Objective   LUMBAR SPINE EVALUATION  PAIN: Pain Level at Rest: 4/10  Pain Level with Use: 9/10  Pain is Exacerbated By: Prolonged Sitting and Bending Forward. Transfers.  Pain is Relieved By: none  Pain Progression: Worsened  INTEGUMENTARY (edema, incisions):   POSTURE:   GAIT:   Weightbearing Status:   Assistive Device(s):   Gait Deviations:   BALANCE/PROPRIOCEPTION:   WEIGHTBEARING ALIGNMENT:   NON-WEIGHTBEARING ALIGNMENT:    ROM:  Lumbar Flexion: Limited to shins bilaterally, Lumbar Extension: Mild limitation  PELVIC/SI SCREEN:   STRENGTH:  Left Hip Flexion: 4+/5, Right Hip Flexion 4+/5 and painful, Left knee Extension: 4/5, Right Knee Extension: 4+/5, Left Knee Flexion: 4+/5, Right Knee Flexion 4+/5. Ankle DF Bilaterally: 4+/5    MYOTOMES:  Left weaker than right but no myotomal specific strength loss  DTR S:   CORD SIGNS:   DERMATOMES:   NEURAL TENSION:  Positive Slump on the Left  FLEXIBILITY:   LUMBAR/HIP Special Tests:    PELVIS/SI SPECIAL TESTS:   FUNCTIONAL TESTS:   PALPATION:   SPINAL SEGMENTAL CONCLUSIONS:       Assessment & Plan   CLINICAL IMPRESSIONS  Medical Diagnosis: Low Back Pain with Left Sided Sciatica and Sacroiliac Joint Pain    Treatment Diagnosis: Low Back Pain with Left Sciatica and PJD   Impression/Assessment: Patient is a 28 year old female with Chronic Low Back Pain complaints.  The following significant findings have been identified: Pain, Decreased ROM/flexibility, and Decreased strength. These  impairments interfere with their ability to perform self care tasks, work tasks, recreational activities, household chores, driving , household mobility, and community mobility as compared to previous level of function.     Clinical Decision Making (Complexity):  Clinical Presentation: Stable/Uncomplicated  Clinical Presentation Rationale: based on medical and personal factors listed in PT evaluation  Clinical Decision Making (Complexity): Low complexity    PLAN OF CARE  Treatment Interventions:  Interventions: Manual Therapy, Neuromuscular Re-education, Therapeutic Activity, Therapeutic Exercise    Long Term Goals     PT Goal 1  Goal Identifier: Standing, Sitting and Transfers  Goal Description: Will be able to stand for 2 hours, sit for 2 hours and transfer from all surfaces without back pain worse than 5/10  Rationale: to maximize safety and independence with performance of ADLs and functional tasks;to maximize safety and independence within the home;to maximize safety and independence within the community;to maximize safety and independence with transportation;to maximize safety and independence with self cares  Goal Progress: Currently has pain upwards of 9/10 with prolonged standing, sitting and transfers  Target Date: 03/14/24      Frequency of Treatment: 1x a week  Duration of Treatment: 12 weeks    Recommended Referrals to Other Professionals:   Education Assessment:        Risks and benefits of evaluation/treatment have been explained.   Patient/Family/caregiver agrees with Plan of Care.     Evaluation Time:     PT Eval, Low Complexity Minutes (04183): 30       Signing Clinician: JAYLYN Arias [facility]                                                                                   OUTPATIENT PHYSICAL THERAPY      PLAN OF TREATMENT FOR OUTPATIENT REHABILITATION   Patient's Last Name, First Name, Caprice Lange YOB: 1995   Provider's Name   [facility]   Medical Record No.  [id number]     Onset Date: 12/12/23  Start of Care Date: 12/21/23     Medical Diagnosis:  Low Back Pain with Left Sided Sciatica and Sacroiliac Joint Pain      PT Treatment Diagnosis:  Low Back Pain with Left Sciatica and PJD Plan of Treatment  Frequency/Duration: 1x a week/ 12 weeks    Certification date from 12/21/23 to 03/14/24         See note for plan of treatment details and functional goals     John Santoro, PT                         I CERTIFY THE NEED FOR THESE SERVICES FURNISHED UNDER        THIS PLAN OF TREATMENT AND WHILE UNDER MY CARE     (Physician attestation of this document indicates review and certification of the therapy plan).              Referring Provider:  Jeffery Webster MD    Initial Assessment  See Epic Evaluation- Start of Care Date: 12/21/23

## 2024-01-05 ENCOUNTER — VIRTUAL VISIT (OUTPATIENT)
Dept: FAMILY MEDICINE | Facility: OTHER | Age: 29
End: 2024-01-05
Payer: COMMERCIAL

## 2024-01-05 DIAGNOSIS — M54.42 ACUTE BILATERAL LOW BACK PAIN WITH LEFT-SIDED SCIATICA: Primary | ICD-10-CM

## 2024-01-05 PROBLEM — F10.20 ALCOHOL USE DISORDER, MODERATE, DEPENDENCE (H): Status: RESOLVED | Noted: 2023-02-07 | Resolved: 2024-01-05

## 2024-01-05 PROCEDURE — 99213 OFFICE O/P EST LOW 20 MIN: CPT | Mod: 95 | Performed by: PHYSICIAN ASSISTANT

## 2024-01-05 RX ORDER — MELOXICAM 7.5 MG/1
7.5-15 TABLET ORAL DAILY
Qty: 180 TABLET | Refills: 0 | Status: SHIPPED | OUTPATIENT
Start: 2024-01-05 | End: 2024-02-28

## 2024-01-05 RX ORDER — METHOCARBAMOL 750 MG/1
750 TABLET, FILM COATED ORAL 3 TIMES DAILY
Qty: 90 TABLET | Refills: 1 | Status: SHIPPED | OUTPATIENT
Start: 2024-01-05 | End: 2024-02-28

## 2024-01-05 ASSESSMENT — PATIENT HEALTH QUESTIONNAIRE - PHQ9
10. IF YOU CHECKED OFF ANY PROBLEMS, HOW DIFFICULT HAVE THESE PROBLEMS MADE IT FOR YOU TO DO YOUR WORK, TAKE CARE OF THINGS AT HOME, OR GET ALONG WITH OTHER PEOPLE: SOMEWHAT DIFFICULT
SUM OF ALL RESPONSES TO PHQ QUESTIONS 1-9: 12
SUM OF ALL RESPONSES TO PHQ QUESTIONS 1-9: 12

## 2024-01-05 NOTE — PROGRESS NOTES
"Caprice is a 28 year old who is being evaluated via a billable video visit.      How would you like to obtain your AVS? MyChart  If the video visit is dropped, the invitation should be resent by: Text to cell phone: [phone]  Will anyone else be joining your video visit? No      Assessment & Plan     Acute bilateral low back pain with left-sided sciatica  Maybe some slight improvement.   Will increase dose of Methocarbamol to 750 mg per dose  Will have her continue on total 15 mg daily of Meloxicam.   - methocarbamol (ROBAXIN) 750 MG tablet; Take 1 tablet (750 mg) by mouth 3 times daily  - meloxicam (MOBIC) 7.5 MG tablet; Take 1-2 tablets (7.5-15 mg) by mouth daily    Follow-up in the next 3 months, sooner if needed.     Options for treatment and follow-up care were reviewed with the patient and/or guardian. Patient and/or guardian engaged in the decision making process and verbalized understanding of the options discussed and agreed with the final plan.     Junior Roe PA-C  [facility]    Subjective   Caprice is a 28 year old, presenting for the following health issues:  No chief complaint on file.      HPI     - Still dealing with her pain. Had severe pain the other night that was the worst it has been in a long time. But likely due to having to do more physical activity than normal.   - No side effects with the medications  - Maybe slight improvement - not as \"full feeling\".  For a while after the methocarbamol she can tell that it is relieving some pain.       Review of Systems   Constitutional, HEENT, cardiovascular, pulmonary, GI, , musculoskeletal, neuro, skin, endocrine and psych systems are negative, except as otherwise noted.      Objective           Vitals:  No vitals were obtained today due to virtual visit.    Physical Exam   GENERAL: Healthy, alert and no distress  EYES: Eyes grossly normal to inspection.  No discharge or erythema, or obvious scleral/conjunctival " abnormalities.  RESP: No audible wheeze, cough, or visible cyanosis.  No visible retractions or increased work of breathing.    SKIN: Visible skin clear. No significant rash, abnormal pigmentation or lesions.  NEURO: Cranial nerves grossly intact.  Mentation and speech appropriate for age.  PSYCH: Mentation appears normal, affect normal/bright, judgement and insight intact, normal speech and appearance well-groomed.            Video-Visit Details    Type of service:  Video Visit   Video Start Time: 1:48 PM  Video End Time:1:52 PM    Originating Location (pt. Location): Home    Distant Location (provider location):  Off-site  Platform used for Video Visit: Sendbloom

## 2024-01-30 DIAGNOSIS — G43.909 MIGRAINE WITHOUT STATUS MIGRAINOSUS, NOT INTRACTABLE, UNSPECIFIED MIGRAINE TYPE: ICD-10-CM

## 2024-01-30 RX ORDER — RIZATRIPTAN BENZOATE 5 MG/1
TABLET ORAL
Qty: 12 TABLET | Refills: 1 | Status: SHIPPED | OUTPATIENT
Start: 2024-01-30 | End: 2024-04-29

## 2024-02-08 ENCOUNTER — OFFICE VISIT (OUTPATIENT)
Dept: URGENT CARE | Facility: URGENT CARE | Age: 29
End: 2024-02-08
Payer: COMMERCIAL

## 2024-02-08 VITALS
HEART RATE: 79 BPM | DIASTOLIC BLOOD PRESSURE: 87 MMHG | SYSTOLIC BLOOD PRESSURE: 127 MMHG | TEMPERATURE: 99.3 F | RESPIRATION RATE: 22 BRPM | WEIGHT: 203 LBS | BODY MASS INDEX: 29.98 KG/M2 | OXYGEN SATURATION: 96 %

## 2024-02-08 DIAGNOSIS — H57.89 EYE SWELLING, RIGHT: Primary | ICD-10-CM

## 2024-02-08 PROCEDURE — 99207 PR NO CHARGE LOS: CPT | Performed by: NURSE PRACTITIONER

## 2024-02-13 ENCOUNTER — OFFICE VISIT (OUTPATIENT)
Dept: FAMILY MEDICINE | Facility: OTHER | Age: 29
End: 2024-02-13
Attending: PHYSICIAN ASSISTANT
Payer: COMMERCIAL

## 2024-02-13 VITALS
WEIGHT: 203.5 LBS | SYSTOLIC BLOOD PRESSURE: 106 MMHG | HEIGHT: 68 IN | OXYGEN SATURATION: 98 % | HEART RATE: 85 BPM | RESPIRATION RATE: 16 BRPM | BODY MASS INDEX: 30.84 KG/M2 | TEMPERATURE: 98.8 F | DIASTOLIC BLOOD PRESSURE: 76 MMHG

## 2024-02-13 DIAGNOSIS — L03.213 PRESEPTAL CELLULITIS OF RIGHT EYE: ICD-10-CM

## 2024-02-13 DIAGNOSIS — G43.909 MIGRAINE WITHOUT STATUS MIGRAINOSUS, NOT INTRACTABLE, UNSPECIFIED MIGRAINE TYPE: ICD-10-CM

## 2024-02-13 DIAGNOSIS — G89.29 CHRONIC LEFT-SIDED LOW BACK PAIN WITH LEFT-SIDED SCIATICA: ICD-10-CM

## 2024-02-13 DIAGNOSIS — M54.16 LUMBAR RADICULOPATHY: ICD-10-CM

## 2024-02-13 DIAGNOSIS — M54.42 CHRONIC LEFT-SIDED LOW BACK PAIN WITH LEFT-SIDED SCIATICA: ICD-10-CM

## 2024-02-13 DIAGNOSIS — Z00.00 ROUTINE GENERAL MEDICAL EXAMINATION AT A HEALTH CARE FACILITY: Primary | ICD-10-CM

## 2024-02-13 PROCEDURE — 99395 PREV VISIT EST AGE 18-39: CPT | Performed by: PHYSICIAN ASSISTANT

## 2024-02-13 SDOH — HEALTH STABILITY: PHYSICAL HEALTH: ON AVERAGE, HOW MANY MINUTES DO YOU ENGAGE IN EXERCISE AT THIS LEVEL?: 30 MIN

## 2024-02-13 SDOH — HEALTH STABILITY: PHYSICAL HEALTH: ON AVERAGE, HOW MANY DAYS PER WEEK DO YOU ENGAGE IN MODERATE TO STRENUOUS EXERCISE (LIKE A BRISK WALK)?: 3 DAYS

## 2024-02-13 ASSESSMENT — PATIENT HEALTH QUESTIONNAIRE - PHQ9
SUM OF ALL RESPONSES TO PHQ QUESTIONS 1-9: 14
SUM OF ALL RESPONSES TO PHQ QUESTIONS 1-9: 14
10. IF YOU CHECKED OFF ANY PROBLEMS, HOW DIFFICULT HAVE THESE PROBLEMS MADE IT FOR YOU TO DO YOUR WORK, TAKE CARE OF THINGS AT HOME, OR GET ALONG WITH OTHER PEOPLE: SOMEWHAT DIFFICULT

## 2024-02-13 ASSESSMENT — PAIN SCALES - GENERAL: PAINLEVEL: MODERATE PAIN (5)

## 2024-02-13 ASSESSMENT — SOCIAL DETERMINANTS OF HEALTH (SDOH): HOW OFTEN DO YOU GET TOGETHER WITH FRIENDS OR RELATIVES?: PATIENT DECLINED

## 2024-02-13 NOTE — PROGRESS NOTES
12/21/23 0500   Appointment Info   Signing clinician's name / credentials John Santoro, PT, DPT, CSCS, CLT   Total/Authorized Visits E[employer]T   Visits Used 1   Medical Diagnosis Low Back Pain with Left Sided Sciatica and Sacroiliac Joint Pain   PT Tx Diagnosis Low Back Pain with Left Sciatica and PJD   Quick Adds Certification   Progress Note/Certification   Start of Care Date 12/21/23   Onset of illness/injury or Date of Surgery 12/12/23   Therapy Frequency 1x a week   Predicted Duration 12 weeks   Certification date from 12/21/23   Certification date to 03/14/24   Progress Note Due Date 03/14/24   Progress Note Completed Date 12/21/23   PT Goal 1   Goal Identifier Standing, Sitting and Transfers   Goal Description Will be able to stand for 2 hours, sit for 2 hours and transfer from all surfaces without back pain worse than 5/10   Rationale to maximize safety and independence with performance of ADLs and functional tasks;to maximize safety and independence within the home;to maximize safety and independence within the community;to maximize safety and independence with transportation;to maximize safety and independence with self cares   Goal Progress Currently has pain upwards of 9/10 with prolonged standing, sitting and transfers   Target Date 03/14/24   Subjective Report   Subjective Report Patient reports that she has pain in the left side of the low back that started like sciatica, but then radiated down to her glute, hip and anterior thigh.  She has had numbness going down her leg a lot.  She reports that there are times where it's so bad she needs helping getting up and transferring.  She reports it has started moving to the right side as well. She says yesterday when she was leaving work both of her legs were numb. She reports her back hurts. She says her pain was comparable to prior to her micro-disctomy just prior to the procedure. She reports the micro-discectomy helped a little bit and she was able to  "stand up straight over the year.  However the pain returned, so she got another MRI which showed \"fluid\" in there.  Now she just deals with the pain.  She reports that he assessed her low back it hurt and reports a lack of sensation during some of the assessment. After the OMT performed by Dr. Webster, she reports only minimal and mild relief for only 1-2 hours. She reports getting no relief from an SI joint injection and the procedure was awful. Reports doing PT in the past including squats, lunges and step ups without success.   Treatment Interventions (PT)   Interventions Therapeutic Procedure/Exercise   Therapeutic Procedure/Exercise   Therapeutic Procedures: strength, endurance, ROM, flexibillity minutes (18237) 8   Skilled Intervention Directional Preference   Patient Response/Progress Will perform at home   PTRx Ther Proc 1 Prone On Elbows   PTRx Ther Proc 1 - Details Directional Preference Assessment Performed.  Repeated Flexion 1x10 increased radicular pain.  Prone press ups 1x10 increased right radicular pain.  Prone on Elbows created pressure through the low back and was tolerable x5 minutes   Eval/Assessments   PT Eval, Low Complexity Minutes (81519) 30   Plan   Home program See PTRx   Updates to plan of care Continue per POC   Plan for next session Next session expand HEP by A) repeated REI, B) adding Gm protocol, C) try some gentle strength and ROM work   Total Session Time   Timed Code Treatment Minutes 8   Total Treatment Time (sum of timed and untimed services) 38           DISCHARGE  Reason for Discharge: Patient has not made expected progress due to interrupted treatment attendance.  Patient has failed to schedule further appointments.    Equipment Issued: None    Discharge Plan: Patient to continue home program.    Referring Provider:  Jeffery Webster     "

## 2024-02-13 NOTE — COMMUNITY RESOURCES LIST (ENGLISH)
02/13/2024   [facility]  N/A  For questions about this resource list or additional care needs, please contact your primary care clinic or care manager.  Phone: [phone]   Email: N/A   Address: [address] [place] [address]   Hours: N/A        Food and Nutrition       Food pantry  1  [facility] ([facility] Food Shelf) Distance: 0.78 miles      Pickup   [address] [place] [address]  Language: English  Hours: Mon 10:00 AM - 3:00 PM , Tue 12:00 PM - 5:00 PM , Wed 10:00 AM - 3:00 PM , Thu 2:00 PM - 7:00 PM  Fees: Free   Phone: (321) 567-8509 Email: support@[facility].Drik Website: http://[facility] CenterUber Entertainment.org     2  [facility] - Family Table Meals - [facility] Food [facility] Distance: 3.16 miles      Pickup   [address] [place] [address]  Language: English  Hours: Wed 12:00 PM - 2:00 PM  Fees: Free   Phone: (234) 864-5148 Email: [place]@[facility] CenterSpring[facility].Drik Website: http://www.Parents Journey.org     SNAP application assistance  3  [facility] Distance: 7.66 miles      Phone/Virtual   [address] [place] [address]  Language: English, [place]an  Hours: Mon 9:00 AM - 1:00 PM , Tue - Thu 9:00 AM - 4:00 PM , Fri 9:00 AM - 1:00 PM  Fees: Free   Phone: (732) 713-1047 Email: info@[employer].org Website: http://www.[employer].org/     4  [facility] Economic Assistance Department Distance: 7.9 miles      Phone/Virtual   [address] [address]  Language: English  Hours: Mon - Fri 8:15 AM - 4:00 PM  Fees: Free   Phone: (574) 679-1046 Email: paperwork@co.[place].mn. Website: http://www.[facility]./193/Economic-Assistance     Soup kitchen or free meals  5  [facility] Meals Distance: 1.42 miles      In-Person   [address] [place] [address]  Language: English  Hours: Wed 5:30 PM - 6:15 PM  Fees: Free   Phone: (808) 961-6788 Email: zo@[facility]  Website: http://www.[facility].org/     6  [facility] - Family Table Meals - Family Table Meal Distance: 3.16 miles      Pickup   [address] [place] [address]  Language: English  Hours: Thu 5:00 PM - 6:30 PM  Fees: Free   Phone: (570) 948-1032 Email: dilan@[facility] EastmultiBIND biotec Website: http://www.[facility].HubNami          Transportation       Free or low-cost transportation  7  [place] [employer] Distance: 11.49 miles      In-Person   [address] [place] [address]  Language: English  Hours: Mon - Fri 6:00 AM - 6:00 PM  Fees: Insurance, Self Pay   Phone: (428) 141-5385 Email: info@[employer] Website: http://www.[employer]     8  [facility] - Bus Passes - Free or low-cost transportation Distance: 15.91 miles      In-Person   [address], [place] [address]  Language: English  Hours: Tue 9:30 AM - 11:30 AM , Thu 9:30 AM - 11:30 AM  Fees: Free   Phone: (651) 849-8797 Email: info@[employer] Website: http://In[employer]tralife/     Transportation to medical appointments  9  [facility] [employer] Distance: 1.28 miles      In-Person   [address] [facility] [address] [place] [address]  Language: Khmer, English, Citizen of [place], Tajik  Hours: Mon - Sun Open 24 Hours  Fees: Insurance, Self Pay, State or [facility] Child Welfare Agency   Phone: (617) 301-1589 Email: info@[employer] Website: https://www.[facility].info/Providers/[facility]_[employer]_Trans[employer]_LLC/Transportation/1?returnUrl=%2FSpecialTopics%2Fpeoplewithdisabilities%5E15522%3F&pos=1     10  [place] Transportation Distance: 9.44 miles      In-Person   [address] [facility] [address] [place] [address]  Language: English  Hours: Mon - Sat 4:00 AM - 6:00 PM  Fees: Insurance, Self Pay   Phone: (884) 815-1523 Email: peggyation1@[employer] Website:  https://helpmeconnect.web.[facility].us/HelpMeConnect/Providers/[place]_Transportation/Transportation/2?returnUrl=%2FHelpMeConnect%2FSearch%2FBasicNeeds%2FTransportation%2FTransportationServices%3Fstart%3D40          Important Numbers & Websites       Emergency Services   911  [facility]   311  Poison Control   (541) 911-3974  Suicide Prevention Lifeline   (134) 908-2433 (TALK)  Child Abuse Hotline   (634) 598-9592 (4-A-Child)  Sexual Assault Hotline   (131) 281-5098 (HOPE)  National Runaway Safeline   (719) 774-8490 (RUNAWAY)  All-Options Talkline   (999) 546-8092  Substance Abuse Referral   (786) 406-2048 (HELP)

## 2024-02-13 NOTE — PROGRESS NOTES
"Preventive Care Visit  [facility]  Junior Roe PA-C, Family Medicine  Feb 13, 2024    Assessment & Plan     Routine general medical examination at a health care facility  Preseptal cellulitis of right eye  Migraine without status migrainosus, not intractable, unspecified migraine type   Patient here for routine visit. She was recently seen on 2/8/2024 for evaluation of preseptal cellulitis. She was placed on clindamycin and augmentin for 5 days. She has had significant improvement in her swelling and pain but there is some still present today. Plan to have her continue on a 5 day course of augmentin for resolution of her symptoms. She otherwise reports that she has had an increase in the frequency of her migraines over the last month. She states that she has had increased stress at work which she believes is contributing to this. She reports she has had good symptom relief with her rizatriptan. She is due for vaccine updates including pnuemococcal, influenza, and hepatitis B. She declined receiving these today. We discussed considering receiving these in the future and she was amenable to this.  She continues to see Psychiatry and counselor regularly.   - amoxicillin-clavulanate (AUGMENTIN) 875-125 MG tablet; Take 1 tablet by mouth 2 times daily for 5 days  - rizatriptan (Maxalt) 5 mg tablet    Lumbar radiculopathy   Chronic left-sided low back pain with left-sided sciatica   Patient reports her lumbar radiculopathy pain is stable. She states that her meloxicam and methocarbamol are helpful. Plan to continue medications as prescribed.  - meloxicam 7.5 mg tablet  - methocarbamol 750 mg tablet    BMI  Estimated body mass index is 30.7 kg/m  as calculated from the following:    Height as of this encounter: 1.734 m (5' 8.27\").    Weight as of this encounter: 92.3 kg (203 lb 8 oz).   Weight management plan: patient is taking wegovy for weight management.    Counseling  Appropriate preventive " services were discussed with this patient, including applicable screening as appropriate for fall prevention, nutrition, physical activity, Tobacco-use cessation, weight loss and cognition.  Checklist reviewing preventive services available has been given to the patient.  Reviewed patient's diet, addressing concerns and/or questions.   She is at risk for lack of exercise and has been provided with information to increase physical activity for the benefit of her well-being.   The patient was instructed to see the dentist every 6 months.   The patient's PHQ-9 score is consistent with moderate depression. She was provided with information regarding depression.     Patient has been advised of split billing requirements and indicates understanding: Yes      Hector Vasques is a 28 year old, presenting for the following:  Physical        2/13/2024     2:51 PM   Additional Questions   Roomed by ANTONIO   Accompanied by NA         2/13/2024     2:51 PM   Patient Reported Additional Medications   Patient reports taking the following new medications None          HPI  Patient here for annual visit and to discuss recent preseptal infection. She was seen for right sided preseptal cellulitis on 2/8/24 and has been taking augmentin and clindamycin with relief in her symptoms though she notes she does still have some swelling and pain to her face. She otherwise reports an increase in migraines recently stating that she has had increased stress at work. She feels that her rizatriptan has been working well. She notes that her mood has been stable. She has been taking her medications as prescribed. She also reports no changes in her lower back pain.    ED/UC Followup:    Facility:  [facility]  Date of visit: 2/8/24  Reason for visit: Cellulitis of the right eye  Current Status: pt was placed on 7 days of antibiotics, benadryl, Augmentin and clindamycin. Swelling has mostly subsided, but fluctuates throughout the day. Pt states it is  painful along the jaw line and she sometimes has darkness in her right peripheral vision.        2/13/2024   General Health   How would you rate your overall physical health? (!) FAIR   Feel stress (tense, anxious, or unable to sleep) Very much   (!) STRESS CONCERN      2/13/2024   Nutrition   Three or more servings of calcium each day? Yes   Diet: Regular (no restrictions)   How many servings of fruit and vegetables per day? (!) 2-3   How many sweetened beverages each day? 0-1         2/13/2024   Exercise   Days per week of moderate/strenous exercise 3 days   Average minutes spent exercising at this level 30 min         2/13/2024   Social Factors   Frequency of gathering with friends or relatives Patient declined   Worry food won't last until get money to buy more Yes   Food not last or not have enough money for food? Yes   Do you have housing?  Yes   Are you worried about losing your housing? No   Lack of transportation? Yes   Unable to get utilities (heat,electricity)? No   (!) FOOD SECURITY CONCERN PRESENT (!) TRANSPORTATION CONCERN PRESENT      2/13/2024   Dental   Dentist two times every year? (!) NO         2/13/2024   TB Screening   Were you born outside of [place]?  No       Today's PHQ-9 Score:       2/13/2024     7:19 AM   PHQ-9 SCORE   PHQ-9 Total Score MyChart 14 (Moderate depression)   PHQ-9 Total Score 14         2/13/2024   Substance Use   If I could quit smoking, I would Neutral   I want to quit somking, worry about health affects Neutral   Willing to make a plan to quit smoking Neutral   Willing to cut down before quitting Neutral   Alcohol more than 3/day or more than 7/wk No   Do you use any other substances recreationally? No     Social History     Tobacco Use    Smoking status: Former     Packs/day: 0.00     Years: 0.00     Additional pack years: 0.00     Total pack years: 0.00     Types: Cigarettes, Vaping Device    Smokeless tobacco: Never   Vaping Use    Vaping Use: Every day    Substances:  "Nicotine    Devices: Refillable tank   Substance Use Topics    Alcohol use: Not Currently     Comment: 2 drinks every other day     Drug use: Not Currently           1/31/2023   LAST FHS-7 RESULTS   1st degree relative breast or ovarian cancer Yes   Any relative bilateral breast cancer Unknown   Any male have breast cancer No   Any woman have breast and ovarian cancer Unknown   Any woman with breast cancer before 50yrs Unknown   2 or more relatives with breast and/or ovarian cancer No   2 or more relatives with breast and/or bowel cancer Yes        Mammogram Screening - Patient under 40 years of age: Routine Mammogram Screening not recommended.         2/13/2024   STI Screening   New sexual partner(s) since last STI/HIV test? No     History of abnormal Pap smear: NO - age 21-29 PAP every 3 years recommended        10/15/2021    10:53 AM   PAP / HPV   PAP Negative for Intraepithelial Lesion or Malignancy (NILM)            2/13/2024   Contraception/Family Planning   Questions about contraception or family planning No        Reviewed and updated as needed this visit by Provider                    Past Medical History:   Diagnosis Date    Complication of anesthesia     \"freaking out when I wake up\"    Depressive disorder 2011     Past Surgical History:   Procedure Laterality Date    DISCECTOMY LUMBAR POSTERIOR MICROSCOPIC ONE LEVEL Left 08/24/2021    Procedure: Minimally invasive left Lumbar 5 to Sacral 1 microdiscectomy  ;  Surgeon: Destin Junior MD;  Location: [facility] OR    GENITOURINARY SURGERY  2012    Fallopian tube removal and cyst removal    GYN SURGERY Right     cystectomy - with salpingectomy    HEAD & NECK SURGERY      tonsillectomy    HEAD & NECK SURGERY      wisdom teeth extraction         Review of Systems  Constitutional, HEENT, cardiovascular, pulmonary, gi and gu systems are negative, except as otherwise noted.     Objective    Exam  /76   Pulse 85   Temp 98.8  F (37.1  C) (Temporal)   Resp 16  " " Ht 1.734 m (5' 8.27\")   Wt 92.3 kg (203 lb 8 oz)   LMP 01/17/2024 (Exact Date)   SpO2 98%   BMI 30.70 kg/m     Estimated body mass index is 30.7 kg/m  as calculated from the following:    Height as of this encounter: 1.734 m (5' 8.27\").    Weight as of this encounter: 92.3 kg (203 lb 8 oz).    Physical Exam  GENERAL: alert and no distress  EYES: Eyes grossly normal to inspection, PERRL and conjunctivae and sclerae normal  HENT: ear canals and TM's normal, nose and mouth without ulcers or lesions  NECK: no adenopathy, no asymmetry, masses, or scars  RESP: lungs clear to auscultation - no rales, rhonchi or wheezes  BREAST: normal without masses, tenderness or nipple discharge and no palpable axillary masses or adenopathy  CV: regular rate and rhythm, normal S1 S2, no S3 or S4, no murmur, click or rub, no peripheral edema  ABDOMEN: soft, nontender, no hepatosplenomegaly, no masses and bowel sounds normal  MS: no gross musculoskeletal defects noted, no edema  SKIN: no suspicious lesions or rashes  NEURO: Normal strength and tone, mentation intact and speech normal  PSYCH: mentation appears normal, affect normal/bright    I, Junior Roe PA-C, was present with the Physician Assistant student who participated in the service and in the documentation of the note.  I have verified the history and personally performed the physical exam and medical decision making.  I agree with the assessment and plan of care as documented in the note.     Parts of this note were written by: STEWART [email or web address]  Signed Electronically by: STEWART NguyenC    "

## 2024-02-21 ENCOUNTER — VIRTUAL VISIT (OUTPATIENT)
Dept: FAMILY MEDICINE | Facility: OTHER | Age: 29
End: 2024-02-21
Payer: COMMERCIAL

## 2024-02-21 DIAGNOSIS — J06.9 VIRAL URI WITH COUGH: Primary | ICD-10-CM

## 2024-02-21 PROCEDURE — 99213 OFFICE O/P EST LOW 20 MIN: CPT | Mod: 95 | Performed by: PHYSICIAN ASSISTANT

## 2024-02-21 NOTE — PROGRESS NOTES
"    Instructions Relayed to Patient by Virtual Roomer:     Patient is active on [employer]:   Relayed following to patient: \"It looks like you are active on [employer], are you able to join the visit this way? If not, do you need us to send you a link now or would you like your provider to send a link via text or email when they are ready to initiate the visit?\"    Reminded patient to ensure they were logged on to virtual visit by arrival time listed. Documented in appointment notes if patient had flexibility to initiate visit sooner than arrival time. If pediatric virtual visit, ensured pediatric patient along with parent/guardian will be present for video visit.     Patient offered the website [email or web address] and/or phone number to [employer] Help line: [phone]    Caprice is a 28 year old who is being evaluated via a billable video visit.      How would you like to obtain your AVS? [place]  If the video visit is dropped, the invitation should be resent by: Text to cell phone: [phone]  Will anyone else be joining your video visit? No        Assessment & Plan     Viral URI with cough  Suspect she contracted another viral infection. Symptoms are different for the most part, she still has some pain on the right side of the face. She was able to move her head up and down without any issues other than her chronic pain.  She has had fever and headaches.  Offered influenza rsv testing, declined since she is outside window of any treatment anyway at this time. Will switch to Combivent inhaler to see if this helps. Recommended to hold the meloxicam and do rotating ibuprofen 800 mg every 6 hours and tylenol 500 mg every 4 hours to see if this helps with headache and fever.  Recommended OTC mucinex as well. If not improving with the chest symptoms consider prednisone.  Do not feel antibiotic warranted at this time since she has no recurrence of facial swelling like when she had the cellulitis.  Discussed " "if any issues with moving neck due to pain, persistently high fevers or unresponsive fevers that are escalating, worsening chest symptoms, etc she should be seen right away.   - ipratropium-albuterol (COMBIVENT RESPIMAT)  MCG/ACT inhaler; Inhale 1 puff into the lungs 4 times daily as needed for shortness of breath, wheezing or cough      Options for treatment and follow-up care were reviewed with the patient and/or guardian. Patient and/or guardian engaged in the decision making process and verbalized understanding of the options discussed and agreed with the final planAngela Vasques is a 28 year old, presenting for the following health issues:  URI      2/21/2024     1:29 PM   Additional Questions   Roomed by sagar   Accompanied by self     URI    History of Present Illness       Headaches:   Since the patient's last clinic visit, headaches are: worsened  The patient is getting headaches:  Constant since URI started  She is not able to do normal daily activities when she has a migraine.  The patient is taking the following rescue/relief medications:  Maxalt   Patient states \"I get no relief\" from the rescue/relief medications.   The patient is taking the following medications to prevent migraines:  No medications to prevent migraines  In the past 4 weeks, the patient has gone to an Urgent Care or Emergency Room 0 times times due to headaches.    Reason for visit:  Ongoing illness  Symptom onset:  More than a month  Symptom intensity:  Moderate  Symptom progression:  Worsening  Had these symptoms before:  Yes  Has tried/received treatment for these symptoms:  Yes  Previous treatment was successful:  No  What makes it worse:  Moving around and sitting up  What makes it better:  Laying diwn.    She eats 2-3 servings of fruits and vegetables daily.She consumes 2 sweetened beverage(s) daily.She exercises with enough effort to increase her heart rate 20 to 29 minutes per day.  She exercises with enough " effort to increase her heart rate 5 days per week.   She is taking medications regularly.    Pt had different answers in the general questionnaire portion    Sometimes the eyes can be slightly swollen and itchy but nothing like it was before the ER. Her face does still hurt on the right side, now starting to be on the other side.   Antibiotics ended on Sunday and then symptoms started sat/sun.   Fever up to 100. That has been constant.   COVID test was negative.   Couple people at work have been sick.   Breathing is variable, sometimes will struggle to breathe.   She has had a constant headache.       Review of Systems  Constitutional, HEENT, cardiovascular, pulmonary, gi and gu systems are negative, except as otherwise noted.      Objective           Vitals:  No vitals were obtained today due to virtual visit.    Physical Exam   GENERAL: alert and no distress  EYES: Eyes grossly normal to inspection.  No discharge or erythema, or obvious scleral/conjunctival abnormalities.  RESP: No audible wheeze, cough, or visible cyanosis.    SKIN: Visible skin clear. No significant rash, abnormal pigmentation or lesions.  NEURO: Cranial nerves grossly intact.  Mentation and speech appropriate for age.  PSYCH: Appropriate affect, tone, and pace of words          Video-Visit Details    Type of service:  Video Visit   Video Start Time: 2:30 PM  Video End Time:2:41 PM    Originating Location (pt. Location): Home    Distant Location (provider location):  Off-site  Platform used for Video Visit: Tiffanie  Signed Electronically by: Junior Roe PA-C

## 2024-02-27 DIAGNOSIS — R11.0 NAUSEA: ICD-10-CM

## 2024-02-27 DIAGNOSIS — M54.42 ACUTE BILATERAL LOW BACK PAIN WITH LEFT-SIDED SCIATICA: ICD-10-CM

## 2024-02-27 DIAGNOSIS — T78.02XA ANAPHYLACTIC SHOCK DUE TO SHELLFISH, INITIAL ENCOUNTER: ICD-10-CM

## 2024-02-27 DIAGNOSIS — J06.9 VIRAL URI WITH COUGH: ICD-10-CM

## 2024-02-28 ENCOUNTER — MYC MEDICAL ADVICE (OUTPATIENT)
Dept: FAMILY MEDICINE | Facility: OTHER | Age: 29
End: 2024-02-28

## 2024-02-28 ENCOUNTER — ANCILLARY PROCEDURE (OUTPATIENT)
Dept: GENERAL RADIOLOGY | Facility: OTHER | Age: 29
End: 2024-02-28
Attending: PHYSICIAN ASSISTANT
Payer: COMMERCIAL

## 2024-02-28 ENCOUNTER — OFFICE VISIT (OUTPATIENT)
Dept: FAMILY MEDICINE | Facility: OTHER | Age: 29
End: 2024-02-28
Payer: COMMERCIAL

## 2024-02-28 VITALS
BODY MASS INDEX: 30.84 KG/M2 | RESPIRATION RATE: 18 BRPM | OXYGEN SATURATION: 96 % | SYSTOLIC BLOOD PRESSURE: 100 MMHG | TEMPERATURE: 98.4 F | HEIGHT: 68 IN | HEART RATE: 83 BPM | WEIGHT: 203.5 LBS | DIASTOLIC BLOOD PRESSURE: 68 MMHG

## 2024-02-28 DIAGNOSIS — R51.9 FACIAL PAIN: ICD-10-CM

## 2024-02-28 DIAGNOSIS — J22 LRTI (LOWER RESPIRATORY TRACT INFECTION): ICD-10-CM

## 2024-02-28 DIAGNOSIS — Z86.39 HX OF OBESITY: ICD-10-CM

## 2024-02-28 DIAGNOSIS — R05.1 ACUTE COUGH: Primary | ICD-10-CM

## 2024-02-28 DIAGNOSIS — R05.1 ACUTE COUGH: ICD-10-CM

## 2024-02-28 PROCEDURE — 99214 OFFICE O/P EST MOD 30 MIN: CPT | Performed by: PHYSICIAN ASSISTANT

## 2024-02-28 PROCEDURE — 71046 X-RAY EXAM CHEST 2 VIEWS: CPT | Mod: TC | Performed by: RADIOLOGY

## 2024-02-28 RX ORDER — BENZONATATE 200 MG/1
200 CAPSULE ORAL 3 TIMES DAILY PRN
Qty: 30 CAPSULE | Refills: 0 | Status: SHIPPED | OUTPATIENT
Start: 2024-02-28 | End: 2024-06-25

## 2024-02-28 RX ORDER — METHOCARBAMOL 750 MG/1
750 TABLET, FILM COATED ORAL 3 TIMES DAILY
Qty: 90 TABLET | Refills: 1 | Status: SHIPPED | OUTPATIENT
Start: 2024-02-28 | End: 2024-04-29

## 2024-02-28 RX ORDER — ONDANSETRON 4 MG/1
TABLET, ORALLY DISINTEGRATING ORAL
Qty: 90 TABLET | Refills: 0 | Status: SHIPPED | OUTPATIENT
Start: 2024-02-28 | End: 2024-10-07

## 2024-02-28 RX ORDER — DOXYCYCLINE 100 MG/1
100 CAPSULE ORAL 2 TIMES DAILY
Qty: 20 CAPSULE | Refills: 0 | Status: SHIPPED | OUTPATIENT
Start: 2024-02-28 | End: 2024-03-09

## 2024-02-28 RX ORDER — MELOXICAM 7.5 MG/1
TABLET ORAL
Qty: 180 TABLET | Refills: 0 | Status: SHIPPED | OUTPATIENT
Start: 2024-02-28 | End: 2024-07-08

## 2024-02-28 RX ORDER — IPRATROPIUM BROMIDE AND ALBUTEROL 20; 100 UG/1; UG/1
SPRAY, METERED RESPIRATORY (INHALATION)
Qty: 4 G | Refills: 0 | OUTPATIENT
Start: 2024-02-28

## 2024-02-28 RX ORDER — SEMAGLUTIDE 0.25 MG/.5ML
0.25 INJECTION, SOLUTION SUBCUTANEOUS WEEKLY
Qty: 2 ML | Refills: 0 | Status: SHIPPED | OUTPATIENT
Start: 2024-02-28 | End: 2024-06-25

## 2024-02-28 RX ORDER — EPINEPHRINE 0.3 MG/.3ML
INJECTION SUBCUTANEOUS
Qty: 2 EACH | Refills: 1 | Status: SHIPPED | OUTPATIENT
Start: 2024-02-28

## 2024-02-28 ASSESSMENT — PAIN SCALES - GENERAL: PAINLEVEL: SEVERE PAIN (7)

## 2024-02-28 NOTE — PROGRESS NOTES
"  Assessment & Plan     Acute cough  LRTI (lower respiratory tract infection)  No findings on x-ray of pneumonia. But due to her symptoms, length of symptoms and no improvement elected to start on Doxycycline to cover for infection.  She is going to be losing insurance at least for a few months so elected to treat now versus waiting. If not improving may need to consider Chest CT but her x-ray was clear and lung examination was benign.   Continue with Combivent inhaler. The steroids will still give benefit through the rest of this week. Can use benzonatate PRN.   - XR Chest 2 Views; Future  - doxycycline monohydrate (MONODOX) 100 MG capsule; Take 1 capsule (100 mg) by mouth 2 times daily for 10 days  - benzonatate (TESSALON) 200 MG capsule; Take 1 capsule (200 mg) by mouth 3 times daily as needed for cough    Facial Pain  - She has normal EOMs, there is no visible swelling and no signs of proptosis of the eye.  Suspect that there was irritation of the facial nerve as her distribution is consistent and she is tender just to light touch.  There is no visible swelling but again neuropathic symptoms could cause that sensation and pain.   - Considering starting on Gabapentin but want verification from her psychiatrist this is ok to utilize. Can also consider pregabalin as well.     Hector Vasques is a 28 year old, presenting for the following health issues:  URI    Pt was seen on 2/13 for cellulitis of right side of face. Pt states that the right side of the face is still slightly swollen and tender to the touch, redness has subsided. Pt completed 10 days of Augmentin. Pt states that she is also experiencing chest tightness x2 weeks that has gotten worse since onset. Pt states that she has pain and tightness that radiates into upper back. \"It feels like someone is giving me a very big hug and you can't breathe.\" Pt has been seen virtually and was prescribed inhaler. Pt has used inhaler and prednisone as prescribed " "with minimal relief.     She is still having pain on the right side of her face, she feels swollen in the morning.   Her sinus symptoms seem to be improving.   Cough is still present. Boyfriend notes it even more at night  This is her 5th day of prednisone.   She did feel some improvement with use of the Combivent inhaler.      Review of Systems  Constitutional, HEENT, cardiovascular, pulmonary, gi and gu systems are negative, except as otherwise noted.      Objective    /68   Pulse 83   Temp 98.4  F (36.9  C) (Temporal)   Resp 18   Ht 1.734 m (5' 8.27\")   Wt 92.3 kg (203 lb 8 oz)   LMP 02/19/2024 (Exact Date)   SpO2 96%   BMI 30.70 kg/m    Body mass index is 30.7 kg/m .  Physical Exam   GENERAL: alert and no distress  EYES: Eyes grossly normal to inspection, PERRL and conjunctivae and sclerae normal  HENT: ear canals and TM's normal, nose and mouth without ulcers or lesions  NECK: no adenopathy, no asymmetry, masses, or scars  RESP: lungs clear to auscultation - no rales, rhonchi or wheezes  CV: regular rate and rhythm, normal S1 S2, no S3 or S4, no murmur, click or rub, no peripheral edema  MS: no gross musculoskeletal defects noted, no edema, tenderness to very light touch over the right side of the face.      Results for orders placed or performed in visit on 02/28/24   XR Chest 2 Views     Status: None    Narrative    CHEST TWO VIEWS 2/28/2024 10:31 AM     HISTORY: Acute cough    COMPARISON: None.       Impression    IMPRESSION: Cardiac silhouette is within normal limits. No infiltrate,  effusion, or pneumothorax. No significant bony abnormalities.    KRISTEN GEE MD         SYSTEM ID:  [id number]           Signed Electronically by: Junior Roe PA-C    "

## 2024-02-29 NOTE — TELEPHONE ENCOUNTER
Junior Roe PA-C   to [facility] Triage Nurse Pool   ES    2/29/24 10:43 AM   Because I am not in the office I would recommend that she go to the ER right now    Junior Roe PA-C      Attempted to call patient again. No answer but was able to leave a voicemail this time. Instructed patient she call and speak with triage.   Will also send my chart message with providers recommendations.     Caprice ORTEGAN, RN  [facility]

## 2024-04-01 ENCOUNTER — ANCILLARY PROCEDURE (OUTPATIENT)
Dept: CT IMAGING | Facility: CLINIC | Age: 29
End: 2024-04-01
Attending: PHYSICIAN ASSISTANT
Payer: COMMERCIAL

## 2024-04-01 DIAGNOSIS — R51.9 FACIAL PAIN: ICD-10-CM

## 2024-04-01 DIAGNOSIS — L03.213 PRESEPTAL CELLULITIS OF RIGHT EYE: ICD-10-CM

## 2024-04-01 PROCEDURE — 70481 CT ORBIT/EAR/FOSSA W/DYE: CPT | Mod: TC | Performed by: RADIOLOGY

## 2024-04-01 RX ORDER — IOPAMIDOL 755 MG/ML
67 INJECTION, SOLUTION INTRAVASCULAR ONCE
Status: COMPLETED | OUTPATIENT
Start: 2024-04-01 | End: 2024-04-01

## 2024-04-01 RX ADMIN — IOPAMIDOL 67 ML: 755 INJECTION, SOLUTION INTRAVASCULAR at 15:16

## 2024-04-29 DIAGNOSIS — M54.42 ACUTE BILATERAL LOW BACK PAIN WITH LEFT-SIDED SCIATICA: ICD-10-CM

## 2024-04-29 DIAGNOSIS — G43.909 MIGRAINE WITHOUT STATUS MIGRAINOSUS, NOT INTRACTABLE, UNSPECIFIED MIGRAINE TYPE: ICD-10-CM

## 2024-04-29 RX ORDER — METHOCARBAMOL 750 MG/1
750 TABLET, FILM COATED ORAL 3 TIMES DAILY
Qty: 90 TABLET | Refills: 1 | Status: SHIPPED | OUTPATIENT
Start: 2024-04-29 | End: 2024-07-01

## 2024-04-29 RX ORDER — RIZATRIPTAN BENZOATE 5 MG/1
TABLET ORAL
Qty: 12 TABLET | Refills: 1 | Status: SHIPPED | OUTPATIENT
Start: 2024-04-29 | End: 2024-09-09

## 2024-06-12 DIAGNOSIS — F90.0 ATTENTION-DEFICIT HYPERACTIVITY DISORDER, PREDOMINANTLY INATTENTIVE TYPE: ICD-10-CM

## 2024-06-12 DIAGNOSIS — F60.3 BORDERLINE PERSONALITY DISORDER (H): ICD-10-CM

## 2024-06-12 DIAGNOSIS — Z79.899 LONG TERM USE OF DRUG: ICD-10-CM

## 2024-06-12 DIAGNOSIS — F41.1 GENERALIZED ANXIETY DISORDER: ICD-10-CM

## 2024-06-12 DIAGNOSIS — F31.81 BIPOLAR II DISORDER (H): Primary | ICD-10-CM

## 2024-06-20 ENCOUNTER — LAB (OUTPATIENT)
Dept: LAB | Facility: CLINIC | Age: 29
End: 2024-06-20
Payer: COMMERCIAL

## 2024-06-20 DIAGNOSIS — Z79.899 LONG TERM USE OF DRUG: ICD-10-CM

## 2024-06-20 DIAGNOSIS — F60.3 BORDERLINE PERSONALITY DISORDER (H): ICD-10-CM

## 2024-06-20 DIAGNOSIS — F41.1 GENERALIZED ANXIETY DISORDER: ICD-10-CM

## 2024-06-20 DIAGNOSIS — F31.81 BIPOLAR II DISORDER (H): ICD-10-CM

## 2024-06-20 DIAGNOSIS — F90.0 ATTENTION-DEFICIT HYPERACTIVITY DISORDER, PREDOMINANTLY INATTENTIVE TYPE: ICD-10-CM

## 2024-06-20 LAB
BASOPHILS # BLD AUTO: 0 10E3/UL (ref 0–0.2)
BASOPHILS NFR BLD AUTO: 1 %
EOSINOPHIL # BLD AUTO: 0.1 10E3/UL (ref 0–0.7)
EOSINOPHIL NFR BLD AUTO: 2 %
ERYTHROCYTE [DISTWIDTH] IN BLOOD BY AUTOMATED COUNT: 14.5 % (ref 10–15)
HBA1C MFR BLD: 4.7 % (ref 0–5.6)
HCT VFR BLD AUTO: 39.5 % (ref 35–47)
HGB BLD-MCNC: 12.7 G/DL (ref 11.7–15.7)
IMM GRANULOCYTES # BLD: 0 10E3/UL
IMM GRANULOCYTES NFR BLD: 0 %
LYMPHOCYTES # BLD AUTO: 2 10E3/UL (ref 0.8–5.3)
LYMPHOCYTES NFR BLD AUTO: 36 %
MCH RBC QN AUTO: 25.5 PG (ref 26.5–33)
MCHC RBC AUTO-ENTMCNC: 32.2 G/DL (ref 31.5–36.5)
MCV RBC AUTO: 79 FL (ref 78–100)
MONOCYTES # BLD AUTO: 0.3 10E3/UL (ref 0–1.3)
MONOCYTES NFR BLD AUTO: 6 %
NEUTROPHILS # BLD AUTO: 3.1 10E3/UL (ref 1.6–8.3)
NEUTROPHILS NFR BLD AUTO: 55 %
PLATELET # BLD AUTO: 295 10E3/UL (ref 150–450)
RBC # BLD AUTO: 4.99 10E6/UL (ref 3.8–5.2)
WBC # BLD AUTO: 5.6 10E3/UL (ref 4–11)

## 2024-06-20 PROCEDURE — 36415 COLL VENOUS BLD VENIPUNCTURE: CPT

## 2024-06-20 PROCEDURE — 99000 SPECIMEN HANDLING OFFICE-LAB: CPT

## 2024-06-20 PROCEDURE — 80178 ASSAY OF LITHIUM: CPT

## 2024-06-20 PROCEDURE — 80053 COMPREHEN METABOLIC PANEL: CPT

## 2024-06-20 PROCEDURE — 83036 HEMOGLOBIN GLYCOSYLATED A1C: CPT

## 2024-06-20 PROCEDURE — 84443 ASSAY THYROID STIM HORMONE: CPT

## 2024-06-20 PROCEDURE — 80061 LIPID PANEL: CPT

## 2024-06-20 PROCEDURE — 84630 ASSAY OF ZINC: CPT | Mod: 90

## 2024-06-20 PROCEDURE — 85025 COMPLETE CBC W/AUTO DIFF WBC: CPT

## 2024-06-20 PROCEDURE — 82306 VITAMIN D 25 HYDROXY: CPT

## 2024-06-20 NOTE — PROGRESS NOTES
Patient unable to void to complete urine test.  She will return at a later date.  Shyla Marquez CMA([facility])

## 2024-06-21 LAB
ALBUMIN SERPL BCG-MCNC: 5 G/DL (ref 3.5–5.2)
ALP SERPL-CCNC: 38 U/L (ref 40–150)
ALT SERPL W P-5'-P-CCNC: 22 U/L (ref 0–50)
ANION GAP SERPL CALCULATED.3IONS-SCNC: 12 MMOL/L (ref 7–15)
AST SERPL W P-5'-P-CCNC: 19 U/L (ref 0–45)
BILIRUB SERPL-MCNC: 0.3 MG/DL
BUN SERPL-MCNC: 16.4 MG/DL (ref 6–20)
CALCIUM SERPL-MCNC: 9.7 MG/DL (ref 8.6–10)
CHLORIDE SERPL-SCNC: 102 MMOL/L (ref 98–107)
CHOLEST SERPL-MCNC: 197 MG/DL
CREAT SERPL-MCNC: 0.92 MG/DL (ref 0.51–0.95)
DEPRECATED HCO3 PLAS-SCNC: 25 MMOL/L (ref 22–29)
EGFRCR SERPLBLD CKD-EPI 2021: 86 ML/MIN/1.73M2
FASTING STATUS PATIENT QL REPORTED: NO
FASTING STATUS PATIENT QL REPORTED: NO
GLUCOSE SERPL-MCNC: 90 MG/DL (ref 70–99)
HDLC SERPL-MCNC: 96 MG/DL
LDLC SERPL CALC-MCNC: 70 MG/DL
LITHIUM SERPL-SCNC: 0.73 MMOL/L (ref 0.6–1.2)
NONHDLC SERPL-MCNC: 101 MG/DL
POTASSIUM SERPL-SCNC: 3.9 MMOL/L (ref 3.4–5.3)
PROT SERPL-MCNC: 7 G/DL (ref 6.4–8.3)
SODIUM SERPL-SCNC: 139 MMOL/L (ref 135–145)
TRIGL SERPL-MCNC: 156 MG/DL
TSH SERPL DL<=0.005 MIU/L-ACNC: 2.02 UIU/ML (ref 0.3–4.2)
VIT D+METAB SERPL-MCNC: 64 NG/ML (ref 20–50)

## 2024-06-23 LAB — ZINC SERPL-MCNC: 75.2 UG/DL

## 2024-06-25 ENCOUNTER — ANCILLARY PROCEDURE (OUTPATIENT)
Dept: GENERAL RADIOLOGY | Facility: OTHER | Age: 29
End: 2024-06-25
Attending: PHYSICIAN ASSISTANT
Payer: COMMERCIAL

## 2024-06-25 ENCOUNTER — OFFICE VISIT (OUTPATIENT)
Dept: FAMILY MEDICINE | Facility: OTHER | Age: 29
End: 2024-06-25
Payer: COMMERCIAL

## 2024-06-25 VITALS
HEIGHT: 68 IN | HEART RATE: 88 BPM | DIASTOLIC BLOOD PRESSURE: 78 MMHG | OXYGEN SATURATION: 98 % | RESPIRATION RATE: 14 BRPM | BODY MASS INDEX: 30.84 KG/M2 | TEMPERATURE: 97.9 F | WEIGHT: 203.5 LBS | SYSTOLIC BLOOD PRESSURE: 118 MMHG

## 2024-06-25 DIAGNOSIS — S99.911A INJURY OF RIGHT ANKLE, INITIAL ENCOUNTER: Primary | ICD-10-CM

## 2024-06-25 DIAGNOSIS — M79.661 PAIN OF RIGHT LOWER LEG: ICD-10-CM

## 2024-06-25 DIAGNOSIS — S99.911A INJURY OF RIGHT ANKLE, INITIAL ENCOUNTER: ICD-10-CM

## 2024-06-25 PROCEDURE — 99214 OFFICE O/P EST MOD 30 MIN: CPT | Performed by: PHYSICIAN ASSISTANT

## 2024-06-25 PROCEDURE — 73610 X-RAY EXAM OF ANKLE: CPT | Mod: TC | Performed by: RADIOLOGY

## 2024-06-25 PROCEDURE — 73590 X-RAY EXAM OF LOWER LEG: CPT | Mod: TC | Performed by: RADIOLOGY

## 2024-06-25 NOTE — PROGRESS NOTES
Assessment & Plan     Injury of right ankle, initial encounter  Pain of right lower leg  See HPI. Patient's right foot caught in a gap in the parking lot while getting out of her car for work this morning at 8am. Reports the right ankle rolled inward and she twisted while falling to avoid hitting a nearby garage. Denies hearing or feeling popping/snapping, but did experience pain along the right lateral ankle. Since the injury the patient has been experiencing swelling around the ankle and distal foot. Imagine completed today which did not show any obvious fracture. Patient was offered ankle brace, but did not tolerate the rigid pieces in the brace and prefers to get an OTC brace. She was instructed on taking the remainder of the day off and the importance of RICE therapy. Pending review of imaging by radiologist will allow her to return to work tomorrow with limitations for the next month for suspected ankle sprain. Reference material attached to AVS.  - XR Ankle Right G/E 3 Views; Future  - XR Tibia and Fibula Right 2 Views; Future    Subjective   Caprice is a 28 year old, presenting for the following health issues:  Ankle/Foot right      6/25/2024    10:55 AM   Additional Questions   Roomed by Xi SOLIS   Accompanied by self         6/25/2024    10:55 AM   Patient Reported Additional Medications   Patient reports taking the following new medications Adderall 30 MG ER 1 tablet once per day in the AM replaced Vyvanse     Ankle/Foot right    History of Present Illness       Reason for visit:  Tripped and fell on unsturdt ground twisting my ankle pretty good and scraping fingers down a garage  Symptom onset:  Today  Symptoms include:  Pain walking  moving in my ankle up to my knee on backside twinge my already painful back  Symptom intensity:  Moderate  Symptom progression:  Worsening    She eats 2-3 servings of fruits and vegetables daily.She consumes 2 sweetened beverage(s) daily.She exercises with enough effort to  "increase her heart rate 20 to 29 minutes per day.  She exercises with enough effort to increase her heart rate 4 days per week.   She is taking medications regularly.     Patient is a 28 year old female who presents today after injury which occurred at 8am this morning in her employer's parking lot. 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 She lost her balance and twisted to try to prevent hitting or falling against a nearby garage. [place] that the ankle rolled inward during the injury. She denies hearing pop or snap. Did feel immediate pain along the right lateral ankle and distal fibula. Since the injury the ankle and foot have begun to swell. She was evaluated by on staff nurse who recommended she be evaluated in clinic. Has been able to bear weight with pain. Declined wheel chair at time of rooming.      Review of Systems  Constitutional, HEENT, cardiovascular, pulmonary, gi and gu systems are negative, except as otherwise noted.      Objective    /78   Pulse 88   Temp 97.9  F (36.6  C) (Temporal)   Resp 14   Ht 1.734 m (5' 8.27\")   Wt 92.3 kg (203 lb 8 oz)   LMP 06/18/2024   SpO2 98%   BMI 30.70 kg/m    Body mass index is 30.7 kg/m .  Physical Exam   GENERAL: alert and no distress  RESP: lungs clear to auscultation - no rales, rhonchi or wheezes  CV: regular rate and rhythm, normal S1 S2, no S3 or S4, no murmur, click or rub, no peripheral edema  MS: Moderate edema along the right lateral ankle and distal foot. No bruising. Tenderness noted along the mid fibula distally to the ankle. No bony abnormalities.   NEURO: Normal strength and tone, mentation intact and speech normal  PSYCH: mentation appears normal, affect normal/bright    Signed Electronically by: Karl Jimenez PA-C    "

## 2024-06-25 NOTE — LETTER
[address]  [facility] [address]  [phone]        2024    REPORT OF WORK ABILITY    PATIENT DATA  Employee Name: Caprice Kline        : 1995   [id number]  Work related injury: YES  Today's date: 2024  Date of injury: 2024     PROVIDER EVALUATION: Please fill in as needed.  Please give copy to employee for employer.  1. Diagnosis: Right ankle sprain  2. Treatment: Bracing, activity modification, limitations at work  3. Medication: Tylenol as needed  NOTE: When ordering a medication, [place] Rules require Work Comp or WC on prescriptions.  4. Return to work date: May return to work on 24. Return with restrictions.     Limited prolonged standing for next 2-4 weeks  Limited prolonged walking for next 2-4 weeks  10 minute break every hour to elevate ankle as needed for next 2-4  Avoid climbing ladders for next 2-4 weeks   Limited extended use of stairs for next 2-4 weeks       RESTRICTIONS: Unlimited unless listed.  Restrictions apply to home and leisure also.  If work within restrictions is not available, the employee is totally disabled.  Provider comments: Patient will reach out if not improving as expected.   Medical Examiner: Karl Jimenez PA-C        Next appointment: As needed    CC: Employer, Managed Care Plan/Payor, Patient

## 2024-06-26 ENCOUNTER — MYC MEDICAL ADVICE (OUTPATIENT)
Dept: FAMILY MEDICINE | Facility: OTHER | Age: 29
End: 2024-06-26
Payer: COMMERCIAL

## 2024-06-26 ENCOUNTER — TRANSFERRED RECORDS (OUTPATIENT)
Dept: HEALTH INFORMATION MANAGEMENT | Facility: CLINIC | Age: 29
End: 2024-06-26
Payer: COMMERCIAL

## 2024-06-26 NOTE — TELEPHONE ENCOUNTER
You saw patient in office yesterday. X ray of ankle, tib/fib show no fracture. Please advise if further triage is needed.

## 2024-06-27 ENCOUNTER — NURSE TRIAGE (OUTPATIENT)
Dept: FAMILY MEDICINE | Facility: OTHER | Age: 29
End: 2024-06-27
Payer: COMMERCIAL

## 2024-06-27 NOTE — TELEPHONE ENCOUNTER
Please triage patient. Seen on 06/25 following injury to the right ankle. Imaging returned negative for concerning findings. Now reporting burning sensation in area of injury.    I am technically out of office this afternoon, so subsequent triage decisions may need DP input.     Karl Jimenez PA-C on 6/27/2024 at 2:25 PM

## 2024-06-27 NOTE — TELEPHONE ENCOUNTER
[facility]    HISTORY OF PRESENT ILLNESS  Mich Hernandez  is a 80 y.o. y.o. male here for MWV and follow up.    HYPERTENSION, Essential  Reports Compliance with meds losartan 100, atenolol 50, nifedipine 60  Checking BP at Home: YES  Denies Chest pain, shortness of breath, lower extremity edema, vision changes, change in urination.    Lab Results   Component Value Date     2023    K 4.4 2023     2023    CO2 26 2023    GLUCOSE 118 (H) 2023    BUN 25 (H) 2023    CREATININE 1.54 (H) 2023    CALCIUM 8.9 2023    ALT 20 2023    AST 16 2023    ALKPHOS 97 2023     CKD  -not following nephrology  -last GFR  45    HLD  -taking zocor 40    seizures  -taking Keppra 250  -last seizure about 30 years  -follows a neurologist, was told could stop it but scared to stop medication    Gout  -needs refill on allopurinol      Mr#: [id number]      Past Medical History:   Diagnosis Date    Arthritis     Cancer (HCC)     rectal    GERD (gastroesophageal reflux disease)     Hypercholesterolemia     Hypertension     Malignant neoplasm of rectum (HCC) 2023       History reviewed. No pertinent surgical history.    Family History   Problem Relation Age of Onset    No Known Problems Father     No Known Problems Mother        No Known Allergies    Social History     Tobacco Use   Smoking Status Former    Current packs/day: 0.00    Average packs/day: 1 pack/day for 10.0 years (10.0 ttl pk-yrs)    Types: Cigarettes    Start date: 1973    Quit date: 1983    Years since quittin.4   Smokeless Tobacco Never       Social History     Substance and Sexual Activity   Alcohol Use No       Immunization History   Administered Date(s) Administered    COVID-19, PFIZER PURPLE top, DILUTE for use, (age 12 y+), 30mcg/0.3mL 2021, 2021, 2021    Influenza, FLUAD, (age 65 y+), Adjuvanted, 0.5mL 2022, 2023     Discussed with provider who agreed with treatment of rest, ice and compression. If this is not helping should be seen in urgent care or ED.     Sent my chart message to patient with this recommendation.     Caprice ORTEGAN, RN

## 2024-06-27 NOTE — TELEPHONE ENCOUNTER
Patient would like a my chart message back after provider reviews encounter.      Nurse Triage SBAR    Is this a 2nd Level Triage? YES, LICENSED PRACTITIONER REVIEW IS REQUIRED    Situation: Patient had sent my chart message about new, burning pain in ankle following evaluation. RN called to triage symptoms.     Background: Patient had office visit with Karl Jimenez on 6/25/24 for injury of right ankle.   Xray of ankle, tib/fib were negative. Recommended rest, ice, elevation and compression with wrap or brace.   Patient is reporting new burning sensation in her ankle that was not present at time of evaluation.     Assessment: Patient reports burning localized to the right ankle, heart beat sensation in ankle as well.   With walking this pain shoots up her leg.   She denies an increase in swelling but also reports lack of improvement despite ice and elevation.   She denies redness or foot looking pale and cool to touch compared to other ankle.   Patient is taking Tylenol 4 times per day but not touching the pain.     Protocol Recommended Disposition:   See in Office Today, See More Appropriate Protocol    Recommendation: Routing to provider to review. Will follow DP process as Karl is out of office this afternoon.  Would ortho consult be recommended at this point? Present to urgent care? Further imaging?    Routed to provider    Does the patient meet one of the following criteria for ADS visit consideration? 16+ years old, with an [facility] PCP     TIP  Providers, please consider if this condition is appropriate for management at one of our Acute and Diagnostic Services sites.     If patient is a good candidate, please use dotphrase <dot>triageresponse and select Refer to ADS to document.    Caprice CHAUDHARY, RN     Reason for Disposition   Followed an injury   SEVERE pain (e.g., excruciating)    Additional Information   Negative: Major bleeding (actively dripping or spurting) that can't be stopped   Negative:  Amputation or bone sticking through the skin   Negative: Looks like a dislocated joint (crooked or deformed)   Negative: Serious injury with multiple fractures (broken bones)   Negative: Sounds like a life-threatening emergency to the triager   Negative: Wound looks infected   Negative: Caused by an animal bite   Negative: Puncture wound of foot   Negative: Toe injury is main symptom   Negative: Cast problems or questions   Negative: Bullet, stabbed by knife or other serious penetrating wound   Negative: Can't stand (bear weight) or walk (e.g., 4 steps)   Negative: Skin is split open or gaping (length > 1/2 inch or 12 mm)   Negative: Bleeding won't stop after 10 minutes of direct pressure (using correct technique)   Negative: Dirt in the wound and not removed after 15 minutes of scrubbing   Negative: Numbness (new loss of sensation) of toe(s)   Negative: Looks infected (e.g., spreading redness, red streak, pus)   Negative: Sounds like a serious injury to the triager    Protocols used: Ankle Pain-A-OH, Ankle and Foot Injury-A-OH

## 2024-06-29 DIAGNOSIS — M54.42 ACUTE BILATERAL LOW BACK PAIN WITH LEFT-SIDED SCIATICA: ICD-10-CM

## 2024-07-01 ENCOUNTER — TRANSFERRED RECORDS (OUTPATIENT)
Dept: HEALTH INFORMATION MANAGEMENT | Facility: CLINIC | Age: 29
End: 2024-07-01
Payer: COMMERCIAL

## 2024-07-01 RX ORDER — METHOCARBAMOL 750 MG/1
750 TABLET, FILM COATED ORAL 3 TIMES DAILY
Qty: 90 TABLET | Refills: 1 | Status: SHIPPED | OUTPATIENT
Start: 2024-07-01 | End: 2024-09-04

## 2024-07-07 DIAGNOSIS — M54.42 ACUTE BILATERAL LOW BACK PAIN WITH LEFT-SIDED SCIATICA: ICD-10-CM

## 2024-07-08 RX ORDER — MELOXICAM 7.5 MG/1
TABLET ORAL
Qty: 180 TABLET | Refills: 0 | Status: SHIPPED | OUTPATIENT
Start: 2024-07-08 | End: 2024-10-07

## 2024-08-08 ENCOUNTER — MYC MEDICAL ADVICE (OUTPATIENT)
Dept: FAMILY MEDICINE | Facility: OTHER | Age: 29
End: 2024-08-08
Payer: COMMERCIAL

## 2024-08-19 ENCOUNTER — VIRTUAL VISIT (OUTPATIENT)
Dept: FAMILY MEDICINE | Facility: OTHER | Age: 29
End: 2024-08-19
Payer: COMMERCIAL

## 2024-08-19 DIAGNOSIS — G89.29 CHRONIC LEFT-SIDED LOW BACK PAIN WITH LEFT-SIDED SCIATICA: Primary | ICD-10-CM

## 2024-08-19 DIAGNOSIS — M54.16 LUMBAR RADICULOPATHY: ICD-10-CM

## 2024-08-19 DIAGNOSIS — F60.3 BORDERLINE PERSONALITY DISORDER IN ADULT (H): ICD-10-CM

## 2024-08-19 DIAGNOSIS — M54.42 CHRONIC LEFT-SIDED LOW BACK PAIN WITH LEFT-SIDED SCIATICA: Primary | ICD-10-CM

## 2024-08-19 DIAGNOSIS — F31.81 BIPOLAR II DISORDER (H): ICD-10-CM

## 2024-08-19 PROCEDURE — 99214 OFFICE O/P EST MOD 30 MIN: CPT | Mod: 95 | Performed by: PHYSICIAN ASSISTANT

## 2024-08-19 RX ORDER — CLONAZEPAM 0.5 MG/1
TABLET ORAL
COMMUNITY
Start: 2024-02-05

## 2024-08-19 RX ORDER — PREGABALIN 150 MG/1
150 CAPSULE ORAL 3 TIMES DAILY
COMMUNITY
Start: 2024-08-09

## 2024-08-19 RX ORDER — SPIRONOLACTONE 25 MG/1
TABLET ORAL
COMMUNITY

## 2024-08-19 RX ORDER — PREGABALIN 100 MG/1
CAPSULE ORAL
COMMUNITY
Start: 2024-08-02

## 2024-08-19 RX ORDER — KETOCONAZOLE 20 MG/ML
SHAMPOO TOPICAL
COMMUNITY
Start: 2024-08-02

## 2024-08-19 RX ORDER — MUPIROCIN 20 MG/G
OINTMENT TOPICAL
COMMUNITY
Start: 2024-06-26 | End: 2024-09-09

## 2024-08-19 RX ORDER — CLOBETASOL PROPIONATE 0.5 MG/ML
SOLUTION TOPICAL
COMMUNITY
Start: 2024-08-02

## 2024-08-19 RX ORDER — DEXTROAMPHETAMINE SACCHARATE, AMPHETAMINE ASPARTATE MONOHYDRATE, DEXTROAMPHETAMINE SULFATE AND AMPHETAMINE SULFATE 7.5; 7.5; 7.5; 7.5 MG/1; MG/1; MG/1; MG/1
CAPSULE, EXTENDED RELEASE ORAL
COMMUNITY
Start: 2024-08-15

## 2024-08-19 ASSESSMENT — PATIENT HEALTH QUESTIONNAIRE - PHQ9
SUM OF ALL RESPONSES TO PHQ QUESTIONS 1-9: 15
10. IF YOU CHECKED OFF ANY PROBLEMS, HOW DIFFICULT HAVE THESE PROBLEMS MADE IT FOR YOU TO DO YOUR WORK, TAKE CARE OF THINGS AT HOME, OR GET ALONG WITH OTHER PEOPLE: VERY DIFFICULT
SUM OF ALL RESPONSES TO PHQ QUESTIONS 1-9: 15

## 2024-08-19 NOTE — PROGRESS NOTES
Caprice is a 28 year old who is being evaluated via a billable video visit.    How would you like to obtain your AVS? MyChart  If the video visit is dropped, the invitation should be resent by: Text to cell phone: [phone]  Will anyone else be joining your video visit? No      Assessment & Plan     Chronic left-sided low back pain with left-sided sciatica  Lumbar radiculopathy  Will try Tizanidine, also consider Baclofen. Advised no driving/operating machinery while on medication until we see if she has any side effects. She can still use Methocarbamol if needed but advised to use one or the other not both at the same time. No adjustments to her Pregabalin since Psychiatry is managing.  At this point recommended Pain Management evaluation to determine a better plan of attack for her chronic pain  - Pain Management  Referral; Future  - tiZANidine (ZANAFLEX) 4 MG tablet; Take 1 tablet (4 mg) by mouth 3 times daily    Borderline personality disorder in adult (H)  Bipolar II disorder (H)  Still seeing Psychiatry.   We did discuss that some of her medications especially Wegovy, Adderall, and Spironolactone are not recommended in pregnancy      Options for treatment and follow-up care were reviewed with the patient and/or guardian. Patient and/or guardian engaged in the decision making process and verbalized understanding of the options discussed and agreed with the final plan.    Subjective   Caprice is a 28 year old, presenting for the following health issues:  No chief complaint on file.    History of Present Illness       Reason for visit:  Medication management    She eats 2-3 servings of fruits and vegetables daily.She consumes 2 sweetened beverage(s) daily.She exercises with enough effort to increase her heart rate 20 to 29 minutes per day.  She exercises with enough effort to increase her heart rate 3 or less days per week.   She is taking medications regularly.     - She isn't sure if she needs to change  her muscle relaxer, she is in constant pain, she will walk and her hips feel like they are crunching  - Methocarbamol isn't working anymore  - Her psychiatrist is the one prescribing the Pregabalin because it is also used for her anxiety      Review of Systems  Constitutional, HEENT, cardiovascular, pulmonary, GI, , musculoskeletal, neuro, skin, endocrine and psych systems are negative, except as otherwise noted.      Objective           Vitals:  No vitals were obtained today due to virtual visit.    Physical Exam   GENERAL: alert and no distress  EYES: Eyes grossly normal to inspection.  No discharge or erythema, or obvious scleral/conjunctival abnormalities.  RESP: No audible wheeze, cough, or visible cyanosis.    SKIN: Visible skin clear. No significant rash, abnormal pigmentation or lesions.  NEURO: Cranial nerves grossly intact.  Mentation and speech appropriate for age.  PSYCH: Appropriate affect, tone, and pace of words          Video-Visit Details    Type of service:  Video Visit   Originating Location (pt. Location): Home    Distant Location (provider location):  Off-site  Platform used for Video Visit: Tiffanie  Signed Electronically by: Junior Roe PA-C

## 2024-08-21 NOTE — TELEPHONE ENCOUNTER
REASON FOR VISIT: Brain fog, Blurred vision, Migraine without status migrainosus, not intractable, unspecified migraine    DATE OF APPT: 8/22/2024   NOTES (FOR ALL VISITS) STATUS DETAILS   OFFICE NOTE from referring provider Internal [facility]  Junior Roe PA-C   MEDICATION LIST Internal    IMAGING  (FOR ALL VISITS)     XR N/A    MRI (HEAD, NECK, SPINE) N/A    CT (HEAD, NECK, SPINE) N/A

## 2024-08-22 ENCOUNTER — PRE VISIT (OUTPATIENT)
Dept: NEUROLOGY | Facility: CLINIC | Age: 29
End: 2024-08-22

## 2024-08-22 ENCOUNTER — OFFICE VISIT (OUTPATIENT)
Dept: NEUROLOGY | Facility: CLINIC | Age: 29
End: 2024-08-22
Payer: COMMERCIAL

## 2024-08-22 ENCOUNTER — TELEPHONE (OUTPATIENT)
Dept: NEUROLOGY | Facility: CLINIC | Age: 29
End: 2024-08-22

## 2024-08-22 VITALS
HEIGHT: 68 IN | WEIGHT: 203 LBS | DIASTOLIC BLOOD PRESSURE: 87 MMHG | BODY MASS INDEX: 30.77 KG/M2 | SYSTOLIC BLOOD PRESSURE: 131 MMHG | HEART RATE: 87 BPM

## 2024-08-22 DIAGNOSIS — R41.89 BRAIN FOG: ICD-10-CM

## 2024-08-22 DIAGNOSIS — R25.1 TREMOR: ICD-10-CM

## 2024-08-22 DIAGNOSIS — G43.719 INTRACTABLE CHRONIC MIGRAINE WITHOUT AURA AND WITHOUT STATUS MIGRAINOSUS: Primary | ICD-10-CM

## 2024-08-22 DIAGNOSIS — R41.3 MEMORY CHANGES: ICD-10-CM

## 2024-08-22 PROCEDURE — G2211 COMPLEX E/M VISIT ADD ON: HCPCS | Performed by: STUDENT IN AN ORGANIZED HEALTH CARE EDUCATION/TRAINING PROGRAM

## 2024-08-22 PROCEDURE — 99205 OFFICE O/P NEW HI 60 MIN: CPT | Performed by: STUDENT IN AN ORGANIZED HEALTH CARE EDUCATION/TRAINING PROGRAM

## 2024-08-22 RX ORDER — PROPRANOLOL HCL 60 MG
60 CAPSULE, EXTENDED RELEASE 24HR ORAL DAILY
Qty: 90 CAPSULE | Refills: 1 | Status: SHIPPED | OUTPATIENT
Start: 2024-08-22 | End: 2024-09-04
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
## 2024-08-22 NOTE — PATIENT INSTRUCTIONS
Magnesium oxide 400 mg Riboflavin (vitamin b2) 400 mg daily  Nurtec 75 mg as needed              Neuropsychlogical testing an option for brain fog  OT

## 2024-08-22 NOTE — TELEPHONE ENCOUNTER
PRIOR AUTHORIZATION DENIED    Medication: RIMEGEPANT SULFATE 75 MG PO TBDP  Insurance Company: [employer] - Phone [phone] Fax [phone]  Denial Date: 8/22/2024  Denial Reason(s):     Appeal Information:     Patient Notified: No
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
## 2024-08-22 NOTE — NURSING NOTE
"Caprice Kline's goals for this visit include:   Chief Complaint   Patient presents with    New Patient     Brain fog, Blurred vision, Migraine without status migrainosus, not intractable,   unspecified migraine type Ref Junior Roe PA-C scheduled by patient records   in [facility]-patient given numbers to radha and Zev   3/21 [facility] ENDING#[id number]          She requests these members of her care team be copied on today's visit information: yes    PCP: Junior Roe    Referring Provider:  Junior Roe PA-C  [address] [place] [address]    /87 (BP Location: Right arm, Patient Position: Sitting, Cuff Size: Adult Regular)   Pulse 87   Ht 1.734 m (5' 8.25\")   Wt 92.1 kg (203 lb)   LMP 06/18/2024   BMI 30.64 kg/m      Do you need any medication refills at today's visit? No  KYLER Renteria, LORETTA ([facility])      "

## 2024-08-29 ENCOUNTER — MYC MEDICAL ADVICE (OUTPATIENT)
Dept: NEUROLOGY | Facility: CLINIC | Age: 29
End: 2024-08-29
Payer: COMMERCIAL

## 2024-08-29 NOTE — TELEPHONE ENCOUNTER
We were going to try Nurtec for rescue but if she is willing to trial one of the monthly injection options that is just fine too!  If her pulse and BP are holding up OK we could push propranolol higher.     Up to her if she wants to do a video check in sooner that is an option re the one you have on hold!

## 2024-09-03 DIAGNOSIS — M54.42 ACUTE BILATERAL LOW BACK PAIN WITH LEFT-SIDED SCIATICA: ICD-10-CM

## 2024-09-04 ENCOUNTER — VIRTUAL VISIT (OUTPATIENT)
Dept: NEUROLOGY | Facility: CLINIC | Age: 29
End: 2024-09-04
Payer: COMMERCIAL

## 2024-09-04 DIAGNOSIS — R25.1 TREMOR: ICD-10-CM

## 2024-09-04 DIAGNOSIS — G43.719 INTRACTABLE CHRONIC MIGRAINE WITHOUT AURA AND WITHOUT STATUS MIGRAINOSUS: Primary | ICD-10-CM

## 2024-09-04 PROCEDURE — 99214 OFFICE O/P EST MOD 30 MIN: CPT | Mod: 95 | Performed by: STUDENT IN AN ORGANIZED HEALTH CARE EDUCATION/TRAINING PROGRAM

## 2024-09-04 PROCEDURE — G2211 COMPLEX E/M VISIT ADD ON: HCPCS | Mod: 95 | Performed by: STUDENT IN AN ORGANIZED HEALTH CARE EDUCATION/TRAINING PROGRAM

## 2024-09-04 RX ORDER — PROPRANOLOL HYDROCHLORIDE 20 MG/1
20 TABLET ORAL 2 TIMES DAILY PRN
Qty: 60 TABLET | Refills: 4 | Status: SHIPPED | OUTPATIENT
Start: 2024-09-04 | End: 2024-09-06

## 2024-09-04 RX ORDER — PROPRANOLOL HYDROCHLORIDE 80 MG/1
80 CAPSULE, EXTENDED RELEASE ORAL DAILY
Qty: 90 CAPSULE | Refills: 1 | Status: SHIPPED | OUTPATIENT
Start: 2024-09-04

## 2024-09-04 RX ORDER — METHOCARBAMOL 750 MG/1
750 TABLET, FILM COATED ORAL 3 TIMES DAILY
Qty: 90 TABLET | Refills: 1 | Status: SHIPPED | OUTPATIENT
Start: 2024-09-04

## 2024-09-04 NOTE — PROGRESS NOTES
Caprice is a 29 year old who is being evaluated via a billable video visit.        Type of service:  Video Visit   Originating Location (pt. Location): Work    Distant Location (provider location):  On-site  Platform used for Video Visit: [employer]    Video timing 1229-7279 AM

## 2024-09-04 NOTE — PATIENT INSTRUCTIONS
Keep an eye on BP  Increase propranolol to 80 mg daily + 20 mg BIDPRN  Ubrelvy PRN for bad headaches

## 2024-09-04 NOTE — LETTER
9/4/2024      Caprice Kline  [address] [address]      Dear Colleague,    Thank you for referring your patient, Caprice Kline, to the [facility] [place]. Please see a copy of my visit note below.    [facility]  Section of General Neurology  Return Patient  Virtual Visit    Caprice Kline MRN# [id number]   Age: 29 year old YOB: 1995            Assessment and Plan:   Assessment:  Caprice Kline is a 29 year old female who presents today in close follow up.  She has a PMH of bipolar, depression with h/o SI, BPD, recent preseptal cellulitis among other PMH as below.  She returns primarily to discuss worsening tremor.   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  Stress and poor sleep may be variables at times too.   Discussed options, will maximize propranolol as able.  Side effects re-discussed. She will monitor her pulse/BP.   Nurtec not covered, will trial Ubrelvy.  Hopefully higher propranolol dosing can improve headaches as well.      Plan:  Increase propranolol to 80 mg daily + 20 mg BIDPRN  Ubrelvy PRN for bad headaches  Has existing follow up    Louie Goldman MD   of Neurology   [facility]      Interval history:     She is seen in close follow up today given that tremor is not improved   Headaches--had a bad one over the week  Sometimes random, sometimes related to stress/poor sleep.    Nurtec--denied this.    Ubrelvy--will try this instead     A/P at previous visit  Caprice Kline is a 28 year old female who presents today for evaluation of headaches  She has a PMH of bipolar, depression with h/o SI, BPD, recent preseptal cellulitis among other PMH as below.   Her headaches seem migrainous in nature.  We discussed that her other diffuse pains can seep in at times confounding issues further.  We will treat with migraine specific  "treatments and see what we can improve.  She has failed imitrex and now maxalt of note.   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  As mood, pain, etc improve these can improve too.  Brain fog:  These same risk factors (Mood, sleep pain) are the biggest variables for brain fog in the young.  She can work with OT to get some ideas to improve/so these don't alter her work day as much.  Discussed neuropsychological testing, MRI brain as future steps but likely of lower yield actionably.       Plan:  Magnesium oxide 400 mg Riboflavin (vitamin b2) 400 mg daily  Propranolol 60 mg daily  Nurtec 75 mg as needed  Discussed side effects, teratogenicity concerns e.g. for all  OT referral   Follow up in ~3 months, to reach out sooner with any issues or questions      Past Medical History:     Patient Active Problem List   Diagnosis     Lumbar radiculopathy     Severe episode of recurrent major depressive disorder, without psychotic features (H)     Suicide attempt (H)     Bipolar II disorder (H)     Borderline personality disorder in adult (H)     FH: colon cancer     Anaphylactic shock due to shellfish, initial encounter     Left-sided low back pain with left-sided sciatica     Past Medical History:   Diagnosis Date     Complication of anesthesia     \"freaking out when I wake up\"     Depressive disorder 2011        Past Surgical History:     Past Surgical History:   Procedure Laterality Date     DISCECTOMY LUMBAR POSTERIOR MICROSCOPIC ONE LEVEL Left 08/24/2021    Procedure: Minimally invasive left Lumbar 5 to Sacral 1 microdiscectomy  ;  Surgeon: Destin Junior MD;  Location:  OR     GENITOURINARY SURGERY  2012    Fallopian tube removal and cyst removal     GYN SURGERY Right     " cystectomy - with salpingectomy     HEAD & NECK SURGERY      tonsillectomy     HEAD & NECK SURGERY      wisdom teeth extraction        Social History:     Social History     Tobacco Use     Smoking status: Former     Current packs/day: 0.00     Types: Cigarettes, Vaping Device     Passive exposure: Current     Smokeless tobacco: Never   Vaping Use     Vaping status: Every Day     Substances: Nicotine     Devices: [employer] tank   Substance Use Topics     Alcohol use: Not Currently     Comment: 2 drinks every other day      Drug use: Not Currently        Family History:     Family History   Problem Relation Age of Onset     Hypertension Mother      Colon Polyps Mother 50     Substance Abuse Father      Colon Cancer Father 60     Diabetes Maternal Grandmother      Hypertension Maternal Grandmother      Colon Cancer Maternal Grandmother      Diabetes Paternal Grandmother         Medications:     Current Outpatient Medications   Medication Sig Dispense Refill     albuterol (PROAIR HFA/PROVENTIL HFA/VENTOLIN HFA) 108 (90 Base) MCG/ACT inhaler Inhale 2 puffs into the lungs every 6 hours as needed for shortness of breath, wheezing or cough 18 g 0     amphetamine-dextroamphetamine (ADDERALL XR) 30 MG 24 hr capsule        bisacodyl (DULCOLAX) 5 MG EC tablet Take 5 mg by mouth daily as needed for constipation       clobetasol (TEMOVATE) 0.05 % external solution APPLY TOPICALLY TO THE AFFECTED AREA TWICE DAILY FOR UP TO 21 DAYS. DO NOT APPLY TO NORMAL SKIN       clonazePAM (KLONOPIN) 0.5 MG tablet        doxepin (SINEQUAN) 10 MG capsule Take 10 mg by mouth At Bedtime       EPINEPHrine (ANY BX GENERIC EQUIV) 0.3 MG/0.3ML injection 2-pack INJECT 0.3 ML INTO THE MUSCLE AS NEEDED FOR ANAPHYLAXIS 2 each 1     hydrOXYzine (VISTARIL) 50 MG capsule TAKE 1 TO 2 CAPSULES BY MOUTH THREE TIMES DAILY       ipratropium-albuterol (COMBIVENT RESPIMAT)  MCG/ACT inhaler Inhale 1 puff into the lungs 4 times daily as needed for shortness  of breath, wheezing or cough 4 g 0     ketoconazole (NIZORAL) 2 % external shampoo APPLY TOPICALLY TO THE AFFECTED AREA 3 TIMES EVERY WEEK. LEAVE IN 5-10 MINUTES BEFORE WASHING OUT       lamoTRIgine (LAMICTAL) 100 MG tablet Take 100 mg by mouth 2 times daily       lithium (ESKALITH) 600 MG capsule        meloxicam (MOBIC) 7.5 MG tablet TAKE 1-2 TABLETS BY MOUTH EVERY  tablet 0     methocarbamol (ROBAXIN) 750 MG tablet TAKE 1 TABLET BY MOUTH THREE TIMES DAILY 90 tablet 1     mupirocin (BACTROBAN) 2 % external ointment APPLY THREE TIMES DAILY X10 DAYS ON AFFECTED AREAS       ondansetron (ZOFRAN ODT) 4 MG ODT tab DISSOLVE 1 TABLET(4 MG) ON THE TONGUE EVERY 8 HOURS AS NEEDED FOR NAUSEA 90 tablet 0     polyethylene glycol (MIRALAX) 17 GM/Dose powder Take 1 Capful by mouth daily       prazosin (MINIPRESS) 2 MG capsule Take 2 mg by mouth 2 times daily       pregabalin (LYRICA) 100 MG capsule TAKE 1 CAPSULE BY MOUTH EVERY MORNING IN ADDITION  MG 3 TIMES DAILY       pregabalin (LYRICA) 150 MG capsule Take 150 mg by mouth 3 times daily       propranolol ER (INDERAL LA) 60 MG 24 hr capsule Take 1 capsule (60 mg) by mouth daily. 90 capsule 1     QUEtiapine (SEROQUEL) 300 MG tablet Take 300 mg by mouth At Bedtime       rimegepant (NURTEC) 75 MG ODT tablet Place 1 tablet (75 mg) under the tongue every 48 hours. 8 tablet 11     rizatriptan (MAXALT) 5 MG tablet TAKE 1 TABLET(5 MG) BY MOUTH AT ONSET OF HEADACHE FOR MIGRAINE. MAY REPEAT IN 2 HOURS. MAX 6 TABLETS/ 24 HOURS 12 tablet 1     Semaglutide-Weight Management (WEGOVY) 0.5 MG/0.5ML pen Inject 0.5 mg Subcutaneous once a week 2 mL 0     spironolactone (ALDACTONE) 25 MG tablet        tiZANidine (ZANAFLEX) 4 MG tablet Take 1 tablet (4 mg) by mouth 3 times daily 90 tablet 1     No current facility-administered medications for this visit.        Allergies:     Allergies   Allergen Reactions     Metronidazole Anaphylaxis and Hives     Other reaction(s): Throat  "swelling  Throat swelling 6 hrs after exposure  Itching and hives 2 hrs after exposure     Shellfish Allergy Anxiety, Diarrhea, Difficulty breathing, Hives, Itching, Rash, Shortness Of Breath and Swelling     Fish-Derived Products      Stopped fish oil and less stomach discomfort  Rash after eating fish.      Hydrocodone-Acetaminophen Other (See Comments)     Sertraline Other (See Comments)     Patient was foggy and \"high\" feeling     Shellfish-Derived Products      Lips get numb, throat gets scratchy, rashes        Review of Systems:   As noted above     Physical Exam:   General: Seated comfortably in no acute distress.  Neurologic:     Mental Status: Fully alert, attentive and oriented. Speech clear and fluent, no paraphasic errors.     Cranial Nerves  Facial movements symmetric. Hearing not formally tested but intact to conversation.  No dysarthria.     Motor: faint action tremor present  bilaterally     Sensory:Not able to be tested virtually                 The longitudinal plan of care for tremor, headaches was addressed during this visit. Due to the added complexity in care, I will continue to support Ms Kline in the subsequent management of this condition(s) and with the ongoing continuity of care of this condition(s).      Caprice is a 29 year old who is being evaluated via a billable video visit.        Type of service:  Video Visit   Originating Location (pt. Location): Work    Distant Location (provider location):  On-site  Platform used for Video Visit: "[employer]    Video timing 8746-9594 AM      Again, thank you for allowing me to participate in the care of your patient.        Sincerely,        Karl Goldman MD  "

## 2024-09-04 NOTE — PROGRESS NOTES
[facility]  Section of General Neurology  Return Patient  Virtual Visit    Caprice Kline MRN# [id number]   Age: 29 year old YOB: 1995            Assessment and Plan:   Assessment:  Caprice Kline is a 29 year old female who presents today in close follow up.  She has a PMH of bipolar, depression with h/o SI, BPD, recent preseptal cellulitis among other PMH as below.  She returns primarily to discuss worsening tremor.   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  Stress and poor sleep may be variables at times too.   Discussed options, will maximize propranolol as able.  Side effects re-discussed. She will monitor her pulse/BP.   Nurtec not covered, will trial Ubrelvy.  Hopefully higher propranolol dosing can improve headaches as well.      Plan:  Increase propranolol to 80 mg daily + 20 mg BIDPRN  Ubrelvy PRN for bad headaches  Has existing follow up    Louie Goldman MD   of Neurology   [facility]      Interval history:     She is seen in close follow up today given that tremor is not improved   Headaches--had a bad one over the week  Sometimes random, sometimes related to stress/poor sleep.    Nurtec--denied this.    Ubrelvy--will try this instead     A/P at previous visit  Caprice Kline is a 28 year old female who presents today for evaluation of headaches  She has a PMH of bipolar, depression with h/o SI, BPD, recent preseptal cellulitis among other PMH as below.   Her headaches seem migrainous in nature.  We discussed that her other diffuse pains can seep in at times confounding issues further.  We will treat with migraine specific treatments and see what we can improve.  She has failed imitrex and now maxalt of note.   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  As mood, pain, etc improve these can improve too.  Brain fog:  These same risk factors (Mood, sleep pain) are the biggest variables for brain fog in the young.  She can work with OT to get some ideas to improve/so these don't alter her work day as much.  Discussed neuropsychological testing, MRI brain as future steps but likely of lower yield actionably.       Plan:  Magnesium oxide 400 mg Riboflavin (vitamin b2) 400 mg daily  Propranolol 60 mg daily  Nurtec 75 mg as needed  Discussed side effects, teratogenicity concerns e.g. for all  OT referral   Follow up in ~3 months, to reach out sooner with any issues or questions      Past Medical History:     Patient Active Problem List   Diagnosis    Lumbar radiculopathy    Severe episode of recurrent major depressive disorder, without psychotic features (H)    Suicide attempt (H)    Bipolar II disorder (H)    Borderline personality disorder in adult (H)    FH: colon cancer    Anaphylactic shock due to shellfish, initial encounter    Left-sided low back pain with left-sided sciatica     Past Medical History:   Diagnosis Date    Complication of anesthesia     \"freaking out when I wake up\"    Depressive disorder 2011        Past Surgical History:     Past Surgical History:   Procedure Laterality Date    DISCECTOMY LUMBAR POSTERIOR MICROSCOPIC ONE LEVEL Left 08/24/2021    Procedure: Minimally invasive left Lumbar 5 to Sacral 1 microdiscectomy  ;  Surgeon: Destin Junior MD;  Location:  OR    GENITOURINARY SURGERY  2012    Fallopian tube removal and cyst removal    GYN SURGERY Right     cystectomy - with salpingectomy    HEAD & NECK SURGERY      tonsillectomy    HEAD & NECK SURGERY      wisdom teeth extraction        Social History:     Social History     Tobacco Use    Smoking status: Former     Current packs/day: 0.00     Types: Cigarettes, Vaping " Device     Passive exposure: Current    Smokeless tobacco: Never   Vaping Use    Vaping status: Every Day    Substances: Nicotine    Devices: Supersolid   Substance Use Topics    Alcohol use: Not Currently     Comment: 2 drinks every other day     Drug use: Not Currently        Family History:     Family History   Problem Relation Age of Onset    Hypertension Mother     Colon Polyps Mother 50    Substance Abuse Father     Colon Cancer Father 60    Diabetes Maternal Grandmother     Hypertension Maternal Grandmother     Colon Cancer Maternal Grandmother     Diabetes Paternal Grandmother         Medications:     Current Outpatient Medications   Medication Sig Dispense Refill    albuterol (PROAIR HFA/PROVENTIL HFA/VENTOLIN HFA) 108 (90 Base) MCG/ACT inhaler Inhale 2 puffs into the lungs every 6 hours as needed for shortness of breath, wheezing or cough 18 g 0    amphetamine-dextroamphetamine (ADDERALL XR) 30 MG 24 hr capsule       bisacodyl (DULCOLAX) 5 MG EC tablet Take 5 mg by mouth daily as needed for constipation      clobetasol (TEMOVATE) 0.05 % external solution APPLY TOPICALLY TO THE AFFECTED AREA TWICE DAILY FOR UP TO 21 DAYS. DO NOT APPLY TO NORMAL SKIN      clonazePAM (KLONOPIN) 0.5 MG tablet       doxepin (SINEQUAN) 10 MG capsule Take 10 mg by mouth At Bedtime      EPINEPHrine (ANY BX GENERIC EQUIV) 0.3 MG/0.3ML injection 2-pack INJECT 0.3 ML INTO THE MUSCLE AS NEEDED FOR ANAPHYLAXIS 2 each 1    hydrOXYzine (VISTARIL) 50 MG capsule TAKE 1 TO 2 CAPSULES BY MOUTH THREE TIMES DAILY      ipratropium-albuterol (COMBIVENT RESPIMAT)  MCG/ACT inhaler Inhale 1 puff into the lungs 4 times daily as needed for shortness of breath, wheezing or cough 4 g 0    ketoconazole (NIZORAL) 2 % external shampoo APPLY TOPICALLY TO THE AFFECTED AREA 3 TIMES EVERY WEEK. LEAVE IN 5-10 MINUTES BEFORE WASHING OUT      lamoTRIgine (LAMICTAL) 100 MG tablet Take 100 mg by mouth 2 times daily      lithium (ESKALITH) 600 MG capsule        meloxicam (MOBIC) 7.5 MG tablet TAKE 1-2 TABLETS BY MOUTH EVERY  tablet 0    methocarbamol (ROBAXIN) 750 MG tablet TAKE 1 TABLET BY MOUTH THREE TIMES DAILY 90 tablet 1    mupirocin (BACTROBAN) 2 % external ointment APPLY THREE TIMES DAILY X10 DAYS ON AFFECTED AREAS      ondansetron (ZOFRAN ODT) 4 MG ODT tab DISSOLVE 1 TABLET(4 MG) ON THE TONGUE EVERY 8 HOURS AS NEEDED FOR NAUSEA 90 tablet 0    polyethylene glycol (MIRALAX) 17 GM/Dose powder Take 1 Capful by mouth daily      prazosin (MINIPRESS) 2 MG capsule Take 2 mg by mouth 2 times daily      pregabalin (LYRICA) 100 MG capsule TAKE 1 CAPSULE BY MOUTH EVERY MORNING IN ADDITION  MG 3 TIMES DAILY      pregabalin (LYRICA) 150 MG capsule Take 150 mg by mouth 3 times daily      propranolol ER (INDERAL LA) 60 MG 24 hr capsule Take 1 capsule (60 mg) by mouth daily. 90 capsule 1    QUEtiapine (SEROQUEL) 300 MG tablet Take 300 mg by mouth At Bedtime      rimegepant (NURTEC) 75 MG ODT tablet Place 1 tablet (75 mg) under the tongue every 48 hours. 8 tablet 11    rizatriptan (MAXALT) 5 MG tablet TAKE 1 TABLET(5 MG) BY MOUTH AT ONSET OF HEADACHE FOR MIGRAINE. MAY REPEAT IN 2 HOURS. MAX 6 TABLETS/ 24 HOURS 12 tablet 1    Semaglutide-Weight Management (WEGOVY) 0.5 MG/0.5ML pen Inject 0.5 mg Subcutaneous once a week 2 mL 0    spironolactone (ALDACTONE) 25 MG tablet       tiZANidine (ZANAFLEX) 4 MG tablet Take 1 tablet (4 mg) by mouth 3 times daily 90 tablet 1     No current facility-administered medications for this visit.        Allergies:     Allergies   Allergen Reactions    Metronidazole Anaphylaxis and Hives     Other reaction(s): Throat swelling  Throat swelling 6 hrs after exposure  Itching and hives 2 hrs after exposure    Shellfish Allergy Anxiety, Diarrhea, Difficulty breathing, Hives, Itching, Rash, Shortness Of Breath and Swelling    Fish-Derived Products      Stopped fish oil and less stomach discomfort  Rash after eating fish.      "Hydrocodone-Acetaminophen Other (See Comments)    Sertraline Other (See Comments)     Patient was foggy and \"high\" feeling    Shellfish-Derived Products      Lips get numb, throat gets scratchy, rashes        Review of Systems:   As noted above     Physical Exam:   General: Seated comfortably in no acute distress.  Neurologic:     Mental Status: Fully alert, attentive and oriented. Speech clear and fluent, no paraphasic errors.     Cranial Nerves  Facial movements symmetric. Hearing not formally tested but intact to conversation.  No dysarthria.     Motor: faint action tremor present  bilaterally     Sensory:Not able to be tested virtually                 The longitudinal plan of care for tremor, headaches was addressed during this visit. Due to the added complexity in care, I will continue to support Ms Kline in the subsequent management of this condition(s) and with the ongoing continuity of care of this condition(s).    "

## 2024-09-06 ENCOUNTER — TELEPHONE (OUTPATIENT)
Dept: NEUROSURGERY | Facility: CLINIC | Age: 29
End: 2024-09-06
Payer: COMMERCIAL

## 2024-09-06 DIAGNOSIS — R25.1 TREMOR: ICD-10-CM

## 2024-09-06 RX ORDER — PROPRANOLOL HYDROCHLORIDE 20 MG/1
20 TABLET ORAL 2 TIMES DAILY PRN
Qty: 180 TABLET | Refills: 2 | Status: SHIPPED | OUTPATIENT
Start: 2024-09-06

## 2024-09-06 NOTE — TELEPHONE ENCOUNTER
[facility] on [address] in [place] sent fax stating patient requesting 90 day supply of the following medication     propranolol (INDERAL) 20 MG tablet 60 tablet 4 9/4/2024 -- No   Sig - Route: Take 1 tablet (20 mg) by mouth 2 times

## 2024-09-06 NOTE — TELEPHONE ENCOUNTER
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 PA:  Previously Tried and Failed:    Rationale:      Insurance   Primary:   Insurance ID:      Secondary (if applicable):  Insurance ID:      Pharmacy Information (if different than what is on RX)  Name:  [id number] [place] [address] AT [facility]   Phone:  [phone]   Fax: [phone]

## 2024-09-09 ENCOUNTER — NURSE TRIAGE (OUTPATIENT)
Dept: FAMILY MEDICINE | Facility: OTHER | Age: 29
End: 2024-09-09

## 2024-09-09 ENCOUNTER — VIRTUAL VISIT (OUTPATIENT)
Dept: FAMILY MEDICINE | Facility: CLINIC | Age: 29
End: 2024-09-09
Payer: COMMERCIAL

## 2024-09-09 DIAGNOSIS — U07.1 INFECTION DUE TO 2019 NOVEL CORONAVIRUS: Primary | ICD-10-CM

## 2024-09-09 PROCEDURE — 99441 PR PHYSICIAN TELEPHONE EVALUATION 5-10 MIN: CPT | Mod: 93 | Performed by: FAMILY MEDICINE

## 2024-09-09 NOTE — PROGRESS NOTES
"Caprice is a 29 year old who is being evaluated via a billable telephone visit.    What phone number would you like to be contacted at? [phone]  How would you like to obtain your AVS? Sirena  Originating Location (pt. Location): Home    Distant Location (provider location):  On-site    Assessment & Plan     Infection due to 2019 novel coronavirus  Caprice is a 29-year-old female who presents by virtual phone visit for COVID treatment.  She began symptomatic with fever cough and headache 3 days ago.  She tested earlier this morning and was positive.  She has had COVID previously and treated successfully with Paxlovid.  Reviewed of her past medical history and laboratory.  She would like treatment with Paxlovid.  Reviewed her medication.  Recommended holding her Ubrelvy while she is taking the Paxlovid.  She is on Seroquel at bedtime and recommended decreasing the dose to 150 mg at bedtime for the 5 days that she is on the Paxlovid.  She will follow-up in clinic if symptoms or not improving.  Recommend ongoing isolation until she is asymptomatic and then test for clearance prior to being around others without masking.  - nirmatrelvir and ritonavir (PAXLOVID) 300 mg/100 mg therapy pack; Take 3 tablets by mouth 2 times daily for 5 days. (Take 2 Nirmatrelvir tablets and 1 Ritonavir tablet twice daily for 5 days)          BMI  Estimated body mass index is 30.64 kg/m  as calculated from the following:    Height as of 8/22/24: 1.734 m (5' 8.25\").    Weight as of 8/22/24: 92.1 kg (203 lb).         COVID-19 positive patient.  Encounter for consideration of medication intervention. Patient does qualify for a prescription. Full discussion with patient including medication options, risks and benefits. Potential drug interactions reviewed with patient.     Treatment Planned Paxlovid, Rx sent to [facility] pharmacy       Temporary change to home medications: Hold Ubrelvy and decrease Seroquel to 150 mg at bedtime    Estimated " "body mass index is 30.64 kg/m  as calculated from the following:    Height as of 8/22/24: 1.734 m (5' 8.25\").    Weight as of 8/22/24: 92.1 kg (203 lb).  GFR Estimate   Date Value Ref Range Status   06/20/2024 86 >60 mL/min/1.73m2 Final     Comment:     eGFR calculated using 2021 CKD-EPI equation.     Lab Results   Component Value Date    [id number] Negative 08/21/2021       Hector Vasques is a 29 year old, presenting for the following health issues:  Covid Concern      9/9/2024    11:23 AM   Additional Questions   Roomed by Lexy     History of Present Illness       Reason for visit:  Covid+  Symptom onset:  1-3 days ago  Symptoms include:  Sore throat, chest hurts, body aches, fever 101.5 per pt. cough  Symptom intensity:  Moderate  Symptom progression:  Worsening  Had these symptoms before:  Yes  Has tried/received treatment for these symptoms:  Yes  Previous treatment was successful:  Yes  Prior treatment description:  Paxlovid  What makes it worse:  Bending  What makes it better:  No   She is taking medications regularly.         COVID-19 Symptom Review  How many days ago did these symptoms start? 3    Are any of the following symptoms significant for you?  New or worsening difficulty breathing? Yes  Please describe what kind of difficulty you are having breathing:Mild dyspnea (able to do ADLs without difficulty, mild shortness of breath with activities such as climbing one or two flights of stairs or walking briskly)  Worsening cough? Yes, it's a dry cough.   Fever or chills? Yes, the highest temperature was 101  Headache: YES  Sore throat: YES  Chest pain: YES  Diarrhea: No  Body aches? YES    What treatments has patient tried? Acetaminophen   Does patient live in a nursing home, group home, or shelter? No  Does patient have a way to get food/medications during quarantined? Yes, I have a friend or family member who can help me.          Patient Active Problem List   Diagnosis    Lumbar radiculopathy    " Severe episode of recurrent major depressive disorder, without psychotic features (H)    Suicide attempt (H)    Bipolar II disorder (H)    Borderline personality disorder in adult (H)    FH: colon cancer    Anaphylactic shock due to shellfish, initial encounter    Left-sided low back pain with left-sided sciatica     Current Outpatient Medications   Medication Sig Dispense Refill    albuterol (PROAIR HFA/PROVENTIL HFA/VENTOLIN HFA) 108 (90 Base) MCG/ACT inhaler Inhale 2 puffs into the lungs every 6 hours as needed for shortness of breath, wheezing or cough 18 g 0    amphetamine-dextroamphetamine (ADDERALL XR) 30 MG 24 hr capsule       bisacodyl (DULCOLAX) 5 MG EC tablet Take 5 mg by mouth daily as needed for constipation      clobetasol (TEMOVATE) 0.05 % external solution APPLY TOPICALLY TO THE AFFECTED AREA TWICE DAILY FOR UP TO 21 DAYS. DO NOT APPLY TO NORMAL SKIN      clonazePAM (KLONOPIN) 0.5 MG tablet       doxepin (SINEQUAN) 10 MG capsule Take 10 mg by mouth At Bedtime      EPINEPHrine (ANY BX GENERIC EQUIV) 0.3 MG/0.3ML injection 2-pack INJECT 0.3 ML INTO THE MUSCLE AS NEEDED FOR ANAPHYLAXIS 2 each 1    hydrOXYzine (VISTARIL) 50 MG capsule TAKE 1 TO 2 CAPSULES BY MOUTH THREE TIMES DAILY      ipratropium-albuterol (COMBIVENT RESPIMAT)  MCG/ACT inhaler Inhale 1 puff into the lungs 4 times daily as needed for shortness of breath, wheezing or cough 4 g 0    ketoconazole (NIZORAL) 2 % external shampoo APPLY TOPICALLY TO THE AFFECTED AREA 3 TIMES EVERY WEEK. LEAVE IN 5-10 MINUTES BEFORE WASHING OUT      lamoTRIgine (LAMICTAL) 100 MG tablet Take 100 mg by mouth 2 times daily      lithium (ESKALITH) 600 MG capsule       meloxicam (MOBIC) 7.5 MG tablet TAKE 1-2 TABLETS BY MOUTH EVERY  tablet 0    methocarbamol (ROBAXIN) 750 MG tablet TAKE 1 TABLET BY MOUTH THREE TIMES DAILY 90 tablet 1    ondansetron (ZOFRAN ODT) 4 MG ODT tab DISSOLVE 1 TABLET(4 MG) ON THE TONGUE EVERY 8 HOURS AS NEEDED FOR NAUSEA 90  tablet 0    prazosin (MINIPRESS) 2 MG capsule Take 2 mg by mouth 2 times daily      pregabalin (LYRICA) 100 MG capsule TAKE 1 CAPSULE BY MOUTH EVERY MORNING IN ADDITION  MG 3 TIMES DAILY      pregabalin (LYRICA) 150 MG capsule Take 150 mg by mouth 3 times daily      propranolol (INDERAL) 20 MG tablet Take 1 tablet (20 mg) by mouth 2 times daily as needed (tremor). 180 tablet 2    propranolol ER (INDERAL LA) 80 MG 24 hr capsule Take 1 capsule (80 mg) by mouth daily. 90 capsule 1    QUEtiapine (SEROQUEL) 300 MG tablet Take 300 mg by mouth At Bedtime      Semaglutide-Weight Management (WEGOVY) 0.5 MG/0.5ML pen Inject 0.5 mg Subcutaneous once a week 2 mL 0    spironolactone (ALDACTONE) 25 MG tablet       tiZANidine (ZANAFLEX) 4 MG tablet Take 1 tablet (4 mg) by mouth 3 times daily 90 tablet 1    ubrogepant (UBRELVY) 100 MG tablet Take 1 tablet (100 mg) by mouth at onset of headache. 8 tablet 11           Review of Systems  Constitutional, HEENT, cardiovascular, pulmonary, gi and gu systems are negative, except as otherwise noted.      Objective    Vitals - Patient Reported  Pain Score: Extreme Pain (8)        Physical Exam   General: Alert and no distress //Respiratory: No audible wheeze, cough, or shortness of breath // Psychiatric:  Appropriate affect, tone, and pace of words            Phone call duration: 5 minutes  Signed Electronically by: Billy Mclean MD

## 2024-09-09 NOTE — TELEPHONE ENCOUNTER
Central Prior Authorization Team - Phone: [phone]     PA Initiation    Medication: UBRELVY 100 MG PO TABS  Insurance Company: [employer] - Phone [phone] Fax [phone]  Pharmacy Filling the Rx: [id number] - [facility] [place] - [address] AT [facility]  Filling Pharmacy Phone: [phone]  Filling Pharmacy Fax:    Start Date: 9/9/2024

## 2024-09-09 NOTE — PATIENT INSTRUCTIONS
Coronavirus (COVID-19): Care Instructions  What is COVID-19?  COVID-19 is a disease caused by a type of coronavirus. This illness was first found in 2019 and has since spread worldwide (pandemic). Symptoms can range from mild, such as fever and body aches, to severe, including trouble breathing. COVID-19 can be deadly.  Coronaviruses are a large group of viruses. Some types cause the common cold. Others cause more serious illnesses like Middle East respiratory syndrome (MERS) and severe acute respiratory syndrome (SARS).  Follow-up care is a key part of your treatment and safety. Be sure to make and go to all appointments, and call your doctor if you are having problems. It's also a good idea to know your test results and keep a list of the medicines you take.  How can you self-isolate when you have COVID-19?  If you have COVID-19, there are things you can do to help avoid spreading the virus to others.  Stay home, and avoid contact with other people.  Limit contact with people in your home. If possible, stay in a separate bedroom and use a separate bathroom.  Wear a high-quality mask when you are around other people.  Improve airflow. If you have to spend time indoors with others, open windows and doors. Or you can use a fan to blow air away from people and out a window.  Avoid contact with pets and other animals.  Cover your mouth and nose with a tissue when you cough or sneeze. Then throw it in the trash right away.  Wash your hands often, especially after you cough or sneeze. Use soap and water, and scrub for at least 20 seconds. If soap and water aren't available, use an alcohol-based hand .  Don't share personal household items. These include bedding, towels, cups and glasses, and eating utensils.  Wash laundry in the warmest water allowed for the fabric type, and dry it completely. It's okay to wash other people's laundry with yours.  Clean and disinfect your home. Use household  and  disinfectant wipes or sprays.  Go to the CDC website at cdc.gov if you have questions.  When can you end self-isolation for COVID-19?  If you know or think that you have the virus, you will need to self-isolate. When you can be around other people you live with and leave home depends on whether you have symptoms. Important: Day 0 is the day your symptoms started or the day you tested positive. Day 1 is the day after your symptoms first started or your test was positive.  If you tested positive but had no symptoms, it's safe to end isolation at the end of Day 5. But if you start to have symptoms, follow the recommendations below, and count your first day of symptoms as Day 0.  If you have symptoms, when you can end isolation depends on how sick you were and your overall health. No matter what, you need to wait until your symptoms are getting better and you haven't had a fever for 24 hours while not taking medicines to lower the fever. Here's how long to isolate, based on your symptoms:  If you were only a little sick: (This means you might have felt really bad but had no shortness of breath and never needed to be in the hospital.) You can end isolation at the end of Day 5.  If you were more sick: (You had some shortness of breath or some trouble breathing but never needed to be in the hospital.) You can end isolation at the end of Day 10.  If you were very sick and needed to be in the hospital, or if you have a weakened immune system: You can end isolation at the end of Day 10 or later. Talk to your doctor to find out when it's safe to end isolation. You may need a viral test.  After you end isolation, if your symptoms come back or get worse: Restart your isolation at Day 0. Do this even if it happens after you took medicine for COVID.  Avoid travel and stay away from people at high risk for serious disease for at least 10 days.  Those who can't wear a mask because they are under 2 years old or have certain  "disabilities should isolate for at least 10 full days.  Call your doctor or seek care if you have questions about your symptoms or when to end isolation.  Go to the CDC website at cdc.gov if you have questions.  Where can you learn more?  Go to https://www.[employer].net/patiented  Enter C007 in the search box to learn more about \"Coronavirus (COVID-19): Care Instructions.\"  Current as of: May 28, 2024  Content Version: 14.1 2006-2024 [employer].   Care instructions adapted under license by your healthcare professional. If you have questions about a medical condition or this instruction, always ask your healthcare professional. [employer] disclaims any warranty or liability for your use of this information.    "

## 2024-09-09 NOTE — TELEPHONE ENCOUNTER
Scheduled now. Closing this encounter as provider has connected with patient.     RN COVID TREATMENT VISIT  09/09/24      The patient has been triaged and does not require a higher level of care.    Caprice Kline  29 year old  Current weight? 203    Has the patient been seen by a primary care provider at an [facility] or [facility] within the past two years? Yes.   Have you been in close proximity to/do you have a known exposure to a person with a confirmed case of influenza? No.     General treatment eligibility:  Date of positive COVID test (PCR or at home)?  9/9/24      Are you or have you been hospitalized for this COVID-19 infection? No.   Have you received monoclonal antibodies or antiviral treatment for COVID-19 since this positive test? No.   Do you have any of the following conditions that place you at risk of being very sick from COVID-19?   - Mental health disorders including mood disorders, depression, schizophrenia spectrum disorders   - Overweight or Obesity (BMI >85th percentile or BMI 25 or higher)  Yes, patient has at least one high risk condition as noted above.     Current COVID symptoms:   - fever or chills  - cough  - shortness of breath or difficulty breathing  - headache  - sore throat  - congestion or runny nose  Yes. Patient has at least one symptom as selected.     How many days since symptoms started? 5 days or less. Established patient, 12 years or older weighing at least 88.2 lbs, who has symptoms that started in the past 5 days, has not been hospitalized nor received treatment already, and is at risk for being very sick from COVID-19.     Treatment eligibility by RN:  Are you currently pregnant or nursing? No  Do you have a clinically significant hypersensitivity to nirmatrelvir or ritonavir, or toxic epidermal necrolysis (TEN) or Nagy-Clemente Syndrome? No  Do you have a history of hepatitis, any hepatic impairment on the Problem List (such as  Child-Kovacs Class C, cirrhosis, fatty liver disease, alcoholic liver disease), or was the last liver lab (hepatic panel, ALT, AST, ALK Phos, bilirubin) elevated in the past 6 months? No BUT was low at last check.   Do you have any history of severe renal impairment (eGFR < 30mL/min)? No        Due to patient's abnormal ALK phos level, RN did schedule patient with provider to review Paxlovid treatment.  Patient requests prescription be sent to [facility] in [place].     Miguel Ángel Sierra RN          Reason for Disposition   HIGH RISK patient (e.g., weak immune system, age > 64 years, obesity with BMI of 30 or higher, pregnant, chronic lung disease or other chronic medical condition) and COVID symptoms (e.g., cough, fever)  (Exceptions: Already seen by doctor or NP/PA and no new or worsening symptoms.)    Additional Information   Negative: Diagnosed or suspected COVID-19 and symptoms lasting 3 or more weeks   Negative: COVID-19 exposure and no symptoms   Negative: COVID-19 vaccine reaction suspected (e.g., fever, headache, muscle aches) occurring 1 to 3 days after getting vaccine   Negative: COVID-19 vaccine, questions about   Negative: Lives with someone known to have influenza (flu test positive) and flu-like symptoms (e.g., cough, runny nose, sore throat, SOB; with or without fever)   Negative: Possible COVID-19 symptoms and triager concerned about severity of symptoms or other causes   Negative: COVID-19 and breastfeeding, questions about   Negative: SEVERE or constant chest pain or pressure  (Exception: Mild central chest pain, present only when coughing.)   Negative: MODERATE difficulty breathing (e.g., speaks in phrases, SOB even at rest, pulse 100-120)   Negative: Headache and stiff neck (can't touch chin to chest)   Negative: Oxygen level (e.g., pulse oximetry) 90% or lower   Negative: Chest pain or pressure  (Exception: MILD central chest pain, present only when coughing.)   Negative: Drinking very little  and dehydration suspected (e.g., no urine > 12 hours, very dry mouth, very lightheaded)   Negative: Patient sounds very sick or weak to the triager   Negative: MILD difficulty breathing (e.g., minimal/no SOB at rest, SOB with walking, pulse <100)   Negative: Fever > 103 F (39.4 C)   Negative: Fever > 101 F (38.3 C) and over 60 years of age   Negative: Fever > 100.0 F (37.8 C) and bedridden (e.g., CVA, chronic illness, recovering from surgery)    Protocols used: Coronavirus (COVID-19) Diagnosed or [id number]

## 2024-09-13 ENCOUNTER — TELEPHONE (OUTPATIENT)
Dept: FAMILY MEDICINE | Facility: OTHER | Age: 29
End: 2024-09-13
Payer: COMMERCIAL

## 2024-09-13 DIAGNOSIS — G43.719 INTRACTABLE CHRONIC MIGRAINE WITHOUT AURA AND WITHOUT STATUS MIGRAINOSUS: Primary | ICD-10-CM

## 2024-09-13 NOTE — TELEPHONE ENCOUNTER
Central Prior Authorization Team - Phone: [phone]     PRIOR AUTHORIZATION DENIED    Medication: UBRELVY 100 MG PO TABS  Insurance Company: [employer] - Phone [phone] Fax [phone]  Denial Date: 9/12/2024    Denial Reason(s):         Appeal Information:       Patient Notified: NO  Unfortunately, we cannot call the patient with denials because we do not know what next steps the MD will take nor can we give medical advice, please notify the patient of what they are to expect for the continuation of their therapy from the provider.

## 2024-09-14 ASSESSMENT — ANXIETY QUESTIONNAIRES
GAD7 TOTAL SCORE: 19
GAD7 TOTAL SCORE: 19
8. IF YOU CHECKED OFF ANY PROBLEMS, HOW DIFFICULT HAVE THESE MADE IT FOR YOU TO DO YOUR WORK, TAKE CARE OF THINGS AT HOME, OR GET ALONG WITH OTHER PEOPLE?: EXTREMELY DIFFICULT
7. FEELING AFRAID AS IF SOMETHING AWFUL MIGHT HAPPEN: SEVERAL DAYS
GAD7 TOTAL SCORE: 19

## 2024-09-14 ASSESSMENT — PAIN SCALES - PAIN ENJOYMENT GENERAL ACTIVITY SCALE (PEG)
INTERFERED_GENERAL_ACTIVITY: 9
PEG_TOTALSCORE: 8.33
AVG_PAIN_PASTWEEK: 7
PEG_TOTALSCORE: 8.33
INTERFERED_ENJOYMENT_LIFE: 9

## 2024-09-16 ENCOUNTER — TELEPHONE (OUTPATIENT)
Dept: NEUROLOGY | Facility: CLINIC | Age: 29
End: 2024-09-16

## 2024-09-16 NOTE — TELEPHONE ENCOUNTER
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 PA:  Previously Tried and Failed:    Rationale:      Insurance   Primary:   Insurance ID:      Secondary (if applicable):  Insurance ID:      Pharmacy Information (if different than what is on RX)  Name:  [id number] [place] [address] AT [facility]   Phone:  [phone]   Fax: [phone]     [employer]  Tony: [id number]

## 2024-09-16 NOTE — PROGRESS NOTES
[facility] Consultation    Date of visit: 9/17/2024    Reason for consultation:    Caprice Kline is a 29 year old female who is seen in consultation today at the request of her provider, Junior Roe PA-C of Primary Care  re: patient's chronic left-sided low back pain with left-sided sciatica and lumbar radiculopathy in the setting of mental illness/communication difficulties, patient with Bipolar disorder and working with psychiatry monthly.       Primary Care Provider is Junior Roe.  Pain medications are being prescribed by not on opiates.      Pain Questionnaire    What number best describes your pain right now: 7  (0 = No pain to 10 = Worst pain imaginable)    How would you describe the pain? burning, cramping, sharp, numbness, cutting, dull, aching, throbbing, pressure    Which of the following worsen your pain? standing, sitting, walking, exercise    Which of the following improve or reduce your pain? nothing relieves the pain    What number best describes your average pain for the past week: 7  (0 = No pain to 10 = Worst pain imaginable)    What number best describes your LOWEST pain in past 24 hours: 6  (0 = No pain to 10 = Worst pain imaginable)    What number best describes your WORST pain in past 24 hours: 8  (0 = No pain to 10 = Worst pain imaginable)    When is your pain worst? Constant    What non-medicine treatments have you already had for your pain? physical therapy, acupuncture, TENS (electrical stimulator), spine injections (shots), surgery, exercise    Have you tried treating your pain with medication? Yes    If yes, please answer the below questions -     What topical medications have you tried in the past but are no longer taking? Lidoderm patch, Other gels, creams, patches or ointments    What anti-convulsants (seizure medicines) have you tried in the past but are no longer taking? Gabapentin (Neurontin), Pregabalin (Lyrica)    What anti-depressant  medication have you tried in the past but are no longer taking? Amitriptyline (Elavil), Bupropion (Wellbutrin), Citalopram (Celexa, Lexapro), Doxepin (Sinequan), Duloxetine (Cymbalta), Fluoxetine (Prozac), Sertraline (Zoloft), Trazodone (Desyrel)    What sleep aid medications have you tried in the past but are no longer taking? Alprazolam (Xanax), Clonazepam (Klonopin), Diazepam (Valium), Hydroxyzine (Atarax, Vistaril)    What opioid medications have you tried in the past but are no longer taking? Hydrocodone (Lorcet, Lortab, Vicodin), Oxycodone (Percocet, OxyContin)    What NSAID medications have you tried in the past but are no longer taking? Ibuprofen (Advil, Motrin), Naproxen (Aleve)    What muscle relaxer medications have you tried in the past but are no longer taking? Cyclobenzaprine (Flexeril), Methocarbamol (Robaxin)    What anti-migraine medications have you tried in the past but are no longer taking? Prednisone, Rizatriptan (Maxalt), Sumatriptan (Imitrex) tablets      Are you currently taking medications for your pain? Yes    If yes, please answer below -     During the past month, list all the medications that you have used for pain. Please list drug name, dose, and frequency taking:        Chief Complaint:  left sided low back pain and left leg pain down to the knee. Left knee pops and is painful.   Chief Complaint   Patient presents with    Pain     Lower back pain and SI- Right side hip bone. Left side radiating pain lower back to front of thigh- down to knee.        Pain history:  Caprice Kline is a 29 year old female who first started having problems with pain as follows:     Pain history collected at initial consult on 9/17/2024  Chronic left sided low back pain with left leg pain that extends posterolaterally down the left leg to the level of the knee.   This pain has been present since prior to August 2021 when she had back surgery with Dr. Junior of neurosurgery. This helped for a short period of  "time, thinks it was for about 2-3 months.    -she had a good experience with Dr. Junior and his team.   -she is unsure if her leg is weak, she knows she favors the left leg.   -she does have numbness and tingling in the left leg to the lateral hip and thigh.   -denies saddle anesthesia, no loss of bowel or bladder.   -she had COVID last week. Was laid off from her job on her birthday.     -left knee pain present since high school. Injured during basketball. Her left knee pops and his painful.         Pain rating: intensity ranges from 6/10 to 10/10, and Averages 7/10 on a 0-10 scale.  Pain right now is 7/10.   Describes pain as \" burning, cramping, sharp, numbness, cutting, dull, aching, throbbing, pressure.\"  Pain is constant.      Home self care includes: heat, ice    Aggravating factors include: Standing, sitting, walking, exercise    Relieving factors include: Nothing relieves the pain    Any bowel or bladder incontinence: none      Current pain-related medication treatments include:  -clonazepam 0.5mg PRN (uses sparingly, #10 last 4-6 weeks)  -doxepin 10mg at HS (helpful for sleep)  -hydroxyzine 50-100mg TID PRN (uses 100mg TID, very helpful)  -meloxicam 7.5mg every day (not helpful)  -methocarbamol 750mg TID PRN muscle spasms (not helpful)  -pregabalin take 100mg PLUS 150mg  in the AM  -Pregabalin take 150mg in the afternoon and evening (helpful)  -propranolol ER 80mg every day (recently started, migraine and tremor treatment)  -tizanidine 4mg TID PRN (not helpful)  -ubrelvy 100mg at onset of headache (has been a lifesaver)      Other pertinent medications:  -Adderall XR 30 mg once a day  -Dulcolax 5 mg daily as needed constipation  -lamotrigine 100mg BID (helpful)  -Lithium 600mg every day (helpful)  -ondansetron 4mg Q 8 hours PRN nausea (helpful, doesn't need often)  -prazosin 2mg BID (helpful for nightmares and PTSD)  -Quetiapine 300mg at HS (helpful)  -semiglutide 0.5mg weekly subcutaneous injection (too " "soon to tell)    Previous medication treatments included:    OPIATES:hydrocodone (causes headaches), Oxycodone (helpful)  NSAIDS: ibuprofen (cannot take, on lithium), Naproxen (cannot take, on lithium), meloxicam (not helpful)  MUSCLE RELAXANTS: cyclobenzaprine (not helpful, \"I hated it\"), methocarbamol (not helpful), tizanidine (not helpful)  ANTI-MIGRAINE MEDS: prednisone (not helpful), rizatriptan (not helpful), sumatriptan (not helpful), Ubrelvy (has been a life saver)  ANTI-DEPRESSANTS: amitriptyline (unsure), bupropion (caused nausea), citalopram (not helpful), Doxepin (helpful), Duloxetine (unsure), fluoxetine (not helpful), sertraline (allergic), trazodone (nightmares)  SLEEP AIDS: alprazolam (somewhat helpful), clonazepam (helpful, uses sparingly), diazepam (not helpful), hydroxyzine (very helpful),   ANTI-CONVULSANTS: gabapentin (not helpful), pregabalin (helpful), Topiramate (didn't like how she felt)  TOPICALS: lidoderm patch (short term benefit), other gels/creams/ointments (sometimes)  ANXIOLYTICS: Clonanazepam  (helpful), hydroxyzine (helpful)  MEDICAL CANNABIS: Medical cannabis cream (helpful)  Other meds: Tylenol (not helpful)    Medications and Allergies reviewed. Yes     Other treatments have included:  Caprice Kline has not been seen at a pain clinic in the past.      Physical therapy: Yes  not helpful  Psychologist: No   Chiropractor: Yes not helpful  Acupuncture: Yes somewhat helpful  Pharmacotherapy:    Opioids: No     Non-opioids:  Yes helpful  TENs Unit/electric stim: Yes helpful at chiropractor  Heat/Cold: Yes   Exercise: yes       Injections:   -10/24/2023 left SI joint injection per Dr. Moi Gary (no pain relief whatsoever)    Pertinent Surgeries:  8/21/2021 Lumbar microdiscectomy L5-S1 with Dr. Junior     Past Medical History:  Past Medical History:   Diagnosis Date    Bipolar disorder (H)     Complication of anesthesia     \"freaking out when I wake up\"    Depressive disorder 2011 "     Past Surgical History:  Past Surgical History:   Procedure Laterality Date    DISCECTOMY LUMBAR POSTERIOR MICROSCOPIC ONE LEVEL Left 08/24/2021    Procedure: Minimally invasive left Lumbar 5 to Sacral 1 microdiscectomy  ;  Surgeon: Destin Junior MD;  Location: [facility] OR    GYN SURGERY Right     cystectomy - with salpingectomy    HEAD & NECK SURGERY      tonsillectomy    HEAD & NECK SURGERY      wisdom teeth extraction     Medications:  Current Outpatient Medications   Medication Sig Dispense Refill    albuterol (PROAIR HFA/PROVENTIL HFA/VENTOLIN HFA) 108 (90 Base) MCG/ACT inhaler Inhale 2 puffs into the lungs every 6 hours as needed for shortness of breath, wheezing or cough 18 g 0    amphetamine-dextroamphetamine (ADDERALL XR) 30 MG 24 hr capsule       bisacodyl (DULCOLAX) 5 MG EC tablet Take 5 mg by mouth daily as needed for constipation      clobetasol (TEMOVATE) 0.05 % external solution APPLY TOPICALLY TO THE AFFECTED AREA TWICE DAILY FOR UP TO 21 DAYS. DO NOT APPLY TO NORMAL SKIN      clonazePAM (KLONOPIN) 0.5 MG tablet       doxepin (SINEQUAN) 10 MG capsule Take 10 mg by mouth At Bedtime      EPINEPHrine (ANY BX GENERIC EQUIV) 0.3 MG/0.3ML injection 2-pack INJECT 0.3 ML INTO THE MUSCLE AS NEEDED FOR ANAPHYLAXIS 2 each 1    hydrOXYzine (VISTARIL) 50 MG capsule TAKE 1 TO 2 CAPSULES BY MOUTH THREE TIMES DAILY      ipratropium-albuterol (COMBIVENT RESPIMAT)  MCG/ACT inhaler Inhale 1 puff into the lungs 4 times daily as needed for shortness of breath, wheezing or cough 4 g 0    ketoconazole (NIZORAL) 2 % external shampoo APPLY TOPICALLY TO THE AFFECTED AREA 3 TIMES EVERY WEEK. LEAVE IN 5-10 MINUTES BEFORE WASHING OUT      lamoTRIgine (LAMICTAL) 100 MG tablet Take 100 mg by mouth 2 times daily      lithium (ESKALITH) 600 MG capsule       meloxicam (MOBIC) 7.5 MG tablet TAKE 1-2 TABLETS BY MOUTH EVERY  tablet 0    methocarbamol (ROBAXIN) 750 MG tablet TAKE 1 TABLET BY MOUTH THREE TIMES DAILY 90  "tablet 1    ondansetron (ZOFRAN ODT) 4 MG ODT tab DISSOLVE 1 TABLET(4 MG) ON THE TONGUE EVERY 8 HOURS AS NEEDED FOR NAUSEA 90 tablet 0    prazosin (MINIPRESS) 2 MG capsule Take 2 mg by mouth 2 times daily      pregabalin (LYRICA) 100 MG capsule TAKE 1 CAPSULE BY MOUTH EVERY MORNING IN ADDITION  MG 3 TIMES DAILY      pregabalin (LYRICA) 150 MG capsule Take 150 mg by mouth 3 times daily      propranolol (INDERAL) 20 MG tablet Take 1 tablet (20 mg) by mouth 2 times daily as needed (tremor). 180 tablet 2    propranolol ER (INDERAL LA) 80 MG 24 hr capsule Take 1 capsule (80 mg) by mouth daily. 90 capsule 1    QUEtiapine (SEROQUEL) 300 MG tablet Take 300 mg by mouth At Bedtime      Semaglutide-Weight Management (WEGOVY) 0.5 MG/0.5ML pen Inject 0.5 mg subcutaneously once a week. 2 mL 0    spironolactone (ALDACTONE) 25 MG tablet       tiZANidine (ZANAFLEX) 4 MG tablet Take 1 tablet (4 mg) by mouth 3 times daily 90 tablet 1    ubrogepant (UBRELVY) 100 MG tablet Take 1 tablet (100 mg) by mouth at onset of headache. 8 tablet 11    rimegepant (NURTEC) 75 MG ODT tablet Place 1 tablet (75 mg) under the tongue daily as needed for migraine. Maximum of 1 tablet every 24 hours. (Patient not taking: Reported on 9/17/2024) 8 tablet 11     Allergies:     Allergies   Allergen Reactions    Metronidazole Anaphylaxis and Hives     Other reaction(s): Throat swelling  Throat swelling 6 hrs after exposure  Itching and hives 2 hrs after exposure    Shellfish Allergy Anxiety, Diarrhea, Difficulty breathing, Hives, Itching, Rash, Shortness Of Breath and Swelling    Fish-Derived Products      Stopped fish oil and less stomach discomfort  Rash after eating fish.     Hydrocodone-Acetaminophen Other (See Comments)    Sertraline Other (See Comments)     Patient was foggy and \"high\" feeling    Shellfish-Derived Products      Lips get numb, throat gets scratchy, rashes     Social History:  Home situation: lives with boyfriend. 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  Tobacco use: vapes  Alcohol use: minimal, monthly use  Drug use: now none, used marijuana in the past  History of chemical dependency treatment: none    Family history:  Family History   Problem Relation Age of Onset    Hypertension Mother     Colon Polyps Mother 50    Substance Abuse Father     Colon Cancer Father 60    Diabetes Maternal Grandmother     Hypertension Maternal Grandmother     Colon Cancer Maternal Grandmother     Diabetes Paternal Grandmother          Review of Systems:  The 14 system ROS was reviewed with the patient and is positive for: positives are in bold  Constitutional: fever/chills, fatigue, weight gain, weight loss  Eyes/Head: headache, dizziness  ENT: ringing in ears  Allergy/Immune: allergies  Skin: itching, rash, hives  Hematologic: easy bruising  Respiratory: cough, wheezing, shortness of breath  Cardiovascular: swelling in feet, fainting, palpitations, chest pain  GI: abdominal pain, nausea, vomiting, diarrhea, constipation  Endocrine: steroid use  Musculoskeletal:  joint pain, arthritis, stiffness, gout, back pain, neck pain  Urinary: frequency, urgency, incontinence, hesitancy  Neurologic: weakness, numbness/tingling (left leg), seizure (2 in the past over several years ago), stroke, memory loss  Mental health: depression, anxiety, stress, suicidal ideation      Physical Exam:  Vitals:    09/17/24 0900   BP: 109/74   Pulse: 72     Exam:  Constitutional: healthy, alert, and no distress  Head: normocephalic. Atraumatic.   Eyes: no redness or jaundice noted   ENT: oropharnx normal.  MMM.   Cardiovascular: RRR no m/g/r   Respiratory: clear to auscultation A/P. Respirations easy and unlabored. Able to speak in full sentences without SOB or cough noted.    Gastrointestinal: soft, non-tender   : deferred  Skin: no suspicious lesions or rashes  Psychiatric: mentation appears normal  and affect normal/bright    Musculoskeletal exam:  Gait/Station/Posture: forward head and rounded shoulders. Somewhat slowed gait. Able to rise onto toes and heels. Tandem gait was challenging.     Cervical spine:    Flex:  20 degrees   Ext: 20 degrees   Rotation to right: 80 degrees   Rotation to left: 80 degrees   Ext/rotation to the right pain free   Ext/rotation to the left pain free    Thoracic spine:  Normal     Lumbar spine:    Flex:  80 degrees   Ext: 20 degrees and painful   Rotation/ext to right: pain free   Rotation/ext to left: painful    SI joints: mild pain over left SI joint   Piriformis: mild pain over left piriformis    Greater trochanters: bilateral tenderness Left >> right     Myofascial tenderness:  posterior neck and upper shoulder girdle  Straight leg exam: negative    Neurologic exam:  CN:  Cranial nerves 2-12 are  Grossly normal  Motor:  5/5 UE and LE strength, except for LLE 3+/5  Reflexes:     Biceps:     R:  2/4 L: 2/4   Brachioradialis   R:  2/4 L: 2/4      Patella:  R:  2/4 L: 2/4   Achilles:  R:  2/4 L: 2/4  Other reflexes:     Smith's negative  Sensory:  (upper and lower extremities):   Light touch: normal 2   Vibration: normal 2   Pin prick: normal 2   Allodynia: absent 2   Dysethesia: absent 2   Hyperalgesia: absent 2    IMAGING:  EXAM: MR LUMBAR SPINE W/O & W CONTRAST  2/28/2023 9:49 AM      HISTORY:  Low back pain; hx Spine surgery; No r/o hardware failure;  Worsening low back pain; No known/automatically detected potential  contraindications to imaging; Lumbar radiculopathy; Chronic bilateral  low back pain with left-sided sciatica; Chronic bilateral low back  pain with left-sided sciatica        COMPARISON:  MRI 7/13/2021, 9/27/2021     TECHNIQUE: Sagittal T1-weighted, sagittal STIR, axial T1-weighted, and  3-D volumetric T2-weighted images of the lumbar spine were obtained  without intravenous contrast. Post intravenous contrast using  gadolinium axial and sagittal T1-weighted  images were obtained with  fat saturation. Post intravenous contrast using gadolinium, axial and  sagittal T1-weighted images were obtained with fat saturation.     CONTRAST: 9ml Gadavist.     FINDINGS:  There are 5 lumbar type vertebrae, used for the purposes of this  dictation. Conus tip at approximately L1. Normal lumbar vertebral  alignment. L5-S1 disc dehydration with opposing L5-S1 endplate  degenerative edema. No loss of vertebral body height. Normal marrow  signal. Normal cauda equina.     On a level by level basis, the findings are as follows:     L1-2: No spinal canal or neural foraminal stenosis.     L2-3: No spinal canal or neural foraminal stenosis.     L3-4: No spinal canal or neural foraminal stenosis. Stable disc bulge.        L4-5: No spinal canal or neural foraminal stenosis. Stable disc bulge.     L5-S1: Left hemilaminectomy changes with nonmasslike postcontrast  enhancement surrounding the left L5-S1 facet joint and within the left  epidural space similar surrounding the traversing S1 nerve root  without any significant mass effect. Posterior paraspinal fluid  collection has resolved. No spinal canal or neural foraminal stenosis.     The visualized paraspinous soft tissues are within normal limits.                                                                       IMPRESSION:   1. L5-S1 hemilaminectomy, microdiscectomy changes. Left facet  periarticular, left epidural space granulation tissue formation  surrounding the left descending S1 within the lateral recess without  significant compression or abnormal nerve signal. Previous  postoperative fluid collection has resolved.  2. No substantial degenerative change at any other level within the  lumbar spine.     I have personally reviewed the examination and initial interpretation  and I agree with the findings.     ELLIE GLORIA MD     LABS:  Creatinine   Date Value Ref Range Status   06/20/2024 0.92 0.51 - 0.95 mg/dL Final     GFR Estimate    Date Value Ref Range Status   06/20/2024 86 >60 mL/min/1.73m2 Final     Comment:     eGFR calculated using 2021 CKD-EPI equation.     Alkaline Phosphatase   Date Value Ref Range Status   06/20/2024 38 (L) 40 - 150 U/L Final     AST   Date Value Ref Range Status   06/20/2024 19 0 - 45 U/L Final     Comment:     Reference intervals for this test were updated on 6/12/2023 to more accurately reflect our healthy population. There may be differences in the flagging of prior results with similar values performed with this method. Interpretation of those prior results can be made in the context of the updated reference intervals.     ALT   Date Value Ref Range Status   06/20/2024 22 0 - 50 U/L Final     Comment:     Reference intervals for this test were updated on 6/12/2023 to more accurately reflect our healthy population. There may be differences in the flagging of prior results with similar values performed with this method. Interpretation of those prior results can be made in the context of the updated reference intervals.            Screening tools:     DIRE Score for ongoing opioid management is calculated as follows:    Diagnosis = 2    Intractability = 2    Risk: Psych = 2  Chem Hlth = 2  Reliability = 2  Social = 2    Efficacy = 2    Total DIRE Score = 14 (14 or higher predicts good candidate for ongoing opioid management; 13 or lower predicts poor candidate for opioid management)       [place]   review:  Reviewed [place]  September 17, 2024- no concerning fills. Not on opiates.   Modesta MADRID, RN CNP, FNP  [facility]  [place] location          Assessment:  Chronic left sided low back pain with left sided sciatica  Lumbar radicular pain (left leg)  Meralgia paresthetica on the left side  Lumbar DDD  Myofascial pain  Muscle spasm  Chronic intractable pain  Bipolar disorder, in partial remission, most recent episode mixed  PMHx includes: depressive disorder, complication of  anesthesia (freaking out when I wake up), bipolar disorder  PSHx includes: Minimally invasive left Lumbar 5 to Sacral 1 microdiscectomy (2021), cystectomy with  salpingectomy on the right side, tonsillectomy, wisdom teeth extraction        Plan:  Diagnosis reviewed, treatment option addressed, and risk/benefits discussed.  Self-care instructions given.  I am recommending a multidisciplinary treatment plan to help this patient better manage her pain.      Physical Therapy: none at present  Clinical Health Psychologist to address issues of relaxation, behavioral change, coping style, and other factors important to improvement: referral to [facility]   Diagnostic Studies: none  Medication Management:   Stop tizanidine  START metaxalone 400-800mg three times daily as needed for muscle spasms  Further procedures recommended: none at present, we can talk about this again if and when you are open to it.   Acupuncture: I am okay with this  Recommendations/follow-up for PCP:  see above  Release of information: none   Follow up: 3 months. In-person or virtual.   Check out [facility] on You Tube, short simple exercise videos, 30-90 seconds long in the short form.       ASSESSMENT AND PLAN:  (M54.42,  G89.29) Chronic left-sided low back pain with left-sided sciatica  (primary encounter diagnosis)  Comment:   Plan: Adult Mental Health  Referral, Adult         Pain Clinic Follow-Up Order, CANCELED: Adult         Mental Health  Referral, CANCELED:         Adult Pain Clinic Follow-Up Order            (M54.16) Lumbar radicular pain  Comment:   Plan: Adult Mental Health  Referral, Adult         Pain Clinic Follow-Up Order            (G57.12) Meralgia paresthetica of left side  Comment:   Plan: Adult Mental Health  Referral, Adult         Pain Clinic Follow-Up Order            (M51.36) DDD (degenerative disc disease), lumbar  Comment:   Plan: Adult Mental Health  Referral, Adult          Pain Clinic Follow-Up Order, CANCELED: Adult         Mental Health  Referral, CANCELED:         Adult Pain Clinic Follow-Up Order            (M79.18) Myofascial pain  Comment:   Plan: metaxalone (SKELAXIN) 800 MG tablet, Adult         Mental Health  Referral, Adult Pain         Clinic Follow-Up Order, CANCELED: Adult Mental         Health  Referral, CANCELED: Adult Pain        Clinic Follow-Up Order            (M62.838) Muscle spasm  Comment:   Plan: metaxalone (SKELAXIN) 800 MG tablet, Adult         Mental Health  Referral, Adult Pain         Clinic Follow-Up Order, CANCELED: Adult Mental         Health  Referral, CANCELED: Adult Pain        Clinic Follow-Up Order            (G89.29) Chronic intractable pain  Comment:   Plan: Adult Mental Health  Referral, Adult         Pain Clinic Follow-Up Order, CANCELED: Adult         Mental Health  Referral, CANCELED:         Adult Pain Clinic Follow-Up Order            (F31.77) Bipolar disorder, in partial remission, most recent episode mixed (H)  Comment:   Plan: Adult Mental Health  Referral, Adult         Pain Clinic Follow-Up Order, CANCELED: Adult         Mental Health  Referral, CANCELED:         Adult Pain Clinic Follow-Up Order              BILLING TIME DOCUMENTATION:   TOTAL TIME includes:   Time spent preparing to see the patient: 2 minutes (reviewing records and tests)  Time spend face to face with the patient: 95 minutes  Time spent ordering tests, medications, procedures and referrals: 0 minutes  Time spent Referring and communicating with other healthcare professionals: 0 minutes  Documenting clinical information in Epic: 7 minutes    The total TIME spent on this patient on the day of the appointment was 104 minutes.          Modesta MADRID, RN CNP, FNP  [facility]  [place] location      Answers submitted by the patient for this visit:  Patient  Health Questionnaire (G7) (Submitted on 9/14/2024)  AKI 7 TOTAL SCORE: 19

## 2024-09-17 ENCOUNTER — OFFICE VISIT (OUTPATIENT)
Dept: PALLIATIVE MEDICINE | Facility: CLINIC | Age: 29
End: 2024-09-17
Attending: PHYSICIAN ASSISTANT
Payer: COMMERCIAL

## 2024-09-17 VITALS — SYSTOLIC BLOOD PRESSURE: 109 MMHG | DIASTOLIC BLOOD PRESSURE: 74 MMHG | HEART RATE: 72 BPM

## 2024-09-17 DIAGNOSIS — G89.29 CHRONIC INTRACTABLE PAIN: ICD-10-CM

## 2024-09-17 DIAGNOSIS — M51.369 DDD (DEGENERATIVE DISC DISEASE), LUMBAR: ICD-10-CM

## 2024-09-17 DIAGNOSIS — M62.838 MUSCLE SPASM: ICD-10-CM

## 2024-09-17 DIAGNOSIS — M54.16 LUMBAR RADICULAR PAIN: ICD-10-CM

## 2024-09-17 DIAGNOSIS — M79.18 MYOFASCIAL PAIN: ICD-10-CM

## 2024-09-17 DIAGNOSIS — F31.77 BIPOLAR DISORDER, IN PARTIAL REMISSION, MOST RECENT EPISODE MIXED (H): ICD-10-CM

## 2024-09-17 DIAGNOSIS — G57.12 MERALGIA PARESTHETICA OF LEFT SIDE: ICD-10-CM

## 2024-09-17 DIAGNOSIS — M54.42 CHRONIC LEFT-SIDED LOW BACK PAIN WITH LEFT-SIDED SCIATICA: Primary | ICD-10-CM

## 2024-09-17 DIAGNOSIS — G89.29 CHRONIC LEFT-SIDED LOW BACK PAIN WITH LEFT-SIDED SCIATICA: Primary | ICD-10-CM

## 2024-09-17 PROCEDURE — 99215 OFFICE O/P EST HI 40 MIN: CPT | Performed by: NURSE PRACTITIONER

## 2024-09-17 PROCEDURE — G2211 COMPLEX E/M VISIT ADD ON: HCPCS | Performed by: NURSE PRACTITIONER

## 2024-09-17 PROCEDURE — 99417 PROLNG OP E/M EACH 15 MIN: CPT | Performed by: NURSE PRACTITIONER

## 2024-09-17 RX ORDER — METAXALONE 800 MG/1
400-800 TABLET ORAL 3 TIMES DAILY PRN
Qty: 45 TABLET | Refills: 1 | Status: SHIPPED | OUTPATIENT
Start: 2024-09-17

## 2024-09-17 ASSESSMENT — PAIN SCALES - GENERAL: PAINLEVEL: SEVERE PAIN (7)

## 2024-09-17 NOTE — PATIENT INSTRUCTIONS
PLAN:  Physical Therapy: none at present  Clinical Health Psychologist to address issues of relaxation, behavioral change, coping style, and other factors important to improvement: referral to [facility]   Diagnostic Studies: none  Medication Management:   Stop tizanidine  START metaxalone 400-800mg three times daily as needed for muscle spasms  Further procedures recommended: none at present, we can talk about this again if and when you are open to it.   Acupuncture: I am okay with this  Recommendations/follow-up for PCP:  see above  Release of information: none   Follow up: 3 months. In-person or virtual.   Check out De Archibald on You Tube, short simple exercise videos, 30-90 seconds long in the short form.     ============================================    Clinic Number:  [phone]   Call with any questions about your care and for scheduling assistance.   Calls are returned Monday through Friday between 8 AM and 4:30 PM. We usually get back to you within 2 business days depending on the issue/request.    If we are prescribing your medications:  For opioid medication refills, call the clinic or send a [employer] message 7 days in advance.  Please include:  Name of requested medication  Name of the pharmacy.  For non-opioid medications, call your pharmacy directly to request a refill. Please allow 3-4 days to be processed.   Per [place] State Law:  All controlled substance prescriptions must be filled within 30 days of being written.    For those controlled substances allowing refills, pickup must occur within 30 days of last fill.      We believe regular attendance is key to your success in our program!    Any time you are unable to keep your appointment we ask that you call us at least 24 hours in advance to cancel.This will allow us to offer the appointment time to another patient.   Multiple missed appointments may lead to dismissal from the clinic.

## 2024-09-18 NOTE — TELEPHONE ENCOUNTER
We recvd a note form PA dept stating Nurtec was denied in past by insurance. I called patient and pt stated she does not need PA for Nurtec this was an error.

## 2024-09-19 NOTE — TELEPHONE ENCOUNTER
[facility]  Fax:[phone]  Spec: [phone]  [place]: [phone]    
Central Prior Authorization Team   Phone: [phone]    PA Initiation    Medication: WEGOVY 0.5MG/ 0.5ML SOAJ  Insurance Company: [employer] - Phone [phone] Fax [phone]  Pharmacy Filling the Rx: [facility] - [place], [place] - [address]  Filling Pharmacy Phone: [phone]  Filling Pharmacy Fax:    Start Date: 9/17/2024      
PRIOR AUTHORIZATION DENIED    Medication: WEGOVY 0.5MG/ 0.5ML SOAJ-PA DENIED     Denial Date: 9/18/2024    Denial Rational:           Appeal Information:         
Support Present

## 2024-10-02 ENCOUNTER — TELEPHONE (OUTPATIENT)
Dept: FAMILY MEDICINE | Facility: OTHER | Age: 29
End: 2024-10-02
Payer: COMMERCIAL

## 2024-10-02 NOTE — TELEPHONE ENCOUNTER
[facility]  Fax:[phone]  Spec: [phone]  MO: [phone]

## 2024-10-05 DIAGNOSIS — R11.0 NAUSEA: ICD-10-CM

## 2024-10-05 DIAGNOSIS — M54.42 ACUTE BILATERAL LOW BACK PAIN WITH LEFT-SIDED SCIATICA: ICD-10-CM

## 2024-10-07 RX ORDER — MELOXICAM 7.5 MG/1
TABLET ORAL
Qty: 180 TABLET | Refills: 0 | Status: SHIPPED | OUTPATIENT
Start: 2024-10-07 | End: 2024-10-28

## 2024-10-07 RX ORDER — ONDANSETRON 4 MG/1
TABLET, ORALLY DISINTEGRATING ORAL
Qty: 90 TABLET | Refills: 0 | Status: SHIPPED | OUTPATIENT
Start: 2024-10-07

## 2024-10-07 NOTE — TELEPHONE ENCOUNTER
Prior Authorization Approval    Medication: WEGOVY 0.5 MG/0.5ML SC SOAJ  Authorization Effective Date: 10/5/2024  Authorization Expiration Date: 4/5/2025  Insurance Company: [employer] - Phone [phone] Fax [phone]  Which Pharmacy is filling the prescription: [facility] - [place] [address]  Pharmacy Notified: YES  Patient Notified: YES (faxed approval letter to pharmacy and notified patient via [employer] message)

## 2024-10-17 ENCOUNTER — MYC MEDICAL ADVICE (OUTPATIENT)
Dept: PALLIATIVE MEDICINE | Facility: CLINIC | Age: 29
End: 2024-10-17
Payer: COMMERCIAL

## 2024-10-18 DIAGNOSIS — G89.29 CHRONIC LEFT-SIDED LOW BACK PAIN WITH LEFT-SIDED SCIATICA: ICD-10-CM

## 2024-10-18 DIAGNOSIS — M54.42 CHRONIC LEFT-SIDED LOW BACK PAIN WITH LEFT-SIDED SCIATICA: ICD-10-CM

## 2024-10-18 DIAGNOSIS — M54.16 LUMBAR RADICULOPATHY: ICD-10-CM

## 2024-10-22 NOTE — TELEPHONE ENCOUNTER
Information noted.     Modesta MADRID, RN CNP, FNP  [facility]  [facility] [place]

## 2024-10-24 ENCOUNTER — NURSE TRIAGE (OUTPATIENT)
Dept: FAMILY MEDICINE | Facility: OTHER | Age: 29
End: 2024-10-24
Payer: COMMERCIAL

## 2024-10-24 DIAGNOSIS — M62.838 MUSCLE SPASM: ICD-10-CM

## 2024-10-24 DIAGNOSIS — M79.18 MYOFASCIAL PAIN: ICD-10-CM

## 2024-10-24 NOTE — TELEPHONE ENCOUNTER
"Nurse Triage SBAR    Is this a 2nd Level Triage? NO    Situation: Patient called in reporting back pain that is unbearable today.     Background: Patient states she has a history of low back pain, but it has never been this bad.     Assessment: Patient states her low back pain has been getting worse over the last week or so, and reports today it is unbearable. She states the pain is like a \"sharp ripping feeling\" at the low back, and there is a stabbing, burning feeling that radiates down her leg through her buttock and hip. She states she gets jolts of pain when she moves, which then makes her whole body jolt due to the amount of pain. She states she has a little bit of pain in her LLQ. She states the last 3 nights she has vomited once per night. She states she does have a little bit of pain over the lower part of her ribs on her back.     Protocol Recommended Disposition:   Go To ED/UCC Now (Or To Office With PCP Approval)    Recommendation: Per protocol, patient was advised to go to ED/UCC. Writer advised ED is likely a better place for the amount of pain she is in. She states she will go to ED. She had no other questions or concerns.       JET Mims, RN         Reason for Disposition   Vomiting and pain over lower ribs of back (i.e., flank - kidney area)    Additional Information   Negative: Passed out (i.e., fainted, collapsed and was not responding)   Negative: Shock suspected (e.g., cold/pale/clammy skin, too weak to stand, low BP, rapid pulse)   Negative: Sounds like a life-threatening emergency to the triager   Negative: Major injury to the back (e.g., MVA, fall > 10 feet or 3 meters, penetrating injury, etc.)   Negative: Pain in the upper back over the ribs (rib cage) that radiates (travels) into the chest   Negative: Pain in the upper back over the ribs (rib cage) and worsened by coughing (or clearly increases with breathing)   Negative: Back pain during pregnancy   Negative: SEVERE back pain of " sudden onset and age > 60 years   Negative: SEVERE abdominal pain (e.g., excruciating)   Negative: Abdominal pain and age > 60 years   Negative: Unable to urinate (or only a few drops) and bladder feels very full   Negative: Loss of bladder or bowel control (urine or bowel incontinence; wetting self, leaking stool) of new-onset   Negative: Numbness (loss of sensation) in groin or rectal area   Negative: Pain radiates into groin, scrotum   Negative: Blood in urine (red, pink, or tea-colored)    Protocols used: Back Pain-A-OH

## 2024-10-24 NOTE — TELEPHONE ENCOUNTER
Received fax request from   [id number] - [place], [place]     pharmacy for     metaxalone (SKELAXIN) 800 MG tablet       Last refilled on 10/04/24 per pharmacy   Pt last seen on 09/17/24  Next appt scheduled for 11/04/24    Call placed to Pt - Pt reports that she has already picked up her refill on this medication from her pharmacy.      Will facilitate refill.     Michela Donis

## 2024-10-25 RX ORDER — METAXALONE 800 MG/1
400-800 TABLET ORAL 3 TIMES DAILY PRN
Qty: 45 TABLET | Refills: 1 | Status: SHIPPED | OUTPATIENT
Start: 2024-10-25 | End: 2024-11-04

## 2024-10-26 NOTE — TELEPHONE ENCOUNTER
Signed Prescriptions:                        Disp   Refills    metaxalone (SKELAXIN) 800 MG tablet        45 tab*1        Sig: Take 0.5-1 tablets (400-800 mg) by mouth 3 times           daily as needed for muscle spasms. Stop           tizanidine. Caution sedation.  Authorizing Provider: LAURENCE DANIELS, RN CNP, FNP  [facility]  [facility] [place]

## 2024-10-28 ENCOUNTER — OFFICE VISIT (OUTPATIENT)
Dept: URGENT CARE | Facility: URGENT CARE | Age: 29
End: 2024-10-28
Payer: COMMERCIAL

## 2024-10-28 VITALS
WEIGHT: 203.8 LBS | SYSTOLIC BLOOD PRESSURE: 99 MMHG | DIASTOLIC BLOOD PRESSURE: 67 MMHG | RESPIRATION RATE: 16 BRPM | BODY MASS INDEX: 30.76 KG/M2 | HEART RATE: 71 BPM | TEMPERATURE: 98.5 F | OXYGEN SATURATION: 97 %

## 2024-10-28 DIAGNOSIS — H57.89 IRRITATION OF LEFT EYE: Primary | ICD-10-CM

## 2024-10-28 PROCEDURE — 99213 OFFICE O/P EST LOW 20 MIN: CPT | Performed by: FAMILY MEDICINE

## 2024-10-28 ASSESSMENT — PATIENT HEALTH QUESTIONNAIRE - PHQ9
SUM OF ALL RESPONSES TO PHQ QUESTIONS 1-9: 20
10. IF YOU CHECKED OFF ANY PROBLEMS, HOW DIFFICULT HAVE THESE PROBLEMS MADE IT FOR YOU TO DO YOUR WORK, TAKE CARE OF THINGS AT HOME, OR GET ALONG WITH OTHER PEOPLE: EXTREMELY DIFFICULT
SUM OF ALL RESPONSES TO PHQ QUESTIONS 1-9: 20

## 2024-10-28 NOTE — PROGRESS NOTES
Assessment & Plan     Fertility testing  We reviewed her history, cycles. Reviewed possible etiologies-female and male. Reviewed hormonal work up in addition to uterine and tubal evaluation. Patient with single fallopian tube and this is discussed. Will start with day 3 labs next menstrual cycle and pelvic ultrasound.  Discussed semen analysis in partner.  Discussed her tubal history and possible evaluation of patency of remaining tube with HSG.  We reviewed her medical history and medication list. Discussed preconception consult with MFM given her current medications. Consider this referral once hormonal and imaging work up completed.  Continue tracking cycles, using home ovulation tests and timing intercourse accordingly.  Start PNV.  Plan follow up once initial work up completed. Patient is given an opportunity to ask questions and have them answered.  - Follicle stimulating hormone; Future  - Estradiol; Future  - Luteinizing Hormone; Future  - Prolactin; Future  - Testosterone Free and Total; Future  - TSH with free T4 reflex; Future  - US Pelvic Transabdominal and Transvaginal; Future    Screening for malignant neoplasm of cervix  - HPV and Gynecologic Cytology Panel - Recommended Age 30-65 Years      Hector Vasques is a 29 year old, presenting for the following health issues:  Fertility Testing    HPI       Fertility Testing    Patient presents today to discuss fertility concerns. Last seen by me to discuss management of menorrhagia and dysmenorrhea 3/2022 at that time, was not desiring any pregnancy.  They have not been preventing pregnancy for about 2 years. Actively tracking cycles, using ovulation tests and timing intercourse accordingly for about 1 year. Ovulation tests at home are positive and correlate with her cycles.   Cycles occur Q 30-40 days, bleeds for 2-4 days with day 1 being heavier and more painful, then tapers. Last cycle was 10 days late, but attributes to stress recently.  No  "intermenstrual bleeding, has moderate to severe cramping (severe occurring less often).   History of SAB x 2:   Age 23-approximately 12 weeks-passed naturally  Age 26 or 27-very early-passed naturally  History of right cystectomy and salpingectomy in 2012-pathology record available in [facility] from [facility] (right paratubal ovarian cyst with torsion of the right fallopian tube).  No history of PID, chlamydia or gonorrhea.   Amenable to completing a pap smear today.    Partner is healthy-no children of his own. No chronic medical conditions or medication use. He works on tractors with lasers.     Review of Systems  Constitutional, HEENT, cardiovascular, pulmonary, gi and gu systems are negative, except as otherwise noted.      Objective    /76 (BP Location: Right arm, Patient Position: Sitting, Cuff Size: Adult Regular)   Pulse 91   Ht 1.734 m (5' 8.25\")   Wt 93.2 kg (205 lb 6.4 oz)   LMP 10/19/2024 (Exact Date)   SpO2 96%   BMI 31.00 kg/m    Body mass index is 31 kg/m .  Physical Exam   GENERAL: alert and no distress   (female) w/bimanual: normal female external genitalia, normal urethral meatus, normal vaginal mucosa, normal cervix/adnexa/uterus without masses or discharge  MS: no gross musculoskeletal defects noted, no edema  SKIN: no suspicious lesions or rashes  PSYCH: mentation appears normal, affect normal/bright    Depression Screening Follow-up        10/28/2024    12:52 PM   PHQ   PHQ-9 Total Score 20    Q9: Thoughts of better off dead/self-harm past 2 weeks Not at all        Patient-reported       Does the patient currently have a mental health provider?  Yes, patient was referred back to current mental health provider-[facility]      Signed Electronically by: JULIUS Ramsay CNP      Answers submitted by the patient for this visit:  Patient Health Questionnaire (Submitted on 10/28/2024)  If you checked off any problems, how difficult have these problems made it for you " to do your work, take care of things at home, or get along with other people?: Extremely difficult  PHQ9 TOTAL SCORE: 20

## 2024-10-28 NOTE — PROGRESS NOTES
(H57.89) Irritation of left eye  (primary encounter diagnosis)  Comment:     Local left eye irritation, particularly beneath lids.  I was unable to visualize a contact and therefore there was no removal procedure.    Plan:   Some lubricating eyedrops were provided through the eye clinic and she should use these every 2 hours and see if things do not heal up.  Follow-up with eye clinic if discomfort persists.      CHIEF COMPLAINT    Possible stuck contact lens left eye.      HISTORY    This patient presents with possibly having lost a contact in the left eye.    After some discussion, it came out that she was trying hard to get a lens out using a Q-tip and her fingers.    She apparently rarely wears her contacts and had them in a couple of days ago.  Remove the right 1 uneventfully and then had problems with the left contact.      EXAM  BP 99/67 (BP Location: Left arm, Cuff Size: Adult Regular)   Pulse 71   Temp 98.5  F (36.9  C) (Tympanic)   Resp 16   Wt 92.4 kg (203 lb 12.8 oz)   LMP 09/03/2024 (Exact Date)   SpO2 97%   BMI 30.76 kg/m      On inspection, no contact was visible in left eye.  There is some inflammation beneath the lower lid especially and a little bit needs upper lid.  Minimal conjunctival injection.

## 2024-10-29 ENCOUNTER — TRANSFERRED RECORDS (OUTPATIENT)
Dept: HEALTH INFORMATION MANAGEMENT | Facility: CLINIC | Age: 29
End: 2024-10-29

## 2024-10-29 ENCOUNTER — OFFICE VISIT (OUTPATIENT)
Dept: OBGYN | Facility: CLINIC | Age: 29
End: 2024-10-29
Payer: COMMERCIAL

## 2024-10-29 VITALS
DIASTOLIC BLOOD PRESSURE: 76 MMHG | SYSTOLIC BLOOD PRESSURE: 108 MMHG | OXYGEN SATURATION: 96 % | HEART RATE: 91 BPM | WEIGHT: 205.4 LBS | HEIGHT: 68 IN | BODY MASS INDEX: 31.13 KG/M2

## 2024-10-29 DIAGNOSIS — Z31.41 FERTILITY TESTING: Primary | ICD-10-CM

## 2024-10-29 DIAGNOSIS — Z12.4 SCREENING FOR MALIGNANT NEOPLASM OF CERVIX: ICD-10-CM

## 2024-10-29 PROCEDURE — 99214 OFFICE O/P EST MOD 30 MIN: CPT | Performed by: NURSE PRACTITIONER

## 2024-10-29 PROCEDURE — G2211 COMPLEX E/M VISIT ADD ON: HCPCS | Performed by: NURSE PRACTITIONER

## 2024-10-29 PROCEDURE — G0145 SCR C/V CYTO,THINLAYER,RESCR: HCPCS | Performed by: NURSE PRACTITIONER

## 2024-10-29 PROCEDURE — 99459 PELVIC EXAMINATION: CPT | Performed by: NURSE PRACTITIONER

## 2024-10-29 PROCEDURE — 87624 HPV HI-RISK TYP POOLED RSLT: CPT | Performed by: NURSE PRACTITIONER

## 2024-10-29 NOTE — PATIENT INSTRUCTIONS
If you have any questions regarding your visit, Please contact your care team.     [facility]: [phone]  To Schedule an Appointment 24/7  Call: [phone]LE[facility]     Nohemy Betancourt- APRN CNP      Beryl Camargo-Surgery Scheduler  [facility]al-Surgery Scheduler         Monday 7:30am-2:00pm    Tuesday 7:30am-4:00pm    Wednesday 7:30am-2:00pm    Thursday 7:30am-11:00am    Friday 7:30am-2:00pm [address] [address]  Phone- [phone]   Fax- [phone]     Imaging Scheduling all locations  [phone]    [facility]  [address] [place] [address]   [place] [address] [phone]         Urgent Care locations:  [facility]   Monday-Friday  10 am - 8 pm  Saturday and Sunday   9 am - 5 pm     [phone]   If you need a medication refill, please contact your pharmacy. Please allow 3 business days for your refill to be completed.  As always, Thank you for trusting us with your healthcare needs!      see additional instructions from your care team below      Schedule labs on day 3 of next menstrual cycle  Schedule pelvic ultrasound around the time your menstrual bleeding is ending

## 2024-10-29 NOTE — PROGRESS NOTES
Depression Response    Patient completed the PHQ-9 assessment for depression and scored >9? Yes  Question 9 on the PHQ-9 was positive for suicidality? No  Does patient have current mental health provider? Yes    Is this a virtual visit? No    I personally notified the following: visit provider           Answers submitted by the patient for this visit:  Patient Health Questionnaire (Submitted on 10/28/2024)  If you checked off any problems, how difficult have these problems made it for you to do your work, take care of things at home, or get along with other people?: Extremely difficult  PHQ9 TOTAL SCORE: 20

## 2024-11-01 LAB
HPV HR 12 DNA CVX QL NAA+PROBE: NEGATIVE
HPV16 DNA CVX QL NAA+PROBE: NEGATIVE
HPV18 DNA CVX QL NAA+PROBE: NEGATIVE
HUMAN PAPILLOMA VIRUS FINAL DIAGNOSIS: NORMAL

## 2024-11-01 ASSESSMENT — PAIN SCALES - PAIN ENJOYMENT GENERAL ACTIVITY SCALE (PEG)
INTERFERED_ENJOYMENT_LIFE: 9
INTERFERED_GENERAL_ACTIVITY: 9
PEG_TOTALSCORE: 8.67
AVG_PAIN_PASTWEEK: 8

## 2024-11-02 ENCOUNTER — MYC MEDICAL ADVICE (OUTPATIENT)
Dept: OBGYN | Facility: CLINIC | Age: 29
End: 2024-11-02

## 2024-11-02 ENCOUNTER — TRANSFERRED RECORDS (OUTPATIENT)
Dept: HEALTH INFORMATION MANAGEMENT | Facility: CLINIC | Age: 29
End: 2024-11-02

## 2024-11-02 ENCOUNTER — OFFICE VISIT (OUTPATIENT)
Dept: URGENT CARE | Facility: URGENT CARE | Age: 29
End: 2024-11-02
Payer: COMMERCIAL

## 2024-11-02 VITALS
OXYGEN SATURATION: 97 % | TEMPERATURE: 99.3 F | BODY MASS INDEX: 30.52 KG/M2 | HEART RATE: 68 BPM | SYSTOLIC BLOOD PRESSURE: 104 MMHG | RESPIRATION RATE: 10 BRPM | WEIGHT: 202.2 LBS | DIASTOLIC BLOOD PRESSURE: 73 MMHG

## 2024-11-02 DIAGNOSIS — R39.89 GENITAL SORE: Primary | ICD-10-CM

## 2024-11-02 DIAGNOSIS — N89.8 VAGINAL DISCHARGE: ICD-10-CM

## 2024-11-02 DIAGNOSIS — A60.00 GENITAL HERPES SIMPLEX, UNSPECIFIED SITE: ICD-10-CM

## 2024-11-02 LAB
CLUE CELLS: ABNORMAL
TRICHOMONAS, WET PREP: ABNORMAL
WBC'S/HIGH POWER FIELD, WET PREP: ABNORMAL
YEAST, WET PREP: ABNORMAL

## 2024-11-02 PROCEDURE — 87529 HSV DNA AMP PROBE: CPT | Performed by: NURSE PRACTITIONER

## 2024-11-02 PROCEDURE — 99213 OFFICE O/P EST LOW 20 MIN: CPT | Performed by: NURSE PRACTITIONER

## 2024-11-02 PROCEDURE — 87210 SMEAR WET MOUNT SALINE/INK: CPT | Performed by: NURSE PRACTITIONER

## 2024-11-02 RX ORDER — OXYCODONE HYDROCHLORIDE 5 MG/1
1 TABLET ORAL
COMMUNITY
Start: 2024-10-29

## 2024-11-02 NOTE — PATIENT INSTRUCTIONS
Make appointment with GYN for further evaluation of genital sore  No intercourse until symptoms resolve

## 2024-11-02 NOTE — PROGRESS NOTES
"Assessment & Plan     Genital sore    - HSV Types 1 and 2 Qualitative PCR CSF    Vaginal discharge    - Wet prep - lab collect  - Wet prep - lab collect     Reviewed wet prep showing no BV, trich, yeast infection, 3+ WBC nonspecific finding. HSV in process, will notify if positive and start antiviral.     Recommend trying not to scratch, cotton underwear. Make appointment with GYN for further evaluation of genital sore  No intercourse until symptoms resolve    Follow-up with GYN if symptoms persist for 7 days, and sooner if symptoms worsen or new symptoms develop.     Discussed red flag symptoms which warrant immediate visit in emergency room    All questions were answered and patient verbalized understanding. AVS reviewed with patient.     Eri Landaverde, DNP, APRN, CNP 11/2/2024 12:14 PM  [facility] [place]    Hector Vasques is a 29 year old female who presents to clinic today for the following health issues:  Chief Complaint   Patient presents with    Vaginal Problem     Genital sore       Patient presents for evaluation of white sore on right labia which was noticed last night. She has been \"cut\" by speculum previously and wonders if this is from her pap smear? Associated symptoms: Genitalia has been itching. Denies discharge, odor, dysuria, urgency, frequency, flank pain, abdominal pain, fever, emesis, tingling. No concern for STD. Last intercourse was 10/28/24. Nothing tried for symptoms.     She was evaluated by GYN 10/29/24 when she had pap smear completed and noticed symptoms last night.       Problem list, Medication list, Allergies, and Medical history reviewed in EPIC.    ROS:  Review of systems negative except for noted above        Objective    /73   Pulse 68   Temp 99.3  F (37.4  C) (Tympanic)   Resp 10   Wt 91.7 kg (202 lb 3.2 oz)   LMP 10/19/2024 (Exact Date)   SpO2 97%   BMI 30.52 kg/m    Physical Exam  Exam conducted with a chaperone present. "   Constitutional:       General: She is not in acute distress.     Appearance: She is not toxic-appearing or diaphoretic.   Genitourinary:     Labia:         Right: Lesion present.       Vagina: Vaginal discharge present.          Comments: Yellow/green vaginal discharge. Approx 1 cm genital sore right labia with yellowish discharge  Neurological:      Mental Status: She is alert.          Labs:  Results for orders placed or performed in visit on 11/02/24   Wet prep - lab collect     Status: Abnormal    Specimen: Vagina; Swab   Result Value Ref Range    Trichomonas Absent Absent    Yeast Absent Absent    Clue Cells Absent Absent    WBCs/high power field 3+ (A) None

## 2024-11-03 ENCOUNTER — TELEPHONE (OUTPATIENT)
Dept: URGENT CARE | Facility: URGENT CARE | Age: 29
End: 2024-11-03
Payer: COMMERCIAL

## 2024-11-03 DIAGNOSIS — A60.00 GENITAL HERPES SIMPLEX, UNSPECIFIED SITE: Primary | ICD-10-CM

## 2024-11-03 LAB
HSV1 DNA SPEC QL NAA+PROBE: DETECTED
HSV2 DNA SPEC QL NAA+PROBE: NOT DETECTED

## 2024-11-03 RX ORDER — VALACYCLOVIR HYDROCHLORIDE 1 G/1
1000 TABLET, FILM COATED ORAL 2 TIMES DAILY
Qty: 20 TABLET | Refills: 0 | Status: SHIPPED | OUTPATIENT
Start: 2024-11-03 | End: 2024-11-13

## 2024-11-03 NOTE — TELEPHONE ENCOUNTER
Patient notified of genital herpes results. Prescription sent to pharmacy for Valtrex twice daily for 10 days for first outbreak. Discussed need for protection with future sexual contact and no sexual activity when having an active outbreak. Advised she make appt with PCP to discuss potential for suppressive therapy. Questions answered.

## 2024-11-04 ENCOUNTER — OFFICE VISIT (OUTPATIENT)
Dept: PALLIATIVE MEDICINE | Facility: CLINIC | Age: 29
End: 2024-11-04
Payer: COMMERCIAL

## 2024-11-04 ENCOUNTER — OFFICE VISIT (OUTPATIENT)
Dept: OBGYN | Facility: CLINIC | Age: 29
End: 2024-11-04
Payer: COMMERCIAL

## 2024-11-04 VITALS
WEIGHT: 202.6 LBS | HEART RATE: 74 BPM | OXYGEN SATURATION: 100 % | BODY MASS INDEX: 30.58 KG/M2 | SYSTOLIC BLOOD PRESSURE: 130 MMHG | DIASTOLIC BLOOD PRESSURE: 92 MMHG

## 2024-11-04 VITALS — SYSTOLIC BLOOD PRESSURE: 113 MMHG | HEART RATE: 77 BPM | DIASTOLIC BLOOD PRESSURE: 81 MMHG

## 2024-11-04 DIAGNOSIS — M79.18 MYOFASCIAL PAIN: ICD-10-CM

## 2024-11-04 DIAGNOSIS — G89.29 CHRONIC BILATERAL LOW BACK PAIN WITH LEFT-SIDED SCIATICA: Primary | ICD-10-CM

## 2024-11-04 DIAGNOSIS — M62.838 MUSCLE SPASM: ICD-10-CM

## 2024-11-04 DIAGNOSIS — A60.00 PRIMARY GENITAL HERPES SIMPLEX INFECTION: Primary | ICD-10-CM

## 2024-11-04 DIAGNOSIS — M54.42 CHRONIC BILATERAL LOW BACK PAIN WITH LEFT-SIDED SCIATICA: Primary | ICD-10-CM

## 2024-11-04 LAB
BKR AP ASSOCIATED HPV REPORT: NORMAL
BKR LAB AP GYN ADEQUACY: NORMAL
BKR LAB AP GYN INTERPRETATION: NORMAL
BKR LAB AP GYN OTHER FINDINGS: NORMAL
BKR LAB AP PREVIOUS ABNORMAL: NORMAL
PATH REPORT.COMMENTS IMP SPEC: NORMAL
PATH REPORT.COMMENTS IMP SPEC: NORMAL
PATH REPORT.RELEVANT HX SPEC: NORMAL

## 2024-11-04 PROCEDURE — G2211 COMPLEX E/M VISIT ADD ON: HCPCS | Performed by: NURSE PRACTITIONER

## 2024-11-04 PROCEDURE — 99214 OFFICE O/P EST MOD 30 MIN: CPT | Performed by: OBSTETRICS & GYNECOLOGY

## 2024-11-04 PROCEDURE — 99215 OFFICE O/P EST HI 40 MIN: CPT | Performed by: NURSE PRACTITIONER

## 2024-11-04 RX ORDER — ORPHENADRINE CITRATE 100 MG/1
100 TABLET ORAL 2 TIMES DAILY
Qty: 30 TABLET | Refills: 1 | Status: SHIPPED | OUTPATIENT
Start: 2024-11-04

## 2024-11-04 ASSESSMENT — PAIN SCALES - GENERAL: PAINLEVEL_OUTOF10: EXTREME PAIN (9)

## 2024-11-04 NOTE — NURSING NOTE
Friday or Saturday- Caprice tried to get out of bed and legs just collapsed. 1 minute she was down on the ground and couldn't get back up- Felt like legs weren't even there. Only one incident this has happened. Caprice has explained on her back when anything could be a wrong movement. The back gives off the motion back slipped and stabbing feeling. Went to the ER- Gave medication to help with pain.

## 2024-11-04 NOTE — PROGRESS NOTES
Caprice is a 29 year old   here for follow up of her Herpes diagnosis..  She reports starting on the antiviral, but still having pain.  Explained that it can take 48-72 hours to get much relief..  ROS: Ten point review of systems was reviewed and negative except the above.    PMH: Her past medical, surgical, and obstetric histories were reviewed and are documented in their appropriate chart areas.    ALL/Meds: Her medication and allergy histories were reviewed and are documented in their appropriate chart areas.    SH/FMH: Reviewed and documented in the appropriate area.    PE: BP (!) 130/92 (BP Location: Left arm, Patient Position: Sitting, Cuff Size: Adult Regular)   Pulse 74   Wt 91.9 kg (202 lb 9.6 oz)   LMP 10/19/2024 (Exact Date)   SpO2 100%   BMI 30.58 kg/m      Discussed herpes, how the virus presents, is transmitted and treatment options based on the frequency and severity of outbreaks.  Her BF has had cold sores and they recently  had oral sex.    A/P:   Caprice presents with (A60.00) Primary genital herpes simplex infection  (primary encounter diagnosis)  Comment: 30 minutes spent on the date of the encounter doing chart review, review of test results, patient visit, and documentation    Plan: She should finish the treatment course of Valtrex and notify us if she has a recurrent outbreak.          -    No orders of the defined types were placed in this encounter.        Aubrey Thurston MD FACOG

## 2024-11-04 NOTE — PATIENT INSTRUCTIONS
Plan:  Physical Therapy: none at present  Clinical Health Psychologist to address issues of relaxation, behavioral change, coping style, and other factors important to improvement: referral to [facility]   Diagnostic Studies: none  Medication Management:   Stop metaxalone  START orphenadrine ER 100mg every 12 hours as needed for muscle relaxation  We can consider future use of Butrans (skin patch wear for 7 days, then change) or Belbuca (mouth film wear for 20 minutes every 12 hours). Both of these are buprenorphine products. If we use this, I would need a UDS and have you sign a controlled substance agreement  Further procedures recommended: none at present, we can talk about this again if and when you are open to it.   Acupuncture: I am okay with this  Recommendations/follow-up for PCP:  see above  Release of information: none   Follow up: as scheduled unless you determine you prefer care with [facility] and Dr. South.   Check out De Archibald on You Tube, short simple exercise videos, 30-90 seconds long in the short form.     ----------------------------------------------------------------  Clinic Number:  [phone]   Call with any questions about your care and for scheduling assistance.   Calls are returned Monday through Friday between 8 AM and 4:30 PM. We usually get back to you within 2 business days depending on the issue/request.    If we are prescribing your medications:  For opioid medication refills, call the clinic or send a [employer] message 7 days in advance.  Please include:  Name of requested medication  Name of the pharmacy.  For non-opioid medications, call your pharmacy directly to request a refill. Please allow 3-4 days to be processed.   Per [place] State Law:  All controlled substance prescriptions must be filled within 30 days of being written.    For those controlled substances allowing refills, pickup must occur within 30 days of last fill.      We believe regular attendance is key to your  success in our program!    Any time you are unable to keep your appointment we ask that you call us at least 24 hours in advance to cancel.This will allow us to offer the appointment time to another patient.   Multiple missed appointments may lead to dismissal from the clinic.

## 2024-11-04 NOTE — PROGRESS NOTES
"[facility]    11/04/2024      Chief complaint:   -bilateral low back pain with left sided leg pain  -continued muscle spasm           Interval history:  Caprice Kline 29 year old female is known to me for:  Chronic left sided low back pain with left sided sciatica  Lumbar radicular pain (left leg)  Meralgia paresthetica on the left side  Lumbar DDD  Myofascial pain  Muscle spasm  Chronic intractable pain  Bipolar disorder, in partial remission, most recent episode mixed  PMHx includes: depressive disorder, complication of anesthesia (freaking out when I wake up), bipolar disorder  PSHx includes: Minimally invasive left Lumbar 5 to Sacral 1 microdiscectomy (2021), cystectomy with  salpingectomy on the right side, tonsillectomy, wisdom teeth extraction      Recommendations/plan at the last visit on 9/17/2024 included:  Physical Therapy: none at present  Clinical Health Psychologist to address issues of relaxation, behavioral change, coping style, and other factors important to improvement: referral to [facility]   Diagnostic Studies: none  Medication Management:   Stop tizanidine  START metaxalone 400-800mg three times daily as needed for muscle spasms  Further procedures recommended: none at present, we can talk about this again if and when you are open to it.   Acupuncture: I am okay with this  Recommendations/follow-up for PCP:  see above  Release of information: none   Follow up: 3 months. In-person or virtual.   Check out De Archibald on You Tube, short simple exercise videos, 30-90 seconds long in the short form.         Since female last visit, Caprice Kline reports:    Interval history November 4, 2024  -\"feels like my right hip is breaking glass\"  -she had issues with both legs collapsing out from under her this weekend.   -she fainted twice this past weekend.   -having pain in the low back bilaterally and into the left leg to the calf level and the right leg into the " "front of the thigh.   -metaxalone was not at all helpful  -she has also been seen at [facility] pain clinic and they ordered a MRI scan, this will be done at [facility].   -cannot get comfortable any way. She feels a \"slipping\" sensation in her low back.   -she received Percocet from Dr. South at [facility] pain clinic.   -we discussed that I recommend that she decide which pain clinic she is going to follow with. Better to only have one clinic managing medications, etc.       -she has had migraines her whole life, worse lately.   -migraine pain behind her eyes, up and over the skull and into the neck.   -has both phono and photophobia.   -no aura with migraine  -Ubrelvy is very helpful for her migraines.   -she has been having migraines nearly everyday  -she sees Dr. Goldman in neurology re: her migraines and tremor      Pain history collected at initial consult on 9/17/2024  Chronic left sided low back pain with left leg pain that extends posterolaterally down the left leg to the level of the knee.   This pain has been present since prior to August 2021 when she had back surgery with Dr. Junior of neurosurgery. This helped for a short period of time, thinks it was for about 2-3 months.    -she had a good experience with Dr. Junior and his team.   -she is unsure if her leg is weak, she knows she favors the left leg.   -she does have numbness and tingling in the left leg to the lateral hip and thigh.   -denies saddle anesthesia, no loss of bowel or bladder.   -she had COVID last week. Was laid off from her job on her birthday.     -left knee pain present since high school. Injured during basketball. Her left knee pops and his painful.         At this point, the patient's participation with our multidisciplinary team includes:  The patient has been compliant with the program.  PT -not ordered  Health Psych - not ordered            Pain rating: intensity ranges from 6/10 to 10/10, and Averages 7-8/10 on a 0-10 scale.  Pain " "right now is 9/10.   Describes pain as \" burning, cramping, sharp, numbness, cutting, dull, aching, throbbing, pressure, my right hip feels like broken glass.\"  Pain is constant.        Current pain-related medication treatments include:  -clonazepam 0.5mg PRN (uses sparingly, #10 last 4-6 weeks)  -doxepin 10mg at HS (helpful for sleep)  -hydroxyzine 50-100mg TID PRN (uses 100mg TID, very helpful)  -pregabalin take 100mg PLUS 150mg  in the AM  -Pregabalin take 150mg in the afternoon and evening (helpful)  -propranolol ER 80mg every day (recently started, migraine and tremor treatment)  -ubrelvy 100mg at onset of headache (has been a lifesaver)      Other pertinent medications:  -Adderall XR 30 mg once a day  -Dulcolax 5 mg daily as needed constipation  -lamotrigine 100mg BID (helpful)  -Lithium 600mg every day (helpful)  -ondansetron 4mg Q 8 hours PRN nausea (helpful, doesn't need often)  -prazosin 2mg BID (helpful for nightmares and PTSD)  -Quetiapine 300mg at HS (helpful)  -semiglutide 0.5mg weekly subcutaneous injection (too soon to tell)    Previous medication treatments included:    OPIATES:hydrocodone (causes headaches), Oxycodone (helpful)  NSAIDS: ibuprofen (cannot take, on lithium), Naproxen (cannot take, on lithium), meloxicam (not helpful)  MUSCLE RELAXANTS: cyclobenzaprine (not helpful, \"I hated it\"), methocarbamol (not helpful), tizanidine (not helpful), metaxalone (not at all helpful)  ANTI-MIGRAINE MEDS: prednisone (not helpful), rizatriptan (not helpful), sumatriptan (not helpful), Ubrelvy (has been a life saver)  ANTI-DEPRESSANTS: amitriptyline (unsure), bupropion (caused nausea), citalopram (not helpful), Doxepin (helpful), Duloxetine (unsure), fluoxetine (not helpful), sertraline (allergic), trazodone (nightmares)  SLEEP AIDS: alprazolam (somewhat helpful), clonazepam (helpful, uses sparingly), diazepam (not helpful), hydroxyzine (very helpful),   ANTI-CONVULSANTS: gabapentin (not helpful), " "pregabalin (helpful), Topiramate (didn't like how she felt)  TOPICALS: lidoderm patch (short term benefit), other gels/creams/ointments (sometimes)  ANXIOLYTICS: Clonanazepam  (helpful), hydroxyzine (helpful)  MEDICAL CANNABIS: Medical cannabis cream (helpful)  Other meds: Tylenol (not helpful)    Medications and Allergies reviewed. Yes     Other treatments have included:  Caprice Kline has not been seen at a pain clinic in the past.      Physical therapy: Yes  not helpful  Psychologist: No   Chiropractor: Yes not helpful  Acupuncture: Yes somewhat helpful  Pharmacotherapy:    Opioids: No     Non-opioids:  Yes helpful  TENs Unit/electric stim: Yes helpful at chiropractor  Heat/Cold: Yes   Exercise: yes       Injections:   -10/24/2023 left SI joint injection per Dr. Moi Gary (no pain relief whatsoever)    Pertinent Surgeries:  8/21/2021 Lumbar microdiscectomy L5-S1 with Dr. Junior     Past Medical History:  Past Medical History:   Diagnosis Date    Bipolar disorder (H)     Complication of anesthesia     \"freaking out when I wake up\"    Depressive disorder 2011     Past Surgical History:  Past Surgical History:   Procedure Laterality Date    DISCECTOMY LUMBAR POSTERIOR MICROSCOPIC ONE LEVEL Left 08/24/2021    Procedure: Minimally invasive left Lumbar 5 to Sacral 1 microdiscectomy  ;  Surgeon: Destin Junior MD;  Location: [facility] OR    GYN SURGERY Right     cystectomy - with salpingectomy    HEAD & NECK SURGERY      tonsillectomy    HEAD & NECK SURGERY      wisdom teeth extraction     Medications:  Current Outpatient Medications   Medication Sig Dispense Refill    albuterol (PROAIR HFA/PROVENTIL HFA/VENTOLIN HFA) 108 (90 Base) MCG/ACT inhaler Inhale 2 puffs into the lungs every 6 hours as needed for shortness of breath, wheezing or cough 18 g 0    amphetamine-dextroamphetamine (ADDERALL XR) 30 MG 24 hr capsule       bisacodyl (DULCOLAX) 5 MG EC tablet Take 5 mg by mouth daily as needed for constipation      " clobetasol (TEMOVATE) 0.05 % external solution APPLY TOPICALLY TO THE AFFECTED AREA TWICE DAILY FOR UP TO 21 DAYS. DO NOT APPLY TO NORMAL SKIN      clonazePAM (KLONOPIN) 0.5 MG tablet       doxepin (SINEQUAN) 10 MG capsule Take 10 mg by mouth At Bedtime      EPINEPHrine (ANY BX GENERIC EQUIV) 0.3 MG/0.3ML injection 2-pack INJECT 0.3 ML INTO THE MUSCLE AS NEEDED FOR ANAPHYLAXIS 2 each 1    hydrOXYzine (VISTARIL) 50 MG capsule TAKE 1 TO 2 CAPSULES BY MOUTH THREE TIMES DAILY      ipratropium-albuterol (COMBIVENT RESPIMAT)  MCG/ACT inhaler Inhale 1 puff into the lungs 4 times daily as needed for shortness of breath, wheezing or cough 4 g 0    ketoconazole (NIZORAL) 2 % external shampoo APPLY TOPICALLY TO THE AFFECTED AREA 3 TIMES EVERY WEEK. LEAVE IN 5-10 MINUTES BEFORE WASHING OUT      lamoTRIgine (LAMICTAL) 100 MG tablet Take 100 mg by mouth 2 times daily      lithium (ESKALITH) 600 MG capsule       metaxalone (SKELAXIN) 800 MG tablet Take 0.5-1 tablets (400-800 mg) by mouth 3 times daily as needed for muscle spasms. Stop tizanidine. Caution sedation. 45 tablet 1    ondansetron (ZOFRAN ODT) 4 MG ODT tab DISSOLVE 1 TABLET(4 MG) ON THE TONGUE EVERY 8 HOURS AS NEEDED FOR NAUSEA 90 tablet 0    oxyCODONE (ROXICODONE) 5 MG tablet Take 1 tablet by mouth 3 times daily.      prazosin (MINIPRESS) 2 MG capsule Take 2 mg by mouth 2 times daily      pregabalin (LYRICA) 100 MG capsule TAKE 1 CAPSULE BY MOUTH EVERY MORNING IN ADDITION  MG 3 TIMES DAILY      pregabalin (LYRICA) 150 MG capsule Take 150 mg by mouth 3 times daily      propranolol (INDERAL) 20 MG tablet Take 1 tablet (20 mg) by mouth 2 times daily as needed (tremor). 180 tablet 2    propranolol ER (INDERAL LA) 80 MG 24 hr capsule Take 1 capsule (80 mg) by mouth daily. 90 capsule 1    QUEtiapine (SEROQUEL) 300 MG tablet Take 300 mg by mouth At Bedtime      Semaglutide-Weight Management (WEGOVY) 0.5 MG/0.5ML pen Inject 0.5 mg subcutaneously once a week. 2 mL 0  "   spironolactone (ALDACTONE) 25 MG tablet       ubrogepant (UBRELVY) 100 MG tablet Take 1 tablet (100 mg) by mouth at onset of headache. 8 tablet 11    valACYclovir (VALTREX) 1000 mg tablet Take 1 tablet (1,000 mg) by mouth 2 times daily for 10 days. 20 tablet 0     Allergies:     Allergies   Allergen Reactions    Metronidazole Anaphylaxis and Hives     Other reaction(s): Throat swelling  Throat swelling 6 hrs after exposure  Itching and hives 2 hrs after exposure    Shellfish Allergy Anxiety, Diarrhea, Difficulty breathing, Hives, Itching, Rash, Shortness Of Breath and Swelling    Fish-Derived Products      Stopped fish oil and less stomach discomfort  Rash after eating fish.     Hydrocodone-Acetaminophen Other (See Comments)    Morphine And Codeine Headache    Sertraline Other (See Comments)     Patient was foggy and \"high\" feeling    Shellfish-Derived Products      Lips get numb, throat gets scratchy, rashes     Social History:  Home situation: lives with boyfriend. 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  Tobacco use: vapes  Alcohol use: minimal, monthly use  Drug use: now none, used marijuana in the past  History of chemical dependency treatment: none    Family history:  Family History   Problem Relation Age of Onset    Hypertension Mother     Colon Polyps Mother 50    Substance Abuse Father     Colon Cancer Father 60    Diabetes Maternal Grandmother     Hypertension Maternal Grandmother     Colon Cancer Maternal Grandmother     Diabetes Paternal Grandmother                Physical Exam:  Vitals:    11/04/24 1435   BP: 113/81   Pulse: 77       Behavioral observations:  Awake, alert, conversant and cooperative     Gait:  slow    Musculoskeletal exam:    Lumbar flexion 25 degrees, painful   Lumbar extension 15 degrees, painful  Lumbar ext/rot to the right and left is painful  Si joints mildly painful  Piriformis mildly painful  GTs " Non-tender bilaterally     Neuro exam:  SILT in all extremities     Skin/vascular/autonomic:  No suspicious lesions on exposed skin.     Other:  na       IMAGING:  EXAM: MR LUMBAR SPINE W/O & W CONTRAST  2/28/2023 9:49 AM      HISTORY:  Low back pain; hx Spine surgery; No r/o hardware failure;  Worsening low back pain; No known/automatically detected potential  contraindications to imaging; Lumbar radiculopathy; Chronic bilateral  low back pain with left-sided sciatica; Chronic bilateral low back  pain with left-sided sciatica        COMPARISON:  MRI 7/13/2021, 9/27/2021     TECHNIQUE: Sagittal T1-weighted, sagittal STIR, axial T1-weighted, and  3-D volumetric T2-weighted images of the lumbar spine were obtained  without intravenous contrast. Post intravenous contrast using  gadolinium axial and sagittal T1-weighted images were obtained with  fat saturation. Post intravenous contrast using gadolinium, axial and  sagittal T1-weighted images were obtained with fat saturation.     CONTRAST: 9ml Gadavist.     FINDINGS:  There are 5 lumbar type vertebrae, used for the purposes of this  dictation. Conus tip at approximately L1. Normal lumbar vertebral  alignment. L5-S1 disc dehydration with opposing L5-S1 endplate  degenerative edema. No loss of vertebral body height. Normal marrow  signal. Normal cauda equina.     On a level by level basis, the findings are as follows:     L1-2: No spinal canal or neural foraminal stenosis.     L2-3: No spinal canal or neural foraminal stenosis.     L3-4: No spinal canal or neural foraminal stenosis. Stable disc bulge.        L4-5: No spinal canal or neural foraminal stenosis. Stable disc bulge.     L5-S1: Left hemilaminectomy changes with nonmasslike postcontrast  enhancement surrounding the left L5-S1 facet joint and within the left  epidural space similar surrounding the traversing S1 nerve root  without any significant mass effect. Posterior paraspinal fluid  collection has resolved.  No spinal canal or neural foraminal stenosis.     The visualized paraspinous soft tissues are within normal limits.                                                                       IMPRESSION:   1. L5-S1 hemilaminectomy, microdiscectomy changes. Left facet  periarticular, left epidural space granulation tissue formation  surrounding the left descending S1 within the lateral recess without  significant compression or abnormal nerve signal. Previous  postoperative fluid collection has resolved.  2. No substantial degenerative change at any other level within the  lumbar spine.     I have personally reviewed the examination and initial interpretation  and I agree with the findings.     ELLIE GLORIA MD     LABS:  Creatinine   Date Value Ref Range Status   06/20/2024 0.92 0.51 - 0.95 mg/dL Final     GFR Estimate   Date Value Ref Range Status   06/20/2024 86 >60 mL/min/1.73m2 Final     Comment:     eGFR calculated using 2021 CKD-EPI equation.     Alkaline Phosphatase   Date Value Ref Range Status   06/20/2024 38 (L) 40 - 150 U/L Final     AST   Date Value Ref Range Status   06/20/2024 19 0 - 45 U/L Final     Comment:     Reference intervals for this test were updated on 6/12/2023 to more accurately reflect our healthy population. There may be differences in the flagging of prior results with similar values performed with this method. Interpretation of those prior results can be made in the context of the updated reference intervals.     ALT   Date Value Ref Range Status   06/20/2024 22 0 - 50 U/L Final     Comment:     Reference intervals for this test were updated on 6/12/2023 to more accurately reflect our healthy population. There may be differences in the flagging of prior results with similar values performed with this method. Interpretation of those prior results can be made in the context of the updated reference intervals.            Screening tools:     DIRE Score for ongoing opioid management is calculated  as follows:    Diagnosis = 2    Intractability = 2    Risk: Psych = 2  Chem Hlth = 2  Reliability = 2  Social = 2    Efficacy = 2    Total DIRE Score = 14 (14 or higher predicts good candidate for ongoing opioid management; 13 or lower predicts poor candidate for opioid management)       [place]   review:  Reviewed [place]  11/042024- no concerning fills. Not on opiates.   Modesta MADRID, RN CNP, FNP  [facility]  [place] location          Assessment:  Chronic left sided low back pain with left sided sciatica  Myofascial pain  Muscle spasm    Chronic intractable pain  Bipolar disorder, in partial remission, most recent episode mixed  PMHx includes: depressive disorder, complication of anesthesia (freaking out when I wake up), bipolar disorder  PSHx includes: Minimally invasive left Lumbar 5 to Sacral 1 microdiscectomy (2021), cystectomy with  salpingectomy on the right side, tonsillectomy, wisdom teeth extraction        Plan:  Physical Therapy: none at present  Clinical Health Psychologist to address issues of relaxation, behavioral change, coping style, and other factors important to improvement: referral to [facility]   Diagnostic Studies: none  Medication Management:   Stop metaxalone  START orphenadrine ER 100mg every 12 hours as needed for muscle relaxation  We can consider future use of Butrans (skin patch wear for 7 days, then change) or Belbuca (mouth film wear for 20 minutes every 12 hours). Both of these are buprenorphine products. If we use this, I would need a UDS and have you sign a controlled substance agreement  Further procedures recommended: none at present, we can talk about this again if and when you are open to it.   Acupuncture: I am okay with this  Recommendations/follow-up for PCP:  see above  Release of information: none   Follow up: as scheduled unless you determine you prefer care with [facility] and Dr. South.   Check out De Archibald on You Tube, short simple  exercise videos, 30-90 seconds long in the short form.         ASSESSMENT AND PLAN:  (M54.42,  G89.29) Chronic bilateral low back pain with left-sided sciatica  (primary encounter diagnosis)  Comment:   Plan: orphenadrine ER (NORFLEX) 100 MG 12 hr tablet            (M79.18) Myofascial pain  Comment:   Plan: orphenadrine ER (NORFLEX) 100 MG 12 hr tablet            (M62.838) Muscle spasm  Comment:   Plan: orphenadrine ER (NORFLEX) 100 MG 12 hr tablet              BILLING TIME DOCUMENTATION:   TOTAL TIME includes:   Time spent preparing to see the patient: 0 minutes (reviewing records and tests)  Time spend face to face with the patient: 39 minutes  Time spent ordering tests, medications, procedures and referrals: 0 minutes  Time spent Referring and communicating with other healthcare professionals: 0 minutes  Documenting clinical information in Epic: 5 minutes    The total TIME spent on this patient on the day of the appointment was 44 minutes.              Modesta MADRID, RN CNP, FNP  [facility]  [facility]

## 2024-11-05 ENCOUNTER — NURSE TRIAGE (OUTPATIENT)
Dept: NURSING | Facility: CLINIC | Age: 29
End: 2024-11-05
Payer: COMMERCIAL

## 2024-11-05 NOTE — TELEPHONE ENCOUNTER
Nurse Triage SBAR    Is this a 2nd Level Triage? NO    Situation: anuria    Background: Patient has HSV1 of the genitals and states that it is extremely painful. She reports that she has not urinated since 11/4/24 but she is not confident that is accurate and it could have been 11/3/24    Assessment: Patient has HSV1 of the genitals and states that it is extremely painful. She reports that she has not urinated since 11/4/24 but she is not confident that is accurate and it could have been 11/3/24    Protocol Recommended Disposition:   ED now     Recommendation:  care advice given          Does the patient meet one of the following criteria for ADS visit consideration? No    Reason for Disposition   [1] Unable to urinate (or only a few drops) > 4 hours AND [2] bladder feels very full (e.g., palpable bladder or strong urge to urinate)    Additional Information   Negative: Shock suspected (e.g., cold/pale/clammy skin, too weak to stand, low BP, rapid pulse)   Negative: Sounds like a life-threatening emergency to the triager   Negative: Followed a female genital area injury (e.g., labia, vagina, vulva)   Negative: Followed a male genital area injury (e.g., penis, scrotum)   Negative: Vaginal discharge   Negative: [1] Taking antibiotic for urinary tract infection (UTI) AND [2] male   Negative: [1] Taking antibiotic for urinary tract infection (UTI) AND [2] female   Negative: Pus (white, yellow) or bloody discharge from end of penis   Negative: [1] Pain or burning with passing urine (urination) AND [2] pregnant   Negative: [1] Pain or burning with passing urine (urination) AND [2] postpartum (from 0 to 6 weeks after delivery)   Negative: [1] Pain or burning with passing urine (urination) AND [2] female   Negative: [1] Pain or burning with passing urine (urination) AND [2] male   Negative: Pain or itching in the vulvar area   Negative: Pain in scrotum is main symptom   Negative: Blood in the urine is main symptom    Negative: Symptoms arising from use of a urinary catheter (e.g., Coude, Cronin)    Protocols used: Urinary Symptoms-A-AH

## 2024-11-06 ENCOUNTER — TRANSFERRED RECORDS (OUTPATIENT)
Dept: HEALTH INFORMATION MANAGEMENT | Facility: CLINIC | Age: 29
End: 2024-11-06

## 2024-11-11 ENCOUNTER — TRANSFERRED RECORDS (OUTPATIENT)
Dept: HEALTH INFORMATION MANAGEMENT | Facility: CLINIC | Age: 29
End: 2024-11-11

## 2024-11-11 DIAGNOSIS — Z86.39 HX OF OBESITY: ICD-10-CM

## 2024-11-11 RX ORDER — SEMAGLUTIDE 0.5 MG/.5ML
INJECTION, SOLUTION SUBCUTANEOUS
Qty: 2 ML | Refills: 0 | Status: SHIPPED | OUTPATIENT
Start: 2024-11-11

## 2024-11-13 ENCOUNTER — TRANSFERRED RECORDS (OUTPATIENT)
Dept: HEALTH INFORMATION MANAGEMENT | Facility: CLINIC | Age: 29
End: 2024-11-13

## 2024-11-18 ENCOUNTER — VIRTUAL VISIT (OUTPATIENT)
Dept: NEUROLOGY | Facility: CLINIC | Age: 29
End: 2024-11-18
Payer: COMMERCIAL

## 2024-11-18 VITALS — WEIGHT: 200 LBS | HEIGHT: 69 IN | BODY MASS INDEX: 29.62 KG/M2

## 2024-11-18 DIAGNOSIS — R25.1 TREMOR: Primary | ICD-10-CM

## 2024-11-18 DIAGNOSIS — G43.719 INTRACTABLE CHRONIC MIGRAINE WITHOUT AURA AND WITHOUT STATUS MIGRAINOSUS: ICD-10-CM

## 2024-11-18 RX ORDER — PROPRANOLOL HCL 20 MG
20 TABLET ORAL 2 TIMES DAILY PRN
Qty: 180 TABLET | Refills: 2 | Status: SHIPPED | OUTPATIENT
Start: 2024-11-18

## 2024-11-18 RX ORDER — PROPRANOLOL HYDROCHLORIDE 80 MG/1
80 CAPSULE, EXTENDED RELEASE ORAL DAILY
Qty: 90 CAPSULE | Refills: 1 | Status: SHIPPED | OUTPATIENT
Start: 2024-11-18

## 2024-11-18 ASSESSMENT — PAIN SCALES - GENERAL: PAINLEVEL_OUTOF10: EXTREME PAIN (8)

## 2024-11-18 NOTE — NURSING NOTE
Current patient location: [address] [place] [id number]    Is the patient currently in the state of [place]? YES    Visit mode:VIDEO    If the visit is dropped, the patient can be reconnected by:VIDEO VISIT: Text to cell phone:   Telephone Information:   Mobile [phone]       Will anyone else be joining the visit? NO  (If patient encounters technical issues they should call [phone]    Are changes needed to the allergy or medication list? No    Are refills needed on medications prescribed by this physician? NO    Rooming Documentation:  Patient declined to complete questionnaire(s)    Reason for visit: RECHECK    Karli AWADF

## 2024-11-18 NOTE — LETTER
11/18/2024      Caprice Kline  [address] [address]      Dear Colleague,    Thank you for referring your patient, Caprice Kline, to the [facility] [place]. Please see a copy of my visit note below.    Virtual Visit Details    Type of service: Video visit   Video start time 956 AM  Video End Time:1013 AM    Originating Location (pt. Location): Home    Distant Location (provider location):  Off-site  Platform used for Video Visit: [facility]/[place]  Section of General Neurology  Return Patient  Virtual Visit    Caprice Kline MRN# [id number]   Age: 29 year old YOB: 1995          Assessment and Plan:   Assessment:  Caprice Kline is a 29 year old female who presents today in close follow up.  She has a PMH of bipolar, depression with h/o SI, BPD, recent preseptal cellulitis among other PMH as below.  Tremor is improved, will continue propranolol  Discussed headache options--overall Nurtec does help for rescue but she has stretches (one that lasted a full month) of migraines where our preventative strategy could still be better.  She has previously not tolerated topamax well, given other medications she takes for mood other options would be a bit limited as well.  I think CGRP antagonists given her response to Nurtec  PRN would be a good idea and should improve her quality of life.  She is agreement to risk/benefit discussion.      Plan:  Continue propranolol 80 mg daily +20 mg BIDPRN for tremor, headache  Continue nurtec 75 mg PRN  Add Ajovy monthly for prevention  Follow up in ~3 months for video visit sooner with any issues questions or changes        Louie Goldman MD   of Neurology   [facility]      Interval history:     Recent ER visits reviewed pre visit, urinary retention requiring fisher  Headaches are pretty good for the most part now, but had a bad one that lasted for >1 month.  Waxes and wanes in terms of total headache days per month thus.  Headaches limited ability to attend a wedding recently  Ubrelvy for the most part helps  Propranolol --helps and can help PRN as well  Tremors are better  Lost her job on her birthday she notes, tremors are better without work.  Anxiety a variable.    Had a migraine for a long time recently and this is worrisome, felt hopeless at that point  Lyrica 150 mg TID and 100 mg at bedtime.  Pretty much maximum dose.    Next option probably monthly CGRP injections, discussed.    Previously on topiramate, felt crappy coming off of it.    TENS unit in spine soon, she notes  Botox a future option, discussed.     A/P at last visit  Caprice Kline is a 29 year old female who presents today in close follow up.  She has a PMH of bipolar, depression with h/o SI, BPD, recent preseptal cellulitis among other PMH as below.  She returns primarily to discuss worsening tremor.   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  Stress and poor sleep may be variables at times too.   Discussed options, will maximize propranolol as able.  Side effects re-discussed. She will monitor her pulse/BP.   Nurtec not covered, will trial Ubrelvy.  Hopefully higher propranolol dosing can improve headaches as well.      Plan:  Increase propranolol to 80 mg daily + 20 mg BIDPRN  Ubrelvy PRN for bad headaches  Has existing follow up      Past Medical History:     Patient Active Problem List   Diagnosis     Lumbar radiculopathy     Severe episode of recurrent major depressive disorder, without psychotic features (H)     Suicide attempt (H)     Bipolar II disorder (H)     Borderline personality disorder in adult (H)     FH: colon cancer     Anaphylactic shock due to shellfish, initial encounter     Left-sided low back pain with left-sided sciatica     Genital herpes     Past Medical History:   Diagnosis Date     Bipolar disorder (H)      Complication  "of anesthesia     \"freaking out when I wake up\"     Depressive disorder 2011        Past Surgical History:     Past Surgical History:   Procedure Laterality Date     DISCECTOMY LUMBAR POSTERIOR MICROSCOPIC ONE LEVEL Left 08/24/2021    Procedure: Minimally invasive left Lumbar 5 to Sacral 1 microdiscectomy  ;  Surgeon: Destin Junior MD;  Location: [facility] OR     GYN SURGERY Right     cystectomy - with salpingectomy     HEAD & NECK SURGERY      tonsillectomy     HEAD & NECK SURGERY      wisdom teeth extraction        Social History:     Social History     Tobacco Use     Smoking status: Former     Current packs/day: 0.00     Types: Cigarettes, Vaping Device     Passive exposure: Current     Smokeless tobacco: Never   Vaping Use     Vaping status: Every Day     Substances: Nicotine     Devices: [employer]   Substance Use Topics     Alcohol use: Not Currently     Comment: 2 drinks every other day      Drug use: Not Currently        Family History:     Family History   Problem Relation Age of Onset     Hypertension Mother      Colon Polyps Mother 50     Substance Abuse Father      Colon Cancer Father 60     Diabetes Maternal Grandmother      Hypertension Maternal Grandmother      Colon Cancer Maternal Grandmother      Diabetes Paternal Grandmother         Medications:     Current Outpatient Medications   Medication Sig Dispense Refill     albuterol (PROAIR HFA/PROVENTIL HFA/VENTOLIN HFA) 108 (90 Base) MCG/ACT inhaler Inhale 2 puffs into the lungs every 6 hours as needed for shortness of breath, wheezing or cough 18 g 0     amphetamine-dextroamphetamine (ADDERALL XR) 30 MG 24 hr capsule        bisacodyl (DULCOLAX) 5 MG EC tablet Take 5 mg by mouth daily as needed for constipation       clobetasol (TEMOVATE) 0.05 % external solution APPLY TOPICALLY TO THE AFFECTED AREA TWICE DAILY FOR UP TO 21 DAYS. DO NOT APPLY TO NORMAL SKIN       clonazePAM (KLONOPIN) 0.5 MG tablet        doxepin (SINEQUAN) 10 MG capsule Take 10 mg " by mouth At Bedtime       EPINEPHrine (ANY BX GENERIC EQUIV) 0.3 MG/0.3ML injection 2-pack INJECT 0.3 ML INTO THE MUSCLE AS NEEDED FOR ANAPHYLAXIS 2 each 1     hydrOXYzine (VISTARIL) 50 MG capsule TAKE 1 TO 2 CAPSULES BY MOUTH THREE TIMES DAILY       ipratropium-albuterol (COMBIVENT RESPIMAT)  MCG/ACT inhaler Inhale 1 puff into the lungs 4 times daily as needed for shortness of breath, wheezing or cough 4 g 0     ketoconazole (NIZORAL) 2 % external shampoo APPLY TOPICALLY TO THE AFFECTED AREA 3 TIMES EVERY WEEK. LEAVE IN 5-10 MINUTES BEFORE WASHING OUT       lamoTRIgine (LAMICTAL) 100 MG tablet Take 100 mg by mouth 2 times daily       lithium (ESKALITH) 600 MG capsule        ondansetron (ZOFRAN ODT) 4 MG ODT tab DISSOLVE 1 TABLET(4 MG) ON THE TONGUE EVERY 8 HOURS AS NEEDED FOR NAUSEA 90 tablet 0     orphenadrine ER (NORFLEX) 100 MG 12 hr tablet Take 1 tablet (100 mg) by mouth 2 times daily. 30 tablet 1     oxyCODONE (ROXICODONE) 5 MG tablet Take 1 tablet by mouth 3 times daily.       prazosin (MINIPRESS) 2 MG capsule Take 2 mg by mouth 2 times daily       pregabalin (LYRICA) 100 MG capsule TAKE 1 CAPSULE BY MOUTH EVERY MORNING IN ADDITION  MG 3 TIMES DAILY       pregabalin (LYRICA) 150 MG capsule Take 150 mg by mouth 3 times daily       propranolol (INDERAL) 20 MG tablet Take 1 tablet (20 mg) by mouth 2 times daily as needed (tremor). 180 tablet 2     propranolol ER (INDERAL LA) 80 MG 24 hr capsule Take 1 capsule (80 mg) by mouth daily. 90 capsule 1     QUEtiapine (SEROQUEL) 300 MG tablet Take 300 mg by mouth At Bedtime       spironolactone (ALDACTONE) 25 MG tablet        ubrogepant (UBRELVY) 100 MG tablet Take 1 tablet (100 mg) by mouth at onset of headache. 8 tablet 11     valACYclovir (VALTREX) 1000 mg tablet Take 1 tablet (1,000 mg) by mouth 2 times daily for 10 days. 20 tablet 0     WEGOVY 0.5 MG/0.5ML pen INJECT THE CONTENTS OF 1 AUTOINJECTOR PEN UNDER THE SKIN ONCE WEEKLY 2 mL 0     No current  "facility-administered medications for this visit.        Allergies:     Allergies   Allergen Reactions     Metronidazole Anaphylaxis and Hives     Other reaction(s): Throat swelling  Throat swelling 6 hrs after exposure  Itching and hives 2 hrs after exposure     Shellfish Allergy Anxiety, Diarrhea, Difficulty breathing, Hives, Itching, Rash, Shortness Of Breath and Swelling     Fish-Derived Products      Stopped fish oil and less stomach discomfort  Rash after eating fish.      Hydrocodone-Acetaminophen Other (See Comments)     Morphine And Codeine Headache     Sertraline Other (See Comments)     Patient was foggy and \"high\" feeling     Shellfish-Derived Products      Lips get numb, throat gets scratchy, rashes        Review of Systems:   As noted above     Physical Exam:   General: Seated comfortably in no acute distress.  Neurologic:     Mental Status: Fully alert, attentive and oriented. Speech clear and fluent, no paraphasic errors.     Cranial Nerves: EOM appear intact. Facial movements symmetric. Hearing not formally tested but intact to conversation.  No dysarthria.     Motor: No tremors or other abnormal movements observed.      Sensory:Not able to be tested virtually               The total time of this encounter today amounted to 33 minutes in total. This time included time spent with the patient, prep work, ordering tests, and performing post visit documentation.    The longitudinal plan of care for tremor, headaches was addressed during this visit. Due to the added complexity in care, I will continue to support Ms Kline in the subsequent management of this condition(s) and with the ongoing continuity of care of this condition(s).      Again, thank you for allowing me to participate in the care of your patient.        Sincerely,        Karl Goldman MD  "

## 2024-11-18 NOTE — PROGRESS NOTES
Virtual Visit Details    Type of service: Video visit   Video start time 956 AM  Video End Time:1013 AM    Originating Location (pt. Location): Home    Distant Location (provider location):  Off-site  Platform used for Video Visit: [facility]/[place]  Section of General Neurology  Return Patient  Virtual Visit    Caprice Kline MRN# [id number]   Age: 29 year old YOB: 1995          Assessment and Plan:   Assessment:  Caprice Kline is a 29 year old female who presents today in close follow up.  She has a PMH of bipolar, depression with h/o SI, BPD, recent preseptal cellulitis among other PMH as below.  Tremor is improved, will continue propranolol  Discussed headache options--overall Nurtec does help for rescue but she has stretches (one that lasted a full month) of migraines where our preventative strategy could still be better.  She has previously not tolerated topamax well, given other medications she takes for mood other options would be a bit limited as well.  I think CGRP antagonists given her response to Nurtec  PRN would be a good idea and should improve her quality of life.  She is agreement to risk/benefit discussion.      Plan:  Continue propranolol 80 mg daily +20 mg BIDPRN for tremor, headache  Continue nurtec 75 mg PRN  Add Ajovy monthly for prevention  Follow up in ~3 months for video visit sooner with any issues questions or changes        Louie Goldman MD   of Neurology   [facility]      Interval history:     Recent ER visits reviewed pre visit, urinary retention requiring fisher  Headaches are pretty good for the most part now, but had a bad one that lasted for >1 month. Waxes and wanes in terms of total headache days per month thus.  Headaches limited ability to attend a wedding recently  Ubrelvy for the most part helps  Propranolol --helps and can help PRN as well  Tremors are better  Lost her job on her  "birthday she notes, tremors are better without work.  Anxiety a variable.    Had a migraine for a long time recently and this is worrisome, felt hopeless at that point  Lyrica 150 mg TID and 100 mg at bedtime.  Pretty much maximum dose.    Next option probably monthly CGRP injections, discussed.    Previously on topiramate, felt crappy coming off of it.    TENS unit in spine soon, she notes  Botox a future option, discussed.     A/P at last visit  Caprice Kline is a 29 year old female who presents today in close follow up.  She has a PMH of bipolar, depression with h/o SI, BPD, recent preseptal cellulitis among other PMH as below.  She returns primarily to discuss worsening tremor.   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  Stress and poor sleep may be variables at times too.   Discussed options, will maximize propranolol as able.  Side effects re-discussed. She will monitor her pulse/BP.   Nurtec not covered, will trial Ubrelvy.  Hopefully higher propranolol dosing can improve headaches as well.      Plan:  Increase propranolol to 80 mg daily + 20 mg BIDPRN  Ubrelvy PRN for bad headaches  Has existing follow up      Past Medical History:     Patient Active Problem List   Diagnosis    Lumbar radiculopathy    Severe episode of recurrent major depressive disorder, without psychotic features (H)    Suicide attempt (H)    Bipolar II disorder (H)    Borderline personality disorder in adult (H)    FH: colon cancer    Anaphylactic shock due to shellfish, initial encounter    Left-sided low back pain with left-sided sciatica    Genital herpes     Past Medical History:   Diagnosis Date    Bipolar disorder (H)     Complication of anesthesia     \"freaking out when I wake up\"    Depressive disorder 2011        Past Surgical History:     Past Surgical History:   Procedure Laterality Date    DISCECTOMY LUMBAR POSTERIOR MICROSCOPIC ONE LEVEL Left 08/24/2021    Procedure: " Minimally invasive left Lumbar 5 to Sacral 1 microdiscectomy  ;  Surgeon: Destin Junior MD;  Location: [facility] OR    GYN SURGERY Right     cystectomy - with salpingectomy    HEAD & NECK SURGERY      tonsillectomy    HEAD & NECK SURGERY      wisdom teeth extraction        Social History:     Social History     Tobacco Use    Smoking status: Former     Current packs/day: 0.00     Types: Cigarettes, Vaping Device     Passive exposure: Current    Smokeless tobacco: Never   Vaping Use    Vaping status: Every Day    Substances: Nicotine    Devices: [employer]   Substance Use Topics    Alcohol use: Not Currently     Comment: 2 drinks every other day     Drug use: Not Currently        Family History:     Family History   Problem Relation Age of Onset    Hypertension Mother     Colon Polyps Mother 50    Substance Abuse Father     Colon Cancer Father 60    Diabetes Maternal Grandmother     Hypertension Maternal Grandmother     Colon Cancer Maternal Grandmother     Diabetes Paternal Grandmother         Medications:     Current Outpatient Medications   Medication Sig Dispense Refill    albuterol (PROAIR HFA/PROVENTIL HFA/VENTOLIN HFA) 108 (90 Base) MCG/ACT inhaler Inhale 2 puffs into the lungs every 6 hours as needed for shortness of breath, wheezing or cough 18 g 0    amphetamine-dextroamphetamine (ADDERALL XR) 30 MG 24 hr capsule       bisacodyl (DULCOLAX) 5 MG EC tablet Take 5 mg by mouth daily as needed for constipation      clobetasol (TEMOVATE) 0.05 % external solution APPLY TOPICALLY TO THE AFFECTED AREA TWICE DAILY FOR UP TO 21 DAYS. DO NOT APPLY TO NORMAL SKIN      clonazePAM (KLONOPIN) 0.5 MG tablet       doxepin (SINEQUAN) 10 MG capsule Take 10 mg by mouth At Bedtime      EPINEPHrine (ANY BX GENERIC EQUIV) 0.3 MG/0.3ML injection 2-pack INJECT 0.3 ML INTO THE MUSCLE AS NEEDED FOR ANAPHYLAXIS 2 each 1    hydrOXYzine (VISTARIL) 50 MG capsule TAKE 1 TO 2 CAPSULES BY MOUTH THREE TIMES DAILY      ipratropium-albuterol  (COMBIVENT RESPIMAT)  MCG/ACT inhaler Inhale 1 puff into the lungs 4 times daily as needed for shortness of breath, wheezing or cough 4 g 0    ketoconazole (NIZORAL) 2 % external shampoo APPLY TOPICALLY TO THE AFFECTED AREA 3 TIMES EVERY WEEK. LEAVE IN 5-10 MINUTES BEFORE WASHING OUT      lamoTRIgine (LAMICTAL) 100 MG tablet Take 100 mg by mouth 2 times daily      lithium (ESKALITH) 600 MG capsule       ondansetron (ZOFRAN ODT) 4 MG ODT tab DISSOLVE 1 TABLET(4 MG) ON THE TONGUE EVERY 8 HOURS AS NEEDED FOR NAUSEA 90 tablet 0    orphenadrine ER (NORFLEX) 100 MG 12 hr tablet Take 1 tablet (100 mg) by mouth 2 times daily. 30 tablet 1    oxyCODONE (ROXICODONE) 5 MG tablet Take 1 tablet by mouth 3 times daily.      prazosin (MINIPRESS) 2 MG capsule Take 2 mg by mouth 2 times daily      pregabalin (LYRICA) 100 MG capsule TAKE 1 CAPSULE BY MOUTH EVERY MORNING IN ADDITION  MG 3 TIMES DAILY      pregabalin (LYRICA) 150 MG capsule Take 150 mg by mouth 3 times daily      propranolol (INDERAL) 20 MG tablet Take 1 tablet (20 mg) by mouth 2 times daily as needed (tremor). 180 tablet 2    propranolol ER (INDERAL LA) 80 MG 24 hr capsule Take 1 capsule (80 mg) by mouth daily. 90 capsule 1    QUEtiapine (SEROQUEL) 300 MG tablet Take 300 mg by mouth At Bedtime      spironolactone (ALDACTONE) 25 MG tablet       ubrogepant (UBRELVY) 100 MG tablet Take 1 tablet (100 mg) by mouth at onset of headache. 8 tablet 11    valACYclovir (VALTREX) 1000 mg tablet Take 1 tablet (1,000 mg) by mouth 2 times daily for 10 days. 20 tablet 0    WEGOVY 0.5 MG/0.5ML pen INJECT THE CONTENTS OF 1 AUTOINJECTOR PEN UNDER THE SKIN ONCE WEEKLY 2 mL 0     No current facility-administered medications for this visit.        Allergies:     Allergies   Allergen Reactions    Metronidazole Anaphylaxis and Hives     Other reaction(s): Throat swelling  Throat swelling 6 hrs after exposure  Itching and hives 2 hrs after exposure    Shellfish Allergy Anxiety,  "Diarrhea, Difficulty breathing, Hives, Itching, Rash, Shortness Of Breath and Swelling    Fish-Derived Products      Stopped fish oil and less stomach discomfort  Rash after eating fish.     Hydrocodone-Acetaminophen Other (See Comments)    Morphine And Codeine Headache    Sertraline Other (See Comments)     Patient was foggy and \"high\" feeling    Shellfish-Derived Products      Lips get numb, throat gets scratchy, rashes        Review of Systems:   As noted above     Physical Exam:   General: Seated comfortably in no acute distress.  Neurologic:     Mental Status: Fully alert, attentive and oriented. Speech clear and fluent, no paraphasic errors.     Cranial Nerves: EOM appear intact. Facial movements symmetric. Hearing not formally tested but intact to conversation.  No dysarthria.     Motor: No tremors or other abnormal movements observed.      Sensory:Not able to be tested virtually               The total time of this encounter today amounted to 33 minutes in total. This time included time spent with the patient, prep work, ordering tests, and performing post visit documentation.    The longitudinal plan of care for tremor, headaches was addressed during this visit. Due to the added complexity in care, I will continue to support Ms Kline in the subsequent management of this condition(s) and with the ongoing continuity of care of this condition(s).    "

## 2024-11-19 ENCOUNTER — TELEPHONE (OUTPATIENT)
Dept: NEUROLOGY | Facility: CLINIC | Age: 29
End: 2024-11-19
Payer: COMMERCIAL

## 2024-11-19 NOTE — TELEPHONE ENCOUNTER
Left Voicemail (1st Attempt) and Sent Teamleaderhart (1st Attempt) for the patient to call back and schedule the following:    Appointment type: Return Virtual Neuro  Provider:   Return date: 3/19/2025  Specialty phone number: [phone]  Additional appointment(s) needed:   Additonal Notes:     Attempted to schedule a return 3 month virtual Neuro visit with Dr. Goldman.    Also sent a Yumit message.    Sugey RICHARD/[facility]    [facility]   Neurology, NeuroSurgery, NeuroPsychology, Pain Management and Cardiology Specialties  Medical/Surgical Adult Specialties

## 2024-11-22 ENCOUNTER — TELEPHONE (OUTPATIENT)
Dept: NEUROSURGERY | Facility: CLINIC | Age: 29
End: 2024-11-22
Payer: COMMERCIAL

## 2024-11-22 NOTE — TELEPHONE ENCOUNTER
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 PA:  Previously Tried and Failed:    Rationale:      Insurance   Primary:   Insurance ID:      Secondary (if applicable):  Insurance ID:      Pharmacy Information (if different than what is on RX)  Name: [id number] [place] [address] AT [facility]    Phone:  [phone]   Fax:[phone]     Clinic Information  Preferred routing pool for dept communication: [id number]

## 2024-11-26 NOTE — TELEPHONE ENCOUNTER
Central Prior Authorization Team   Phone: [phone]    PA Initiation    Medication: Ubrelvy  Insurance Company: [employer] - Phone [phone] Fax [phone]  Pharmacy Filling the Rx: [id number] - [place], [place] - [address] AT [facility]  Filling Pharmacy Phone: [phone]  Filling Pharmacy Fax:    Start Date: 11/26/2024

## 2024-11-27 NOTE — TELEPHONE ENCOUNTER
Prior Authorization Approval    Authorization Effective Date: 11/27/2024  Authorization Expiration Date: 11/27/2025  Medication: Ubrelvy-PA APPROVED   Approved Dose/Quantity:   Reference #:     Insurance Company: [employer] - Phone [phone] Fax [phone]  Expected CoPay:       CoPay Card Available:      Foundation Assistance Needed:    Which Pharmacy is filling the prescription (Not needed for infusion/clinic administered): [id number] [place] [address] AT [facility]  Pharmacy Notified:  Yes- **Instructed pharmacy to notify patient when script is ready to /ship.**  Patient Notified:  Yes

## 2024-12-03 ENCOUNTER — DOCUMENTATION ONLY (OUTPATIENT)
Dept: FAMILY MEDICINE | Facility: OTHER | Age: 29
End: 2024-12-03
Payer: COMMERCIAL

## 2024-12-03 ENCOUNTER — TELEPHONE (OUTPATIENT)
Dept: FAMILY MEDICINE | Facility: OTHER | Age: 29
End: 2024-12-03
Payer: COMMERCIAL

## 2024-12-03 NOTE — PROGRESS NOTES
Caprice Muñoz has a upcoming lab appointment and is requesting labs from you. Please place orders as needed. Thank you.      Yunior HARTLEY

## 2024-12-03 NOTE — TELEPHONE ENCOUNTER
Patient Returning Call    Reason for call:  Pt received a call from [facility] from Dr. Junior Roe PA-C office and was told to give a call back.     Information relayed to patient:  N/A     Patient has additional questions:  Yes PT will just like a return call she's unsure of why the office called.     What are your questions/concerns:  N/A    Could we send this information to you in [facility] or would you prefer to receive a phone call?:   Patient would prefer a phone call   Okay to leave a detailed message?: Yes at Cell number on file:    Telephone Information:   Mobile [phone]

## 2024-12-03 NOTE — TELEPHONE ENCOUNTER
Please call, what is she needing labs for? Looks like there are already futured labs    Junior Roe PA-C

## 2024-12-03 NOTE — PROGRESS NOTES
Patient is having fertility lab work and was curious if she was due any labs from [facility]?   Patient does not need callback.

## 2024-12-04 ENCOUNTER — LAB (OUTPATIENT)
Dept: LAB | Facility: CLINIC | Age: 29
End: 2024-12-04
Payer: COMMERCIAL

## 2024-12-04 ENCOUNTER — ANCILLARY PROCEDURE (OUTPATIENT)
Dept: ULTRASOUND IMAGING | Facility: CLINIC | Age: 29
End: 2024-12-04
Attending: NURSE PRACTITIONER
Payer: COMMERCIAL

## 2024-12-04 DIAGNOSIS — Z31.41 FERTILITY TESTING: ICD-10-CM

## 2024-12-04 LAB
ESTRADIOL SERPL-MCNC: 39 PG/ML
FSH SERPL IRP2-ACNC: 6.8 MIU/ML
LH SERPL-ACNC: 6.3 MIU/ML
PROLACTIN SERPL 3RD IS-MCNC: 13 NG/ML (ref 5–23)
SHBG SERPL-SCNC: 78 NMOL/L (ref 30–135)
T4 FREE SERPL-MCNC: 0.85 NG/DL (ref 0.9–1.7)
TSH SERPL DL<=0.005 MIU/L-ACNC: 4.87 UIU/ML (ref 0.3–4.2)

## 2024-12-04 PROCEDURE — 82670 ASSAY OF TOTAL ESTRADIOL: CPT

## 2024-12-04 PROCEDURE — 83001 ASSAY OF GONADOTROPIN (FSH): CPT

## 2024-12-04 PROCEDURE — 84146 ASSAY OF PROLACTIN: CPT

## 2024-12-04 PROCEDURE — 84270 ASSAY OF SEX HORMONE GLOBUL: CPT

## 2024-12-04 PROCEDURE — 83002 ASSAY OF GONADOTROPIN (LH): CPT

## 2024-12-04 PROCEDURE — 84443 ASSAY THYROID STIM HORMONE: CPT

## 2024-12-04 PROCEDURE — 36415 COLL VENOUS BLD VENIPUNCTURE: CPT

## 2024-12-04 PROCEDURE — 84439 ASSAY OF FREE THYROXINE: CPT

## 2024-12-09 SDOH — HEALTH STABILITY: PHYSICAL HEALTH: ON AVERAGE, HOW MANY MINUTES DO YOU ENGAGE IN EXERCISE AT THIS LEVEL?: 0 MIN

## 2024-12-09 SDOH — HEALTH STABILITY: PHYSICAL HEALTH: ON AVERAGE, HOW MANY DAYS PER WEEK DO YOU ENGAGE IN MODERATE TO STRENUOUS EXERCISE (LIKE A BRISK WALK)?: 0 DAYS

## 2024-12-09 ASSESSMENT — SOCIAL DETERMINANTS OF HEALTH (SDOH): HOW OFTEN DO YOU GET TOGETHER WITH FRIENDS OR RELATIVES?: ONCE A WEEK

## 2024-12-10 ENCOUNTER — TRANSFERRED RECORDS (OUTPATIENT)
Dept: HEALTH INFORMATION MANAGEMENT | Facility: CLINIC | Age: 29
End: 2024-12-10

## 2024-12-10 ENCOUNTER — OFFICE VISIT (OUTPATIENT)
Dept: FAMILY MEDICINE | Facility: OTHER | Age: 29
End: 2024-12-10
Payer: COMMERCIAL

## 2024-12-10 VITALS
DIASTOLIC BLOOD PRESSURE: 70 MMHG | RESPIRATION RATE: 16 BRPM | OXYGEN SATURATION: 98 % | TEMPERATURE: 98.2 F | HEART RATE: 72 BPM | BODY MASS INDEX: 30.13 KG/M2 | SYSTOLIC BLOOD PRESSURE: 108 MMHG | WEIGHT: 204 LBS

## 2024-12-10 DIAGNOSIS — G89.29 OTHER CHRONIC PAIN: ICD-10-CM

## 2024-12-10 DIAGNOSIS — E66.811 CLASS 1 OBESITY WITH SERIOUS COMORBIDITY AND BODY MASS INDEX (BMI) OF 30.0 TO 30.9 IN ADULT, UNSPECIFIED OBESITY TYPE: Primary | ICD-10-CM

## 2024-12-10 PROCEDURE — 99214 OFFICE O/P EST MOD 30 MIN: CPT | Performed by: PHYSICIAN ASSISTANT

## 2024-12-10 ASSESSMENT — PAIN SCALES - GENERAL: PAINLEVEL_OUTOF10: EXTREME PAIN (8)

## 2024-12-10 ASSESSMENT — PATIENT HEALTH QUESTIONNAIRE - PHQ9
SUM OF ALL RESPONSES TO PHQ QUESTIONS 1-9: 22
10. IF YOU CHECKED OFF ANY PROBLEMS, HOW DIFFICULT HAVE THESE PROBLEMS MADE IT FOR YOU TO DO YOUR WORK, TAKE CARE OF THINGS AT HOME, OR GET ALONG WITH OTHER PEOPLE: VERY DIFFICULT
SUM OF ALL RESPONSES TO PHQ QUESTIONS 1-9: 22

## 2024-12-10 ASSESSMENT — ENCOUNTER SYMPTOMS: BACK PAIN: 1

## 2024-12-10 NOTE — PROGRESS NOTES
Assessment & Plan     Class 1 obesity with serious comorbidity and body mass index (BMI) of 30.0 to 30.9 in adult, unspecified obesity type  Increasing Wegovy dosing to 1 mg to see if she can experience some weight loss. So far no response on 0.25 and 0.5 but concerns she is having side effects which are likely due to multiple medications.  But continue on Zofran since that helps random nausea and then Miralax daily.  Consider switching to Tirzepatide/Zepbound if not tolerating or no response with weight loss.  Update in a month on results. Encouraged high lean protein and working on muscle strengthening.   - Semaglutide-Weight Management (WEGOVY) 1 MG/0.5ML pen; Inject 1 mg subcutaneously once a week.    Other chronic pain  Recommended moving forward with [facility] plan.    Consider trial down with slow tapers off medication  Consider seeing what insurance covers for alternative medicine and Marie evaluations.         Greater than 40 minutes were spent today with more than 50% dedicated to counseling and coordination of care of the above mentioned problems.      Options for treatment and follow-up care were reviewed with the patient and/or guardian. Patient and/or guardian engaged in the decision making process and verbalized understanding of the options discussed and agreed with the final plan.     Hector Vasques is a 29 year old, presenting for the following health issues:  Back Pain (Overall pain)      12/10/2024     8:01 AM   Additional Questions   Roomed by Michela MEJIA     Back Pain     History of Present Illness       Back Pain:  She presents for follow up of back pain. Patient's back pain is a chronic problem.  Location of back pain:  Left lower back, right hip and left hip  Description of back pain: burning, cramping, dull ache, fullness, gnawing, sharp, shooting and stabbing  Back pain spreads: right thigh, left thigh, left knee and left foot    Since patient first noticed back pain, pain is: always  present, but gets better and worse  Does back pain interfere with her job:  Not applicable   She exercises with enough effort to increase her heart rate 9 or less minutes per day.  She exercises with enough effort to increase her heart rate 3 or less days per week. She is missing 1 dose(s) of medications per week.  She is not taking prescribed medications regularly due to remembering to take.     Seeing [facility] and [facility] management.   So far nothing has helped her pain - doesn't notice improvement on opioids, even when in the ER they gave her Dilaudid and it did nothing for her. She notes that they want to try a spine stimulator through [facility] but they did note it won't help her overall pain but hopefully would help her bilateral hip pain and pain in her legs.    No weight loss noted on Wegovy, has constipation and nausea but the Zofran helps the nausea.   Seeing OB/GYN, had abnormal TSH, she follows up with them to discuss. She also sounds to have PCOS.  Wegovy helping with the Insulin resistance.        Review of Systems  Constitutional, HEENT, cardiovascular, pulmonary, GI, , musculoskeletal, neuro, skin, endocrine and psych systems are negative, except as otherwise noted.      Objective    /70   Pulse 72   Temp 98.2  F (36.8  C) (Temporal)   Resp 16   Wt 92.5 kg (204 lb)   LMP 12/02/2024 (Exact Date)   SpO2 98%   BMI 30.13 kg/m    Body mass index is 30.13 kg/m .  Physical Exam   GENERAL: alert and no distress  MS: no gross musculoskeletal defects noted, no edema  PSYCH: mentation appears normal, affect flat, judgement and insight intact, and appearance well groomed            Signed Electronically by: Junior Roe PA-C

## 2025-01-05 DIAGNOSIS — E66.811 CLASS 1 OBESITY WITH SERIOUS COMORBIDITY AND BODY MASS INDEX (BMI) OF 30.0 TO 30.9 IN ADULT, UNSPECIFIED OBESITY TYPE: ICD-10-CM

## 2025-01-06 RX ORDER — SEMAGLUTIDE 1 MG/.5ML
INJECTION, SOLUTION SUBCUTANEOUS
Qty: 2 ML | Refills: 0 | Status: SHIPPED | OUTPATIENT
Start: 2025-01-06

## 2025-01-06 NOTE — PROGRESS NOTES
Assessment & Plan     Encounter for preconception consultation  We reviewed her labs and imaging, given she is desiring pregnancy, will repeat TSH today and if abnormal, she may follow up with her PCP to discuss if medication may be beneficial. We discussed her chronic back concerns and pregnancy, discussed her mental health, medications and pregnancy. There are some medications she feels she can come off of for pregnancy, but would need to continue Lithium, does not feel she can stop this.   We also discussed use of weight loss mediations-would need to be off for 2 months prior to pregnancy, likely even prior to initiation of medication to help with fertility.   We discussed questions related to referral to RE specialist and she likely would plan to start next steps with them-would prefer to start and continue with same place that can manage her fertility concerns. Discussed [facility] and [facility]-she would like to research both clinics.   Recommend pre-conception consult with MFM first given her medications as we discussed this previously and she would like to meet with them.  At this time, as she has already been off Wegovy for a few weeks, she states she may remain off it. We did discuss that even a 5-10% weight loss can be beneficial in helping with conception as well as healthy pregnancy, but feels she may just discontinue. Will let me know if she decides on a RE clinic. Patient is given an opportunity to ask questions and have them answered.  - [facility] Referral - Preconception; Future    Abnormal finding on thyroid function test  Per above  - TSH with free T4 reflex; Future  - TSH with free T4 reflex    25 minutes spent by me on the date of the encounter doing chart review, history and exam, documentation and further activities per the note        Hector Vasques is a 29 year old, presenting for the following health issues:  Follow Up (Fertility Testing)    HPI       Follow up- Fertility  "Testing    Patient was seen about 2 months ago with fertility concerns. Patient is having cycles every 30-40 days with 2-4 days of bleeding.   History of SAB x 2:   Age 23-approximately 12 weeks-passed naturally  Age 26 or 27-very early-passed naturally  History of right cystectomy and salpingectomy in 2012-pathology record available in [facility] from [facility] (right paratubal ovarian cyst with torsion of the right fallopian tube).     Patient has chronic medical history of back pain-will be getting a spinal cord stimulator tomorrow.  Has been working on weight loss, was prescribed Wegovy-has been off for a few weeks in order to have her spinal stimulator tomorrow. Plan was to restart this weekend, likely be on it for a few months and then possible change to an alternate medication. Patient is also on a number of medications for her mental health.   Partner will be planning semen analysis.  Follows up today to discuss her labs and imaging.     Review of Systems  Constitutional, HEENT, cardiovascular, pulmonary, gi and gu systems are negative, except as otherwise noted.      Objective    /81 (BP Location: Right arm, Patient Position: Sitting, Cuff Size: Adult Regular)   Pulse 78   Ht 1.753 m (5' 9\")   Wt 95.5 kg (210 lb 9.6 oz)   LMP 12/02/2024 (Exact Date)   SpO2 96%   BMI 31.10 kg/m    Body mass index is 31.1 kg/m .  Physical Exam   GENERAL: alert and no distress  MS: no gross musculoskeletal defects noted, no edema  SKIN: no suspicious lesions or rashes  PSYCH: mentation appears normal, affect normal/bright    Signed Electronically by: JULIUS Ramsay CNP    "

## 2025-01-07 ENCOUNTER — TRANSFERRED RECORDS (OUTPATIENT)
Dept: HEALTH INFORMATION MANAGEMENT | Facility: CLINIC | Age: 30
End: 2025-01-07

## 2025-01-07 ENCOUNTER — OFFICE VISIT (OUTPATIENT)
Dept: OBGYN | Facility: CLINIC | Age: 30
End: 2025-01-07
Payer: COMMERCIAL

## 2025-01-07 VITALS
HEIGHT: 69 IN | WEIGHT: 210.6 LBS | SYSTOLIC BLOOD PRESSURE: 122 MMHG | DIASTOLIC BLOOD PRESSURE: 81 MMHG | BODY MASS INDEX: 31.19 KG/M2 | HEART RATE: 78 BPM | OXYGEN SATURATION: 96 %

## 2025-01-07 DIAGNOSIS — Z31.69 ENCOUNTER FOR PRECONCEPTION CONSULTATION: Primary | ICD-10-CM

## 2025-01-07 DIAGNOSIS — R94.6 ABNORMAL FINDING ON THYROID FUNCTION TEST: ICD-10-CM

## 2025-01-07 PROCEDURE — 84443 ASSAY THYROID STIM HORMONE: CPT | Performed by: NURSE PRACTITIONER

## 2025-01-07 PROCEDURE — 36415 COLL VENOUS BLD VENIPUNCTURE: CPT | Performed by: NURSE PRACTITIONER

## 2025-01-07 PROCEDURE — 99213 OFFICE O/P EST LOW 20 MIN: CPT | Performed by: NURSE PRACTITIONER

## 2025-01-07 ASSESSMENT — PAIN SCALES - GENERAL: PAINLEVEL_OUTOF10: EXTREME PAIN (8)

## 2025-01-07 NOTE — PATIENT INSTRUCTIONS
If you have any questions regarding your visit, Please contact your care team.     [facility]: [phone]  To Schedule an Appointment 24/7  Call: [phone]     Nohemy Betancourt- APRN CNP      Beryl Camargo-Surgery Scheduler  Kristal-Surgery Scheduler         Monday 7:30am-2:00pm    Tuesday 7:30am-4:00pm    Wednesday 7:30am-2:00pm    Thursday 7:30am-11:00am    Friday 7:30am-2:00pm [address] [address]  Phone- [phone]   Fax- [phone]     Imaging Scheduling all locations  [phone]    [facility] Labor and Delivery  [address] [place] [address]   [place] [address] [phone]         Urgent Care locations:  [facility]   Monday-Friday  10 am - 8 pm  Saturday and Sunday   9 am - 5 pm     [phone]   If you need a medication refill, please contact your pharmacy. Please allow 3 business days for your refill to be completed.  As always, Thank you for trusting us with your healthcare needs!      see additional instructions from your care team below

## 2025-01-08 DIAGNOSIS — Z31.69 ENCOUNTER FOR PRECONCEPTION CONSULTATION: Primary | ICD-10-CM

## 2025-01-08 LAB — TSH SERPL DL<=0.005 MIU/L-ACNC: 3.06 UIU/ML (ref 0.3–4.2)

## 2025-01-15 ENCOUNTER — TRANSFERRED RECORDS (OUTPATIENT)
Dept: HEALTH INFORMATION MANAGEMENT | Facility: CLINIC | Age: 30
End: 2025-01-15

## 2025-01-20 ENCOUNTER — TELEPHONE (OUTPATIENT)
Dept: NEUROLOGY | Facility: CLINIC | Age: 30
End: 2025-01-20
Payer: COMMERCIAL

## 2025-01-20 NOTE — TELEPHONE ENCOUNTER
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 PA:  Previously Tried and Failed:    Rationale:      Insurance   Primary:   Insurance ID:      Secondary (if applicable):  Insurance ID:      Pharmacy Information (if different than what is on RX)  Name:  [id number] [facility] [place] [address] AT [facility]   Phone:  [phone]   Fax: [phone]     Clinic Information  Preferred routing pool for dept communication: P [id number]

## 2025-01-23 ENCOUNTER — TRANSFERRED RECORDS (OUTPATIENT)
Dept: HEALTH INFORMATION MANAGEMENT | Facility: CLINIC | Age: 30
End: 2025-01-23

## 2025-01-23 NOTE — TELEPHONE ENCOUNTER
Central Prior Authorization Team   Phone: [phone]    PA Initiation    Medication: Ajovy  Insurance Company: [employer] - Phone [phone] Fax [phone]  Pharmacy Filling the Rx: [facility] #[id number] - [place], [place] - [address] AT [facility]  Filling Pharmacy Phone: [phone]  Filling Pharmacy Fax:    Start Date: 1/23/2025    Initiate PA by phone [phone] with Rep Tamara Case # [id number]

## 2025-01-28 NOTE — TELEPHONE ENCOUNTER
Prior Authorization Approval    Authorization Effective Date: 1/23/2025  Authorization Expiration Date: 7/23/2025  Medication: Ajovy-PA APPROVED   Approved Dose/Quantity:   Reference #:     Insurance Company: Patti - Phone [phone] Fax [phone]  Expected CoPay:       CoPay Card Available:      Foundation Assistance Needed:    Which Pharmacy is filling the prescription (Not needed for infusion/clinic administered): [id number] [place] [address] AT [facility]  Pharmacy Notified:  Yes- **Instructed pharmacy to notify patient when script is ready to /ship.**  Patient Notified:  Yes

## 2025-01-29 ENCOUNTER — VIRTUAL VISIT (OUTPATIENT)
Dept: FAMILY MEDICINE | Facility: OTHER | Age: 30
End: 2025-01-29
Payer: COMMERCIAL

## 2025-01-29 DIAGNOSIS — M79.7 FIBROMYALGIA: Primary | ICD-10-CM

## 2025-01-29 PROCEDURE — 98005 SYNCH AUDIO-VIDEO EST LOW 20: CPT | Performed by: PHYSICIAN ASSISTANT

## 2025-01-29 ASSESSMENT — PATIENT HEALTH QUESTIONNAIRE - PHQ9
10. IF YOU CHECKED OFF ANY PROBLEMS, HOW DIFFICULT HAVE THESE PROBLEMS MADE IT FOR YOU TO DO YOUR WORK, TAKE CARE OF THINGS AT HOME, OR GET ALONG WITH OTHER PEOPLE: EXTREMELY DIFFICULT
SUM OF ALL RESPONSES TO PHQ QUESTIONS 1-9: 18
SUM OF ALL RESPONSES TO PHQ QUESTIONS 1-9: 18

## 2025-01-29 ASSESSMENT — ANXIETY QUESTIONNAIRES
4. TROUBLE RELAXING: NEARLY EVERY DAY
GAD7 TOTAL SCORE: 19
7. FEELING AFRAID AS IF SOMETHING AWFUL MIGHT HAPPEN: MORE THAN HALF THE DAYS
GAD7 TOTAL SCORE: 19
3. WORRYING TOO MUCH ABOUT DIFFERENT THINGS: NEARLY EVERY DAY
IF YOU CHECKED OFF ANY PROBLEMS ON THIS QUESTIONNAIRE, HOW DIFFICULT HAVE THESE PROBLEMS MADE IT FOR YOU TO DO YOUR WORK, TAKE CARE OF THINGS AT HOME, OR GET ALONG WITH OTHER PEOPLE: EXTREMELY DIFFICULT
8. IF YOU CHECKED OFF ANY PROBLEMS, HOW DIFFICULT HAVE THESE MADE IT FOR YOU TO DO YOUR WORK, TAKE CARE OF THINGS AT HOME, OR GET ALONG WITH OTHER PEOPLE?: EXTREMELY DIFFICULT
5. BEING SO RESTLESS THAT IT IS HARD TO SIT STILL: MORE THAN HALF THE DAYS
1. FEELING NERVOUS, ANXIOUS, OR ON EDGE: NEARLY EVERY DAY
GAD7 TOTAL SCORE: 19
6. BECOMING EASILY ANNOYED OR IRRITABLE: NEARLY EVERY DAY
7. FEELING AFRAID AS IF SOMETHING AWFUL MIGHT HAPPEN: MORE THAN HALF THE DAYS
2. NOT BEING ABLE TO STOP OR CONTROL WORRYING: NEARLY EVERY DAY

## 2025-01-29 NOTE — PROGRESS NOTES
Caprice is a 29 year old who is being evaluated via a billable video visit.    How would you like to obtain your AVS? MyChart  If the video visit is dropped, the invitation should be resent by: Text to cell phone: [phone]  Will anyone else be joining your video visit? No      Assessment & Plan     Fibromyalgia  Unfortunately this news of Fibromyalgia confirmation from Rheumatology is frustrating to Caprice. She has been on standard treatments for Fibromyalgia with either no benefit or side effects.  She did reach out to [facility] and they are reviewing her case but she notes she can't stay down there due to cost and she can't stand to sit in a car to drive down daily so this is not likely a plausible option if she needs treatment at the location there.  Did talk about searching for different Fibromyalgia provider throughout the [place] or even seeing if there are any active research options as there are new updates to research for Fibromyagia in recent times with hopes of better therapies in the future. Do feel that this diagnosis does still fit appropriately with her symptoms.  She will reach out if she is needing any additional referrals.           Subjective   Caprice is a 29 year old, presenting for the following health issues:  No chief complaint on file.      Video Start Time:  1:42 pm    HPI       Diagnosis of Fibromyalgia from Rheumatology, waiting on labs back.   Has SI joint injection coming up soon.   She has labral tears but they don't want to repair it given her other issues.    She is upset with the diagnosis.  She has a lot of pain, she is struggling to function.  She doesn't know       Review of Systems  Constitutional, msk, skin, neuro systems are negative, except as otherwise noted.      Objective           Vitals:  No vitals were obtained today due to virtual visit.    Physical Exam   GENERAL: alert and no distress  EYES: Eyes grossly normal to inspection.  No discharge or erythema, or  obvious scleral/conjunctival abnormalities.  RESP: No audible wheeze, cough, or visible cyanosis.    SKIN: Visible skin clear. No significant rash, abnormal pigmentation or lesions.  NEURO: Cranial nerves grossly intact.  Mentation and speech appropriate for age.  PSYCH: Appropriate affect, tone, and pace of words          Video-Visit Details    Type of service:  Video Visit   Video End Time:2:00 PM  Originating Location (pt. Location): Home    Distant Location (provider location):  Off-site  Platform used for Video Visit: [facility]  Signed Electronically by: Junior Roe PA-C    Answers submitted by the patient for this visit:  Patient Health Questionnaire (Submitted on 1/29/2025)  If you checked off any problems, how difficult have these problems made it for you to do your work, take care of things at home, or get along with other people?: Extremely difficult  PHQ9 TOTAL SCORE: 18

## 2025-01-30 ENCOUNTER — TRANSFERRED RECORDS (OUTPATIENT)
Dept: HEALTH INFORMATION MANAGEMENT | Facility: CLINIC | Age: 30
End: 2025-01-30

## 2025-02-03 DIAGNOSIS — R11.0 NAUSEA: ICD-10-CM

## 2025-02-03 RX ORDER — ONDANSETRON 4 MG/1
TABLET, ORALLY DISINTEGRATING ORAL
Qty: 90 TABLET | Refills: 0 | Status: SHIPPED | OUTPATIENT
Start: 2025-02-03

## 2025-02-03 NOTE — TELEPHONE ENCOUNTER
Prescription approved per [facility] Refill Protocol.  Christine Avendano RN  [facility]

## 2025-02-04 NOTE — PROGRESS NOTES
Assessment & Plan     Encounter for preconception consultation  We had a lengthy discussion on her fertility planning. Reviewed her medication list. Recommend she reschedule her appointment for medication therapy management, keep scheduled MFM visit, continue follow up with her pain clinic as they are looking at new treatment options. Discussed that at this time, still recommend completing these first, working to get her chronic conditions as stable as possible before we consider ovulation medications-discussed there may be better options given her salpingectomy history. Also, discussed her previous desire to establish with RE clinic-patient cannot recall if she did set up an appointment or not-but does feel that would her preference-will look into [facility] or [facility]. To let me know if referral is needed.    Pregnancy examination or test, pregnancy unconfirmed  Await result-if negative, patient will monitor over the next month or so, if no cycle by then will contact me to discuss progesterone withdrawal. We discussed possible etiologies of her absent cycle.  - HCG quantitative pregnancy; Future  - HCG quantitative pregnancy      Hector Vasques is a 29 year old, presenting for the following health issues:  Follow Up (Preconception consultation)    HPI       Follow up- Preconception consultation     Patient presents today to discuss starting on ovulation stimulation medication to help become pregnant. Patient has an extensive medical history and struggles with chronic pain. Most recently, has a likely new diagnosis from Rheumatology of fibromyalgia.  We have done work up for fertility and the recommendation was made to complete a preconception consult with MFM prior to starting on any medication, also was to see a Pharm D for medication therapy management and missed the appointment, has not rescheduled. MFM consult is set for July, wanting to do something prior to then.   At our last visit, she had also decided that  "she would like to see Reproductive Endocrinologist when she does start treatment so that she can continue all her fertility care with a single team. She cannot recall if she has scheduled a consult with [facility] or [facility] yet.    Since her last visit with me, she had a spinal stimulator that was unsuccessful in managing her symptoms. Had an SI joint injection, imaging with orthopedics showing bilateral labral tears that they do not want to repair due to her other medical conditions and now this likely new diagnosis of Fibromyalgia.  Her pain clinic is discussing possibly starting a pain pump as she has exhausted all other options.     On top of this, LMP was 12/2/24, had a recent negative home pregnancy test.     Review of Systems  Constitutional, HEENT, cardiovascular, pulmonary, gi and gu systems are negative, except as otherwise noted.      Objective    /78 (BP Location: Right arm, Patient Position: Sitting, Cuff Size: Adult Regular)   Pulse 71   Ht 1.753 m (5' 9\")   Wt 95.9 kg (211 lb 6.4 oz)   LMP 12/02/2024 (Exact Date)   SpO2 97%   BMI 31.22 kg/m    Body mass index is 31.22 kg/m .  Physical Exam   GENERAL: alert and no distress  MS: no gross musculoskeletal defects noted, no edema  SKIN: no suspicious lesions or rashes  PSYCH: mentation appears normal, affect normal/bright    Signed Electronically by: JULIUS Ramsay CNP    "

## 2025-02-05 ENCOUNTER — OFFICE VISIT (OUTPATIENT)
Dept: OBGYN | Facility: CLINIC | Age: 30
End: 2025-02-05
Payer: COMMERCIAL

## 2025-02-05 VITALS
HEIGHT: 69 IN | BODY MASS INDEX: 31.31 KG/M2 | DIASTOLIC BLOOD PRESSURE: 78 MMHG | WEIGHT: 211.4 LBS | HEART RATE: 71 BPM | SYSTOLIC BLOOD PRESSURE: 117 MMHG | OXYGEN SATURATION: 97 %

## 2025-02-05 DIAGNOSIS — Z31.69 ENCOUNTER FOR PRECONCEPTION CONSULTATION: Primary | ICD-10-CM

## 2025-02-05 DIAGNOSIS — Z32.00 PREGNANCY EXAMINATION OR TEST, PREGNANCY UNCONFIRMED: ICD-10-CM

## 2025-02-05 LAB — HCG INTACT+B SERPL-ACNC: <1 MIU/ML

## 2025-02-05 PROCEDURE — 36415 COLL VENOUS BLD VENIPUNCTURE: CPT | Performed by: NURSE PRACTITIONER

## 2025-02-05 PROCEDURE — 84702 CHORIONIC GONADOTROPIN TEST: CPT | Performed by: NURSE PRACTITIONER

## 2025-02-05 PROCEDURE — 99212 OFFICE O/P EST SF 10 MIN: CPT | Performed by: NURSE PRACTITIONER

## 2025-02-05 NOTE — PATIENT INSTRUCTIONS
If you have any questions regarding your visit, Please contact your care team.     [facility]: [phone]  To Schedule an Appointment 24/7  Call: [phone]LE[facility]     Nohemy Betancourt- APRN CNP      Beryl Camargo-Surgery Scheduler  Kristal-Surgery Scheduler         Monday 7:30am-2:00pm    Tuesday 7:30am-4:00pm    Wednesday 7:30am-2:00pm    Thursday 7:30am-11:00am    Friday 7:30am-2:00pm [address] [address]  Phone- [phone]   Fax- [phone]     Imaging Scheduling all locations  [phone]    [facility] Labor and Delivery  [address] [place] [address]   [place] [address] [phone]         Urgent Care locations:  [facility]   Monday-Friday  10 am - 8 pm  Saturday and Sunday   9 am - 5 pm     [phone]   If you need a medication refill, please contact your pharmacy. Please allow 3 business days for your refill to be completed.  As always, Thank you for trusting us with your healthcare needs!      see additional instructions from your care team below

## 2025-02-18 DIAGNOSIS — R25.1 TREMOR: ICD-10-CM

## 2025-02-18 RX ORDER — PROPRANOLOL HYDROCHLORIDE 80 MG/1
80 CAPSULE, EXTENDED RELEASE ORAL DAILY
Qty: 90 CAPSULE | Refills: 1 | Status: SHIPPED | OUTPATIENT
Start: 2025-02-18

## 2025-02-18 NOTE — TELEPHONE ENCOUNTER
Pending Prescriptions:                       Disp   Refills    propranolol ER (INDERAL LA) 80 MG 24 hr c*90 cap*1            Sig: Take 1 capsule (80 mg) by mouth daily.       Requested Pharmacy: [facility] #[id number] - [place] - [address] AT [facility]     Pt's last office visit: 11/18/24  Next scheduled office visit: I LM to call and schedule Follow-up appt       Per the RN/LPN medication refill protocol, writer is unable to refill this request.

## 2025-02-24 ENCOUNTER — TELEPHONE (OUTPATIENT)
Dept: FAMILY MEDICINE | Facility: OTHER | Age: 30
End: 2025-02-24
Payer: COMMERCIAL

## 2025-02-24 ENCOUNTER — MYC REFILL (OUTPATIENT)
Dept: FAMILY MEDICINE | Facility: OTHER | Age: 30
End: 2025-02-24
Payer: COMMERCIAL

## 2025-02-24 DIAGNOSIS — E66.811 CLASS 1 OBESITY WITH SERIOUS COMORBIDITY AND BODY MASS INDEX (BMI) OF 30.0 TO 30.9 IN ADULT, UNSPECIFIED OBESITY TYPE: ICD-10-CM

## 2025-02-24 RX ORDER — SEMAGLUTIDE 1 MG/.5ML
1 INJECTION, SOLUTION SUBCUTANEOUS WEEKLY
Qty: 2 ML | Refills: 0 | Status: SHIPPED | OUTPATIENT
Start: 2025-02-24

## 2025-02-24 NOTE — TELEPHONE ENCOUNTER
Prior Authorization Retail Medication Request    Medication/Dose: Semaglutide-Weight Management (WEGOVY) 1 MG/0.5ML pen   Diagnosis and ICD code (if different than what is on RX):     New/renewal/insurance change PA/secondary ins. PA:  Previously Tried and Failed:     Rationale:       Insurance   Primary:    Insurance ID:       Secondary (if applicable):   Insurance ID:       Pharmacy Information (if different than what is on RX)  Name:     Phone:     Fax:     Clinic Information  Preferred routing pool for dept communication: [facility] Pool

## 2025-02-26 NOTE — TELEPHONE ENCOUNTER
Attempted to call patient and triage symptoms. Unable to leave voicemail due to mailbox full.     Replied via my chart. Will send to PCP regarding gabapentin.    Caprice ORTEGAN, RN  [facility]     Left msg for pt regarding her appt that she needed to cancel on 3/10/25.  Provided phone# [phone] to reschedule.  Advised that [facility] is scheduling out in to Oct but that our new nurse practitioner Jessa Henry who works with [facility] is scheduling in March.

## 2025-02-27 ENCOUNTER — VIRTUAL VISIT (OUTPATIENT)
Dept: PHARMACY | Facility: CLINIC | Age: 30
End: 2025-02-27
Payer: COMMERCIAL

## 2025-02-27 ENCOUNTER — TELEPHONE (OUTPATIENT)
Dept: UROLOGY | Facility: CLINIC | Age: 30
End: 2025-02-27
Payer: COMMERCIAL

## 2025-02-27 DIAGNOSIS — M54.16 LUMBAR RADICULOPATHY: ICD-10-CM

## 2025-02-27 DIAGNOSIS — R55 SYNCOPE, UNSPECIFIED SYNCOPE TYPE: ICD-10-CM

## 2025-02-27 DIAGNOSIS — R55 SYNCOPE: ICD-10-CM

## 2025-02-27 DIAGNOSIS — G43.009 NONINTRACTABLE MIGRAINE, UNSPECIFIED MIGRAINE TYPE: ICD-10-CM

## 2025-02-27 DIAGNOSIS — L64.9 ANDROGENIC ALOPECIA: ICD-10-CM

## 2025-02-27 DIAGNOSIS — F31.81 BIPOLAR II DISORDER (H): Primary | ICD-10-CM

## 2025-02-27 PROCEDURE — 99607 MTMS BY PHARM ADDL 15 MIN: CPT | Mod: 95 | Performed by: PHARMACIST

## 2025-02-27 PROCEDURE — 99605 MTMS BY PHARM NP 15 MIN: CPT | Mod: 95 | Performed by: PHARMACIST

## 2025-02-27 RX ORDER — MORPHINE SULFATE 15 MG/1
15 TABLET ORAL 3 TIMES DAILY PRN
COMMUNITY
Start: 2024-12-17

## 2025-02-27 RX ORDER — SODIUM PHOSPHATE,MONO-DIBASIC 19G-7G/118
1 ENEMA (ML) RECTAL DAILY
COMMUNITY

## 2025-02-27 RX ORDER — TRIAMCINOLONE ACETONIDE 1 MG/G
CREAM TOPICAL 2 TIMES DAILY PRN
COMMUNITY
Start: 2025-01-31

## 2025-02-27 RX ORDER — KETOCONAZOLE 20 MG/G
CREAM TOPICAL
COMMUNITY
Start: 2025-02-13

## 2025-02-27 RX ORDER — MAGNESIUM OXIDE 400 MG/1
400 TABLET ORAL DAILY
COMMUNITY

## 2025-02-27 RX ORDER — DOCUSATE SODIUM 100 MG/1
200 CAPSULE, LIQUID FILLED ORAL 2 TIMES DAILY
COMMUNITY

## 2025-02-27 RX ORDER — TRETINOIN 0.025 %
CREAM (GRAM) TOPICAL
COMMUNITY
Start: 2025-02-07

## 2025-02-27 NOTE — TELEPHONE ENCOUNTER
Central Prior Authorization Team   Phone: [phone]    PA Initiation    Medication: Semaglutide-Weight Management (WEGOVY) 1 MG/0.5ML pen  Insurance Company: Patti - Phone [phone] Fax [phone]  Pharmacy Filling the Rx: [facility] #[id number] - [place], [place] - [address] AT [facility]  Filling Pharmacy Phone: [phone]  Filling Pharmacy Fax:    Start Date: 2/27/2025    [facility] KEY: [id number]

## 2025-02-27 NOTE — TELEPHONE ENCOUNTER
Patient scheduled with Dr. Mckee on 3/10/25 as a New Patient for retention follow up/catheter removal. Patient stated when appt was made, she had a visit scheduled on 11/12/24 with [facility]. Clinic provided [phone] to patient to callback and discuss if appointment is needed with Dr. Mckee or if patient was seen prior.     Gretchen Hirsch on 2/27/2025 at 10:33 AM

## 2025-02-27 NOTE — Clinical Note
I met with Caprice about her meds and pregnancy. She was unsure about what to do and seemed overwhelmed so I asked her to think about the information I gave her and follow-up with her prescribers. I do think it would be reasonable to continue lithium if she is comfortable with the potential risk.  Of note, I'm concerned about polypharmacy contributing to her fainting episodes and do think she needs medication changes there - may be easier to start with doxepin and spironolactone then see if she improves without also needing to taper propranolol, prazosin. Please reach out with any questions, Divya Champagne, PharmD, BCACP

## 2025-02-27 NOTE — PROGRESS NOTES
Medication Therapy Management (MTM) Encounter    ASSESSMENT:                            Medication Adherence/Access: No issues identified.    Preconception counseling  Focus on medication use in pregnancy, see below.  Recommend daily prenatal vitamin.     Mental health:   Discussed importance of maintaining good control of mental health symptoms during pregnancy. Provided the information below; she is quite concerned about the need for increasing doses of some medications during pregnancy and is fearful of continuing lithium. Reports her partner also wants her to stop lithium.  After reviewing the information, she is unsure about what to do.  Encouraged she speak with her psychiatrist about weighing risk vs benefit given specifics of her symptoms and history. Reasonable to consider continuing low dose lithium and monitoring levels, would also consider slow taper off lamotrigine to reduce polypharmacy, as she reports questionable benefit from this medication.  If she chooses not to take lithium, could consider restarting after the first trimester though doses may need to be escalated due to increased clearance of the medication through pregnancy.   Since doxepin appears to have questionable efficacy, consider taper off medication.     Lithium may increase the risk of cardiovascular defects, including Ebstein anomaly, from 1% to 2% and appears to be dose related.  Frequent lithium level monitoring during pregnancy is recommended due to pharmacokinetic changes in pregnancy. Heart specific US monitoring and thyroid monitoring is recommended in pregnancy for patients taking lithium.     Lamotrigine in some studies has shown a 0.1-0.4% increased risk of cleft lip or cleft palate, however this has not been confirmed in other studies.   Clearance of lamotrigine increases during pregnancy, and the dose may need to be increased.     In animal studies, high doses of hydroxyzine were associated with developmental toxicity, but  the human data suggest low risk.  One study found an increased risk of oral clefts, but this has not been observed in other studies.  Withdrawal symptoms including seizure have been reported in two newborns exposed near term.      Pregabalin  The available evidence was found to be conflicting and generally of low quality.  Observational studies suggest a possible small increase in the rate of overall birth defects, but there was no consistent or specific pattern identified.  Findings from the largest study suggested absolute risks of major malformation of 4.8-5.6%.    Due to pregnancy-induced physiologic changes, some pharmacokinetic properties of pregabalin may be altered. Theoretically, the increase in renal clearance that occurs during pregnancy may decrease plasma concentrations of pregabalin.  She is taking a rather high dose; encouraged her to consider a slow dose reduction to find the lowest effective dose.    Propranolol may cause intrauterine growth restriction and reduced placental weight.  infants exposed to propranolol near delivery should be closely observed for the first 24-48 hours for bradycardia, hypoglycemia, and respiratory depression.     Prazosin   Animal Data: No evidence of teratogenicity. Limited human pregnancy data suggests low risk.     Fainting  She takes several medications that may increase her risk of syncopal episodes. Medications with vasodilatory effects can increase risk of syncopal episode. Additionally, beta blockers in combination with alpha blockers can increase risk. Diuretics are also recommended to be avoided due to volume depletion. Mental health medications with a side effect of orthostatic hypotension include: doxepin and quetiapine.  Given the increase in severity of her syncopal episodes, consider tapering doses of propranolol, prazosin, spironolactone, doxepin.    Chronic pain  Use opioid in late pregnancy increases risk of withdrawal symptoms including  respiratory depression. Monitor closely and avoid/limit opioid use as able.  Follow-up plan in place with specialist.     Migraine  Insufficient evidence is available for use of geptans during pregnancy.  Consider alternate therapies with neurology guidance if needed.    Androgenic alopecia   Not recommended to use spironolactone during pregnancy, consider stopping.      PLAN:                            Consider taper off doxepin.     Patient to follow-up with psychiatry about additional discussion of risk benefit with your mood stabilizers during pregnancy.     Consider slow dose reduction in pregabalin    Consider stopping spironolactone    Consider taper of propranolol, prazosin doses due to syncopal episodes    Follow-up: as needed    SUBJECTIVE/OBJECTIVE:                          Caprice Kline is a 29 year old female seen for an initial visit. She was referred to me from Nohemy Betancourt CNP/MFM.      Reason for visit: preconception.    Allergies/ADRs: Reviewed in chart  Past Medical History: Reviewed in chart  Tobacco: She reports that she has quit smoking. Her smoking use included cigarettes and vaping device. She has been exposed to tobacco smoke. She has never used smokeless tobacco.  Alcohol: not currently using    Medication Adherence/Access: no issues reported.    Preconception counseling:   Has been trying for pregnancy for the past couple years. Will actively try for a couple months, then sometimes just not prevent pregnancy.  She has received mixed opinions on whether she can take her current medications during pregnancy.       Mental Health   Bipolar  Lithium 600mg at bedtime - very effective  Quetiapine 300mg at bedtime - effective  Lamotrigine 100mg twice daily in morning and noon - if misses doses, she feels sweaty. Not as sure how much this is helping.  Prazosin 2mg AM, 2mg midday, 5mg bedtime - taking for nightmares, effective at this high dose.  Doxepin 10mg at bedtime -  unsure this is  effective  Adderall XR 30mg daily as needed, takes 0-3 times a week when she gets out of bed. Doesn't think she would need to take this during pregnancy.   Propranolol LA 80mg daily and 20mg twice a day as needed - to her tremors seem related to anxiety, not related to lithium.  Hydroxyzine 100mg 3 times a day, helpful for anxiety  Pregabalin 150mg 3 times a day and 100mg at bedtime, helpful for anxiety  Clonazepam 0.5mg as needed, takes up to 10 tablets in a month    Patient reports the following medication side effects: weight gain.  Lithium has resolved suicidal thoughts. Has been the most effective medication for her after many previous trials.  Patient is seeing a psychiatrist.        Fainting  Some evenings feels faint, in the last couple months has lost consciousness about 6-7 times. ED visit 2/10/25 for this.  Does not think this is related to her medications.     Chronic pain, Fibromyalgia?  Recommended to consider pain pump  Morphine IR 15mg 3 times a day as needed, taking at least twice a day. Pain relief lasts about 2 hours.  Lidocaine patches, rarely uses  Followed by a pain specialist    Migraine  Ajovy 225mg monthly (hasn't taken in 2 months due to other procedures)  Propranolol LA 80mg daily  Ubrelvy 100mg as needed (takes typically 8 times a month, works well with propranolol 20mg)  Magnesium 400mg daily    Androgenic alopecia  Spironolactone 100mg daily at night      Today's Vitals: There were no vitals taken for this visit.  ----------------      I spent 55 minutes with this patient today.      A summary of these recommendations was sent via [employer].    Divya Champagne, PharmD, BCACP   Medication Therapy Management Pharmacist   [facility]  [phone]     Telemedicine Visit Details  The patient's medications can be safely assessed via a telemedicine encounter.  Type of service:  Video Conference via [employer]  Originating Location (pt. Location):  Home    Distant Location (provider location):  On-site  Start Time: 2:09 PM  End Time: 3:06 PM     Medication Therapy Recommendations  Androgenic alopecia   1 Current Medication: spironolactone (ALDACTONE) 100 MG tablet   Current Medication Sig: Take 100 mg by mouth daily.   Rationale: Undesirable effect - Adverse medication event - Safety   Recommendation: Discontinue Medication   Status: Contact Provider - Awaiting Response   Identified Date: 2/27/2025         Bipolar II disorder (H)   1 Current Medication: doxepin (SINEQUAN) 10 MG capsule   Current Medication Sig: Take 10 mg by mouth At Bedtime   Rationale: Duplicate Therapy - Unnecessary medication therapy - Indication   Recommendation: Discontinue Medication   Status: Contact Provider - Awaiting Response   Identified Date: 2/27/2025

## 2025-03-03 ENCOUNTER — MYC MEDICAL ADVICE (OUTPATIENT)
Dept: UROLOGY | Facility: CLINIC | Age: 30
End: 2025-03-03
Payer: COMMERCIAL

## 2025-03-03 NOTE — TELEPHONE ENCOUNTER
3/3/2025. 2nd attempt. Patient scheduled with Dr. Mckee on 3/10/25 as a New Patient for retention follow up/catheter removal. Patient stated when appt was made, she had a visit scheduled on 11/12/24 with [facility]. Clinic provided [phone] to patient to callback and discuss if appointment is needed with Dr. Mckee or if patient was seen prior.     Gretchen Hirsch on 3/3/2025 at 10:49 AM

## 2025-03-04 NOTE — TELEPHONE ENCOUNTER
See MyChart encounter. Patient keeping appointment with Dr. Mckee 3/10/25.     Gretchen Hirsch on 3/4/2025 at 3:07 PM

## 2025-03-04 NOTE — TELEPHONE ENCOUNTER
Spoke with patient and care team. Patient stated she does not have fisher catheter in place and currently has difficulty voiding and having issues emptying. Has history of retention. Patient scheduled with Dr. Mckee on 3/10/25.     Gretchen Hirsch on 3/4/2025 at 3:05 PM

## 2025-03-04 NOTE — PATIENT INSTRUCTIONS
Recommendations from today's MTM visit:                                                    MTM (medication therapy management) is a service provided by a clinical pharmacist designed to help you get the most of out of your medicines.   Today we reviewed what your medicines are for, how to know if they are working, that your medicines are safe and how to make your medicine regimen as easy as possible.      We talked about your medications in pregnancy today.      Medicines that you may want to consider a taper off since you weren't sure during our discussion that you needed to take them:  Doxepin  Lamotrigine *taper not recommended if you decide to use this as your mood stabilizer during pregnancy and stop lithium.   If you choose to continue lithium, consider a slow taper off this medication.     Would not recommend the following medicines in pregnancy:  Spironolactone (may cause changes to sex organ development in males)  Ajovy (not enough data available)  Ubrelvy (not enough data available)    Information about your medicines and safety in pregnancy:    Lithium - we talked about the possible increased risk of heart defects.  In any pregnancy the risk is 1% for any heart defect. When taking lithium, the risk appears to increase to 2%, however the risk is related to dose (higher the dose, higher the risk).  Heart development happens in the first trimester. If you do not want to take lithium because of this heart risk, consider a discussion with your psychiatrist about stopping the medicine during the first trimester.  You will need more blood monitoring the lithium and thyroid levels during pregnancy.   Lamotrigine - some studies show an increased risk of 0.1 - 0.4% increased risk of cleft lip/palate, however that is not confirmed in other studies.  Doses may need to be increased during pregnancy as the body changes.  Hydroxyzine - overall the data suggests low risk. One study found an increased risk of cleft  "lip/palate but other studies did not confirm this finding. Overall recommendation is to keep the dose as low as possible.   Pregabalin (Lyrica) - the available studies are not very good unfortunately. There may be an increased risk in overall birth defects. The largest studied showed a risk of birth defects to be 4.8 to 5.6% with Lyrica.  Note that the risk of birth defects in any pregnancy is 3 to 5%.  The dose of Lyrica may need to be adjusted in pregnancy due to changes in the body.   Prazosin - animal data and limited human data suggests this is OK to use in pregnancy.  Clonazepam - not recommended in pregnancy, though infrequent use (weekly) is low risk.  Quetiapine (Seroquel) - OK to use in pregnancy. Risk of withdrawal effects at delivery which can look like   Adderall - likely OK to use in pregnancy, as long as blood pressure remains well controlled.  Morphine or other opioid - risk when used late in pregnancy, increases risk of baby needing breathing and withdrawal symptom support after delivery  Propranolol - may affect growth of the fetus, monitor growth closely with ultrasound. If propranolol is taken in late pregnancy, baby should be monitored closely for the first 2 days after delivery for any effects on heart rate, blood sugar, and breathing.      Here is a good website to read more about medications during pregnancy:  https://mothertobaby.org/fact-sheets/      Follow-up: as needed    It was great speaking with you today.  I value your experience and would be very thankful for your time in providing feedback in our clinic survey. In the next few days, you may receive an email or text message from [employer] with a link to a survey related to your  clinical pharmacist.\"     To schedule another MTM appointment, please call the clinic directly or you may call the MTM scheduling line at [phone] or toll-free at [phone].     My Clinical Pharmacist's contact information:                           "                            Please feel free to contact me with any questions or concerns you have.      Divya Champagne, PharmD, [facility]   Medication Therapy Management Pharmacist   [facility]  [phone]

## 2025-03-10 ENCOUNTER — OFFICE VISIT (OUTPATIENT)
Dept: UROLOGY | Facility: CLINIC | Age: 30
End: 2025-03-10
Payer: COMMERCIAL

## 2025-03-10 ENCOUNTER — MYC MEDICAL ADVICE (OUTPATIENT)
Dept: UROLOGY | Facility: CLINIC | Age: 30
End: 2025-03-10

## 2025-03-10 DIAGNOSIS — M62.89 PELVIC FLOOR DYSFUNCTION: ICD-10-CM

## 2025-03-10 DIAGNOSIS — R33.9 URINARY RETENTION: Primary | ICD-10-CM

## 2025-03-10 DIAGNOSIS — R39.11 URINARY HESITANCY: ICD-10-CM

## 2025-03-10 DIAGNOSIS — R10.2 PELVIC PAIN IN FEMALE: ICD-10-CM

## 2025-03-10 DIAGNOSIS — R39.89 BLADDER PAIN: ICD-10-CM

## 2025-03-10 LAB
ALBUMIN UR-MCNC: 10 MG/DL
APPEARANCE UR: CLEAR
BACTERIA #/AREA URNS HPF: ABNORMAL /HPF
BILIRUB UR QL STRIP: NEGATIVE
COLOR UR AUTO: YELLOW
GLUCOSE UR STRIP-MCNC: NEGATIVE MG/DL
HGB UR QL STRIP: NEGATIVE
HYALINE CASTS #/AREA URNS LPF: ABNORMAL /LPF
KETONES UR STRIP-MCNC: NEGATIVE MG/DL
LEUKOCYTE ESTERASE UR QL STRIP: NEGATIVE
MUCOUS THREADS #/AREA URNS LPF: PRESENT /LPF
NITRATE UR QL: NEGATIVE
PH UR STRIP: 6 [PH] (ref 5–7)
RBC #/AREA URNS AUTO: ABNORMAL /HPF
SKIP: ABNORMAL
SP GR UR STRIP: 1.02 (ref 1–1.03)
SQUAMOUS #/AREA URNS AUTO: ABNORMAL /LPF
UROBILINOGEN UR STRIP-MCNC: NORMAL MG/DL
WBC #/AREA URNS AUTO: ABNORMAL /HPF

## 2025-03-10 PROCEDURE — 81001 URINALYSIS AUTO W/SCOPE: CPT | Performed by: UROLOGY

## 2025-03-10 PROCEDURE — 99204 OFFICE O/P NEW MOD 45 MIN: CPT | Performed by: UROLOGY

## 2025-03-10 RX ORDER — DIAZEPAM 10 MG/1
TABLET ORAL
Qty: 15 TABLET | Refills: 0 | Status: SHIPPED | OUTPATIENT
Start: 2025-03-10

## 2025-03-10 RX ORDER — TAMSULOSIN HYDROCHLORIDE 0.4 MG/1
0.4 CAPSULE ORAL EVERY EVENING
Qty: 30 CAPSULE | Refills: 11 | Status: SHIPPED | OUTPATIENT
Start: 2025-03-10

## 2025-03-10 NOTE — NURSING NOTE
Caprice Kline's goals for this visit include:   Chief Complaint   Patient presents with    Consult     Incomplete bladder emptying, difficulty voiding, history of retention  - ED follow up from Nov 2024       She requests these members of her care team be copied on today's visit information:     PCP: Junior Roe    Referring Provider:  Referred Self, MD  No address on file    There were no vitals taken for this visit.    Do you need any medication refills at today's visit?     Tamara Luis MA on 3/10/2025 at 10:21 AM

## 2025-03-10 NOTE — CONFIDENTIAL NOTE
Chart reviewed. 3/10/25 UA was negative for nitrite urine, negative for leukocyte esterase, showed 0-5 WBC urine, did not meet the criteria for urine culture, and is not suggestive of infection. My chart message sent to patient.    Layla Ayala RN, BSN

## 2025-03-10 NOTE — PROGRESS NOTES
HPI:  Caprice Kline is a 29 year old female with hx of urinary retention.  She notices this is worse when she is nervous.  She tried pelvic floor PT but it did not help her.    Reviewed previous notes from Nohemy Morillo on 2/5/25 and Junior Roe from 1/29/25    Exam:  There were no vitals taken for this visit.  GENERAL: alert and no distress  EYES: Eyes grossly normal to inspection.  No discharge or erythema, or obvious scleral/conjunctival abnormalities.  RESP: No audible wheeze, cough, or visible cyanosis.    SKIN: Visible skin clear. No significant rash, abnormal pigmentation or lesions.  NEURO: Cranial nerves grossly intact.  Mentation and speech appropriate for age.  PSYCH: Appropriate affect, tone, and pace of words    Review of Imaging:  The following imaging exams were reviewed by me and discussed with patient:  11/26/24 CT lumbar spine:  Impression    1.  No fracture or posttraumatic subluxation.  2.  No high-grade spinal canal or neural foraminal stenosis.    11/26/24 CT abd/pelvis:  Impression    1.  No acute pathology identified in abdomen or pelvis.  2.  2 mm nonobstructing calyceal stone in the right kidney. No ureteral stones or hydronephrosis.        Review of Labs:  The following labs were reviewed by me and discussed with the patient:  Lab Results   Component Value Date    WBC 5.6 06/20/2024     Lab Results   Component Value Date    RBC 4.99 06/20/2024     Lab Results   Component Value Date    HGB 12.7 06/20/2024     Lab Results   Component Value Date    HCT 39.5 06/20/2024     Lab Results   Component Value Date    MCV 79 06/20/2024     Lab Results   Component Value Date    MCH 25.5 06/20/2024     Lab Results   Component Value Date    MCHC 32.2 06/20/2024     Lab Results   Component Value Date    RDW 14.5 06/20/2024     Lab Results   Component Value Date     06/20/2024        Last Comprehensive Metabolic Panel:  Sodium   Date Value Ref Range Status   06/20/2024 139 135 - 145 mmol/L Final      Comment:     Reference intervals for this test were updated on 09/26/2023 to more accurately reflect our healthy population. There may be differences in the flagging of prior results with similar values performed with this method. Interpretation of those prior results can be made in the context of the updated reference intervals.      Potassium   Date Value Ref Range Status   06/20/2024 3.9 3.4 - 5.3 mmol/L Final     Chloride   Date Value Ref Range Status   06/20/2024 102 98 - 107 mmol/L Final     Carbon Dioxide (CO2)   Date Value Ref Range Status   06/20/2024 25 22 - 29 mmol/L Final     Anion Gap   Date Value Ref Range Status   06/20/2024 12 7 - 15 mmol/L Final     Glucose   Date Value Ref Range Status   06/20/2024 90 70 - 99 mg/dL Final     Urea Nitrogen   Date Value Ref Range Status   06/20/2024 16.4 6.0 - 20.0 mg/dL Final     Creatinine   Date Value Ref Range Status   06/20/2024 0.92 0.51 - 0.95 mg/dL Final     GFR Estimate   Date Value Ref Range Status   06/20/2024 86 >60 mL/min/1.73m2 Final     Comment:     eGFR calculated using 2021 CKD-EPI equation.     Calcium   Date Value Ref Range Status   06/20/2024 9.7 8.6 - 10.0 mg/dL Final     Bilirubin Total   Date Value Ref Range Status   06/20/2024 0.3 <=1.2 mg/dL Final     Alkaline Phosphatase   Date Value Ref Range Status   06/20/2024 38 (L) 40 - 150 U/L Final     ALT   Date Value Ref Range Status   06/20/2024 22 0 - 50 U/L Final     Comment:     Reference intervals for this test were updated on 6/12/2023 to more accurately reflect our healthy population. There may be differences in the flagging of prior results with similar values performed with this method. Interpretation of those prior results can be made in the context of the updated reference intervals.       AST   Date Value Ref Range Status   06/20/2024 19 0 - 45 U/L Final     Comment:     Reference intervals for this test were updated on 6/12/2023 to more accurately reflect our healthy population.  There may be differences in the flagging of prior results with similar values performed with this method. Interpretation of those prior results can be made in the context of the updated reference intervals.       Assessment & Plan   30 y/o female with some pelvic floor dysfunction and urinary retention and urinary hesitancy in the setting of fibromyalgia and chronic pain.  She has tried PFPT and failed.  We discussed trying flomax and CIC, she would like to do this.  -flomax 0.4 mg nightly  -consider learning CIC  -vaginal valium  -will hold off on UDS for now  -consider seeing GI for chronic constipation.  -f/u with me in a month or so virtually, for symptoms check.    Teresa Mckee MD  [facility]

## 2025-03-10 NOTE — PATIENT INSTRUCTIONS
-flomax 0.4 mg nightly  -consider learning CIC  -vaginal valium  -will hold off on UDS for now  -consider seeing GI for chronic constipation.  -f/u with me in a month or so virtually, for symptoms check.

## 2025-03-10 NOTE — CONFIDENTIAL NOTE
Patient was seen today by Dr. Mckee. UA/UC completed for patient report of symptoms. Will watch for UA/UC results and contact patient once available. Will also discuss if patient was able to try CIC at home and if she has any questions.    Layla Ayala RN, BSN

## 2025-03-10 NOTE — CONFIDENTIAL NOTE
"Per Dr. Mckee, she instructed patient on clean intermittent catheterization, but patient declines further instruction by nursing at this time. Per Dr. Mckee, patient to complete CIC as needed.     Clean intermittent catheterization instruction booklet and samples of 14 french female intermittent catheters provided to patient. Patient will try to complete CIC independently at home. Informed patient to call with any questions or concerns and if she'd like a urology nurse visit for this in the future, we'd be happy to assist.     Patient reports bladder pain and feeling of incomplete bladder emptying, urinary hesitancy and is inquiring if she has a UTI. Patient stated, \"The problem is that when I need a sample then I can't go.\" Offered for patient to attempt CIC independently in clinic to complete the sample. Patient stated,  \"I will try to go on my own first and then if I can't I will try cathing at home.\" Informed patient we could have her schedule a lab appointment in the future and she could bring a catheter with to complete the urine sample in the lab.     Patient used clinic bathroom and was able to void. UA/UC ordered per nursing protocol. Urine sample obtained and sent to lab, results in process at this time. Patient aware that she will be contacted via my chart regarding results. Patient was comfortable with plan. See 3/10/25 my chart encounter.    Layla Ayala RN, BSN      "

## 2025-03-10 NOTE — ADDENDUM NOTE
Addended by: ISABEL DYER on: 3/10/2025 11:05 AM     Modules accepted: Orders     [Joint Pain] : joint pain [Negative] : Heme/Lymph

## 2025-03-11 ENCOUNTER — OFFICE VISIT (OUTPATIENT)
Dept: NEUROSURGERY | Facility: CLINIC | Age: 30
End: 2025-03-11
Payer: COMMERCIAL

## 2025-03-11 VITALS
DIASTOLIC BLOOD PRESSURE: 67 MMHG | HEIGHT: 69 IN | OXYGEN SATURATION: 98 % | SYSTOLIC BLOOD PRESSURE: 114 MMHG | BODY MASS INDEX: 33.5 KG/M2 | WEIGHT: 226.2 LBS | HEART RATE: 71 BPM

## 2025-03-11 DIAGNOSIS — G89.29 CHRONIC BILATERAL LOW BACK PAIN WITH BILATERAL SCIATICA: Primary | ICD-10-CM

## 2025-03-11 DIAGNOSIS — M79.662 PAIN OF LEFT CALF: ICD-10-CM

## 2025-03-11 DIAGNOSIS — M54.42 CHRONIC BILATERAL LOW BACK PAIN WITH BILATERAL SCIATICA: Primary | ICD-10-CM

## 2025-03-11 DIAGNOSIS — M54.41 CHRONIC BILATERAL LOW BACK PAIN WITH BILATERAL SCIATICA: Primary | ICD-10-CM

## 2025-03-11 PROCEDURE — 3078F DIAST BP <80 MM HG: CPT | Performed by: NURSE PRACTITIONER

## 2025-03-11 PROCEDURE — 99213 OFFICE O/P EST LOW 20 MIN: CPT | Performed by: NURSE PRACTITIONER

## 2025-03-11 PROCEDURE — 1125F AMNT PAIN NOTED PAIN PRSNT: CPT | Performed by: NURSE PRACTITIONER

## 2025-03-11 PROCEDURE — 3074F SYST BP LT 130 MM HG: CPT | Performed by: NURSE PRACTITIONER

## 2025-03-11 ASSESSMENT — PAIN SCALES - GENERAL: PAINLEVEL_OUTOF10: SEVERE PAIN (9)

## 2025-03-11 NOTE — NURSING NOTE
"Caprice Kline is a 29 year old female who presents for:  Chief Complaint   Patient presents with    Neurologic Problem     LBP.  S/p MIS left L5-S1 microdiscectomy on 8/24/21. CT spine at [facility] 12/4/24. Onset: 6+ months ago. Patient notes her pain started in the LB and now it has radiates into her bilateral hip and down the legs. Her ankles have started to swell. Anything she does is extremely painful. She will have some N/T in her left arm and leg at times. She will get shooting pain up her left leg and through her buttocks, like someone is spearing her or electrocuting her. Has imaging through [facility] but not with her.         Vitals:    Vitals:    03/11/25 1256   BP: 114/67   Pulse: 71   SpO2: 98%   Weight: 226 lb 3.2 oz (102.6 kg)   Height: 5' 9\" (1.753 m)       BMI:  Estimated body mass index is 33.4 kg/m  as calculated from the following:    Height as of this encounter: 5' 9\" (1.753 m).    Weight as of this encounter: 226 lb 3.2 oz (102.6 kg).    Pain Score:  Severe Pain (9)        Maryann Keith CMA      "

## 2025-03-11 NOTE — PROGRESS NOTES
"[facility] Neurosurgery  Neurosurgery Follow Up:    HPI: 29F with history of prior MIS left L5-S1 microdiscectomy with Dr. Junior on 8/24/2021. Presents with low back and bilateral buttock pain to the left>right and posterior thighs. No overt weakness. No bowel/bladder incontinence. Has undergone lumbar MRI 11/6/2024. Thoracic MRI 11/13/2024. Failed SCS trial at [facility]. Repeat lumbar MRI 1/23/2025. Repeat bilateral SI joint injections at [facility] 1/30/2025 without any benefit. She has undergone multiple rounds of PT in the past without any sustainable benefit. She continues to report severe, debilitating back and buttock pain. She has seen an orthopedic provider for her hips and was told no surgery is needed.   Also with concerns of focal left calf pain with left leg swelling. Denies any SOB or chest pain.     Medical, surgical, family, and social history unchanged since prior exam.  Exam:  Constitutional:  Alert, well nourished, NAD.  HEENT: Normocephalic, atraumatic.   Pulm:  Without shortness of breath   CV:  No pitting edema of BLE.      Vital Signs:  /67   Pulse 71   Ht 5' 9\" (1.753 m)   Wt 226 lb 3.2 oz (102.6 kg)   SpO2 98%   BMI 33.40 kg/m      Neurological:  Awake  Alert  Oriented x 3  Motor exam:        IP Q DF PF EHL  R   5  5   5   5    5  L   5  5   5   5    5     Able to spontaneously move L/E bilaterally  Sensation intact throughout all L/E dermatomes     Imaging:   Lumbar MRI 1/23/2025  CONCLUSION:  Moderate single-level lumbar disc degeneration with marked disc space narrowing and discogeneic signal changes on the left.  Laminotomy defect on the left at L5-S1 with left S1 perineural fibrosis.  No disc herniation, stenosis or neural impingement.  No distal cord lesion, intradural mass or arachnoidal adhesions.    A/P: chronic bilateral low back pain with bilateral sciatica, hx lumbar microdiscectomy  Reviewed lumbar MRI. Plan for bilateral L5-S1 MBB at this time. If failed, would trial " L5-S1 discogram for consideration of L5-S1 arthroplasty. Will obtain LLE US to rule out DVT. She verbalized understanding and agreement.   Patient Instructions   -Referral to [facility] for bilateral L5-S1 medial branch block. Please let me know how you do after the procedure.   -Physical therapy ordered. They will contact you to schedule.  -Please contact our clinic with questions or concerns at [phone].    Harriet Adamson, [facility] Neurosurgery  [address] [place] [address]  Tel [phone]  Fax [phone]

## 2025-03-11 NOTE — PROGRESS NOTES
[facility] Bilateral SI joint injections 1/30/2025  Lumbar MRI with/without [facility]11/6/20204   Thoracic MRI [facility]11/13/2024  Lumbar MRI Bilateral hips [facility] 1/23/25

## 2025-03-11 NOTE — LETTER
"3/11/2025      Caprice Kline  [address] [address]      Dear Colleague,    Thank you for referring your patient, Caprice Kline, to the [facility]. Please see a copy of my visit note below.    [facility]surgery  Neurosurgery Follow Up:    HPI: 29F with history of prior MIS left L5-S1 microdiscectomy with Dr. Junior on 8/24/2021. Presents with low back and bilateral buttock pain to the left>right and posterior thighs. No overt weakness. No bowel/bladder incontinence. Has undergone lumbar MRI 11/6/2024. Thoracic MRI 11/13/2024. Failed SCS trial at [facility]. Repeat lumbar MRI 1/23/2025. Repeat bilateral SI joint injections at [facility] 1/30/2025 without any benefit. She has undergone multiple rounds of PT in the past without any sustainable benefit. She continues to report severe, debilitating back and buttock pain. She has seen an orthopedic provider for her hips and was told no surgery is needed.     Medical, surgical, family, and social history unchanged since prior exam.  Exam:  Constitutional:  Alert, well nourished, NAD.  HEENT: Normocephalic, atraumatic.   Pulm:  Without shortness of breath   CV:  No pitting edema of BLE.      Vital Signs:  /67   Pulse 71   Ht 5' 9\" (1.753 m)   Wt 226 lb 3.2 oz (102.6 kg)   SpO2 98%   BMI 33.40 kg/m      Neurological:  Awake  Alert  Oriented x 3  Motor exam:        IP Q DF PF EHL  R   5  5   5   5    5  L   5  5   5   5    5     Able to spontaneously move L/E bilaterally  Sensation intact throughout all L/E dermatomes     Imaging:   Lumbar MRI 1/23/2025  CONCLUSION:  Moderate single-level lumbar disc degeneration with marked disc space narrowing and discogeneic signal changes on the left.  Laminotomy defect on the left at L5-S1 with left S1 perineural fibrosis.  No disc herniation, stenosis or neural impingement.  No distal cord lesion, intradural mass or arachnoidal adhesions.    A/P: chronic bilateral low back pain " with bilateral sciatica, hx lumbar microdiscectomy  Reviewed lumbar MRI. Plan for bilateral L5-S1 MBB at this time. If failed, would trial L5-S1 discogram for consideration of L5-S1 arthroplasty. She verbalized understanding and agreement.   Patient Instructions   -Referral to [facility] for bilateral L5-S1 medial branch block. Please let me know how you do after the procedure.   -Physical therapy ordered. They will contact you to schedule.  -Please contact our clinic with questions or concerns at [phone].    Harriet Adamson CNP  [facility] Neurosurgery  [address] [place] [id number]  Tel [phone]  Fax [phone]      [facility] Bilateral SI joint injections 1/30/2025  Lumbar MRI with/without Rayus11/6/20204   Thoracic MRI [facility]11/13/2024  Lumbar MRI Bilateral hips [facility] 1/23/25      Again, thank you for allowing me to participate in the care of your patient.        Sincerely,        Harriet Adamson NP    Electronically signed

## 2025-03-12 ENCOUNTER — ANCILLARY PROCEDURE (OUTPATIENT)
Dept: ULTRASOUND IMAGING | Facility: CLINIC | Age: 30
End: 2025-03-12
Attending: NURSE PRACTITIONER
Payer: COMMERCIAL

## 2025-03-12 ENCOUNTER — DOCUMENTATION ONLY (OUTPATIENT)
Dept: NEUROSURGERY | Facility: CLINIC | Age: 30
End: 2025-03-12
Payer: COMMERCIAL

## 2025-03-12 DIAGNOSIS — M79.662 PAIN OF LEFT CALF: ICD-10-CM

## 2025-03-12 PROCEDURE — 93971 EXTREMITY STUDY: CPT | Mod: LT | Performed by: STUDENT IN AN ORGANIZED HEALTH CARE EDUCATION/TRAINING PROGRAM

## 2025-03-12 NOTE — PROGRESS NOTES
Faxed  [facility] pain management referral to [facility]  March 12, 2025 to fax number [phone]    Right Fax confirmed at 3:41 PM    Yolanda Mcdonnell

## 2025-03-12 NOTE — PATIENT INSTRUCTIONS
-Referral to [facility] for bilateral L5-S1 medial branch block. Please let me know how you do after the procedure.   -Physical therapy ordered. They will contact you to schedule.  -Please contact our clinic with questions or concerns at [phone].

## 2025-03-17 ENCOUNTER — TELEPHONE (OUTPATIENT)
Dept: NEUROLOGY | Facility: CLINIC | Age: 30
End: 2025-03-17
Payer: COMMERCIAL

## 2025-03-17 ASSESSMENT — SLEEP AND FATIGUE QUESTIONNAIRES
HOW LIKELY ARE YOU TO NOD OFF OR FALL ASLEEP WHILE SITTING AND TALKING TO SOMEONE: WOULD NEVER DOZE
HOW LIKELY ARE YOU TO NOD OFF OR FALL ASLEEP IN A CAR, WHILE STOPPED FOR A FEW MINUTES IN TRAFFIC: WOULD NEVER DOZE
HOW LIKELY ARE YOU TO NOD OFF OR FALL ASLEEP WHILE SITTING QUIETLY AFTER LUNCH WITHOUT ALCOHOL: MODERATE CHANCE OF DOZING
HOW LIKELY ARE YOU TO NOD OFF OR FALL ASLEEP WHEN YOU ARE A PASSENGER IN A CAR FOR AN HOUR WITHOUT A BREAK: WOULD NEVER DOZE
HOW LIKELY ARE YOU TO NOD OFF OR FALL ASLEEP WHILE WATCHING TV: MODERATE CHANCE OF DOZING
HOW LIKELY ARE YOU TO NOD OFF OR FALL ASLEEP WHILE SITTING INACTIVE IN A PUBLIC PLACE: SLIGHT CHANCE OF DOZING
HOW LIKELY ARE YOU TO NOD OFF OR FALL ASLEEP WHILE SITTING AND READING: SLIGHT CHANCE OF DOZING
HOW LIKELY ARE YOU TO NOD OFF OR FALL ASLEEP WHILE LYING DOWN TO REST IN THE AFTERNOON WHEN CIRCUMSTANCES PERMIT: HIGH CHANCE OF DOZING

## 2025-03-17 NOTE — TELEPHONE ENCOUNTER
Attempted to reach patient to remind them about appointment scheduled with Karl Goldman MD on 3/18/25 in our [place] clinic.    A voicemail was left with a call back number if the patient has questions or would like to reschedule.

## 2025-03-20 ENCOUNTER — OFFICE VISIT (OUTPATIENT)
Dept: SLEEP MEDICINE | Facility: CLINIC | Age: 30
End: 2025-03-20
Attending: PHYSICIAN ASSISTANT
Payer: COMMERCIAL

## 2025-03-20 ENCOUNTER — THERAPY VISIT (OUTPATIENT)
Dept: SLEEP MEDICINE | Facility: CLINIC | Age: 30
End: 2025-03-20
Payer: COMMERCIAL

## 2025-03-20 ENCOUNTER — HOSPITAL ENCOUNTER (OUTPATIENT)
Dept: RESPIRATORY THERAPY | Facility: CLINIC | Age: 30
Discharge: HOME | End: 2025-03-20
Attending: PHYSICIAN ASSISTANT
Payer: COMMERCIAL

## 2025-03-20 VITALS
HEIGHT: 69 IN | DIASTOLIC BLOOD PRESSURE: 73 MMHG | BODY MASS INDEX: 33.03 KG/M2 | OXYGEN SATURATION: 95 % | SYSTOLIC BLOOD PRESSURE: 111 MMHG | WEIGHT: 223 LBS | HEART RATE: 69 BPM

## 2025-03-20 DIAGNOSIS — F31.81 BIPOLAR II DISORDER (H): ICD-10-CM

## 2025-03-20 DIAGNOSIS — F33.2 SEVERE EPISODE OF RECURRENT MAJOR DEPRESSIVE DISORDER, WITHOUT PSYCHOTIC FEATURES (H): ICD-10-CM

## 2025-03-20 DIAGNOSIS — G47.00 INSOMNIA, UNSPECIFIED TYPE: ICD-10-CM

## 2025-03-20 DIAGNOSIS — E66.9 OBESITY (BMI 30-39.9): ICD-10-CM

## 2025-03-20 DIAGNOSIS — R06.83 SNORING: Primary | ICD-10-CM

## 2025-03-20 DIAGNOSIS — G47.50 PARASOMNIA, UNSPECIFIED TYPE: ICD-10-CM

## 2025-03-20 DIAGNOSIS — R06.83 SNORING: ICD-10-CM

## 2025-03-20 DIAGNOSIS — R06.02 SOB (SHORTNESS OF BREATH): ICD-10-CM

## 2025-03-20 LAB
DLCOUNC-%PRED-PRE: 87 %
DLCOUNC-PRE: 21.9 ML/MIN/MMHG
DLCOUNC-PRED: 24.91 ML/MIN/MMHG
ERV-%PRED-PRE: 36 %
ERV-PRE: 0.53 L
ERV-PRED: 1.46 L
EXPTIME-PRE: 4.81 SEC
FEF2575-%PRED-POST: 77 %
FEF2575-%PRED-PRE: 87 %
FEF2575-POST: 2.91 L/SEC
FEF2575-PRE: 3.28 L/SEC
FEF2575-PRED: 3.75 L/SEC
FEFMAX-%PRED-PRE: 87 %
FEFMAX-PRE: 6.8 L/SEC
FEFMAX-PRED: 7.8 L/SEC
FEV1-%PRED-PRE: 93 %
FEV1-PRE: 3.28 L
FEV1FEV6-PRE: 82 %
FEV1FEV6-PRED: 85 %
FEV1FVC-PRE: 82 %
FEV1FVC-PRED: 85 %
FEV1SVC-PRE: 91 %
FEV1SVC-PRED: 84 %
FIFMAX-PRE: 5.96 L/SEC
FRCPLETH-%PRED-PRE: 78 %
FRCPLETH-PRE: 2.53 L
FRCPLETH-PRED: 3.22 L
FVC-%PRED-PRE: 96 %
FVC-PRE: 4 L
FVC-PRED: 4.14 L
IC-%PRED-PRE: 106 %
IC-PRE: 3.09 L
IC-PRED: 2.91 L
RVPLETH-%PRED-PRE: 120 %
RVPLETH-PRE: 2.01 L
RVPLETH-PRED: 1.66 L
TLCPLETH-%PRED-PRE: 97 %
TLCPLETH-PRE: 5.62 L
TLCPLETH-PRED: 5.78 L
VA-%PRED-PRE: 89 %
VA-PRE: 5.08 L
VC-%PRED-PRE: 86 %
VC-PRE: 3.61 L
VC-PRED: 4.19 L

## 2025-03-20 PROCEDURE — 94729 DIFFUSING CAPACITY: CPT

## 2025-03-20 PROCEDURE — 94726 PLETHYSMOGRAPHY LUNG VOLUMES: CPT

## 2025-03-20 PROCEDURE — 94060 EVALUATION OF WHEEZING: CPT

## 2025-03-20 ASSESSMENT — SLEEP AND FATIGUE QUESTIONNAIRES
HOW LIKELY ARE YOU TO NOD OFF OR FALL ASLEEP WHILE LYING DOWN TO REST IN THE AFTERNOON WHEN CIRCUMSTANCES PERMIT: HIGH CHANCE OF DOZING
HOW LIKELY ARE YOU TO NOD OFF OR FALL ASLEEP WHEN YOU ARE A PASSENGER IN A CAR FOR AN HOUR WITHOUT A BREAK: WOULD NEVER DOZE
HOW LIKELY ARE YOU TO NOD OFF OR FALL ASLEEP WHILE SITTING INACTIVE IN A PUBLIC PLACE: WOULD NEVER DOZE
HOW LIKELY ARE YOU TO NOD OFF OR FALL ASLEEP WHILE SITTING QUIETLY AFTER LUNCH WITHOUT ALCOHOL: SLIGHT CHANCE OF DOZING
HOW LIKELY ARE YOU TO NOD OFF OR FALL ASLEEP WHILE SITTING AND TALKING TO SOMEONE: WOULD NEVER DOZE
HOW LIKELY ARE YOU TO NOD OFF OR FALL ASLEEP IN A CAR, WHILE STOPPED FOR A FEW MINUTES IN TRAFFIC: WOULD NEVER DOZE
HOW LIKELY ARE YOU TO NOD OFF OR FALL ASLEEP WHILE WATCHING TV: MODERATE CHANCE OF DOZING
HOW LIKELY ARE YOU TO NOD OFF OR FALL ASLEEP WHILE SITTING AND READING: SLIGHT CHANCE OF DOZING

## 2025-03-20 NOTE — PROGRESS NOTES
Pre and post spirometry, DLCO, and Lung volumes completed.  Patient was given Albuterol MDI before post spirometry.

## 2025-03-20 NOTE — PROGRESS NOTES
Outpatient Sleep Medicine Consultation:      Name: Caprice Kline MRN# [id number]   Age: 29 year old YOB: 1995     Date of Consultation: March 20, 2025  Consultation is requested by: Junior Roe PA-C  [address] [place] [id number] Junior Roe  Primary care provider: Junior Roe       Reason for Sleep Consult:     Caprice Kline is sent by Junior Roe for a sleep consultation regarding 1. SOB (shortness of breath)    Patient s Reason for visit  Capricegerhard Kline main reason for visit: (Patient-Rptd) Trouble breathing, excess drooling and newly starting to snore bad, trouble falling and staying asleep, wakingp, sleeping too much and too little  Patient states problem(s) started: (Patient-Rptd) 6+ months at LEAST  Caprice JADA Kline's goals for this visit: (Patient-Rptd) Figure out anything to help my breathing (feeling lije i cant get a deep of breath and feeling like i absolutely cannot breathe , sleep (ability to fall and stay) figuring out my snoring and excessive drooling pretty much a nything to help my sleep         Assessment and Plan:     Summary Sleep Diagnoses:  1. Snoring (Primary)  2. Parasomnia, unspecified type  3. Insomnia, unspecified type  Comorbid Diagnoses:  4. Obesity (BMI 30-39.9)  5. Bipolar II disorder (H)  6. Severe episode of recurrent major depressive disorder, without psychotic features (H)    Patient presents to clinic today after referral from PCP for evaluation of suspected sleep apnea.  During our discussion today she also endorses several other sleep concerns/parasomnias.  Discussed that underlying untreated sleep apnea can cause parasomnias and insomnia though her insomnia appears more behavioral and mood related.  Symptoms concerning for sleep apnea include loud disruptive snoring, sensation of shortness of breath at night though denies classic gasping/choking arousals and no witnessed apneic events by her boyfriend.  Denies excessive sleepiness though she is  "napping daily, sounds like it may be more related to pain control than true sleepiness.  Discussed ideally avoiding any daytime napping to better consolidate sleep at night but if napping is inevitable limiting to 30-45 minutes.  She does take morphine at bedtime or sometimes in the middle of the night for pain management and this also increases risk of sleep disordered breathing given respiratory depression.  Orders were ultimately placed for diagnostic polysomnogram to get some clarity as to sleep disordered breathing and parasomnia symptoms.  Discussed if significant sleep apnea seen on testing CPAP therapy will be recommended and she is open to this, would be comfortable with me placing orders for CPAP to expedite treatment set up.  As we await sleep study we discussed the importance of keeping a sleep sleep environment for both her and her boyfriend and consider sleeping separately to limit external disruptions.  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  Discussed stopping watching TV in bed and avoiding electronics in general in the evenings, continuing her sound machine is okay or something like music, podcast, etc.Should continue to follow with psychiatry for optimization of mood as this certainly affect sleep as well.  Will see her back following testing to review results. Education materials provided in AVS.     - Comprehensive Sleep Study; Future         History of Present Illness:     Caprice Kline is a 29 year old female with obesity, MDD/bipolar II, anxiety, fibromyalgia, chronic pain morphine who presents to clinic today for evaluation of suspected sleep apnea. States \"I snore and drool and it's hard to stay asleep\".     SLEEP-WAKE SCHEDULE:     Work/School Days: Patient goes to school/work: (Patient-Rptd) No   Usually gets into bed at " (Patient-Rptd) Start trying and 9/10pm sometimes i fall asleep around that time or i dont sleep at all. Even with my sleep meds   * Typically enters bed between 8-9:00PM when boyfriend does and watches TV, takes her nighttime meds between 9-10:00PM and sleep latency variable/unable to estimate    Takes patient about (Patient-Rptd) Differs 2ish hours- 4/5 plus. Or not at all to fall asleep  Has trouble falling asleep (Patient-Rptd) 7 nights per week  Wakes up in the middle of the night (Patient-Rptd) 2-7 times.  Wakes up due to (Patient-Rptd) Pain;External stimuli (bed partner, pets, noise, etc);Use the bathroom;Anxiety;Nightmares;Uncertain  She has trouble falling back asleep (Patient-Rptd) 7 times a week.   It usually takes (Patient-Rptd) 1 hour + to get back to sleep  Patient is usually up at (Patient-Rptd) 6am- 1pm  Uses alarm: (Patient-Rptd) No    Weekends/Non-work Days/All Other Days:  Usually gets into bed at (Patient-Rptd) 7/8pm   Takes patient about (Patient-Rptd) 2-5 hours to fall asleep  Patient is usually up at (Patient-Rptd) 6am- 1pm  Uses alarm: (Patient-Rptd) No    Sleep Need  Patient gets  (Patient-Rptd) Not a lot. 4hours on average. Maybe sleep on average   Patient thinks she needs about (Patient-Rptd) 8-10 hours per night sleep    Caprice Kline prefers to sleep in this position(s): (Patient-Rptd) Side;Head Elevated   Patient states they do the following activities in bed: (Patient-Rptd) Read;Eat;Watch TV;Use phone, computer, or tablet    Naps  Patient takes a purposeful nap (Patient-Rptd) 7 days if possible and naps are usually (Patient-Rptd) 2-3 hours in duration - often taken around 2-3PM, can be for pain management more than sleepiness   She feels better after a nap: (Patient-Rptd) No  She dozes off unintentionally (Patient-Rptd) 5-7 days per week   Patient has had a driving accident or near-miss due to sleepiness/drowsiness: (Patient-Rptd) No  She had a total [place] Sleepiness Scale of  "(Patient-Rptd) 9 (03/17/25 8300), with scores of 10 or higher indicating abnormal levels of sleepiness.     SLEEP DISRUPTIONS:    Breathing/Snoring  Patient snores:(Patient-Rptd) Yes for the past few months \"so loud\" per boyfriend, can wake herself up from her snoring   Other people complain about her snoring: (Patient-Rptd) Yes  Patient has been told she stops breathing in her sleep:(Patient-Rptd) No   Gasping/choking: No   She has issues with the following: (Patient-Rptd) Morning headaches;Morning mouth dryness;Stuffy nose when you wake up  Denies frequent nocturia, nocturnal GERD    Morphine prescribed TID but majority of the time taking once daily either with her 9-10pm meds or in middle of night if pain is interfering with sleep      Movement:  Patient gets pain, discomfort, with an urge to move:  (Patient-Rptd) Yes - \"I am in pain pretty much always sometimes I can't get up if I am in so much pain and right now it's my hip and leg on the right side\"  It happens when she is resting:  (Patient-Rptd) Yes  It happens more at night:  (Patient-Rptd) No  Patient has been told she kicks her legs at night:  (Patient-Rptd) Yes - per boyfriend \"I kick him I guess I don't know I do it but I don't think it is every night\"     Behaviors in Sleep:  Caprice Kline has experienced the following behaviors while sleeping: (Patient-Rptd) Recurring Nightmares - on prazosin \"for a long time like 4 years\", very helpful     Sleepwalking- appears intermittent over the years, \"this past week I have been again and I did it in the past when I broke my foot too and it took me longer to heal\". Other night woke up in bathroom, has fallen over into her bed before and woke up putting makeup on her face in middle of night, woke up in her office sleeping on her keyboard    Eating- 2-3 years ago microwaved food in middle of night and left it in microwave, has woken up in bed eating a muffin and ice cream   Sleep-talking    Kicking or punching - " no punchng but kicking as above, not clear dream enactment but maybe PLMS?    Night terrors (screaming,yelling or acting afraid but not recalling event)    Denies sleepwalking, sleep talking, sleep eating, bruxism, dream enactment, recurring nightmares, night terrors, sleep paralysis, hypnagogic/hypnopompic hallucinations, cataplexy    Unrelated to sleepwalking, has fainted in middle of night and hit her head/face, woke up with bloody nose, oversedated?    Denies sleep paralysis, hypnagogic/hypnopompic hallucinations, cataplexy    Is there anything else you would like your sleep provider to know: (Patient-Rptd) Breathing is extremely difficult laying down, sitting or standing. I sleep walked all night the other night, jeanine fainted 2 times during night and hit head really hard on hard floor and other time in my own puke     CAFFEINE AND OTHER SUBSTANCES:    Patient consumes caffeinated beverages per day:  (Patient-Rptd) 0-1  Last caffeine use is usually: (Patient-Rptd) 12pm  List of any prescribed or over the counter stimulants that patient takes: (Patient-Rptd) Adderall  List of any prescribed or over the counter sleep medication patient takes: (Patient-Rptd) Quetiapine, doxepin, melatonin, prazosin  List of previous sleep medications that patient has tried: (Patient-Rptd) There are plenty but i can t remember  Patient drinks alcohol to help them sleep: (Patient-Rptd) No  Patient drinks alcohol near bedtime: (Patient-Rptd) No    Family History:  Patient has a family member been diagnosed with a sleep disorder: (Patient-Rptd) No      SCALES:    EPWORTH SLEEPINESS SCALE         3/17/2025     5:55 PM    [place] Sleepiness Scale ( RIVERA Day  [id number]<br>ESS - [place]/English - Final version - 21 Nov 07 - [facility] [employer].)   Sitting and reading Slight chance of dozing   Watching TV Moderate chance of dozing   Sitting, inactive in a public place (e.g. a theatre or a meeting) Slight chance of dozing   As a passenger  in a car for an hour without a break Would never doze   Lying down to rest in the afternoon when circumstances permit High chance of dozing   Sitting and talking to someone Would never doze   Sitting quietly after a lunch without alcohol Moderate chance of dozing   In a car, while stopped for a few minutes in traffic Would never doze   [place] Score (MC) 9   [place] Score (Sleep) 9        Patient-reported         INSOMNIA SEVERITY INDEX (SHENA)          3/17/2025     5:12 PM   Insomnia Severity Index (SHENA)   Difficulty falling asleep 3   Difficulty staying asleep 3   Problems waking up too early 3   How SATISFIED/DISSATISFIED are you with your CURRENT sleep pattern? 4   How NOTICEABLE to others do you think your sleep problem is in terms of impairing the quality of your life? 2   How WORRIED/DISTRESSED are you about your current sleep problem? 4   To what extent do you consider your sleep problem to INTERFERE with your daily functioning (e.g. daytime fatigue, mood, ability to function at work/daily chores, concentration, memory, mood, etc.) CURRENTLY? 4   SHENA Total Score 23        Patient-reported         Guidelines for Scoring/Interpretation:  Total score categories:  0-7 = No clinically significant insomnia   8-14 = Subthreshold insomnia   15-21 = Clinical insomnia (moderate severity)  22-28 = Clinical insomnia (severe)  Used via courtesy of www.OraHealthth.va.gov with permission from Richard Reeves PhD., [facility] [place], [place]    GAD7        1/29/2025     1:29 PM   AKI-7    1. Feeling nervous, anxious, or on edge 3   2. Not being able to stop or control worrying 3   3. Worrying too much about different things 3   4. Trouble relaxing 3   5. Being so restless that it is hard to sit still 2   6. Becoming easily annoyed or irritable 3   7. Feeling afraid, as if something awful might happen 2   AKI-7 Total Score 19    If you checked any problems, how difficult have they made it for you to do your work, take care of things at  "home, or get along with other people? Extremely difficult       Patient-reported       CAGE-AID        10/21/2024     6:03 PM   CAGE-AID Flowsheet   Have you ever felt you should Cut down on your drinking or drug use? 0   Have people Annoyed you by criticizing your drinking or drug use? 0   Have you ever felt bad or Guilty about your drinking or drug use? 0   Have you ever had a drink or used drugs first thing in the morning to steady your nerves or to get rid of a hangover? (Eye opener) 0   CAGE-AID SCORE 0   Have you ever felt you should Cut down on your drinking or drug use? No   Have people Annoyed you by criticizing your drinking or drug use? No   Have you ever felt bad or Guilty about your drinking or drug use? No   Have you ever had a drink or used drugs first thing in the morning to steady your nerves or to get rid of a hangover? (Eye opener) No   CAGE-AID SCORE 0 (A total score of 2 or greater is considered clinically significant)       CAGE-AID reprinted with permission from the [place] Medical Journal, KATINA Johnson. and ROBERTA Guerrero, \"Conjoint screening questionnaires for alcohol and drug abuse\" [place] Medical Journal 94: 135-140, 1995.      PATIENT HEALTH QUESTIONNAIRE-9 (PHQ - 9)        1/29/2025     1:22 PM   PHQ-9 ([employer])   1.  Little interest or pleasure in doing things 3   2.  Feeling down, depressed, or hopeless 3   3.  Trouble falling or staying asleep, or sleeping too much 3   4.  Feeling tired or having little energy 3   5.  Poor appetite or overeating 1   6.  Feeling bad about yourself - or that you are a failure or have let yourself or your family down 1   7.  Trouble concentrating on things, such as reading the newspaper or watching television 3   8.  Moving or speaking so slowly that other people could have noticed. Or the opposite - being so fidgety or restless that you have been moving around a lot more than usual 1   9.  Thoughts that you would be better off dead, or of hurting yourself " "in some way 0   PHQ-9 Total Score 18    6.  Feeling bad about yourself 1   7.  Trouble concentrating 3   8.  Moving slowly or restless 1   9.  Suicidal or self-harm thoughts 0   1.  Little interest or pleasure in doing things Nearly every day   2.  Feeling down, depressed, or hopeless Nearly every day   3.  Trouble falling or staying asleep, or sleeping too much Nearly every day   4.  Feeling tired or having little energy Nearly every day   5.  Poor appetite or overeating Several days   6.  Feeling bad about yourself Several days   7.  Trouble concentrating Nearly every day   8.  Moving slowly or restless Several days   9.  Suicidal or self-harm thoughts Not at all   PHQ-9 via [facility] TOTAL SCORE-----> 18 (Moderately severe depression)   Difficulty at work, home, or with people Extremely difficult       Patient-reported         Developed by Amado Tenorio, Crystal Brown, Tod Sims and colleagues, with an educational nichelle from [employer]. No permission required to reproduce, translate, display or distribute.        Allergies:    Allergies   Allergen Reactions    Metronidazole Anaphylaxis and Hives     Other reaction(s): Throat swelling  Throat swelling 6 hrs after exposure  Itching and hives 2 hrs after exposure    Shellfish Allergy Anxiety, Diarrhea, Difficulty breathing, Hives, Itching, Rash, Shortness Of Breath and Swelling    Fish-Derived Products      Stopped fish oil and less stomach discomfort  Rash after eating fish.     Hydrocodone-Acetaminophen Other (See Comments)    Morphine And Codeine Headache    Sertraline Other (See Comments)     Patient was foggy and \"high\" feeling    Shellfish-Derived Products      Lips get numb, throat gets scratchy, rashes       Medications:    Current Outpatient Medications   Medication Sig Dispense Refill    albuterol (PROAIR HFA/PROVENTIL HFA/VENTOLIN HFA) 108 (90 Base) MCG/ACT inhaler Inhale 2 puffs into the lungs every 6 hours as needed for shortness of " breath, wheezing or cough 18 g 0    amphetamine-dextroamphetamine (ADDERALL XR) 30 MG 24 hr capsule Take 30 mg by mouth daily as needed.      clobetasol (TEMOVATE) 0.05 % external solution APPLY TOPICALLY TO THE AFFECTED AREA TWICE DAILY FOR UP TO 21 DAYS. DO NOT APPLY TO NORMAL SKIN      clonazePAM (KLONOPIN) 0.5 MG tablet Take 0.5 mg by mouth daily as needed for anxiety.      diazepam (VALIUM) 10 MG tablet Place tablet vaginally as needed for urinary retention or pain (Patient not taking: Reported on 3/11/2025) 15 tablet 0    docusate sodium (COLACE) 100 MG capsule Take 200 mg by mouth 2 times daily.      doxepin (SINEQUAN) 10 MG capsule Take 10 mg by mouth At Bedtime      EPINEPHrine (ANY BX GENERIC EQUIV) 0.3 MG/0.3ML injection 2-pack INJECT 0.3 ML INTO THE MUSCLE AS NEEDED FOR ANAPHYLAXIS 2 each 1    Fremanezumab-vfrm (AJOVY) 225 MG/1.5ML SOAJ Inject 225 mg subcutaneously every 30 days. 1.5 mL 11    glucosamine-chondroitin 500-400 MG CAPS per capsule Take 1 capsule by mouth daily.      hydrOXYzine (VISTARIL) 50 MG capsule TAKE 1 TO 2 CAPSULES BY MOUTH THREE TIMES DAILY      ipratropium-albuterol (COMBIVENT RESPIMAT)  MCG/ACT inhaler Inhale 1 puff into the lungs 4 times daily as needed for shortness of breath, wheezing or cough 4 g 0    ketoconazole (NIZORAL) 2 % external cream Apply topically. 3 times every week      lamoTRIgine (LAMICTAL) 100 MG tablet Take 100 mg by mouth 2 times daily      lithium (ESKALITH) 600 MG capsule Take 600 mg by mouth at bedtime.      magnesium oxide (MAG-OX) 400 MG tablet Take 400 mg by mouth daily.      morphine (MSIR) 15 MG IR tablet Take 15 mg by mouth 3 times daily as needed for pain.      ondansetron (ZOFRAN ODT) 4 MG ODT tab DISSOLVE 1 TABLET(4 MG) ON THE TONGUE EVERY 8 HOURS AS NEEDED FOR NAUSEA 90 tablet 0    prazosin (MINIPRESS) 2 MG capsule Take 2 mg by mouth 2 times daily. And 5mg at bedtime      pregabalin (LYRICA) 100 MG capsule TAKE 1 CAPSULE BY MOUTH EVERY  MORNING IN ADDITION  MG 3 TIMES DAILY      pregabalin (LYRICA) 150 MG capsule Take 150 mg by mouth 3 times daily      propranolol (INDERAL) 20 MG tablet Take 1 tablet (20 mg) by mouth 2 times daily as needed (tremor). 180 tablet 2    propranolol ER (INDERAL LA) 80 MG 24 hr capsule Take 1 capsule (80 mg) by mouth daily. 90 capsule 1    QUEtiapine (SEROQUEL) 300 MG tablet Take 300 mg by mouth At Bedtime      RETIN-A 0.025 % external cream APPLY SPARINGLY TO FACE EVERY OTHER NIGHT FOR 14 NIGHTS THEN NIGHTLY THEREAFTER      semaglutide-weight management (WEGOVY) 0.25 MG/0.5ML pen Inject 0.5 mLs (0.25 mg) subcutaneously once a week. (Patient not taking: Reported on 3/11/2025) 2 mL 0    Semaglutide-Weight Management (WEGOVY) 0.5 MG/0.5ML pen Inject 0.5 mg subcutaneously once a week. (Patient not taking: Reported on 3/11/2025) 2 mL 0    Semaglutide-Weight Management (WEGOVY) 1 MG/0.5ML pen Inject 1 mg subcutaneously once a week. (Patient not taking: Reported on 3/11/2025) 2 mL 0    Semaglutide-Weight Management (WEGOVY) 1 MG/0.5ML pen Inject 1 mg subcutaneously once a week. (Patient not taking: Reported on 3/11/2025) 2 mL 0    Semaglutide-Weight Management (WEGOVY) 1.7 MG/0.75ML pen Inject 1.7 mg subcutaneously once a week. (Patient not taking: Reported on 3/11/2025) 3 mL 0    spironolactone (ALDACTONE) 100 MG tablet Take 100 mg by mouth daily.      tamsulosin (FLOMAX) 0.4 MG capsule Take 1 capsule (0.4 mg) by mouth every evening. 30 capsule 11    triamcinolone (KENALOG) 0.1 % external cream Apply topically 2 times daily as needed.      ubrogepant (UBRELVY) 100 MG tablet Take 1 tablet (100 mg) by mouth at onset of headache. 8 tablet 11    valACYclovir (VALTREX) 1000 mg tablet Take 1 tablet (1,000 mg) by mouth 2 times daily for 10 days. 20 tablet 0    WEGOVY 0.5 MG/0.5ML pen INJECT THE CONTENTS OF 1 AUTOINJECTOR PEN UNDER THE SKIN ONCE WEEKLY (Patient not taking: Reported on 3/11/2025) 2 mL 0       Problem  "List:  Patient Active Problem List    Diagnosis Date Noted    Fibromyalgia 01/29/2025     Priority: Medium    Genital herpes 11/03/2024     Priority: Medium    Left-sided low back pain with left-sided sciatica 03/08/2023     Priority: Medium    FH: colon cancer 06/22/2022     Priority: Medium     Will need colonoscopy at 34 years old.       Anaphylactic shock due to shellfish, initial encounter 06/22/2022     Priority: Medium    Bipolar II disorder (H) 04/28/2022     Priority: Medium    Borderline personality disorder in adult (H) 04/28/2022     Priority: Medium    Severe episode of recurrent major depressive disorder, without psychotic features (H) 10/15/2021     Priority: Medium    Lumbar radiculopathy 07/19/2021     Priority: Medium     Added automatically from request for surgery [id number]      Suicide attempt (H) 11/01/2011     Priority: Medium     Last 2/16/19 with intentional benadryl overdose          Past Medical/Surgical History:  Past Medical History:   Diagnosis Date    Bipolar disorder (H)     Complication of anesthesia     \"freaking out when I wake up\"    Depressive disorder 2011     Past Surgical History:   Procedure Laterality Date    DISCECTOMY LUMBAR POSTERIOR MICROSCOPIC ONE LEVEL Left 08/24/2021    Procedure: Minimally invasive left Lumbar 5 to Sacral 1 microdiscectomy  ;  Surgeon: Destin Junior MD;  Location: [facility] OR    GYN SURGERY Right     cystectomy - with salpingectomy    HEAD & NECK SURGERY      tonsillectomy    HEAD & NECK SURGERY      wisdom teeth extraction       Social History:  Social History     Socioeconomic History    Marital status: Single     Spouse name: Not on file    Number of children: Not on file    Years of education: Not on file    Highest education level: Not on file   Occupational History    Not on file   Tobacco Use    Smoking status: Former     Current packs/day: 0.00     Types: Cigarettes, Vaping Device     Passive exposure: Current    Smokeless tobacco: Never "   Vaping Use    Vaping status: Every Day    Substances: Nicotine    Devices: Refillable tank   Substance and Sexual Activity    Alcohol use: Not Currently     Comment: 2 drinks every other day     Drug use: Not Currently    Sexual activity: Yes     Partners: Female, Male     Birth control/protection: None   Other Topics Concern    Parent/sibling w/ CABG, MI or angioplasty before 65F 55M? No   Social History Narrative    Not on file     Social Drivers of Health     Financial Resource Strain: Low Risk  (12/9/2024)    Financial Resource Strain     Within the past 12 months, have you or your family members you live with been unable to get utilities (heat, electricity) when it was really needed?: No   Food Insecurity: Unknown (12/9/2024)    Food Insecurity     Within the past 12 months, did you worry that your food would run out before you got money to buy more?: Patient declined     Within the past 12 months, did the food you bought just not last and you didn t have money to get more?: Patient declined   Transportation Needs: Unknown (12/9/2024)    Transportation Needs     Within the past 12 months, has lack of transportation kept you from medical appointments, getting your medicines, non-medical meetings or appointments, work, or from getting things that you need?: Patient declined   Physical Activity: Inactive (12/9/2024)    Exercise Vital Sign     Days of Exercise per Week: 0 days     Minutes of Exercise per Session: 0 min   Stress: Stress Concern Present (12/9/2024)    [place] [employer] - Occupational Stress Questionnaire     Feeling of Stress : Very much   Social Connections: Unknown (12/9/2024)    Social Connection and Isolation Panel [NHANES]     Frequency of Communication with Friends and Family: Not on file     Frequency of Social Gatherings with Friends and Family: Once a week     Attends Alevism Services: Not on file     Active Member of Clubs or Organizations: Not on file     Attends  Club or Organization Meetings: Not on file     Marital Status: Not on file   Interpersonal Safety: Not At Risk (3/18/2025)    Received from [facility]     Humiliation, Afraid, Rape, and Kick questionnaire     Fear of Current or Ex-Partner: No     Emotionally Abused: No     Physically Abused: No     Sexually Abused: No   Housing Stability: Low Risk  (12/9/2024)    Housing Stability     Do you have housing? : Yes     Are you worried about losing your housing?: No       Family History:  Family History   Problem Relation Age of Onset    Hypertension Mother     Colon Polyps Mother 50    Substance Abuse Father     Colon Cancer Father 60    Diabetes Maternal Grandmother     Hypertension Maternal Grandmother     Colon Cancer Maternal Grandmother     Diabetes Paternal Grandmother        Review of Systems:  In the last TWO WEEKS have you experienced any of the following symptoms?  Fevers: (Patient-Rptd) No  Night Sweats: (Patient-Rptd) Yes  Weight Gain: (Patient-Rptd) Yes  Pain at Night: (Patient-Rptd) Yes  Double Vision: (Patient-Rptd) Yes  Changes in Vision: (Patient-Rptd) Yes  Difficulty Breathing through Nose: (Patient-Rptd) Yes  Sore Throat in Morning: (Patient-Rptd) Yes  Dry Mouth in the Morning: (Patient-Rptd) Yes  Shortness of Breath Lying Flat: (Patient-Rptd) Yes  Shortness of Breath With Activity: (Patient-Rptd) Yes  Awakening with Shortness of Breath: (Patient-Rptd) Yes  Increased Cough: (Patient-Rptd) No  Heart Racing at Night: (Patient-Rptd) Yes  Swelling in Feet or Legs: (Patient-Rptd) Yes  Diarrhea at Night: (Patient-Rptd) Yes  Heartburn at Night: (Patient-Rptd) No  Urinating More than Once at Night: (Patient-Rptd) No  Losing Control of Urine at Night: (Patient-Rptd) No  Joint Pains at Night: (Patient-Rptd) Yes  Headaches in Morning: (Patient-Rptd) Yes  Weakness in Arms or Legs: (Patient-Rptd) Yes  Depressed Mood: (Patient-Rptd) Yes  Anxiety: (Patient-Rptd) Yes     Physical Examination:  Vitals: BP  "111/73   Pulse 69   Ht 1.753 m (5' 9\")   Wt 101.2 kg (223 lb)   SpO2 95%   BMI 32.93 kg/m    BMI= Body mass index is 32.93 kg/m .  General appearance: Awake, alert, cooperative. Well groomed. Sitting comfortably in chair. In no apparent distress.  HEENT: Head: Normocephalic, atraumatic. Eyes: PERRL. Conjunctiva clear. Sclera normal. Nose: External appearance normal.   Neck: Neck Cir (cm): 41 cm (3/20/2025 12:00 PM)  Skin:  No rashes or significant lesions.   Neurologic: Alert, oriented x3. No focal neurological deficit. Gait normal.   Psychiatric: Mood euthymic. Affect congruent with full range and intensity.         Data: All pertinent previous laboratory data reviewed     Recent Labs   Lab Test 06/20/24  1716 08/16/23  1546    142   POTASSIUM 3.9 3.8   CHLORIDE 102 108*   CO2 25 21*   ANIONGAP 12 13   GLC 90 88   BUN 16.4 9.2   CR 0.92 0.85   REMA 9.7 9.8       Recent Labs   Lab Test 06/20/24  1716   WBC 5.6   RBC 4.99   HGB 12.7   HCT 39.5   MCV 79   MCH 25.5*   MCHC 32.2   RDW 14.5          Recent Labs   Lab Test 06/20/24  1716   PROTTOTAL 7.0   ALBUMIN 5.0   BILITOTAL 0.3   ALKPHOS 38*   AST 19   ALT 22       TSH   Date Value   01/07/2025 3.06 uIU/mL   12/04/2024 4.87 uIU/mL (H)   03/01/2022 1.06 mU/L       No results found for: \"UAMP\", \"UBARB\", \"BENZODIAZEUR\", \"UCANN\", \"UCOC\", \"OPIT\", \"UPCP\"    Iron Sat Index   Date/Time Value Ref Range Status   02/07/2023 08:21 AM 25 15 - 46 % Final       No results found for: \"PH\", \"PHARTERIAL\", \"PO2\", \"CY2OFSYUGSJ\", \"SAT\", \"PCO2\", \"HCO3\", \"BASEEXCESS\", \"URSZULA\", \"BEB\"    @LABRCNTIPR(phv:4,pco2v:4,po2v:4,hco3v:4,nikolay:4,o2per:4)@    Echocardiology: No results found for this or any previous visit (from the past 4320 hours).    Chest x-ray: No results found for this or any previous visit from the past 365 days.      Chest CT: No results found for this or any previous visit from the past 365 days.      PFT: Most Recent [facility] Pulmonary Function Testing    FVC-Pred " "  Date Value Ref Range Status   03/20/2025 4.14 L      FVC-Pre   Date Value Ref Range Status   03/20/2025 4.00 L      FVC-%Pred-Pre   Date Value Ref Range Status   03/20/2025 96 %      FEV1-Pre   Date Value Ref Range Status   03/20/2025 3.28 L      FEV1-%Pred-Pre   Date Value Ref Range Status   03/20/2025 93 %      FEV1FVC-Pred   Date Value Ref Range Status   03/20/2025 85 %      FEV1FVC-Pre   Date Value Ref Range Status   03/20/2025 82 %      No results found for: \"20029\"  FEFMax-Pred   Date Value Ref Range Status   03/20/2025 7.80 L/sec      FEFMax-Pre   Date Value Ref Range Status   03/20/2025 6.80 L/sec      FEFMax-%Pred-Pre   Date Value Ref Range Status   03/20/2025 87 %      ExpTime-Pre   Date Value Ref Range Status   03/20/2025 4.81 sec      FIFMax-Pre   Date Value Ref Range Status   03/20/2025 5.96 L/sec      FEV1FEV6-Pred   Date Value Ref Range Status   03/20/2025 85 %      FEV1FEV6-Pre   Date Value Ref Range Status   03/20/2025 82 %      No results found for: \"20055\"      Rhona Laird PA-C 3/20/2025     [facility]  [address] [address]     [facility]  [address] [address]    Chart documentation was completed, in part, with [employer] voice-recognition software. Even though reviewed, some grammatical, spelling, and word errors may remain.    68 minutes spent on day of encounter reviewing medical records, history and physical examination, review of previous testing and interpretation, documentation and further activities as noted above        "

## 2025-03-20 NOTE — DISCHARGE INSTRUCTIONS
Thank you for completing pulmonary function testing today.  All results will be scanned into your epic results for your doctor to review.  Please resume taking all your current prescribed medications and diet as directed by your provider.   If you have not heard from your provider about your testing within two weeks and do not have a follow-up appointment scheduled with them please contact your provider about any questions you have concerning your testing.   Thank you  [facility]monary Function [facility]

## 2025-03-24 ENCOUNTER — ANCILLARY PROCEDURE (OUTPATIENT)
Dept: CT IMAGING | Facility: CLINIC | Age: 30
End: 2025-03-24
Attending: PHYSICIAN ASSISTANT
Payer: COMMERCIAL

## 2025-03-24 DIAGNOSIS — R06.02 SOB (SHORTNESS OF BREATH): ICD-10-CM

## 2025-03-24 PROCEDURE — 71260 CT THORAX DX C+: CPT | Mod: TC | Performed by: RADIOLOGY

## 2025-03-24 RX ORDER — IOPAMIDOL 755 MG/ML
100 INJECTION, SOLUTION INTRAVASCULAR ONCE
Status: COMPLETED | OUTPATIENT
Start: 2025-03-24 | End: 2025-03-24

## 2025-03-24 RX ADMIN — IOPAMIDOL 100 ML: 755 INJECTION, SOLUTION INTRAVASCULAR at 13:39

## 2025-03-26 ENCOUNTER — TRANSFERRED RECORDS (OUTPATIENT)
Dept: HEALTH INFORMATION MANAGEMENT | Facility: CLINIC | Age: 30
End: 2025-03-26
Payer: COMMERCIAL

## 2025-03-30 ENCOUNTER — HEALTH MAINTENANCE LETTER (OUTPATIENT)
Age: 30
End: 2025-03-30

## 2025-03-31 ENCOUNTER — TRANSFERRED RECORDS (OUTPATIENT)
Dept: HEALTH INFORMATION MANAGEMENT | Facility: CLINIC | Age: 30
End: 2025-03-31
Payer: COMMERCIAL

## 2025-04-01 ENCOUNTER — MYC MEDICAL ADVICE (OUTPATIENT)
Dept: SLEEP MEDICINE | Facility: CLINIC | Age: 30
End: 2025-04-01
Payer: COMMERCIAL

## 2025-04-02 LAB — SLPCOMP: NORMAL

## 2025-04-14 DIAGNOSIS — R33.9 URINARY RETENTION: ICD-10-CM

## 2025-04-14 DIAGNOSIS — R10.2 PELVIC PAIN IN FEMALE: ICD-10-CM

## 2025-04-15 RX ORDER — DIAZEPAM 10 MG/1
TABLET ORAL
Qty: 15 TABLET | Refills: 0 | Status: SHIPPED | OUTPATIENT
Start: 2025-04-15

## 2025-04-15 NOTE — TELEPHONE ENCOUNTER
Last Written Prescription:  diazepam (VALIUM) 10 MG tablet 15 tablet 0 3/10/2025     ----------------------  Last Visit Date: 3/10/25  Future Visit Date: 5/19/25  ----------------------    Refill decision: Medication unable to be refilled by RN due to: Controlled medication    Isis Miller RN  [facility] Triage & Med Refill Team       Request from pharmacy:  Requested Prescriptions   Pending Prescriptions Disp Refills    diazepam (VALIUM) 10 MG tablet [Pharmacy Med Name: DIAZEPAM 10MG TABLETS] 15 tablet      Sig: PLACE 1 TABLET VAGINALLY AS NEEDED FOR URINARY RETENTION OR FOR PAIN       Rx Protocol Controlled Substance Failed - 4/15/2025  8:44 AM        Failed - Medication is active on med list and the sig matches        Failed - Urine drug screeen results on file in past 12 months     [unfilled]           Failed - Controlled Substance Agreement on file in last 12 months     Please review last Controlled Substance Pain agreement document.   CSA -- Encounter Level:    CSA: None found at the encounter level.       CSA -- Patient Level:    CSA: None found at the patient level.               Failed - Auto Fail - Please forward to Provider        Failed - No Opioids on active med list        Passed - Visit with relevant provider in past 3 months or upcoming 3 months        Passed - Medication not refilled in past 28 days     Invalid Medication Grouper          Passed - No active pregnancy on record        Passed - No pregnancy test in past 12 months or most recent test was negative

## 2025-04-18 ENCOUNTER — TELEPHONE (OUTPATIENT)
Dept: NEUROLOGY | Facility: CLINIC | Age: 30
End: 2025-04-18
Payer: MEDICAID

## 2025-04-18 NOTE — TELEPHONE ENCOUNTER
Prior Authorization Retail Medication Request    Medication/Dose: ubrogepant (UBRELVY) 100 MG tablet   Diagnosis and ICD code (if different than what is on RX):  Intractable chronic migraine without aura and without status migrainosus [G43.719]    New/renewal/insurance change PA/secondary ins. PA:  Previously Tried and Failed:  See chart  Rationale:  See chart    Insurance   Primary: Medicaid [place]  Insurance ID:  [id number]     Secondary (if applicable):NA  Insurance ID:  NA    Pharmacy Information (if different than what is on RX)  Name:  [facility] in [place]  Phone:  [phone]   Fax:[phone]     Clinic Information  Preferred routing pool for dept communication: [phone]

## 2025-04-21 ENCOUNTER — TELEPHONE (OUTPATIENT)
Dept: NEUROLOGY | Facility: CLINIC | Age: 30
End: 2025-04-21
Payer: MEDICAID

## 2025-04-21 NOTE — TELEPHONE ENCOUNTER
Prior Authorization Retail Medication Request    Medication/Dose: Ajovy  Diagnosis and ICD code (if different than what is on RX):  Intractable chronic migraine without aura and without status migrainosus [G43.719]   New/renewal/insurance change PA/secondary ins. PA:  Previously Tried and Failed:    Rationale:      Insurance   Primary:   Insurance ID:      Secondary (if applicable):  Insurance ID:      Pharmacy Information (if different than what is on RX)  Name:  [id number] [place] [address] AT [facility]   Phone:  [phone]   Fax:[phone]     Clinic Information  Preferred routing pool for dept communication: [id number]

## 2025-04-22 ENCOUNTER — OFFICE VISIT (OUTPATIENT)
Dept: FAMILY MEDICINE | Facility: OTHER | Age: 30
End: 2025-04-22
Payer: MEDICAID

## 2025-04-22 ENCOUNTER — NURSE TRIAGE (OUTPATIENT)
Dept: NURSING | Facility: CLINIC | Age: 30
End: 2025-04-22

## 2025-04-22 ENCOUNTER — HOSPITAL ENCOUNTER (EMERGENCY)
Facility: CLINIC | Age: 30
Discharge: HOME | End: 2025-04-23
Attending: FAMILY MEDICINE | Admitting: FAMILY MEDICINE
Payer: MEDICAID

## 2025-04-22 VITALS
WEIGHT: 236.5 LBS | OXYGEN SATURATION: 96 % | TEMPERATURE: 98 F | RESPIRATION RATE: 18 BRPM | HEIGHT: 68 IN | BODY MASS INDEX: 35.84 KG/M2 | SYSTOLIC BLOOD PRESSURE: 126 MMHG | HEART RATE: 84 BPM | DIASTOLIC BLOOD PRESSURE: 70 MMHG

## 2025-04-22 DIAGNOSIS — G89.29 CHRONIC LEFT-SIDED LOW BACK PAIN WITH LEFT-SIDED SCIATICA: ICD-10-CM

## 2025-04-22 DIAGNOSIS — M62.81 MUSCLE WEAKNESS (GENERALIZED): ICD-10-CM

## 2025-04-22 DIAGNOSIS — G89.29 CHRONIC HIP PAIN, UNSPECIFIED LATERALITY: ICD-10-CM

## 2025-04-22 DIAGNOSIS — R60.0 PERIPHERAL EDEMA: ICD-10-CM

## 2025-04-22 DIAGNOSIS — Z01.818 PREOP GENERAL PHYSICAL EXAM: Primary | ICD-10-CM

## 2025-04-22 DIAGNOSIS — M25.559 CHRONIC HIP PAIN, UNSPECIFIED LATERALITY: ICD-10-CM

## 2025-04-22 DIAGNOSIS — M79.7 FIBROMYALGIA: ICD-10-CM

## 2025-04-22 DIAGNOSIS — F31.81 BIPOLAR II DISORDER (H): ICD-10-CM

## 2025-04-22 DIAGNOSIS — M54.42 CHRONIC LEFT-SIDED LOW BACK PAIN WITH LEFT-SIDED SCIATICA: ICD-10-CM

## 2025-04-22 DIAGNOSIS — M62.82 NON-TRAUMATIC RHABDOMYOLYSIS: ICD-10-CM

## 2025-04-22 LAB
ALBUMIN SERPL BCG-MCNC: 4.4 G/DL (ref 3.5–5.2)
ALP SERPL-CCNC: 72 U/L (ref 40–150)
ALT SERPL W P-5'-P-CCNC: 22 U/L (ref 0–50)
ANION GAP SERPL CALCULATED.3IONS-SCNC: 11 MMOL/L (ref 7–15)
AST SERPL W P-5'-P-CCNC: 37 U/L (ref 0–45)
BILIRUB SERPL-MCNC: <0.2 MG/DL
BUN SERPL-MCNC: 22.5 MG/DL (ref 6–20)
CALCIUM SERPL-MCNC: 9.7 MG/DL (ref 8.8–10.4)
CHLORIDE SERPL-SCNC: 103 MMOL/L (ref 98–107)
CK SERPL-CCNC: 1307 U/L (ref 26–192)
CREAT SERPL-MCNC: 0.89 MG/DL (ref 0.51–0.95)
EGFRCR SERPLBLD CKD-EPI 2021: 89 ML/MIN/1.73M2
GLUCOSE SERPL-MCNC: 108 MG/DL (ref 70–99)
HCO3 SERPL-SCNC: 25 MMOL/L (ref 22–29)
HGB BLD-MCNC: 12.6 G/DL (ref 11.7–15.7)
LDH SERPL L TO P-CCNC: 195 U/L (ref 0–250)
POTASSIUM SERPL-SCNC: 4.5 MMOL/L (ref 3.4–5.3)
PROT SERPL-MCNC: 6.6 G/DL (ref 6.4–8.3)
SODIUM SERPL-SCNC: 139 MMOL/L (ref 135–145)

## 2025-04-22 PROCEDURE — 3078F DIAST BP <80 MM HG: CPT | Performed by: PHYSICIAN ASSISTANT

## 2025-04-22 PROCEDURE — 36415 COLL VENOUS BLD VENIPUNCTURE: CPT | Performed by: PHYSICIAN ASSISTANT

## 2025-04-22 PROCEDURE — 82550 ASSAY OF CK (CPK): CPT | Performed by: PHYSICIAN ASSISTANT

## 2025-04-22 PROCEDURE — 99283 EMERGENCY DEPT VISIT LOW MDM: CPT | Performed by: FAMILY MEDICINE

## 2025-04-22 PROCEDURE — 85018 HEMOGLOBIN: CPT | Performed by: PHYSICIAN ASSISTANT

## 2025-04-22 PROCEDURE — 99214 OFFICE O/P EST MOD 30 MIN: CPT | Performed by: PHYSICIAN ASSISTANT

## 2025-04-22 PROCEDURE — 3074F SYST BP LT 130 MM HG: CPT | Performed by: PHYSICIAN ASSISTANT

## 2025-04-22 PROCEDURE — 93000 ELECTROCARDIOGRAM COMPLETE: CPT | Performed by: PHYSICIAN ASSISTANT

## 2025-04-22 PROCEDURE — 80053 COMPREHEN METABOLIC PANEL: CPT | Performed by: PHYSICIAN ASSISTANT

## 2025-04-22 PROCEDURE — 1125F AMNT PAIN NOTED PAIN PRSNT: CPT | Performed by: PHYSICIAN ASSISTANT

## 2025-04-22 PROCEDURE — 99283 EMERGENCY DEPT VISIT LOW MDM: CPT

## 2025-04-22 PROCEDURE — 83615 LACTATE (LD) (LDH) ENZYME: CPT | Performed by: PHYSICIAN ASSISTANT

## 2025-04-22 RX ORDER — CEPHALEXIN 500 MG/1
CAPSULE ORAL
Status: ON HOLD | COMMUNITY
Start: 2025-04-02 | End: 2025-04-27

## 2025-04-22 RX ORDER — LIDOCAINE HYDROCHLORIDE 20 MG/ML
JELLY TOPICAL
COMMUNITY
End: 2025-04-22

## 2025-04-22 RX ORDER — MUPIROCIN 20 MG/G
OINTMENT TOPICAL
Status: ON HOLD | COMMUNITY
End: 2025-04-27

## 2025-04-22 RX ORDER — SULFACETAMIDE SODIUM 100 MG/ML
LOTION TOPICAL
Status: ON HOLD | COMMUNITY
Start: 2025-03-21

## 2025-04-22 RX ORDER — NALOXONE HYDROCHLORIDE 4 MG/.1ML
SPRAY NASAL
Status: ON HOLD | COMMUNITY
Start: 2025-04-02

## 2025-04-22 RX ORDER — LIDOCAINE 50 MG/G
1 PATCH TOPICAL EVERY 24 HOURS
COMMUNITY
End: 2025-04-22

## 2025-04-22 RX ORDER — PRAZOSIN HYDROCHLORIDE 5 MG/1
5 CAPSULE ORAL AT BEDTIME
Status: ON HOLD | COMMUNITY
Start: 2025-02-14

## 2025-04-22 RX ORDER — FUROSEMIDE 20 MG/1
20 TABLET ORAL DAILY
Qty: 90 TABLET | Refills: 0 | Status: ON HOLD | OUTPATIENT
Start: 2025-04-22

## 2025-04-22 RX ORDER — AZELAIC ACID 0.15 G/G
GEL TOPICAL 2 TIMES DAILY PRN
Status: ON HOLD | COMMUNITY
Start: 2025-03-21

## 2025-04-22 ASSESSMENT — COLUMBIA-SUICIDE SEVERITY RATING SCALE - C-SSRS
1. IN THE PAST MONTH, HAVE YOU WISHED YOU WERE DEAD OR WISHED YOU COULD GO TO SLEEP AND NOT WAKE UP?: NO
2. HAVE YOU ACTUALLY HAD ANY THOUGHTS OF KILLING YOURSELF IN THE PAST MONTH?: NO
6. HAVE YOU EVER DONE ANYTHING, STARTED TO DO ANYTHING, OR PREPARED TO DO ANYTHING TO END YOUR LIFE?: YES

## 2025-04-22 ASSESSMENT — PAIN SCALES - GENERAL: PAINLEVEL_OUTOF10: SEVERE PAIN (7)

## 2025-04-22 NOTE — Clinical Note
Trev Goldman.  Obviously I'm sure you're aware Caprice is a complex patient.  She is seeing you soon, it seems like on the MRIs there were some concerns for muscle inflammation but it seems like from what I can find Rheumatology is evaluating for Myositis.  Not sure if you have further recommendations, wondering if she needs EMGs and even possible muscle biopsy.  Sounds like she does see Rheumatologist next month for follow-up as well. I saw her for a preop today for her pain pump. If you feel there are other things I should be doing from a primary care stand point please let me know.   Thanks Junior Roe PA-C

## 2025-04-22 NOTE — PATIENT INSTRUCTIONS
How to Take Your Medication Before Surgery  Preoperative Medication Instructions   Antiplatelet or Anticoagulation Medication Instructions   - We reviewed the medication list and the patient is not on an antiplatelet or anticoagulation medications.    Additional Medication Instructions  Take all scheduled medications on the day of surgery EXCEPT for modifications listed below:   - Herbal medications and vitamins: DO NOT TAKE 14 days prior to surgery.   - Diuretics (furosemide, hydrochlorothiazide, chlorothalidone): DO NOT TAKE on the day of surgery.   - GLP-1 Injectable (exenitide, liraglutide, semaglutide, dulaglutide, etc.): DO NOT TAKE 4 weeks before surgery per patient report of surgeon recommendation.     - NSAIDs (Advil, Motrin): DO NOT TAKE 7-14 days before surgery   - Clonazepam: HOLD for 2 days prior to procedure per guidance of her surgeon.    - Stimulants: HOLD day of procedure       Patient Education   Preparing for Your Surgery  For Adults  Getting started  In most cases, a nurse will call to review your health history and instructions. They will give you an arrival time based on your scheduled surgery time. Please be ready to share:  Your doctor's clinic name and phone number  Your medical, surgical, and anesthesia history  A list of allergies and sensitivities  A list of medicines, including herbal treatments and over-the-counter drugs  Whether the patient has a legal guardian (ask how to send us the papers in advance)  Note: You may not receive a call if you were seen at our PAC (Preoperative Assessment Center).  Please tell us if you're pregnant--or if there's any chance you might be pregnant. Some surgeries may injure a fetus (unborn baby), so they require a pregnancy test. Surgeries that are safe for a fetus don't always need a test, and you can choose whether to have one.   Preparing for surgery  Within 10 to 30 days of surgery: Have a pre-op exam (sometimes called an H&P, or History and  Physical). This can be done at a clinic or pre-operative center.  If you're having a , you may not need this exam. Talk to your care team.  At your pre-op exam, talk to your care team about all medicines you take. (This includes CBD oil and any drugs, such as THC, marijuana, and other forms of cannabis.) If you need to stop any medicine before surgery, ask when to start taking it again.  This is for your safety. Many medicines and drugs can make you bleed too much during surgery. Some change how well surgery (anesthesia) drugs work.  Call your insurance company to let them know you're having surgery. (If you don't have insurance, call [phone].)  Call your clinic if there's any change in your health. This includes a scrape or scratch near the surgery site, or any signs of a cold (sore throat, runny nose, cough, rash, fever).  Eating and drinking guidelines  For your safety: Unless your surgeon tells you otherwise, follow the guidelines below.  Eat and drink as normal until 8 hours before you arrive for surgery. After that, no food or milk. You can spit out gum when you arrive.  Drink clear liquids until 2 hours before you arrive. These are liquids you can see through, like water, Gatorade, and Propel Water. They also include plain black coffee and tea (no cream or milk).  No alcohol for 24 hours before you arrive. The night before surgery, stop any drinks that contain THC.  If your care team tells you to take medicine on the morning of surgery, it's okay to take it with a sip of water. No other medicines or drugs are allowed (including CBD oil)--follow your care team's instructions.  If you have questions the day of surgery, call your hospital or surgery center.   Preventing infection  Shower or bathe the night before and the morning of surgery. Follow the instructions your clinic gave you. (If no instructions, use regular soap.)  Don't shave or clip hair near your surgery site. We'll remove the hair if  needed.  Don't smoke or vape the morning of surgery. No chewing tobacco for 6 hours before you arrive. A nicotine patch is okay. You may spit out nicotine gum when you arrive.  For some surgeries, the surgeon will tell you to fully quit smoking and nicotine.  We will make every effort to keep you safe from infection. We will:  Clean our hands often with soap and water (or an alcohol-based hand rub).  Clean the skin at your surgery site with a special soap that kills germs.  Give you a special gown to keep you warm. (Cold raises the risk of infection.)  Wear hair covers, masks, gowns, and gloves during surgery.  Give antibiotic medicine, if prescribed. Not all surgeries need this medicine.  What to bring on the day of surgery  Photo ID and insurance card  Copy of your health care directive, if you have one  Glasses and hearing aids (bring cases)  You can't wear contacts during surgery  Inhaler and eye drops, if you use them (tell us about these when you arrive)  CPAP machine or breathing device, if you use them  A few personal items, if spending the night  If you have . . .  A pacemaker, ICD (cardiac defibrillator), or other implant: Bring the ID card.  An implanted stimulator: Bring the remote control.  A legal guardian: Bring a copy of the certified (court-stamped) guardianship papers.  Please remove any jewelry, including body piercings. Leave jewelry and other valuables at home.  If you're going home the day of surgery  You must have a responsible adult drive you home. They should stay with you overnight as well.  If you don't have someone to stay with you, and you aren't safe to go home alone, we may keep you overnight. Insurance often won't pay for this.  After surgery  If it's hard to control your pain or you need more pain medicine, please call your surgeon's office.  Questions?   If you have any questions for your care team, list them here:    ____________________________________________________________________________________________________________________________________________________________________________________________________________________________________________________________  For informational purposes only. Not to replace the advice of your health care provider. Copyright   2003, 2019 [facility]. All rights reserved. Clinically reviewed by Taqueria Ordonez MD. [employer] [id number] - REV 08/24.

## 2025-04-22 NOTE — PROGRESS NOTES
Preoperative Evaluation  [facility]  [address]  [facility] [address]  Phone: [phone]  Fax: [phone]  Primary Provider: Junior Roe PA-C  Pre-op Performing Provider: Junior Roe PA-C  Apr 22, 2025   {Provider  Link to PREOP SmartSet  REQUIRED to apply standard patient instructions and medication directions to the AVS :734142}  {ROOMER review and update patient entered surgical information if needed :[id number]}        4/17/2025   Surgical Information   What procedure is being done? Pain pump implant   Facility or Hospital where procedure/surgery will be performed: [facility] [place]   Who is doing the procedure / surgery? Dr. Hamlin   Date of surgery / procedure: May 6, 2025   Time of surgery / procedure: 8am?   Where do you plan to recover after surgery? at home with family     Fax number for surgical facility: [phone]    Assessment & Plan     The proposed surgical procedure is considered INTERMEDIATE risk.    Preop general physical exam  - Hemoglobin; Future  - Hemoglobin    Chronic left-sided low back pain with left-sided sciatica  Chronic hip pain, unspecified laterality  Fibromyalgia  Continues to see Pain management.  She is having this surgery to try and improve her current pain management. She is still maintained on Morphine 15 mg TID and is trying to reduce dosing with use of Tizanidine per the instructions of her [facility] providers.    - Hemoglobin; Future  - Comprehensive metabolic panel (BMP + Alb, Alk Phos, ALT, AST, Total. Bili, TP); Future  - EKG 12-lead complete w/read - Clinics  - Hemoglobin  - Comprehensive metabolic panel (BMP + Alb, Alk Phos, ALT, AST, Total. Bili, TP)    Bipolar II disorder (H)  No major changes.      Muscle weakness (generalized)  Elevated CK  This has been an on going issue. She has been seen by two Rheumatologists, Neurology, and Neurosurgery with no concrete findings.  There is inflammation present on the  MRI of the hip.  She has had normal muscle breakdown/myositis evaluation with Rheumatology without any obvious findings. They did discuss possible muscle biopsy and what sounds like possible EMG.  Recommended she continue to follow-up with them and we will reach out to her neurologist.  Repeating the CK and LDH today.  Will also obtain CMP to evaluate liver enzymes and electrolytes with the edema.   04/23/25: Her CK level came back elevated, LDH normal, liver and kidney function is normal.  Question if this is related to her inactivity and mainly sitting/laying all day due to her pain or if this is a myositis.  She has been seen by two rheumatologists and both times her labs were normal for muscle concerns but it sounds like there have been discussions for muscle biopsy due to the MRI findings. Will have her see her Rheumatologist or at least talk with them ASAP about the findings, we have also reached out to her Neurologist but she does see them coming up this week so they can decide if they also need to do more testing.    - CK total; Future  - Lactate Dehydrogenase; Future  - CK total  - Lactate Dehydrogenase    Peripheral edema  This has been going on now for a couple of months. She has no concerning cardiac features. She has issues with shortness of breath with activity that seems within reason because she has been very sedentary due to her increased pain. Also suspect that this is part of the cause of her peripheral edema. Weight is increased from last month which could be due to retained fluid. Recommended US to evaluate for varicosities which could be making this worse. Recommended elevation when at home but again limited with her positioning secondary to back and hip pain. Encouraged to continue with use of her compression stockings.  Will have her start furosemide once daily for a week and then update provider and get BMP repeated. If it is helping we'll continue her on this and then hold the Furosemide  day of her procedure.   - US Venous Competency Bilateral; Future    Risks and Recommendations  The patient has the following additional risks and recommendations for perioperative complications:   - History of urinary retention  Social and Substance:    - Patient is taking medications for chronic pain    Preoperative Medication Instructions  How to Take Your Medication Before Surgery  Preoperative Medication Instructions   Antiplatelet or Anticoagulation Medication Instructions   - We reviewed the medication list and the patient is not on an antiplatelet or anticoagulation medications.    Additional Medication Instructions  Take all scheduled medications on the day of surgery EXCEPT for modifications listed below:   - Herbal medications and vitamins: DO NOT TAKE 14 days prior to surgery.   - Diuretics (furosemide, hydrochlorothiazide, chlorothalidone): DO NOT TAKE on the day of surgery.   - GLP-1 Injectable (exenitide, liraglutide, semaglutide, dulaglutide, etc.): DO NOT TAKE 4 weeks before surgery per patient report of surgeon recommendation.     - NSAIDs (Advil, Motrin): DO NOT TAKE 7-14 days before surgery   - Clonazepam: HOLD for 2 days prior to procedure per guidance of her surgeon.    - Stimulants: HOLD day of procedure       Recommendation  {IMPORTANT - Approval:[id number]:}        Hector Vasques is a 29 year old, presenting for the following:  Pre-Op Exam          4/22/2025    10:34 AM   Additional Questions   Roomed by LILIA Ferraro     HPI: Has had issues with severe back and hip pain for several years that has escalated with time. She has had multiple procedures/injections without improvement. She has been on several medications in attempts to control pain without relief.            4/17/2025   Pre-Op Questionnaire   Have you ever had a heart attack or stroke? No   Have you ever had surgery on your heart or blood vessels, such as a stent placement, a coronary artery bypass, or surgery on an artery in your  head, neck, heart, or legs? No   Do you have chest pain with activity? (!) UNKNOWN - She walked into clinic without any issues, lack of activity recently due to pain and so having some shortness of breath with activity.    Do you have a history of heart failure? No   Do you currently have a cold, bronchitis or symptoms of other infection? No   Do you have a cough, shortness of breath, or wheezing? (!) YES See above. No major escalations in symptoms.    Do you or anyone in your family have previous history of blood clots? No   Do you or does anyone in your family have a serious bleeding problem such as prolonged bleeding following surgeries or cuts? No   Have you ever had problems with anemia or been told to take iron pills? No   Have you had any abnormal blood loss such as black, tarry or bloody stools, or abnormal vaginal bleeding? No   Have you ever had a blood transfusion? No   Are you willing to have a blood transfusion if it is medically needed before, during, or after your surgery? Yes   Have you or any of your relatives ever had problems with anesthesia? No   Do you have sleep apnea, excessive snoring or daytime drowsiness? (!) YES   Do you have a CPAP machine? (!) NO   Do you have any artifical heart valves or other implanted medical devices like a pacemaker, defibrillator, or continuous glucose monitor? No   Do you have artificial joints? No   Are you allergic to latex? No     Advance Care Planning  {The storyboard will display whether the patient has ACP docs on file. Hover over the Code section in the storyboard to access the ACP documents. :[id number]}  Discussed advance care planning with patient; informed AVS has link to [facility] [employer].    Preoperative Review of    reviewed - controlled substances reflected in medication list.      Status of Chronic Conditions:  See problem list for active medical problems.  Problems all longstanding and stable, except as noted/documented.  See ROS for pertinent  "symptoms related to these conditions.    Patient Active Problem List    Diagnosis Date Noted    Fibromyalgia 01/29/2025     Priority: Medium    Genital herpes 11/03/2024     Priority: Medium    Left-sided low back pain with left-sided sciatica 03/08/2023     Priority: Medium    FH: colon cancer 06/22/2022     Priority: Medium     Will need colonoscopy at 34 years old.       Anaphylactic shock due to shellfish, initial encounter 06/22/2022     Priority: Medium    Bipolar II disorder (H) 04/28/2022     Priority: Medium    Borderline personality disorder in adult (H) 04/28/2022     Priority: Medium    Severe episode of recurrent major depressive disorder, without psychotic features (H) 10/15/2021     Priority: Medium    Lumbar radiculopathy 07/19/2021     Priority: Medium     Added automatically from request for surgery [id number]      Suicide attempt (H) 11/01/2011     Priority: Medium     Last 2/16/19 with intentional benadryl overdose        Past Medical History:   Diagnosis Date    Bipolar disorder (H)     Complication of anesthesia     \"freaking out when I wake up\"    Depressive disorder 2011     Past Surgical History:   Procedure Laterality Date    DISCECTOMY LUMBAR POSTERIOR MICROSCOPIC ONE LEVEL Left 08/24/2021    Procedure: Minimally invasive left Lumbar 5 to Sacral 1 microdiscectomy  ;  Surgeon: Destin Junior MD;  Location: [facility] OR    GYN SURGERY Right     cystectomy - with salpingectomy    HEAD & NECK SURGERY      tonsillectomy    HEAD & NECK SURGERY      wisdom teeth extraction     Current Outpatient Medications   Medication Sig Dispense Refill    albuterol (PROAIR HFA/PROVENTIL HFA/VENTOLIN HFA) 108 (90 Base) MCG/ACT inhaler Inhale 2 puffs into the lungs every 6 hours as needed for shortness of breath, wheezing or cough 18 g 0    amphetamine-dextroamphetamine (ADDERALL XR) 30 MG 24 hr capsule Take 30 mg by mouth daily as needed.      azelaic acid (FINACIA) 15 % external gel Apply topically.      cephALEXin " (KEFLEX) 500 MG capsule TAKE 1 CAPSULE BY MOUTH EVERY 6 HOURS STARTING THE DAY BEFORE SURGERY      clobetasol (TEMOVATE) 0.05 % external solution APPLY TOPICALLY TO THE AFFECTED AREA TWICE DAILY FOR UP TO 21 DAYS. DO NOT APPLY TO NORMAL SKIN      clonazePAM (KLONOPIN) 0.5 MG tablet Take 0.5 mg by mouth daily as needed for anxiety.      diazepam (VALIUM) 10 MG tablet PLACE 1 TABLET VAGINALLY AS NEEDED FOR URINARY RETENTION OR FOR PAIN 15 tablet 0    docusate sodium (COLACE) 100 MG capsule Take 200 mg by mouth 2 times daily.      doxepin (SINEQUAN) 10 MG capsule Take 10 mg by mouth At Bedtime      EPINEPHrine (ANY BX GENERIC EQUIV) 0.3 MG/0.3ML injection 2-pack INJECT 0.3 ML INTO THE MUSCLE AS NEEDED FOR ANAPHYLAXIS 2 each 1    glucosamine-chondroitin 500-400 MG CAPS per capsule Take 1 capsule by mouth daily.      hydrOXYzine (VISTARIL) 50 MG capsule TAKE 1 TO 2 CAPSULES BY MOUTH THREE TIMES DAILY      ipratropium-albuterol (COMBIVENT RESPIMAT)  MCG/ACT inhaler Inhale 1 puff into the lungs 4 times daily as needed for shortness of breath, wheezing or cough 4 g 0    ketoconazole (NIZORAL) 2 % external cream Apply topically. 3 times every week      lamoTRIgine (LAMICTAL) 100 MG tablet Take 100 mg by mouth 2 times daily      lithium (ESKALITH) 600 MG capsule Take 600 mg by mouth at bedtime.      magnesium oxide (MAG-OX) 400 MG tablet Take 400 mg by mouth daily.      morphine (MSIR) 15 MG IR tablet Take 15 mg by mouth 3 times daily as needed for pain.      mupirocin (BACTROBAN) 2 % external ointment APPLY SMALL AMOUNT EXTERNALLY TWICE DAILY TO BILATERAL UPPER BUTTOCK NEAR THE BELT LINE UNTIL DAY OF SURGERY      NARCAN 4 MG/0.1ML nasal spray CALL 911. SPR CONTENTS OF ONE SPRAYER (0.1ML) INTO ONE NOSTRIL. REPEAT IN 2-3 MIN IF SYMPTOMS OF OPIOID EMERGENCY PERSIST, ALTERNATE NOSTRILS      ondansetron (ZOFRAN ODT) 4 MG ODT tab DISSOLVE 1 TABLET(4 MG) ON THE TONGUE EVERY 8 HOURS AS NEEDED FOR NAUSEA 90 tablet 0    prazosin  (MINIPRESS) 2 MG capsule Take 2 mg by mouth 2 times daily. And 5mg at bedtime      prazosin (MINIPRESS) 5 MG capsule Take 5 mg by mouth at bedtime.      pregabalin (LYRICA) 100 MG capsule TAKE 1 CAPSULE BY MOUTH EVERY MORNING IN ADDITION  MG 3 TIMES DAILY      pregabalin (LYRICA) 150 MG capsule Take 150 mg by mouth 3 times daily      propranolol (INDERAL) 20 MG tablet Take 1 tablet (20 mg) by mouth 2 times daily as needed (tremor). 180 tablet 2    propranolol ER (INDERAL LA) 80 MG 24 hr capsule Take 1 capsule (80 mg) by mouth daily. 90 capsule 1    QUEtiapine (SEROQUEL) 300 MG tablet Take 300 mg by mouth At Bedtime      RETIN-A 0.025 % external cream APPLY SPARINGLY TO FACE EVERY OTHER NIGHT FOR 14 NIGHTS THEN NIGHTLY THEREAFTER      sulfacetamide sodium, Acne, 10 % lotion APPLY IT TO THE FACE ONCE DAILY IN THE MORNING X3 MONTHS      tamsulosin (FLOMAX) 0.4 MG capsule Take 1 capsule (0.4 mg) by mouth every evening. 30 capsule 11    tiZANidine (ZANAFLEX) 4 MG tablet TAKE 1 TABLET BY MOUTH EVERY 8 TO 12 HOURS AS NEEDED. NOT TO EXCEED 3 DOSES IN 24 HOURS      triamcinolone (KENALOG) 0.1 % external cream Apply topically 2 times daily as needed.      ubrogepant (UBRELVY) 100 MG tablet Take 1 tablet (100 mg) by mouth at onset of headache. 8 tablet 11    Fremanezumab-vfrm (AJOVY) 225 MG/1.5ML SOAJ Inject 225 mg subcutaneously every 30 days. (Patient not taking: Reported on 4/22/2025) 1.5 mL 11    lidocaine (LIDODERM) 5 % patch Place 1 patch onto the skin every 24 hours. (Patient not taking: Reported on 4/22/2025)      Lidocaine HCl Urethral/Mucosal 2 % external gel  (Patient not taking: Reported on 4/22/2025)      semaglutide-weight management (WEGOVY) 0.25 MG/0.5ML pen Inject 0.5 mLs (0.25 mg) subcutaneously once a week. (Patient not taking: Reported on 4/22/2025) 2 mL 0    Semaglutide-Weight Management (WEGOVY) 0.5 MG/0.5ML pen Inject 0.5 mg subcutaneously once a week. (Patient not taking: Reported on 4/22/2025) 2  "mL 0    Semaglutide-Weight Management (WEGOVY) 1 MG/0.5ML pen Inject 1 mg subcutaneously once a week. (Patient not taking: Reported on 4/22/2025) 2 mL 0    Semaglutide-Weight Management (WEGOVY) 1 MG/0.5ML pen Inject 1 mg subcutaneously once a week. (Patient not taking: Reported on 4/22/2025) 2 mL 0    Semaglutide-Weight Management (WEGOVY) 1.7 MG/0.75ML pen Inject 1.7 mg subcutaneously once a week. (Patient not taking: Reported on 4/22/2025) 3 mL 0    spironolactone (ALDACTONE) 100 MG tablet Take 100 mg by mouth daily. (Patient not taking: Reported on 4/22/2025)      valACYclovir (VALTREX) 1000 mg tablet Take 1 tablet (1,000 mg) by mouth 2 times daily for 10 days. 20 tablet 0    WEGOVY 0.5 MG/0.5ML pen INJECT THE CONTENTS OF 1 AUTOINJECTOR PEN UNDER THE SKIN ONCE WEEKLY (Patient not taking: Reported on 4/22/2025) 2 mL 0       Allergies   Allergen Reactions    Metronidazole Anaphylaxis and Hives     Other reaction(s): Throat swelling  Throat swelling 6 hrs after exposure  Itching and hives 2 hrs after exposure    Shellfish Allergy Anxiety, Diarrhea, Difficulty breathing, Hives, Itching, Rash, Shortness Of Breath and Swelling    Fish-Derived Products      Stopped fish oil and less stomach discomfort  Rash after eating fish.     Hydrocodone-Acetaminophen Other (See Comments)    Morphine And Codeine Headache    Sertraline Other (See Comments)     Patient was foggy and \"high\" feeling    Shellfish-Derived Products      Lips get numb, throat gets scratchy, rashes        Social History     Tobacco Use    Smoking status: Former     Current packs/day: 0.00     Types: Cigarettes, Vaping Device     Passive exposure: Current    Smokeless tobacco: Never   Substance Use Topics    Alcohol use: Not Currently     Comment: 2 drinks every other day      Family History   Problem Relation Age of Onset    Hypertension Mother     Colon Polyps Mother 50    Substance Abuse Father     Colon Cancer Father 60    Diabetes Maternal Grandmother  " "   Hypertension Maternal Grandmother     Colon Cancer Maternal Grandmother     Diabetes Paternal Grandmother      History   Drug Use Unknown             Review of Systems  Constitutional, HEENT, cardiovascular, pulmonary, GI, , musculoskeletal, neuro, skin, endocrine and psych systems are negative, except as otherwise noted.    Objective    /70   Pulse 84   Temp 98  F (36.7  C) (Temporal)   Resp 18   Ht 1.735 m (5' 8.31\")   Wt 107.3 kg (236 lb 8 oz)   LMP 04/17/2025 (Exact Date)   SpO2 96%   BMI 35.64 kg/m     Estimated body mass index is 35.64 kg/m  as calculated from the following:    Height as of this encounter: 1.735 m (5' 8.31\").    Weight as of this encounter: 107.3 kg (236 lb 8 oz).  Physical Exam  GENERAL: alert and no distress  EYES: Eyes grossly normal to inspection, PERRL and conjunctivae and sclerae normal  HENT: ear canals and TM's normal, nose and mouth without ulcers or lesions  NECK: no adenopathy, no asymmetry, masses, or scars  RESP: lungs clear to auscultation - no rales, rhonchi or wheezes  CV: regular rate and rhythm, normal S1 S2, no S3 or S4, no murmur, click or rub  ABDOMEN: soft, nontender, no hepatosplenomegaly, no masses and bowel sounds normal  MS: 5/5 upper extremity strength bilaterally in all muscles with the exception of 4/5 left side upper extremity strength of biceps and decreased  strength bilaterally.  5/5 lower extremity strength bilaterally in all muscle groups with the exception of 4/5 hip flexors bilaterally and left side gastrocnemius.  Patellar reflexes 2+ right, 1+ left.   SKIN: no suspicious lesions or rashes and Left foot and ankle there is 2+ pitting edema without erythema, right foot and ankle there is trace pitting edema without erythema.    NEURO: Normal strength and tone, mentation intact and speech normal  PSYCH: mentation appears normal, affect normal/bright    Recent Labs   Lab Test 06/20/24  1716   HGB 12.7         POTASSIUM 3.9 "   CR 0.92   A1C 4.7        Diagnostics  Recent Results (from the past 24 hours)   Hemoglobin    Collection Time: 04/22/25 12:13 PM   Result Value Ref Range    Hemoglobin 12.6 11.7 - 15.7 g/dL   Comprehensive metabolic panel (BMP + Alb, Alk Phos, ALT, AST, Total. Bili, TP)    Collection Time: 04/22/25 12:13 PM   Result Value Ref Range    Sodium 139 135 - 145 mmol/L    Potassium 4.5 3.4 - 5.3 mmol/L    Carbon Dioxide (CO2) 25 22 - 29 mmol/L    Anion Gap 11 7 - 15 mmol/L    Urea Nitrogen 22.5 (H) 6.0 - 20.0 mg/dL    Creatinine 0.89 0.51 - 0.95 mg/dL    GFR Estimate 89 >60 mL/min/1.73m2    Calcium 9.7 8.8 - 10.4 mg/dL    Chloride 103 98 - 107 mmol/L    Glucose 108 (H) 70 - 99 mg/dL    Alkaline Phosphatase 72 40 - 150 U/L    AST 37 0 - 45 U/L    ALT 22 0 - 50 U/L    Protein Total 6.6 6.4 - 8.3 g/dL    Albumin 4.4 3.5 - 5.2 g/dL    Bilirubin Total <0.2 <=1.2 mg/dL   CK total    Collection Time: 04/22/25 12:13 PM   Result Value Ref Range    CK 1,307 (HH) 26 - 192 U/L   Lactate Dehydrogenase    Collection Time: 04/22/25 12:13 PM   Result Value Ref Range    Lactate Dehydrogenase 195 0 - 250 U/L   Basic metabolic panel    Collection Time: 04/23/25 12:03 AM   Result Value Ref Range    Sodium 137 135 - 145 mmol/L    Potassium 4.4 3.4 - 5.3 mmol/L    Chloride 102 98 - 107 mmol/L    Carbon Dioxide (CO2) 29 22 - 29 mmol/L    Anion Gap 6 (L) 7 - 15 mmol/L    Urea Nitrogen 22.3 (H) 6.0 - 20.0 mg/dL    Creatinine 0.89 0.51 - 0.95 mg/dL    GFR Estimate 89 >60 mL/min/1.73m2    Calcium 9.6 8.8 - 10.4 mg/dL    Glucose 105 (H) 70 - 99 mg/dL   CK total    Collection Time: 04/23/25 12:03 AM   Result Value Ref Range    CK 1,120 (HH) 26 - 192 U/L   Hepatic function panel    Collection Time: 04/23/25 12:03 AM   Result Value Ref Range    Protein Total 6.3 (L) 6.4 - 8.3 g/dL    Albumin 4.0 3.5 - 5.2 g/dL    Bilirubin Total 0.2 <=1.2 mg/dL    Alkaline Phosphatase 71 40 - 150 U/L    AST 43 0 - 45 U/L    ALT 21 0 - 50 U/L    Bilirubin Direct <0.08  0.00 - 0.30 mg/dL   INR    Collection Time: 04/23/25 12:03 AM   Result Value Ref Range    INR 1.00 0.85 - 1.15   Partial thromboplastin time    Collection Time: 04/23/25 12:03 AM   Result Value Ref Range    aPTT 30 22 - 38 Seconds   CBC with platelets and differential    Collection Time: 04/23/25 12:03 AM   Result Value Ref Range    WBC Count 5.7 4.0 - 11.0 10e3/uL    RBC Count 4.84 3.80 - 5.20 10e6/uL    Hemoglobin 12.2 11.7 - 15.7 g/dL    Hematocrit 37.4 35.0 - 47.0 %    MCV 77 (L) 78 - 100 fL    MCH 25.2 (L) 26.5 - 33.0 pg    MCHC 32.6 31.5 - 36.5 g/dL    RDW 14.8 10.0 - 15.0 %    Platelet Count 251 150 - 450 10e3/uL    % Neutrophils 53 %    % Lymphocytes 31 %    % Monocytes 7 %    % Eosinophils 8 %    % Basophils 0 %    % Immature Granulocytes 1 %    Absolute Neutrophils 3.0 1.6 - 8.3 10e3/uL    Absolute Lymphocytes 1.8 0.8 - 5.3 10e3/uL    Absolute Monocytes 0.4 0.0 - 1.3 10e3/uL    Absolute Eosinophils 0.5 0.0 - 0.7 10e3/uL    Absolute Basophils 0.0 0.0 - 0.2 10e3/uL    Absolute Immature Granulocytes 0.0 <=0.4 10e3/uL      EKG: appears normal, NSR, normal axis, normal intervals, no acute ST/T changes c/w ischemia, no LVH by voltage criteria    Revised Cardiac Risk Index (RCRI)  The patient has the following serious cardiovascular risks for perioperative complications:   - No serious cardiac risks = 0 points     RCRI Interpretation: 0 points: Class I (very low risk - 0.4% complication rate)         Signed Electronically by: Junior Roe PA-C  A copy of this evaluation report is provided to the requesting physician.       {Email feedback regarding this note to [email or web address]   :[id number]}  Answers submitted by the patient for this visit:  Patient Health Questionnaire (Submitted on 4/22/2025)  If you checked off any problems, how difficult have these problems made it for you to do your work, take care of things at home, or get along with other people?: Extremely difficult  PHQ9  TOTAL SCORE: 19

## 2025-04-23 ENCOUNTER — MYC MEDICAL ADVICE (OUTPATIENT)
Dept: FAMILY MEDICINE | Facility: OTHER | Age: 30
End: 2025-04-23

## 2025-04-23 VITALS
DIASTOLIC BLOOD PRESSURE: 72 MMHG | SYSTOLIC BLOOD PRESSURE: 107 MMHG | BODY MASS INDEX: 35.25 KG/M2 | OXYGEN SATURATION: 99 % | TEMPERATURE: 98.4 F | HEIGHT: 69 IN | WEIGHT: 238 LBS | RESPIRATION RATE: 18 BRPM | HEART RATE: 71 BPM

## 2025-04-23 DIAGNOSIS — R74.8 ELEVATED CK: Primary | ICD-10-CM

## 2025-04-23 LAB
ALBUMIN SERPL BCG-MCNC: 4 G/DL (ref 3.5–5.2)
ALP SERPL-CCNC: 71 U/L (ref 40–150)
ALT SERPL W P-5'-P-CCNC: 21 U/L (ref 0–50)
ANION GAP SERPL CALCULATED.3IONS-SCNC: 6 MMOL/L (ref 7–15)
APTT PPP: 30 SECONDS (ref 22–38)
AST SERPL W P-5'-P-CCNC: 43 U/L (ref 0–45)
BASOPHILS # BLD AUTO: 0 10E3/UL (ref 0–0.2)
BASOPHILS NFR BLD AUTO: 0 %
BILIRUB DIRECT SERPL-MCNC: <0.08 MG/DL (ref 0–0.3)
BILIRUB SERPL-MCNC: 0.2 MG/DL
BUN SERPL-MCNC: 22.3 MG/DL (ref 6–20)
CALCIUM SERPL-MCNC: 9.6 MG/DL (ref 8.8–10.4)
CHLORIDE SERPL-SCNC: 102 MMOL/L (ref 98–107)
CK SERPL-CCNC: 1120 U/L (ref 26–192)
CREAT SERPL-MCNC: 0.89 MG/DL (ref 0.51–0.95)
EGFRCR SERPLBLD CKD-EPI 2021: 89 ML/MIN/1.73M2
EOSINOPHIL # BLD AUTO: 0.5 10E3/UL (ref 0–0.7)
EOSINOPHIL NFR BLD AUTO: 8 %
ERYTHROCYTE [DISTWIDTH] IN BLOOD BY AUTOMATED COUNT: 14.8 % (ref 10–15)
GLUCOSE SERPL-MCNC: 105 MG/DL (ref 70–99)
HCO3 SERPL-SCNC: 29 MMOL/L (ref 22–29)
HCT VFR BLD AUTO: 37.4 % (ref 35–47)
HGB BLD-MCNC: 12.2 G/DL (ref 11.7–15.7)
IMM GRANULOCYTES # BLD: 0 10E3/UL
IMM GRANULOCYTES NFR BLD: 1 %
INR PPP: 1 (ref 0.85–1.15)
LYMPHOCYTES # BLD AUTO: 1.8 10E3/UL (ref 0.8–5.3)
LYMPHOCYTES NFR BLD AUTO: 31 %
MCH RBC QN AUTO: 25.2 PG (ref 26.5–33)
MCHC RBC AUTO-ENTMCNC: 32.6 G/DL (ref 31.5–36.5)
MCV RBC AUTO: 77 FL (ref 78–100)
MONOCYTES # BLD AUTO: 0.4 10E3/UL (ref 0–1.3)
MONOCYTES NFR BLD AUTO: 7 %
NEUTROPHILS # BLD AUTO: 3 10E3/UL (ref 1.6–8.3)
NEUTROPHILS NFR BLD AUTO: 53 %
PLATELET # BLD AUTO: 251 10E3/UL (ref 150–450)
POTASSIUM SERPL-SCNC: 4.4 MMOL/L (ref 3.4–5.3)
PROT SERPL-MCNC: 6.3 G/DL (ref 6.4–8.3)
RBC # BLD AUTO: 4.84 10E6/UL (ref 3.8–5.2)
SODIUM SERPL-SCNC: 137 MMOL/L (ref 135–145)
WBC # BLD AUTO: 5.7 10E3/UL (ref 4–11)

## 2025-04-23 PROCEDURE — 82248 BILIRUBIN DIRECT: CPT | Performed by: FAMILY MEDICINE

## 2025-04-23 PROCEDURE — 85610 PROTHROMBIN TIME: CPT | Performed by: FAMILY MEDICINE

## 2025-04-23 PROCEDURE — 82550 ASSAY OF CK (CPK): CPT | Performed by: FAMILY MEDICINE

## 2025-04-23 PROCEDURE — 36415 COLL VENOUS BLD VENIPUNCTURE: CPT | Performed by: FAMILY MEDICINE

## 2025-04-23 PROCEDURE — 96360 HYDRATION IV INFUSION INIT: CPT

## 2025-04-23 PROCEDURE — 82310 ASSAY OF CALCIUM: CPT | Performed by: FAMILY MEDICINE

## 2025-04-23 PROCEDURE — 258N000003 HC RX IP 258 OP 636: Performed by: FAMILY MEDICINE

## 2025-04-23 PROCEDURE — 85025 COMPLETE CBC W/AUTO DIFF WBC: CPT | Performed by: FAMILY MEDICINE

## 2025-04-23 PROCEDURE — 85730 THROMBOPLASTIN TIME PARTIAL: CPT | Performed by: FAMILY MEDICINE

## 2025-04-23 PROCEDURE — 80048 BASIC METABOLIC PNL TOTAL CA: CPT | Performed by: FAMILY MEDICINE

## 2025-04-23 RX ADMIN — SODIUM CHLORIDE 1000 ML: 9 INJECTION, SOLUTION INTRAVENOUS at 00:04

## 2025-04-23 ASSESSMENT — ACTIVITIES OF DAILY LIVING (ADL): ADLS_ACUITY_SCORE: 41

## 2025-04-23 NOTE — TELEPHONE ENCOUNTER
Yue with [facility] is calling.    DATE/TIME OF CALL RECEIVED FROM LAB:  04/22/25 at 9:32 PM   LAB TEST:  CK Total  LAB VALUE:  1307  PROVIDER NOTIFIED?: Yes  PROVIDER NAME: Chiara Garcia    DATE/TIME LAB VALUE REPORTED TO PROVIDER: 4/22/2025 9:41 pm  MECHANISM OF PROVIDER NOTIFICATION: Page  PROVIDER RESPONSE:   Send results to pcp/clinic.  [facility]NA sent results to Junior Arnold.

## 2025-04-23 NOTE — TELEPHONE ENCOUNTER
Patient calling to see if CK levels can be ordered through neurology. If not please let us know. Thank you.    [facility]         Caprice Kline,     We attempted to call you but were unable to reach you by phone. Your provider, Junior Roe, has reviewed the notes and labs following your clinic and then emergency room visit yesterday. She has the following recommendations for you:     Follow up with rheumatology today to update them - request repeat CK labs and/or any other testing they may recommend  Be sure rheumatology or neurology will repeat your CK lab. If they are unable to order, please reach back out to us so Junior can get this rechecked for you  Update your surgical team regarding your elevated CK and that you should potentially postpone your pain stimulator procedure to determine what is going on with the muscles first and to know if the current issues are from inactivity or potentially other issues going on.      Please call us to discuss further.      Thank you,      [facility]        Jaqueline Osorio [phone]   [place] [phone]  [place] [phone]

## 2025-04-23 NOTE — TELEPHONE ENCOUNTER
Attempt #1 to call.     RN has attempted to contact this patient by phone to return their call, but there is no response. RN left voicemail and requested return call to [facility] at [phone]    JORDAN McdowellN, RN

## 2025-04-23 NOTE — ED PROVIDER NOTES
"  History     Chief Complaint   Patient presents with    Leg Swelling    Abnormal Labs     HPI  Caprice Kline is a 29 year old female who presents with concerns that labs drawn earlier were abnormal.  Patient had preop labs drawn and while the labs, CK level came back elevated at over 1300.  This was drawn because patient's been having some unexplained leg swelling and muscle pain that is been going on for a while but has been worse in the last few months.  Patient seen a rheumatologist and it sounds like they do not have any good answers on what is going on.  Patient states that there is nothing different about the pain necessarily here tonight, she was just concerned when she got a [employer] alert that her CK level was elevated and googled what that meant.  Patient does have muscle pain kind of in her thighs and calf but is nothing necessarily different today.  Patient does have swelling in her left leg.  She has had an ultrasound recently which was negative for DVT.  Patient denies any chest pain or shortness of breath.  Patient denies any recent medication changes, states that she has been urinating normally.  She has been eating and drinking normal amounts of fluids.  Patient has not had a muscle biopsy yet.    Allergies:  Allergies   Allergen Reactions    Metronidazole Anaphylaxis and Hives     Other reaction(s): Throat swelling  Throat swelling 6 hrs after exposure  Itching and hives 2 hrs after exposure    Shellfish Allergy Anxiety, Diarrhea, Difficulty breathing, Hives, Itching, Rash, Shortness Of Breath and Swelling    Codeine Headache    Fish-Derived Products      Stopped fish oil and less stomach discomfort  Rash after eating fish.     Hydrocodone-Acetaminophen Other (See Comments)    Sertraline Other (See Comments)     Patient was foggy and \"high\" feeling    Shellfish-Derived Products      Lips get numb, throat gets scratchy, rashes       Problem List:    Patient Active Problem List    Diagnosis Date " Noted    Fibromyalgia 01/29/2025     Priority: Medium    Genital herpes 11/03/2024     Priority: Medium    Left-sided low back pain with left-sided sciatica 03/08/2023     Priority: Medium    FH: colon cancer 06/22/2022     Priority: Medium     Will need colonoscopy at 34 years old.       Anaphylactic shock due to shellfish, initial encounter 06/22/2022     Priority: Medium    Bipolar II disorder (H) 04/28/2022     Priority: Medium    Borderline personality disorder in adult (H) 04/28/2022     Priority: Medium    Severe episode of recurrent major depressive disorder, without psychotic features (H) 10/15/2021     Priority: Medium    Lumbar radiculopathy 07/19/2021     Priority: Medium     Added automatically from request for surgery [id number]      Suicide attempt (H) 11/01/2011     Priority: Medium     Last 2/16/19 with intentional benadryl overdose          Past Medical History:    Past Medical History:   Diagnosis Date    Bipolar disorder (H)     Complication of anesthesia     Depressive disorder 2011       Past Surgical History:    Past Surgical History:   Procedure Laterality Date    DISCECTOMY LUMBAR POSTERIOR MICROSCOPIC ONE LEVEL Left 08/24/2021    Procedure: Minimally invasive left Lumbar 5 to Sacral 1 microdiscectomy  ;  Surgeon: Destin Junior MD;  Location: [facility] OR    GYN SURGERY Right     cystectomy - with salpingectomy    HEAD & NECK SURGERY      tonsillectomy    HEAD & NECK SURGERY      wisdom teeth extraction       Family History:    Family History   Problem Relation Age of Onset    Hypertension Mother     Colon Polyps Mother 50    Substance Abuse Father     Colon Cancer Father 60    Diabetes Maternal Grandmother     Hypertension Maternal Grandmother     Colon Cancer Maternal Grandmother     Diabetes Paternal Grandmother        Social History:  Marital Status:  Single [1]  Social History     Tobacco Use    Smoking status: Former     Current packs/day: 0.00     Types: Cigarettes, Vaping Device      Passive exposure: Current    Smokeless tobacco: Never   Vaping Use    Vaping status: Some Days    Substances: Nicotine    Devices: VoiceObjects   Substance Use Topics    Alcohol use: Not Currently     Comment: 2 drinks every other day     Drug use: Not Currently        Medications:    albuterol (PROAIR HFA/PROVENTIL HFA/VENTOLIN HFA) 108 (90 Base) MCG/ACT inhaler  amphetamine-dextroamphetamine (ADDERALL XR) 30 MG 24 hr capsule  azelaic acid (FINACIA) 15 % external gel  cephALEXin (KEFLEX) 500 MG capsule  clobetasol (TEMOVATE) 0.05 % external solution  clonazePAM (KLONOPIN) 0.5 MG tablet  diazepam (VALIUM) 10 MG tablet  docusate sodium (COLACE) 100 MG capsule  doxepin (SINEQUAN) 10 MG capsule  EPINEPHrine (ANY BX GENERIC EQUIV) 0.3 MG/0.3ML injection 2-pack  Fremanezumab-vfrm (AJOVY) 225 MG/1.5ML SOAJ  furosemide (LASIX) 20 MG tablet  glucosamine-chondroitin 500-400 MG CAPS per capsule  hydrOXYzine (VISTARIL) 50 MG capsule  ipratropium-albuterol (COMBIVENT RESPIMAT)  MCG/ACT inhaler  ketoconazole (NIZORAL) 2 % external cream  lamoTRIgine (LAMICTAL) 100 MG tablet  lithium (ESKALITH) 600 MG capsule  magnesium oxide (MAG-OX) 400 MG tablet  morphine (MSIR) 15 MG IR tablet  mupirocin (BACTROBAN) 2 % external ointment  NARCAN 4 MG/0.1ML nasal spray  ondansetron (ZOFRAN ODT) 4 MG ODT tab  prazosin (MINIPRESS) 2 MG capsule  prazosin (MINIPRESS) 5 MG capsule  pregabalin (LYRICA) 100 MG capsule  pregabalin (LYRICA) 150 MG capsule  propranolol (INDERAL) 20 MG tablet  propranolol ER (INDERAL LA) 80 MG 24 hr capsule  QUEtiapine (SEROQUEL) 300 MG tablet  RETIN-A 0.025 % external cream  Semaglutide-Weight Management (WEGOVY) 0.5 MG/0.5ML pen  sulfacetamide sodium, Acne, 10 % lotion  tamsulosin (FLOMAX) 0.4 MG capsule  tiZANidine (ZANAFLEX) 4 MG tablet  triamcinolone (KENALOG) 0.1 % external cream  ubrogepant (UBRELVY) 100 MG tablet  valACYclovir (VALTREX) 1000 mg tablet          Review of Systems   All other systems  "reviewed and are negative.      Physical Exam   BP: (!) 130/93  Pulse: 75  Temp: 98.4  F (36.9  C)  Resp: 18  Height: 175.3 cm (5' 9\")  Weight: 108 kg (238 lb)  SpO2: 99 %      Physical Exam  Vitals and nursing note reviewed.   Constitutional:       General: She is not in acute distress.     Appearance: She is well-developed. She is not diaphoretic.   HENT:      Head: Normocephalic and atraumatic.      Nose: Nose normal.      Mouth/Throat:      Pharynx: No oropharyngeal exudate.   Eyes:      Conjunctiva/sclera: Conjunctivae normal.   Cardiovascular:      Rate and Rhythm: Normal rate and regular rhythm.      Pulses:           Dorsalis pedis pulses are 1+ on the left side.      Heart sounds: Normal heart sounds. No murmur heard.     No friction rub.   Pulmonary:      Effort: Pulmonary effort is normal. No respiratory distress.      Breath sounds: Normal breath sounds. No stridor. No wheezing or rales.   Abdominal:      General: Bowel sounds are normal. There is no distension.      Palpations: Abdomen is soft. There is no mass.      Tenderness: There is no abdominal tenderness. There is no guarding.   Musculoskeletal:         General: No tenderness. Normal range of motion.      Cervical back: Normal range of motion and neck supple.      Left lower leg: Edema present.   Skin:     General: Skin is warm and dry.      Capillary Refill: Capillary refill takes less than 2 seconds.      Findings: No erythema.   Neurological:      Mental Status: She is alert and oriented to person, place, and time.   Psychiatric:         Judgment: Judgment normal.         ED Course        Procedures        Results for orders placed or performed during the hospital encounter of 04/22/25 (from the past 24 hours)   CBC with platelets differential    Narrative    The following orders were created for panel order CBC with platelets differential.  Procedure                               Abnormality         Status                     ---------        "                        -----------         ------                     CBC with platelets and ...[[id number]]  Abnormal            Final result                 Please view results for these tests on the individual orders.   Basic metabolic panel   Result Value Ref Range    Sodium 137 135 - 145 mmol/L    Potassium 4.4 3.4 - 5.3 mmol/L    Chloride 102 98 - 107 mmol/L    Carbon Dioxide (CO2) 29 22 - 29 mmol/L    Anion Gap 6 (L) 7 - 15 mmol/L    Urea Nitrogen 22.3 (H) 6.0 - 20.0 mg/dL    Creatinine 0.89 0.51 - 0.95 mg/dL    GFR Estimate 89 >60 mL/min/1.73m2    Calcium 9.6 8.8 - 10.4 mg/dL    Glucose 105 (H) 70 - 99 mg/dL   CK total   Result Value Ref Range    CK 1,120 (HH) 26 - 192 U/L   Hepatic function panel   Result Value Ref Range    Protein Total 6.3 (L) 6.4 - 8.3 g/dL    Albumin 4.0 3.5 - 5.2 g/dL    Bilirubin Total 0.2 <=1.2 mg/dL    Alkaline Phosphatase 71 40 - 150 U/L    AST 43 0 - 45 U/L    ALT 21 0 - 50 U/L    Bilirubin Direct <0.08 0.00 - 0.30 mg/dL   INR   Result Value Ref Range    INR 1.00 0.85 - 1.15   Partial thromboplastin time   Result Value Ref Range    aPTT 30 22 - 38 Seconds   CBC with platelets and differential   Result Value Ref Range    WBC Count 5.7 4.0 - 11.0 10e3/uL    RBC Count 4.84 3.80 - 5.20 10e6/uL    Hemoglobin 12.2 11.7 - 15.7 g/dL    Hematocrit 37.4 35.0 - 47.0 %    MCV 77 (L) 78 - 100 fL    MCH 25.2 (L) 26.5 - 33.0 pg    MCHC 32.6 31.5 - 36.5 g/dL    RDW 14.8 10.0 - 15.0 %    Platelet Count 251 150 - 450 10e3/uL    % Neutrophils 53 %    % Lymphocytes 31 %    % Monocytes 7 %    % Eosinophils 8 %    % Basophils 0 %    % Immature Granulocytes 1 %    Absolute Neutrophils 3.0 1.6 - 8.3 10e3/uL    Absolute Lymphocytes 1.8 0.8 - 5.3 10e3/uL    Absolute Monocytes 0.4 0.0 - 1.3 10e3/uL    Absolute Eosinophils 0.5 0.0 - 0.7 10e3/uL    Absolute Basophils 0.0 0.0 - 0.2 10e3/uL    Absolute Immature Granulocytes 0.0 <=0.4 10e3/uL       Medications   sodium chloride 0.9% BOLUS 1,000 mL (1,000 mLs  Intravenous $New Bag 4/23/25 0004)     Patient's labs have come back and CK level has decreased slightly from earlier.  I did check other labs for workup for rhabdomyolysis and did not see any other significant abnormalities.  With patient's levels under 5000, I do not see any indication to admit the patient for fluid hydration and there is no signs of a kidney injury or strain.  I think patient needs further workup as an outpatient and will likely need a muscle biopsy which her rheumatologist had been talking about recently.  I recommend that the patient reach out to her rheumatologist in the morning to discuss getting this set up as soon as possible.  Would also recommend following up with patient's primary care doctor to have the CK level rechecked again in another 2 days to make sure it still trending down.  Patient will continue to push fluids, looking over her medications I do not see anything that could also be contributing to this so I did not recommend any medication changes.  Patient will be discharged at this time with strict return precautions.    Assessments & Plan (with Medical Decision Making)  Nontraumatic rhabdomyolysis     I have reviewed the nursing notes.    I have reviewed the findings, diagnosis, plan and need for follow up with the patient.        4/22/2025   [facility] EMERGENCY DEPT       Judah Vieira MD  04/23/25 0120

## 2025-04-23 NOTE — TELEPHONE ENCOUNTER
Prior Authorization Approval    Medication: FREMANEZUMAB-VFRM 225 MG/1.5ML SC SOAJ  Authorization Effective Date: 4/23/2025  Authorization Expiration Date: 4/23/2026  Approved Dose/Quantity: as written  Reference #: [id number]   Insurance Company: "[employer] - Phone [phone] Fax [phone]  Which Pharmacy is filling the prescription: [id number] [place] [address] [facility]  Pharmacy Notified: y  Patient Notified: Instructed pharmacy to notify patient once order is ready.

## 2025-04-23 NOTE — TELEPHONE ENCOUNTER
Please call patient. Make sure she is in touch with her rheumatologist today. She will need updated CK level but she sees Neurologist this week as well so she could have them order it as well since I am out of the office and won't have reliable follow-up on the lab if abnormal.     I would also like her to call her surgeon and see if they would like to postpone her pain stimulator. I personally think that would be ideal right now until we figure out what is going on with the muscles.     Junior Roe PA-C

## 2025-04-23 NOTE — TELEPHONE ENCOUNTER
RN Triage    Patient Contact    Attempt # 2    RN did attempt to reach patient, no answer, left voicemail for her to call us back.     Upon callback,   Advise to follow up with rheumatology today  Advise to have rheumatology or neurology to order a follow up CK.    Recommend updating surgical team regarding elevated CK and potentially needing to reschedule pain stimulator surgery that is scheduled for 5/6 due to elevated labs    Will also send MyChart message.     Sarah Garcia RN on 4/23/2025 at 11:27 AM

## 2025-04-23 NOTE — TELEPHONE ENCOUNTER
There has been back and forth in mychart encounter.  Neurology has been in discussion with her as well. Closing this encounter.

## 2025-04-23 NOTE — ED TRIAGE NOTES
Pt reports that she has been experiencing left foot swelling and left hip pain that radiates all the way to the foot for the past several weeks. She states that she was seen in the clinic today and CK was >1000.      Triage Assessment (Adult)       Row Name 04/22/25 3264          Triage Assessment    Airway WDL WDL        Respiratory WDL    Respiratory WDL WDL        Skin Circulation/Temperature WDL    Skin Circulation/Temperature WDL WDL        Cardiac WDL    Cardiac WDL WDL        Peripheral/Neurovascular WDL    Peripheral Neurovascular WDL X  left foot swelling, left leg pain        Cognitive/Neuro/Behavioral WDL    Cognitive/Neuro/Behavioral WDL WDL

## 2025-04-23 NOTE — TELEPHONE ENCOUNTER
PA Initiation    Medication: UBROGEPANT 100 MG PO TABS  Insurance Company: [employer] for [place] Medicaid Phone [phone] Fax [phone]  Pharmacy Filling the Rx: [id number] - [place] - [address] AT [facility] [place]  Filling Pharmacy Phone: [phone]  Filling Pharmacy Fax: [phone]  Start Date: 4/23/2025    Faxed PA to [facility] via [facility]

## 2025-04-24 ENCOUNTER — MYC MEDICAL ADVICE (OUTPATIENT)
Dept: NEUROLOGY | Facility: CLINIC | Age: 30
End: 2025-04-24

## 2025-04-24 ENCOUNTER — ANCILLARY PROCEDURE (OUTPATIENT)
Dept: ULTRASOUND IMAGING | Facility: OTHER | Age: 30
End: 2025-04-24
Attending: PHYSICIAN ASSISTANT
Payer: MEDICAID

## 2025-04-24 DIAGNOSIS — R60.0 PERIPHERAL EDEMA: ICD-10-CM

## 2025-04-24 NOTE — TELEPHONE ENCOUNTER
I would stay hydrated as able, if she needs to go in to even our ADS/ ER in between (not fully ER) for more fluids that could be a good option until we get a better handle on things

## 2025-04-27 ENCOUNTER — HOSPITAL ENCOUNTER (EMERGENCY)
Facility: CLINIC | Age: 30
Discharge: STILL ADMITTED OR ADMINISTRATIVE | End: 2025-04-27
Attending: FAMILY MEDICINE | Admitting: FAMILY MEDICINE
Payer: MEDICAID

## 2025-04-27 ENCOUNTER — HOSPITAL ENCOUNTER (INPATIENT)
Facility: CLINIC | Age: 30
End: 2025-04-27
Attending: STUDENT IN AN ORGANIZED HEALTH CARE EDUCATION/TRAINING PROGRAM | Admitting: FAMILY MEDICINE
Payer: MEDICAID

## 2025-04-27 VITALS
HEIGHT: 69 IN | SYSTOLIC BLOOD PRESSURE: 118 MMHG | TEMPERATURE: 98.5 F | HEART RATE: 81 BPM | DIASTOLIC BLOOD PRESSURE: 82 MMHG | OXYGEN SATURATION: 94 % | WEIGHT: 242 LBS | RESPIRATION RATE: 17 BRPM | BODY MASS INDEX: 35.84 KG/M2

## 2025-04-27 DIAGNOSIS — M79.605 DIFFUSE PAIN IN LEFT LOWER EXTREMITY: ICD-10-CM

## 2025-04-27 DIAGNOSIS — M62.82 NON-TRAUMATIC RHABDOMYOLYSIS: ICD-10-CM

## 2025-04-27 DIAGNOSIS — M79.651 BILATERAL THIGH PAIN: ICD-10-CM

## 2025-04-27 DIAGNOSIS — M79.652 BILATERAL THIGH PAIN: ICD-10-CM

## 2025-04-27 DIAGNOSIS — I96 MYONECROSIS (H): ICD-10-CM

## 2025-04-27 DIAGNOSIS — R74.8 ELEVATED CK: Primary | ICD-10-CM

## 2025-04-27 DIAGNOSIS — M62.89 MYONECROSIS (H): ICD-10-CM

## 2025-04-27 DIAGNOSIS — M79.662 PAIN OF LEFT LOWER LEG: ICD-10-CM

## 2025-04-27 LAB
ALBUMIN UR-MCNC: NEGATIVE MG/DL
ANION GAP SERPL CALCULATED.3IONS-SCNC: 10 MMOL/L (ref 7–15)
APPEARANCE UR: CLEAR
BACTERIA #/AREA URNS HPF: ABNORMAL /HPF
BASOPHILS # BLD AUTO: 0.1 10E3/UL (ref 0–0.2)
BASOPHILS NFR BLD AUTO: 1 %
BILIRUB UR QL STRIP: NEGATIVE
BUN SERPL-MCNC: 11.3 MG/DL (ref 6–20)
CALCIUM SERPL-MCNC: 10 MG/DL (ref 8.8–10.4)
CHLORIDE SERPL-SCNC: 101 MMOL/L (ref 98–107)
CK SERPL-CCNC: 912 U/L (ref 26–192)
COLOR UR AUTO: YELLOW
CREAT SERPL-MCNC: 0.78 MG/DL (ref 0.51–0.95)
CRP SERPL-MCNC: 7.47 MG/L
EGFRCR SERPLBLD CKD-EPI 2021: >90 ML/MIN/1.73M2
EOSINOPHIL # BLD AUTO: 0.5 10E3/UL (ref 0–0.7)
EOSINOPHIL NFR BLD AUTO: 5 %
ERYTHROCYTE [DISTWIDTH] IN BLOOD BY AUTOMATED COUNT: 14.6 % (ref 10–15)
ERYTHROCYTE [SEDIMENTATION RATE] IN BLOOD BY WESTERGREN METHOD: 6 MM/HR (ref 0–20)
GLUCOSE SERPL-MCNC: 88 MG/DL (ref 70–99)
GLUCOSE UR STRIP-MCNC: NEGATIVE MG/DL
HCO3 SERPL-SCNC: 28 MMOL/L (ref 22–29)
HCT VFR BLD AUTO: 43.3 % (ref 35–47)
HGB BLD-MCNC: 14.2 G/DL (ref 11.7–15.7)
HGB UR QL STRIP: NEGATIVE
HYALINE CASTS: 5 /LPF
IMM GRANULOCYTES # BLD: 0.1 10E3/UL
IMM GRANULOCYTES NFR BLD: 1 %
KETONES UR STRIP-MCNC: NEGATIVE MG/DL
LEUKOCYTE ESTERASE UR QL STRIP: ABNORMAL
LYMPHOCYTES # BLD AUTO: 1.8 10E3/UL (ref 0.8–5.3)
LYMPHOCYTES NFR BLD AUTO: 18 %
MCH RBC QN AUTO: 25.4 PG (ref 26.5–33)
MCHC RBC AUTO-ENTMCNC: 32.8 G/DL (ref 31.5–36.5)
MCV RBC AUTO: 78 FL (ref 78–100)
MONOCYTES # BLD AUTO: 0.6 10E3/UL (ref 0–1.3)
MONOCYTES NFR BLD AUTO: 6 %
MUCOUS THREADS #/AREA URNS LPF: PRESENT /LPF
NEUTROPHILS # BLD AUTO: 7 10E3/UL (ref 1.6–8.3)
NEUTROPHILS NFR BLD AUTO: 70 %
NITRATE UR QL: NEGATIVE
NRBC # BLD AUTO: 0 10E3/UL
NRBC BLD AUTO-RTO: 0 /100
PH UR STRIP: 6.5 [PH] (ref 5–7)
PLATELET # BLD AUTO: 269 10E3/UL (ref 150–450)
POTASSIUM SERPL-SCNC: 4.3 MMOL/L (ref 3.4–5.3)
RBC # BLD AUTO: 5.58 10E6/UL (ref 3.8–5.2)
RBC URINE: 2 /HPF
SODIUM SERPL-SCNC: 139 MMOL/L (ref 135–145)
SP GR UR STRIP: 1.02 (ref 1–1.03)
SQUAMOUS EPITHELIAL: 2 /HPF
T4 FREE SERPL-MCNC: 1.04 NG/DL (ref 0.9–1.7)
TSH SERPL DL<=0.005 MIU/L-ACNC: 5.13 UIU/ML (ref 0.3–4.2)
UROBILINOGEN UR STRIP-MCNC: NORMAL MG/DL
WBC # BLD AUTO: 10 10E3/UL (ref 4–11)
WBC URINE: 15 /HPF

## 2025-04-27 PROCEDURE — 250N000013 HC RX MED GY IP 250 OP 250 PS 637: Performed by: FAMILY MEDICINE

## 2025-04-27 PROCEDURE — 250N000013 HC RX MED GY IP 250 OP 250 PS 637

## 2025-04-27 PROCEDURE — 80048 BASIC METABOLIC PNL TOTAL CA: CPT | Performed by: FAMILY MEDICINE

## 2025-04-27 PROCEDURE — 120N000002 HC R&B MED SURG/OB UMMC

## 2025-04-27 PROCEDURE — 96375 TX/PRO/DX INJ NEW DRUG ADDON: CPT | Performed by: FAMILY MEDICINE

## 2025-04-27 PROCEDURE — 81003 URINALYSIS AUTO W/O SCOPE: CPT | Performed by: FAMILY MEDICINE

## 2025-04-27 PROCEDURE — 96361 HYDRATE IV INFUSION ADD-ON: CPT | Performed by: FAMILY MEDICINE

## 2025-04-27 PROCEDURE — 96374 THER/PROPH/DIAG INJ IV PUSH: CPT | Performed by: FAMILY MEDICINE

## 2025-04-27 PROCEDURE — 36415 COLL VENOUS BLD VENIPUNCTURE: CPT

## 2025-04-27 PROCEDURE — G0378 HOSPITAL OBSERVATION PER HR: HCPCS

## 2025-04-27 PROCEDURE — 85652 RBC SED RATE AUTOMATED: CPT

## 2025-04-27 PROCEDURE — 250N000011 HC RX IP 250 OP 636

## 2025-04-27 PROCEDURE — 84439 ASSAY OF FREE THYROXINE: CPT

## 2025-04-27 PROCEDURE — 250N000011 HC RX IP 250 OP 636: Mod: JZ

## 2025-04-27 PROCEDURE — 99285 EMERGENCY DEPT VISIT HI MDM: CPT | Performed by: FAMILY MEDICINE

## 2025-04-27 PROCEDURE — 96376 TX/PRO/DX INJ SAME DRUG ADON: CPT | Performed by: FAMILY MEDICINE

## 2025-04-27 PROCEDURE — 82550 ASSAY OF CK (CPK): CPT | Performed by: FAMILY MEDICINE

## 2025-04-27 PROCEDURE — 258N000003 HC RX IP 258 OP 636: Performed by: FAMILY MEDICINE

## 2025-04-27 PROCEDURE — 87086 URINE CULTURE/COLONY COUNT: CPT | Performed by: FAMILY MEDICINE

## 2025-04-27 PROCEDURE — 84443 ASSAY THYROID STIM HORMONE: CPT

## 2025-04-27 PROCEDURE — 85025 COMPLETE CBC W/AUTO DIFF WBC: CPT | Performed by: FAMILY MEDICINE

## 2025-04-27 PROCEDURE — 250N000011 HC RX IP 250 OP 636: Mod: JZ | Performed by: FAMILY MEDICINE

## 2025-04-27 PROCEDURE — 86140 C-REACTIVE PROTEIN: CPT

## 2025-04-27 PROCEDURE — 36415 COLL VENOUS BLD VENIPUNCTURE: CPT | Performed by: FAMILY MEDICINE

## 2025-04-27 PROCEDURE — 99285 EMERGENCY DEPT VISIT HI MDM: CPT | Mod: 25 | Performed by: FAMILY MEDICINE

## 2025-04-27 RX ORDER — LAMOTRIGINE 25 MG/1
50-100 TABLET ORAL 2 TIMES DAILY
Status: DISCONTINUED | OUTPATIENT
Start: 2025-04-27 | End: 2025-04-27

## 2025-04-27 RX ORDER — MORPHINE SULFATE 15 MG/1
15 TABLET ORAL 3 TIMES DAILY PRN
Status: DISCONTINUED | OUTPATIENT
Start: 2025-04-27 | End: 2025-05-06 | Stop reason: HOSPADM

## 2025-04-27 RX ORDER — TIZANIDINE 2 MG/1
4 TABLET ORAL 3 TIMES DAILY PRN
Status: DISCONTINUED | OUTPATIENT
Start: 2025-04-27 | End: 2025-05-06 | Stop reason: HOSPADM

## 2025-04-27 RX ORDER — ONDANSETRON 2 MG/ML
4 INJECTION INTRAMUSCULAR; INTRAVENOUS EVERY 6 HOURS PRN
Status: DISCONTINUED | OUTPATIENT
Start: 2025-04-27 | End: 2025-05-06 | Stop reason: HOSPADM

## 2025-04-27 RX ORDER — PROPRANOLOL HYDROCHLORIDE 80 MG/1
80 CAPSULE, EXTENDED RELEASE ORAL DAILY
Status: DISCONTINUED | OUTPATIENT
Start: 2025-04-27 | End: 2025-05-06 | Stop reason: HOSPADM

## 2025-04-27 RX ORDER — PRAZOSIN HYDROCHLORIDE 1 MG/1
2 CAPSULE ORAL EVERY MORNING
Status: DISCONTINUED | OUTPATIENT
Start: 2025-04-28 | End: 2025-05-06 | Stop reason: HOSPADM

## 2025-04-27 RX ORDER — FUROSEMIDE 20 MG/1
20 TABLET ORAL DAILY
Status: DISCONTINUED | OUTPATIENT
Start: 2025-04-27 | End: 2025-05-06 | Stop reason: HOSPADM

## 2025-04-27 RX ORDER — PRAZOSIN HYDROCHLORIDE 5 MG/1
5 CAPSULE ORAL AT BEDTIME
Status: DISCONTINUED | OUTPATIENT
Start: 2025-04-27 | End: 2025-05-06 | Stop reason: HOSPADM

## 2025-04-27 RX ORDER — NALOXONE HYDROCHLORIDE 0.4 MG/ML
0.4 INJECTION, SOLUTION INTRAMUSCULAR; INTRAVENOUS; SUBCUTANEOUS
Status: DISCONTINUED | OUTPATIENT
Start: 2025-04-27 | End: 2025-05-06 | Stop reason: HOSPADM

## 2025-04-27 RX ORDER — DEXTROAMPHETAMINE SACCHARATE, AMPHETAMINE ASPARTATE MONOHYDRATE, DEXTROAMPHETAMINE SULFATE AND AMPHETAMINE SULFATE 7.5; 7.5; 7.5; 7.5 MG/1; MG/1; MG/1; MG/1
30 CAPSULE, EXTENDED RELEASE ORAL DAILY
Status: DISCONTINUED | OUTPATIENT
Start: 2025-04-27 | End: 2025-04-27

## 2025-04-27 RX ORDER — CLONAZEPAM 0.5 MG/1
0.5 TABLET ORAL DAILY PRN
Status: DISCONTINUED | OUTPATIENT
Start: 2025-04-27 | End: 2025-05-06 | Stop reason: HOSPADM

## 2025-04-27 RX ORDER — POLYETHYLENE GLYCOL 3350 17 G/17G
17 POWDER, FOR SOLUTION ORAL 2 TIMES DAILY
Status: DISCONTINUED | OUTPATIENT
Start: 2025-04-27 | End: 2025-05-06 | Stop reason: HOSPADM

## 2025-04-27 RX ORDER — POLYETHYLENE GLYCOL 3350 17 G/17G
17 POWDER, FOR SOLUTION ORAL 2 TIMES DAILY PRN
Status: DISCONTINUED | OUTPATIENT
Start: 2025-04-27 | End: 2025-04-27

## 2025-04-27 RX ORDER — QUETIAPINE FUMARATE 100 MG/1
300 TABLET, FILM COATED ORAL AT BEDTIME
Status: DISCONTINUED | OUTPATIENT
Start: 2025-04-27 | End: 2025-05-06 | Stop reason: HOSPADM

## 2025-04-27 RX ORDER — LITHIUM CARBONATE 600 MG/1
600 CAPSULE ORAL AT BEDTIME
Status: DISCONTINUED | OUTPATIENT
Start: 2025-04-27 | End: 2025-05-06 | Stop reason: HOSPADM

## 2025-04-27 RX ORDER — MORPHINE SULFATE 4 MG/ML
4 INJECTION, SOLUTION INTRAMUSCULAR; INTRAVENOUS
Status: COMPLETED | OUTPATIENT
Start: 2025-04-27 | End: 2025-04-27

## 2025-04-27 RX ORDER — LIDOCAINE 40 MG/G
CREAM TOPICAL
Status: DISCONTINUED | OUTPATIENT
Start: 2025-04-27 | End: 2025-05-06 | Stop reason: HOSPADM

## 2025-04-27 RX ORDER — PROPRANOLOL HYDROCHLORIDE 10 MG/1
20 TABLET ORAL 2 TIMES DAILY PRN
Status: DISCONTINUED | OUTPATIENT
Start: 2025-04-27 | End: 2025-05-06 | Stop reason: HOSPADM

## 2025-04-27 RX ORDER — CALCIUM CARBONATE 500 MG/1
1000 TABLET, CHEWABLE ORAL 4 TIMES DAILY PRN
Status: DISCONTINUED | OUTPATIENT
Start: 2025-04-27 | End: 2025-05-06 | Stop reason: HOSPADM

## 2025-04-27 RX ORDER — NALOXONE HYDROCHLORIDE 0.4 MG/ML
0.2 INJECTION, SOLUTION INTRAMUSCULAR; INTRAVENOUS; SUBCUTANEOUS
Status: DISCONTINUED | OUTPATIENT
Start: 2025-04-27 | End: 2025-05-06 | Stop reason: HOSPADM

## 2025-04-27 RX ORDER — AMOXICILLIN 250 MG
2 CAPSULE ORAL 2 TIMES DAILY PRN
Status: DISCONTINUED | OUTPATIENT
Start: 2025-04-27 | End: 2025-05-06 | Stop reason: HOSPADM

## 2025-04-27 RX ORDER — DEXTROAMPHETAMINE SACCHARATE, AMPHETAMINE ASPARTATE MONOHYDRATE, DEXTROAMPHETAMINE SULFATE AND AMPHETAMINE SULFATE 7.5; 7.5; 7.5; 7.5 MG/1; MG/1; MG/1; MG/1
30 CAPSULE, EXTENDED RELEASE ORAL DAILY PRN
Status: DISCONTINUED | OUTPATIENT
Start: 2025-04-28 | End: 2025-05-06 | Stop reason: HOSPADM

## 2025-04-27 RX ORDER — PREGABALIN 100 MG/1
100 CAPSULE ORAL AT BEDTIME
Status: DISCONTINUED | OUTPATIENT
Start: 2025-04-27 | End: 2025-05-06 | Stop reason: HOSPADM

## 2025-04-27 RX ORDER — DOXEPIN HYDROCHLORIDE 10 MG/1
10 CAPSULE ORAL AT BEDTIME
Status: DISCONTINUED | OUTPATIENT
Start: 2025-04-27 | End: 2025-05-06 | Stop reason: HOSPADM

## 2025-04-27 RX ORDER — PREGABALIN 75 MG/1
150 CAPSULE ORAL 3 TIMES DAILY
Status: DISCONTINUED | OUTPATIENT
Start: 2025-04-27 | End: 2025-05-06 | Stop reason: HOSPADM

## 2025-04-27 RX ORDER — HYDROMORPHONE HYDROCHLORIDE 1 MG/ML
0.5 INJECTION, SOLUTION INTRAMUSCULAR; INTRAVENOUS; SUBCUTANEOUS 2 TIMES DAILY PRN
Status: DISCONTINUED | OUTPATIENT
Start: 2025-04-27 | End: 2025-04-28

## 2025-04-27 RX ORDER — ONDANSETRON 4 MG/1
4 TABLET, ORALLY DISINTEGRATING ORAL EVERY 6 HOURS PRN
Status: DISCONTINUED | OUTPATIENT
Start: 2025-04-27 | End: 2025-05-06 | Stop reason: HOSPADM

## 2025-04-27 RX ORDER — HYDROXYZINE HYDROCHLORIDE 50 MG/1
100 TABLET, FILM COATED ORAL 3 TIMES DAILY
Status: DISCONTINUED | OUTPATIENT
Start: 2025-04-27 | End: 2025-05-06 | Stop reason: HOSPADM

## 2025-04-27 RX ORDER — LAMOTRIGINE 25 MG/1
50 TABLET ORAL
Status: DISCONTINUED | OUTPATIENT
Start: 2025-04-28 | End: 2025-05-06 | Stop reason: HOSPADM

## 2025-04-27 RX ORDER — AMOXICILLIN 250 MG
1 CAPSULE ORAL 2 TIMES DAILY PRN
Status: DISCONTINUED | OUTPATIENT
Start: 2025-04-27 | End: 2025-05-06 | Stop reason: HOSPADM

## 2025-04-27 RX ORDER — HYDROMORPHONE HYDROCHLORIDE 1 MG/ML
0.5 INJECTION, SOLUTION INTRAMUSCULAR; INTRAVENOUS; SUBCUTANEOUS EVERY 30 MIN PRN
Status: DISCONTINUED | OUTPATIENT
Start: 2025-04-27 | End: 2025-04-27 | Stop reason: HOSPADM

## 2025-04-27 RX ORDER — SODIUM CHLORIDE 9 MG/ML
INJECTION, SOLUTION INTRAVENOUS CONTINUOUS
Status: DISCONTINUED | OUTPATIENT
Start: 2025-04-27 | End: 2025-04-28

## 2025-04-27 RX ORDER — ACETAMINOPHEN 325 MG/1
975 TABLET ORAL 3 TIMES DAILY
Status: DISCONTINUED | OUTPATIENT
Start: 2025-04-27 | End: 2025-05-06 | Stop reason: HOSPADM

## 2025-04-27 RX ORDER — LAMOTRIGINE 100 MG/1
100 TABLET ORAL EVERY MORNING
Status: DISCONTINUED | OUTPATIENT
Start: 2025-04-27 | End: 2025-05-06 | Stop reason: HOSPADM

## 2025-04-27 RX ORDER — KETOCONAZOLE 20 MG/ML
SHAMPOO, SUSPENSION TOPICAL
COMMUNITY

## 2025-04-27 RX ORDER — TAMSULOSIN HYDROCHLORIDE 0.4 MG/1
0.4 CAPSULE ORAL EVERY EVENING
Status: DISCONTINUED | OUTPATIENT
Start: 2025-04-27 | End: 2025-05-06 | Stop reason: HOSPADM

## 2025-04-27 RX ADMIN — PREGABALIN 150 MG: 75 CAPSULE ORAL at 19:59

## 2025-04-27 RX ADMIN — DOXEPIN HYDROCHLORIDE 10 MG: 10 CAPSULE ORAL at 21:40

## 2025-04-27 RX ADMIN — HYDROXYZINE HYDROCHLORIDE 100 MG: 50 TABLET ORAL at 20:00

## 2025-04-27 RX ADMIN — PRAZOSIN HYDROCHLORIDE 5 MG: 5 CAPSULE ORAL at 21:40

## 2025-04-27 RX ADMIN — HYDROMORPHONE HYDROCHLORIDE 0.5 MG: 1 INJECTION, SOLUTION INTRAMUSCULAR; INTRAVENOUS; SUBCUTANEOUS at 21:56

## 2025-04-27 RX ADMIN — ONDANSETRON 4 MG: 2 INJECTION INTRAMUSCULAR; INTRAVENOUS at 21:41

## 2025-04-27 RX ADMIN — TAMSULOSIN HYDROCHLORIDE 0.4 MG: 0.4 CAPSULE ORAL at 20:00

## 2025-04-27 RX ADMIN — MORPHINE SULFATE 4 MG: 4 INJECTION, SOLUTION INTRAMUSCULAR; INTRAVENOUS at 09:17

## 2025-04-27 RX ADMIN — MORPHINE SULFATE 4 MG: 4 INJECTION, SOLUTION INTRAMUSCULAR; INTRAVENOUS at 10:17

## 2025-04-27 RX ADMIN — ACETAMINOPHEN 975 MG: 325 TABLET ORAL at 20:00

## 2025-04-27 RX ADMIN — LAMOTRIGINE 100 MG: 100 TABLET ORAL at 19:59

## 2025-04-27 RX ADMIN — QUETIAPINE FUMARATE 300 MG: 100 TABLET ORAL at 21:39

## 2025-04-27 RX ADMIN — POLYETHYLENE GLYCOL 3350 17 G: 17 POWDER, FOR SOLUTION ORAL at 20:04

## 2025-04-27 RX ADMIN — MORPHINE SULFATE 4 MG: 4 INJECTION, SOLUTION INTRAMUSCULAR; INTRAVENOUS at 10:59

## 2025-04-27 RX ADMIN — PREGABALIN 100 MG: 100 CAPSULE ORAL at 21:39

## 2025-04-27 RX ADMIN — SODIUM CHLORIDE 1000 ML: 0.9 INJECTION, SOLUTION INTRAVENOUS at 09:16

## 2025-04-27 RX ADMIN — HYDROMORPHONE HYDROCHLORIDE 0.5 MG: 1 INJECTION, SOLUTION INTRAMUSCULAR; INTRAVENOUS; SUBCUTANEOUS at 12:19

## 2025-04-27 RX ADMIN — LITHIUM CARBONATE 600 MG: 600 CAPSULE ORAL at 21:40

## 2025-04-27 RX ADMIN — FUROSEMIDE 20 MG: 20 TABLET ORAL at 20:00

## 2025-04-27 RX ADMIN — PROPRANOLOL HYDROCHLORIDE 80 MG: 80 CAPSULE, EXTENDED RELEASE ORAL at 19:59

## 2025-04-27 RX ADMIN — MORPHINE SULFATE 15 MG: 15 TABLET ORAL at 18:11

## 2025-04-27 ASSESSMENT — COLUMBIA-SUICIDE SEVERITY RATING SCALE - C-SSRS
6. HAVE YOU EVER DONE ANYTHING, STARTED TO DO ANYTHING, OR PREPARED TO DO ANYTHING TO END YOUR LIFE?: NO
1. IN THE PAST MONTH, HAVE YOU WISHED YOU WERE DEAD OR WISHED YOU COULD GO TO SLEEP AND NOT WAKE UP?: NO
2. HAVE YOU ACTUALLY HAD ANY THOUGHTS OF KILLING YOURSELF IN THE PAST MONTH?: NO

## 2025-04-27 ASSESSMENT — ACTIVITIES OF DAILY LIVING (ADL)
ADLS_ACUITY_SCORE: 29
ADLS_ACUITY_SCORE: 29
ADLS_ACUITY_SCORE: 41
ADLS_ACUITY_SCORE: 29
ADLS_ACUITY_SCORE: 55
ADLS_ACUITY_SCORE: 41
ADLS_ACUITY_SCORE: 29
ADLS_ACUITY_SCORE: 55
ADLS_ACUITY_SCORE: 41
ADLS_ACUITY_SCORE: 29
ADLS_ACUITY_SCORE: 41

## 2025-04-27 NOTE — PLAN OF CARE
Goal Outcome Evaluation:      Plan of Care Reviewed With: patient    Overall Patient Progress: no change    Outcome Evaluation: Pt is A&Ox4. Continent  of B&B. Indpendent. Denies SOB, chest pain. Reports 9/10 BLE pain. Pain management.    Pt was given PRN Morphine for pain.

## 2025-04-27 NOTE — H&P
[facility]    History and Physical - [facility]       Date of Admission:  4/27/2025    Assessment & Plan      Caprice Kline is a 29 year old female with a complex PMHx of lumbar radiculopathy, fibromyalgia, migraines, and complex psychiatric history. She is transferred from [facility] ED and admitted on 4/27/2025 for pain control, and further evaluation and muscle biopsy of bilateral hip and thigh pain.     # Bilateral thigh pain  # Bilateral anterior leg pain  # Elevated CK (255 -> 912)  # Fibromyalgia   # Chronic pain   # Lumbar radiculopathy  Presented with worsening bilateral hip and anterior leg pain in recent months with a known Hx of chronic lumbar pain with sciatica. Pt has had multiple pain interventions for back pain with [facility] pain clinic, recently failed a SCS trial. On 4/27, she was transferred from [facility] ED for further work-up including muscle biopsy. Recent bilateral MRI on 4/22/2025 revealed resolution of previously identified edema signal in adductors, R gluteus tj and R vastus lateralis and slightly more pronounced edema in distal R gluteus medius as seen on MRI from 1/23/2025. Recent OP Rheum work up was notable for positive ETODORO 1:160 with negative DOM, negative myositis panel, normal complement, normal LFTs/CK/aldolase, normal inflammatory markers, and neg RF/CCP. OP neurology had considered need for muscle biopsy. This admission, CMP unremarkable, CK increased from 255 on 4/25 to 912 on 4/27. No evidence of leukocytosis or anemia. US venous competency done on 4/25 at OP clinic revealed no DVT and gross incompetency. On exam, pain with palpation of bilateral proximal thighs/lower buttocks as well as anterior thigh muscles. No focal findings on exam beyond tenderness to palpation bilateral hip joints and hesitancy to flex L hip. No skin findings. Ddx remains broad and includes polymyositis vs DVT (r/o US) vs  septic arthritis (although no fevers, leuko, or exacerbation of pain with hip rocking) vs CRPS (no color, temp, or pain asymmetry) vs fibromyalgia/MO (less likely given elevated CK levels). Unclear etiology and requires further evaluation from multidisciplinary team and possible muscle biopsy.   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 Will continue home meds.    - PTA Adderall XR 30mg every day   - PTA Clonazepam 0.5mg 1 tab PRN anxiety   - PTA Doxepin 10mg 1 tab at bedtime   - PTA Lamictal, pt reports uptitrating with 100mg qam and 50mg qpm    - PTA Lithium 600mg 1 capsule at bedtime    - PTA Prazosin 2mg qam and 5mg qpm    - PTA quetiapine 300mg at bedtime    # Hx of Urinary retention  # Hx of Urinary hesitancy   # Pelvic pain   Seen at ED on 11/5/2024 for acute urinary retention and was discharged home with fisher catheter. Does not have catheter in place or has required further need for self-cath thereafter. 4/27 UA positive for small LE, 15 WBC, neg nitrites. Patient denies new urinary symptoms, although does have a Hx of urinary retention and hesitancy.   - Urine Cx pending    - PTA Flomax 0.4mg at bedtime    # Bilateral edema - chronic   Per patient report, chronic and managed with furosemide 20mg. Denies acute changes in swelling or pain.    - PTA Furosemide 20mg 1 tab daily     # Non-intractable migraine   # Tremor   Sees outpatient neurology for these concerns. Reports baseline  "headache that has not acutely changed. No tremors noted on exam. Will continue to monitor.   - PTA Ubrogepant 100mg 1 tab at onset of headache           Diet: Combination Diet Regular Diet Adult  DVT Prophylaxis: Low Risk/Ambulatory with no VTE prophylaxis indicated  Cronin Catheter: Not present  Fluids: PO  Lines: None     Cardiac Monitoring: None  Code Status: Full Code    Clinically Significant Risk Factors Present on Admission                   # Hypertension: Home medication list includes antihypertensive(s)           # Obesity: Estimated body mass index is 35.74 kg/m  as calculated from the following:    Height as of an earlier encounter on 4/27/25: 1.753 m (5' 9\").    Weight as of an earlier encounter on 4/27/25: 109.8 kg (242 lb).              Disposition Plan      Expected Discharge Date: 04/29/2025                The patient's care was discussed with the Attending Physician, Dr. Zack MD .      DO Camila Puga's [facility]  Securely message with [employer] (more info)  Text page via [facility] Paging/Directory   See signed in provider for up to date coverage information  ______________________________________________________________________    Chief Complaint   Bilateral hip and leg pain     History is obtained from the patient and her boyfriend.     History of Present Illness   Caprice Kline is a 29 year old female with a complex PMHx of lumbar radiculopathy, fibromyalgia, migraines, and complex psychiatric history.    Of note: patient has established outpatient care and work-up with rheumatology and neurology. She is admitted for pain control.     Presented to the ED for uncontrolled pain. Reports chronic bilateral hip pain that has worsened in recent weeks described as \"glass shards in my hips\", along with worsening anterior thigh pain. Although she has constant pain at baseline, it seems the pain severity comes in waves and " "alternates between L and R. Yesterday R hip pain > L. Today, is now L. Hip pain worse with slight movement. She was able to shift positions in the bed slowly without wincing at times. Additionally, bilateral buttocks have decreased sensation, but the front thighs can become excruciatingly painful at times. Feels reduced sensation in lower legs. Endorses weakness both 2/2 pain and generally feeling weak. Her boyfriend says she's been unable to walk for 2 days. Takes morphine 5mg TID PRN, has been needing to taking TID. No injury, wound, rashes, redness, bruising on hips and legs. Has a Hx of R labral tear in both hips, had SI joint injections before with Sports Medicine.     Denies chest pain, palpitations, SOB, viral URI sxs, N/V. Has baseline b/l edema, but no acute changes. Hx of migraines, but not worsening with pain. Endorses intermittent blurry vision the last 2 weeks. \"I have needed to change my glasses every year because my vision changes so much\".     She has a Hx of urinary retention 11/2024, when she did not void for \"35-40 hours\" and went to the ED and had a fisher cath placed. \"I had it for a week and when I was told I could see a provider a month later to get it removed, I couldn't do it\". Was given a fisher catheter kit to do at home, however has never needed to use. Flomax 0.4mg BID helps a little. Urine has been darker yellow, not brown. Denies dysuria. Has chronic vaginal and pelvic pain, sees OP Uro.     Endorses chills that is not new. Has chronic constipation, last BM 2-3 days ago. \"Recently, I hadn't had a bowel movement for a whole month\".     Past Medical History    Past Medical History:   Diagnosis Date    Bipolar disorder (H)     Complication of anesthesia     \"freaking out when I wake up\"    Depressive disorder 2011       Past Surgical History   Past Surgical History:   Procedure Laterality Date    DISCECTOMY LUMBAR POSTERIOR MICROSCOPIC ONE LEVEL Left 08/24/2021    Procedure: Minimally " invasive left Lumbar 5 to Sacral 1 microdiscectomy  ;  Surgeon: Destin Junior MD;  Location: [facility] OR    GYN SURGERY Right     cystectomy - with salpingectomy    HEAD & NECK SURGERY      tonsillectomy    HEAD & NECK SURGERY      wisdom teeth extraction       Prior to Admission Medications   Prior to Admission Medications   Prescriptions Last Dose Informant Patient Reported? Taking?   EPINEPHrine (ANY BX GENERIC EQUIV) 0.3 MG/0.3ML injection 2-pack   No No   Sig: INJECT 0.3 ML INTO THE MUSCLE AS NEEDED FOR ANAPHYLAXIS   Fremanezumab-vfrm (AJOVY) 225 MG/1.5ML SOAJ 4/13/2025  No No   Sig: Inject 225 mg subcutaneously every 30 days.   Patient not taking: Reported on 4/22/2025   NARCAN 4 MG/0.1ML nasal spray   Yes No   Sig: CALL 911. SPR CONTENTS OF ONE SPRAYER (0.1ML) INTO ONE NOSTRIL. REPEAT IN 2-3 MIN IF SYMPTOMS OF OPIOID EMERGENCY PERSIST, ALTERNATE NOSTRILS   QUEtiapine (SEROQUEL) 300 MG tablet 4/26/2025  Yes Yes   Sig: Take 300 mg by mouth At Bedtime   RETIN-A 0.025 % external cream   Yes No   Sig: APPLY SPARINGLY TO FACE EVERY OTHER NIGHT FOR 14 NIGHTS THEN NIGHTLY THEREAFTER   Semaglutide-Weight Management (WEGOVY) 0.5 MG/0.5ML pen More than a month  No Yes   Sig: Inject 0.5 mg subcutaneously once a week.   albuterol (PROAIR HFA/PROVENTIL HFA/VENTOLIN HFA) 108 (90 Base) MCG/ACT inhaler Past Month  No Yes   Sig: Inhale 2 puffs into the lungs every 6 hours as needed for shortness of breath, wheezing or cough   amphetamine-dextroamphetamine (ADDERALL XR) 30 MG 24 hr capsule Past Week  Yes Yes   Sig: Take 30 mg by mouth daily as needed.   azelaic acid (FINACIA) 15 % external gel Past Week  Yes Yes   Sig: Apply topically 2 times daily as needed.   clobetasol (TEMOVATE) 0.05 % external solution Past Week  Yes Yes   Sig: APPLY TOPICALLY TO THE AFFECTED AREA TWICE DAILY FOR UP TO 21 DAYS. DO NOT APPLY TO NORMAL SKIN   clonazePAM (KLONOPIN) 0.5 MG tablet Past Week  Yes Yes   Sig: Take 0.5 mg by mouth daily as needed  for anxiety.   diazepam (VALIUM) 10 MG tablet Past Week  No Yes   Sig: PLACE 1 TABLET VAGINALLY AS NEEDED FOR URINARY RETENTION OR FOR PAIN   docusate sodium (COLACE) 100 MG capsule 4/26/2025  Yes Yes   Sig: Take 200 mg by mouth 2 times daily.   doxepin (SINEQUAN) 10 MG capsule 4/26/2025  Yes Yes   Sig: Take 10 mg by mouth At Bedtime   furosemide (LASIX) 20 MG tablet 4/26/2025  No Yes   Sig: Take 1 tablet (20 mg) by mouth daily.   glucosamine-chondroitin 500-400 MG CAPS per capsule 4/26/2025  Yes Yes   Sig: Take 1 capsule by mouth daily.   hydrOXYzine (VISTARIL) 50 MG capsule 4/26/2025  Yes Yes   Sig: Take 100 mg by mouth 3 times daily.   ipratropium-albuterol (COMBIVENT RESPIMAT)  MCG/ACT inhaler Past Month  No Yes   Sig: Inhale 1 puff into the lungs 4 times daily as needed for shortness of breath, wheezing or cough   ketoconazole (NIZORAL) 2 % external cream   Yes No   Sig: Apply topically. 3 times every week   ketoconazole (NIZORAL) 2 % external shampoo Past Week  Yes Yes   Sig: Apply topically twice a week.   lamoTRIgine (LAMICTAL) 100 MG tablet 4/26/2025  Yes Yes   Sig: Take  mg by mouth 2 times daily. 100 mg AM / 50 mg PM as of 4/27 - pt in process of titrating back up   lithium (ESKALITH) 600 MG capsule 4/26/2025  Yes Yes   Sig: Take 600 mg by mouth at bedtime.   magnesium oxide (MAG-OX) 400 MG tablet 4/26/2025  Yes Yes   Sig: Take 400 mg by mouth daily.   morphine (MSIR) 15 MG IR tablet 4/26/2025  Yes Yes   Sig: Take 15 mg by mouth 3 times daily as needed for pain.   ondansetron (ZOFRAN ODT) 4 MG ODT tab Past Month  No Yes   Sig: DISSOLVE 1 TABLET(4 MG) ON THE TONGUE EVERY 8 HOURS AS NEEDED FOR NAUSEA   prazosin (MINIPRESS) 2 MG capsule   Yes No   Sig: Take 2 mg by mouth every morning.   prazosin (MINIPRESS) 5 MG capsule   Yes No   Sig: Take 5 mg by mouth at bedtime.   pregabalin (LYRICA) 100 MG capsule 4/26/2025  Yes Yes   Sig: Take 100 mg by mouth at bedtime.   pregabalin (LYRICA) 150 MG  capsule 4/26/2025  Yes Yes   Sig: Take 150 mg by mouth 3 times daily. AM / noon / dinner   propranolol (INDERAL) 20 MG tablet Past Week  No Yes   Sig: Take 1 tablet (20 mg) by mouth 2 times daily as needed (tremor).   propranolol ER (INDERAL LA) 80 MG 24 hr capsule 4/26/2025  No Yes   Sig: Take 1 capsule (80 mg) by mouth daily.   sulfacetamide sodium, Acne, 10 % lotion Past Week  Yes Yes   Sig: APPLY IT TO THE FACE ONCE DAILY IN THE MORNING X3 MONTHS   tamsulosin (FLOMAX) 0.4 MG capsule 4/26/2025  No Yes   Sig: Take 1 capsule (0.4 mg) by mouth every evening.   tiZANidine (ZANAFLEX) 4 MG tablet Past Week  Yes Yes   Sig: TAKE 1 TABLET BY MOUTH EVERY 8 TO 12 HOURS AS NEEDED. NOT TO EXCEED 3 DOSES IN 24 HOURS   triamcinolone (KENALOG) 0.1 % external cream More than a month  Yes Yes   Sig: Apply topically 2 times daily as needed.   ubrogepant (UBRELVY) 100 MG tablet Past Week  No Yes   Sig: Take 1 tablet (100 mg) by mouth at onset of headache.      Facility-Administered Medications: None        Review of Systems    The 10 point Review of Systems is negative other than noted in the HPI.     Physical Exam   Vital Signs: Temp: 97.7  F (36.5  C) Temp src: Oral BP: 122/86 Pulse: 78   Resp: 18 SpO2: 98 % O2 Device: None (Room air)    Weight: 0 lbs 0 oz    Physical Exam  Vitals reviewed.   Constitutional:       General: She is not in acute distress.     Appearance: Normal appearance. She is not toxic-appearing.      Comments: Intermittently uncomfortable from waxing/waning b/l leg and hip pain   HENT:      Head: Normocephalic and atraumatic.   Eyes:      General: No scleral icterus.        Right eye: No discharge.         Left eye: No discharge.      Extraocular Movements: Extraocular movements intact.      Conjunctiva/sclera: Conjunctivae normal.      Pupils: Pupils are equal, round, and reactive to light.   Cardiovascular:      Rate and Rhythm: Normal rate and regular rhythm.      Heart sounds: No murmur heard.     No gallop.    Pulmonary:      Effort: Pulmonary effort is normal. No respiratory distress.      Breath sounds: No wheezing or rales.   Abdominal:      General: Abdomen is flat. Bowel sounds are normal. There is no distension.      Tenderness: There is abdominal tenderness. There is no guarding or rebound.      Comments: Mild tenderness to palpation of periumbilical area and RLQ   Musculoskeletal:      Right lower leg: Edema present.      Left lower leg: Edema present.      Comments: Trace bilateral edema   Skin:     Capillary Refill: Capillary refill takes less than 2 seconds.      Findings: No bruising, erythema, lesion or rash.   Neurological:      General: No focal deficit present.      Mental Status: She is alert and oriented to person, place, and time.      Motor: Weakness present.      Comments: Unable to assess gait, patient lying in bed. Patient able to slowly shift positions with intermittent pain. Pain not worsened with palpation of anterior thigh muscles. Tenderness to palpation of bilateral lower buttocks, with diminished sensation of upper buttocks at level of iliac crest. L hip flexion limited by pain. Pain with hip log rolling b/l. Mild tenderness to palpation of anterior knees and bilateral ankle joints. Sensation slightly diminished on L thigh and leg.   Psychiatric:         Mood and Affect: Mood normal.         Behavior: Behavior normal.       Medical Decision Making   Please see A&P for additional details of medical decision making.      Data     I have personally reviewed the following data over the past 24 hrs:    10.0  \   14.2   / 269     139 101 11.3 /  88   4.3 28 0.78 \       Imaging results reviewed over the past 24 hrs:   No results found for this or any previous visit (from the past 24 hours).

## 2025-04-27 NOTE — PROGRESS NOTES
"Transfer Type: [facility]  Transfer Triage Note    Date of call: 04/27/25  Time of call: 1:21 PM    Current Patient Location:  [facility]  Current Level of Care: ED    Vitals: Temp: 98.5  F (36.9  C) Temp src: Oral BP: 111/75 Pulse: 79   Resp: 17 SpO2: 94 % Height: 175.3 cm (5' 9\") Weight: 109.8 kg (242 lb)  O2 Device: None (Room air) at    Diagnosis: Uncontrolled Elevated CK, not Rhabdomyolysis Levels  Reason for requested transfer: Further diagnostic work up, management, and consultation for specialized care   Isolation Needs: None    Care everywhere has been updated and reviewed: Yes  Necessary images have been sent through PACS: Yes    If patient is transferring for specialty care or specific procedure, the specialist required has participated in the transfer call and agreed with need for transfer and anticipated timeline: Yes, Provider name: Dr. Reyes-Jarad specialty with: Rheumatology    Transfer accepted: Yes  Stability of Patient: Patient is vitally stable, with no critical labs, and will likely remain stable throughout the transfer process  Does the patient require placement on the [facility]? No.  Level of Care Needed:  Observation  Telemetry Needed:  None  Expected Time of Arrival for Transfer: 0-8 hours  Arrival Location:  [facility]     Recommendations for Management and Stabilization: Not needed    Additional Comments: Patient is a 29 year woman with bipolar disorder complaining of  b/l leg pain, now left leg worse. She has been found to have fluctating CK levels that are elevated but less than 5000. Levels are typically fluid responsive. Patient reported to uncontrolled pain on PO morphine. Per the ED she did not take any evening morphine and not yet taken any during the day. Patient reports that pain remains uncontrolled in the ED. Admit to observation for pain control and rheumatology and neurology consults. "     To notify the admitting provider that the patient has arrived at their destination:    For [facility]: use My [employer] to contact Admitting Hospitalist [[facility]]  Alternative method is to identify the correct provider on Am[employer]: [facility]/ [facility] then find title GOLD ADMITTING HOSPITALIST [[id number]].        Courtney Pat MD on 4/27/2025 at 1:46 PM

## 2025-04-27 NOTE — ED PROVIDER NOTES
"  History     Chief Complaint   Patient presents with    Leg Pain     HPI  Caprice Kline is a 29 year old female who presents with left leg pain again.  Patient has been dealing with this here recently, has had unexplained elevated CK levels which have trended down.  Patient is supposed to be set up to work with rheumatology to get muscle biopsies but this has not been set up yet.  Patient comes in today because when she woke up this morning she had severe pain in her leg where she could not even walk.  She had a call her boyfriend for assistance and they really struggled with getting her up and the pain which is unrelenting.  She does have chronic morphine therapy that she can take at home but she did not take it this morning because she did not think it would make a difference at all.  EMS was called but they helped her walk up 2 flights of stairs and she was able to do this on her own.  Patient denies any new trauma.  She states yesterday her pain was actually pretty good, she only used her morphine twice yesterday, did not take any before bed.  There has been no recent nausea or any vomiting.  She has not noticed any change in urination.    Allergies:  Allergies   Allergen Reactions    Metronidazole Anaphylaxis and Hives     Other reaction(s): Throat swelling  Throat swelling 6 hrs after exposure  Itching and hives 2 hrs after exposure    Shellfish Allergy Anxiety, Diarrhea, Difficulty breathing, Hives, Itching, Rash, Shortness Of Breath and Swelling    Codeine Headache    Fish-Derived Products      Stopped fish oil and less stomach discomfort  Rash after eating fish.     Hydrocodone-Acetaminophen Other (See Comments)    Sertraline Other (See Comments)     Patient was foggy and \"high\" feeling    Shellfish-Derived Products      Lips get numb, throat gets scratchy, rashes       Problem List:    Patient Active Problem List    Diagnosis Date Noted    Fibromyalgia 01/29/2025     Priority: Medium    Genital herpes " 11/03/2024     Priority: Medium    Left-sided low back pain with left-sided sciatica 03/08/2023     Priority: Medium    FH: colon cancer 06/22/2022     Priority: Medium     Will need colonoscopy at 34 years old.       Anaphylactic shock due to shellfish, initial encounter 06/22/2022     Priority: Medium    Bipolar II disorder (H) 04/28/2022     Priority: Medium    Borderline personality disorder in adult (H) 04/28/2022     Priority: Medium    Severe episode of recurrent major depressive disorder, without psychotic features (H) 10/15/2021     Priority: Medium    Lumbar radiculopathy 07/19/2021     Priority: Medium     Added automatically from request for surgery [id number]      Suicide attempt (H) 11/01/2011     Priority: Medium     Last 2/16/19 with intentional benadryl overdose          Past Medical History:    Past Medical History:   Diagnosis Date    Bipolar disorder (H)     Complication of anesthesia     Depressive disorder 2011       Past Surgical History:    Past Surgical History:   Procedure Laterality Date    DISCECTOMY LUMBAR POSTERIOR MICROSCOPIC ONE LEVEL Left 08/24/2021    Procedure: Minimally invasive left Lumbar 5 to Sacral 1 microdiscectomy  ;  Surgeon: Destin Junior MD;  Location: [facility] OR    GYN SURGERY Right     cystectomy - with salpingectomy    HEAD & NECK SURGERY      tonsillectomy    HEAD & NECK SURGERY      wisdom teeth extraction       Family History:    Family History   Problem Relation Age of Onset    Hypertension Mother     Colon Polyps Mother 50    Substance Abuse Father     Colon Cancer Father 60    Diabetes Maternal Grandmother     Hypertension Maternal Grandmother     Colon Cancer Maternal Grandmother     Diabetes Paternal Grandmother        Social History:  Marital Status:  Single [1]  Social History     Tobacco Use    Smoking status: Former     Current packs/day: 0.00     Types: Cigarettes, Vaping Device     Passive exposure: Current    Smokeless tobacco: Never   Vaping Use    Vaping  status: Some Days    Substances: Nicotine    Devices: Tangled   Substance Use Topics    Alcohol use: Not Currently     Comment: 2 drinks every other day     Drug use: Not Currently        Medications:    albuterol (PROAIR HFA/PROVENTIL HFA/VENTOLIN HFA) 108 (90 Base) MCG/ACT inhaler  amphetamine-dextroamphetamine (ADDERALL XR) 30 MG 24 hr capsule  azelaic acid (FINACIA) 15 % external gel  cephALEXin (KEFLEX) 500 MG capsule  clobetasol (TEMOVATE) 0.05 % external solution  clonazePAM (KLONOPIN) 0.5 MG tablet  diazepam (VALIUM) 10 MG tablet  docusate sodium (COLACE) 100 MG capsule  doxepin (SINEQUAN) 10 MG capsule  EPINEPHrine (ANY BX GENERIC EQUIV) 0.3 MG/0.3ML injection 2-pack  Fremanezumab-vfrm (AJOVY) 225 MG/1.5ML SOAJ  furosemide (LASIX) 20 MG tablet  glucosamine-chondroitin 500-400 MG CAPS per capsule  hydrOXYzine (VISTARIL) 50 MG capsule  ipratropium-albuterol (COMBIVENT RESPIMAT)  MCG/ACT inhaler  ketoconazole (NIZORAL) 2 % external cream  lamoTRIgine (LAMICTAL) 100 MG tablet  lithium (ESKALITH) 600 MG capsule  magnesium oxide (MAG-OX) 400 MG tablet  morphine (MSIR) 15 MG IR tablet  mupirocin (BACTROBAN) 2 % external ointment  NARCAN 4 MG/0.1ML nasal spray  ondansetron (ZOFRAN ODT) 4 MG ODT tab  prazosin (MINIPRESS) 2 MG capsule  prazosin (MINIPRESS) 5 MG capsule  pregabalin (LYRICA) 100 MG capsule  pregabalin (LYRICA) 150 MG capsule  propranolol (INDERAL) 20 MG tablet  propranolol ER (INDERAL LA) 80 MG 24 hr capsule  QUEtiapine (SEROQUEL) 300 MG tablet  RETIN-A 0.025 % external cream  Semaglutide-Weight Management (WEGOVY) 0.5 MG/0.5ML pen  sulfacetamide sodium, Acne, 10 % lotion  tamsulosin (FLOMAX) 0.4 MG capsule  tiZANidine (ZANAFLEX) 4 MG tablet  triamcinolone (KENALOG) 0.1 % external cream  ubrogepant (UBRELVY) 100 MG tablet  valACYclovir (VALTREX) 1000 mg tablet          Review of Systems   All other systems reviewed and are negative.      Physical Exam   BP: 112/66  Pulse: 90  Temp: 98.5  " F (36.9  C)  Resp: 20  Height: 175.3 cm (5' 9\")  Weight: 109.8 kg (242 lb)  SpO2: 99 %      Physical Exam  Vitals and nursing note reviewed.   Constitutional:       General: She is not in acute distress.     Appearance: Normal appearance. She is not ill-appearing.   Cardiovascular:      Pulses: Decreased pulses.           Dorsalis pedis pulses are detected w/ Doppler on the right side and detected w/ Doppler on the left side.   Musculoskeletal:         General: Tenderness present.      Comments: Patient has tenderness over the calf area and lateral aspect of the left leg, there is no firmness noted.   Skin:     General: Skin is warm and dry.      Capillary Refill: Capillary refill takes less than 2 seconds.   Neurological:      Mental Status: She is alert.         ED Course        Procedures           Results for orders placed or performed during the hospital encounter of 04/27/25 (from the past 24 hours)   CBC with platelets differential    Narrative    The following orders were created for panel order CBC with platelets differential.  Procedure                               Abnormality         Status                     ---------                               -----------         ------                     CBC with platelets and ...[[id number]]  Abnormal            Final result                 Please view results for these tests on the individual orders.   Basic metabolic panel   Result Value Ref Range    Sodium 139 135 - 145 mmol/L    Potassium 4.3 3.4 - 5.3 mmol/L    Chloride 101 98 - 107 mmol/L    Carbon Dioxide (CO2) 28 22 - 29 mmol/L    Anion Gap 10 7 - 15 mmol/L    Urea Nitrogen 11.3 6.0 - 20.0 mg/dL    Creatinine 0.78 0.51 - 0.95 mg/dL    GFR Estimate >90 >60 mL/min/1.73m2    Calcium 10.0 8.8 - 10.4 mg/dL    Glucose 88 70 - 99 mg/dL   CK total   Result Value Ref Range     (H) 26 - 192 U/L   CBC with platelets and differential   Result Value Ref Range    WBC Count 10.0 4.0 - 11.0 10e3/uL    RBC Count " 5.58 (H) 3.80 - 5.20 10e6/uL    Hemoglobin 14.2 11.7 - 15.7 g/dL    Hematocrit 43.3 35.0 - 47.0 %    MCV 78 78 - 100 fL    MCH 25.4 (L) 26.5 - 33.0 pg    MCHC 32.8 31.5 - 36.5 g/dL    RDW 14.6 10.0 - 15.0 %    Platelet Count 269 150 - 450 10e3/uL    % Neutrophils 70 %    % Lymphocytes 18 %    % Monocytes 6 %    % Eosinophils 5 %    % Basophils 1 %    % Immature Granulocytes 1 %    NRBCs per 100 WBC 0 <1 /100    Absolute Neutrophils 7.0 1.6 - 8.3 10e3/uL    Absolute Lymphocytes 1.8 0.8 - 5.3 10e3/uL    Absolute Monocytes 0.6 0.0 - 1.3 10e3/uL    Absolute Eosinophils 0.5 0.0 - 0.7 10e3/uL    Absolute Basophils 0.1 0.0 - 0.2 10e3/uL    Absolute Immature Granulocytes 0.1 <=0.4 10e3/uL    Absolute NRBCs 0.0 10e3/uL   UA with Microscopic reflex to Culture    Specimen: Urine, NOS   Result Value Ref Range    Color Urine Yellow Colorless, Straw, Light Yellow, Yellow    Appearance Urine Clear Clear    Glucose Urine Negative Negative mg/dL    Bilirubin Urine Negative Negative    Ketones Urine Negative Negative mg/dL    Specific Gravity Urine 1.017 1.003 - 1.035    Blood Urine Negative Negative    pH Urine 6.5 5.0 - 7.0    Protein Albumin Urine Negative Negative mg/dL    Urobilinogen Urine Normal Normal mg/dL    Nitrite Urine Negative Negative    Leukocyte Esterase Urine Small (A) Negative    Bacteria Urine Few (A) None Seen /HPF    Mucus Urine Present (A) None Seen /LPF    RBC Urine 2 <=2 /HPF    WBC Urine 15 (H) <=5 /HPF    Squamous Epithelials Urine 2 (H) <=1 /HPF    Hyaline Casts Urine 5 (H) <=2 /LPF    Narrative    Urine Culture ordered based on laboratory criteria       Medications   HYDROmorphone (PF) (DILAUDID) injection 0.5 mg (0.5 mg Intravenous $Given 4/27/25 1219)   morphine (PF) injection 4 mg (4 mg Intravenous $Given 4/27/25 1059)   sodium chloride 0.9% BOLUS 1,000 mL (0 mLs Intravenous Stopped 4/27/25 1000)     This is a 29-year-old female with lower extremity pain for a number of months.  Initially was more in  her right leg but then recently in the past few weeks is more pain in her left hip and left leg.  Patient has had some unexplained elevations in her CK level and was seen here 5 days ago for the same problem.  Ultimately discharged home.  Patient followed up with neurology who did recommend working with rheumatology to get a muscle biopsy this has not been set up yet.  Patient comes in today because the pain is much worse and she is having trouble walking and really can get out of bed.  This pain is much different than has been before.  On arrival vitals were stable, she did have diminished pulses in her left foot but they were able to be found with Doppler ultrasound.  Patient was given few doses of morphine and she states that her pain is a little bit better but is still having significant pain.  Patient's CK level had been trending down but seems to be trending back up again.  I consulted with rheumatology at the [facility] and they did not have any good explanations or thoughts over the phone.  With this being more in 1 leg, this seems to go against some type of myositis or other rheumatological condition as a cause of the elevated CK or pain.  They agree that patient does need a muscle biopsy as neurology recently recommended.  They state that we are still having trouble getting her pain under control, it might not be a bad idea to look at admitting the patient for pain control and more workup for the symptoms including somewhere where they could do a muscle biopsy and somewhere where rheumatology and neurology can consult.  I went to reevaluate the patient and she still is having a fair amount of pain and feels uncomfortable going home.  She is very, very frustrated that she cannot get any answers on what is going on and feels like things are continuing to get worse.  I am concerned about trying to send her home again as I feel like she will be right back here again as the symptoms are getting progressive.  I  think it would be reasonable to look at bringing the patient to the hospital release even for observation for rheumatology and/or neurology to consult with the patient and see if there is any other thoughts or other options.  Possibly even moving up the muscle biopsy may be beneficial.  Will call around and see if there is a bed available.  I was able to speak to the triage hospitalist at the [facility] and they will accept the patient for an observation stay.    Assessments & Plan (with Medical Decision Making)  Left leg pain, nontraumatic rhabdomyolysis     I have reviewed the nursing notes.    I have reviewed the findings, diagnosis, plan and need for follow up with the patient.      New Prescriptions    No medications on file       Final diagnoses:   Non-traumatic rhabdomyolysis   Pain of left lower leg       4/27/2025   [facility] EMERGENCY DEPT       Judah Vieira MD  04/27/25 9824       Judah Vieira MD  04/27/25 8928

## 2025-04-27 NOTE — PROGRESS NOTES
8MS Admission Note    Reason for admission: Bilateral Lower Extremities pain  Primary team notified of pt arrival.  Admitted from: [facility]  Via: EMS  Accompanied by: EMS  Belongings: Placed in closet  Admission Required Doc Completed: No  Mobility Devices Utilized by Patient Provided (i.e. walker, wheelchair, etc.): No  Teaching: Orientation to unit and call light- call light within reach, use of console, meal times, when to call for the RN, and enforced importance of safety.  IV Access: Yes  Telemetry: No  Ht./Wt.: Completed  Code Status verified on armband: Yes  2 RN Skin Assessment Completed with: John Gonzáles  Suction/Ambu bag/Flowmeter at bedside: Yes    Pt status:     Temp:  [97.7  F (36.5  C)] 97.7  F (36.5  C)  Pulse:  [78] 78  Resp:  [18] 18  BP: (122)/(86) 122/86  SpO2:  [98 %] 98 %

## 2025-04-27 NOTE — PHARMACY-ADMISSION MEDICATION HISTORY
Pharmacist Admission Medication History    Admission medication history is complete. The information provided in this note is only as accurate as the sources available at the time of the update.    Information Source(s): Patient and CareEverywhere/SureScripts via in-person    Pertinent Information:   Patient is a reliable historian; reports the following information:   Re-titrating Lamictal back up to 100 mg BID; current dose is 100 mg AM / 50 mg PM as of 4/27 (it is supposed to increase today 4/27 to this dose and then back to 100 mg BID in a week)   Takes Lyrica 150 mg TID (AM, noon, dinner) and additional 100 mg at bedtime   Has not used Wegovy since December (on hold for upcoming surgeries, which did not end up happening)   Prescribed a variety of topical medications and seemed overwhelmed by sheer number, understandably; would benefit from dermatology or PCP to clarify what ones still need to be ordered vs when to take what, etc.     Changes made to PTA medication list:  Added: None  Deleted: mupirocin, Keflex (was supposed to take before surgery, did not end up happening)   Changed: hydroxyzine PRN > TID, Lamictal dosing, prazosin dosing, Lyrica time of day     Allergies reviewed with patient and updates made in EHR: yes    Medication History Completed By: MALIK CASPER [facility] 4/27/2025 5:07 PM    PTA Med List   Medication Sig Last Dose/Taking    albuterol (PROAIR HFA/PROVENTIL HFA/VENTOLIN HFA) 108 (90 Base) MCG/ACT inhaler Inhale 2 puffs into the lungs every 6 hours as needed for shortness of breath, wheezing or cough Past Month    amphetamine-dextroamphetamine (ADDERALL XR) 30 MG 24 hr capsule Take 30 mg by mouth daily as needed. Past Week    azelaic acid (FINACIA) 15 % external gel Apply topically 2 times daily as needed. Past Week    clobetasol (TEMOVATE) 0.05 % external solution APPLY TOPICALLY TO THE AFFECTED AREA TWICE DAILY FOR UP TO 21 DAYS. DO NOT APPLY TO NORMAL SKIN Past Week    clonazePAM  (KLONOPIN) 0.5 MG tablet Take 0.5 mg by mouth daily as needed for anxiety. Past Week    diazepam (VALIUM) 10 MG tablet PLACE 1 TABLET VAGINALLY AS NEEDED FOR URINARY RETENTION OR FOR PAIN Past Week    docusate sodium (COLACE) 100 MG capsule Take 200 mg by mouth 2 times daily. 4/26/2025    doxepin (SINEQUAN) 10 MG capsule Take 10 mg by mouth At Bedtime 4/26/2025    furosemide (LASIX) 20 MG tablet Take 1 tablet (20 mg) by mouth daily. 4/26/2025    glucosamine-chondroitin 500-400 MG CAPS per capsule Take 1 capsule by mouth daily. 4/26/2025    hydrOXYzine (VISTARIL) 50 MG capsule Take 100 mg by mouth 3 times daily. 4/26/2025    ipratropium-albuterol (COMBIVENT RESPIMAT)  MCG/ACT inhaler Inhale 1 puff into the lungs 4 times daily as needed for shortness of breath, wheezing or cough Past Month    ketoconazole (NIZORAL) 2 % external shampoo Apply topically twice a week. Past Week    lamoTRIgine (LAMICTAL) 100 MG tablet Take  mg by mouth 2 times daily. 100 mg AM / 50 mg PM as of 4/27 - pt in process of titrating back up 4/26/2025    lithium (ESKALITH) 600 MG capsule Take 600 mg by mouth at bedtime. 4/26/2025    magnesium oxide (MAG-OX) 400 MG tablet Take 400 mg by mouth daily. 4/26/2025    morphine (MSIR) 15 MG IR tablet Take 15 mg by mouth 3 times daily as needed for pain. 4/26/2025    ondansetron (ZOFRAN ODT) 4 MG ODT tab DISSOLVE 1 TABLET(4 MG) ON THE TONGUE EVERY 8 HOURS AS NEEDED FOR NAUSEA Past Month    pregabalin (LYRICA) 100 MG capsule Take 100 mg by mouth at bedtime. 4/26/2025    pregabalin (LYRICA) 150 MG capsule Take 150 mg by mouth 3 times daily. AM / noon / dinner 4/26/2025    propranolol (INDERAL) 20 MG tablet Take 1 tablet (20 mg) by mouth 2 times daily as needed (tremor). Past Week    propranolol ER (INDERAL LA) 80 MG 24 hr capsule Take 1 capsule (80 mg) by mouth daily. 4/26/2025    QUEtiapine (SEROQUEL) 300 MG tablet Take 300 mg by mouth At Bedtime 4/26/2025    Semaglutide-Weight Management  (WEGOVY) 0.5 MG/0.5ML pen Inject 0.5 mg subcutaneously once a week. More than a month    sulfacetamide sodium, Acne, 10 % lotion APPLY IT TO THE FACE ONCE DAILY IN THE MORNING X3 MONTHS Past Week    tamsulosin (FLOMAX) 0.4 MG capsule Take 1 capsule (0.4 mg) by mouth every evening. 4/26/2025    tiZANidine (ZANAFLEX) 4 MG tablet TAKE 1 TABLET BY MOUTH EVERY 8 TO 12 HOURS AS NEEDED. NOT TO EXCEED 3 DOSES IN 24 HOURS Past Week    triamcinolone (KENALOG) 0.1 % external cream Apply topically 2 times daily as needed. More than a month    ubrogepant (UBRELVY) 100 MG tablet Take 1 tablet (100 mg) by mouth at onset of headache. Past Week

## 2025-04-27 NOTE — ED TRIAGE NOTES
Patient comes in with bilateral leg pain that woke her up this morning. Was unable to get out of bed or walk. Has prescription for morphine but last took it yesterday. EMS was able to ambulate her up some stairs with assist of 2. This has been going on for about 6 months-1 year but patient states has never not been able to get out of bed.      Triage Assessment (Adult)       Row Name 04/27/25 0842          Triage Assessment    Airway WDL WDL        Respiratory WDL    Respiratory WDL WDL        Skin Circulation/Temperature WDL    Skin Circulation/Temperature WDL WDL        Cardiac WDL    Cardiac WDL WDL        Peripheral/Neurovascular WDL    Peripheral Neurovascular WDL WDL        Cognitive/Neuro/Behavioral WDL    Cognitive/Neuro/Behavioral WDL WDL

## 2025-04-28 ENCOUNTER — PATIENT OUTREACH (OUTPATIENT)
Dept: CARE COORDINATION | Facility: CLINIC | Age: 30
End: 2025-04-28

## 2025-04-28 PROBLEM — R74.8 ELEVATED CK: Status: ACTIVE | Noted: 2025-04-28

## 2025-04-28 LAB
ALBUMIN SERPL BCG-MCNC: 3.8 G/DL (ref 3.5–5.2)
ALBUMIN SERPL BCG-MCNC: 3.9 G/DL (ref 3.5–5.2)
ALBUMIN UR-MCNC: NEGATIVE MG/DL
ALP SERPL-CCNC: 79 U/L (ref 40–150)
ALP SERPL-CCNC: 80 U/L (ref 40–150)
ALT SERPL W P-5'-P-CCNC: 81 U/L (ref 0–50)
ALT SERPL W P-5'-P-CCNC: 95 U/L (ref 0–50)
AMMONIA PLAS-SCNC: 22 UMOL/L (ref 11–51)
ANION GAP SERPL CALCULATED.3IONS-SCNC: 10 MMOL/L (ref 7–15)
ANION GAP SERPL CALCULATED.3IONS-SCNC: 7 MMOL/L (ref 7–15)
APPEARANCE UR: ABNORMAL
APTT PPP: 31 SECONDS (ref 22–38)
AST SERPL W P-5'-P-CCNC: 115 U/L (ref 0–45)
AST SERPL W P-5'-P-CCNC: 151 U/L (ref 0–45)
BACTERIA #/AREA URNS HPF: ABNORMAL /HPF
BACTERIA UR CULT: NORMAL
BASOPHILS # BLD AUTO: 0 10E3/UL (ref 0–0.2)
BASOPHILS # BLD AUTO: 0.1 10E3/UL (ref 0–0.2)
BASOPHILS NFR BLD AUTO: 1 %
BASOPHILS NFR BLD AUTO: 1 %
BILIRUB SERPL-MCNC: 0.3 MG/DL
BILIRUB SERPL-MCNC: 0.5 MG/DL
BILIRUB UR QL STRIP: NEGATIVE
BUN SERPL-MCNC: 7.1 MG/DL (ref 6–20)
BUN SERPL-MCNC: 8 MG/DL (ref 6–20)
CALCIUM SERPL-MCNC: 9.5 MG/DL (ref 8.8–10.4)
CALCIUM SERPL-MCNC: 9.8 MG/DL (ref 8.8–10.4)
CHLORIDE SERPL-SCNC: 101 MMOL/L (ref 98–107)
CHLORIDE SERPL-SCNC: 102 MMOL/L (ref 98–107)
CK SERPL-CCNC: 2537 U/L (ref 26–192)
CK SERPL-CCNC: 2788 U/L (ref 26–192)
COLOR UR AUTO: ABNORMAL
CREAT SERPL-MCNC: 0.79 MG/DL (ref 0.51–0.95)
CREAT SERPL-MCNC: 0.81 MG/DL (ref 0.51–0.95)
D DIMER PPP FEU-MCNC: <0.27 UG/ML FEU (ref 0–0.5)
EGFRCR SERPLBLD CKD-EPI 2021: >90 ML/MIN/1.73M2
EGFRCR SERPLBLD CKD-EPI 2021: >90 ML/MIN/1.73M2
EOSINOPHIL # BLD AUTO: 0.5 10E3/UL (ref 0–0.7)
EOSINOPHIL # BLD AUTO: 0.6 10E3/UL (ref 0–0.7)
EOSINOPHIL NFR BLD AUTO: 9 %
EOSINOPHIL NFR BLD AUTO: 9 %
ERYTHROCYTE [DISTWIDTH] IN BLOOD BY AUTOMATED COUNT: 14.5 % (ref 10–15)
ERYTHROCYTE [DISTWIDTH] IN BLOOD BY AUTOMATED COUNT: 14.6 % (ref 10–15)
FIBRINOGEN PPP-MCNC: 383 MG/DL (ref 170–510)
GLUCOSE SERPL-MCNC: 116 MG/DL (ref 70–99)
GLUCOSE SERPL-MCNC: 98 MG/DL (ref 70–99)
GLUCOSE UR STRIP-MCNC: NEGATIVE MG/DL
HCO3 SERPL-SCNC: 26 MMOL/L (ref 22–29)
HCO3 SERPL-SCNC: 27 MMOL/L (ref 22–29)
HCT VFR BLD AUTO: 40.3 % (ref 35–47)
HCT VFR BLD AUTO: 41 % (ref 35–47)
HGB BLD-MCNC: 13.2 G/DL (ref 11.7–15.7)
HGB BLD-MCNC: 13.4 G/DL (ref 11.7–15.7)
HGB UR QL STRIP: NEGATIVE
HOLD SPECIMEN: NORMAL
HYALINE CASTS: 19 /LPF
IMM GRANULOCYTES # BLD: 0 10E3/UL
IMM GRANULOCYTES # BLD: 0 10E3/UL
IMM GRANULOCYTES NFR BLD: 0 %
IMM GRANULOCYTES NFR BLD: 1 %
INR PPP: 1.01 (ref 0.85–1.15)
KETONES UR STRIP-MCNC: NEGATIVE MG/DL
LACTATE SERPL-SCNC: 0.7 MMOL/L (ref 0.7–2)
LEUKOCYTE ESTERASE UR QL STRIP: ABNORMAL
LITHIUM SERPL-SCNC: 0.81 MMOL/L (ref 0.6–1.2)
LYMPHOCYTES # BLD AUTO: 1.4 10E3/UL (ref 0.8–5.3)
LYMPHOCYTES # BLD AUTO: 2.1 10E3/UL (ref 0.8–5.3)
LYMPHOCYTES NFR BLD AUTO: 25 %
LYMPHOCYTES NFR BLD AUTO: 31 %
MAGNESIUM SERPL-MCNC: 1.7 MG/DL (ref 1.7–2.3)
MCH RBC QN AUTO: 25.3 PG (ref 26.5–33)
MCH RBC QN AUTO: 25.4 PG (ref 26.5–33)
MCHC RBC AUTO-ENTMCNC: 32.7 G/DL (ref 31.5–36.5)
MCHC RBC AUTO-ENTMCNC: 32.8 G/DL (ref 31.5–36.5)
MCV RBC AUTO: 77 FL (ref 78–100)
MCV RBC AUTO: 78 FL (ref 78–100)
MONOCYTES # BLD AUTO: 0.4 10E3/UL (ref 0–1.3)
MONOCYTES # BLD AUTO: 0.5 10E3/UL (ref 0–1.3)
MONOCYTES NFR BLD AUTO: 6 %
MONOCYTES NFR BLD AUTO: 7 %
MUCOUS THREADS #/AREA URNS LPF: PRESENT /LPF
NEUTROPHILS # BLD AUTO: 3.3 10E3/UL (ref 1.6–8.3)
NEUTROPHILS # BLD AUTO: 3.4 10E3/UL (ref 1.6–8.3)
NEUTROPHILS NFR BLD AUTO: 51 %
NEUTROPHILS NFR BLD AUTO: 58 %
NITRATE UR QL: NEGATIVE
NRBC # BLD AUTO: 0 10E3/UL
NRBC # BLD AUTO: 0 10E3/UL
NRBC BLD AUTO-RTO: 0 /100
NRBC BLD AUTO-RTO: 0 /100
PH UR STRIP: 5.5 [PH] (ref 5–7)
PHOSPHATE SERPL-MCNC: 4.5 MG/DL (ref 2.5–4.5)
PLATELET # BLD AUTO: 251 10E3/UL (ref 150–450)
PLATELET # BLD AUTO: 255 10E3/UL (ref 150–450)
POTASSIUM SERPL-SCNC: 3.7 MMOL/L (ref 3.4–5.3)
POTASSIUM SERPL-SCNC: 3.9 MMOL/L (ref 3.4–5.3)
PROT SERPL-MCNC: 6.1 G/DL (ref 6.4–8.3)
PROT SERPL-MCNC: 6.1 G/DL (ref 6.4–8.3)
RBC # BLD AUTO: 5.22 10E6/UL (ref 3.8–5.2)
RBC # BLD AUTO: 5.27 10E6/UL (ref 3.8–5.2)
RBC URINE: 3 /HPF
RETICS # AUTO: 0.12 10E6/UL (ref 0.03–0.1)
RETICS/RBC NFR AUTO: 2.2 % (ref 0.5–2)
SODIUM SERPL-SCNC: 136 MMOL/L (ref 135–145)
SODIUM SERPL-SCNC: 137 MMOL/L (ref 135–145)
SP GR UR STRIP: 1.01 (ref 1–1.03)
SQUAMOUS EPITHELIAL: 9 /HPF
TRANSITIONAL EPI: <1 /HPF
UROBILINOGEN UR STRIP-MCNC: NORMAL MG/DL
WBC # BLD AUTO: 5.6 10E3/UL (ref 4–11)
WBC # BLD AUTO: 6.7 10E3/UL (ref 4–11)
WBC CLUMPS #/AREA URNS HPF: PRESENT /HPF
WBC URINE: 30 /HPF

## 2025-04-28 PROCEDURE — 82550 ASSAY OF CK (CPK): CPT

## 2025-04-28 PROCEDURE — 36415 COLL VENOUS BLD VENIPUNCTURE: CPT

## 2025-04-28 PROCEDURE — 250N000013 HC RX MED GY IP 250 OP 250 PS 637

## 2025-04-28 PROCEDURE — 82043 UR ALBUMIN QUANTITATIVE: CPT | Performed by: INTERNAL MEDICINE

## 2025-04-28 PROCEDURE — 99418 PROLNG IP/OBS E/M EA 15 MIN: CPT | Mod: FS

## 2025-04-28 PROCEDURE — 250N000011 HC RX IP 250 OP 636: Mod: JZ

## 2025-04-28 PROCEDURE — 99255 IP/OBS CONSLTJ NEW/EST HI 80: CPT | Mod: FS

## 2025-04-28 PROCEDURE — 85379 FIBRIN DEGRADATION QUANT: CPT

## 2025-04-28 PROCEDURE — 87521 HEPATITIS C PROBE&RVRS TRNSC: CPT | Performed by: STUDENT IN AN ORGANIZED HEALTH CARE EDUCATION/TRAINING PROGRAM

## 2025-04-28 PROCEDURE — 36415 COLL VENOUS BLD VENIPUNCTURE: CPT | Performed by: FAMILY MEDICINE

## 2025-04-28 PROCEDURE — 82140 ASSAY OF AMMONIA: CPT | Performed by: STUDENT IN AN ORGANIZED HEALTH CARE EDUCATION/TRAINING PROGRAM

## 2025-04-28 PROCEDURE — 82787 IGG 1 2 3 OR 4 EACH: CPT | Performed by: INTERNAL MEDICINE

## 2025-04-28 PROCEDURE — 85025 COMPLETE CBC W/AUTO DIFF WBC: CPT

## 2025-04-28 PROCEDURE — 84100 ASSAY OF PHOSPHORUS: CPT

## 2025-04-28 PROCEDURE — 83516 IMMUNOASSAY NONANTIBODY: CPT | Performed by: STUDENT IN AN ORGANIZED HEALTH CARE EDUCATION/TRAINING PROGRAM

## 2025-04-28 PROCEDURE — 85610 PROTHROMBIN TIME: CPT

## 2025-04-28 PROCEDURE — 86255 FLUORESCENT ANTIBODY SCREEN: CPT | Performed by: STUDENT IN AN ORGANIZED HEALTH CARE EDUCATION/TRAINING PROGRAM

## 2025-04-28 PROCEDURE — 85045 AUTOMATED RETICULOCYTE COUNT: CPT

## 2025-04-28 PROCEDURE — 120N000002 HC R&B MED SURG/OB UMMC

## 2025-04-28 PROCEDURE — 258N000003 HC RX IP 258 OP 636

## 2025-04-28 PROCEDURE — 85049 AUTOMATED PLATELET COUNT: CPT

## 2025-04-28 PROCEDURE — 99222 1ST HOSP IP/OBS MODERATE 55: CPT | Mod: AI

## 2025-04-28 PROCEDURE — 250N000013 HC RX MED GY IP 250 OP 250 PS 637: Performed by: FAMILY MEDICINE

## 2025-04-28 PROCEDURE — 82085 ASSAY OF ALDOLASE: CPT | Performed by: STUDENT IN AN ORGANIZED HEALTH CARE EDUCATION/TRAINING PROGRAM

## 2025-04-28 PROCEDURE — 85060 BLOOD SMEAR INTERPRETATION: CPT | Performed by: STUDENT IN AN ORGANIZED HEALTH CARE EDUCATION/TRAINING PROGRAM

## 2025-04-28 PROCEDURE — 84156 ASSAY OF PROTEIN URINE: CPT | Performed by: INTERNAL MEDICINE

## 2025-04-28 PROCEDURE — 80053 COMPREHEN METABOLIC PANEL: CPT

## 2025-04-28 PROCEDURE — 81001 URINALYSIS AUTO W/SCOPE: CPT

## 2025-04-28 PROCEDURE — 36415 COLL VENOUS BLD VENIPUNCTURE: CPT | Performed by: STUDENT IN AN ORGANIZED HEALTH CARE EDUCATION/TRAINING PROGRAM

## 2025-04-28 PROCEDURE — 87798 DETECT AGENT NOS DNA AMP: CPT

## 2025-04-28 PROCEDURE — 85384 FIBRINOGEN ACTIVITY: CPT

## 2025-04-28 PROCEDURE — 84450 TRANSFERASE (AST) (SGOT): CPT

## 2025-04-28 PROCEDURE — 83735 ASSAY OF MAGNESIUM: CPT

## 2025-04-28 PROCEDURE — 99255 IP/OBS CONSLTJ NEW/EST HI 80: CPT | Mod: GC | Performed by: INTERNAL MEDICINE

## 2025-04-28 PROCEDURE — 83605 ASSAY OF LACTIC ACID: CPT

## 2025-04-28 PROCEDURE — 87086 URINE CULTURE/COLONY COUNT: CPT

## 2025-04-28 PROCEDURE — 87476 LYME DIS DNA AMP PROBE: CPT

## 2025-04-28 PROCEDURE — 80178 ASSAY OF LITHIUM: CPT

## 2025-04-28 PROCEDURE — 82397 CHEMILUMINESCENT ASSAY: CPT | Performed by: INTERNAL MEDICINE

## 2025-04-28 PROCEDURE — 85730 THROMBOPLASTIN TIME PARTIAL: CPT

## 2025-04-28 RX ORDER — RIZATRIPTAN BENZOATE 5 MG/1
5 TABLET, ORALLY DISINTEGRATING ORAL
Status: DISCONTINUED | OUTPATIENT
Start: 2025-04-28 | End: 2025-04-28

## 2025-04-28 RX ORDER — SODIUM CHLORIDE, SODIUM LACTATE, POTASSIUM CHLORIDE, CALCIUM CHLORIDE 600; 310; 30; 20 MG/100ML; MG/100ML; MG/100ML; MG/100ML
INJECTION, SOLUTION INTRAVENOUS CONTINUOUS
Status: DISCONTINUED | OUTPATIENT
Start: 2025-04-28 | End: 2025-04-29

## 2025-04-28 RX ORDER — HYDROMORPHONE HYDROCHLORIDE 1 MG/ML
0.5 INJECTION, SOLUTION INTRAMUSCULAR; INTRAVENOUS; SUBCUTANEOUS EVERY 6 HOURS PRN
Status: DISCONTINUED | OUTPATIENT
Start: 2025-04-28 | End: 2025-04-29

## 2025-04-28 RX ADMIN — TAMSULOSIN HYDROCHLORIDE 0.4 MG: 0.4 CAPSULE ORAL at 20:18

## 2025-04-28 RX ADMIN — PREGABALIN 150 MG: 75 CAPSULE ORAL at 09:03

## 2025-04-28 RX ADMIN — FUROSEMIDE 20 MG: 20 TABLET ORAL at 09:03

## 2025-04-28 RX ADMIN — SODIUM CHLORIDE, SODIUM LACTATE, POTASSIUM CHLORIDE, AND CALCIUM CHLORIDE 1000 ML: .6; .31; .03; .02 INJECTION, SOLUTION INTRAVENOUS at 22:00

## 2025-04-28 RX ADMIN — MORPHINE SULFATE 15 MG: 15 TABLET ORAL at 15:21

## 2025-04-28 RX ADMIN — HYDROXYZINE HYDROCHLORIDE 100 MG: 50 TABLET ORAL at 09:03

## 2025-04-28 RX ADMIN — QUETIAPINE FUMARATE 300 MG: 100 TABLET ORAL at 21:44

## 2025-04-28 RX ADMIN — POLYETHYLENE GLYCOL 3350 17 G: 17 POWDER, FOR SOLUTION ORAL at 20:18

## 2025-04-28 RX ADMIN — SODIUM CHLORIDE, SODIUM LACTATE, POTASSIUM CHLORIDE, AND CALCIUM CHLORIDE: .6; .31; .03; .02 INJECTION, SOLUTION INTRAVENOUS at 23:32

## 2025-04-28 RX ADMIN — MORPHINE SULFATE 15 MG: 15 TABLET ORAL at 09:01

## 2025-04-28 RX ADMIN — LAMOTRIGINE 50 MG: 25 TABLET ORAL at 15:10

## 2025-04-28 RX ADMIN — MORPHINE SULFATE 15 MG: 15 TABLET ORAL at 21:44

## 2025-04-28 RX ADMIN — SODIUM CHLORIDE, SODIUM LACTATE, POTASSIUM CHLORIDE, AND CALCIUM CHLORIDE: .6; .31; .03; .02 INJECTION, SOLUTION INTRAVENOUS at 17:52

## 2025-04-28 RX ADMIN — SODIUM CHLORIDE: 0.9 INJECTION, SOLUTION INTRAVENOUS at 00:10

## 2025-04-28 RX ADMIN — HYDROXYZINE HYDROCHLORIDE 100 MG: 50 TABLET ORAL at 15:10

## 2025-04-28 RX ADMIN — SODIUM CHLORIDE: 0.9 INJECTION, SOLUTION INTRAVENOUS at 02:17

## 2025-04-28 RX ADMIN — PRAZOSIN HYDROCHLORIDE 5 MG: 5 CAPSULE ORAL at 21:45

## 2025-04-28 RX ADMIN — LITHIUM CARBONATE 600 MG: 600 CAPSULE ORAL at 21:45

## 2025-04-28 RX ADMIN — TIZANIDINE 4 MG: 2 TABLET ORAL at 20:18

## 2025-04-28 RX ADMIN — SODIUM CHLORIDE, SODIUM LACTATE, POTASSIUM CHLORIDE, AND CALCIUM CHLORIDE 100 ML/HR: .6; .31; .03; .02 INJECTION, SOLUTION INTRAVENOUS at 17:59

## 2025-04-28 RX ADMIN — DOXEPIN HYDROCHLORIDE 10 MG: 10 CAPSULE ORAL at 21:45

## 2025-04-28 RX ADMIN — HYDROMORPHONE HYDROCHLORIDE 0.5 MG: 1 INJECTION, SOLUTION INTRAMUSCULAR; INTRAVENOUS; SUBCUTANEOUS at 23:52

## 2025-04-28 RX ADMIN — PREGABALIN 150 MG: 75 CAPSULE ORAL at 12:12

## 2025-04-28 RX ADMIN — SENNOSIDES AND DOCUSATE SODIUM 2 TABLET: 50; 8.6 TABLET ORAL at 20:18

## 2025-04-28 RX ADMIN — HYDROMORPHONE HYDROCHLORIDE 0.5 MG: 1 INJECTION, SOLUTION INTRAMUSCULAR; INTRAVENOUS; SUBCUTANEOUS at 17:52

## 2025-04-28 RX ADMIN — ACETAMINOPHEN 975 MG: 325 TABLET ORAL at 09:01

## 2025-04-28 RX ADMIN — PREGABALIN 100 MG: 100 CAPSULE ORAL at 21:45

## 2025-04-28 RX ADMIN — HYDROXYZINE HYDROCHLORIDE 100 MG: 50 TABLET ORAL at 20:18

## 2025-04-28 RX ADMIN — POLYETHYLENE GLYCOL 3350 17 G: 17 POWDER, FOR SOLUTION ORAL at 09:09

## 2025-04-28 RX ADMIN — HYDROMORPHONE HYDROCHLORIDE 0.5 MG: 1 INJECTION, SOLUTION INTRAMUSCULAR; INTRAVENOUS; SUBCUTANEOUS at 12:12

## 2025-04-28 RX ADMIN — PREGABALIN 150 MG: 75 CAPSULE ORAL at 17:52

## 2025-04-28 RX ADMIN — SODIUM CHLORIDE, SODIUM LACTATE, POTASSIUM CHLORIDE, AND CALCIUM CHLORIDE 1000 ML: .6; .31; .03; .02 INJECTION, SOLUTION INTRAVENOUS at 15:09

## 2025-04-28 ASSESSMENT — ACTIVITIES OF DAILY LIVING (ADL)
ADLS_ACUITY_SCORE: 29

## 2025-04-28 NOTE — CONSULTS
[facility]  Neurology Consultation    Patient Name:  Caprice Kline  MRN:  [id number]    :  1995  Date of Service:  2025  Primary care provider:  Junior Roe      Neurology consultation service was asked to see Caprice Kline by Dr. Andrade to evaluate bilateral thigh pain, elevated CK.    Chief Complaint:  bilateral thigh pain, elevated CK    History of Present Illness:   Caprice Kline is a 29 year old female with history of lumbar radiculopathy, fibromyalgia, migraines, and complex psychiatric history. She is transferred from [facility] ED and admitted on 2025 for pain control, and further evaluation and muscle biopsy of bilateral hip and thigh pain.    Patient with history of chronic pain, mostly from waist down but also within upper extremities. She has had pain within anterior left leg since  and over the last 6 months, has had increased pain down posterior right leg into foot. Today she tells me pain is worse within bilateral hips as of more recently, describes it as grinding glass sensation. She is very limited and unable to move very much due to the pain. Typically uses walker to ambulate due to the increased pain. She denies any recent falls but at times she will be walking and it will feel as if her legs give out on her. Additionally, has concerns of intermittent leg/foot swelling and numbness.    She underwent left L5-S1 microdiscectomy 2021 with no relief. She follows with [facility] pain clinic. Failed recent SCS trial though found relief with pain pump trial but leaving it as last resort option. Received diagnosis of fibromyalgia back in January from prior rheumatologist. In January, had bilateral MR of thighs which noted bilateral intramuscular signal alternation, R>L of adductors, R gluteus tj and R vastus lateralis. Repeat bilateral MR of thighs on 3/31 showed improvement of previously identified edema, R>L though slightly more  "pronounced edema in distal R gluteus medius. Seen by alterative rheumatologist, Dr. Ceja, on 3/26, though unfortunately unable to view note. Per chart review serology notable for positive TEODORO 1:160 with negative DOM, negative myositis panel, normal complement, normal LFTs/CK/aldolase, normal inflammatory markers, and neg RF/CCP. Has appointment scheduled with them to discuss new MRI findings. She was seen by Dr. Goldman of our neurology team on 4/25 whom was going to work on facilitating a muscle biopsy.     CK initially drawn on 3/26 and was 45. CK on 4/22 for pre-op and noted to be elevated at 1307. On 4/23 CK was 1120 and on 4/25, 255. On admission,  and today 2,788.     Of note, recently started Ajovy 3 months ago for her headaches.     ROS  A comprehensive ROS was performed and pertinent findings were included in HPI.     PMH  Past Medical History:   Diagnosis Date    Bipolar disorder (H)     Complication of anesthesia     \"freaking out when I wake up\"    Depressive disorder 2011     Past Surgical History:   Procedure Laterality Date    DISCECTOMY LUMBAR POSTERIOR MICROSCOPIC ONE LEVEL Left 08/24/2021    Procedure: Minimally invasive left Lumbar 5 to Sacral 1 microdiscectomy  ;  Surgeon: Destin Junior MD;  Location: [facility] OR    GYN SURGERY Right     cystectomy - with salpingectomy    HEAD & NECK SURGERY      tonsillectomy    HEAD & NECK SURGERY      wisdom teeth extraction       Medications   I have personally reviewed the patient's medication list.     Allergies  I have personally reviewed the patient's allergy list.     Physical Examination   Vitals: /71 (BP Location: Left arm)   Pulse 62   Temp 97.6  F (36.4  C) (Oral)   Resp 18   LMP 04/17/2025 (Exact Date)   SpO2 95%   General: Lying in bed, NAD  Head: NC/AT  Eyes: no icterus, op pink and moist  Cardiac: Extremities warm, no edema.   Respiratory: non-labored on RA  Skin: No rash or lesion on exposed skin  Psych: Mood pleasant, " affect congruent  Neuro:  Mental status: Awake, alert, attentive, oriented to self, time, place, and circumstance. Language is fluent and coherent with intact comprehension of complex commands, naming and repetition.  Cranial nerves: VFF, PERRL, conjugate gaze, EOMI, facial sensation intact, face symmetric, shoulder shrug strong, tongue/uvula midline, no dysarthria.   Motor: Normal bulk and tone. No abnormal movements. Strength dependent on effort and giveaway from pain though 5/5 bilaterally in deltoids, biceps, triceps, hand , hip flexors, hip extensors, knee flexion, knee extension, plantarflexion, dorsiflexion.   Reflexes: Normo-reflexic and symmetric biceps, brachioradialis, triceps, patellae, and achilles. No clonus, toes down-going.  Sensory: Intact to light touch and pin in proximal and distal aspects of all 4 extremities. Vibratory sensation ~8 seconds at bilateral great toes.   Coordination: FNF and HS without ataxia or dysmetria. Rapid alternating movements intact.   Gait: She was able to raise left leg up without difficulty but unable to bear weight solely on left leg due to increased pain in left hip.    Investigations   I have personally reviewed pertinent labs, tests, and radiological imaging. Discussion of notable findings is included under Impression.    CK: 4/22 1307, 4/23 1120, 4/25 255, 4/27 912, 4/28 2,788.     Per Rheumatology:  Negative/Normal: ssDNA, dsDNA,smith, RNP, ssa, ssb, chromatin   Myositis panel negative-- Cristina-1, pl7, pl12, ej, oj, srp, MI-2, MDA5, TIF1, NXP-2     Was patient transferred from outside hospital?   No    Impression  #Elevated CK  #Bilateral hip, leg, and back pain    Caprice Kline is a 29 year old female with history of lumbar radiculopathy, fibromyalgia, migraines, and complex psychiatric history. She is transferred from [facility] ED and admitted on 4/27/2025 for pain control, and further evaluation and muscle biopsy of bilateral hip and thigh pain. Bilateral MR  of thighs completed in January which noted bilateral intramuscular signal alternation, R>L of adductors, R gluteus tj and R vastus lateralis. Repeat bilateral MR of thighs on 3/31 showed improvement of previously identified edema, R>L though slightly more pronounced edema in distal R gluteus medius. Patient with elevated CK of 1307 on 4/22 and down trending though was elevated to 2,788 today. She is full strength within her lower extremities today though limited due to effort and pain.     Workup thus far per rheumatology positive TEODORO 1:1160 with negative DOM, myositis panel negative-- Cristina-1, pl7, pl12, ej, oj, srp, MI-2, MDA5, TIF1, NXP-2. Differential includes autoimmune myopathy vs medication related vs less likely fibromyalgia. She recently started taking Ajovy for her migraines about 3 months ago and on quick search of literature, there may be some association in causing elevations of CK. At this time, since her CK is elevated even higher, would recommend imaging bilateral thighs. We will additionally order serum markers as listed below to further investigate for underlying autoimmune myopathy. Will hold off on muscle biopsy for now.    Recommendations  -Trend CK  -Appreciate rheumatology involvement and recommendations  -MR BL femur ordered for you  -Will order; HMGCr, SRP, Aldolase, ammonia serum labs  -Hold off on muscle biopsy for right now. May consider obtaining EMG prior to biopsy.  -Will continue to follow    Thank you for involving Neurology in the care of Caprice Kline.  Please do not hesitate to call with questions/concerns (consult pager [phone]).      Patient was seen and discussed with Dr. Oliva.    Rhonda Mckoy PA-C    90 MINUTES SPENT BY ME on the date of service doing chart review, history, exam, documentation & further activities per the note.

## 2025-04-28 NOTE — CONSULTS
Rheumatology Consult Note    Caprice Kline MRN# [id number]   Age: 29 year old YOB: 1995     Date of Admission: 4/27/2025    Reason for consult: weakness, Elevated CK    Assessment & Plan:     ASSESSMENT:  Caprice Kline is a 29 year old with a hx of bipoalr, migraine, chronic back pain s/p discectomy  that presents with pain and weakness throughout her upper and lower extremities and elevated CK    Diagnosis:  Elevated CK   Bilateral upper and lower extremity weakness   Lower Extremity pain, bialteral  Hx of chronic back pain s/p microdiscectomy   Hx of Bipolar   Hx of Migraines on ajovy    Hx of chronic back pain. Now presenting with 6-8 months of intermittent weakness of bilateral upper and lower extremities to the point she is using a walker and cannot get dressed. Slightly difficult to delineate how much of the weakness is related to pain. Nevertheless she has elevated CK to 2788 today. She has an MRI in January that demonstrated some concern for underlying muscle edema. Her TSH was normal. She has    negative DOM, myositis panel negative-- Cristina-1, pl7, pl12, ej, oj, srp, MI-2, MDA5, TIF1, NXP-2. No other signs of CTD. Does have some difficulty initiating swallowing but otherwise no other symptoms. Differential includes medication induced myopathy (ajovy, lamotrigine; both with rare case reports) versus immune mediated process (inclusion body myositis) versus underlying genetic myopathies. thyroid level normal.  Considered malignancy but appears to be uptodate on cancer screening (pap with HPV negative october 2024). Less likely CTD without other signs/symptoms. Will start by obtaining MRI of bilateral thighs to assist with potential muscle biopsy location.     PLAN:  Trend CK   Recommend Bilateral MRI Thigh (ordered by neurology)   Recommend muscle biopsy pending what MRI shows   Recommend sending HMGCR antibody ,immunoglobulins, anti CN1A (we will assist with ordering)  SRP negative, negative  myositis panel    Discussed with Dr. Matt Oseguera MD  PGY2        Attending Note: I saw and evaluated the patient with Dr. Oseguera. I agree with the assessment and plan.      TT 90 min was spent on date of the encounter doing chart review, history and exam, documentation and further activities as noted above. Any prior notes, outside records, laboratory results, and imaging studies were reviewed if relevant.      Neel Gutierrez MD      HPI     Caprice Kline is a 29 year old with a hx of bipoalr, migraine, chronic back pain s/p discectomy  that presents with pain and weakness throughout the body.     Caprice has a history of chronic pain since 2019.  Majority of her pain has been located to her lumbar spine area.  She underwent a microdiscectomy at L5-S1 a couple of years ago.  She has had persistent pain since then that radiates down the back of her legs.  Over the last 6 to 8 months she has noticed that she is becoming more weak throughout her body.  She describes it as the inability to get dressed fully and to wash her hair.  She occasionally uses a walker to walk from her living room to her kitchen.  However the symptoms are very intermittent.  Sometimes she is able to for example walk around [employer] for an entire shopping trip.    She has no pain in her upper extremities.  This is primarily located in her lower extremities.  Some of the weakness in her lower extremities is secondary to her pain.    She has been receiving workup from neurology and outpatient rheumatology.  She had an MRI that was completed in January that did show some    Patient denies any fevers, chills, weight loss, vision changes, headaches,  Denies any arthralgias, morning stiffness, Raynaud's, rash, vitiligo, photosensitivity, nasal or oral ulcers, ear fullness or drainage.   No cough or dyspnea, no hematuria, no history of blood clots.     Serology  Positive: TEODORO  Negative/Normal: ssDNA, dsDNA,smith, RNP, ssa, ssb, chromatin  "  Myositis panel negative-- Cristina-1, pl7, pl12, ej, oj, srp, MI-2, MDA5, TIF1, NXP-2   Pending:     March 2025  Full cbc with diff normal   Aldolase 4.2 (normal less than 8.1)   CK was 45 on 3/26  CRP (0.16) normal   Esr 2 normal       Social history-  Rare alcohol   Former smoker, quit (7 pack year)   No drugs     Meds--   Lamotrigene   Wegovy,  Lasix  Ajovy-- migraine treatment started oct/nov  Ubrevly - migraine  Seroqul   Spirolacotone  Prazosin   Doxepin  Lithium     Surgery  Hx of distecomy with chronic pain       HISTORY REVIEW:  Past Medical History:   Diagnosis Date    Bipolar disorder (H)     Complication of anesthesia     \"freaking out when I wake up\"    Depressive disorder 2011     Past Surgical History:   Procedure Laterality Date    DISCECTOMY LUMBAR POSTERIOR MICROSCOPIC ONE LEVEL Left 08/24/2021    Procedure: Minimally invasive left Lumbar 5 to Sacral 1 microdiscectomy  ;  Surgeon: Destin Junior MD;  Location: [facility] OR    GYN SURGERY Right     cystectomy - with salpingectomy    HEAD & NECK SURGERY      tonsillectomy    HEAD & NECK SURGERY      wisdom teeth extraction     Family History   Problem Relation Age of Onset    Hypertension Mother     Colon Polyps Mother 50    Substance Abuse Father     Colon Cancer Father 60    Diabetes Maternal Grandmother     Hypertension Maternal Grandmother     Colon Cancer Maternal Grandmother     Diabetes Paternal Grandmother      Social History     Socioeconomic History    Marital status: Single     Spouse name: Not on file    Number of children: Not on file    Years of education: Not on file    Highest education level: Not on file   Occupational History    Not on file   Tobacco Use    Smoking status: Former     Current packs/day: 0.00     Types: Cigarettes, Vaping Device     Passive exposure: Current    Smokeless tobacco: Never   Vaping Use    Vaping status: Some Days    Substances: Nicotine    Devices: Refillable tank   Substance and Sexual Activity    Alcohol use: " Not Currently     Comment: 2 drinks every other day     Drug use: Not Currently    Sexual activity: Yes     Partners: Female, Male     Birth control/protection: None   Other Topics Concern    Parent/sibling w/ CABG, MI or angioplasty before 65F 55M? No   Social History Narrative    Not on file     Social Drivers of Health     Financial Resource Strain: Low Risk  (12/9/2024)    Financial Resource Strain     Within the past 12 months, have you or your family members you live with been unable to get utilities (heat, electricity) when it was really needed?: No   Food Insecurity: Unknown (12/9/2024)    Food Insecurity     Within the past 12 months, did you worry that your food would run out before you got money to buy more?: Patient declined     Within the past 12 months, did the food you bought just not last and you didn t have money to get more?: Patient declined   Transportation Needs: Unknown (12/9/2024)    Transportation Needs     Within the past 12 months, has lack of transportation kept you from medical appointments, getting your medicines, non-medical meetings or appointments, work, or from getting things that you need?: Patient declined   Physical Activity: Inactive (12/9/2024)    Exercise Vital Sign     Days of Exercise per Week: 0 days     Minutes of Exercise per Session: 0 min   Stress: Stress Concern Present (12/9/2024)    [employer] [place] [employer] - Occupational Stress Questionnaire     Feeling of Stress : Very much   Social Connections: Unknown (12/9/2024)    Social Connection and Isolation Panel [NHANES]     Frequency of Communication with Friends and Family: Not on file     Frequency of Social Gatherings with Friends and Family: Once a week     Attends Mu-ism Services: Not on file     Active Member of Clubs or Organizations: Not on file     Attends Club or Organization Meetings: Not on file     Marital Status: Not on file   Interpersonal Safety: Low Risk  (4/27/2025)    Interpersonal  "Safety     Do you feel physically and emotionally safe where you currently live?: Yes     Within the past 12 months, have you been hit, slapped, kicked or otherwise physically hurt by someone?: No     Within the past 12 months, have you been humiliated or emotionally abused in other ways by your partner or ex-partner?: No   Housing Stability: Low Risk  (12/9/2024)    Housing Stability     Do you have housing? : Yes     Are you worried about losing your housing?: No     Patient Active Problem List   Diagnosis    Lumbar radiculopathy    Severe episode of recurrent major depressive disorder, without psychotic features (H)    Suicide attempt (H)    Bipolar II disorder (H)    Borderline personality disorder in adult (H)    FH: colon cancer    Anaphylactic shock due to shellfish, initial encounter    Left-sided low back pain with left-sided sciatica    Genital herpes    Fibromyalgia    Diffuse pain in left lower extremity    Bilateral thigh pain     Allergies   Allergen Reactions    Metronidazole Anaphylaxis and Hives     Other reaction(s): Throat swelling  Throat swelling 6 hrs after exposure  Itching and hives 2 hrs after exposure    Shellfish Allergy Anxiety, Diarrhea, Difficulty breathing, Hives, Itching, Rash, Shortness Of Breath and Swelling    Codeine Headache    Fish-Derived Products      Stopped fish oil and less stomach discomfort  Rash after eating fish.     Hydrocodone-Acetaminophen Other (See Comments)    Sertraline Other (See Comments)     Patient was foggy and \"high\" feeling    Shellfish-Derived Products      Lips get numb, throat gets scratchy, rashes         ROS     A 10 point ROS was performed with pertinent findings listed above.      Objective     PHYSICAL EXAM  BP 95/72 (BP Location: Left arm)   Pulse 71   Temp 98  F (36.7  C) (Oral)   Resp 16   LMP 04/17/2025 (Exact Date)   SpO2 95%   Wt Readings from Last 4 Encounters:   04/27/25 109.8 kg (242 lb)   04/22/25 108 kg (238 lb)   04/22/25 107.3 kg " "(236 lb 8 oz)   03/20/25 101.2 kg (223 lb)     Constitutional: awake, alert, cooperative, NAD  HEENT: normocephalic, anicteric sclerae, MMM . No periauricular scale. Lips, mucous membranes normal.   Pulmonary: no increased work of breathing on room air, lungs clear to auscultation bilaterally without wheezes or rales   Cardiovascular: RRR,no murmur appreciated   GI: soft, non-tender, non-distended, without guarding or rigidity  Skin: warm, dry, no rashes, lesions, nail changes.   MSK: +1 peripheral edema bilaterally. No tenosynovitis on shoulders, elbows, mcps,pips, dips, hips, knees, ankles, mtps.  No erythema, warmth of joints.   Neuro: AAOx3, no gross focal neurologic deficits noted. 5/5 strength shoulder flexion, biceps, triceps, 4/5  strength. 4/5 hip flexion but hard to discern if from pain. 5/5 knee flexion, extension dorsi/plantar flexion. +tremor. Movements are slow.     DATA    Labs   Phos 4.5   Mag 1.7  Cr 0.79  Alt 95 elevated   Ast 151 elevated  Wbc 5.6   Hg 13.4   Platelet 255   TSH 5.13 , T4 normal   CRP 7.47 elevated  Esr 6  UA with 2 RBC (normal), WBC 15   but been as high as 1307    CBC:  Recent Labs   Lab Test 04/28/25  0727 04/27/25  0916 04/23/25  0003   WBC 5.6 10.0 5.7   RBC 5.27* 5.58* 4.84   HGB 13.4 14.2 12.2   HCT 41.0 43.3 37.4   MCV 78 78 77*   MCH 25.4* 25.4* 25.2*   MCHC 32.7 32.8 32.6   RDW 14.5 14.6 14.8    269 251       BMP:  Recent Labs   Lab Test 04/28/25  0727 04/27/25  0916 04/25/25  0808    139 141   POTASSIUM 3.9 4.3 3.7   CHLORIDE 102 101 105   CO2 27 28 28   ANIONGAP 7 10 8   GLC 98 88 98   BUN 7.1 11.3 10.2   CR 0.79 0.78 0.80   GFRESTIMATED >90 >90 >90   REMA 9.8 10.0 10.2       LFT:  Recent Labs   Lab Test 04/28/25  0727 04/23/25  0003 04/22/25  1213   PROTTOTAL 6.1* 6.3* 6.6   ALBUMIN 3.9 4.0 4.4   BILITOTAL 0.5 0.2 <0.2   ALKPHOS 80 71 72   * 43 37   ALT 95* 21 22       No results found for: \"CKTOTAL\"  TSH   Date Value Ref Range Status " "  04/27/2025 5.13 (H) 0.30 - 4.20 uIU/mL Final   03/01/2022 1.06 0.40 - 4.00 mU/L Final     No results found for: \"URIC\"    Inflammatory markers  No results found for: \"CRP\"  Lab Results   Component Value Date    SED 6 04/27/2025     No results found for: \"JULIO C\"    UA RESULTS:  Recent Labs   Lab Test 04/27/25  1123 03/10/25  1105   COLOR Yellow Yellow   APPEARANCE Clear Clear   URINEGLC Negative Negative   URINEBILI Negative Negative   URINEKETONE Negative Negative   SG 1.017 1.025   UBLD Negative Negative   URINEPH 6.5 6.0   PROTEIN Negative 10*   NITRITE Negative Negative   LEUKEST Small* Negative   RBCU 2 0-2   WBCU 15* 0-5         Autoimmunity labs:     No results found for: \"RHF\"  No results found for: \"CCPIGG\"  No results found for: \"ANCA\"  No results found for: \"N8KTNYW\"  No results found for: \"A1IWBCP\"  No results found for: \"HANY\"  No results found for: \"DNA\"  No results found for: \"RNPIGG\", \"SMIGG\", \"SSAIGG\", \"SSBIGG\", \"SCLIGG\"    IMAGING:please see MRI from Jan 2025    "

## 2025-04-28 NOTE — PLAN OF CARE
Patient admitted -    VS: Blood pressure (!) 137/91, pulse 71, temperature 98.2  F (36.8  C), temperature source Oral, resp. rate 18, last menstrual period 04/17/2025, SpO2 97%, not currently breastfeeding.       O2: >90% on RA no complaints of SOB or chest pain.   Output: Voiding adequate amounts w/o pain or difficulty to bathroom.   Last BM: Continent of bowel and bladder   Activity: Independent in room     Skin: Appears intact    Pain: 9/10 managed with IV 0.5 mg dilaudid   CMS: A/O x 4, able to make needs known   Dressing: None    Diet: Regular, tolerating well. No complaints of nausea or vomiting.   LDA: R PIV infusing 125 ml/ hr (discontinue at 7 am)   Equipment: Personal belongings   Plan: Continue POC    Additional Info:

## 2025-04-28 NOTE — PROGRESS NOTES
[facility]    Progress Note - [facility]       Date of Admission:  4/27/2025    Assessment & Plan   Caprice Kline is a 29 year old female with a complex PMHx of lumbar radiculopathy, fibromyalgia, migraines, and complex psychiatric history transferred from [facility] ED and admitted on 4/27/2025 for pain control, and further evaluation and muscle biopsy of bilateral hip and thigh pain.      Major updates today:  - Held Tylenol in the setting of elevated LFTs  - IV dilaudid 0.5 mg q6hr PRN  - Follow up rheum and neuro recs  - PT consult  - Encourage PO morphine prior to IV dilaudid  - Continuous pulse ox  - Rizatriptan 5 mg ONCE PRN  - LR, fisher catheter for rising CK management  - Ordered MRI b/l femur w/o con    # Bilateral thigh pain  # Bilateral anterior leg pain  # Elevated CK (255 -> 912)  # Fibromyalgia   # Chronic pain   # Lumbar radiculopathy  Presented with worsening bilateral hip and anterior leg pain in recent months with a known Hx of chronic lumbar pain with sciatica, has been following with outpatient rheum. Pertinent hx: had multiple pain interventions for back pain, recently failed a SCS trial; compared to Jan 2025 MRI, 4/22/25 MRI revealed resolution of previously identified edema signal in adductors, R gluteus tj and R vastus lateralis and slightly more pronounced edema in distal R gluteus medius. 4/25 US venous competency with no DVT and gross incompetency. OP Rheum work up notable for positive TEODORO 1:160 with negative DOM, negative myositis panel, normal complement/LFTs/CK/aldolase/inflammatory markers, and neg RF/CCP. OP neurology had considered need for muscle biopsy.     On 4/27, she was transferred from [facility] ED for further work-up including muscle biopsy. On exam, pain with palpation of bilateral proximal thighs/lower buttocks and anterior thigh muscles. Workup notable for up-trending CK with latest level 2788.  Normal Cr and UOP but concern for potential kidney injury with rising CK levels, especially in the setting of chronic Lithium use. Ddx remains broad and includes polymyositis vs septic arthritis vs CRPS vs fibromyalgia/PA vs genetic myopathy vs infectious. Requires further evaluation from multidisciplinary team and possible muscle biopsy.     Consults: rheumatology, neurology, nephrology, physical therapy; appreciate recs    Lab Workup  - LA  - Aldolase  - Coag panel  - Lithium level  - Peripheral smear  - Necrotizing myopathy panel  - Lyme disease + tick panel    Imaging Workup  - MR femur bilateral w/o contrast    Management  - s/p LR 1L bolus  - NS infusion 125mL/hr    Pain control  - Continue PTA morphine IR tablet 15mg TID PRN  - Continue PTA pregabalin 100mg at bedtime + 150mg TID   - Continue PTA Tizanidine 4mg TID PRN        - IV Dilaudid 0.5mg q6h PRN  - HOLD Tylenol 975mg TID for 1-2 days in the setting of elevated LFTs  - Lidocaine cream   - Naloxone ordered    Monitoring  - strict I&O  - Cronin catheter for UOP monitoring (UOP goal - 200ml/ hr)  - continuous pulse ox  - Daily CBC and CMP  - Daily CK    # Transaminitis  , ALT 95 noted on 4/28 AM labs. T bili and INR normal. Asymptomatic. Will continue to monitor with daily AM labs. Will hold Tylenol at least for today to prevent further elevation.     # Subclinical hypothyroidism  Admission labs notable for TSH 5.13 and free T4 1.04. Asymptomatic. Re-check in 3-6 months or sooner if symptoms develop. Continue to monitor.     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 Will continue home meds.               - PTA Adderall XR 30mg every day PRN              - PTA Clonazepam 0.5mg 1 tab PRN anxiety              - PTA Doxepin 10mg 1 tab at bedtime              - PTA Lamictal 100mg  every morning & 50mg daily at bedtime               - PTA Lithium 600mg 1 capsule at bedtime               - PTA Prazosin 2mg qam and 5mg qpm               - PTA quetiapine 300mg at bedtime     # Hx of Urinary retention  # Hx of Urinary hesitancy   # Pelvic pain   Seen at ED on 11/5/2024 for acute urinary retention and was discharged home with fisher catheter. Does not have catheter in place or has required further need for self-cath thereafter. 4/27 UA positive for small LE, 15 WBC, neg nitrites. Patient denies new urinary symptoms, although does have a Hx of urinary retention and hesitancy. Per discussion with nephro, will need Fisher temporarily to accurately measure UOP in the setting of rising CK (see more above).   - PTA Flomax 0.4mg at bedtime     # Bilateral edema - chronic   Per patient report, chronic and managed with furosemide 20mg. Denies acute changes in swelling or pain. Of note, has been taking Lyrica for some time, which may also be contributing to LE edema. Will have to be cautious will Furosemide administration in the setting of rising CK and potential renal injury.   - PTA Furosemide 20mg 1 tab daily      # Non-intractable migraine   # Tremor   Sees outpatient neurology for these concerns. Reports baseline headache that has not acutely changed. No tremors noted on exam. Ordered Rizatriptan PRN once until pt is able to have her PTA medication delivered here.    - PTA Ubrogepant 100mg 1 tab at onset of headache      Diet: Combination Diet Regular Diet Adult  DVT Prophylaxis: Low Risk/Ambulatory with no VTE prophylaxis indicated  Fisher Catheter: Fisher ordered today  Fluids: PO + IV continuous infusion  Lines: None     Cardiac Monitoring: None  Code Status: Full Code    Clinically Significant Risk Factors Present on Admission                   # Hypertension: Home medication list includes antihypertensive(s)           # Obesity: Estimated body mass index is 35.74 kg/m  as calculated from the  "following:    Height as of an earlier encounter on 4/27/25: 1.753 m (5' 9\").    Weight as of an earlier encounter on 4/27/25: 109.8 kg (242 lb).              Social Drivers of Health   Food Insecurity: Unknown (12/9/2024)    Food Insecurity     Within the past 12 months, did you worry that your food would run out before you got money to buy more?: Patient declined     Within the past 12 months, did the food you bought just not last and you didn t have money to get more?: Patient declined   Depression: At risk (4/22/2025)    PHQ-2     PHQ-2 Score: 6   Tobacco Use: Medium Risk (4/22/2025)    Patient History     Smoking Tobacco Use: Former     Smokeless Tobacco Use: Never     Passive Exposure: Current   Transportation Needs: Unknown (12/9/2024)    Transportation Needs     Within the past 12 months, has lack of transportation kept you from medical appointments, getting your medicines, non-medical meetings or appointments, work, or from getting things that you need?: Patient declined   Physical Activity: Inactive (12/9/2024)    Exercise Vital Sign     Days of Exercise per Week: 0 days     Minutes of Exercise per Session: 0 min   Stress: Stress Concern Present (12/9/2024)    [employer] [place] [employer] - Occupational Stress Questionnaire     Feeling of Stress : Very much   Social Connections: Unknown (12/9/2024)    Social Connection and Isolation Panel [NHANES]     Frequency of Social Gatherings with Friends and Family: Once a week         Disposition Plan     Medically Ready for Discharge: Anticipated in 2-4 Days    The patient's care was discussed with the Attending Physician, Dr. Andrade .    Adamaris Matthew MD  [facility]  [facility]  Securely message with [employer] (more info)  Text page via [employer] Paging/Directory   See signed in provider for up to date coverage " information  ______________________________________________________________________    Interval History   Overnight: EMILY    Subjective: Continues to endorse bilateral hip and bilateral thigh pain. Says pain meds decrease the pain from 7 to 6 out of ten. Says the pain location changes and continues to wax and wane. At baseline, uses walker at home as needed. Also endorsing headache, suspects that a migraine may be beginning.     Physical Exam   Vital Signs: Temp: 97.6  F (36.4  C) Temp src: Oral BP: 113/71 Pulse: 62   Resp: 18 SpO2: 95 % O2 Device: None (Room air)    Weight: 0 lbs 0 oz    Constitutional:       General: She is not in acute distress.     Appearance: Normal appearance. She is not toxic-appearing.      Comments: Intermittently uncomfortable from waxing/waning b/l leg and hip pain   Eyes:      General: No scleral icterus.        Right eye: No discharge.         Left eye: No discharge.   Cardiovascular:      Rate and Rhythm: Normal rate and regular rhythm.      Heart sounds: No murmur heard.     No gallop.   Pulmonary:      Effort: Pulmonary effort is normal. No respiratory distress.      Breath sounds: No wheezing or rales.   Abdominal:      General: Abdomen is flat. Bowel sounds are normal. There is no distension.      Tenderness: There is abdominal tenderness. There is no guarding or rebound.      Comments: Mild tenderness to palpation of periumbilical area and RLQ   Musculoskeletal:      Right lower leg: Edema present.      Left lower leg: Edema present.      Comments: Trace bilateral edema   Neurological:      General: No focal deficit present.      Mental Status: She is alert and oriented to person, place, and time.      Motor: Weakness present.      Comments: Patient able to slowly shift positions with intermittent pain. Pain not worsened with palpation of anterior thigh muscles. Tenderness to palpation of bilateral lower buttocks, with diminished sensation of upper buttocks at level of iliac crest.  L hip flexion limited by pain. Pain with hip log rolling b/l. Mild tenderness to palpation of anterior knees and bilateral ankle joints. Sensation slightly diminished on L thigh and leg.   Psychiatric:         Mood and Affect: Mood normal.         Behavior: Behavior normal.     Medical Decision Making             Data     I have personally reviewed the following data over the past 24 hrs:    6.7  \   13.2   / 251     137 101 8.0 /  116 (H)   3.7 26 0.81 \     ALT: 81 (H) AST: 115 (H) AP: 79 TBILI: 0.3   ALB: 3.8 TOT PROTEIN: 6.1 (L) LIPASE: N/A     TSH: 5.13 (H) T4: 1.04 A1C: N/A     Procal: N/A CRP: 7.47 (H) Lactic Acid: 0.7       INR:  1.01 PTT:  31   D-dimer:  <0.27 Fibrinogen:  383     Ferritin:  N/A % Retic:  2.2 (H) LDH:  N/A       Imaging results reviewed over the past 24 hrs:   No results found for this or any previous visit (from the past 24 hours).

## 2025-04-28 NOTE — TELEPHONE ENCOUNTER
Does the member have headache frequency of 15 or more headache days per month during the prior 6 months?  No    2. Does the provider attest to any of the following statements?  - The provider has completed a comprehensive review of all of the member's concurrent medications to determine id a dosage adjustment of Ubrelvy is necessary   - The provider has evaluated the member's hepatic and renal function to determined if a dosage adjustment of Ubrelvy is necessary  - If applicable, the provider has completed a review of member's adherence to any medication used to prevent migraine and addressed any member adherence issue  Yes- to all 3    6. Has the member demonstrated resolution in headache pain or reduction in headache severity, as assessed by the prescriber?   Yes    7. Has the member had any treatment-restricting adverse affects?   No      JORDAN GutiérrezN RN Care Coordinator  Neurology/Neurosurgery/PM&R/Pain Management

## 2025-04-29 ENCOUNTER — APPOINTMENT (OUTPATIENT)
Dept: MRI IMAGING | Facility: CLINIC | Age: 30
End: 2025-04-29
Attending: STUDENT IN AN ORGANIZED HEALTH CARE EDUCATION/TRAINING PROGRAM
Payer: MEDICAID

## 2025-04-29 ENCOUNTER — APPOINTMENT (OUTPATIENT)
Dept: PHYSICAL THERAPY | Facility: CLINIC | Age: 30
End: 2025-04-29
Payer: MEDICAID

## 2025-04-29 LAB
A PHAGOCYTOPH DNA BLD QL NAA+PROBE: NOT DETECTED
ALBUMIN MFR UR ELPH: 7.1 MG/DL
ALBUMIN SERPL BCG-MCNC: 3.5 G/DL (ref 3.5–5.2)
ALP SERPL-CCNC: 64 U/L (ref 40–150)
ALT SERPL W P-5'-P-CCNC: 59 U/L (ref 0–50)
ANION GAP SERPL CALCULATED.3IONS-SCNC: 10 MMOL/L (ref 7–15)
AST SERPL W P-5'-P-CCNC: 69 U/L (ref 0–45)
BABESIA DNA BLD QL NAA+PROBE: NOT DETECTED
BASOPHILS # BLD AUTO: 0.1 10E3/UL (ref 0–0.2)
BASOPHILS NFR BLD AUTO: 1 %
BILIRUB SERPL-MCNC: 0.3 MG/DL
BUN SERPL-MCNC: 8.3 MG/DL (ref 6–20)
CALCIUM SERPL-MCNC: 9.5 MG/DL (ref 8.8–10.4)
CHLORIDE SERPL-SCNC: 102 MMOL/L (ref 98–107)
CK SERPL-CCNC: 1563 U/L (ref 26–192)
CREAT SERPL-MCNC: 0.72 MG/DL (ref 0.51–0.95)
CREAT UR-MCNC: 73.9 MG/DL
EGFRCR SERPLBLD CKD-EPI 2021: >90 ML/MIN/1.73M2
EHRLICHIA DNA SPEC QL NAA+PROBE: NOT DETECTED
EOSINOPHIL # BLD AUTO: 0.4 10E3/UL (ref 0–0.7)
EOSINOPHIL NFR BLD AUTO: 9 %
ERYTHROCYTE [DISTWIDTH] IN BLOOD BY AUTOMATED COUNT: 14.6 % (ref 10–15)
GLUCOSE SERPL-MCNC: 113 MG/DL (ref 70–99)
HCO3 SERPL-SCNC: 27 MMOL/L (ref 22–29)
HCT VFR BLD AUTO: 36.9 % (ref 35–47)
HGB BLD-MCNC: 12.1 G/DL (ref 11.7–15.7)
IGG SERPL-MCNC: 436 MG/DL (ref 610–1616)
IGG1 SER-MCNC: 240 MG/DL (ref 382–929)
IGG2 SER-MCNC: 166 MG/DL (ref 242–700)
IGG3 SER-MCNC: 30 MG/DL (ref 22–176)
IGG4 SER-MCNC: 6 MG/DL (ref 4–86)
IMM GRANULOCYTES # BLD: 0 10E3/UL
IMM GRANULOCYTES NFR BLD: 0 %
LYMPHOCYTES # BLD AUTO: 1.9 10E3/UL (ref 0.8–5.3)
LYMPHOCYTES NFR BLD AUTO: 39 %
Lab: NORMAL
Lab: NORMAL
MCH RBC QN AUTO: 25.5 PG (ref 26.5–33)
MCHC RBC AUTO-ENTMCNC: 32.8 G/DL (ref 31.5–36.5)
MCV RBC AUTO: 78 FL (ref 78–100)
MONOCYTES # BLD AUTO: 0.4 10E3/UL (ref 0–1.3)
MONOCYTES NFR BLD AUTO: 9 %
NEUTROPHILS # BLD AUTO: 2 10E3/UL (ref 1.6–8.3)
NEUTROPHILS NFR BLD AUTO: 42 %
NRBC # BLD AUTO: 0 10E3/UL
NRBC BLD AUTO-RTO: 0 /100
PATH REPORT.COMMENTS IMP SPEC: NORMAL
PATH REPORT.COMMENTS IMP SPEC: NORMAL
PATH REPORT.FINAL DX SPEC: NORMAL
PATH REPORT.MICROSCOPIC SPEC OTHER STN: NORMAL
PATH REPORT.MICROSCOPIC SPEC OTHER STN: NORMAL
PATH REPORT.RELEVANT HX SPEC: NORMAL
PERFORMING LABORATORY: NORMAL
PERFORMING LABORATORY: NORMAL
PLATELET # BLD AUTO: 240 10E3/UL (ref 150–450)
POTASSIUM SERPL-SCNC: 4.1 MMOL/L (ref 3.4–5.3)
PROT SERPL-MCNC: 5.5 G/DL (ref 6.4–8.3)
PROT/CREAT 24H UR: 0.1 MG/MG CR (ref 0–0.2)
RBC # BLD AUTO: 4.74 10E6/UL (ref 3.8–5.2)
SODIUM SERPL-SCNC: 139 MMOL/L (ref 135–145)
SPECIMEN STATUS: NORMAL
SPECIMEN STATUS: NORMAL
TEST NAME: NORMAL
TEST NAME: NORMAL
WBC # BLD AUTO: 4.8 10E3/UL (ref 4–11)

## 2025-04-29 PROCEDURE — 97110 THERAPEUTIC EXERCISES: CPT | Mod: GP

## 2025-04-29 PROCEDURE — 36415 COLL VENOUS BLD VENIPUNCTURE: CPT

## 2025-04-29 PROCEDURE — 120N000002 HC R&B MED SURG/OB UMMC

## 2025-04-29 PROCEDURE — 97530 THERAPEUTIC ACTIVITIES: CPT | Mod: GP

## 2025-04-29 PROCEDURE — 73720 MRI LWR EXTREMITY W/O&W/DYE: CPT | Mod: 26 | Performed by: RADIOLOGY

## 2025-04-29 PROCEDURE — 250N000013 HC RX MED GY IP 250 OP 250 PS 637

## 2025-04-29 PROCEDURE — 99418 PROLNG IP/OBS E/M EA 15 MIN: CPT | Mod: FS

## 2025-04-29 PROCEDURE — A9585 GADOBUTROL INJECTION: HCPCS | Performed by: STUDENT IN AN ORGANIZED HEALTH CARE EDUCATION/TRAINING PROGRAM

## 2025-04-29 PROCEDURE — 85025 COMPLETE CBC W/AUTO DIFF WBC: CPT

## 2025-04-29 PROCEDURE — 97161 PT EVAL LOW COMPLEX 20 MIN: CPT | Mod: GP

## 2025-04-29 PROCEDURE — 250N000013 HC RX MED GY IP 250 OP 250 PS 637: Performed by: FAMILY MEDICINE

## 2025-04-29 PROCEDURE — 82565 ASSAY OF CREATININE: CPT

## 2025-04-29 PROCEDURE — 99223 1ST HOSP IP/OBS HIGH 75: CPT | Mod: FS

## 2025-04-29 PROCEDURE — 99222 1ST HOSP IP/OBS MODERATE 55: CPT | Performed by: INTERNAL MEDICINE

## 2025-04-29 PROCEDURE — 73720 MRI LWR EXTREMITY W/O&W/DYE: CPT | Mod: 50

## 2025-04-29 PROCEDURE — 99233 SBSQ HOSP IP/OBS HIGH 50: CPT | Mod: GC

## 2025-04-29 PROCEDURE — 82550 ASSAY OF CK (CPK): CPT

## 2025-04-29 PROCEDURE — 255N000002 HC RX 255 OP 636: Performed by: STUDENT IN AN ORGANIZED HEALTH CARE EDUCATION/TRAINING PROGRAM

## 2025-04-29 PROCEDURE — 250N000011 HC RX IP 250 OP 636: Mod: JZ

## 2025-04-29 RX ORDER — HYDROMORPHONE HYDROCHLORIDE 4 MG/1
4 TABLET ORAL 2 TIMES DAILY PRN
Qty: 6 TABLET | Refills: 0 | Status: CANCELLED | OUTPATIENT
Start: 2025-04-29 | End: 2025-05-02

## 2025-04-29 RX ORDER — GADOBUTROL 604.72 MG/ML
10 INJECTION INTRAVENOUS ONCE
Status: COMPLETED | OUTPATIENT
Start: 2025-04-29 | End: 2025-04-29

## 2025-04-29 RX ORDER — HYDROMORPHONE HYDROCHLORIDE 4 MG/1
4 TABLET ORAL 2 TIMES DAILY PRN
Status: DISCONTINUED | OUTPATIENT
Start: 2025-04-29 | End: 2025-05-02

## 2025-04-29 RX ADMIN — HYDROXYZINE HYDROCHLORIDE 100 MG: 50 TABLET ORAL at 22:03

## 2025-04-29 RX ADMIN — MORPHINE SULFATE 15 MG: 15 TABLET ORAL at 08:44

## 2025-04-29 RX ADMIN — PROPRANOLOL HYDROCHLORIDE 80 MG: 80 CAPSULE, EXTENDED RELEASE ORAL at 08:36

## 2025-04-29 RX ADMIN — PREGABALIN 150 MG: 75 CAPSULE ORAL at 17:55

## 2025-04-29 RX ADMIN — PRAZOSIN HYDROCHLORIDE 5 MG: 5 CAPSULE ORAL at 22:03

## 2025-04-29 RX ADMIN — QUETIAPINE FUMARATE 300 MG: 100 TABLET ORAL at 22:02

## 2025-04-29 RX ADMIN — MORPHINE SULFATE 15 MG: 15 TABLET ORAL at 16:47

## 2025-04-29 RX ADMIN — GADOBUTROL 10 ML: 604.72 INJECTION INTRAVENOUS at 07:46

## 2025-04-29 RX ADMIN — PREGABALIN 100 MG: 100 CAPSULE ORAL at 22:03

## 2025-04-29 RX ADMIN — TIZANIDINE 4 MG: 2 TABLET ORAL at 20:01

## 2025-04-29 RX ADMIN — LAMOTRIGINE 100 MG: 100 TABLET ORAL at 08:35

## 2025-04-29 RX ADMIN — POLYETHYLENE GLYCOL 3350 17 G: 17 POWDER, FOR SOLUTION ORAL at 08:35

## 2025-04-29 RX ADMIN — TAMSULOSIN HYDROCHLORIDE 0.4 MG: 0.4 CAPSULE ORAL at 20:01

## 2025-04-29 RX ADMIN — LITHIUM CARBONATE 600 MG: 600 CAPSULE ORAL at 22:03

## 2025-04-29 RX ADMIN — FUROSEMIDE 20 MG: 20 TABLET ORAL at 08:35

## 2025-04-29 RX ADMIN — HYDROXYZINE HYDROCHLORIDE 100 MG: 50 TABLET ORAL at 08:35

## 2025-04-29 RX ADMIN — DOXEPIN HYDROCHLORIDE 10 MG: 10 CAPSULE ORAL at 22:04

## 2025-04-29 RX ADMIN — POLYETHYLENE GLYCOL 3350 17 G: 17 POWDER, FOR SOLUTION ORAL at 20:01

## 2025-04-29 RX ADMIN — HYDROXYZINE HYDROCHLORIDE 100 MG: 50 TABLET ORAL at 13:51

## 2025-04-29 RX ADMIN — MORPHINE SULFATE 15 MG: 15 TABLET ORAL at 22:03

## 2025-04-29 RX ADMIN — LAMOTRIGINE 50 MG: 25 TABLET ORAL at 16:47

## 2025-04-29 RX ADMIN — CLONAZEPAM 0.5 MG: 0.5 TABLET ORAL at 06:23

## 2025-04-29 RX ADMIN — PREGABALIN 150 MG: 75 CAPSULE ORAL at 08:35

## 2025-04-29 RX ADMIN — HYDROMORPHONE HYDROCHLORIDE 4 MG: 4 TABLET ORAL at 20:00

## 2025-04-29 RX ADMIN — PRAZOSIN HYDROCHLORIDE 2 MG: 1 CAPSULE ORAL at 08:37

## 2025-04-29 RX ADMIN — HYDROMORPHONE HYDROCHLORIDE 0.5 MG: 1 INJECTION, SOLUTION INTRAMUSCULAR; INTRAVENOUS; SUBCUTANEOUS at 06:11

## 2025-04-29 RX ADMIN — PREGABALIN 150 MG: 75 CAPSULE ORAL at 12:08

## 2025-04-29 RX ADMIN — HYDROMORPHONE HYDROCHLORIDE 4 MG: 4 TABLET ORAL at 12:46

## 2025-04-29 ASSESSMENT — ACTIVITIES OF DAILY LIVING (ADL)
ADLS_ACUITY_SCORE: 28
ADLS_ACUITY_SCORE: 29
ADLS_ACUITY_SCORE: 29
ADLS_ACUITY_SCORE: 28
ADLS_ACUITY_SCORE: 29
ADLS_ACUITY_SCORE: 28
ADLS_ACUITY_SCORE: 29
ADLS_ACUITY_SCORE: 29
ADLS_ACUITY_SCORE: 28
ADLS_ACUITY_SCORE: 29
ADLS_ACUITY_SCORE: 28
ADLS_ACUITY_SCORE: 28
ADLS_ACUITY_SCORE: 29
ADLS_ACUITY_SCORE: 29
ADLS_ACUITY_SCORE: 28
ADLS_ACUITY_SCORE: 29

## 2025-04-29 NOTE — TELEPHONE ENCOUNTER
PRIOR AUTHORIZATION DENIED    Medication: UBROGEPANT 100 MG PO TABS  Insurance Company: [employer] for [place] Medicaid Phone [phone] Fax [phone]  Denial Date: 4/28/2025  Denial Reason(s):  Request for additional information was not responded to in a timely manner.   Appeal Information: Resubmit with requested information.   Patient Notified: No, care team must notify

## 2025-04-29 NOTE — PROGRESS NOTES
"   04/29/25 1100   Appointment Info   Signing Clinician's Name / Credentials (PT) Claudine Mina DPCHAVO   Living Environment   People in Home significant other   Current Living Arrangements apartment   Home Accessibility stairs to enter home   Number of Stairs, Main Entrance greater than 10 stairs  (12)   Stair Railings, Main Entrance railing on left side (ascending)   Living Environment Comments Patient lives in lower level apartment, x 1 flight down to access unit, walk-in shower   Self-Care   Usual Activity Tolerance fair   Current Activity Tolerance poor   Regular Exercise No   Equipment Currently Used at Home walker, rolling  (4WW)   Fall history within last six months no   Activity/Exercise/Self-Care Comment Patient struggles with chronic pain and fibromyalgia at baseline, citing some days very challenging and barely able to perform basic IADLs at home other days she is able to walk around [place].  Was working with outpatient PT at [facility] pain clinic for pool therapy.  Patient says that she struggles with activity tolerance and accommodation as on days she overdoes it she ends up in severe uncontrolled pain.  Significant other occasionally assisting with ADLs as needed but typically patient independent, mod I with 4WW when in pain crisis but does not typically use walker   General Information   Onset of Illness/Injury or Date of Surgery 04/27/25   Referring Physician Chrissie Reyes, DO   Pertinent History of Current Problem (include personal factors and/or comorbidities that impact the POC) per chart review , \"Caprice Kline is a 29 year old female with a complex PMHx of lumbar radiculopathy, fibromyalgia, migraines, and complex psychiatric history transferred from [facility] ED and admitted on 4/27/2025 for pain control, and further evaluation and muscle biopsy of bilateral hip and thigh pain. \"   Existing Precautions/Restrictions no known precautions/restrictions   General Observations Significant other in room "   Cognition   Affect/Mental Status (Cognition) WNL   Orientation Status (Cognition) oriented x 4   Follows Commands (Cognition) WNL   Pain Assessment   Patient Currently in Pain Yes, see Vital Sign flowsheet   Integumentary/Edema   Integumentary/Edema Comments 2+ pitting B feet to 1+ through shins. not currently impairing mobility. Very mobile, skin intact   Posture    Posture Protracted shoulders   Range of Motion (ROM)   Range of Motion ROM deficits secondary to pain   Strength (Manual Muscle Testing)   Strength (Manual Muscle Testing) Deficits observed during functional mobility   Bed Mobility   Comment, (Bed Mobility) slow, Essence with rail use tot EOB   Transfers   Comment, (Transfers) slow, painful, but stands Essence to IV pole   Gait/Stairs (Locomotion)   Comment, (Gait/Stairs) ambulates single IV support to/from door in room, painful but no overt LOB, reaching out to bed for added support 2/2 pain but refuses walker   Balance   Balance Comments defecits 2/2 pain   Sensory Examination   Sensory Perception patient reports no sensory changes   Coordination   Coordination no deficits were identified   Clinical Impression   Criteria for Skilled Therapeutic Intervention Yes, treatment indicated   PT Diagnosis (PT) Impaired functional mobility   Influenced by the following impairments Pain   Functional limitations due to impairments Gait, ADLs, community activity   Clinical Presentation (PT Evaluation Complexity) evolving   Clinical Presentation Rationale PMH, clinical impression, managing uncontrolled pain   Clinical Decision Making (Complexity) low complexity   Planned Therapy Interventions (PT) gait training;home exercise program;patient/family education;stair training;transfer training;progressive activity/exercise;risk factor education;home program guidelines   Risk & Benefits of therapy have been explained evaluation/treatment results reviewed;care plan/treatment goals reviewed;risks/benefits  reviewed;current/potential barriers reviewed;participants voiced agreement with care plan;participants included;patient;spouse/significant other   Clinical Impression Comments Is below baseline mobility status though pain is primary barrier, dissipate mobility and activity to improve with ongoing pain management managed by medical   PT Total Evaluation Time   PT Eval, Low Complexity Minutes (56689) 10   Physical Therapy Goals   PT Frequency 4x/week   PT Predicted Duration/Target Date for Goal Attainment 05/05/25   PT Goals Gait;Stairs;PT Goal 1   PT: Gait Modified independent;Rolling walker;100 feet   PT: Stairs Supervision/stand-by assist;Greater than 10 stairs;Rail on left  (12 stairs descending)   PT: Goal 1 Patient will demo understanding for IND use of HEP at home   Interventions   Interventions Quick Adds Therapeutic Activity;Therapeutic Procedure   Therapeutic Procedure/Exercise   Ther. Procedure: strength, endurance, ROM, flexibillity Minutes (54069) 10   Treatment Detail/Skilled Intervention UE HEP set up to work on upper extremity strengthening and postural training.  Both PT and patient in agreement that LE HEP at this time likely to cause more pain than help.  Patient instructed through x 10 each of Scap sets, shoulder ER, abduction, elbow flexion.  Discussed AROM versus adding Thera-Band resistance.  Patient citing even just doing AAROM today as she will likely be sore tomorrow, so agreed to hold off on Thera-Band for now.  HEP handout provided to patient for use later today and tomorrow.   Therapeutic Activity   Therapeutic Activities: dynamic activities to improve functional performance Minutes (83378) 15   Treatment Detail/Skilled Intervention Discussed acute PT role with patient in hospital for dispo planning as patient more familiar with OP PT specialty.  In agreement with PT assessment that once pain control manage her activity and mobility would likely improve so PT feels she will be safe for  "home for home and not primary  of discharge plan, patient in agreement with this.  Education to patient on energy conservation strategies and pain management strategies for home.  Patient expressing frustration with chronic pain and fibromyalgia limiting her ability to \"be normal\" at home.  Patient feels OP PT in the past has not been helpful for multiple orthopedic neuromuscular problems.  PT emphasizing strategies more around logging daily activity and using a subjective pain scale to help her guide increasing activity at home and prevent overdoing it where she is then stuck in bed multiple days due to uncontrolled pain.  Educated that chronic pain and fibromyalgia will likely be lifelong issues for her in terms of progressing activity and encouraged continued follow-up with PT at [facility] pain clinic to help continue guiding activity accommodations and recommendations.  Patient expressing frustration with current status, accepting of education but response felt overall neutral.   PT Discharge Planning   PT Plan Trial a.m. with 4 WW, follow-up on how exercises went and if can advance to Thera-Band, eventually they want to trial stairs in next couple sessions   PT Discharge Recommendation (DC Rec) home with assist;home with outpatient physical therapy   PT Rationale for DC Rec Patient is below her baseline however moving overall safe despite significant pain.  Lives with significant other who can assist at home.  She does have flight of stairs she will need to navigate but highly anticipate once pain better manage mobility and activity will follow suit be safe for home discharge.  Recommend resumption of OP PT to manage chronic pain   PT Brief overview of current status Independent in room, limited overall ambulation and activity tolerance   PT Total Distance Amb During Session (feet) 40   Physical Therapy Time and Intention   Timed Code Treatment Minutes 25   Total Session Time (sum of timed and untimed " services) 35

## 2025-04-29 NOTE — TELEPHONE ENCOUNTER
Medication Appeal Initiation    Medication: UBROGEPANT 100 MG PO TABS  Appeal Start Date:  4/29/2025  Insurance Company: [employer] for [place] Medicaid Phone [phone] Fax [phone]   Insurance Phone: [employer] for [place] Medicaid Phone [phone] Fax [phone]   Insurance Fax: [employer] for [place] Medicaid Phone [phone] Fax [phone]   Comments:       Sent in requested additional information.

## 2025-04-29 NOTE — PROGRESS NOTES
[facility]  Neurology Consultation - Progress Note    Patient Name:  Caprice Kline  Date of Service:  April 29, 2025    Subjective:    NAEO. Pain is about the same today, still worse within bilateral hips and down legs. CK 1563 today, as high as 2788 yesterday. MR hayes completed this morning and concern for area of myonecrosis in left hip.     Of note, she has not taken Ajovy in about 3 months. Additionally, she was injecting medication into her stomach, alternating sides.     Objective:    Vitals: /64 (BP Location: Left arm)   Pulse 69   Temp 98.4  F (36.9  C) (Oral)   Resp 18   LMP 04/17/2025 (Exact Date)   SpO2 94%   General: Lying in bed, NAD  Head: Atraumatic, normocephalic   Cardiac: no lower extremity edema  Neurologic:  Mental status: Awake, alert, attentive, oriented to self, time, place, and circumstance. Language is fluent and coherent with intact comprehension of complex commands, naming and repetition.  Cranial nerves: VFF, PERRL, conjugate gaze, EOMI, facial sensation intact, face symmetric, shoulder shrug strong, tongue/uvula midline, no dysarthria.   Motor: Normal bulk and tone. No abnormal movements. Strength dependent on effort and giveaway from pain though 5/5 bilaterally in deltoids, biceps, triceps, hand , hip flexors, hip extensors, knee flexion, knee extension, plantarflexion, dorsiflexion.   Reflexes: Normo-reflexic and symmetric biceps, brachioradialis, triceps, brisk patellae, and achilles. No clonus, toes down-going.  Sensory: Intact to light touch and pin in proximal and distal aspects of all 4 extremities. Vibratory sensation ~8 seconds at bilateral great toes.   Coordination: FNF without ataxia or dysmetria.  Gait: She was able to raise left leg up without difficulty but unable to bear weight solely on left leg due to increased pain in left hip.    Pertinent Investigations:    I have personally reviewed most recent and pertinent  labs, tests, and radiological images.     Ammonia 22. Aldolase pending    CK: 4/22 1307, 4/23 1120, 4/25 255, 4/27 912, 4/28 2,788, 4/29 1563.      Per Rheumatology:  Negative/Normal: ssDNA, dsDNA,smith, RNP, ssa, ssb, chromatin   Myositis panel negative-- Cristina-1, pl7, pl12, ej, oj, srp, MI-2, MDA5, TIF1, NXP-2     Assessment  #Elevated CK  #BL lower extremity pain  #Hx of chronic back pain s/p microdiscectomy     Caprice Kline is a 29 year old female with history of lumbar radiculopathy, fibromyalgia, migraines, and complex psychiatric history. She is transferred from [facility] ED and admitted on 4/27/2025 for pain control, and further evaluation and muscle biopsy of bilateral hip and thigh pain. Bilateral MR of thighs completed in January which noted bilateral intramuscular signal alternation, R>L of adductors, R gluteus tj and R vastus lateralis. Repeat bilateral MR of thighs on 3/31 showed improvement of previously identified edema, R>L though slightly more pronounced edema in distal R gluteus medius. Patient with fluctuating elevations in CK; 1307 on 4/22 and down trending though was elevated to 2,788 yesterday, 1563 today. She is full strength within her lower extremities though limited due to effort and pain.      Workup thus far per rheumatology positive TEODORO 1:1160 with negative DOM, myositis panel negative-- Cristina-1, pl7, pl12, ej, oj, srp, MI-2, MDA5, TIF1, NXP-2. Differential for myopathy includes immune mediated vs infectious vs hereditary vs metabolic (ammonia 22, aldolase pending) vs less likely medication related. With further search of literature, medications including Ajovy and Ubrelvy with no clear evidence that it can be associated with elevated CK. Necrotizing myopathy mcwilliams panel, Aldolase, HBV HCV HIV pending. Appreciate rheumatology teams assistance in ordering HMGCR, immunoglobulins, and anti CN1A.     MR of femurs was obtained and showed concern for rim enhancement localized to gluteus  minimus and quadratus femoris suggesting myonecrosis. This finding is a little puzzling to what it may represent, ?possible abscess or infectious process. We also wonder whether this specific area of possible myonecrosis is causing her fluctuations in CK elevation and is less likely immune-mediated.    Recommendations:   -MR bilateral femur completed, concern for rim enhancement localized to gluteus minimus and quadratus femoris suggesting myonecrosis. Will discuss with rheumatology and get their thoughts and give further recommendations.  -Necrotizing myopathy mcwilliams panel, Aldolase, HBV HCV HIV pending  -Will continue to follow    Thank you for involving Neurology in the care of Caprice Kline.  Please do not hesitate to call with questions/concerns (consult pager [phone]).      Patient was discussed with Dr. Oliva.    Rhonda Mckoy PA-C    60 MINUTES SPENT BY ME on the date of service doing chart review, history, exam, documentation & further activities per the note.

## 2025-04-29 NOTE — PROGRESS NOTES
End of Shift Summary.[id number]     Changes this shift:   -Refused fisher placement  -Strict I&O  -CK up trending  -1 L LR bolus @500 ml/hr    Neuro: A&Ox4  Cardiac: WDL, BLE edema  Respiratory: Sat>92 on RA   GI/: Continent of bladder and bowel, strict I&O's  Diet/appetite: Regular  Pain: Managed with PRN morphine and Tizanidine in addition to scheduled meds  Skin: Grossly intact,   LDA's: PIV x1  Activities: SB-A to A-1    Drips:   LR:100 ml/hr       Plan: Continue POC

## 2025-04-29 NOTE — PLAN OF CARE
Goal Outcome Evaluation:      VS: /64 (BP Location: Left arm)   Pulse 69   Temp 98.4  F (36.9  C) (Oral)   Resp 18   LMP 04/17/2025 (Exact Date)   SpO2 94%      O2: Stable ORA   Output: Voiding spontaneously, has an order for intermittent cath if needed    Last BM: 4/28/25   Activity: Independent   Up for meals? Yes   Skin: Intact   Pain: Reports pain @ 9/10    CMS: AO x4   Dressing: None   Diet: Regular   LDA: Rt PIV SL    Equipment: IV pole, personal belongings   Plan: Continue with POC    Additional Info: MRI done this shift

## 2025-04-29 NOTE — PROGRESS NOTES
Brief Rheumatology Update--    Chart Review  CK downtrending   MRI reviewed           Impression:  1. Left: Patchy soft tissue edema within the proximal  quadriceps/vastus lateralis , gluteus minimus and quadratus femoris  muscles There is associated rim enhancement particularly in the  gluteus minimus and quadratus femoris suggesting myonecrosis.  Perifascial edema and small perifascial fluid particularly along the  proximal quadriceps muscles. Compared to MRI from 1/23/2025 findings  have progressed around the left hip.      Right: Trace residual soft tissue edema in the gluteus medius;  improved from 1/23/2025. Interval significant resolution of the edema  in the adductor musculature and gluteus tj. Perifascial edema  involving quadriceps muscle particularly along the lateral aspect.  Small areas of edema and enhancement in the distal biceps femoris and  distal adductor perry.     After discussion with Neurology, this ring enhancing lesion on the left side would be odd for a metabolic or inflammatory myopathy . The right sided edema actually improved. It is unclear what this ring enhancing lesion is. However myonecrosis could explain the intermittent CK leak. Concern for infection versus underlying tumor. Recommend obtaining a sample of  this ring enhancing lesion. Unsure if it would be amendable to IR aspiration (no clear fluid pocket) versus orthopedic consult    Plan  Recommend Orthopedics consult to assist with best approach to obtaining sample of ring enhancing lesion within muscle   Recommend ID consult to assist with infectious workup of this lesion  Will follow HMGCR antibody ,immunoglobulins, anti CN1A ordered     Discussed with Dr. Simone Oseguera MD  PGY2     Discussed with Dr. Ana Rosa Nichols MD  Staff Rheumatologist, [facility]

## 2025-04-29 NOTE — CONSULTS
Nephrology Initial Consult  April 29, 2025      Caprice Kline MRN:[id number] YOB: 1995  Date of Admission:4/27/2025  Primary care provider: Junior Roe  Requesting physician: Lydia Padgett MD    ASSESSMENT AND RECOMMENDATIONS:     # elevated creatine kinase  Risk of CHAVO lower when CK level <75973  CHAVO can occur with levels of 5000 Unit/liter but typically when there are other things increasing risk of CHAVO such as sepsis N Engl J Med 2009;361:62-72.  Her potassium, phosphorus and calcium levels are normal - these can go out of range with significant rhabdo  Agree with discontinuation of IV crystalloid  OK to continue furosemide for comfort    # BPAD:  On lithium x3 years and sx under better control with lithium  She avoids NSAIDS  If she were to develop CHAVO she would be at risk for lithium toxicity but there is no e/o CHAVO at this time    # edema  No e/o cardiovascular cause of sodium retention. Will check urine protein and albumin.  Edema seems to be related to the process that is currently affecting her muscles and causing CK elevation. She is undergoing evaluation with rheumatology and neurology.    Recommendations were communicated to primary team via discussion    No ongoing need for nephrology consultative care, will sign off. Call if questions.    Nneka Winter MD   Division of Nephrology and Hypertension  [facility] [place]  Vocera Web Console      REASON FOR CONSULT: elevated CK    HISTORY OF PRESENT ILLNESS:  Admitting provider and nursing notes reviewed  Caprice Kline is a 29 year old with BPADII, recurrent MDD and chronic LE edema admitted with worsening leg pain and elevated CK.  Prior to admission, she has had pain attributed to lumbar radiculopathy and fibromyalgia and had been undergoing evaluation with rheumatology.  Since admission, she has had consults with neurology and rheumatology. She has received LR and her CK levels have been trended. CK was as high as 2000's and now down to  "1000's.    Caprice has had pain for quite some time, some of this has been present for six years but more issues in the last 2 years.  In the last month, she developed bilateral foot/ankle edema that does not improve with elevation. Occasionally notes facial edema without clear aggravating/alleviating factors.   Bilateral leg pain worsening recently prompting ED evaluation.    Notable that she has also had abdominal pain at times, CT A/P w/contrast last November showed a 2 mm stone upper pole right kidney, she has never passed a stone.    PAST MEDICAL HISTORY:  Reviewed with patient on 04/29/2025   Past Medical History:   Diagnosis Date    Bipolar disorder (H)     Complication of anesthesia     \"freaking out when I wake up\"    Depressive disorder 2011       Past Surgical History:   Procedure Laterality Date    DISCECTOMY LUMBAR POSTERIOR MICROSCOPIC ONE LEVEL Left 08/24/2021    Procedure: Minimally invasive left Lumbar 5 to Sacral 1 microdiscectomy  ;  Surgeon: Destin Junior MD;  Location: [facility] OR    GYN SURGERY Right     cystectomy - with salpingectomy    HEAD & NECK SURGERY      tonsillectomy    HEAD & NECK SURGERY      wisdom teeth extraction        MEDICATIONS:  PTA Meds  Prior to Admission medications    Medication Sig Last Dose Taking? Auth Provider Long Term End Date   albuterol (PROAIR HFA/PROVENTIL HFA/VENTOLIN HFA) 108 (90 Base) MCG/ACT inhaler Inhale 2 puffs into the lungs every 6 hours as needed for shortness of breath, wheezing or cough Past Month Yes Junior Roe PA-C Yes    amphetamine-dextroamphetamine (ADDERALL XR) 30 MG 24 hr capsule Take 30 mg by mouth daily as needed. Past Week Yes Reported, Patient     azelaic acid (FINACIA) 15 % external gel Apply topically 2 times daily as needed. Past Week Yes Reported, Patient     clobetasol (TEMOVATE) 0.05 % external solution APPLY TOPICALLY TO THE AFFECTED AREA TWICE DAILY FOR UP TO 21 DAYS. DO NOT APPLY TO NORMAL SKIN Past Week Yes Reported, Patient   "   clonazePAM (KLONOPIN) 0.5 MG tablet Take 0.5 mg by mouth daily as needed for anxiety. Past Week Yes Reported, Patient No    diazepam (VALIUM) 10 MG tablet PLACE 1 TABLET VAGINALLY AS NEEDED FOR URINARY RETENTION OR FOR PAIN Past Week Yes Teresa Mckee MD     docusate sodium (COLACE) 100 MG capsule Take 200 mg by mouth 2 times daily. 4/26/2025 Yes Reported, Patient     doxepin (SINEQUAN) 10 MG capsule Take 10 mg by mouth At Bedtime 4/26/2025 Yes Reported, Patient Yes    furosemide (LASIX) 20 MG tablet Take 1 tablet (20 mg) by mouth daily. 4/26/2025 Yes Junior Roe PA-C Yes    glucosamine-chondroitin 500-400 MG CAPS per capsule Take 1 capsule by mouth daily. 4/26/2025 Yes Reported, Patient     hydrOXYzine (VISTARIL) 50 MG capsule Take 100 mg by mouth 3 times daily. 4/26/2025 Yes Reported, Patient     ipratropium-albuterol (COMBIVENT RESPIMAT)  MCG/ACT inhaler Inhale 1 puff into the lungs 4 times daily as needed for shortness of breath, wheezing or cough Past Month Yes Junior Roe PA-C Yes    ketoconazole (NIZORAL) 2 % external shampoo Apply topically twice a week. Past Week Yes Unknown, Entered By History     lamoTRIgine (LAMICTAL) 100 MG tablet Take  mg by mouth 2 times daily. 100 mg AM / 50 mg PM as of 4/27 - pt in process of titrating back up 4/26/2025 Yes Reported, Patient Yes    lithium (ESKALITH) 600 MG capsule Take 600 mg by mouth at bedtime. 4/26/2025 Yes Reported, Patient Yes    magnesium oxide (MAG-OX) 400 MG tablet Take 400 mg by mouth daily. 4/26/2025 Yes Reported, Patient     morphine (MSIR) 15 MG IR tablet Take 15 mg by mouth 3 times daily as needed for pain. 4/26/2025 Yes Reported, Patient     ondansetron (ZOFRAN ODT) 4 MG ODT tab DISSOLVE 1 TABLET(4 MG) ON THE TONGUE EVERY 8 HOURS AS NEEDED FOR NAUSEA Past Month Yes Junior Roe PA-C     pregabalin (LYRICA) 100 MG capsule Take 100 mg by mouth at bedtime. 4/26/2025 Yes Reported, Patient No    pregabalin (LYRICA) 150 MG capsule  Take 150 mg by mouth 3 times daily. AM / noon / dinner 4/26/2025 Yes Reported, Patient No    propranolol (INDERAL) 20 MG tablet Take 1 tablet (20 mg) by mouth 2 times daily as needed (tremor). Past Week Yes Karl Goldman MD Yes    propranolol ER (INDERAL LA) 80 MG 24 hr capsule Take 1 capsule (80 mg) by mouth daily. 4/26/2025 Yes Karl Goldman MD Yes    QUEtiapine (SEROQUEL) 300 MG tablet Take 300 mg by mouth At Bedtime 4/26/2025 Yes Reported, Patient Yes    Semaglutide-Weight Management (WEGOVY) 0.5 MG/0.5ML pen Inject 0.5 mg subcutaneously once a week. More than a month Yes Junior Roe PA-C No    sulfacetamide sodium, Acne, 10 % lotion APPLY IT TO THE FACE ONCE DAILY IN THE MORNING X3 MONTHS Past Week Yes Reported, Patient     tamsulosin (FLOMAX) 0.4 MG capsule Take 1 capsule (0.4 mg) by mouth every evening. 4/26/2025 Yes Teresa Mckee MD     tiZANidine (ZANAFLEX) 4 MG tablet TAKE 1 TABLET BY MOUTH EVERY 8 TO 12 HOURS AS NEEDED. NOT TO EXCEED 3 DOSES IN 24 HOURS Past Week Yes Reported, Patient     triamcinolone (KENALOG) 0.1 % external cream Apply topically 2 times daily as needed. More than a month Yes Reported, Patient     ubrogepant (UBRELVY) 100 MG tablet Take 1 tablet (100 mg) by mouth at onset of headache. Past Week Yes Karl Goldman MD     EPINEPHrine (ANY BX GENERIC EQUIV) 0.3 MG/0.3ML injection 2-pack INJECT 0.3 ML INTO THE MUSCLE AS NEEDED FOR ANAPHYLAXIS   Junior Roe PA-C     Fremanezumab-vfrm (AJOVY) 225 MG/1.5ML SOAJ Inject 225 mg subcutaneously every 30 days.  Patient not taking: Reported on 4/22/2025 4/13/2025  Karl Goldman MD     ketoconazole (NIZORAL) 2 % external cream Apply topically. 3 times every week   Reported, Patient     NARCAN 4 MG/0.1ML nasal spray CALL 911. SPR CONTENTS OF ONE SPRAYER (0.1ML) INTO ONE NOSTRIL. REPEAT IN 2-3 MIN IF SYMPTOMS OF OPIOID EMERGENCY PERSIST, ALTERNATE NOSTRILS   Reported, Patient Yes    prazosin (MINIPRESS) 2 MG capsule Take 2 mg by  mouth every morning.   Reported, Patient No    prazosin (MINIPRESS) 5 MG capsule Take 5 mg by mouth at bedtime.   Reported, Patient No    RETIN-A 0.025 % external cream APPLY SPARINGLY TO FACE EVERY OTHER NIGHT FOR 14 NIGHTS THEN NIGHTLY THEREAFTER   Reported, Patient        Current Meds  Current Facility-Administered Medications   Medication Dose Route Frequency Provider Last Rate Last Admin    [Held by provider] acetaminophen (TYLENOL) tablet 975 mg  975 mg Oral TID Chrissie Reyes DO   975 mg at 04/28/25 0901    doxepin (SINEquan) capsule 10 mg  10 mg Oral At Bedtime [facility], DO   10 mg at 04/28/25 2145    furosemide (LASIX) tablet 20 mg  20 mg Oral Daily [facility], DO   20 mg at 04/29/25 0835    hydrOXYzine HCl (ATARAX) tablet 100 mg  100 mg Oral TID [facility], DO   100 mg at 04/29/25 0835    lamoTRIgine (LaMICtal) tablet 100 mg  100 mg Oral [facility] Sujit Andrade MD   100 mg at 04/29/25 0835    lamoTRIgine (LaMICtal) tablet 50 mg  50 mg Oral Daily at 4 pm Sujit Andrade MD   50 mg at 04/28/25 1510    lithium (ESKALITH) capsule 600 mg  600 mg Oral At Bedtime [facility], DO   600 mg at 04/28/25 2145    polyethylene glycol (MIRALAX) Packet 17 g  17 g Oral BID [facility], DO   17 g at 04/29/25 0835    prazosin (MINIPRESS) capsule 2 mg  [facility]   2 mg at 04/29/25 0837    prazosin (MINIPRESS) capsule 5 mg  5 mg Oral At Bedtime [facility], DO   5 mg at 04/28/25 2145    pregabalin (LYRICA) capsule 100 mg  100 mg Oral At Bedtime [facility], DO   100 mg at 04/28/25 2145    pregabalin (LYRICA) capsule 150 mg  150 mg Oral TID [facility], DO   150 mg at 04/29/25 1208    propranolol ER (INDERAL LA) 24 hr capsule 80 mg  80 mg Oral Daily Isabel Haque DO   80 mg at 04/29/25 0836    QUEtiapine (SEROquel) tablet 300 mg  300 mg Oral At Bedtime Isabel Haque DO   300 mg at 04/28/25 5768    sodium chloride (PF) 0.9% PF flush  "3 mL  3 mL Intracatheter Q8H [facility] Chrissie Reyes DO   3 mL at 04/28/25 1517    tamsulosin (FLOMAX) capsule 0.4 mg  0.4 mg Oral QPM Isabel Haque DO   0.4 mg at 04/28/25 2018     Infusion Meds  Current Facility-Administered Medications   Medication Dose Route Frequency Provider Last Rate Last Admin       ALLERGIES:    Allergies   Allergen Reactions    Metronidazole Anaphylaxis and Hives     Other reaction(s): Throat swelling  Throat swelling 6 hrs after exposure  Itching and hives 2 hrs after exposure    Shellfish Allergy Anxiety, Diarrhea, Difficulty breathing, Hives, Itching, Rash, Shortness Of Breath and Swelling    Codeine Headache    Fish-Derived Products      Stopped fish oil and less stomach discomfort  Rash after eating fish.     Hydrocodone-Acetaminophen Other (See Comments)    Sertraline Other (See Comments)     Patient was foggy and \"high\" feeling    Shellfish-Derived Products      Lips get numb, throat gets scratchy, rashes       REVIEW OF SYSTEMS:  A comprehensive of systems was negative except as noted above.    SOCIAL HISTORY:   Social History     Socioeconomic History    Marital status: Single     Spouse name: Not on file    Number of children: Not on file    Years of education: Not on file    Highest education level: Not on file   Occupational History    Not on file   Tobacco Use    Smoking status: Former     Current packs/day: 0.00     Types: Cigarettes, Vaping Device     Passive exposure: Current    Smokeless tobacco: Never   Vaping Use    Vaping status: Some Days    Substances: Nicotine    Devices: Refillable tank   Substance and Sexual Activity    Alcohol use: Not Currently     Comment: 2 drinks every other day     Drug use: Not Currently    Sexual activity: Yes     Partners: Female, Male     Birth control/protection: None   Other Topics Concern    Parent/sibling w/ CABG, MI or angioplasty before 65F 55M? No   Social History Narrative    Not on file     Social Drivers of Health "     Financial Resource Strain: Low Risk  (12/9/2024)    Financial Resource Strain     Within the past 12 months, have you or your family members you live with been unable to get utilities (heat, electricity) when it was really needed?: No   Food Insecurity: Unknown (12/9/2024)    Food Insecurity     Within the past 12 months, did you worry that your food would run out before you got money to buy more?: Patient declined     Within the past 12 months, did the food you bought just not last and you didn t have money to get more?: Patient declined   Transportation Needs: Unknown (12/9/2024)    Transportation Needs     Within the past 12 months, has lack of transportation kept you from medical appointments, getting your medicines, non-medical meetings or appointments, work, or from getting things that you need?: Patient declined   Physical Activity: Inactive (12/9/2024)    Exercise Vital Sign     Days of Exercise per Week: 0 days     Minutes of Exercise per Session: 0 min   Stress: Stress Concern Present (12/9/2024)    [employer] [place] [employer] - Occupational Stress Questionnaire     Feeling of Stress : Very much   Social Connections: Unknown (12/9/2024)    Social Connection and Isolation Panel [NHANES]     Frequency of Communication with Friends and Family: Not on file     Frequency of Social Gatherings with Friends and Family: Once a week     Attends Spiritism Services: Not on file     Active Member of Clubs or Organizations: Not on file     Attends Club or Organization Meetings: Not on file     Marital Status: Not on file   Interpersonal Safety: Low Risk  (4/27/2025)    Interpersonal Safety     Do you feel physically and emotionally safe where you currently live?: Yes     Within the past 12 months, have you been hit, slapped, kicked or otherwise physically hurt by someone?: No     Within the past 12 months, have you been humiliated or emotionally abused in other ways by your partner or ex-partner?: No    Housing Stability: Low Risk  (2024)    Housing Stability     Do you have housing? : Yes     Are you worried about losing your housing?: No     Reviewed with patient   boyfriend accompanies Caprice Kline in hospital room    FAMILY MEDICAL HISTORY:   No kidney stones  Brother has similar sx as Caprice but has not been tested for any health conditions    PHYSICAL EXAM:   Temp  Av.1  F (36.7  C)  Min: 97.6  F (36.4  C)  Max: 98.5  F (36.9  C)      Pulse  Av.9  Min: 62  Max: 91 Resp  Av.8  Min: 16  Max: 20  SpO2  Av.2 %  Min: 88 %  Max: 100 %       /64 (BP Location: Left arm)   Pulse 69   Temp 98.4  F (36.9  C) (Oral)   Resp 18   LMP 2025 (Exact Date)   SpO2 94%    Date 25 0700 - 25 0659   Shift 7405-5851 1492-0956 7715-2168 24 Hour Total   INTAKE   P.O. 1036   1036   Shift Total 1036   1036   OUTPUT   Urine 2950   2950   Shift Total 2950   2950   Weight (kg)          Admit       GENERAL APPEARANCE: no distress,  awake  EYES: no scleral icterus, pupils equal  Lymphatics: no cervical or supraclavicular LAD  Pulmonary: lungs clear to auscultation, few basilar crackles that cleared after deep breaths, no wheeze  CV: regular rhythm, normal rate, no rub   - JVD none   - Edema 1+ bilateral feet  GI: soft, nontender, normal bowel sounds  MS: no evidence of inflammation in joints, no muscle tenderness  : no fisher  SKIN: no rash, warm, dry, no cyanosis  NEURO: face symmetric, no asterixis, speech fluent    LABS:   I have reviewed the following labs:  CMP  Recent Labs   Lab 25  0549 25  1533 25  0727 25  0916 25  0808 25  0003    137 136 139   < > 137   POTASSIUM 4.1 3.7 3.9 4.3   < > 4.4   CHLORIDE 102 101 102 101   < > 102   CO2 27 26 27 28   < > 29   ANIONGAP 10 10 7 10   < > 6*   * 116* 98 88   < > 105*   BUN 8.3 8.0 7.1 11.3   < > 22.3*   CR 0.72 0.81 0.79 0.78   < > 0.89   GFRESTIMATED >90 >90 >90 >90   < > 89   REMA 9.5  9.5 9.8 10.0   < > 9.6   MAG  --   --  1.7  --   --   --    PHOS  --   --  4.5  --   --   --    PROTTOTAL 5.5* 6.1* 6.1*  --   --  6.3*   ALBUMIN 3.5 3.8 3.9  --   --  4.0   BILITOTAL 0.3 0.3 0.5  --   --  0.2   ALKPHOS 64 79 80  --   --  71   AST 69* 115* 151*  --   --  43   ALT 59* 81* 95*  --   --  21    < > = values in this interval not displayed.     CBC  Recent Labs   Lab 04/29/25  0549 04/28/25  1533 04/28/25  0727 04/27/25  0916   HGB 12.1 13.2 13.4 14.2   WBC 4.8 6.7 5.6 10.0   RBC 4.74 5.22* 5.27* 5.58*   HCT 36.9 40.3 41.0 43.3   MCV 78 77* 78 78   MCH 25.5* 25.3* 25.4* 25.4*   MCHC 32.8 32.8 32.7 32.8   RDW 14.6 14.6 14.5 14.6    251 255 269     INR  Recent Labs   Lab 04/28/25  1533 04/23/25  0003   INR 1.01 1.00   PTT 31 30     ABGNo lab results found in last 7 days.   URINE STUDIES  Recent Labs   Lab Test 04/28/25  1820 04/27/25  1123 03/10/25  1105   COLOR Light Yellow Yellow Yellow   APPEARANCE Slightly Cloudy* Clear Clear   URINEGLC Negative Negative Negative   URINEBILI Negative Negative Negative   URINEKETONE Negative Negative Negative   SG 1.013 1.017 1.025   UBLD Negative Negative Negative   URINEPH 5.5 6.5 6.0   PROTEIN Negative Negative 10*   NITRITE Negative Negative Negative   LEUKEST Large* Small* Negative   RBCU 3* 2 0-2   WBCU 30* 15* 0-5     No lab results found.  PTH  No lab results found.  IRON STUDIES  Recent Labs   Lab Test 02/07/23  0821   IRON 66      IRONSAT 25       IMAGING:  All imaging studies reviewed by me.     Nneka Winter MD

## 2025-04-29 NOTE — PLAN OF CARE
Goal Outcome Evaluation:    Shift 4469-7611    Changes this shift:     Neuro: Alert and orientated x4.    Cardiac: HR WNL, normotensive. Afebrile    Respiratory: O2 sat > 90% on RA    GI/: Continent B&B, strict I&Os, pt was BS for 661, pt did not want fisher even after writer educated pt, pt wanted to wait until after MRI to try and go one more time then willing to do a fisher if unable to void. Provider aware.    Diet: Regular diet    Pain: Pain managed with scheduled and PRN medications, see MAR for further detail    Skin: Visible skin intact    LDA's: R PIV infusing LR at 100 ml/hr    Activity: SBA    Plan: Call light within reach, able to make needs known, continue with plan of care.

## 2025-04-29 NOTE — PROGRESS NOTES
[facility]    Progress Note - [facility]       Date of Admission:  4/27/2025    Assessment & Plan   Caprice Kline is a 29 year old female with a complex PMHx of lumbar radiculopathy, fibromyalgia, migraines, and complex psychiatric history transferred from [facility] ED and admitted on 4/27/2025 for pain control, and further evaluation of myositis.      Major updates today:  - Stop LR infusion  - Cancel fisher orders; add intermittent cath  - Discontinue IV dilaudid   - PO dilaudid 4 mg BID PRN     # Bilateral thigh pain  # Bilateral anterior leg pain  # Elevated CK  # Fibromyalgia   # Chronic pain   # Lumbar radiculopathy  Presented with worsening bilateral hip and anterior leg pain in recent months with a known hx of chronic lumbar pain with sciatica. Pertinent hx: had multiple pain interventions for back pain, recently failed a SCS trial; compared to Jan 2025 MRI, 4/22/25 MRI revealed resolution of previously identified edema signal in adductors, R gluteus tj and R vastus lateralis and slightly more pronounced edema in distal R gluteus medius. 4/25 US venous competency without DVT and gross incompetency. OP Rheum work up notable for positive TEODORO 1:160 with negative DOM, negative myositis panel, neg RF/CCP, and normal complement/LFTs/CK/aldolase/inflammatory markers. OP neurology had considered need for muscle biopsy.      On 4/27, transferred from [facility] ED for worsening pain management further work-up for elev CK levels including muscle biopsy. On exam, pain with palpation of bilateral proximal thighs/lower buttocks and anterior thigh muscles. Workup notable for elevated CK levels ranging from 255 - 2788. Normal Cr, UOP, and Li level but concern for potential kidney injury if CK increase beyond 5000, especially in the setting of chronic Li use. Ddx considered: polymyositis vs septic arthritis vs CRPS vs fibromyalgia/VA vs genetic  myopathy vs infectious. 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 Right side suggestive of significant resolution of the edema in the adductor musculature and gluteus tj. Awaiting rheum recs for further evaluation.      Consults: rheumatology, neurology, nephrology, physical therapy; appreciate recs     Pending Lab Workup  - Aldolase  - Peripheral smear  - Necrotizing myopathy panel  - Lyme disease + tick panel    Management  - s/p LR 1L bolus; LR infusion stopped on 4/29  - Pain control  - PTA morphine IR tablet 15mg TID PRN (to be administered prior to dilaudid)  - PTA pregabalin 100mg at bedtime + 150mg TID   - PTA Tizanidine 4mg TID PRN        - Dilaudid PO 4mg BID PRN  - Lidocaine cream   - Naloxone ordered  - HOLD Tylenol 975mg TID in the setting of elevated LFTs  - IV Dilaudid 0.5mg q6h PRN discontinued on 4/29     Monitoring  - strict I&O  - continuous pulse ox  - Daily CBC and CMP  - Daily CK    # Hx of Urinary retention  # Hx of Urinary hesitancy   # Pelvic pain   Seen at ED on 11/5/2024 for acute urinary retention and was discharged home with fisher catheter. Does not have catheter in place, nor has needed to self-cath thereafter. 4/28 UA positive for moderate LE, moderate bacteria, WBC, but negative nitrites. Patient denies new urinary symptoms, although does have a hx of urinary retention and hesitancy. Overnight bladder scan was notable for ~600mL urine, but was able to void the next day.   - Follow-up urine culture  - Intermittent catheterization  - Minimize anticholinergic meds, including opioids  - PTA Flomax 0.4mg at bedtime     # Transaminitis, improving  , ALT 95 noted on 4/28 AM labs. Now improved to AST 69, ALT 59. T bili and INR normal. Asymptomatic. Will continue to monitor with daily AM labs. Holding Tylenol for now.       # Subclinical hypothyroidism  Admission labs  "notable for TSH 5.13 and free T4 1.04. Asymptomatic. Re-check in 3-6 months or sooner if symptoms develop. Continue to monitor.      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 Will continue home meds.               - PTA Adderall XR 30mg every day PRN              - PTA Clonazepam 0.5mg 1 tab PRN anxiety              - PTA Doxepin 10mg 1 tab at bedtime              - PTA Lamictal 100mg every morning & 50mg daily at bedtime               - PTA Lithium 600mg 1 capsule at bedtime               - PTA Prazosin 2mg qam and 5mg qpm               - PTA Quetiapine 300mg at bedtime     # Bilateral edema - chronic   Per patient report, chronic and managed with furosemide 20mg. Denies acute changes in swelling or pain. Of note, has been taking Lyrica for some time, which may also be contributing to LE edema. Will have to be cautious with Furosemide administration in the setting of rising CK and potential for renal injury.   - PTA Furosemide 20mg daily      # Non-intractable migraine   # Tremor   Sees outpatient neurology for these concerns. Reports baseline headache that has not acutely changed. No tremors noted on exam.   - PTA Ubrogepant 100mg 1 tab at onset of headache (brought from home)     Diet: Combination Diet Regular Diet Adult  DVT Prophylaxis: Low Risk/Ambulatory with no VTE prophylaxis indicated  Cronin Catheter: Intermittent catheterization PRN  Fluids: PO  Lines: None     Cardiac Monitoring: None  Code Status: Full Code     Clinically Significant Risk Factors                              # Obesity: Estimated body mass index is 35.74 kg/m  as calculated from the following:    Height as of an earlier encounter on 4/27/25: 1.753 m (5' 9\").    Weight as of an earlier encounter on 4/27/25: 109.8 kg (242 lb)., PRESENT ON ADMISSION            Social Drivers of Health   Food Insecurity: " Unknown (12/9/2024)    Food Insecurity     Within the past 12 months, did you worry that your food would run out before you got money to buy more?: Patient declined     Within the past 12 months, did the food you bought just not last and you didn t have money to get more?: Patient declined   Depression: At risk (4/22/2025)    PHQ-2     PHQ-2 Score: 6   Tobacco Use: Medium Risk (4/22/2025)    Patient History     Smoking Tobacco Use: Former     Smokeless Tobacco Use: Never     Passive Exposure: Current   Transportation Needs: Unknown (12/9/2024)    Transportation Needs     Within the past 12 months, has lack of transportation kept you from medical appointments, getting your medicines, non-medical meetings or appointments, work, or from getting things that you need?: Patient declined   Physical Activity: Inactive (12/9/2024)    Exercise Vital Sign     Days of Exercise per Week: 0 days     Minutes of Exercise per Session: 0 min   Stress: Stress Concern Present (12/9/2024)    [place] [employer] - Occupational Stress Questionnaire     Feeling of Stress : Very much   Social Connections: Unknown (12/9/2024)    Social Connection and Isolation Panel [NHANES]     Frequency of Social Gatherings with Friends and Family: Once a week         Disposition Plan     Medically Ready for Discharge: Anticipated in 2-4 Days    The patient's care was discussed with the Attending Physician, Dr. Padgett .    Adamaris Matthew MD  [facility]  [facility]  Securely message with [employer] (more info)  Text page via [employer] Paging/Directory   See signed in provider for up to date coverage information  ______________________________________________________________________    Interval History   Overnight: No acute events.     Subjective: Reports that current pain plan barely manages the pain. Says that she was able to void this morning. Endorses intermittent urinary  hesitancy. Was not using Cronin/ intermittent cath at home. Also reports that she was able to get her Ubrogepant from home.     Physical Exam   Vital Signs: Temp: 98.3  F (36.8  C) Temp src: Oral BP: 99/57 Pulse: 76   Resp: 18 SpO2: (!) 88 % O2 Device: None (Room air)    Weight: 0 lbs 0 oz    Constitutional:       General: She is not in acute distress.     Appearance: Normal appearance. She is not toxic-appearing.      Comments: Intermittently uncomfortable from waxing/waning b/l leg and hip pain   Cardiovascular:      Rate and Rhythm: Normal rate and regular rhythm.      Heart sounds: No murmur heard.     No gallop.   Pulmonary:      Effort: Pulmonary effort is normal on room air. No respiratory distress.      Breath sounds: No wheezing or rales.   Abdominal:      General: Abdomen is flat. Bowel sounds are normal. There is no distension.      Tenderness: There is abdominal tenderness. There is no guarding or rebound.      Comments: Mild tenderness to palpation of periumbilical area and RLQ   Musculoskeletal:      Right lower leg: Edema present.      Left lower leg: Edema present.      Comments: Trace bilateral edema   Neurological:      General: No focal deficit present.      Mental Status: She is alert and oriented to person, place, and time.      Motor: Weakness present, due to pain.   Psychiatric:         Mood and Affect: Mood normal.         Behavior: Behavior normal.     Medical Decision Making             Data     I have personally reviewed the following data over the past 24 hrs:    4.8  \   12.1   / 240     139 102 8.3 /  113 (H)   4.1 27 0.72 \     ALT: 59 (H) AST: 69 (H) AP: 64 TBILI: 0.3   ALB: 3.5 TOT PROTEIN: 5.5 (L) LIPASE: N/A     Procal: N/A CRP: N/A Lactic Acid: 0.7       INR:  1.01 PTT:  31   D-dimer:  <0.27 Fibrinogen:  383     Ferritin:  N/A % Retic:  2.2 (H) LDH:  N/A       Imaging results reviewed over the past 24 hrs:   Recent Results (from the past 24 hours)   MR Femur Bilateral w &wo  Contrast    Narrative    MR bilateral thigh  without and with contrast 4/29/2025 7:49 AM  Techniques: Multiplanar multisequence imaging of the bilateral thighs  was obtained without  and with administration of intra-articular or  intravenous contrast using routine protocol.    Contrast: 10mL Gadavist IV    History: elevated CK, history of abnomal muscle edema in adductors.  Now with elevated CK, assess for myositis in thighs/ pattern of  involvement.    Additional History from EMR: Medication induced myopathy (ajovy,  lamotrigine; both with rare case reports) versus immune mediated  process (inclusion body myositis) versus underlying genetic  myopathies. thyroid level normal. Considered malignancy but appears to  be uptodate on cancer screening (pap with HPV negative october 2024).  Less likely CTD without other signs/symptoms. Will start by obtaining  MRI of bilateral thighs to assist with potential muscle biopsy  location.     Comparison: Bilateral hip MRI 1/23/2025    Findings:    Osseous structures  Osseous structures: Prominent red marrow within the bones. Tiny bone  islands within the left side the tibial plateau. No fracture, stress  reaction, avascular necrosis, or focal osseous lesion is seen.    Joint and periarticular soft tissue    Internal derangement of joints are not well assessed owing to chosen  field of view.    Muscles and tendons  Muscles: No substantial fatty replacement in the muscles.    Left: Patchy edema within the proximal quadriceps femoris, gluteus  minimus and quadratus femoris. There is associated rim enhancement  particularly in the gluteus minimus and quadratus femoris (stipple  sign). Perifascial edema and small fluid particularly along the  proximal quadriceps femoris. Compared to MRI from 1/23/2025 findings  have progressed around left hip.     Right: Trace residual edema in the gluteus medius; improved from  1/23/2025. Interval significant resolution of the edema in the  adductor  musculature and gluteus tj. Perifascial edema involving  quadriceps muscle particularly in the lateral aspect. Small areas of  edema and enhancement in the distal biceps femoris and distal adductor  prery.     Tendons: No partial or complete tear.    Nerves:  Grossly intact sciatic nerves.    Other Findings:  Multiple predominantly peripherally located subcentimeter cysts in the  ovaries; questionable for polycystic appearance.      Impression    Impression:  1. Left: Patchy soft tissue edema within the proximal  quadriceps/vastus lateralis , gluteus minimus and quadratus femoris  muscles There is associated rim enhancement particularly in the  gluteus minimus and quadratus femoris suggesting myonecrosis.  Perifascial edema and small perifascial fluid particularly along the  proximal quadriceps muscles. Compared to MRI from 1/23/2025 findings  have progressed around the left hip.     Right: Trace residual soft tissue edema in the gluteus medius;  improved from 1/23/2025. Interval significant resolution of the edema  in the adductor musculature and gluteus tj. Perifascial edema  involving quadriceps muscle particularly along the lateral aspect.  Small areas of edema and enhancement in the distal biceps femoris and  distal adductor perry.     2. No substantial fatty replacement in the muscles.    I have personally reviewed the examination and initial interpretation  and I agree with the findings.    KRISTOPHER JC MD         SYSTEM ID:  [id number]

## 2025-04-29 NOTE — PLAN OF CARE
Goal Outcome Evaluation:      Plan of Care Reviewed With: patient    Overall Patient Progress: no changeOverall Patient Progress: no change    Outcome Evaluation: Pt is A&Ox4. Continent of B&B. Indpendent. Denies SOB, chest pain. Reports 9/10 BLE pain. Pain management.    Patient admitted -    VS: VS. Blood pressure 107/62, pulse 79, temperature 98.2  F (36.8  C), temperature source Oral, resp. rate 18, last menstrual period 04/17/2025, SpO2 97%, not currently breastfeeding.      O2: >90% on RA no complaints of SOB or chest pain.   Output: Voiding adequate amounts w/o pain or difficulty to bathroom.   Last BM: 04/28/2025   Activity: Up at zan , sba with iv pole    Skin: Clean, dry , intact. Edema bi-laterally lower extremities    Pain: PRN available . Pt report 9/10 pain   CMS: Intact. Pt denies tingling but reports numbness in lower extremities bi - laterally    Dressing: IV clean , dry , intact   Diet: Regular, tolerating well. No complaints of nausea or vomiting.   LDA: Right PIV , infusing LR   Equipment: Personal belongings   Plan: Pain management , continue plan of care adjust as needed   Additional Info: MRI checklist completed. Awaiting MRI to call when available . CK critical lab values , provider aware

## 2025-04-30 ENCOUNTER — APPOINTMENT (OUTPATIENT)
Dept: ULTRASOUND IMAGING | Facility: CLINIC | Age: 30
End: 2025-04-30
Payer: MEDICAID

## 2025-04-30 ENCOUNTER — PATIENT OUTREACH (OUTPATIENT)
Dept: CARE COORDINATION | Facility: CLINIC | Age: 30
End: 2025-04-30

## 2025-04-30 LAB
ALBUMIN SERPL BCG-MCNC: 3.7 G/DL (ref 3.5–5.2)
ALDOLASE SERPL-CCNC: 21.5 U/L
ALP SERPL-CCNC: 63 U/L (ref 40–150)
ALT SERPL W P-5'-P-CCNC: 47 U/L (ref 0–50)
ANION GAP SERPL CALCULATED.3IONS-SCNC: 6 MMOL/L (ref 7–15)
AST SERPL W P-5'-P-CCNC: 47 U/L (ref 0–45)
B BURGDOR DNA SPEC QL NAA+PROBE: NOT DETECTED
B BURGDOR IGG+IGM SER QL: 0.06
BACTERIA UR CULT: NORMAL
BASOPHILS # BLD AUTO: 0.1 10E3/UL (ref 0–0.2)
BASOPHILS NFR BLD AUTO: 1 %
BILIRUB SERPL-MCNC: 0.3 MG/DL
BUN SERPL-MCNC: 9.2 MG/DL (ref 6–20)
CALCIUM SERPL-MCNC: 9.9 MG/DL (ref 8.8–10.4)
CHLORIDE SERPL-SCNC: 101 MMOL/L (ref 98–107)
CK SERPL-CCNC: 779 U/L (ref 26–192)
CREAT SERPL-MCNC: 0.83 MG/DL (ref 0.51–0.95)
CREAT UR-MCNC: 73.9 MG/DL
EGFRCR SERPLBLD CKD-EPI 2021: >90 ML/MIN/1.73M2
EOSINOPHIL # BLD AUTO: 0.5 10E3/UL (ref 0–0.7)
EOSINOPHIL NFR BLD AUTO: 10 %
ERYTHROCYTE [DISTWIDTH] IN BLOOD BY AUTOMATED COUNT: 14.5 % (ref 10–15)
GLUCOSE SERPL-MCNC: 110 MG/DL (ref 70–99)
HCG UR QL: NEGATIVE
HCO3 SERPL-SCNC: 30 MMOL/L (ref 22–29)
HCT VFR BLD AUTO: 38.6 % (ref 35–47)
HGB BLD-MCNC: 12.4 G/DL (ref 11.7–15.7)
HOLD SPECIMEN: NORMAL
IMM GRANULOCYTES # BLD: 0 10E3/UL
IMM GRANULOCYTES NFR BLD: 1 %
LYMPHOCYTES # BLD AUTO: 2 10E3/UL (ref 0.8–5.3)
LYMPHOCYTES NFR BLD AUTO: 41 %
MCH RBC QN AUTO: 25.2 PG (ref 26.5–33)
MCHC RBC AUTO-ENTMCNC: 32.1 G/DL (ref 31.5–36.5)
MCV RBC AUTO: 78 FL (ref 78–100)
MICROALBUMIN UR-MCNC: <12 MG/L
MICROALBUMIN/CREAT UR: NORMAL MG/G{CREAT}
MONOCYTES # BLD AUTO: 0.5 10E3/UL (ref 0–1.3)
MONOCYTES NFR BLD AUTO: 9 %
NEUTROPHILS # BLD AUTO: 1.9 10E3/UL (ref 1.6–8.3)
NEUTROPHILS NFR BLD AUTO: 38 %
NRBC # BLD AUTO: 0 10E3/UL
NRBC BLD AUTO-RTO: 0 /100
PLATELET # BLD AUTO: 264 10E3/UL (ref 150–450)
POTASSIUM SERPL-SCNC: 4.5 MMOL/L (ref 3.4–5.3)
PROT SERPL-MCNC: 5.8 G/DL (ref 6.4–8.3)
RBC # BLD AUTO: 4.93 10E6/UL (ref 3.8–5.2)
SODIUM SERPL-SCNC: 137 MMOL/L (ref 135–145)
T PALLIDUM AB SER QL: NONREACTIVE
WBC # BLD AUTO: 4.9 10E3/UL (ref 4–11)

## 2025-04-30 PROCEDURE — 250N000013 HC RX MED GY IP 250 OP 250 PS 637: Performed by: FAMILY MEDICINE

## 2025-04-30 PROCEDURE — 86618 LYME DISEASE ANTIBODY: CPT

## 2025-04-30 PROCEDURE — 82550 ASSAY OF CK (CPK): CPT

## 2025-04-30 PROCEDURE — 36415 COLL VENOUS BLD VENIPUNCTURE: CPT

## 2025-04-30 PROCEDURE — 250N000011 HC RX IP 250 OP 636: Mod: JW

## 2025-04-30 PROCEDURE — 99255 IP/OBS CONSLTJ NEW/EST HI 80: CPT | Performed by: STUDENT IN AN ORGANIZED HEALTH CARE EDUCATION/TRAINING PROGRAM

## 2025-04-30 PROCEDURE — 120N000002 HC R&B MED SURG/OB UMMC

## 2025-04-30 PROCEDURE — 99207 PR NO CHARGE LOS: CPT | Performed by: ORTHOPAEDIC SURGERY

## 2025-04-30 PROCEDURE — 250N000013 HC RX MED GY IP 250 OP 250 PS 637

## 2025-04-30 PROCEDURE — 86778 TOXOPLASMA ANTIBODY IGM: CPT

## 2025-04-30 PROCEDURE — 85025 COMPLETE CBC W/AUTO DIFF WBC: CPT

## 2025-04-30 PROCEDURE — 86706 HEP B SURFACE ANTIBODY: CPT

## 2025-04-30 PROCEDURE — 82164 ANGIOTENSIN I ENZYME TEST: CPT

## 2025-04-30 PROCEDURE — 86780 TREPONEMA PALLIDUM: CPT

## 2025-04-30 PROCEDURE — 76882 US LMTD JT/FCL EVL NVASC XTR: CPT | Mod: 26 | Performed by: RADIOLOGY

## 2025-04-30 PROCEDURE — 84450 TRANSFERASE (AST) (SGOT): CPT

## 2025-04-30 PROCEDURE — 76882 US LMTD JT/FCL EVL NVASC XTR: CPT | Mod: LT

## 2025-04-30 PROCEDURE — 87799 DETECT AGENT NOS DNA QUANT: CPT

## 2025-04-30 PROCEDURE — 99232 SBSQ HOSP IP/OBS MODERATE 35: CPT | Mod: GC

## 2025-04-30 PROCEDURE — 81025 URINE PREGNANCY TEST: CPT

## 2025-04-30 PROCEDURE — 86704 HEP B CORE ANTIBODY TOTAL: CPT

## 2025-04-30 RX ORDER — HYDROMORPHONE HYDROCHLORIDE 1 MG/ML
0.3 INJECTION, SOLUTION INTRAMUSCULAR; INTRAVENOUS; SUBCUTANEOUS DAILY PRN
Status: DISCONTINUED | OUTPATIENT
Start: 2025-04-30 | End: 2025-05-06 | Stop reason: HOSPADM

## 2025-04-30 RX ADMIN — LAMOTRIGINE 50 MG: 25 TABLET ORAL at 16:15

## 2025-04-30 RX ADMIN — POLYETHYLENE GLYCOL 3350 17 G: 17 POWDER, FOR SOLUTION ORAL at 20:17

## 2025-04-30 RX ADMIN — HYDROMORPHONE HYDROCHLORIDE 0.3 MG: 1 INJECTION, SOLUTION INTRAMUSCULAR; INTRAVENOUS; SUBCUTANEOUS at 16:15

## 2025-04-30 RX ADMIN — MORPHINE SULFATE 15 MG: 15 TABLET ORAL at 13:35

## 2025-04-30 RX ADMIN — PRAZOSIN HYDROCHLORIDE 2 MG: 1 CAPSULE ORAL at 07:57

## 2025-04-30 RX ADMIN — CLONAZEPAM 0.5 MG: 0.5 TABLET ORAL at 20:17

## 2025-04-30 RX ADMIN — PREGABALIN 100 MG: 100 CAPSULE ORAL at 22:06

## 2025-04-30 RX ADMIN — MORPHINE SULFATE 15 MG: 15 TABLET ORAL at 07:02

## 2025-04-30 RX ADMIN — MORPHINE SULFATE 15 MG: 15 TABLET ORAL at 20:17

## 2025-04-30 RX ADMIN — HYDROXYZINE HYDROCHLORIDE 100 MG: 50 TABLET ORAL at 20:16

## 2025-04-30 RX ADMIN — HYDROXYZINE HYDROCHLORIDE 100 MG: 50 TABLET ORAL at 13:34

## 2025-04-30 RX ADMIN — PROPRANOLOL HYDROCHLORIDE 80 MG: 80 CAPSULE, EXTENDED RELEASE ORAL at 07:57

## 2025-04-30 RX ADMIN — HYDROMORPHONE HYDROCHLORIDE 4 MG: 4 TABLET ORAL at 10:57

## 2025-04-30 RX ADMIN — HYDROXYZINE HYDROCHLORIDE 100 MG: 50 TABLET ORAL at 07:57

## 2025-04-30 RX ADMIN — POLYETHYLENE GLYCOL 3350 17 G: 17 POWDER, FOR SOLUTION ORAL at 07:56

## 2025-04-30 RX ADMIN — LAMOTRIGINE 100 MG: 100 TABLET ORAL at 07:57

## 2025-04-30 RX ADMIN — FUROSEMIDE 20 MG: 20 TABLET ORAL at 07:57

## 2025-04-30 RX ADMIN — DOXEPIN HYDROCHLORIDE 10 MG: 10 CAPSULE ORAL at 22:06

## 2025-04-30 RX ADMIN — QUETIAPINE FUMARATE 300 MG: 100 TABLET ORAL at 22:06

## 2025-04-30 RX ADMIN — TIZANIDINE 4 MG: 2 TABLET ORAL at 16:15

## 2025-04-30 RX ADMIN — PREGABALIN 150 MG: 75 CAPSULE ORAL at 07:57

## 2025-04-30 RX ADMIN — PREGABALIN 150 MG: 75 CAPSULE ORAL at 17:31

## 2025-04-30 RX ADMIN — TAMSULOSIN HYDROCHLORIDE 0.4 MG: 0.4 CAPSULE ORAL at 20:17

## 2025-04-30 RX ADMIN — LITHIUM CARBONATE 600 MG: 600 CAPSULE ORAL at 22:06

## 2025-04-30 RX ADMIN — TIZANIDINE 4 MG: 2 TABLET ORAL at 20:16

## 2025-04-30 RX ADMIN — PREGABALIN 150 MG: 75 CAPSULE ORAL at 12:17

## 2025-04-30 RX ADMIN — PRAZOSIN HYDROCHLORIDE 5 MG: 5 CAPSULE ORAL at 22:06

## 2025-04-30 ASSESSMENT — ACTIVITIES OF DAILY LIVING (ADL)
ADLS_ACUITY_SCORE: 28

## 2025-04-30 NOTE — PLAN OF CARE
Goal Outcome Evaluation:      Plan of Care Reviewed With: patient    Overall Patient Progress: no change    Outcome Evaluation: Pt A/O x4. Continent of B&B. Independent, but has generalized weakness. Denies SOB/chest pain. Reports 10/10 L hip pain, stabbing and shoots down the leg. Ice applied and oxycodone and morphine given. Heat packs applied as well, alleviated some pain.   VS:     /65   Pulse 85   Temp 98.3  F (36.8  C) (Oral)   Resp 16   LMP 04/17/2025 (Exact Date)   SpO2 94%    Pt A/O X 4. Afebrile. VSS. Lungs- clear bilaterally with equal expansion. Denies nausea, shortness of breath, and chest pain.     Output:       Bowels- active in all four quadrants. Voids spontaneously without   difficulty to the bathroom.      Activity:       Pt up independently.      Skin:   Visible skin intact .     Pain:       Has pain in the L hip and given morphine and oxycodone, ICE applied, and is tolerating heat packs at this time.      CMS:       CMS and Neuro's are intact.           Diet:       Pt is on a regular diet and appetite was adequate this shift.       LDA:       PIV is patent in the R PIV and is SL.      Equipment:       Personal belongings at bedside     Plan:       Pt is able to make needs known and the call light is within the pt's reach. Continue to monitor.       Additional Info:       Pain exceeded PRN/scheduled medications tonight, paged MD.

## 2025-04-30 NOTE — PROGRESS NOTES
[facility]    Medicine Progress Note - [facility] Service    Date of Admission:  4/27/2025    Assessment & Plan   Caprice Kline is a 29 year old female with a complex PMHx of lumbar radiculopathy, fibromyalgia, migraines, and complex psychiatric history transferred from [facility] ED and admitted on 4/27/2025 for pain control, and further evaluation of myositis.      Major updates today:  - Consult Ortho for muscle bx   - IV dilaudid 0.3mg daily PRN severe pain      # Bilateral thigh pain  # Bilateral anterior leg pain  # Elevated CK  # Fibromyalgia   # Chronic pain   # Lumbar radiculopathy  Worsening b/l hip and anterior leg pain in recent months with a known hx of chronic lumbar pain with sciatica. Compared to Jan 2025 MRI, 4/22/25 MRI revealed resolution of previously identified edema signal in adductors, R gluteus tj and R vastus lateralis and slightly more pronounced edema in distal R gluteus medius. 4/25 US venous competency without DVT and gross incompetency. OP Rheum work up notable for positive TEODORO 1:160 with negative DOM, negative myositis panel, neg RF/CCP, and normal complement/LFTs/CK/aldolase/inflammatory markers. OP neurology had considered need for muscle bx.On 4/27, transferred from [facility] ED for worsening pain management further work-up for elev CK levels including muscle bx. Workup notable for elevated CK levels ranging from 255 - 2788. Normal Cr, UOP, and Li level but concern for potential kidney injury if CK increase beyond 5000, especially in the setting of chronic Li use. 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 Right side suggestive of significant resolution of the edema in the adductor musculature and gluteus tj.     Today, CK improved from 1563 to 779 today. 4/30 US LE could not appreciate the same  findings as seen on MRI. Per Rheum recs, will consult Ortho to coordinate muscle biopsy.      Consults: rheumatology, neurology, nephrology, physical therapy, ortho; appreciate recs     Pending Lab Workup  - Aldolase pending   - Peripheral smear  - Necrotizing myopathy panel  - Lyme disease + tick panel    Management  - s/p LR 1L bolus; LR infusion stopped on 4/29  - Pain control  - PTA morphine IR tablet 15mg TID PRN (to be administered prior to dilaudid)  - PTA pregabalin 100mg at bedtime + 150mg TID   - PTA Tizanidine 4mg TID PRN        - Dilaudid PO 4mg BID PRN   - ADD Dilaudid IV 0.3mg IV daily PRN severe pain  - Lidocaine cream   - Naloxone ordered  - HOLD Tylenol 975mg TID in the setting of elevated LFTs  - IV Dilaudid 0.5mg q6h PRN discontinued on 4/29     Monitoring  - strict I&O  - continuous pulse ox  - Daily CBC and CMP  - Daily CK    # Hx of Urinary retention  # Hx of Urinary hesitancy   # Pelvic pain   Seen at ED on 11/5/2024 for acute urinary retention and was discharged home with fisher catheter. Does not have catheter in place, nor has needed to self-cath thereafter. 4/28 UA positive for moderate LE, moderate bacteria, WBC, but negative nitrites. Urine Cx unremarkable. Patient denies new urinary symptoms, although does have a hx of urinary retention and hesitancy. Denies voiding concerns today.   - Intermittent catheterization  - Minimize anticholinergic meds, including opioids  - PTA Flomax 0.4mg at bedtime     # Transaminitis, improving  , ALT 95 noted on 4/28 AM labs. Now improved to AST 47, ALT 47. T bili and INR normal. Asymptomatic. Holding Tylenol for now. CTM     # Subclinical hypothyroidism  Admission labs notable for TSH 5.13 and free T4 1.04. Asymptomatic. Re-check in 3-6 months or sooner if symptoms develop. 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 Will continue home meds.               - PTA Adderall XR 30mg every day PRN              - PTA Clonazepam 0.5mg 1 tab PRN anxiety              - PTA Doxepin 10mg 1 tab at bedtime              - PTA Lamictal 100mg every morning & 50mg daily at bedtime               - PTA Lithium 600mg 1 capsule at bedtime               - PTA Prazosin 2mg qam and 5mg qpm               - PTA Quetiapine 300mg at bedtime     # Bilateral edema - chronic   Per patient report, chronic and managed with furosemide 20mg. Denies acute changes in swelling or pain. Of note, has been taking Lyrica for some time, which may also be contributing to LE edema. Will have to be cautious with Furosemide administration in the setting of rising CK and potential for renal injury.   - PTA Furosemide 20mg daily      # Non-intractable migraine   # Tremor   Sees outpatient neurology for these concerns. Reports baseline headache that has not acutely changed. No tremors noted on exam.   - PTA Ubrogepant 100mg 1 tab at onset of headache (brought from home)     Diet: Combination Diet Regular Diet Adult  DVT Prophylaxis: Low Risk/Ambulatory with no VTE prophylaxis indicated  Cronin Catheter: Intermittent catheterization PRN  Fluids: PO  Lines: None     Cardiac Monitoring: None  Code Status: Full Code           Diet: Combination Diet Regular Diet Adult    DVT Prophylaxis: Low Risk/Ambulatory with no VTE prophylaxis indicated  Cronin Catheter: Not present  Lines: None     Cardiac Monitoring: None  Code Status: Full Code      Clinically Significant Risk Factors                           # Obesity: Estimated body mass index is 35.74 kg/m  as calculated from the following:    Height as of an earlier encounter on 4/27/25: 1.753 m (5' 9\").    Weight as of an earlier encounter on 4/27/25: 109.8 kg (242 lb)., PRESENT ON ADMISSION            Social Drivers of Health    Food Insecurity: Unknown (12/9/2024)    Food Insecurity     Within the past 12 months, did you " worry that your food would run out before you got money to buy more?: Patient declined     Within the past 12 months, did the food you bought just not last and you didn t have money to get more?: Patient declined   Depression: At risk (4/22/2025)    PHQ-2     PHQ-2 Score: 6   Tobacco Use: Medium Risk (4/22/2025)    Patient History     Smoking Tobacco Use: Former     Smokeless Tobacco Use: Never     Passive Exposure: Current   Transportation Needs: Unknown (12/9/2024)    Transportation Needs     Within the past 12 months, has lack of transportation kept you from medical appointments, getting your medicines, non-medical meetings or appointments, work, or from getting things that you need?: Patient declined   Physical Activity: Inactive (12/9/2024)    Exercise Vital Sign     Days of Exercise per Week: 0 days     Minutes of Exercise per Session: 0 min   Stress: Stress Concern Present (12/9/2024)    [employer] [place] [employer] - Occupational Stress Questionnaire     Feeling of Stress : Very much   Social Connections: Unknown (12/9/2024)    Social Connection and Isolation Panel [NHANES]     Frequency of Social Gatherings with Friends and Family: Once a week          Disposition Plan   Medically Ready for Discharge: Anticipated in 2-4 Days       The patient's care was discussed with the Attending Physician, Dr. Dayron MD .    [facility]  [facility]  Securely message with [employer] (more info)  Text page via [facility] Paging/Directory   See signed in provider for up to date coverage information  ______________________________________________________________________    Interval History   No acute events overnight.     Had significant thigh and hip pain last night, same as when she first came to the ED. Feels that current pain regimen is not effective for those singular episodes of severe pain. Denies other acute symptoms or  neurological changes. No concern with stooling or voiding.     Physical Exam   Vital Signs: Temp: 98.3  F (36.8  C) Temp src: Oral BP: 102/65 Pulse: 85   Resp: 16 SpO2: 94 % O2 Device: None (Room air)    Weight: 0 lbs 0 oz    Physical Exam  Vitals reviewed.   Constitutional:       Appearance: Normal appearance.   Eyes:      General:         Right eye: No discharge.         Left eye: No discharge.      Extraocular Movements: Extraocular movements intact.      Conjunctiva/sclera: Conjunctivae normal.      Pupils: Pupils are equal, round, and reactive to light.   Pulmonary:      Effort: Pulmonary effort is normal. No respiratory distress.   Neurological:      General: No focal deficit present.      Mental Status: She is alert and oriented to person, place, and time.      Comments: Able to move both legs appropriately, although winces with movement at L hip.    Psychiatric:         Mood and Affect: Mood normal.         Behavior: Behavior normal.       Medical Decision Making   See A&P for MDM        Data     I have personally reviewed the following data over the past 24 hrs:    4.9  \   12.4   / 264     137 101 9.2 /  110 (H)   4.5 30 (H) 0.83 \     ALT: 47 AST: 47 (H) AP: 63 TBILI: 0.3   ALB: 3.7 TOT PROTEIN: 5.8 (L) LIPASE: N/A       Imaging results reviewed over the past 24 hrs:   Recent Results (from the past 24 hours)   US Lower Extremity Non Vascular Left    Narrative    Exam: US LOWER EXTREMITY NON VASCULAR LEFT 4/30/2025 9:06 AM    Indication: Left thigh and hip, particularly looking at quadratus  femoris & gluteus minimus. Trying to see if she can undergo bx  with IR but they need to know if US can differentiate between rim  enhancement & other tissue so they know what to bx.    Comparison: 4/29/2025    Findings:   Targeted grayscale and color Doppler sonography was performed in the  area of clinical interest by the sonographer and radiologist. Limited  sonographic penetration due to body habitus. Numerous  ultrasound  probes were utilized. The areas of presumed myonecrosis about the left  hip are not confidently visualized with ultrasound. No discrete  intramuscular abnormality identified, and no focal asymmetry  appreciated when compared to the contralateral side.        Impression    Impression:   The focal abnormalities about the left hip seen on recent MRI are not  appreciated sonographically.    DARIO BASSETT MD         SYSTEM ID:  [id number]

## 2025-04-30 NOTE — PLAN OF CARE
Goal Outcome Evaluation:      Plan of Care Reviewed With: patient    Overall Patient Progress: no change    Outcome Evaluation: No acute changes durning this shift.    VS:       Pt A/O X 4. Afebrile. VSS.BP 92/61 (BP Location: Left arm)   Pulse 76   Temp 98.3  F (36.8  C) (Oral)   Resp 16   LMP 04/17/2025 (Exact Date)   SpO2 94%  Lungs- clear bilaterally with both anterior and posterior. Denies nausea, shortness of breath, and chest pain.     Output:       Bowels- active in all four quadrants. Voids spontaneously without difficulty in the bathroom.      Activity:       Pt up independently.     Skin:   Skin intact.     Pain:       Has pain in the L hip/leg and given morphine and dilaudid, heat applied, and is tolerating well.      CMS:       CMS and Neuro's are intact. Denies numbness and tingling in all extremities.      Dressing:       No incisional dressing.      Diet:       Pt is on a regular diet and appetite was good this shift.       LDA:       PIV is patent in the R hand and SL.      Equipment:       Pt belongings in the room. Bilateral heels are elevated off the bed.     Plan:       Pt is able to make needs known and the call light is within the pt's reach. Continue POC.       Additional Info:

## 2025-04-30 NOTE — CONSULTS
[facility] [place] [facility] INFECTIOUS [facility]EASES CONSULTATION     Patient:  Caprice Kline   Date of birth 1995, Medical record number [id number]  Date of Visit:  04/30/2025  Date of Admission: 4/27/2025  Consult Requester:Lydia Padgett MD          Assessment and Recommendations:   ID Problem List  Bilateral hip and thigh pain + weakness (L>R), acute on chronic  MRI c/f possible myonecrosis L thigh/gluteus  Elevated CK  Transaminitis  Bilateral lower extremity edema, chronic  History of HSV1 - vaginal  Vaping  Obesity    RECOMMENDATION:  No indication for antibiotics  Agree with sampling - muscle biopsy  US did not show rim-enhancing lesion. Consider discussion with IR for possible aspiration under fluoro to better visualize vs orthopedics consult  If biopsy done, please send sample for gram stain, aerobic + anaerobic culture and histopathology  Recommend further workup including: serum ACE, CMV PCR, EBV PCR, Treponema Ab with reflex, Lyme Ab with reflex, and toxoplasma IgG+IgM+PCR.   HIV/HBV/HCV are pending  ID will peripherally follow pending workup above. Low suspicion for infectious etiology.    ASSESSMENT:  Caprice Kline is a 29 year old female with PMHx significant for bipolar disorder, lumbar radiculopathy and chronic back pain s/p discectomy, fibromyalgia, and migraines admitted for pain and weakness of extremities x6-8 months (hip, lower > upper), found to have elevated CK and subsequent LE MRI with soft tissue edema, with associated rim enhancement c/f possible myonecrosis and transaminitis. ID consulted for further management.     Negative workup to date:DOM, myositis panel (Cristina-1, pl7, pl12, ej, oj, srp, MI-2, MDA5, TIF1, NXP-2), ssDNA, dsDNA, Smith, RNP, Ssa, Ssb, RF, CCP, chromatin, aldolase. Anaplasma, ehrlichia, babesia PCRs.  Positive workup to date: TEODORO 1:160 (I could not see in our EMR). Low IgG (436) with low IgG1 and IgG2. Peripheral smear with polycythemia with increased RBC regeneration, no  hemolysis.   Pending labs: HMGCR Ab, SRP, immunoglobulins, NMS1 necrotizing myopathy panel, anti-CN1A, Lyme PCR, HIV, HBV, HCV.    Overall, we have lower suspicion for infectious etiology such as pyomyositis, necrotizing fasciitis given clinical stability and no systemic signs of infection, low CRP, and lack of progression/decompensation of antibiotics and no inciting trauma to site. US additionally did not identify MRI findings, nor any abscess/fluid collection at L hip. No indication for antibiotics. No significant travel or exposure history, though does have cats. A viral-related myositis is a consideration, however would not expect to have a prolonged 6-8+ month course with progressive worsening. She has no current respiratory viral symptoms to explain her acute rhabdomyolysis and worsening acute on chronic lower extremity symptoms. HBV, HCV, and HIV pending. No history of uncontrolled diabetes to suggests diabetic myonecrosis. Rhabdomyolysis may explain her MRI findings of myonecrosis, though we remain without a clear etiology of her rhabdomyolysis. Agree with pending workup and ongoing considerations of inflammatory myositis. Would also keep paraneoplastic on differential as well as consider workup for sarcoidosis (MSK, liver involvement in a young female). Additional suggested ID workup as outlined above.     Thank you for this consult. ID will continue to follow workup peripherally. Please reach out if further questions arise.  Patient was discussed with Dr. Yun. Recommendations discussed with primary team.    Tamara Oliva PA-C  Infectious Diseases  Contact via [employer] or [employer] Paging/Directory    90 MINUTES SPENT BY ME on the date of service doing chart review, history, exam, documentation & further activities per the note. This patient visit is eligible for billing by the  code         History of Present Illness:     Caprice Kline is a 29 year old female with past medical history significant for  bipolar disorder, lumbar radiculopathy and chronic back pain s/p discectomy, fibromyalgia, and migraines admitted for pain and weakness of extremities x6-8 months (hip, lower > upper), found to have elevated CK and subsequent LE MRI with soft tissue edema, with associated rim enhancement c/f possible myonecrosis. ID consulted for further management.     Bilateral LE US negative for DVT on 4/24. MRI bilateral femur with patchy soft tissue edema with rim enhancement in L gluteus minimus and quadratus femoris c/f myonecrosis and some edema with enhancement of distal biceps femoris and distal adductor perry. MRI in 01/2025 with similar muscle edema. US left lower extremity 4/30 does not show abnormalities seen on L hip MRI. She endorses months of subjective fever, chills, sweats, and recent weight gain. Chronic pain since 2019 on unclear etiology, no inciting trauma or incident reported, mostly hip/low back pain. Pain radiates down lateral thighs, some associated numbness. Feels increased susceptibility to infections recently - multiple viral illnesses - COVID, flu. No current URI sx. Endorses intermittent difficulty walking. Upper extremities are ok. No new rash, though partner has noted a  facial rash at times. No oral ulcers. She denies any injection into lower extremitie/gluteus. Was injecting medication (Ajovy) into stomach. Has multiple tattoos, none in the last year and all professionally done. Endorse vaping with nicotine. No smoking, rare alcohol, and no illicit drug use. No history of IVDU. Nephrology, rheumatology, and neurology all consulted. Recommendation for muscle biopsy. She has 4 cats (all indoors), no other animal exposures. Brother has chickens but she is never around them. Travel within the [place] and lived in other states including [place], [place], [place] and has been to [place] remotely >10 years ago. No international travel. No dietary exposures - raw meat/cheese/dairy. No significant outdoor activities due to  "her symptoms - does not garden, hike. Did hike in the past but not for years. Sexually active with 1 male partner. Does have history of HSV1 with flares, no current symptoms. No vaginal symptoms. No TB risk factors.     Afebrile throughout admission, no leukcytosis, CRP 7.47, ESR normal at 6, and lactic wnl. No antibiotics given. AST > ALT elevated, normal bilirubin and alk phos. UA x2 both dirty catches, Ucx no growth. CK elevated since 4/22/25 with peak of 2788 on 4/28, now 1563 on 4/29.     Negative workup to date includes DOM, myositis panel (Cristina-1, pl7, pl12, ej, oj, srp, MI-2, MDA5, TIF1, NXP-2), ssDNA, dsDNA, smith, RNP, Ssa, Ssb, RF, CCP, chromatin, aldolase. Anaplasma, ehrlichia, babesia PCRs.    Positive workup to date: TEODORO 1:160 (I could not see in our EMR). Low IgG (436) with low IgG1 and IgG2. Peripheral smear with polycythemia with increased RBC regeneration, no hemolysis.     Pending labs: HMGCR Ab, SRP, immunoglobulins, NMS1 necrotizing myopathy panel, anti-CN1A, Lyme PCR, HIV, HBV, HCV.    Antimicrobials given: none         Review of Systems:   CONSTITUTIONAL:  As per HPI   EYES: negative for icterus, redness, or purulent drainage.   ENT:  negative for nasal congestion, rhinorrhea, or sore throat.  RESPIRATORY:  negative for cough with sputum and dyspnea.  CARDIOVASCULAR:  negative for chest pain or palpitations.  GASTROINTESTINAL:  negative for nausea, vomiting, diarrhea and constipation.  GENITOURINARY:  negative for dysuria, frequency, or urgency.   HEME:  No easy bruising or bleeding.  INTEGUMENT:  negative for rash and pruritus.  NEURO:  Negative for headache, vision changes         Past Medical History:     Past Medical History:   Diagnosis Date    Bipolar disorder (H)     Complication of anesthesia     \"freaking out when I wake up\"    Depressive disorder 2011            Past Surgical History:     Past Surgical History:   Procedure Laterality Date    DISCECTOMY LUMBAR POSTERIOR MICROSCOPIC ONE " LEVEL Left 08/24/2021    Procedure: Minimally invasive left Lumbar 5 to Sacral 1 microdiscectomy  ;  Surgeon: Destin Junior MD;  Location: [facility] OR    GYN SURGERY Right     cystectomy - with salpingectomy    HEAD & NECK SURGERY      tonsillectomy    HEAD & NECK SURGERY      wisdom teeth extraction            Family History:     Family History   Problem Relation Age of Onset    Hypertension Mother     Colon Polyps Mother 50    Substance Abuse Father     Colon Cancer Father 60    Diabetes Maternal Grandmother     Hypertension Maternal Grandmother     Colon Cancer Maternal Grandmother     Diabetes Paternal Grandmother             Social History:     Social History     Tobacco Use    Smoking status: Former     Current packs/day: 0.00     Types: Cigarettes, Vaping Device     Passive exposure: Current    Smokeless tobacco: Never   Substance Use Topics    Alcohol use: Not Currently     Comment: 2 drinks every other day      History   Sexual Activity    Sexual activity: Yes    Partners: Female, Male    Birth control/ protection: None            Current Medications:     Current Facility-Administered Medications   Medication Dose Route Frequency Provider Last Rate Last Admin    [Held by provider] acetaminophen (TYLENOL) tablet 975 mg  975 mg Oral TID Chrissie Reyes DO   975 mg at 04/28/25 0901    doxepin (SINEquan) capsule 10 mg  10 mg Oral At Bedtime [facility], DO   10 mg at 04/29/25 2204    furosemide (LASIX) tablet 20 mg  20 mg Oral Daily [facility], DO   20 mg at 04/30/25 0757    hydrOXYzine HCl (ATARAX) tablet 100 mg  100 mg Oral TID [facility], DO   100 mg at 04/30/25 0757    lamoTRIgine (LaMICtal) tablet 100 mg  100 mg Oral QAM Sujit Andrade MD   100 mg at 04/30/25 0757    lamoTRIgine (LaMICtal) tablet 50 mg  50 mg Oral Daily at 4 pm Sujit Andrade MD   50 mg at 04/29/25 1647    lithium (ESKALITH) capsule 600 mg  600 mg Oral At Bedtime [facility], DO   600 mg at 04/29/25 2203     "polyethylene glycol (MIRALAX) Packet 17 g  17 g Oral BID Isabel Haque, DO   17 g at 04/30/25 0756    prazosin (MINIPRESS) capsule 2 mg  2 mg Oral QAM [facility] Isabel, DO   2 mg at 04/30/25 0757    prazosin (MINIPRESS) capsule 5 mg  5 mg Oral At Bedtime [facility] Isabel, DO   5 mg at 04/29/25 2203    pregabalin (LYRICA) capsule 100 mg  100 mg Oral At Bedtime [facility] Isabel, DO   100 mg at 04/29/25 2203    pregabalin (LYRICA) capsule 150 mg  150 mg Oral TID [facility] Isabel, DO   150 mg at 04/30/25 0757    propranolol ER (INDERAL LA) 24 hr capsule 80 mg  80 mg Oral Daily [facility] Isabel, DO   80 mg at 04/30/25 0757    QUEtiapine (SEROquel) tablet 300 mg  300 mg Oral At Bedtime [facility] Isabel, DO   300 mg at 04/29/25 2202    sodium chloride (PF) 0.9% PF flush 3 mL  3 mL Intracatheter Q8H [facility] Chrissie, DO   3 mL at 04/28/25 1517    tamsulosin (FLOMAX) capsule 0.4 mg  0.4 mg Oral QPM [facility] Isabel, DO   0.4 mg at 04/29/25 2001            Allergies:     Allergies   Allergen Reactions    Metronidazole Anaphylaxis and Hives     Other reaction(s): Throat swelling  Throat swelling 6 hrs after exposure  Itching and hives 2 hrs after exposure    Shellfish Allergy Anxiety, Diarrhea, Difficulty breathing, Hives, Itching, Rash, Shortness Of Breath and Swelling    Codeine Headache    Fish-Derived Products      Stopped fish oil and less stomach discomfort  Rash after eating fish.     Hydrocodone-Acetaminophen Other (See Comments)    Sertraline Other (See Comments)     Patient was foggy and \"high\" feeling    Shellfish-Derived Products      Lips get numb, throat gets scratchy, rashes            Physical Exam:   Vitals were reviewed  Patient Vitals for the past 24 hrs:   BP Temp Temp src Pulse Resp SpO2   04/30/25 0755 92/61 98.3  F (36.8  C) Oral 76 16 94 %   04/29/25 1528 -- 98.3  F (36.8  C) Oral 85 16 94 %   04/29/25 1500 102/65 -- -- -- -- --       Physical Examination:  Constitutional: Pleasant " adult female seen sitting up in bed, in NAD. Alert and interactive.   HEENT: NCAT, anicteric sclerae, conjunctiva clear. Moist mucous membranes without lesions or thrush. Dentition intact/well cared for. No cervical or supraclavicular LAD  Respiratory: Non-labored breathing, good air exchange. Lungs are clear to auscultation bilaterally, without wheezing, crackles or rhonchi. No cough noted.   Cardiovascular: Regular rate and rhythm with no murmur, rub or gallop.  GI: Normoactive BS. Abdomen is soft, obese, nontender to palpation, non-distended. No rigidity or guarding. No rebound tenderness.  Skin: Warm and dry. No new rashes or lesions on exposed surfaces. +multiple tattoos noted.  Musculoskeletal: Extremities grossly normal in appearance. Tender to palpation of L gluteus - though no focal swelling, warmth, or erythema. No peripheral edema present.   Neurologic: A &O x3, speech normal, answering questions appropriately. Moves all extremities spontaneously, limited by pain in L > R leg at hip. Grossly non-focal.  Neuropsychiatric: Mentation and affect normal/appropriate.  VAD: PIV is c/d/i with no erythema, drainage, or tenderness.         Laboratory Data:     Inflammatory Markers    Recent Labs   Lab Test 04/27/25  2111   SED 6       Hematology Studies    Recent Labs   Lab Test 04/30/25 0629 04/29/25  0549 04/28/25  1533 04/28/25  0727 04/27/25  0916 04/23/25  0003   WBC 4.9 4.8 6.7 5.6 10.0 5.7   ANEU 1.9 2.0 3.4 3.3 7.0 3.0   AEOS 0.5 0.4 0.6 0.5 0.5 0.5   HGB 12.4 12.1 13.2 13.4 14.2 12.2   MCV 78 78 77* 78 78 77*    240 251 255 269 251       Metabolic Studies     Recent Labs   Lab Test 04/30/25  0629 04/29/25  0549 04/28/25  1533 04/28/25  0727 04/27/25  0916    139 137 136 139   POTASSIUM 4.5 4.1 3.7 3.9 4.3   CHLORIDE 101 102 101 102 101   CO2 30* 27 26 27 28   BUN 9.2 8.3 8.0 7.1 11.3   CR 0.83 0.72 0.81 0.79 0.78   GFRESTIMATED >90 >90 >90 >90 >90       Hepatic Studies    Recent Labs   Lab  "Test 04/30/25  0629 04/29/25  0549 04/28/25  1533 04/28/25  0727 04/23/25  0003 04/22/25  1213   BILITOTAL 0.3 0.3 0.3 0.5 0.2 <0.2   ALKPHOS 63 64 79 80 71 72   ALBUMIN 3.7 3.5 3.8 3.9 4.0 4.4   AST 47* 69* 115* 151* 43 37   ALT 47 59* 81* 95* 21 22       Microbiology:  Culture   Date Value Ref Range Status   04/28/2025 <10,000 CFU/mL Mixture of Urogenital Elyssa  Final   04/27/2025 10,000-50,000 CFU/mL Mixture of urogenital elyssa  Final       Urine Studies    Recent Labs   Lab Test 04/28/25  1820 04/27/25  1123 03/10/25  1105   LEUKEST Large* Small* Negative   WBCU 30* 15* 0-5       Vancomycin Levels  No lab results found.    Invalid input(s): \"VANCO\"    Hepatitis B Testing No lab results found.  Hepatitis C Testing   No results found for: \"HCVAB\", \"HQTG\", \"HCGENO\", \"HCPCR\", \"HQTRNA\", \"HEPRNA\"  Respiratory Virus Testing    No results found for: \"RS\", \"FLUAG\"    IMAGING  US Lower Extremity Non Vascular Left  Result Date: 4/30/2025  Impression: The focal abnormalities about the left hip seen on recent MRI are not appreciated sonographically.     MR Femur Bilateral w &wo Contrast  Result Date: 4/29/2025  Findings: Osseous structures Osseous structures: Prominent red marrow within the bones. Tiny bone islands within the left side the tibial plateau. No fracture, stress reaction, avascular necrosis, or focal osseous lesion is seen. Joint and periarticular soft tissue Internal derangement of joints are not well assessed owing to chosen field of view. Muscles and tendons Muscles: No substantial fatty replacement in the muscles. Left: Patchy edema within the proximal quadriceps femoris, gluteus minimus and quadratus femoris. There is associated rim enhancement particularly in the gluteus minimus and quadratus femoris (stipple sign). Perifascial edema and small fluid particularly along the proximal quadriceps femoris. Compared to MRI from 1/23/2025 findings have progressed around left hip. Right: Trace residual edema in the " gluteus medius; improved from 1/23/2025. Interval significant resolution of the edema in the adductor musculature and gluteus tj. Perifascial edema involving quadriceps muscle particularly in the lateral aspect. Small areas of edema and enhancement in the distal biceps femoris and distal adductor perry. Tendons: No partial or complete tear. Nerves: Grossly intact sciatic nerves. Other Findings: Multiple predominantly peripherally located subcentimeter cysts in the ovaries; questionable for polycystic appearance.     Impression: 1. Left: Patchy soft tissue edema within the proximal quadriceps/vastus lateralis , gluteus minimus and quadratus femoris muscles There is associated rim enhancement particularly in the gluteus minimus and quadratus femoris suggesting myonecrosis. Perifascial edema and small perifascial fluid particularly along the proximal quadriceps muscles. Compared to MRI from 1/23/2025 findings have progressed around the left hip. Right: Trace residual soft tissue edema in the gluteus medius; improved from 1/23/2025. Interval significant resolution of the edema in the adductor musculature and gluteus tj. Perifascial edema involving quadriceps muscle particularly along the lateral aspect. Small areas of edema and enhancement in the distal biceps femoris and distal adductor perry. 2. No substantial fatty replacement in the muscles.     US Venous Competency Bilateral  Result Date: 4/24/2025  IMPRESSION: 1.  No deep venous thrombosis of either lower extremity. 2.  The left proximal to mid femoral vein is incompetent. Remaining deep vein segments in both lower extremities are competent. 3.  Right great saphenous vein is diffusely competent. Left great saphenous vein is incompetent from the saphenofemoral junction to the level of the knee. 4.  Both small saphenous veins, anterior and posterior accessory saphenous veins are patent and competent.   no

## 2025-04-30 NOTE — CONSULTS
.[facility] Orthopaedic Surgery Consultation    Caprice Kline MRN# [id number]   Age: 29 year old YOB: 1995   Date of Admission: 4/27/2025    Reason for consult: Lesion within left lower extremity musculature   Requesting physician: Lydia Padgett MD   Level of consult: Consult, follow and place orders            Impression and Recommendation (Resident / Clinician):   Impression:  Caprice Kline is a 29 year old female with a significant history of lumbar radiculopathy, fibromyalgia, migraines who is admitted for uncontrolled pain from chronic bilateral hip pain. She also endorsed atraumatic worsening on 4/27 prompting an inpatient admission and continuation of her outpatient workup.  Orthopedic surgery was consulted for muscle biopsy of area of muscle necrosis however there appears to be 2 areas of rim-enhancement noted on the MRI scan of the left hip musculature.  These lesions do not appear to be communicating to the joint. The lesions appear to be new or more pronounced compared to the MRI from January 2025.  Ongoing workup revealed elevated CK of 1.5K which continues to downtrend along with elevated CRP of 7.47.  Rheumatology is on board and continuing rheumatologic workup which has so far revealed positive TEODORO however negative myositis panel, neg RF/CCP, and normal complement/LFTs/CK/aldolase.  Will plan to aid with ongoing workup for this patient.  Orthopedic surgery team discussed imaging with radiology and we will tentatively plan for CT-guided biopsy.  However will require obtaining a CT scan with and without contrast for review prior to planned biopsy.  Orthopedic surgery will continue to follow along and coordinating to help expedite this patient's care.      Recommendations:  Activity: WBAT. ROM as tolerated. No restrictions.  DVT PPx: DVT ppx per primary team  Imaging: Obtain CT scan W/ and W/o contrast of the pelvis  Dispo: Per Primary team    Operative Plan: None at this juncture    Thank  "you for allowing me to participate in this patient's care. Please do not hesitate to contact me with any questions or concerns.    Discussed with senior resident Dr. Britton.  Staff for this patient is Dr. Braulio Harmon MD  PGY-1  Orthopaedic Surgery           Chief Complaint:   Bilateral hip pain, left worse than right          History of Present Illness (Resident / Clinician):   29-year-old female with past medical history of lumbar radiculopathy, fibromyalgia, migraines who is admitted for uncontrolled pain from chronic bilateral hip pain which she described as sharp/stabbing pain.  Generally patient reports pain worse in the left hip than the right along with diminished sensation in the buttocks bilaterally.  Furthermore the patient endorses weakness in the legs.  While the patient reports symptom ongoing for the past 6 to 8 months she however presented to the emergency department on 4/27/2025 citing that she was unable to walk due to the increasing severity of her pain.  She denies any new,, loss of bowel or bladder function.  Initially being worked up in the outpatient setting by neurology for these complaints and had an MRI done on 1/2025 which revealed some fluids in the right abductor muscles, gluteus and vastus lateralis.  Repeat MRI performed on 4/22/2025 demonstrates resolution of previously noted fluid however demonstrates evidence of 2 rim-enhancing lesion with concern for myonecrosis.  Orthopedic surgery has been consulted for assistance with biopsy for ongoing workup.  Patient denies any episodes of fever, chills, sudden onset/worsening headaches or urinary symptoms.    Notable surgical history includes left-sided L5-S1 microdiscectomy on 8/24/2021.  history of large ovarian cyst and had cystectomy and right salpingectomy at age 16.         Past Medical History:     Past Medical History:   Diagnosis Date    Bipolar disorder (H)     Complication of anesthesia     \"freaking out " "when I wake up\"    Depressive disorder 2011     Patient denies any personal history of bleeding disorders, clotting disorders, or adverse reactions to anesthesia.         Past Surgical History:     Past Surgical History:   Procedure Laterality Date    DISCECTOMY LUMBAR POSTERIOR MICROSCOPIC ONE LEVEL Left 08/24/2021    Procedure: Minimally invasive left Lumbar 5 to Sacral 1 microdiscectomy  ;  Surgeon: Destin Junior MD;  Location: [facility] OR    GYN SURGERY Right     cystectomy - with salpingectomy    HEAD & NECK SURGERY      tonsillectomy    HEAD & NECK SURGERY      wisdom teeth extraction            Social History:     Social History     Socioeconomic History    Marital status: Single     Spouse name: Not on file    Number of children: Not on file    Years of education: Not on file    Highest education level: Not on file   Occupational History    Not on file   Tobacco Use    Smoking status: Former     Current packs/day: 0.00     Types: Cigarettes, Vaping Device     Passive exposure: Current    Smokeless tobacco: Never   Vaping Use    Vaping status: Some Days    Substances: Nicotine    Devices: Refillable tank   Substance and Sexual Activity    Alcohol use: Not Currently     Comment: 2 drinks every other day     Drug use: Not Currently    Sexual activity: Yes     Partners: Female, Male     Birth control/protection: None   Other Topics Concern    Parent/sibling w/ CABG, MI or angioplasty before 65F 55M? No   Social History Narrative    Not on file     Social Drivers of Health     Financial Resource Strain: Low Risk  (12/9/2024)    Financial Resource Strain     Within the past 12 months, have you or your family members you live with been unable to get utilities (heat, electricity) when it was really needed?: No   Food Insecurity: Unknown (12/9/2024)    Food Insecurity     Within the past 12 months, did you worry that your food would run out before you got money to buy more?: Patient declined     Within the past 12 " months, did the food you bought just not last and you didn t have money to get more?: Patient declined   Transportation Needs: Unknown (12/9/2024)    Transportation Needs     Within the past 12 months, has lack of transportation kept you from medical appointments, getting your medicines, non-medical meetings or appointments, work, or from getting things that you need?: Patient declined   Physical Activity: Inactive (12/9/2024)    Exercise Vital Sign     Days of Exercise per Week: 0 days     Minutes of Exercise per Session: 0 min   Stress: Stress Concern Present (12/9/2024)    [employer] - Occupational Stress Questionnaire     Feeling of Stress : Very much   Social Connections: Unknown (12/9/2024)    Social Connection and Isolation Panel [NHANES]     Frequency of Communication with Friends and Family: Not on file     Frequency of Social Gatherings with Friends and Family: Once a week     Attends Sikhism Services: Not on file     Active Member of Clubs or Organizations: Not on file     Attends Club or Organization Meetings: Not on file     Marital Status: Not on file   Interpersonal Safety: Low Risk  (4/27/2025)    Interpersonal Safety     Do you feel physically and emotionally safe where you currently live?: Yes     Within the past 12 months, have you been hit, slapped, kicked or otherwise physically hurt by someone?: No     Within the past 12 months, have you been humiliated or emotionally abused in other ways by your partner or ex-partner?: No   Housing Stability: Low Risk  (12/9/2024)    Housing Stability     Do you have housing? : Yes     Are you worried about losing your housing?: No          Family History:     Family History   Problem Relation Age of Onset    Hypertension Mother     Colon Polyps Mother 50    Substance Abuse Father     Colon Cancer Father 60    Diabetes Maternal Grandmother     Hypertension Maternal Grandmother     Colon Cancer Maternal Grandmother     Diabetes  "Paternal Grandmother        Patient denies known family history of bleeding, clotting, or anesthesia related complications.            Allergies:     Allergies   Allergen Reactions    Metronidazole Anaphylaxis and Hives     Other reaction(s): Throat swelling  Throat swelling 6 hrs after exposure  Itching and hives 2 hrs after exposure    Shellfish Allergy Anxiety, Diarrhea, Difficulty breathing, Hives, Itching, Rash, Shortness Of Breath and Swelling    Codeine Headache    Fish-Derived Products      Stopped fish oil and less stomach discomfort  Rash after eating fish.     Hydrocodone-Acetaminophen Other (See Comments)    Sertraline Other (See Comments)     Patient was foggy and \"high\" feeling    Shellfish-Derived Products      Lips get numb, throat gets scratchy, rashes             Medications:     Prior to Admission medications    Medication Sig Last Dose Taking? Auth Provider Long Term End Date   albuterol (PROAIR HFA/PROVENTIL HFA/VENTOLIN HFA) 108 (90 Base) MCG/ACT inhaler Inhale 2 puffs into the lungs every 6 hours as needed for shortness of breath, wheezing or cough Past Month Yes Junior Roe PA-C Yes    amphetamine-dextroamphetamine (ADDERALL XR) 30 MG 24 hr capsule Take 30 mg by mouth daily as needed. Past Week Yes Reported, Patient     azelaic acid (FINACIA) 15 % external gel Apply topically 2 times daily as needed. Past Week Yes Reported, Patient     clobetasol (TEMOVATE) 0.05 % external solution APPLY TOPICALLY TO THE AFFECTED AREA TWICE DAILY FOR UP TO 21 DAYS. DO NOT APPLY TO NORMAL SKIN Past Week Yes Reported, Patient     clonazePAM (KLONOPIN) 0.5 MG tablet Take 0.5 mg by mouth daily as needed for anxiety. Past Week Yes Reported, Patient No    diazepam (VALIUM) 10 MG tablet PLACE 1 TABLET VAGINALLY AS NEEDED FOR URINARY RETENTION OR FOR PAIN Past Week Yes Teresa Mckee MD     docusate sodium (COLACE) 100 MG capsule Take 200 mg by mouth 2 times daily. 4/26/2025 Yes Reported, Patient     doxepin " (SINEQUAN) 10 MG capsule Take 10 mg by mouth At Bedtime 4/26/2025 Yes Reported, Patient Yes    furosemide (LASIX) 20 MG tablet Take 1 tablet (20 mg) by mouth daily. 4/26/2025 Yes Junior Roe PA-C Yes    glucosamine-chondroitin 500-400 MG CAPS per capsule Take 1 capsule by mouth daily. 4/26/2025 Yes Reported, Patient     hydrOXYzine (VISTARIL) 50 MG capsule Take 100 mg by mouth 3 times daily. 4/26/2025 Yes Reported, Patient     ipratropium-albuterol (COMBIVENT RESPIMAT)  MCG/ACT inhaler Inhale 1 puff into the lungs 4 times daily as needed for shortness of breath, wheezing or cough Past Month Yes Junior Roe PA-C Yes    ketoconazole (NIZORAL) 2 % external shampoo Apply topically twice a week. Past Week Yes Unknown, Entered By History     lamoTRIgine (LAMICTAL) 100 MG tablet Take  mg by mouth 2 times daily. 100 mg AM / 50 mg PM as of 4/27 - pt in process of titrating back up 4/26/2025 Yes Reported, Patient Yes    lithium (ESKALITH) 600 MG capsule Take 600 mg by mouth at bedtime. 4/26/2025 Yes Reported, Patient Yes    magnesium oxide (MAG-OX) 400 MG tablet Take 400 mg by mouth daily. 4/26/2025 Yes Reported, Patient     morphine (MSIR) 15 MG IR tablet Take 15 mg by mouth 3 times daily as needed for pain. 4/26/2025 Yes Reported, Patient     ondansetron (ZOFRAN ODT) 4 MG ODT tab DISSOLVE 1 TABLET(4 MG) ON THE TONGUE EVERY 8 HOURS AS NEEDED FOR NAUSEA Past Month Yes Junior Roe PA-C     pregabalin (LYRICA) 100 MG capsule Take 100 mg by mouth at bedtime. 4/26/2025 Yes Reported, Patient No    pregabalin (LYRICA) 150 MG capsule Take 150 mg by mouth 3 times daily. AM / noon / dinner 4/26/2025 Yes Reported, Patient No    propranolol (INDERAL) 20 MG tablet Take 1 tablet (20 mg) by mouth 2 times daily as needed (tremor). Past Week Yes Karl Goldman MD Yes    propranolol ER (INDERAL LA) 80 MG 24 hr capsule Take 1 capsule (80 mg) by mouth daily. 4/26/2025 Yes Karl Goldman MD Yes    QUEtiapine (SEROQUEL)  300 MG tablet Take 300 mg by mouth At Bedtime 4/26/2025 Yes Reported, Patient Yes    Semaglutide-Weight Management (WEGOVY) 0.5 MG/0.5ML pen Inject 0.5 mg subcutaneously once a week. More than a month Yes Junior Roe PA-C No    sulfacetamide sodium, Acne, 10 % lotion APPLY IT TO THE FACE ONCE DAILY IN THE MORNING X3 MONTHS Past Week Yes Reported, Patient     tamsulosin (FLOMAX) 0.4 MG capsule Take 1 capsule (0.4 mg) by mouth every evening. 4/26/2025 Yes Teresa Mckee MD     tiZANidine (ZANAFLEX) 4 MG tablet TAKE 1 TABLET BY MOUTH EVERY 8 TO 12 HOURS AS NEEDED. NOT TO EXCEED 3 DOSES IN 24 HOURS Past Week Yes Reported, Patient     triamcinolone (KENALOG) 0.1 % external cream Apply topically 2 times daily as needed. More than a month Yes Reported, Patient     ubrogepant (UBRELVY) 100 MG tablet Take 1 tablet (100 mg) by mouth at onset of headache. Past Week Yes Karl Goldman MD     EPINEPHrine (ANY BX GENERIC EQUIV) 0.3 MG/0.3ML injection 2-pack INJECT 0.3 ML INTO THE MUSCLE AS NEEDED FOR ANAPHYLAXIS   Junior Roe PA-C     Fremanezumab-vfrm (AJOVY) 225 MG/1.5ML SOAJ Inject 225 mg subcutaneously every 30 days.  Patient not taking: Reported on 4/22/2025 4/13/2025  Karl Goldman MD     ketoconazole (NIZORAL) 2 % external cream Apply topically. 3 times every week   Reported, Patient     NARCAN 4 MG/0.1ML nasal spray CALL 911. SPR CONTENTS OF ONE SPRAYER (0.1ML) INTO ONE NOSTRIL. REPEAT IN 2-3 MIN IF SYMPTOMS OF OPIOID EMERGENCY PERSIST, ALTERNATE NOSTRILS   Reported, Patient Yes    prazosin (MINIPRESS) 2 MG capsule Take 2 mg by mouth every morning.   Reported, Patient No    prazosin (MINIPRESS) 5 MG capsule Take 5 mg by mouth at bedtime.   Reported, Patient No    RETIN-A 0.025 % external cream APPLY SPARINGLY TO FACE EVERY OTHER NIGHT FOR 14 NIGHTS THEN NIGHTLY THEREAFTER   Reported, Patient       Medication reviewed with patient and in chart.           Physical Exam:     Vitals:    04/29/25 0811 04/29/25  1500 04/29/25 1528 04/30/25 0755   BP: 104/64 102/65  92/61   BP Location: Left arm   Left arm   Pulse: 69  85 76   Resp: 18  16 16   Temp: 98.4  F (36.9  C)  98.3  F (36.8  C) 98.3  F (36.8  C)   TempSrc: Oral  Oral Oral   SpO2: 94%  94% 94%     General: Patient is awake, alert, and appropriate; following commands and is in NAD  Skin: No rashes, lumps, or bumps; skin color normal  HEENT: Normocephalic, atraumatic  Lungs: Breaths nonlabored, without wheezes or stridor  Upper Extremity:   Generalized pain in the musculature of the upper extremities with palpation on physical examination.  Patient endorses pain with palpation of the deltoid, biceps, triceps and forearm musculature with palpation.  Normal ROM shoulder, elbow, wrist without pain. Motor intact distally with finger flexion/extension/intrinsics/EPL, OK sign 5/5 strength. SILT ax/m/r/u nerve distributions. Radial pulse palpable, 2+    Right lower Extremity:   No deformity, skin intact.  Tenderness to palpation over the musculature of the anterior, medial and posterior compartment of the thigh.  Patient also endorses tenderness to palpation over the musculature of all 4 compartments of the lower leg.  However compartments remain soft and compressible.  No pain with ROM hip/knee/ankle. Motor intact distally TA/GSC/EHL/FHL with  4/5 strength. SILT sp/dp/tibial/saph/sural nerves however reports paresthesias in the L3 and L4 nerve dermatomes. DP/PT pulses palpable, 2+, toes warm and well perfused     Left lower Extremity:   No deformity, skin intact.  Tenderness to palpation over the musculature of the anterior, medial and posterior compartment of the thigh.  Patient also endorses tenderness to palpation over the musculature of all 4 compartments of the lower leg.  However compartments remain soft and compressible,  Limited range of motion actively to the ankle, knee and hip secondary to pain.  Motor intact distally TA/GSC/EHL/FHL with 3/5 strength. SILT  sp/dp/tibial/saph/sural nerves. DP/PT pulses palpable, 2+, toes warm and well perfused           Imaging:   Review of imaging below demonstrates     US Lower Extremity Non Vascular Left    Result Date: 4/30/2025  Exam: US LOWER EXTREMITY NON VASCULAR LEFT 4/30/2025 9:06 AM Indication: Left thigh and hip, particularly looking at quadratus femoris & gluteus minimus. Trying to see if she can undergo bx with IR but they need to know if US can differentiate between rim enhancement & other tissue so they know what to bx. Comparison: 4/29/2025 Findings: Targeted grayscale and color Doppler sonography was performed in the area of clinical interest by the sonographer and radiologist. Limited sonographic penetration due to body habitus. Numerous ultrasound probes were utilized. The areas of presumed myonecrosis about the left hip are not confidently visualized with ultrasound. No discrete intramuscular abnormality identified, and no focal asymmetry appreciated when compared to the contralateral side.     Impression: The focal abnormalities about the left hip seen on recent MRI are not appreciated sonographically. DARIO BASSETT MD   SYSTEM ID:  [id number]    MR Femur Bilateral w &wo Contrast    Result Date: 4/29/2025  MR bilateral thigh  without and with contrast 4/29/2025 7:49 AM Techniques: Multiplanar multisequence imaging of the bilateral thighs was obtained without  and with administration of intra-articular or intravenous contrast using routine protocol. Contrast: 10mL Gadavist IV History: elevated CK, history of abnomal muscle edema in adductors. Now with elevated CK, assess for myositis in thighs/ pattern of involvement. 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 thyroid level normal. Considered malignancy but appears to be uptodate on cancer screening (pap with  HPV negative october 2024). Less likely CTD without other signs/symptoms. Will start by obtaining MRI of bilateral thighs to assist with potential muscle biopsy location. Comparison: Bilateral hip MRI 1/23/2025 Findings: Osseous structures Osseous structures: Prominent red marrow within the bones. Tiny bone islands within the left side the tibial plateau. No fracture, stress reaction, avascular necrosis, or focal osseous lesion is seen. Joint and periarticular soft tissue Internal derangement of joints are not well assessed owing to chosen field of view. Muscles and tendons Muscles: No substantial fatty replacement in the muscles. Left: Patchy edema within the proximal quadriceps femoris, gluteus minimus and quadratus femoris. There is associated rim enhancement particularly in the gluteus minimus and quadratus femoris (stipple sign). Perifascial edema and small fluid particularly along the proximal quadriceps femoris. Compared to MRI from 1/23/2025 findings have progressed around left hip. Right: Trace residual edema in the gluteus medius; improved from 1/23/2025. Interval significant resolution of the edema in the adductor musculature and gluteus tj. Perifascial edema involving quadriceps muscle particularly in the lateral aspect. Small areas of edema and enhancement in the distal biceps femoris and distal adductor perry. Tendons: No partial or complete tear. Nerves: Grossly intact sciatic nerves. Other Findings: Multiple predominantly peripherally located subcentimeter cysts in the ovaries; questionable for polycystic appearance.     Impression: 1. Left: Patchy soft tissue edema within the proximal quadriceps/vastus lateralis , gluteus minimus and quadratus femoris muscles There is associated rim enhancement particularly in the gluteus minimus and quadratus femoris suggesting myonecrosis. Perifascial edema and small perifascial fluid particularly along the proximal quadriceps muscles. Compared to MRI from  1/23/2025 findings have progressed around the left hip. Right: Trace residual soft tissue edema in the gluteus medius; improved from 1/23/2025. Interval significant resolution of the edema in the adductor musculature and gluteus tj. Perifascial edema involving quadriceps muscle particularly along the lateral aspect. Small areas of edema and enhancement in the distal biceps femoris and distal adductor perry. 2. No substantial fatty replacement in the muscles. I have personally reviewed the examination and initial interpretation and I agree with the findings. KRISTOPHER JC MD   SYSTEM ID:  [id number]    US Venous Competency Bilateral    Result Date: 4/24/2025  EXAM: BILATERAL LOWER EXTREMITY DEEP AND SUPERFICIAL VENOUS DUPLEX ULTRASOUND WITH PHYSIOLOGIC TESTING LOCATION: [facility] DATE: 4/24/2025 INDICATION:  Peripheral edema. Assess for incompetent veins. TECHNIQUE: Supine and upright ultrasound of the deep and superficial veins with Valsalva and compression augmentation maneuvers. Duplex imaging is performed utilizing gray-scale, two-dimensional images, color-flow imaging, Doppler waveform analysis, and spectral Doppler imaging. INCOMPETENCY CRITERIA: Deep vein reflux reported when greater than 1,000 ms flow reversal. Superficial vein reflux reported when greater than 600 ms flow reversal.  vein reflux reported as greater than 350 ms flow reversal. FINDINGS: Negative for DVT throughout both lower extremities. Deep veins throughout the right lower extremity are competent. The left proximal to mid femoral vein segments are incompetent, remaining deep veins in the left lower extremity are competent. The right great saphenous vein is diffusely competent ranging in diameter from 2-7 mm and depth from skin surface ranging from 4-23 mm. Majority of the left great saphenous vein is incompetent including segments from the saphenofemoral junction through the level of the knee for times  ranging from 1.5-2.5 seconds. Remaining segments are competent. Diameters range from 1-9 mm and depth from skin surface ranges from 13-27 mm. Both anterior posterior accessory saphenous veins are patent and competent. Both small saphenous veins are patent and competent. The right small saphenous vein is 2 mm and depth from skin surface is 12-15 mm. The left small saphenous vein is 2 mm and depth from skin surface is 13-14 mm.     IMPRESSION: 1.  No deep venous thrombosis of either lower extremity. 2.  The left proximal to mid femoral vein is incompetent. Remaining deep vein segments in both lower extremities are competent. 3.  Right great saphenous vein is diffusely competent. Left great saphenous vein is incompetent from the saphenofemoral junction to the level of the knee. 4.  Both small saphenous veins, anterior and posterior accessory saphenous veins are patent and competent.           Labs:   CBC:  Lab Results   Component Value Date    WBC 4.9 04/30/2025    HGB 12.4 04/30/2025     04/30/2025       BMP:  Lab Results   Component Value Date     04/30/2025    POTASSIUM 4.5 04/30/2025    CHLORIDE 101 04/30/2025    CO2 30 (H) 04/30/2025    BUN 9.2 04/30/2025    CR 0.83 04/30/2025    ANIONGAP 6 (L) 04/30/2025    REMA 9.9 04/30/2025     (H) 04/30/2025       Inflammatory Markers:  Lab Results   Component Value Date    WBC 4.9 04/30/2025    SED 6 04/27/2025          Confidence O Abilio Harmon MD  Orthopedic Surgery Resident

## 2025-04-30 NOTE — DISCHARGE SUMMARY
"[facility]  Discharge Summary - Medicine & Pediatrics       ***Update discharge time and date + refresh discharge med list at the end***    Date of Admission:  4/27/2025  Date of Discharge:  {DISCHARGE DATE:119492}  Discharging Provider: ***  Discharge Service: 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    Weight as of an earlier encounter on 4/27/25: 109.8 kg (242 lb).       Follow-ups Needed After Discharge   {Additional important follow-up instructions/to-do's for PCP      ;***}    Unresulted Labs Ordered in the Past 30 Days of this Admission       Date and Time Order Name Status Description    4/29/2025  9:45 AM FACN1; anti- CN1A test for inclusion body myopathy [place] Miscellaneous Test In process     4/28/2025  8:00 PM Urine Culture In process     4/28/2025  6:37 PM NMS1; Necrotizing myopathy panel [place] Miscellaneous Test In process     4/28/2025  5:12 PM HBV HCV HIV by ROBE In process     4/28/2025  3:51 PM Aldolase In process     4/28/2025  1:48 PM Lyme disease DNA detection by PCR In process         These results will be followed up by ***    Discharge Disposition   {:2196821::\"Discharged to home\"}  Condition at discharge: {CONDITION:562860::\"Stable\"}    Hospital Course   Caprice Kline was admitted on 4/27/2025 for pain " control, and further evaluation and muscle biopsy of bilateral hip and thigh pain. The following problems were addressed during her hospitalization:    # Bilateral thigh pain  # Bilateral anterior leg pain  # Elevated CK  # Fibromyalgia   # Chronic pain   # Lumbar radiculopathy  Presented with worsening bilateral hip and anterior leg pain in recent months with a known hx of chronic lumbar pain with sciatica. Pertinent hx: had multiple pain interventions for back pain, recently failed a SCS trial; compared to Jan 2025 MRI, 4/22/25 MRI revealed resolution of previously identified edema signal in adductors, R gluteus tj and R vastus lateralis and slightly more pronounced edema in distal R gluteus medius. 4/25 US venous competency without DVT and gross incompetency. OP Rheum work up notable for positive TEODORO 1:160 with negative DOM, negative myositis panel, neg RF/CCP, and normal complement/LFTs/CK/aldolase/inflammatory markers. OP neurology had considered need for muscle biopsy. On 4/27, transferred from [facility] ED for worsening pain management further work-up for elev CK levels including muscle biopsy. Workup notable for elevated CK levels ranging from 255 - 2788. Normal Cr, UOP, and Li level but concern for potential kidney injury if CK increase beyond 5000, especially in the setting of chronic Li use.  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 Right side suggestive of significant resolution of the edema in the adductor musculature and gluteus tj. Per rheum recs, biopsy of the rim-enhancing lesion showed***.      - Pain control plan during this admission:   - PTA morphine IR tablet 15mg TID PRN (to be administered prior to dilaudid)  - PTA pregabalin 100mg at bedtime + 150mg TID   - PTA Tizanidine 4mg TID PRN        - Dilaudid PO 4mg BID PRN  - Lidocaine cream   - Naloxone  ordered  - HELD Tylenol 975mg TID in the setting of elevated LFTs    # Hx of Urinary retention  # Hx of Urinary hesitancy   # Pelvic pain   Seen at ED on 11/5/2024 for acute urinary retention and was discharged home with fisher catheter. Does not have catheter in place, nor has needed to self-cath thereafter. 4/28 UA positive for moderate LE, moderate bacteria, WBC, but negative nitrites. Denied new urinary symptoms, although does have a hx of urinary retention and hesitancy. Overnight bladder scan was notable for ~600mL urine, but was able to void the next day. Intermittent cath was not needed ***. Continued PTA Flomax 0.4mg at bedtime.      # Transaminitis, improving***  , ALT 95 noted on 4/28 AM labs. Now improved to AST 69, ALT 59. T bili and INR normal. Remained asymptomatic. Held Tylenol.       # Subclinical hypothyroidism  Admission labs notable for TSH 5.13 and free T4 1.04. Asymptomatic. Re-check in 3-6 months or sooner if symptoms develop.     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 Will continue home meds.               - PTA Adderall XR 30mg every day PRN              - PTA Clonazepam 0.5mg 1 tab PRN anxiety              - PTA Doxepin 10mg 1 tab at bedtime              - PTA Lamictal 100mg every morning & 50mg daily at bedtime               - PTA Lithium 600mg 1 capsule at bedtime               - PTA Prazosin 2mg qam and 5mg qpm               - PTA Quetiapine 300mg at bedtime     # Bilateral edema - chronic   Per patient report, chronic and managed with furosemide 20mg. Of note, had been taking Lyrica for some time, which may also have contributed to LE edema. Exercised caution with Furosemide administration in the setting of rising CK and potential for renal injury.   - PTA Furosemide 20mg daily      # Non-intractable migraine   # Tremor   Sees outpatient  neurology for these concerns. Baseline headaches have not acutely changed.  - PTA Ubrogepant 100mg 1 tab at onset of headache (brought from home)    Consultations This Hospital Stay   RHEUMATOLOGY IP CONSULT  NEUROLOGY GENERAL ADULT IP CONSULT  PHYSICAL THERAPY ADULT IP CONSULT  NEPHROLOGY GENERAL ADULT IP CONSULT  INFECTIOUS DISEASE [facility] ADULT IP CONSULT  SURGERY GENERAL ADULT IP CONSULT    Code Status   Full Code       The patient was discussed with  ***    Adamaris Matthew MD***  {Team:[id number]} Service  [facility] [address]  [facility] [address]  Phone: [phone]  Fax: [phone]  ______________________________________________________________________    Physical Exam   Vital Signs: Temp: 98.3  F (36.8  C) Temp src: Oral BP: 102/65 Pulse: 85   Resp: 16 SpO2: 94 % O2 Device: None (Room air)    Weight: 0 lbs 0 oz  {Recommend personal SmartPhrase or Notewriter for exam (OPTIONAL)   :243049}    Physical Exam ***      Primary Care Physician   Junior Roe    Discharge Orders   No discharge procedures on file.    Significant Results and Procedures   {Data for Discharge Summary:817322}    Discharge Medications   Current Discharge Medication List        CONTINUE these medications which have NOT CHANGED    Details   albuterol (PROAIR HFA/PROVENTIL HFA/VENTOLIN HFA) 108 (90 Base) MCG/ACT inhaler Inhale 2 puffs into the lungs every 6 hours as needed for shortness of breath, wheezing or cough  Qty: 18 g, Refills: 0    Comments: Pharmacy may dispense brand covered by insurance (Proair, or proventil or ventolin or generic albuterol inhaler)  Associated Diagnoses: Infection due to 2019 novel coronavirus      amphetamine-dextroamphetamine (ADDERALL XR) 30 MG 24 hr capsule Take 30 mg by mouth daily as needed.      azelaic acid (FINACIA) 15 % external gel Apply topically 2 times daily as needed.      clobetasol (TEMOVATE) 0.05 % external solution APPLY TOPICALLY TO THE AFFECTED AREA TWICE DAILY FOR UP  TO 21 DAYS. DO NOT APPLY TO NORMAL SKIN      clonazePAM (KLONOPIN) 0.5 MG tablet Take 0.5 mg by mouth daily as needed for anxiety.      diazepam (VALIUM) 10 MG tablet PLACE 1 TABLET VAGINALLY AS NEEDED FOR URINARY RETENTION OR FOR PAIN  Qty: 15 tablet, Refills: 0    Associated Diagnoses: Urinary retention; Pelvic pain in female      docusate sodium (COLACE) 100 MG capsule Take 200 mg by mouth 2 times daily.      doxepin (SINEQUAN) 10 MG capsule Take 10 mg by mouth At Bedtime      furosemide (LASIX) 20 MG tablet Take 1 tablet (20 mg) by mouth daily.  Qty: 90 tablet, Refills: 0    Associated Diagnoses: Peripheral edema      glucosamine-chondroitin 500-400 MG CAPS per capsule Take 1 capsule by mouth daily.      hydrOXYzine (VISTARIL) 50 MG capsule Take 100 mg by mouth 3 times daily.      ipratropium-albuterol (COMBIVENT RESPIMAT)  MCG/ACT inhaler Inhale 1 puff into the lungs 4 times daily as needed for shortness of breath, wheezing or cough  Qty: 4 g, Refills: 0    Associated Diagnoses: Viral URI with cough      ketoconazole (NIZORAL) 2 % external shampoo Apply topically twice a week.      lamoTRIgine (LAMICTAL) 100 MG tablet Take  mg by mouth 2 times daily. 100 mg AM / 50 mg PM as of 4/27 - pt in process of titrating back up      lithium (ESKALITH) 600 MG capsule Take 600 mg by mouth at bedtime.      magnesium oxide (MAG-OX) 400 MG tablet Take 400 mg by mouth daily.      morphine (MSIR) 15 MG IR tablet Take 15 mg by mouth 3 times daily as needed for pain.      ondansetron (ZOFRAN ODT) 4 MG ODT tab DISSOLVE 1 TABLET(4 MG) ON THE TONGUE EVERY 8 HOURS AS NEEDED FOR NAUSEA  Qty: 90 tablet, Refills: 0    Associated Diagnoses: Nausea      !! pregabalin (LYRICA) 100 MG capsule Take 100 mg by mouth at bedtime.      !! pregabalin (LYRICA) 150 MG capsule Take 150 mg by mouth 3 times daily. AM / noon / dinner      propranolol (INDERAL) 20 MG tablet Take 1 tablet (20 mg) by mouth 2 times daily as needed  (tremor).  Qty: 180 tablet, Refills: 2    Associated Diagnoses: Tremor      propranolol ER (INDERAL LA) 80 MG 24 hr capsule Take 1 capsule (80 mg) by mouth daily.  Qty: 90 capsule, Refills: 1    Associated Diagnoses: Tremor      QUEtiapine (SEROQUEL) 300 MG tablet Take 300 mg by mouth At Bedtime      Semaglutide-Weight Management (WEGOVY) 0.5 MG/0.5ML pen Inject 0.5 mg subcutaneously once a week.  Qty: 2 mL, Refills: 0    Associated Diagnoses: Class 1 obesity with serious comorbidity and body mass index (BMI) of 30.0 to 30.9 in adult, unspecified obesity type      sulfacetamide sodium, Acne, 10 % lotion APPLY IT TO THE FACE ONCE DAILY IN THE MORNING X3 MONTHS      tamsulosin (FLOMAX) 0.4 MG capsule Take 1 capsule (0.4 mg) by mouth every evening.  Qty: 30 capsule, Refills: 11    Associated Diagnoses: Urinary retention; Urinary hesitancy      tiZANidine (ZANAFLEX) 4 MG tablet TAKE 1 TABLET BY MOUTH EVERY 8 TO 12 HOURS AS NEEDED. NOT TO EXCEED 3 DOSES IN 24 HOURS      triamcinolone (KENALOG) 0.1 % external cream Apply topically 2 times daily as needed.      ubrogepant (UBRELVY) 100 MG tablet Take 1 tablet (100 mg) by mouth at onset of headache.  Qty: 8 tablet, Refills: 11    Associated Diagnoses: Intractable chronic migraine without aura and without status migrainosus      EPINEPHrine (ANY BX GENERIC EQUIV) 0.3 MG/0.3ML injection 2-pack INJECT 0.3 ML INTO THE MUSCLE AS NEEDED FOR ANAPHYLAXIS  Qty: 2 each, Refills: 1    Associated Diagnoses: Anaphylactic shock due to shellfish, initial encounter      Fremanezumab-vfrm (AJOVY) 225 MG/1.5ML SOAJ Inject 225 mg subcutaneously every 30 days.  Qty: 1.5 mL, Refills: 11    Associated Diagnoses: Intractable chronic migraine without aura and without status migrainosus      ketoconazole (NIZORAL) 2 % external cream Apply topically. 3 times every week      NARCAN 4 MG/0.1ML nasal spray CALL 911. SPR CONTENTS OF ONE SPRAYER (0.1ML) INTO ONE NOSTRIL. REPEAT IN 2-3 MIN IF SYMPTOMS OF  "OPIOID EMERGENCY PERSIST, ALTERNATE NOSTRILS      !! prazosin (MINIPRESS) 2 MG capsule Take 2 mg by mouth every morning.      !! prazosin (MINIPRESS) 5 MG capsule Take 5 mg by mouth at bedtime.      RETIN-A 0.025 % external cream APPLY SPARINGLY TO FACE EVERY OTHER NIGHT FOR 14 NIGHTS THEN NIGHTLY THEREAFTER       !! - Potential duplicate medications found. Please discuss with provider.        Allergies   Allergies   Allergen Reactions    Metronidazole Anaphylaxis and Hives     Other reaction(s): Throat swelling  Throat swelling 6 hrs after exposure  Itching and hives 2 hrs after exposure    Shellfish Allergy Anxiety, Diarrhea, Difficulty breathing, Hives, Itching, Rash, Shortness Of Breath and Swelling    Codeine Headache    Fish-Derived Products      Stopped fish oil and less stomach discomfort  Rash after eating fish.     Hydrocodone-Acetaminophen Other (See Comments)    Sertraline Other (See Comments)     Patient was foggy and \"high\" feeling    Shellfish-Derived Products      Lips get numb, throat gets scratchy, rashes     " findings  have progressed around left hip.     Right: Trace residual edema in the gluteus medius; improved from  1/23/2025. Interval significant resolution of the edema in the  adductor musculature and gluteus tj. Perifascial edema involving  quadriceps muscle particularly in the lateral aspect. Small areas of  edema and enhancement in the distal biceps femoris and distal adductor  perry.     Tendons: No partial or complete tear.    Nerves:  Grossly intact sciatic nerves.    Other Findings:  Multiple predominantly peripherally located subcentimeter cysts in the  ovaries; questionable for polycystic appearance.      Impression    Impression:  1. Left: Patchy soft tissue edema within the proximal  quadriceps/vastus lateralis , gluteus minimus and quadratus femoris  muscles There is associated rim enhancement particularly in the  gluteus minimus and quadratus femoris suggesting myonecrosis.  Perifascial edema and small perifascial fluid particularly along the  proximal quadriceps muscles. Compared to MRI from 1/23/2025 findings  have progressed around the left hip.     Right: Trace residual soft tissue edema in the gluteus medius;  improved from 1/23/2025. Interval significant resolution of the edema  in the adductor musculature and gluteus tj. Perifascial edema  involving quadriceps muscle particularly along the lateral aspect.  Small areas of edema and enhancement in the distal biceps femoris and  distal adductor perry.     2. No substantial fatty replacement in the muscles.    I have personally reviewed the examination and initial interpretation  and I agree with the findings.    KRISTOPHER JC MD         SYSTEM ID:  [id number]   US Lower Extremity Non Vascular Left    Narrative    Exam: US LOWER EXTREMITY NON VASCULAR LEFT 4/30/2025 9:06 AM    Indication: Left thigh and hip, particularly looking at quadratus  femoris & gluteus minimus. Trying to see if she can undergo bx  with IR but they need to know if  US can differentiate between rim  enhancement & other tissue so they know what to bx.    Comparison: 4/29/2025    Findings:   Targeted grayscale and color Doppler sonography was performed in the  area of clinical interest by the sonographer and radiologist. Limited  sonographic penetration due to body habitus. Numerous ultrasound  probes were utilized. The areas of presumed myonecrosis about the left  hip are not confidently visualized with ultrasound. No discrete  intramuscular abnormality identified, and no focal asymmetry  appreciated when compared to the contralateral side.        Impression    Impression:   The focal abnormalities about the left hip seen on recent MRI are not  appreciated sonographically.    DARIO BASSETT MD         SYSTEM ID:  [id number]   CT Pelvis Soft Tissue w Contrast    Narrative    EXAMINATION: CT PELVIS SOFT TISSUE W CONTRAST, 5/1/2025 9:35 AM    TECHNIQUE:  Helical CT images from the umbilicus through the symphysis  pubis were obtained with contrast.  Coronal reformatted images were  generated at a workstation for further assessment.    COMPARISON: MRI 4/29/2025, 1/23/2025.    HISTORY: Myonecrosis and ring-enhancing lesions of hip musculature    FINDINGS:    Soft tissues: Redemonstration of ill-defined lesion centered about the  left gluteus minimus measuring up to 3.1 cm with faint peripheral  enhancement (series 2, image 80).    Bones: No acute or suspicious osseous lesions. Left acetabular bone  island.    Bladder: Unremarkable.     Pelvic organs: Unremarkable.    Gastrointestinal tract: No dilated loops of bowel or bowel wall  thickening. The appendix is unremarkable.    Lymph nodes: No suspicious lymphadenopathy.    Peritoneum/mesentery: No free fluid. No free air..    Vessels: Patent major abdominal arterial vasculature. Portal, splenic  and superior mesenteric veins are patent.  No significant  atherosclerotic disease.    Heart and lung bases: Clear.       Impression     IMPRESSION:   Stable ill-defined peripherally enhancing lesion centered about the  left gluteus minimus musculature when compared to 4/29/2025 MRI.     I have personally reviewed the examination and initial interpretation  and I agree with the findings.    CRYSTAL PINA MD         SYSTEM ID:  [id number]   IR Soft Tissue Biopsy    Narrative    PROCEDURE: Ultrasound-guided biopsy    Procedural Personnel  Attending physician(s): Harsh Dupree   Fellow physician(s): Victor Manuel Camara  Resident physician(s): None  Advanced practice provider(s): None    Procedure Date (mm/dd/yyyy): 5/2/2025    Pre-procedure diagnosis: muscle lesion  Post-procedure diagnosis: Same  Indication: Histopathologic diagnosis  Previous biopsy of same target (QCDR): No  Additional clinical history: None    Complications: No immediate complications.      Impression    IMPRESSION:    Ultrasound-guided biopsy of left gluteus minimus lesion, 6 cores.    Plan:     Specimen(s) sent for evaluation.  _______________________________________________________________    PROCEDURE SUMMARY:  - Percutaneous US-guided coaxial core needle biopsy  - Additional procedure(s): None    PROCEDURE DETAILS:    Pre-procedure  Reference imaging for biopsy target: CT 5/1/2025  Consent: Informed consent for the procedure including risks, benefits  and alternatives was obtained and time-out was performed prior to the  procedure.  Preparation: The site was prepared and draped using maximal sterile  barrier technique including cutaneous antisepsis.    Anesthesia/sedation  Monitoring: The patient was placed on continuous monitoring.  Intravenous sedation was administered. Vital signs and sedation  monitored by nursing staff under Interventional Radiologists  supervision. The patient remained stable throughout the procedure.    Medications:   1. 150mcg Fentanyl  2. 4 mg Versed    Face to Face sedation time: 20 minutes    Imaging prior to biopsy  The patient was positioned  supine. Initial imaging was performed.  Biopsy target:  - Organ or target location: Other- left gluteus minimus  - Laterality: Left  - Maximal diameter (cm): 2.2  Other findings: None    Biopsy   Local anesthesia was administered. Under US guidance, the biopsy  needle was advanced to the target and biopsy was performed.  Coaxial needle: 15 gauge    Core needle biopsy device: Bard Marquee  Core needle size: 16 gauge  Number of core specimens: 6    On-site assessment of biopsy adequacy: not performed      Needle removal  The biopsy needle was removed and a sterile dressing was applied.  Tract embolization: Gelfoam pledgets    Imaging following biopsy  Immediate post-biopsy ultrasound was performed.  Post-biopsy imaging findings: no immediate complication    Additional Details  Additional description of procedure: None  Registry event: V/3/g  Device used: None  Equipment details: None  Unique Device Identifiers: Not available  Specimens removed: Biopsy samples as detailed above  Estimated blood loss (mL): Less than 10  Standardized report: [email or web address]_v3.1    Attestation  Signer name: KISHOR HUITRON MD  I attest that I was present for the entire procedure. I reviewed the  stored images and agree with the report as written.    I have personally reviewed the examination and initial interpretation  and I agree with the findings.    KISHOR HUITRON MD         SYSTEM ID:  [id number]       Discharge Medications   Current Discharge Medication List        CONTINUE these medications which have NOT CHANGED    Details   albuterol (PROAIR HFA/PROVENTIL HFA/VENTOLIN HFA) 108 (90 Base) MCG/ACT inhaler Inhale 2 puffs into the lungs every 6 hours as needed for shortness of breath, wheezing or cough  Qty: 18 g, Refills: 0    Comments: Pharmacy may dispense brand covered by insurance (Proair, or proventil or ventolin or generic albuterol inhaler)  Associated Diagnoses: Infection due to 2019 novel coronavirus       amphetamine-dextroamphetamine (ADDERALL XR) 30 MG 24 hr capsule Take 30 mg by mouth daily as needed.      azelaic acid (FINACIA) 15 % external gel Apply topically 2 times daily as needed.      clobetasol (TEMOVATE) 0.05 % external solution APPLY TOPICALLY TO THE AFFECTED AREA TWICE DAILY FOR UP TO 21 DAYS. DO NOT APPLY TO NORMAL SKIN      clonazePAM (KLONOPIN) 0.5 MG tablet Take 0.5 mg by mouth daily as needed for anxiety.      diazepam (VALIUM) 10 MG tablet PLACE 1 TABLET VAGINALLY AS NEEDED FOR URINARY RETENTION OR FOR PAIN  Qty: 15 tablet, Refills: 0    Associated Diagnoses: Urinary retention; Pelvic pain in female      docusate sodium (COLACE) 100 MG capsule Take 200 mg by mouth 2 times daily.      doxepin (SINEQUAN) 10 MG capsule Take 10 mg by mouth At Bedtime      furosemide (LASIX) 20 MG tablet Take 1 tablet (20 mg) by mouth daily.  Qty: 90 tablet, Refills: 0    Associated Diagnoses: Peripheral edema      glucosamine-chondroitin 500-400 MG CAPS per capsule Take 1 capsule by mouth daily.      hydrOXYzine (VISTARIL) 50 MG capsule Take 100 mg by mouth 3 times daily.      ipratropium-albuterol (COMBIVENT RESPIMAT)  MCG/ACT inhaler Inhale 1 puff into the lungs 4 times daily as needed for shortness of breath, wheezing or cough  Qty: 4 g, Refills: 0    Associated Diagnoses: Viral URI with cough      ketoconazole (NIZORAL) 2 % external shampoo Apply topically twice a week.      lamoTRIgine (LAMICTAL) 100 MG tablet Take  mg by mouth 2 times daily. 100 mg AM / 50 mg PM as of 4/27 - pt in process of titrating back up      lithium (ESKALITH) 600 MG capsule Take 600 mg by mouth at bedtime.      magnesium oxide (MAG-OX) 400 MG tablet Take 400 mg by mouth daily.      morphine (MSIR) 15 MG IR tablet Take 15 mg by mouth 3 times daily as needed for pain.      ondansetron (ZOFRAN ODT) 4 MG ODT tab DISSOLVE 1 TABLET(4 MG) ON THE TONGUE EVERY 8 HOURS AS NEEDED FOR NAUSEA  Qty: 90 tablet, Refills: 0    Associated  Diagnoses: Nausea      !! pregabalin (LYRICA) 100 MG capsule Take 100 mg by mouth at bedtime.      !! pregabalin (LYRICA) 150 MG capsule Take 150 mg by mouth 3 times daily. AM / noon / dinner      propranolol (INDERAL) 20 MG tablet Take 1 tablet (20 mg) by mouth 2 times daily as needed (tremor).  Qty: 180 tablet, Refills: 2    Associated Diagnoses: Tremor      propranolol ER (INDERAL LA) 80 MG 24 hr capsule Take 1 capsule (80 mg) by mouth daily.  Qty: 90 capsule, Refills: 1    Associated Diagnoses: Tremor      QUEtiapine (SEROQUEL) 300 MG tablet Take 300 mg by mouth At Bedtime      Semaglutide-Weight Management (WEGOVY) 0.5 MG/0.5ML pen Inject 0.5 mg subcutaneously once a week.  Qty: 2 mL, Refills: 0    Associated Diagnoses: Class 1 obesity with serious comorbidity and body mass index (BMI) of 30.0 to 30.9 in adult, unspecified obesity type      sulfacetamide sodium, Acne, 10 % lotion APPLY IT TO THE FACE ONCE DAILY IN THE MORNING X3 MONTHS      tamsulosin (FLOMAX) 0.4 MG capsule Take 1 capsule (0.4 mg) by mouth every evening.  Qty: 30 capsule, Refills: 11    Associated Diagnoses: Urinary retention; Urinary hesitancy      tiZANidine (ZANAFLEX) 4 MG tablet TAKE 1 TABLET BY MOUTH EVERY 8 TO 12 HOURS AS NEEDED. NOT TO EXCEED 3 DOSES IN 24 HOURS      triamcinolone (KENALOG) 0.1 % external cream Apply topically 2 times daily as needed.      ubrogepant (UBRELVY) 100 MG tablet Take 1 tablet (100 mg) by mouth at onset of headache.  Qty: 8 tablet, Refills: 11    Associated Diagnoses: Intractable chronic migraine without aura and without status migrainosus      EPINEPHrine (ANY BX GENERIC EQUIV) 0.3 MG/0.3ML injection 2-pack INJECT 0.3 ML INTO THE MUSCLE AS NEEDED FOR ANAPHYLAXIS  Qty: 2 each, Refills: 1    Associated Diagnoses: Anaphylactic shock due to shellfish, initial encounter      ketoconazole (NIZORAL) 2 % external cream Apply topically. 3 times every week      NARCAN 4 MG/0.1ML nasal spray CALL 911. SPR CONTENTS OF  "ONE SPRAYER (0.1ML) INTO ONE NOSTRIL. REPEAT IN 2-3 MIN IF SYMPTOMS OF OPIOID EMERGENCY PERSIST, ALTERNATE NOSTRILS      !! prazosin (MINIPRESS) 2 MG capsule Take 2 mg by mouth every morning.      !! prazosin (MINIPRESS) 5 MG capsule Take 5 mg by mouth at bedtime.      RETIN-A 0.025 % external cream APPLY SPARINGLY TO FACE EVERY OTHER NIGHT FOR 14 NIGHTS THEN NIGHTLY THEREAFTER       !! - Potential duplicate medications found. Please discuss with provider.        STOP taking these medications       Fremanezumab-vfrm (AJOVY) 225 MG/1.5ML SOAJ Comments:   Reason for Stopping:             Allergies   Allergies   Allergen Reactions    Metronidazole Anaphylaxis and Hives     Other reaction(s): Throat swelling  Throat swelling 6 hrs after exposure  Itching and hives 2 hrs after exposure    Shellfish Allergy Anxiety, Diarrhea, Difficulty breathing, Hives, Itching, Rash, Shortness Of Breath and Swelling    Codeine Headache    Fish-Derived Products      Stopped fish oil and less stomach discomfort  Rash after eating fish.     Hydrocodone-Acetaminophen Other (See Comments)    Sertraline Other (See Comments)     Patient was foggy and \"high\" feeling    Shellfish-Derived Products      Lips get numb, throat gets scratchy, rashes     "

## 2025-05-01 ENCOUNTER — APPOINTMENT (OUTPATIENT)
Dept: CT IMAGING | Facility: CLINIC | Age: 30
End: 2025-05-01
Payer: MEDICAID

## 2025-05-01 VITALS
RESPIRATION RATE: 18 BRPM | HEART RATE: 72 BPM | TEMPERATURE: 98.2 F | DIASTOLIC BLOOD PRESSURE: 75 MMHG | OXYGEN SATURATION: 93 % | SYSTOLIC BLOOD PRESSURE: 109 MMHG

## 2025-05-01 LAB
ALBUMIN SERPL BCG-MCNC: 3.7 G/DL (ref 3.5–5.2)
ALP SERPL-CCNC: 60 U/L (ref 40–150)
ALT SERPL W P-5'-P-CCNC: 38 U/L (ref 0–50)
ANION GAP SERPL CALCULATED.3IONS-SCNC: 8 MMOL/L (ref 7–15)
AST SERPL W P-5'-P-CCNC: 37 U/L (ref 0–45)
BASOPHILS # BLD AUTO: 0.1 10E3/UL (ref 0–0.2)
BASOPHILS NFR BLD AUTO: 1 %
BILIRUB SERPL-MCNC: 0.2 MG/DL
BUN SERPL-MCNC: 14.1 MG/DL (ref 6–20)
CALCIUM SERPL-MCNC: 9.4 MG/DL (ref 8.8–10.4)
CHLORIDE SERPL-SCNC: 101 MMOL/L (ref 98–107)
CMV DNA SPEC NAA+PROBE-ACNC: NOT DETECTED IU/ML
CREAT SERPL-MCNC: 0.74 MG/DL (ref 0.51–0.95)
EBV DNA SERPL NAA+PROBE-ACNC: NOT DETECTED IU/ML
EGFRCR SERPLBLD CKD-EPI 2021: >90 ML/MIN/1.73M2
EOSINOPHIL # BLD AUTO: 0.5 10E3/UL (ref 0–0.7)
EOSINOPHIL NFR BLD AUTO: 8 %
ERYTHROCYTE [DISTWIDTH] IN BLOOD BY AUTOMATED COUNT: 14.5 % (ref 10–15)
GLUCOSE SERPL-MCNC: 104 MG/DL (ref 70–99)
HCO3 SERPL-SCNC: 27 MMOL/L (ref 22–29)
HCT VFR BLD AUTO: 36.7 % (ref 35–47)
HGB BLD-MCNC: 12.2 G/DL (ref 11.7–15.7)
IMM GRANULOCYTES # BLD: 0 10E3/UL
IMM GRANULOCYTES NFR BLD: 1 %
LYMPHOCYTES # BLD AUTO: 2.1 10E3/UL (ref 0.8–5.3)
LYMPHOCYTES NFR BLD AUTO: 32 %
MCH RBC QN AUTO: 25.5 PG (ref 26.5–33)
MCHC RBC AUTO-ENTMCNC: 33.2 G/DL (ref 31.5–36.5)
MCV RBC AUTO: 77 FL (ref 78–100)
MONOCYTES # BLD AUTO: 0.6 10E3/UL (ref 0–1.3)
MONOCYTES NFR BLD AUTO: 9 %
NEUTROPHILS # BLD AUTO: 3.3 10E3/UL (ref 1.6–8.3)
NEUTROPHILS NFR BLD AUTO: 51 %
NRBC # BLD AUTO: 0 10E3/UL
NRBC BLD AUTO-RTO: 0 /100
PLATELET # BLD AUTO: 253 10E3/UL (ref 150–450)
POTASSIUM SERPL-SCNC: 3.7 MMOL/L (ref 3.4–5.3)
PROT SERPL-MCNC: 5.6 G/DL (ref 6.4–8.3)
RBC # BLD AUTO: 4.79 10E6/UL (ref 3.8–5.2)
SODIUM SERPL-SCNC: 136 MMOL/L (ref 135–145)
SPECIMEN TYPE: NORMAL
WBC # BLD AUTO: 6.5 10E3/UL (ref 4–11)

## 2025-05-01 PROCEDURE — 72193 CT PELVIS W/DYE: CPT

## 2025-05-01 PROCEDURE — 250N000011 HC RX IP 250 OP 636: Mod: JW

## 2025-05-01 PROCEDURE — 250N000013 HC RX MED GY IP 250 OP 250 PS 637: Performed by: FAMILY MEDICINE

## 2025-05-01 PROCEDURE — 99232 SBSQ HOSP IP/OBS MODERATE 35: CPT | Mod: GC

## 2025-05-01 PROCEDURE — 250N000013 HC RX MED GY IP 250 OP 250 PS 637

## 2025-05-01 PROCEDURE — 250N000011 HC RX IP 250 OP 636

## 2025-05-01 PROCEDURE — 36415 COLL VENOUS BLD VENIPUNCTURE: CPT

## 2025-05-01 PROCEDURE — 82565 ASSAY OF CREATININE: CPT

## 2025-05-01 PROCEDURE — 120N000002 HC R&B MED SURG/OB UMMC

## 2025-05-01 PROCEDURE — 85025 COMPLETE CBC W/AUTO DIFF WBC: CPT

## 2025-05-01 PROCEDURE — 72193 CT PELVIS W/DYE: CPT | Mod: 26 | Performed by: RADIOLOGY

## 2025-05-01 PROCEDURE — 250N000009 HC RX 250

## 2025-05-01 RX ORDER — HYDROMORPHONE HYDROCHLORIDE 4 MG/1
4 TABLET ORAL
Status: COMPLETED | OUTPATIENT
Start: 2025-05-01 | End: 2025-05-01

## 2025-05-01 RX ORDER — IOPAMIDOL 755 MG/ML
100 INJECTION, SOLUTION INTRAVASCULAR ONCE
Status: COMPLETED | OUTPATIENT
Start: 2025-05-01 | End: 2025-05-01

## 2025-05-01 RX ADMIN — MORPHINE SULFATE 15 MG: 15 TABLET ORAL at 17:06

## 2025-05-01 RX ADMIN — TIZANIDINE 4 MG: 2 TABLET ORAL at 11:02

## 2025-05-01 RX ADMIN — PREGABALIN 150 MG: 75 CAPSULE ORAL at 08:01

## 2025-05-01 RX ADMIN — HYDROMORPHONE HYDROCHLORIDE 4 MG: 4 TABLET ORAL at 20:35

## 2025-05-01 RX ADMIN — PRAZOSIN HYDROCHLORIDE 5 MG: 5 CAPSULE ORAL at 22:11

## 2025-05-01 RX ADMIN — PROPRANOLOL HYDROCHLORIDE 80 MG: 80 CAPSULE, EXTENDED RELEASE ORAL at 08:01

## 2025-05-01 RX ADMIN — LAMOTRIGINE 100 MG: 100 TABLET ORAL at 08:01

## 2025-05-01 RX ADMIN — PREGABALIN 100 MG: 100 CAPSULE ORAL at 22:11

## 2025-05-01 RX ADMIN — SODIUM CHLORIDE 60 ML: 9 INJECTION, SOLUTION INTRAVENOUS at 09:27

## 2025-05-01 RX ADMIN — DOXEPIN HYDROCHLORIDE 10 MG: 10 CAPSULE ORAL at 22:11

## 2025-05-01 RX ADMIN — FUROSEMIDE 20 MG: 20 TABLET ORAL at 08:01

## 2025-05-01 RX ADMIN — HYDROMORPHONE HYDROCHLORIDE 4 MG: 4 TABLET ORAL at 22:43

## 2025-05-01 RX ADMIN — LAMOTRIGINE 50 MG: 25 TABLET ORAL at 16:21

## 2025-05-01 RX ADMIN — IOPAMIDOL 119 ML: 755 INJECTION, SOLUTION INTRAVENOUS at 09:27

## 2025-05-01 RX ADMIN — HYDROMORPHONE HYDROCHLORIDE 4 MG: 4 TABLET ORAL at 11:02

## 2025-05-01 RX ADMIN — TIZANIDINE 4 MG: 2 TABLET ORAL at 01:50

## 2025-05-01 RX ADMIN — HYDROXYZINE HYDROCHLORIDE 100 MG: 50 TABLET ORAL at 20:35

## 2025-05-01 RX ADMIN — POLYETHYLENE GLYCOL 3350 17 G: 17 POWDER, FOR SOLUTION ORAL at 20:35

## 2025-05-01 RX ADMIN — QUETIAPINE FUMARATE 300 MG: 100 TABLET ORAL at 22:11

## 2025-05-01 RX ADMIN — HYDROMORPHONE HYDROCHLORIDE 4 MG: 4 TABLET ORAL at 00:24

## 2025-05-01 RX ADMIN — HYDROXYZINE HYDROCHLORIDE 100 MG: 50 TABLET ORAL at 08:01

## 2025-05-01 RX ADMIN — ONDANSETRON 4 MG: 2 INJECTION INTRAMUSCULAR; INTRAVENOUS at 20:43

## 2025-05-01 RX ADMIN — HYDROXYZINE HYDROCHLORIDE 100 MG: 50 TABLET ORAL at 16:21

## 2025-05-01 RX ADMIN — TAMSULOSIN HYDROCHLORIDE 0.4 MG: 0.4 CAPSULE ORAL at 20:35

## 2025-05-01 RX ADMIN — ONDANSETRON 4 MG: 2 INJECTION INTRAMUSCULAR; INTRAVENOUS at 06:09

## 2025-05-01 RX ADMIN — LITHIUM CARBONATE 600 MG: 600 CAPSULE ORAL at 22:11

## 2025-05-01 RX ADMIN — PREGABALIN 150 MG: 75 CAPSULE ORAL at 17:06

## 2025-05-01 RX ADMIN — PREGABALIN 150 MG: 75 CAPSULE ORAL at 11:02

## 2025-05-01 RX ADMIN — MORPHINE SULFATE 15 MG: 15 TABLET ORAL at 07:02

## 2025-05-01 RX ADMIN — POLYETHYLENE GLYCOL 3350 17 G: 17 POWDER, FOR SOLUTION ORAL at 08:01

## 2025-05-01 RX ADMIN — PRAZOSIN HYDROCHLORIDE 2 MG: 1 CAPSULE ORAL at 08:01

## 2025-05-01 RX ADMIN — HYDROMORPHONE HYDROCHLORIDE 0.3 MG: 1 INJECTION, SOLUTION INTRAMUSCULAR; INTRAVENOUS; SUBCUTANEOUS at 16:19

## 2025-05-01 ASSESSMENT — ACTIVITIES OF DAILY LIVING (ADL)
ADLS_ACUITY_SCORE: 28

## 2025-05-01 NOTE — PLAN OF CARE
Goal Outcome Evaluation:      Plan of Care Reviewed With: patient    Overall Patient Progress: no change    Outcome Evaluation: No acute changes durning this shift.       VS:        Pt A/O X 4. Afebrile. VSS.BP 96/64 (BP Location: Left arm, Patient Position: Semi-Galvez's, Cuff Size: Adult Regular)   Pulse 75   Temp 97.7  F (36.5  C) (Oral)   Resp 16   LMP 04/17/2025 (Exact Date)   SpO2 94%   Lungs- clear bilaterally with both anterior and posterior. Denies nausea, shortness of breath, and chest pain.      Output:        Bowels- active in all four quadrants. Voids spontaneously without difficulty in the bathroom.       Activity:        Pt up independently.      Skin:    Skin intact.      Pain:        Has pain in the L hip/leg and given morphine and dilaudid, heat applied, and is tolerating well.       CMS:        CMS and Neuro's are intact. Denies numbness and tingling in all extremities.      Dressing:        No incisional dressing.       Diet:        Pt is on a regular diet and appetite was good this shift.        LDA:        PIV is patent in the R hand and SL.       Equipment:        Pt belongings in the room. Bilateral heels are elevated off the bed.      Plan:        Pt is able to make needs known and the call light is within the pt's reach. Continue POC.       Additional Info:

## 2025-05-01 NOTE — PROGRESS NOTES
Brief Orthopedic Surgery Progress Note  We discussed the plan for this patient's care at sign out this morning with Orthopedic Surgery staff, Dr. Alberts. Low suspicion for fluid within the rim enhancing lesions noted on MRI of  femur. The patient remains AF VSS with marginal elevation in CRP to 7.4. Low suspicion for an acute infectious abscess or joint infection requiring intervention. We recommend CT guided biopsy with IR however the patient may follow up outpatient with Dr. Smith for biopsy if needed or unable to complete while inpatient.    Addendum  Patient was discussed with Dr. Smith and he does not believe that an open procedure for a biopsy would be indicated from an orthopedic standpoint.  Recommended consideration for a IR guided biopsy and he will reach out to IR regarding this possibility.      Best,     Confidence O Abilio Harmon MD  Orthopaedic Surgery Resident PGY-1   [phone]

## 2025-05-01 NOTE — PROGRESS NOTES
[facility]    Medicine Progress Note - [facility]    Date of Admission:  4/27/2025    Assessment & Plan   Caprice Kline is a 29 year old female with a complex PMHx of lumbar radiculopathy, fibromyalgia, migraines, and complex psychiatric history transferred from [facility] ED and admitted on 4/27/2025 for pain control, and further evaluation of myositis.      Major updates today:  - Consult General Surgery for muscle biopsy  - Plan to reach out to outpatient Neura for IP pain plan      # Bilateral thigh pain  # Bilateral anterior leg pain  # Elevated CK  # Fibromyalgia   # Chronic pain   # Lumbar radiculopathy  Worsening b/l hip and anterior leg pain in recent months with a known hx of chronic lumbar pain with sciatica. Compared to Jan 2025 MRI, 4/22/25 MRI revealed resolution of previously identified edema signal in adductors, R gluteus tj and R vastus lateralis and slightly more pronounced edema in distal R gluteus medius. 4/25 US venous competency without DVT and gross incompetency. OP Rheum work up notable for positive TEODORO 1:160 with negative DOM, negative myositis panel, neg RF/CCP, and normal complement/LFTs/CK/aldolase/inflammatory markers. OP neurology had considered need for muscle bx. Transferred on 4/27 from [facility] ED for worsening pain management further work-up for elev CK levels including muscle bx. CK levels ranging from 255 - 2788. Normal Cr, UOP, and Li level but concern for potential kidney injury if CK increase beyond 5000, especially in the setting of chronic Li use. 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 Right side suggestive of significant resolution of the edema in the adductor musculature and gluteus tj. Per discussion with multiple consulting specialists, this patient requires an inpatient muscle  biopsy to determine the etiology for her clinical sxs. General surgery has been consulted for further input. Patient appears mildly sedated while also A&O on exam today, likely multiple opioid management contributing to this. Will reach out to her outpatient pain provider to notify and gather input for pain plan.     Consults: rheumatology, neurology, nephrology, physical therapy, IR ortho, surgery; appreciate recs     Lab Workup  - Aldolase - high 21.5   - Peripheral smear  - Necrotizing myopathy panel  - Lyme disease + tick panel - negative     Management  - s/p LR 1L bolus; LR infusion stopped on 4/29  - Pain control  - PTA morphine IR tablet 15mg TID PRN (to be administered prior to dilaudid)  - PTA pregabalin 100mg at bedtime + 150mg TID   - PTA Tizanidine 4mg TID PRN        - Dilaudid PO 4mg BID PRN   - Dilaudid IV 0.3mg IV daily PRN severe pain  - Lidocaine cream   - Naloxone ordered  - HOLD Tylenol 975mg TID in the setting of elevated LFTs  - IV Dilaudid 0.5mg q6h PRN discontinued on 4/29     Monitoring  - strict I&O  - continuous pulse ox  - Daily CBC and CMP  - Daily CK    # Hx of Urinary retention  # Hx of Urinary hesitancy   # Pelvic pain   Evaluated on 11/5/2024 for acute urinary retention and was discharged home from ED with fisher catheter. Does not have catheter in place, nor has needed to self-cath thereafter. 4/28 UA positive for moderate LE, moderate bacteria, WBC, but negative nitrites. Urine Cx unremarkable. Patient denies new urinary symptoms, although does have a hx of urinary retention and hesitancy. Denies voiding concerns today.   - Intermittent catheterization  - Minimize anticholinergic meds, including opioids  - PTA Flomax 0.4mg at bedtime     # Transaminitis, improving  , ALT 95 noted on 4/28 AM labs. Now improved to AST 37, ALT 38. T bili and INR normal. Asymptomatic. Holding Tylenol for now. CTM     # Subclinical hypothyroidism  Admission labs notable for TSH 5.13 and free T4  1.04. Asymptomatic. Re-check in 3-6 months or sooner if symptoms develop. 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 Will continue home meds.               - PTA Adderall XR 30mg every day PRN              - PTA Clonazepam 0.5mg 1 tab PRN anxiety              - PTA Doxepin 10mg 1 tab at bedtime              - PTA Lamictal 100mg every morning & 50mg daily at bedtime               - PTA Lithium 600mg 1 capsule at bedtime               - PTA Prazosin 2mg qam and 5mg qpm               - PTA Quetiapine 300mg at bedtime     # Bilateral edema - chronic   Per patient report, chronic and managed with furosemide 20mg. Denies acute changes in swelling or pain. Of note, has been taking Lyrica for some time, which may also be contributing to LE edema. Will have to be cautious with Furosemide administration in the setting of rising CK and potential for renal injury.   - PTA Furosemide 20mg daily      # Non-intractable migraine   # Tremor   Sees outpatient neurology for these concerns. Reports baseline headache that has not acutely changed. No tremors noted on exam.   - PTA Ubrogepant 100mg 1 tab at onset of headache (brought from home)     Diet: Combination Diet Regular Diet Adult  DVT Prophylaxis: Low Risk/Ambulatory with no VTE prophylaxis indicated  Cronin Catheter: Intermittent catheterization PRN  Fluids: PO  Lines: None     Cardiac Monitoring: None  Code Status: Full Code           Diet: Combination Diet Regular Diet Adult    DVT Prophylaxis: Low Risk/Ambulatory with no VTE prophylaxis indicated  Cronin Catheter: Not present  Lines: None     Cardiac Monitoring: None  Code Status: Full Code      Clinically Significant Risk Factors                              # Obesity: Estimated body mass index is 35.74 kg/m  as calculated from the following:    Height as of an earlier encounter on  "4/27/25: 1.753 m (5' 9\").    Weight as of an earlier encounter on 4/27/25: 109.8 kg (242 lb)., PRESENT ON ADMISSION            Social Drivers of Health    Food Insecurity: Unknown (12/9/2024)    Food Insecurity     Within the past 12 months, did you worry that your food would run out before you got money to buy more?: Patient declined     Within the past 12 months, did the food you bought just not last and you didn t have money to get more?: Patient declined   Depression: At risk (4/22/2025)    PHQ-2     PHQ-2 Score: 6   Tobacco Use: Medium Risk (4/22/2025)    Patient History     Smoking Tobacco Use: Former     Smokeless Tobacco Use: Never     Passive Exposure: Current   Transportation Needs: Unknown (12/9/2024)    Transportation Needs     Within the past 12 months, has lack of transportation kept you from medical appointments, getting your medicines, non-medical meetings or appointments, work, or from getting things that you need?: Patient declined   Physical Activity: Inactive (12/9/2024)    Exercise Vital Sign     Days of Exercise per Week: 0 days     Minutes of Exercise per Session: 0 min   Stress: Stress Concern Present (12/9/2024)    [employer] [place] [employer] - Occupational Stress Questionnaire     Feeling of Stress : Very much   Social Connections: Unknown (12/9/2024)    Social Connection and Isolation Panel [NHANES]     Frequency of Social Gatherings with Friends and Family: Once a week          Disposition Plan   Medically Ready for Discharge: Anticipated in 2-4 Days         The patient's care was discussed with the Attending Physician, Dr. Dayron MD .    [facility]  [facility]  Securely message with [employer] (more info)  Text page via [facility] Paging/Directory   See signed in provider for up to date coverage information  ______________________________________________________________________    Interval " "History   No acute events overnight. Required IV dilaudid x 1 dose for breakthrough pain     Feels morphine is not effective for her pain, since it's worsened over the past few weeks and feels \"different\". Morphine was managed by outpatient [facility] pain management clinic, however would like to transfer to the [facility]. No new changes in sensation or strength. Managed to walk around the room when pain controlled. 2 BM yesterday.     Physical Exam   Vital Signs: Temp: 97.7  F (36.5  C) Temp src: Oral BP: 96/64 Pulse: 75   Resp: 16 SpO2: 94 % O2 Device: None (Room air)    Weight: 0 lbs 0 oz    Physical Exam  Vitals reviewed.   Constitutional:       Appearance: Normal appearance.   HENT:      Head: Normocephalic and atraumatic.   Eyes:      Extraocular Movements: Extraocular movements intact.      Conjunctiva/sclera: Conjunctivae normal.      Pupils: Pupils are equal, round, and reactive to light.   Pulmonary:      Effort: Pulmonary effort is normal. No respiratory distress.   Neurological:      General: No focal deficit present.      Mental Status: She is alert and oriented to person, place, and time.      Comments: A&O, however, appears mildly sedated w/o concern for impending respiratory depression.    Psychiatric:         Mood and Affect: Mood normal.         Behavior: Behavior normal.       Medical Decision Making   See A&P for MDM        Data     I have personally reviewed the following data over the past 24 hrs:    6.5  \   12.2   / 253     136 101 14.1 /  104 (H)   3.7 27 0.74 \     ALT: 38 AST: 37 AP: 60 TBILI: 0.2   ALB: 3.7 TOT PROTEIN: 5.6 (L) LIPASE: N/A       Imaging results reviewed over the past 24 hrs:   Recent Results (from the past 24 hours)   CT Pelvis Soft Tissue w Contrast    Narrative    EXAMINATION: CT PELVIS SOFT TISSUE W CONTRAST, 5/1/2025 9:35 AM    TECHNIQUE:  Helical CT images from the umbilicus through the symphysis  pubis were obtained with contrast.  Coronal reformatted images " were  generated at a workstation for further assessment.    COMPARISON: MRI 4/29/2025, 1/23/2025.    HISTORY: Myonecrosis and ring-enhancing lesions of hip musculature    FINDINGS:    Soft tissues: Redemonstration of ill-defined lesion centered about the  left gluteus minimus measuring up to 3.1 cm with faint peripheral  enhancement (series 2, image 80).    Bones: No acute or suspicious osseous lesions. Left acetabular bone  island.    Bladder: Unremarkable.     Pelvic organs: Unremarkable.    Gastrointestinal tract: No dilated loops of bowel or bowel wall  thickening. The appendix is unremarkable.    Lymph nodes: No suspicious lymphadenopathy.    Peritoneum/mesentery: No free fluid. No free air..    Vessels: Patent major abdominal arterial vasculature. Portal, splenic  and superior mesenteric veins are patent.  No significant  atherosclerotic disease.    Heart and lung bases: Clear.       Impression    IMPRESSION:   Stable ill-defined peripherally enhancing lesion centered about the  left gluteus minimus musculature when compared to 4/29/2025 MRI.     I have personally reviewed the examination and initial interpretation  and I agree with the findings.    CRYSTAL PINA MD         SYSTEM ID:  [id number]

## 2025-05-01 NOTE — PLAN OF CARE
Goal Outcome Evaluation:      Plan of Care Reviewed With: patient    Overall Patient Progress: no change    Outcome Evaluation: Pt. A/Ox4, able to make needs known, call light within reach, OOB to the bathroom independently. Pain management with dilaudid, morphine, and heat packs. Pt. expressed frustration with current pain med prescriptions because she is still having pain at levels of 7+.  VS:     BP 96/64 (BP Location: Left arm, Patient Position: Semi-Galvez's, Cuff Size: Adult Regular)   Pulse 75   Temp 97.7  F (36.5  C) (Oral)   Resp 16   LMP 04/17/2025 (Exact Date)   SpO2 94%    Pt A/O X 4. Afebrile. VSS. Lungs- clear bilaterally with equal expansion. Denies nausea, shortness of breath, and chest pain.      Output:       Bowels- active in all four quadrants. Voids spontaneously without   difficulty independently to the bathroom. Urine specimen collected and sent to lab pending upcoming CT.      Activity:       Pt up independently in room.      Skin:   Visible skin intact.     Pain:       Has pain in b/l legs, primarily L hip and knee and given PRN morphine and dilaudid, heat packs applied.      CMS:       CMS and Neuro's are intact. Denies numbness and tingling in all extremities. Tenderness in b/l LE.          Diet:       Pt is on a regular diet and appetite was adequate this shift.       LDA:       PIV is patent in the R forearm and is SL.      Equipment:       Personal belongings at bedside and in drawers.      Plan:       Pt is able to make needs known and the call light is within the pt's reach. Continue to monitor.       Additional Info:       Pt expressed frustration with Ortho MD claiming she did not have uncontrolled pain until he came in and started touching her L leg/hip.    OOB to shower this morning at 0630.     Threw up med amount of M&M's prior to lab draw this AM, given IV zofran.     Periodically rested throughout night.

## 2025-05-01 NOTE — TELEPHONE ENCOUNTER
MEDICATION APPEAL APPROVED    Medication: UBROGEPANT 100 MG PO TABS  Authorization Effective Date: 4/23/2025  Authorization Expiration Date: 4/23/2026  Approved Dose/Quantity:   Reference #: [id number]   [employer] Company:   Expected CoPay: $       CoPay Card Available:    Financial Assistance Needed:   Filling Pharmacy: [id number] - [place], [place] - [address] AT [facility]  Patient Notified: Yes  Comments:

## 2025-05-01 NOTE — CONSULTS
"    Interventional Radiology  [facility] Consult Service Note  05/01/25   8:47 AM    Consult Requested: Muscle biopsy.    Recommendations/Plan:    No IR intervention indicated.    This is a 29 year old female with complex history including bilateral thigh pain, fibromyalgia, chronic pain, and lumbar radiculopathy. Patient's team requesting CT guided IR muscle biopsy per Orthopedics recommendation.     Ortho note from 4/30/25 states \"Orthopedic surgery team discussed imaging with radiology and we will tentatively plan for CT-guided biopsy. However, will require obtaining a CT scan with and without contrast for review prior to planned biopsy.\" There is no chart documentation of IR involvement is this discussion. New CT has not been obtained.    Review from MR (4/29/25) notes \"Patchy soft tissue edema within the proximal  quadriceps/vastus lateralis , gluteus minimus and quadratus femoris  muscles There is associated rim enhancement particularly in the  gluteus minimus and quadratus femoris suggesting myonecrosis.\"    No lesion or focal collection that would be a target for image guided biopsy. No IR intervention indicated.    Case and imaging discussed with IR attending, Dr. Knapp. Recommendations were reviewed with requesting team (LINDA Haque DO).        Pertinent Imaging Reviewed: , 4/29/25    Expected date of discharge:  TBD    Vitals:   BP 96/64 (BP Location: Left arm, Patient Position: Semi-Galvez's, Cuff Size: Adult Regular)   Pulse 75   Temp 97.7  F (36.5  C) (Oral)   Resp 16   LMP 04/17/2025 (Exact Date)   SpO2 94%     Pertinent Labs:   Lab Results   Component Value Date    WBC 6.5 05/01/2025    WBC 4.9 04/30/2025    WBC 4.8 04/29/2025     Lab Results   Component Value Date    HGB 12.2 05/01/2025    HGB 12.4 04/30/2025    HGB 12.1 04/29/2025     Lab Results   Component Value Date     05/01/2025     04/30/2025     04/29/2025     Lab Results   Component Value Date    " INR 1.01 04/28/2025    PTT 31 04/28/2025     Lab Results   Component Value Date    POTASSIUM 3.7 05/01/2025        COVID-19 Antibody Results, Testing for Immunity           No data to display              COVID-19 PCR Results           No data to display                Jerson Patterson PA-C  Interventional Radiology  Pager: [phone]

## 2025-05-02 ENCOUNTER — APPOINTMENT (OUTPATIENT)
Dept: PHYSICAL THERAPY | Facility: CLINIC | Age: 30
End: 2025-05-02
Attending: STUDENT IN AN ORGANIZED HEALTH CARE EDUCATION/TRAINING PROGRAM
Payer: MEDICAID

## 2025-05-02 ENCOUNTER — APPOINTMENT (OUTPATIENT)
Dept: INTERVENTIONAL RADIOLOGY/VASCULAR | Facility: CLINIC | Age: 30
End: 2025-05-02
Attending: PHYSICIAN ASSISTANT
Payer: MEDICAID

## 2025-05-02 LAB
ALBUMIN SERPL BCG-MCNC: 3.7 G/DL (ref 3.5–5.2)
ALP SERPL-CCNC: 63 U/L (ref 40–150)
ALT SERPL W P-5'-P-CCNC: 34 U/L (ref 0–50)
ANION GAP SERPL CALCULATED.3IONS-SCNC: 10 MMOL/L (ref 7–15)
AST SERPL W P-5'-P-CCNC: 30 U/L (ref 0–45)
BASOPHILS # BLD AUTO: 0 10E3/UL (ref 0–0.2)
BASOPHILS NFR BLD AUTO: 1 %
BILIRUB SERPL-MCNC: 0.3 MG/DL
BUN SERPL-MCNC: 12 MG/DL (ref 6–20)
CALCIUM SERPL-MCNC: 9.3 MG/DL (ref 8.8–10.4)
CHLORIDE SERPL-SCNC: 102 MMOL/L (ref 98–107)
CREAT SERPL-MCNC: 0.76 MG/DL (ref 0.51–0.95)
EGFRCR SERPLBLD CKD-EPI 2021: >90 ML/MIN/1.73M2
EOSINOPHIL # BLD AUTO: 0.6 10E3/UL (ref 0–0.7)
EOSINOPHIL NFR BLD AUTO: 7 %
ERYTHROCYTE [DISTWIDTH] IN BLOOD BY AUTOMATED COUNT: 14.6 % (ref 10–15)
GLUCOSE SERPL-MCNC: 114 MG/DL (ref 70–99)
HCO3 SERPL-SCNC: 27 MMOL/L (ref 22–29)
HCT VFR BLD AUTO: 37.3 % (ref 35–47)
HGB BLD-MCNC: 12.2 G/DL (ref 11.7–15.7)
IMM GRANULOCYTES # BLD: 0 10E3/UL
IMM GRANULOCYTES NFR BLD: 1 %
LYMPHOCYTES # BLD AUTO: 1.8 10E3/UL (ref 0.8–5.3)
LYMPHOCYTES NFR BLD AUTO: 23 %
MCH RBC QN AUTO: 25.2 PG (ref 26.5–33)
MCHC RBC AUTO-ENTMCNC: 32.7 G/DL (ref 31.5–36.5)
MCV RBC AUTO: 77 FL (ref 78–100)
MONOCYTES # BLD AUTO: 0.5 10E3/UL (ref 0–1.3)
MONOCYTES NFR BLD AUTO: 6 %
NEUTROPHILS # BLD AUTO: 5 10E3/UL (ref 1.6–8.3)
NEUTROPHILS NFR BLD AUTO: 63 %
NRBC # BLD AUTO: 0 10E3/UL
NRBC BLD AUTO-RTO: 0 /100
PLATELET # BLD AUTO: 260 10E3/UL (ref 150–450)
POTASSIUM SERPL-SCNC: 3.7 MMOL/L (ref 3.4–5.3)
PROT SERPL-MCNC: 5.8 G/DL (ref 6.4–8.3)
RBC # BLD AUTO: 4.85 10E6/UL (ref 3.8–5.2)
SODIUM SERPL-SCNC: 139 MMOL/L (ref 135–145)
T GONDII IGG SER-ACNC: <3 IU/ML
T GONDII IGM SER-ACNC: <3 AU/ML
WBC # BLD AUTO: 8 10E3/UL (ref 4–11)

## 2025-05-02 PROCEDURE — 87070 CULTURE OTHR SPECIMN AEROBIC: CPT

## 2025-05-02 PROCEDURE — 20206 BIOPSY MUSCLE PERQ NEEDLE: CPT

## 2025-05-02 PROCEDURE — 87176 TISSUE HOMOGENIZATION CULTR: CPT

## 2025-05-02 PROCEDURE — 85025 COMPLETE CBC W/AUTO DIFF WBC: CPT

## 2025-05-02 PROCEDURE — 36415 COLL VENOUS BLD VENIPUNCTURE: CPT

## 2025-05-02 PROCEDURE — 76942 ECHO GUIDE FOR BIOPSY: CPT | Mod: 26 | Performed by: STUDENT IN AN ORGANIZED HEALTH CARE EDUCATION/TRAINING PROGRAM

## 2025-05-02 PROCEDURE — 99152 MOD SED SAME PHYS/QHP 5/>YRS: CPT | Mod: GC | Performed by: STUDENT IN AN ORGANIZED HEALTH CARE EDUCATION/TRAINING PROGRAM

## 2025-05-02 PROCEDURE — 97110 THERAPEUTIC EXERCISES: CPT | Mod: GP

## 2025-05-02 PROCEDURE — 99152 MOD SED SAME PHYS/QHP 5/>YRS: CPT

## 2025-05-02 PROCEDURE — 88305 TISSUE EXAM BY PATHOLOGIST: CPT | Mod: 26 | Performed by: PATHOLOGY

## 2025-05-02 PROCEDURE — 250N000011 HC RX IP 250 OP 636: Mod: JW

## 2025-05-02 PROCEDURE — 272N000505 HC NEEDLE CR5

## 2025-05-02 PROCEDURE — 84460 ALANINE AMINO (ALT) (SGPT): CPT

## 2025-05-02 PROCEDURE — 250N000013 HC RX MED GY IP 250 OP 250 PS 637: Performed by: FAMILY MEDICINE

## 2025-05-02 PROCEDURE — 88305 TISSUE EXAM BY PATHOLOGIST: CPT | Mod: TC

## 2025-05-02 PROCEDURE — 250N000011 HC RX IP 250 OP 636: Mod: JZ | Performed by: STUDENT IN AN ORGANIZED HEALTH CARE EDUCATION/TRAINING PROGRAM

## 2025-05-02 PROCEDURE — 250N000013 HC RX MED GY IP 250 OP 250 PS 637

## 2025-05-02 PROCEDURE — 0KBP3ZX EXCISION OF LEFT HIP MUSCLE, PERCUTANEOUS APPROACH, DIAGNOSTIC: ICD-10-PCS | Performed by: STUDENT IN AN ORGANIZED HEALTH CARE EDUCATION/TRAINING PROGRAM

## 2025-05-02 PROCEDURE — 250N000009 HC RX 250: Mod: JW | Performed by: STUDENT IN AN ORGANIZED HEALTH CARE EDUCATION/TRAINING PROGRAM

## 2025-05-02 PROCEDURE — 20206 BIOPSY MUSCLE PERQ NEEDLE: CPT | Mod: LT | Performed by: STUDENT IN AN ORGANIZED HEALTH CARE EDUCATION/TRAINING PROGRAM

## 2025-05-02 PROCEDURE — 120N000002 HC R&B MED SURG/OB UMMC

## 2025-05-02 PROCEDURE — 97530 THERAPEUTIC ACTIVITIES: CPT | Mod: GP

## 2025-05-02 PROCEDURE — 99232 SBSQ HOSP IP/OBS MODERATE 35: CPT | Mod: GC

## 2025-05-02 RX ORDER — NALOXONE HYDROCHLORIDE 0.4 MG/ML
0.4 INJECTION, SOLUTION INTRAMUSCULAR; INTRAVENOUS; SUBCUTANEOUS
Status: DISCONTINUED | OUTPATIENT
Start: 2025-05-02 | End: 2025-05-02

## 2025-05-02 RX ORDER — FLUMAZENIL 0.1 MG/ML
0.2 INJECTION, SOLUTION INTRAVENOUS
Status: DISCONTINUED | OUTPATIENT
Start: 2025-05-02 | End: 2025-05-02

## 2025-05-02 RX ORDER — NALOXONE HYDROCHLORIDE 0.4 MG/ML
0.2 INJECTION, SOLUTION INTRAMUSCULAR; INTRAVENOUS; SUBCUTANEOUS
Status: DISCONTINUED | OUTPATIENT
Start: 2025-05-02 | End: 2025-05-02

## 2025-05-02 RX ORDER — HYDROMORPHONE HYDROCHLORIDE 4 MG/1
4 TABLET ORAL EVERY 6 HOURS PRN
Status: DISCONTINUED | OUTPATIENT
Start: 2025-05-02 | End: 2025-05-06 | Stop reason: HOSPADM

## 2025-05-02 RX ORDER — FENTANYL CITRATE 50 UG/ML
25-50 INJECTION, SOLUTION INTRAMUSCULAR; INTRAVENOUS EVERY 5 MIN PRN
Status: DISCONTINUED | OUTPATIENT
Start: 2025-05-02 | End: 2025-05-02

## 2025-05-02 RX ORDER — MORPHINE SULFATE 15 MG/1
15 TABLET ORAL 3 TIMES DAILY PRN
Status: CANCELLED | OUTPATIENT
Start: 2025-05-02

## 2025-05-02 RX ADMIN — HYDROMORPHONE HYDROCHLORIDE 4 MG: 4 TABLET ORAL at 08:46

## 2025-05-02 RX ADMIN — PREGABALIN 150 MG: 75 CAPSULE ORAL at 08:46

## 2025-05-02 RX ADMIN — LAMOTRIGINE 50 MG: 25 TABLET ORAL at 15:31

## 2025-05-02 RX ADMIN — FUROSEMIDE 20 MG: 20 TABLET ORAL at 08:46

## 2025-05-02 RX ADMIN — DOXEPIN HYDROCHLORIDE 10 MG: 10 CAPSULE ORAL at 21:13

## 2025-05-02 RX ADMIN — HYDROMORPHONE HYDROCHLORIDE 0.3 MG: 1 INJECTION, SOLUTION INTRAMUSCULAR; INTRAVENOUS; SUBCUTANEOUS at 21:18

## 2025-05-02 RX ADMIN — HYDROXYZINE HYDROCHLORIDE 100 MG: 50 TABLET ORAL at 14:46

## 2025-05-02 RX ADMIN — PRAZOSIN HYDROCHLORIDE 5 MG: 5 CAPSULE ORAL at 21:12

## 2025-05-02 RX ADMIN — PROPRANOLOL HYDROCHLORIDE 80 MG: 80 CAPSULE, EXTENDED RELEASE ORAL at 08:47

## 2025-05-02 RX ADMIN — MIDAZOLAM 2 MG: 1 INJECTION INTRAMUSCULAR; INTRAVENOUS at 13:29

## 2025-05-02 RX ADMIN — HYDROXYZINE HYDROCHLORIDE 100 MG: 50 TABLET ORAL at 08:47

## 2025-05-02 RX ADMIN — HYDROMORPHONE HYDROCHLORIDE 4 MG: 4 TABLET ORAL at 18:01

## 2025-05-02 RX ADMIN — LITHIUM CARBONATE 600 MG: 600 CAPSULE ORAL at 21:12

## 2025-05-02 RX ADMIN — PRAZOSIN HYDROCHLORIDE 2 MG: 1 CAPSULE ORAL at 08:46

## 2025-05-02 RX ADMIN — LAMOTRIGINE 100 MG: 100 TABLET ORAL at 08:46

## 2025-05-02 RX ADMIN — PREGABALIN 150 MG: 75 CAPSULE ORAL at 17:39

## 2025-05-02 RX ADMIN — LIDOCAINE HYDROCHLORIDE 6 ML: 10 INJECTION, SOLUTION EPIDURAL; INFILTRATION; INTRACAUDAL; PERINEURAL at 14:00

## 2025-05-02 RX ADMIN — FENTANYL CITRATE 50 MCG: 50 INJECTION INTRAMUSCULAR; INTRAVENOUS at 13:41

## 2025-05-02 RX ADMIN — PREGABALIN 150 MG: 75 CAPSULE ORAL at 11:52

## 2025-05-02 RX ADMIN — HYDROXYZINE HYDROCHLORIDE 100 MG: 50 TABLET ORAL at 20:09

## 2025-05-02 RX ADMIN — FENTANYL CITRATE 50 MCG: 50 INJECTION INTRAMUSCULAR; INTRAVENOUS at 13:51

## 2025-05-02 RX ADMIN — MIDAZOLAM 1 MG: 1 INJECTION INTRAMUSCULAR; INTRAVENOUS at 13:42

## 2025-05-02 RX ADMIN — TAMSULOSIN HYDROCHLORIDE 0.4 MG: 0.4 CAPSULE ORAL at 20:10

## 2025-05-02 RX ADMIN — FENTANYL CITRATE 50 MCG: 50 INJECTION INTRAMUSCULAR; INTRAVENOUS at 13:57

## 2025-05-02 RX ADMIN — POLYETHYLENE GLYCOL 3350 17 G: 17 POWDER, FOR SOLUTION ORAL at 20:10

## 2025-05-02 RX ADMIN — QUETIAPINE FUMARATE 300 MG: 100 TABLET ORAL at 21:13

## 2025-05-02 RX ADMIN — MIDAZOLAM 1 MG: 1 INJECTION INTRAMUSCULAR; INTRAVENOUS at 13:52

## 2025-05-02 RX ADMIN — PREGABALIN 100 MG: 100 CAPSULE ORAL at 21:12

## 2025-05-02 ASSESSMENT — ACTIVITIES OF DAILY LIVING (ADL)
ADLS_ACUITY_SCORE: 28

## 2025-05-02 NOTE — IR NOTE
Patient Name: Caprice Kline  Medical Record Number: [id number]  Today's Date: 5/2/2025    Procedure: left pelvic/hip muscular biopsy with imaging guidance   Proceduralist: Dr. Dupree and Dr. Camara  Pathology present: no    Procedure Start: 1341  Procedure end: 1401  Sedation medications administered: Midazolam: 4 mg, Fentanyl: 150mcg    Report given to: UMU Ruiz    Other Notes: Pt arrived to IR room 2 from  08. Consent reviewed. Pt denies any questions or concerns regarding procedure. Pt positioned supine and monitored per protocol. Pt tolerated procedure without any noted complications. Dressing to left hip is clean, dry and intact. Pt transferred back to  0805.

## 2025-05-02 NOTE — PRE-PROCEDURE
GENERAL PRE-PROCEDURE:   Procedure:  Left pelvic muscular biopsy    Verbal consent obtained?: Yes    Written consent obtained?: Yes    Risks and benefits: Risks, benefits and alternatives were discussed    Consent given by:  Patient  Patient states understanding of procedure being performed: Yes    Patient's understanding of procedure matches consent: Yes    Procedure consent matches procedure scheduled: Yes    Expected level of sedation:  Moderate  Appropriately NPO:  Yes  ASA Class:  2  Mallampati  :  Grade 2- soft palate, base of uvula, tonsillar pillars, and portion of posterior pharyngeal wall visible  Lungs:  Lungs clear with good breath sounds bilaterally  Heart:  Normal heart sounds and rate  History & Physical reviewed:  History and physical reviewed and no updates needed  Statement of review:  I have reviewed the lab findings, diagnostic data, medications, and the plan for sedation

## 2025-05-02 NOTE — PLAN OF CARE
Patient admitted -    VS: Blood pressure 109/75, pulse 72, temperature 98.2  F (36.8  C), temperature source Oral, resp. rate 18, last menstrual period 04/17/2025, SpO2 93%, not currently breastfeeding.       O2: >90% on RA no complaints of SOB or chest pain.   Output: Voiding adequate amounts w/o pain or difficulty to bathroom.   Last BM: Continent of bowel and bladder    Activity: Independent in room     Skin: Appears intact    Pain: 8/10 manag with 4 mg oral dilaudid   CMS: A/O x4, able to make needs known   Dressing: None    Diet: Regular, tolerating well. No complaints of nausea or vomiting.   LDA: L PIV - SL.    Equipment: Personal belongings   Plan: Continue POC    Additional Info:

## 2025-05-02 NOTE — CONSULTS
"      [facility] [place]  Interventional Radiology  05/02/25   7:54 AM    Consult Requested: \"Muscle biopsy\"    Recommendations/Plan:    Since last consult from yesterday, patient had additional imaging with CT that demonstrated a lesion in the left gluteus minimus. Patient is approved for IR US guided lesion in gluteus minimus .      Labs WNL for procedure .    Orders entered for procedure, NPO status. Plan for biopsy with nurse sedate.  Medications to be held include: N/A  Informed consent will be completed prior to procedure.     Case and imaging discussed with IR attending Dr. Harsh Dupree MD   Recommendations were reviewed with Isabel Haque.    History of Present Illness:  Caprice Kline is a 29 year old English speaking female with past medical history of bilateral thigh pain, elevated CK, lumbar radiculopathy, depression, bipolar II disorder, and fibromyalgia who we are being asked to pursue a left gluteus minimus biopsy.     Diagnostic labs to be entered by: Isabel Haque.    Pertinent Imaging Reviewed:   Recent Results (from the past 24 hours)   CT Pelvis Soft Tissue w Contrast    Narrative    EXAMINATION: CT PELVIS SOFT TISSUE W CONTRAST, 5/1/2025 9:35 AM    TECHNIQUE:  Helical CT images from the umbilicus through the symphysis  pubis were obtained with contrast.  Coronal reformatted images were  generated at a workstation for further assessment.    COMPARISON: MRI 4/29/2025, 1/23/2025.    HISTORY: Myonecrosis and ring-enhancing lesions of hip musculature    FINDINGS:    Soft tissues: Redemonstration of ill-defined lesion centered about the  left gluteus minimus measuring up to 3.1 cm with faint peripheral  enhancement (series 2, image 80).    Bones: No acute or suspicious osseous lesions. Left acetabular bone  island.    Bladder: Unremarkable.     Pelvic organs: Unremarkable.    Gastrointestinal tract: No dilated loops of bowel or bowel wall  thickening. The " appendix is unremarkable.    Lymph nodes: No suspicious lymphadenopathy.    Peritoneum/mesentery: No free fluid. No free air..    Vessels: Patent major abdominal arterial vasculature. Portal, splenic  and superior mesenteric veins are patent.  No significant  atherosclerotic disease.    Heart and lung bases: Clear.       Impression    IMPRESSION:   Stable ill-defined peripherally enhancing lesion centered about the  left gluteus minimus musculature when compared to 4/29/2025 MRI.     I have personally reviewed the examination and initial interpretation  and I agree with the findings.    CRYSTAL PINA MD         SYSTEM ID:  [id number]       Expected date of discharge:  05/05/2025    Vitals:   /66 (BP Location: Left arm)   Pulse 86   Temp 98.5  F (36.9  C) (Oral)   Resp 16   LMP 04/17/2025 (Exact Date)   SpO2 (!) 90%     Pertinent Labs Reviewed:  CBC:  Lab Results   Component Value Date    WBC 8.0 05/02/2025    WBC 6.5 05/01/2025    WBC 4.9 04/30/2025     Lab Results   Component Value Date    HGB 12.2 05/02/2025    HGB 12.2 05/01/2025    HGB 12.4 04/30/2025     Lab Results   Component Value Date     05/02/2025     05/01/2025     04/30/2025    INR:  Lab Results   Component Value Date    INR 1.01 04/28/2025    PTT 31 04/28/2025          COVID Results:  COVID-19 Antibody Results, Testing for Immunity           No data to display              COVID-19 PCR Results           No data to display             Potassium   Date Value Ref Range Status   05/02/2025 3.7 3.4 - 5.3 mmol/L Final          Mary Cameron PA-C  Interventional Radiology  Pager: [phone]

## 2025-05-02 NOTE — PROCEDURES
[facility]    Procedure: IR Procedure Note    Date/Time: 5/2/2025 2:02 PM    Performed by: Victor Manuel Camara MD  Authorized by: Victor Manuel Camara MD  IR Fellow Physician: Victor Manuel Camara  Other(s) attending procedure: Harsh Leia    Pre Procedure Diagnosis: muscular lesion  Post Procedure Diagnosis: same    UNIVERSAL PROTOCOL   Site Marked: NA  Prior Images Obtained and Reviewed:  Yes  Required items: Required blood products, implants, devices and special equipment available    Patient identity confirmed:  Arm band, provided demographic data, hospital-assigned identification number and verbally with patient  Patient was reevaluated immediately before administering moderate or deep sedation or anesthesia  Confirmation Checklist:  Correct equipment/implants were available, procedure was appropriate and matched the consent or emergent situation, relevant allergies and patient's identity using two indicators  Time out: Immediately prior to the procedure a time out was called    Universal Protocol: the Joint Commission Universal Protocol was followed    Preparation: Patient was prepped and draped in usual sterile fashion       ANESTHESIA    Anesthesia:  Local infiltration  Local Anesthetic:  Lidocaine 1% without epinephrine      SEDATION  Patient Sedated: Yes    Sedation Type:  Moderate (conscious) sedation  Sedation:  Fentanyl and midazolam  Vital signs: Vital signs monitored during sedation    See dictated procedure note for full details.  Findings: Successful left gluteus minimus lesion biopsy 6 cores.    Specimens: core needle biopsy specimens sent for pathological analysis    Procedural Complications: None    Condition: Stable    Plan: 1 hour bedrest      PROCEDURE  Describe Procedure: Successful left gluteus minimus lesion biopsy 6 cores.  Patient Tolerance:  Patient tolerated the procedure well with no immediate complications  Length of time physician/provider present  for 1:1 monitoring during sedation:  0-22 min

## 2025-05-02 NOTE — PROGRESS NOTES
[facility]    Medicine Progress Note - [facility]    Date of Admission:  4/27/2025    Assessment & Plan   Caprice Kline is a 29 year old female with a complex PMHx of lumbar radiculopathy, fibromyalgia, migraines, and complex psychiatric history transferred from [facility] ED and admitted on 4/27/2025 for pain control, and further evaluation of myositis.      Major updates today:  - IR to do muscle biopsy   - HELD PTA morphine 15mg TID  - Dilaudid 4mg Q6H PRN     # Bilateral thigh pain  # Bilateral anterior leg pain  # Elevated CK  # Fibromyalgia   # Chronic pain   # Lumbar radiculopathy  Worsening b/l hip and anterior leg pain in recent months with a known hx of chronic lumbar pain with sciatica. Compared to Jan 2025 MRI, 4/22/25 MRI revealed resolution of previously identified edema signal in adductors, R gluteus tj and R vastus lateralis and slightly more pronounced edema in distal R gluteus medius. 4/25 US venous competency without DVT and gross incompetency. OP Rheum work up notable for positive TEODORO 1:160 with negative DOM, negative myositis panel, neg RF/CCP, and normal complement/LFTs/CK/aldolase/inflammatory markers. OP neurology had considered need for muscle bx. Transferred on 4/27 from [facility] ED for worsening pain management further work-up for elev CK levels including muscle bx. CK levels ranging from 255 - 2788. Normal Cr, UOP, and Li level but concern for potential kidney injury if CK increase beyond 5000, especially in the setting of chronic Li use. 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 Right side suggestive of significant resolution of the edema in the adductor musculature and gluteus tj. Per multiple consulting specialists, requires muscle biopsy while inpatient given no clear diagnosis.     Today, IR  is scheduled to perform the muscle biopsy, appreciate intervention. Per discussion with OP Neura pain provider, okay to change pain plan with dilaudid while inpatient, however, requested she be transitioned back to the PTA morphine 15mg TID PRN prior to discharge. See below for details.     Consults: rheumatology, neurology, nephrology, physical therapy, IR ortho, surgery; appreciate recs     Lab Workup  - Aldolase - high 21.5   - Peripheral smear  - Necrotizing myopathy panel  - Lyme disease + tick panel - negative     Management  - s/p LR 1L bolus; LR infusion stopped on 4/29  - Pain control  - HOLD PTA morphine IR tablet 15mg TID PRN (to be administered prior to dilaudid)  - PTA pregabalin 100mg at bedtime + 150mg TID   - PTA Tizanidine 4mg TID PRN        - START Dilaudid PO 4mg Q6H PRN   - Dilaudid IV 0.3mg IV daily PRN severe pain  - Lidocaine cream   - Naloxone ordered  - HOLD Tylenol 975mg TID in the setting of elevated LFTs  - IV Dilaudid 0.5mg q6h PRN discontinued on 4/29     Monitoring  - strict I&O  - continuous pulse ox  - Daily CBC and CMP  - Daily CK    # Hypoxia? 2/2 atelectasis   SatO2 readings have been ranging from 92-94%. Patient denies new cardiopulmonary symptoms. Lungs CTAB, although breath sounds mildly diminished. Likely mild atelectasis 2/2 immobilization vs deconditioning. Less likely PE vs PNA vs HF. Encouraged incentive spirometer. CTM.     # Hx of Urinary retention  # Hx of Urinary hesitancy   # Pelvic pain   Evaluated on 11/5/2024 for acute urinary retention and was discharged home from ED with fisher catheter. Does not have catheter in place, nor has needed to self-cath thereafter. 4/28 UA positive for moderate LE, moderate bacteria, WBC, but negative nitrites. Urine Cx unremarkable. No new urinary and voiding concerns.   - Intermittent catheterization  - Minimize anticholinergic meds, including opioids  - PTA Flomax 0.4mg at bedtime     # Transaminitis, improving  , ALT 95 noted  on 4/28 AM labs. Now improved to AST 37, ALT 38. T bili and INR normal. Asymptomatic. Holding Tylenol for now. CTM     # Subclinical hypothyroidism  Admission labs notable for TSH 5.13 and free T4 1.04. Asymptomatic. Re-check in 3-6 months or sooner if symptoms develop. 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 Will continue home meds.               - PTA Adderall XR 30mg every day PRN              - PTA Clonazepam 0.5mg 1 tab PRN anxiety              - PTA Doxepin 10mg 1 tab at bedtime              - PTA Lamictal 100mg every morning & 50mg daily at bedtime               - PTA Lithium 600mg 1 capsule at bedtime               - PTA Prazosin 2mg qam and 5mg qpm               - PTA Quetiapine 300mg at bedtime     # Bilateral edema - chronic   Per patient report, chronic and managed with furosemide 20mg. Denies acute changes in swelling or pain. Of note, has been taking Lyrica for some time, which may also be contributing to LE edema. Will have to be cautious with Furosemide administration in the setting of rising CK and potential for renal injury.   - PTA Furosemide 20mg daily      # Non-intractable migraine   # Tremor   Sees outpatient neurology for these concerns. Reports baseline headache that has not acutely changed. No tremors noted on exam.   - PTA Ubrogepant 100mg 1 tab at onset of headache (brought from home)     Diet: Combination Diet Regular Diet Adult  DVT Prophylaxis: Low Risk/Ambulatory with no VTE prophylaxis indicated  Cronin Catheter: Intermittent catheterization PRN  Fluids: PO  Lines: None     Cardiac Monitoring: None  Code Status: Full Code          Diet: NPO for Procedure/Surgery per Anesthesia Guidelines Except for: Meds; Clear liquids before procedure/surgery: ADULT (Age GREATER than or Equal to 18 years) - Clear liquids 2 hours before procedure/surgery   "  DVT Prophylaxis: Low Risk/Ambulatory with no VTE prophylaxis indicated  Cronin Catheter: Not present  Lines: None     Cardiac Monitoring: None  Code Status: Full Code      Clinically Significant Risk Factors                              # Obesity: Estimated body mass index is 35.74 kg/m  as calculated from the following:    Height as of an earlier encounter on 4/27/25: 1.753 m (5' 9\").    Weight as of an earlier encounter on 4/27/25: 109.8 kg (242 lb).             Social Drivers of Health    Food Insecurity: Unknown (12/9/2024)    Food Insecurity     Within the past 12 months, did you worry that your food would run out before you got money to buy more?: Patient declined     Within the past 12 months, did the food you bought just not last and you didn t have money to get more?: Patient declined   Depression: At risk (4/22/2025)    PHQ-2     PHQ-2 Score: 6   Tobacco Use: Medium Risk (4/22/2025)    Patient History     Smoking Tobacco Use: Former     Smokeless Tobacco Use: Never     Passive Exposure: Current   Transportation Needs: Unknown (12/9/2024)    Transportation Needs     Within the past 12 months, has lack of transportation kept you from medical appointments, getting your medicines, non-medical meetings or appointments, work, or from getting things that you need?: Patient declined   Physical Activity: Inactive (12/9/2024)    Exercise Vital Sign     Days of Exercise per Week: 0 days     Minutes of Exercise per Session: 0 min   Stress: Stress Concern Present (12/9/2024)    [employer] [place] [employer] - Occupational Stress Questionnaire     Feeling of Stress : Very much   Social Connections: Unknown (12/9/2024)    Social Connection and Isolation Panel [NHANES]     Frequency of Social Gatherings with Friends and Family: Once a week          Disposition Plan   Medically Ready for Discharge: Anticipated in 2-4 Days         The patient's care was discussed with the Attending Physician, Dr. Dayron MD " ".    [facility]  [facility]  Securely message with [employer] (more info)  Text page via [employer] Paging/Directory   See signed in provider for up to date coverage information  ______________________________________________________________________    Interval History   No acute events overnight.     Pain uncontrolled, slightly worsening. Required dilaudid overnight. No neurological changes in LE. Pain has been worsening for the past 1 week, prior to admission.      Denies difficulty breathing, cough, sore throat, nasal congestion, fevers, chills, new LE edema/swelling. Notes chronic wheezing, but \"asthma tests were negative\". No acute changes.     Physical Exam   Vital Signs: Temp: 98.5  F (36.9  C) Temp src: Oral BP: 104/66 Pulse: 86   Resp: 16 SpO2: (!) 90 % O2 Device: None (Room air)    Weight: 0 lbs 0 oz    Physical Exam  Vitals reviewed.   Constitutional:       Appearance: Normal appearance.   HENT:      Head: Normocephalic and atraumatic.   Eyes:      Extraocular Movements: Extraocular movements intact.      Conjunctiva/sclera: Conjunctivae normal.      Pupils: Pupils are equal, round, and reactive to light.   Cardiovascular:      Rate and Rhythm: Normal rate and regular rhythm.      Heart sounds: No murmur heard.     No gallop.   Pulmonary:      Effort: Pulmonary effort is normal. No respiratory distress.      Breath sounds: No wheezing.      Comments: Mildly diminished breath sounds, though clear bilaterally  Musculoskeletal:      Right lower leg: Edema present.      Left lower leg: Edema present.      Comments: Trace bilateral edema, unchanged from prior.    Neurological:      General: No focal deficit present.      Mental Status: She is alert and oriented to person, place, and time.   Psychiatric:         Mood and Affect: Mood normal.         Behavior: Behavior normal.       Medical Decision Making   See A&P for MDM    "     Data     I have personally reviewed the following data over the past 24 hrs:    8.0  \   12.2   / 260     139 102 12.0 /  114 (H)   3.7 27 0.76 \     ALT: 34 AST: 30 AP: 63 TBILI: 0.3   ALB: 3.7 TOT PROTEIN: 5.8 (L) LIPASE: N/A       Imaging results reviewed over the past 24 hrs:   Recent Results (from the past 24 hours)   CT Pelvis Soft Tissue w Contrast    Narrative    EXAMINATION: CT PELVIS SOFT TISSUE W CONTRAST, 5/1/2025 9:35 AM    TECHNIQUE:  Helical CT images from the umbilicus through the symphysis  pubis were obtained with contrast.  Coronal reformatted images were  generated at a workstation for further assessment.    COMPARISON: MRI 4/29/2025, 1/23/2025.    HISTORY: Myonecrosis and ring-enhancing lesions of hip musculature    FINDINGS:    Soft tissues: Redemonstration of ill-defined lesion centered about the  left gluteus minimus measuring up to 3.1 cm with faint peripheral  enhancement (series 2, image 80).    Bones: No acute or suspicious osseous lesions. Left acetabular bone  island.    Bladder: Unremarkable.     Pelvic organs: Unremarkable.    Gastrointestinal tract: No dilated loops of bowel or bowel wall  thickening. The appendix is unremarkable.    Lymph nodes: No suspicious lymphadenopathy.    Peritoneum/mesentery: No free fluid. No free air..    Vessels: Patent major abdominal arterial vasculature. Portal, splenic  and superior mesenteric veins are patent.  No significant  atherosclerotic disease.    Heart and lung bases: Clear.       Impression    IMPRESSION:   Stable ill-defined peripherally enhancing lesion centered about the  left gluteus minimus musculature when compared to 4/29/2025 MRI.     I have personally reviewed the examination and initial interpretation  and I agree with the findings.    CRYSTAL PINA MD         SYSTEM ID:  [id number]

## 2025-05-02 NOTE — PLAN OF CARE
Goal Outcome Evaluation:       Patient is A & O x 4; coherent of speech, and able to make her needs known to staff; IND in the room, and BRP; was NPO at AM, and was GAYLA to IR for a successful biopsy of left gluteus minimus lesion; receives scheduled medications, and PRN Dilaudid at 0846; pleasant and calm throughout this shift; denied SOB, N/V, and discomfort; will continue with POC; call light is within reach. /77 (BP Location: Left arm)   Pulse 77   Temp 97.9  F (36.6  C) (Oral)   Resp 16   LMP 04/17/2025 (Exact Date)   SpO2 95%     Joseph Murcia RN

## 2025-05-03 LAB
ACE SERPL-CCNC: 46 U/L
ALBUMIN SERPL BCG-MCNC: 3.6 G/DL (ref 3.5–5.2)
ALP SERPL-CCNC: 65 U/L (ref 40–150)
ALT SERPL W P-5'-P-CCNC: 29 U/L (ref 0–50)
ANION GAP SERPL CALCULATED.3IONS-SCNC: 10 MMOL/L (ref 7–15)
AST SERPL W P-5'-P-CCNC: 27 U/L (ref 0–45)
BASOPHILS # BLD AUTO: 0 10E3/UL (ref 0–0.2)
BASOPHILS NFR BLD AUTO: 1 %
BILIRUB SERPL-MCNC: 0.2 MG/DL
BUN SERPL-MCNC: 12.6 MG/DL (ref 6–20)
CALCIUM SERPL-MCNC: 9.3 MG/DL (ref 8.8–10.4)
CHLORIDE SERPL-SCNC: 100 MMOL/L (ref 98–107)
CREAT SERPL-MCNC: 0.74 MG/DL (ref 0.51–0.95)
EGFRCR SERPLBLD CKD-EPI 2021: >90 ML/MIN/1.73M2
EOSINOPHIL # BLD AUTO: 0.5 10E3/UL (ref 0–0.7)
EOSINOPHIL NFR BLD AUTO: 7 %
ERYTHROCYTE [DISTWIDTH] IN BLOOD BY AUTOMATED COUNT: 14.6 % (ref 10–15)
GLUCOSE SERPL-MCNC: 108 MG/DL (ref 70–99)
HCO3 SERPL-SCNC: 28 MMOL/L (ref 22–29)
HCT VFR BLD AUTO: 36.5 % (ref 35–47)
HGB BLD-MCNC: 12.1 G/DL (ref 11.7–15.7)
IMM GRANULOCYTES # BLD: 0 10E3/UL
IMM GRANULOCYTES NFR BLD: 0 %
LYMPHOCYTES # BLD AUTO: 1.7 10E3/UL (ref 0.8–5.3)
LYMPHOCYTES NFR BLD AUTO: 24 %
MCH RBC QN AUTO: 25.3 PG (ref 26.5–33)
MCHC RBC AUTO-ENTMCNC: 33.2 G/DL (ref 31.5–36.5)
MCV RBC AUTO: 76 FL (ref 78–100)
MONOCYTES # BLD AUTO: 0.5 10E3/UL (ref 0–1.3)
MONOCYTES NFR BLD AUTO: 7 %
NEUTROPHILS # BLD AUTO: 4.5 10E3/UL (ref 1.6–8.3)
NEUTROPHILS NFR BLD AUTO: 62 %
NRBC # BLD AUTO: 0 10E3/UL
NRBC BLD AUTO-RTO: 0 /100
PLATELET # BLD AUTO: 259 10E3/UL (ref 150–450)
POTASSIUM SERPL-SCNC: 3.6 MMOL/L (ref 3.4–5.3)
PROT SERPL-MCNC: 5.6 G/DL (ref 6.4–8.3)
RBC # BLD AUTO: 4.78 10E6/UL (ref 3.8–5.2)
SODIUM SERPL-SCNC: 138 MMOL/L (ref 135–145)
WBC # BLD AUTO: 7.2 10E3/UL (ref 4–11)

## 2025-05-03 PROCEDURE — 120N000002 HC R&B MED SURG/OB UMMC

## 2025-05-03 PROCEDURE — 250N000013 HC RX MED GY IP 250 OP 250 PS 637

## 2025-05-03 PROCEDURE — 250N000011 HC RX IP 250 OP 636

## 2025-05-03 PROCEDURE — 250N000011 HC RX IP 250 OP 636: Mod: JW

## 2025-05-03 PROCEDURE — 80053 COMPREHEN METABOLIC PANEL: CPT

## 2025-05-03 PROCEDURE — 36415 COLL VENOUS BLD VENIPUNCTURE: CPT

## 2025-05-03 PROCEDURE — 250N000013 HC RX MED GY IP 250 OP 250 PS 637: Performed by: FAMILY MEDICINE

## 2025-05-03 PROCEDURE — 85025 COMPLETE CBC W/AUTO DIFF WBC: CPT

## 2025-05-03 PROCEDURE — 99232 SBSQ HOSP IP/OBS MODERATE 35: CPT | Mod: GC

## 2025-05-03 RX ADMIN — QUETIAPINE FUMARATE 300 MG: 100 TABLET ORAL at 22:11

## 2025-05-03 RX ADMIN — LITHIUM CARBONATE 600 MG: 600 CAPSULE ORAL at 22:11

## 2025-05-03 RX ADMIN — HYDROXYZINE HYDROCHLORIDE 100 MG: 50 TABLET ORAL at 08:40

## 2025-05-03 RX ADMIN — HYDROXYZINE HYDROCHLORIDE 100 MG: 50 TABLET ORAL at 13:36

## 2025-05-03 RX ADMIN — PRAZOSIN HYDROCHLORIDE 2 MG: 1 CAPSULE ORAL at 08:40

## 2025-05-03 RX ADMIN — PREGABALIN 150 MG: 75 CAPSULE ORAL at 08:40

## 2025-05-03 RX ADMIN — ONDANSETRON 4 MG: 2 INJECTION INTRAMUSCULAR; INTRAVENOUS at 15:12

## 2025-05-03 RX ADMIN — FUROSEMIDE 20 MG: 20 TABLET ORAL at 08:40

## 2025-05-03 RX ADMIN — PREGABALIN 150 MG: 75 CAPSULE ORAL at 12:04

## 2025-05-03 RX ADMIN — DOXEPIN HYDROCHLORIDE 10 MG: 10 CAPSULE ORAL at 22:11

## 2025-05-03 RX ADMIN — HYDROMORPHONE HYDROCHLORIDE 0.3 MG: 1 INJECTION, SOLUTION INTRAMUSCULAR; INTRAVENOUS; SUBCUTANEOUS at 20:12

## 2025-05-03 RX ADMIN — PREGABALIN 100 MG: 100 CAPSULE ORAL at 22:11

## 2025-05-03 RX ADMIN — HYDROMORPHONE HYDROCHLORIDE 4 MG: 4 TABLET ORAL at 15:12

## 2025-05-03 RX ADMIN — LAMOTRIGINE 50 MG: 25 TABLET ORAL at 15:13

## 2025-05-03 RX ADMIN — LAMOTRIGINE 100 MG: 100 TABLET ORAL at 08:39

## 2025-05-03 RX ADMIN — TAMSULOSIN HYDROCHLORIDE 0.4 MG: 0.4 CAPSULE ORAL at 20:14

## 2025-05-03 RX ADMIN — PREGABALIN 150 MG: 75 CAPSULE ORAL at 18:32

## 2025-05-03 RX ADMIN — ONDANSETRON 4 MG: 4 TABLET, ORALLY DISINTEGRATING ORAL at 13:36

## 2025-05-03 RX ADMIN — HYDROMORPHONE HYDROCHLORIDE 4 MG: 4 TABLET ORAL at 00:05

## 2025-05-03 RX ADMIN — HYDROMORPHONE HYDROCHLORIDE 4 MG: 4 TABLET ORAL at 22:11

## 2025-05-03 RX ADMIN — HYDROXYZINE HYDROCHLORIDE 100 MG: 50 TABLET ORAL at 20:15

## 2025-05-03 RX ADMIN — HYDROMORPHONE HYDROCHLORIDE 4 MG: 4 TABLET ORAL at 08:39

## 2025-05-03 RX ADMIN — POLYETHYLENE GLYCOL 3350 17 G: 17 POWDER, FOR SOLUTION ORAL at 08:41

## 2025-05-03 RX ADMIN — PRAZOSIN HYDROCHLORIDE 5 MG: 5 CAPSULE ORAL at 22:12

## 2025-05-03 RX ADMIN — PROPRANOLOL HYDROCHLORIDE 80 MG: 80 CAPSULE, EXTENDED RELEASE ORAL at 08:39

## 2025-05-03 ASSESSMENT — ACTIVITIES OF DAILY LIVING (ADL)
ADLS_ACUITY_SCORE: 28

## 2025-05-03 NOTE — PROGRESS NOTES
VS: See flowsheet     O2: >90% on RA no complaints of SOB or chest pain.   Output: Voiding adequate amounts w/o pain or difficulty.   Last BM: 5/2/25   Activity: Standby assist     Skin: LE biopsy   Pain: Managed with medication   CMS: A/O x 4   Dressing: LE biopsy dressing   Diet: Regular, tolerating well.    LDA: R PIV   Equipment: Personal belongings   Plan: Continue POC   Additional Info:

## 2025-05-03 NOTE — PLAN OF CARE
[id number]    Goal Outcome Evaluation:      Plan of Care Reviewed With: patient    Overall Patient Progress: no change    Patient alert and oriented, stable on room air. Up independently in room,  at bedside assisting patient. Left lower extremity biopsy dressing D/I, scant drainage. Pain managed with prn dilaudid. Able to make needs known. Call light within reach. Continue with current plan of care.

## 2025-05-03 NOTE — PROGRESS NOTES
VS: Blood pressure 119/77, pulse 76, temperature 98.2  F (36.8  C), temperature source Oral, resp. rate 16,SpO2 96%   O2: >90% on RA . No complaints of SOB, cough or chest pain.   Output: Voiding adequate amounts without difficulty.   Last BM: Continent of bowel and bladder    Activity: Independent in room     Skin: Appears intact    Pain: Left leg pain manage with PRN Dilaudid.   CMS: A/O x4, able to make needs known   Dressing: None    Diet: Regular, tolerating well. No complaints of nausea or vomiting.   LDA: Right PIV - SL.    Equipment: Personal belongings   Plan: Continue POC    Additional Info:

## 2025-05-03 NOTE — PROGRESS NOTES
[facility]    Medicine Progress Note - [facility]    Date of Admission:  4/27/2025    Assessment & Plan   Caprice Kline is a 29 year old female with a complex PMHx of lumbar radiculopathy, fibromyalgia, migraines, and complex psychiatric history transferred from [facility] ED and admitted on 4/27/2025 for pain control, and further evaluation of myositis.      Major updates today:  - No major changes      # Bilateral thigh pain  # Bilateral anterior leg pain  # Elevated CK  # Fibromyalgia   # Chronic pain   # Lumbar radiculopathy  Worsening b/l hip and anterior leg pain in recent months with a known hx of chronic lumbar pain with sciatica. Compared to Jan 2025 MRI, 4/22/25 MRI revealed resolution of previously identified edema signal in adductors, R gluteus tj and R vastus lateralis and slightly more pronounced edema in distal R gluteus medius. 4/25 US venous competency without DVT and gross incompetency. OP Rheum work up notable for positive TEODORO 1:160 with negative DOM, negative myositis panel, neg RF/CCP, and normal complement/LFTs/CK/aldolase/inflammatory markers. OP neurology had considered need for muscle bx. Transferred on 4/27 from [facility] ED for worsening pain management further work-up for elev CK levels including muscle bx. CK levels ranging from 255 - 2788. 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 R side suggestive of significant resolution of the edema in the adductor musculature and gluteus tj.     At the request of multiple consulting specialists, muscle biopsy was completed on 5/2/2025. Histopathology results are still pending. As for pain management, it is unclear whether her pain is changing acutely given chronic gradually worsening pain of the area. She has demonstrated mild sedation on past encounters,  although is A&Ox4 with no acute neurological changes. Will continue with current pain plan and will hold off on making any dose increases at this time, however, if remains uncontrolled, may need to consider involving Pain Management. Of note, her OP Neura provider recommended transitioning her back to her PTA morphine regimen prior to discharge.     Consults: rheumatology, neurology, nephrology, physical therapy, IR ortho, surgery; appreciate recs     Lab Workup  - Aldolase - high 21.5   - Peripheral smear  - Necrotizing myopathy panel  - Lyme disease + tick panel - negative     Management  - s/p LR 1L bolus; LR infusion stopped on 4/29  - Pain control  - HOLD PTA morphine IR tablet 15mg TID PRN (to be administered prior to dilaudid)  - PTA pregabalin 100mg at bedtime + 150mg TID   - PTA Tizanidine 4mg TID PRN        - Dilaudid PO 4mg Q6H PRN   - Dilaudid IV 0.3mg IV daily PRN severe pain  - Lidocaine cream   - Naloxone ordered  - HOLD Tylenol 975mg TID in the setting of elevated LFTs  - IV Dilaudid 0.5mg q6h PRN discontinued on 4/29     Monitoring  - strict I&O  - continuous pulse ox  - Daily CBC and CMP  - Daily CK    # Hypoxia 2/2 atelectasis - improved  SatO2 readings have been ranging from 92-94% during which she denied acute cardiopulmonary symptoms. Lung exams have been unremarkable. Initially suspected atelectasis 2/2 immobilization and deconditioning. SatO2 now consistently 95+ on RA with regular incentive spirometer use. CTM.     # Hx of Urinary retention  # Hx of Urinary hesitancy   # Pelvic pain - chronic   Evaluated on 11/5/2024 for acute urinary retention and was discharged home from ED with fisher catheter. Does not have catheter in place, nor has needed to self-cath thereafter. No new urinary and voiding concerns.   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 Will continue home meds.               - PTA Adderall XR 30mg every day PRN              - PTA Clonazepam 0.5mg 1 tab PRN anxiety              - PTA Doxepin 10mg 1 tab at bedtime              - PTA Lamictal 100mg every morning & 50mg daily at bedtime               - PTA Lithium 600mg 1 capsule at bedtime               - PTA Prazosin 2mg qam and 5mg qpm               - PTA Quetiapine 300mg at bedtime     # Subclinical hypothyroidism  Admission labs notable for TSH 5.13 and free T4 1.04. Asymptomatic. Re-check in 3-6 months or sooner if symptoms develop. CTM    # Transaminitis - resolved  , ALT 95 noted on 4/28 AM labs. Now improved to AST 27 and 29. T bili and INR normal. Asymptomatic. Holding Tylenol for now. CTM    # Bilateral edema - chronic   Per patient report, chronic and managed with furosemide 20mg. Denies acute changes in swelling or pain. Of note, has been taking Lyrica for some time, which may also be contributing to LE edema. Will have to be cautious with Furosemide administration in the setting of rising CK and potential for renal injury.   - PTA Furosemide 20mg daily      # Non-intractable migraine - chronic   # Tremor   Sees outpatient neurology for these concerns. Reports baseline headache that has not acutely changed. No tremors noted on exam.   - PTA Ubrogepant 100mg 1 tab at onset of headache (brought from home)     Diet: Combination Diet Regular Diet Adult  DVT Prophylaxis: Low Risk/Ambulatory with no VTE prophylaxis indicated  Cronin Catheter: Intermittent catheterization PRN  Fluids: PO  Lines: None     Cardiac Monitoring: None  Code Status: Full Code        Diet: Regular Diet Adult    DVT Prophylaxis: Low Risk/Ambulatory with no VTE prophylaxis indicated  Cronin Catheter: Not present  Lines: None     Cardiac Monitoring: None  Code Status: Full Code      Clinically  "Significant Risk Factors                              # Obesity: Estimated body mass index is 35.74 kg/m  as calculated from the following:    Height as of an earlier encounter on 4/27/25: 1.753 m (5' 9\").    Weight as of an earlier encounter on 4/27/25: 109.8 kg (242 lb).             Social Drivers of Health    Food Insecurity: Unknown (12/9/2024)    Food Insecurity     Within the past 12 months, did you worry that your food would run out before you got money to buy more?: Patient declined     Within the past 12 months, did the food you bought just not last and you didn t have money to get more?: Patient declined   Depression: At risk (4/22/2025)    PHQ-2     PHQ-2 Score: 6   Tobacco Use: Medium Risk (4/22/2025)    Patient History     Smoking Tobacco Use: Former     Smokeless Tobacco Use: Never     Passive Exposure: Current   Transportation Needs: Unknown (12/9/2024)    Transportation Needs     Within the past 12 months, has lack of transportation kept you from medical appointments, getting your medicines, non-medical meetings or appointments, work, or from getting things that you need?: Patient declined   Physical Activity: Inactive (12/9/2024)    Exercise Vital Sign     Days of Exercise per Week: 0 days     Minutes of Exercise per Session: 0 min   Stress: Stress Concern Present (12/9/2024)    [employer] [place] [employer] - Occupational Stress Questionnaire     Feeling of Stress : Very much   Social Connections: Unknown (12/9/2024)    Social Connection and Isolation Panel [NHANES]     Frequency of Social Gatherings with Friends and Family: Once a week          Disposition Plan   Medically Ready for Discharge: Anticipated in 2-4 Days         The patient's care was discussed with the Attending Physician, Dr. Donato DO .    [facility]  [facility]  Securely message with [employer] (more info)  Text page via [employer] " "Paging/Directory   See signed in provider for up to date coverage information  ______________________________________________________________________    Interval History   No acute events overnight.     Continues to have pain. Feels the biopsy with IR went better than expected. \"Everyone involved with the procedure was fantastic and it wasn't as painful as I had anticipated\".     Physical Exam   Vital Signs: Temp: 98.2  F (36.8  C) Temp src: Oral BP: 119/77 Pulse: 76   Resp: 16 SpO2: 96 % O2 Device: None (Room air) Oxygen Delivery: 2 LPM  Weight: 0 lbs 0 oz    Physical Exam  Vitals reviewed.   Constitutional:       General: She is not in acute distress.     Appearance: Normal appearance.   HENT:      Head: Normocephalic and atraumatic.   Eyes:      Extraocular Movements: Extraocular movements intact.      Conjunctiva/sclera: Conjunctivae normal.      Pupils: Pupils are equal, round, and reactive to light.   Pulmonary:      Effort: Pulmonary effort is normal. No respiratory distress.   Neurological:      General: No focal deficit present.      Mental Status: She is alert and oriented to person, place, and time.      Comments: Mildly sedated, though did wake up from sleep during this encounter.    Psychiatric:         Mood and Affect: Mood normal.         Behavior: Behavior normal.         Thought Content: Thought content normal.         Judgment: Judgment normal.       Medical Decision Making   See A&P for MDM        Data     I have personally reviewed the following data over the past 24 hrs:    N/A  \   N/A   / N/A     N/A N/A N/A /  N/A   N/A N/A N/A \     ALT: N/A AST: N/A AP: N/A TBILI: N/A   ALB: N/A TOT PROTEIN: N/A LIPASE: N/A       Imaging results reviewed over the past 24 hrs:   Recent Results (from the past 24 hours)   IR Soft Tissue Biopsy    Narrative    PROCEDURE: Ultrasound-guided biopsy    Procedural Personnel  Attending physician(s): Harsh Dupree   Fellow physician(s): Victor Manuel Camara  Resident " physician(s): None  Advanced practice provider(s): None    Procedure Date (mm/dd/yyyy): 5/2/2025    Pre-procedure diagnosis: muscle lesion  Post-procedure diagnosis: Same  Indication: Histopathologic diagnosis  Previous biopsy of same target (QCDR): No  Additional clinical history: None    Complications: No immediate complications.      Impression    IMPRESSION:    Ultrasound-guided biopsy of left gluteus minimus lesion, 6 cores.    Plan:     Specimen(s) sent for evaluation.  _______________________________________________________________    PROCEDURE SUMMARY:  - Percutaneous US-guided coaxial core needle biopsy  - Additional procedure(s): None    PROCEDURE DETAILS:    Pre-procedure  Reference imaging for biopsy target: CT 5/1/2025  Consent: Informed consent for the procedure including risks, benefits  and alternatives was obtained and time-out was performed prior to the  procedure.  Preparation: The site was prepared and draped using maximal sterile  barrier technique including cutaneous antisepsis.    Anesthesia/sedation  Monitoring: The patient was placed on continuous monitoring.  Intravenous sedation was administered. Vital signs and sedation  monitored by nursing staff under Interventional Radiologists  supervision. The patient remained stable throughout the procedure.    Medications:   1. 150mcg Fentanyl  2. 4 mg Versed    Face to Face sedation time: 20 minutes    Imaging prior to biopsy  The patient was positioned supine. Initial imaging was performed.  Biopsy target:  - Organ or target location: Other- left gluteus minimus  - Laterality: Left  - Maximal diameter (cm): 2.2  Other findings: None    Biopsy   Local anesthesia was administered. Under US guidance, the biopsy  needle was advanced to the target and biopsy was performed.  Coaxial needle: 15 gauge    Core needle biopsy device: R[employer]af[employer] Marquee  Core needle size: 16 gauge  Number of core specimens: 6    On-site assessment of biopsy adequacy: not performed       Needle removal  The biopsy needle was removed and a sterile dressing was applied.  Tract embolization: Gelfoam pledgets    Imaging following biopsy  Immediate post-biopsy ultrasound was performed.  Post-biopsy imaging findings: no immediate complication    Additional Details  Additional description of procedure: None  Registry event: V/3/g  Device used: None  Equipment details: None  Unique Device Identifiers: Not available  Specimens removed: Biopsy samples as detailed above  Estimated blood loss (mL): Less than 10  Standardized report: [email or web address]_v3.1    Attestation  Signer name: KISHOR HUITRON MD  I attest that I was present for the entire procedure. I reviewed the  stored images and agree with the report as written.    I have personally reviewed the examination and initial interpretation  and I agree with the findings.    KISHOR HUITRON MD         SYSTEM ID:  [id number]

## 2025-05-04 PROBLEM — I96 MYONECROSIS (H): Status: ACTIVE | Noted: 2025-05-04

## 2025-05-04 PROBLEM — M79.605 DIFFUSE PAIN IN LEFT LOWER EXTREMITY: Status: RESOLVED | Noted: 2025-04-27 | Resolved: 2025-05-04

## 2025-05-04 PROBLEM — M62.89 MYONECROSIS (H): Status: ACTIVE | Noted: 2025-05-04

## 2025-05-04 LAB
ALBUMIN SERPL BCG-MCNC: 3.6 G/DL (ref 3.5–5.2)
ALP SERPL-CCNC: 60 U/L (ref 40–150)
ALT SERPL W P-5'-P-CCNC: 23 U/L (ref 0–50)
ANION GAP SERPL CALCULATED.3IONS-SCNC: 8 MMOL/L (ref 7–15)
AST SERPL W P-5'-P-CCNC: 24 U/L (ref 0–45)
BASOPHILS # BLD AUTO: 0 10E3/UL (ref 0–0.2)
BASOPHILS NFR BLD AUTO: 1 %
BILIRUB SERPL-MCNC: 0.3 MG/DL
BUN SERPL-MCNC: 9.2 MG/DL (ref 6–20)
CALCIUM SERPL-MCNC: 9.4 MG/DL (ref 8.8–10.4)
CHLORIDE SERPL-SCNC: 104 MMOL/L (ref 98–107)
CK SERPL-CCNC: 87 U/L (ref 26–192)
CK SERPL-CCNC: 89 U/L (ref 26–192)
CREAT SERPL-MCNC: 0.76 MG/DL (ref 0.51–0.95)
EGFRCR SERPLBLD CKD-EPI 2021: >90 ML/MIN/1.73M2
EOSINOPHIL # BLD AUTO: 0.5 10E3/UL (ref 0–0.7)
EOSINOPHIL NFR BLD AUTO: 9 %
ERYTHROCYTE [DISTWIDTH] IN BLOOD BY AUTOMATED COUNT: 14.6 % (ref 10–15)
FLUAV RNA SPEC QL NAA+PROBE: NEGATIVE
FLUBV RNA RESP QL NAA+PROBE: NEGATIVE
GLUCOSE SERPL-MCNC: 103 MG/DL (ref 70–99)
HCO3 SERPL-SCNC: 27 MMOL/L (ref 22–29)
HCT VFR BLD AUTO: 36.4 % (ref 35–47)
HGB BLD-MCNC: 11.9 G/DL (ref 11.7–15.7)
IMM GRANULOCYTES # BLD: 0 10E3/UL
IMM GRANULOCYTES NFR BLD: 1 %
LYMPHOCYTES # BLD AUTO: 1.6 10E3/UL (ref 0.8–5.3)
LYMPHOCYTES NFR BLD AUTO: 30 %
MCH RBC QN AUTO: 25.3 PG (ref 26.5–33)
MCHC RBC AUTO-ENTMCNC: 32.7 G/DL (ref 31.5–36.5)
MCV RBC AUTO: 77 FL (ref 78–100)
MONOCYTES # BLD AUTO: 0.3 10E3/UL (ref 0–1.3)
MONOCYTES NFR BLD AUTO: 6 %
NEUTROPHILS # BLD AUTO: 2.9 10E3/UL (ref 1.6–8.3)
NEUTROPHILS NFR BLD AUTO: 54 %
NRBC # BLD AUTO: 0 10E3/UL
NRBC BLD AUTO-RTO: 0 /100
PLATELET # BLD AUTO: 262 10E3/UL (ref 150–450)
POTASSIUM SERPL-SCNC: 3.9 MMOL/L (ref 3.4–5.3)
PROT SERPL-MCNC: 5.7 G/DL (ref 6.4–8.3)
RBC # BLD AUTO: 4.7 10E6/UL (ref 3.8–5.2)
RSV RNA SPEC NAA+PROBE: NEGATIVE
SARS-COV-2 RNA RESP QL NAA+PROBE: NEGATIVE
SODIUM SERPL-SCNC: 139 MMOL/L (ref 135–145)
WBC # BLD AUTO: 5.4 10E3/UL (ref 4–11)

## 2025-05-04 PROCEDURE — 85025 COMPLETE CBC W/AUTO DIFF WBC: CPT

## 2025-05-04 PROCEDURE — 250N000013 HC RX MED GY IP 250 OP 250 PS 637

## 2025-05-04 PROCEDURE — 82565 ASSAY OF CREATININE: CPT

## 2025-05-04 PROCEDURE — 82550 ASSAY OF CK (CPK): CPT

## 2025-05-04 PROCEDURE — 250N000011 HC RX IP 250 OP 636

## 2025-05-04 PROCEDURE — 36415 COLL VENOUS BLD VENIPUNCTURE: CPT

## 2025-05-04 PROCEDURE — 250N000013 HC RX MED GY IP 250 OP 250 PS 637: Performed by: FAMILY MEDICINE

## 2025-05-04 PROCEDURE — 120N000002 HC R&B MED SURG/OB UMMC

## 2025-05-04 PROCEDURE — 87637 SARSCOV2&INF A&B&RSV AMP PRB: CPT

## 2025-05-04 PROCEDURE — 99232 SBSQ HOSP IP/OBS MODERATE 35: CPT | Mod: GC

## 2025-05-04 RX ADMIN — CLONAZEPAM 0.5 MG: 0.5 TABLET ORAL at 13:00

## 2025-05-04 RX ADMIN — HYDROMORPHONE HYDROCHLORIDE 4 MG: 4 TABLET ORAL at 08:26

## 2025-05-04 RX ADMIN — HYDROXYZINE HYDROCHLORIDE 100 MG: 50 TABLET ORAL at 08:25

## 2025-05-04 RX ADMIN — PRAZOSIN HYDROCHLORIDE 5 MG: 5 CAPSULE ORAL at 21:44

## 2025-05-04 RX ADMIN — PRAZOSIN HYDROCHLORIDE 2 MG: 1 CAPSULE ORAL at 08:25

## 2025-05-04 RX ADMIN — TIZANIDINE 4 MG: 2 TABLET ORAL at 13:00

## 2025-05-04 RX ADMIN — PROPRANOLOL HYDROCHLORIDE 80 MG: 80 CAPSULE, EXTENDED RELEASE ORAL at 08:26

## 2025-05-04 RX ADMIN — FUROSEMIDE 20 MG: 20 TABLET ORAL at 08:26

## 2025-05-04 RX ADMIN — TAMSULOSIN HYDROCHLORIDE 0.4 MG: 0.4 CAPSULE ORAL at 20:45

## 2025-05-04 RX ADMIN — LAMOTRIGINE 50 MG: 25 TABLET ORAL at 17:13

## 2025-05-04 RX ADMIN — PREGABALIN 150 MG: 75 CAPSULE ORAL at 17:13

## 2025-05-04 RX ADMIN — PREGABALIN 150 MG: 75 CAPSULE ORAL at 08:25

## 2025-05-04 RX ADMIN — HYDROMORPHONE HYDROCHLORIDE 4 MG: 4 TABLET ORAL at 14:34

## 2025-05-04 RX ADMIN — DOXEPIN HYDROCHLORIDE 10 MG: 10 CAPSULE ORAL at 21:44

## 2025-05-04 RX ADMIN — ONDANSETRON 4 MG: 2 INJECTION INTRAMUSCULAR; INTRAVENOUS at 20:20

## 2025-05-04 RX ADMIN — PREGABALIN 100 MG: 100 CAPSULE ORAL at 21:44

## 2025-05-04 RX ADMIN — POLYETHYLENE GLYCOL 3350 17 G: 17 POWDER, FOR SOLUTION ORAL at 08:24

## 2025-05-04 RX ADMIN — POLYETHYLENE GLYCOL 3350 17 G: 17 POWDER, FOR SOLUTION ORAL at 20:46

## 2025-05-04 RX ADMIN — LITHIUM CARBONATE 600 MG: 600 CAPSULE ORAL at 21:43

## 2025-05-04 RX ADMIN — ONDANSETRON 4 MG: 4 TABLET, ORALLY DISINTEGRATING ORAL at 09:25

## 2025-05-04 RX ADMIN — HYDROMORPHONE HYDROCHLORIDE 4 MG: 4 TABLET ORAL at 21:44

## 2025-05-04 RX ADMIN — HYDROXYZINE HYDROCHLORIDE 100 MG: 50 TABLET ORAL at 14:34

## 2025-05-04 RX ADMIN — HYDROXYZINE HYDROCHLORIDE 100 MG: 50 TABLET ORAL at 20:45

## 2025-05-04 RX ADMIN — PREGABALIN 150 MG: 75 CAPSULE ORAL at 12:48

## 2025-05-04 RX ADMIN — QUETIAPINE FUMARATE 300 MG: 100 TABLET ORAL at 21:44

## 2025-05-04 RX ADMIN — LAMOTRIGINE 100 MG: 100 TABLET ORAL at 08:26

## 2025-05-04 ASSESSMENT — ACTIVITIES OF DAILY LIVING (ADL)
ADLS_ACUITY_SCORE: 28

## 2025-05-04 NOTE — PROGRESS NOTES
VS: See flowsheet     O2: >90% on RA with no complaints of SOB or chest pain.   Output: Voiding adequate amounts w/o pain or difficulty.   Last BM: 5/4/25   Activity: Independent    Skin: LE biopsy    Pain: Managed with medication   CMS: A/O x 4   Dressing:    Diet: Regular, tolerating well. Nausea managed with Zofran.   LDA: R PIV   Equipment: Personal belongings   Plan: Continue POC   Additional Info:

## 2025-05-04 NOTE — PROGRESS NOTES
[facility]    Medicine Progress Note - [facility]    Date of Admission:  4/27/2025    Assessment & Plan   Caprice Kline is a 29 year old female with a complex PMHx of lumbar radiculopathy, fibromyalgia, migraines, and complex psychiatric history transferred from [facility] ED and admitted on 4/27/2025 for pain control, and further evaluation of myositis.      Major updates today:  - RSV/COVID/flu  - Will touchbase with specialist teams on 5/5 to determine disposition    #Malaise  #Fatigue  Malaise, fatigue, chills could represent viral illness. 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 Rheumatology, neurology, infectious disease were all consulted and extensive workup was completed (see previous note for full workup and overall unremarkable except mildly positive TEODORO). At the request of multiple consulting specialists, muscle biopsy was completed on 5/2/2025. Histopathology results are still pending. As for pain management, it is unclear whether her pain is changing acutely given chronic gradually worsening pain of the area. She has demonstrated mild sedation, and has required dynamic changes to pain regimen to adequetly control pain and address sedation . Of note, her OP Neura provider recommended transitioning her back to her PTA morphine regimen prior to discharge.     Consults: rheumatology, neurology, nephrology, physical therapy, IR ortho, surgery; appreciate recs     Lab Workup  - Peripheral smear  - Necrotizing myopathy panel    Management  - Pain control  -  HOLD PTA morphine IR tablet 15mg TID PRN  - PTA pregabalin 100mg at bedtime + 150mg TID   - PTA Tizanidine 4mg TID PRN        - Dilaudid PO 4mg Q6H PRN   - Dilaudid IV 0.3mg IV daily PRN severe pain  - Lidocaine cream   - Naloxone ordered  - HOLD Tylenol 975mg TID in the setting of elevated LFTs  - IV Dilaudid 0.5mg q6h PRN discontinued on 4/29     Monitoring  - strict I&O  - continuous pulse ox  - Daily CBC and CMP  - Daily CK      Chronic/Stable/Resolved  # Severe episode of recurrent MDD w/o psychotic features   # Bipolar II disorder  # ADHD  # Parasomnia  # Insomnia   Medications managed by OP psychiatry. Will continue home meds.               - PTA Adderall XR 30mg every day PRN              - PTA Clonazepam 0.5mg 1 tab PRN anxiety              - PTA Doxepin 10mg 1 tab at bedtime              - PTA Lamictal 100mg every morning & 50mg daily at bedtime               - PTA Lithium 600mg 1 capsule at bedtime               - PTA Prazosin 2mg qam and 5mg qpm               - PTA Quetiapine 300mg at bedtime     # Subclinical hypothyroidism  Admission labs notable for TSH 5.13 and free T4 1.04. Asymptomatic. Re-check in 3-6 months or sooner if symptoms develop. CTM    # Transaminitis - resolved  , ALT 95 noted on 4/28 AM labs. Now improved to AST 27 and 29. T bili and INR normal. Asymptomatic. Holding Tylenol for now. CTM    # Bilateral edema - chronic   Per patient report, chronic and managed with furosemide 20mg. Denies acute changes in swelling or pain. Of note, has been taking Lyrica for some time, which may also be contributing to LE edema. Will have to be cautious with Furosemide administration in the setting of rising CK and potential for renal injury.   - PTA Furosemide 20mg daily      # Non-intractable migraine - chronic   # Tremor   Sees outpatient neurology for these concerns. Reports baseline headache that has not acutely changed. No tremors noted on exam.   - PTA Ubrogepant 100mg 1 tab at onset of  "headache (brought from home)    # Hx of Urinary retention  # Hx of Urinary hesitancy   # Pelvic pain - chronic   Evaluated on 11/5/2024 for acute urinary retention and was discharged home from ED with fisher catheter. Does not have catheter in place, nor has needed to self-cath thereafter. No new urinary and voiding concerns.   - Intermittent catheterization  - Minimize anticholinergic meds, including opioids  - PTA Flomax 0.4mg at bedtime       # Hypoxia 2/2 atelectasis - resolved  During admit had occasional pulse oximetry readings have been ranging from 92-94% considered to be 2/2 atelectasis with immobilization and deconditioning. SatO2 subsequently consistently 95+ on RA with regular incentive spirometer use. CTM.      Diet: Combination Diet Regular Diet Adult  DVT Prophylaxis: Low Risk/Ambulatory with no VTE prophylaxis indicated  Fisher Catheter: Intermittent catheterization PRN  Fluids: PO  Lines: None     Cardiac Monitoring: None  Code Status: Full Code        Diet: Regular Diet Adult    DVT Prophylaxis: Low Risk/Ambulatory with no VTE prophylaxis indicated  Fisher Catheter: Not present  Lines: None     Cardiac Monitoring: None  Code Status: Full Code      Clinically Significant Risk Factors                              # Obesity: Estimated body mass index is 35.74 kg/m  as calculated from the following:    Height as of an earlier encounter on 4/27/25: 1.753 m (5' 9\").    Weight as of an earlier encounter on 4/27/25: 109.8 kg (242 lb).             Social Drivers of Health    Food Insecurity: Unknown (12/9/2024)    Food Insecurity     Within the past 12 months, did you worry that your food would run out before you got money to buy more?: Patient declined     Within the past 12 months, did the food you bought just not last and you didn t have money to get more?: Patient declined   Depression: At risk (4/22/2025)    PHQ-2     PHQ-2 Score: 6   Tobacco Use: Medium Risk (4/22/2025)    Patient History     Smoking " "Tobacco Use: Former     Smokeless Tobacco Use: Never     Passive Exposure: Current   Transportation Needs: Unknown (12/9/2024)    Transportation Needs     Within the past 12 months, has lack of transportation kept you from medical appointments, getting your medicines, non-medical meetings or appointments, work, or from getting things that you need?: Patient declined   Physical Activity: Inactive (12/9/2024)    Exercise Vital Sign     Days of Exercise per Week: 0 days     Minutes of Exercise per Session: 0 min   Stress: Stress Concern Present (12/9/2024)    [employer] [place] [employer] - Occupational Stress Questionnaire     Feeling of Stress : Very much   Social Connections: Unknown (12/9/2024)    Social Connection and Isolation Panel [NHANES]     Frequency of Social Gatherings with Friends and Family: Once a week          Disposition Plan   Medically Ready for Discharge: Anticipated in 2-4 Days         The patient's care was discussed with the Attending Physician, Dr. Donato DO .    Mitzy Rice MD  [facility]  [facility]  Securely message with [employer] (more info)  Text page via [employer] Paging/Directory   See signed in provider for up to date coverage information  ______________________________________________________________________    Interval History   No acute events overnight.     Continues to have pain. Also experiencing numbness in feet particulary inside of right foot. Notes able to move feet and legs but does have some pain with this. Feels that fatigue is also a major concern right now and felt \"sick\" since yesterday with some myalgias and fatigue. Patient had some nausea, now resolved. No fevers, vomiting, or dysuria. Subjective chills.    Physical Exam   Vital Signs: Temp: 98  F (36.7  C) Temp src: Oral BP: 102/59 Pulse: 70   Resp: 17 SpO2: 96 % O2 Device: None (Room air)    Weight: 0 lbs 0 oz  General: No acute " distress  Head: No obvious trauma to head.  Ears, Nose, Throat:  External ears normal.  Nose normal.   Eyes:  Conjunctivae clear.    CV: Regular rate and rhythm.  No murmurs.      Respiratory: Effort normal and breath sounds normal.  No wheezing or crackles.   Gastrointestinal: soft, non tender, non distended  Musculoskeletal: Normal range of motion.  Moves bilateral toes. 2+ DP pulses bilaterally.   Neuro: Alert. Moving all extremities appropriately.  Normal speech.    Skin: Skin is warm and dry.  No rash noted. Biopsy site covered with bandage that is CDI.  Psych: Normal mood and affect. Behavior is normal.     Medical Decision Making   See A&P for MDM        Data     I have personally reviewed the following data over the past 24 hrs:    N/A  \   N/A   / N/A     N/A N/A N/A /  N/A   N/A N/A N/A \     ALT: N/A AST: N/A AP: N/A TBILI: N/A   ALB: N/A TOT PROTEIN: N/A LIPASE: N/A       Imaging results reviewed over the past 24 hrs:   No results found for this or any previous visit (from the past 24 hours).

## 2025-05-04 NOTE — PLAN OF CARE
VS:     /66   Pulse 75   Temp 97.4  F (36.3  C) (Oral)   Resp 16   LMP 04/17/2025 (Exact Date)   SpO2 96%        Output:     Voids spontaneously without   difficulty in the toilet .      Activity:       Ind     Skin: Bilateral edema and LLE biopsy      Pain:     Pain managed with PRN Dilaudid.     CMS:     Neuro's are intact. C/o numbness and tingling in LE       Dressing:     Left lower extremity biopsy      Diet:     Regular/thin      LDA:     PIV RT SL    Equipment:      Bilateral heels are elevated off the bed.     Plan:       Pt is able to make needs known and the call light is within the pt's reach. Continue to monitor.       Additional Info:     Pain management.    Goal Outcome Evaluation:

## 2025-05-05 ENCOUNTER — APPOINTMENT (OUTPATIENT)
Dept: PHYSICAL THERAPY | Facility: CLINIC | Age: 30
End: 2025-05-05
Attending: STUDENT IN AN ORGANIZED HEALTH CARE EDUCATION/TRAINING PROGRAM
Payer: MEDICAID

## 2025-05-05 LAB
ALBUMIN SERPL BCG-MCNC: 3.8 G/DL (ref 3.5–5.2)
ALP SERPL-CCNC: 60 U/L (ref 40–150)
ALT SERPL W P-5'-P-CCNC: 25 U/L (ref 0–50)
ANION GAP SERPL CALCULATED.3IONS-SCNC: 11 MMOL/L (ref 7–15)
AST SERPL W P-5'-P-CCNC: 20 U/L (ref 0–45)
BASOPHILS # BLD AUTO: 0.1 10E3/UL (ref 0–0.2)
BASOPHILS NFR BLD AUTO: 1 %
BILIRUB SERPL-MCNC: 0.3 MG/DL
BUN SERPL-MCNC: 11 MG/DL (ref 6–20)
CALCIUM SERPL-MCNC: 9.6 MG/DL (ref 8.8–10.4)
CHLORIDE SERPL-SCNC: 100 MMOL/L (ref 98–107)
CK SERPL-CCNC: 62 U/L (ref 26–192)
CREAT SERPL-MCNC: 0.8 MG/DL (ref 0.51–0.95)
EGFRCR SERPLBLD CKD-EPI 2021: >90 ML/MIN/1.73M2
EOSINOPHIL # BLD AUTO: 0.6 10E3/UL (ref 0–0.7)
EOSINOPHIL NFR BLD AUTO: 9 %
ERYTHROCYTE [DISTWIDTH] IN BLOOD BY AUTOMATED COUNT: 14.6 % (ref 10–15)
GLUCOSE SERPL-MCNC: 138 MG/DL (ref 70–99)
HCO3 SERPL-SCNC: 25 MMOL/L (ref 22–29)
HCT VFR BLD AUTO: 38.3 % (ref 35–47)
HGB BLD-MCNC: 12.6 G/DL (ref 11.7–15.7)
IMM GRANULOCYTES # BLD: 0 10E3/UL
IMM GRANULOCYTES NFR BLD: 1 %
LYMPHOCYTES # BLD AUTO: 2.2 10E3/UL (ref 0.8–5.3)
LYMPHOCYTES NFR BLD AUTO: 35 %
MCH RBC QN AUTO: 26 PG (ref 26.5–33)
MCHC RBC AUTO-ENTMCNC: 32.9 G/DL (ref 31.5–36.5)
MCV RBC AUTO: 79 FL (ref 78–100)
MONOCYTES # BLD AUTO: 0.5 10E3/UL (ref 0–1.3)
MONOCYTES NFR BLD AUTO: 8 %
NEUTROPHILS # BLD AUTO: 2.9 10E3/UL (ref 1.6–8.3)
NEUTROPHILS NFR BLD AUTO: 46 %
NRBC # BLD AUTO: 0 10E3/UL
NRBC BLD AUTO-RTO: 0 /100
PLATELET # BLD AUTO: 310 10E3/UL (ref 150–450)
POTASSIUM SERPL-SCNC: 3.6 MMOL/L (ref 3.4–5.3)
PROT SERPL-MCNC: 6.2 G/DL (ref 6.4–8.3)
RBC # BLD AUTO: 4.85 10E6/UL (ref 3.8–5.2)
SODIUM SERPL-SCNC: 136 MMOL/L (ref 135–145)
WBC # BLD AUTO: 6.3 10E3/UL (ref 4–11)

## 2025-05-05 PROCEDURE — 85025 COMPLETE CBC W/AUTO DIFF WBC: CPT

## 2025-05-05 PROCEDURE — 97530 THERAPEUTIC ACTIVITIES: CPT | Mod: GP | Performed by: REHABILITATION PRACTITIONER

## 2025-05-05 PROCEDURE — 250N000013 HC RX MED GY IP 250 OP 250 PS 637

## 2025-05-05 PROCEDURE — 82565 ASSAY OF CREATININE: CPT

## 2025-05-05 PROCEDURE — 99232 SBSQ HOSP IP/OBS MODERATE 35: CPT | Mod: GC

## 2025-05-05 PROCEDURE — 36415 COLL VENOUS BLD VENIPUNCTURE: CPT

## 2025-05-05 PROCEDURE — 250N000011 HC RX IP 250 OP 636

## 2025-05-05 PROCEDURE — 120N000002 HC R&B MED SURG/OB UMMC

## 2025-05-05 PROCEDURE — 82550 ASSAY OF CK (CPK): CPT

## 2025-05-05 PROCEDURE — 250N000013 HC RX MED GY IP 250 OP 250 PS 637: Performed by: FAMILY MEDICINE

## 2025-05-05 RX ORDER — LIDOCAINE 4 G/G
1 PATCH TOPICAL
Status: DISCONTINUED | OUTPATIENT
Start: 2025-05-05 | End: 2025-05-06 | Stop reason: HOSPADM

## 2025-05-05 RX ADMIN — LIDOCAINE 4% 1 PATCH: 40 PATCH TOPICAL at 11:22

## 2025-05-05 RX ADMIN — POLYETHYLENE GLYCOL 3350 17 G: 17 POWDER, FOR SOLUTION ORAL at 08:40

## 2025-05-05 RX ADMIN — HYDROMORPHONE HYDROCHLORIDE 4 MG: 4 TABLET ORAL at 03:43

## 2025-05-05 RX ADMIN — TAMSULOSIN HYDROCHLORIDE 0.4 MG: 0.4 CAPSULE ORAL at 21:16

## 2025-05-05 RX ADMIN — DOXEPIN HYDROCHLORIDE 10 MG: 10 CAPSULE ORAL at 21:16

## 2025-05-05 RX ADMIN — HYDROXYZINE HYDROCHLORIDE 100 MG: 50 TABLET ORAL at 13:12

## 2025-05-05 RX ADMIN — LAMOTRIGINE 100 MG: 100 TABLET ORAL at 08:41

## 2025-05-05 RX ADMIN — ONDANSETRON 4 MG: 4 TABLET, ORALLY DISINTEGRATING ORAL at 15:59

## 2025-05-05 RX ADMIN — TIZANIDINE 4 MG: 2 TABLET ORAL at 08:45

## 2025-05-05 RX ADMIN — PREGABALIN 100 MG: 100 CAPSULE ORAL at 21:16

## 2025-05-05 RX ADMIN — ONDANSETRON 4 MG: 2 INJECTION INTRAMUSCULAR; INTRAVENOUS at 22:36

## 2025-05-05 RX ADMIN — HYDROMORPHONE HYDROCHLORIDE 4 MG: 4 TABLET ORAL at 09:58

## 2025-05-05 RX ADMIN — PRAZOSIN HYDROCHLORIDE 5 MG: 5 CAPSULE ORAL at 21:16

## 2025-05-05 RX ADMIN — HYDROXYZINE HYDROCHLORIDE 100 MG: 50 TABLET ORAL at 08:40

## 2025-05-05 RX ADMIN — PROPRANOLOL HYDROCHLORIDE 80 MG: 80 CAPSULE, EXTENDED RELEASE ORAL at 08:41

## 2025-05-05 RX ADMIN — TIZANIDINE 4 MG: 2 TABLET ORAL at 22:23

## 2025-05-05 RX ADMIN — TIZANIDINE 4 MG: 2 TABLET ORAL at 00:07

## 2025-05-05 RX ADMIN — LITHIUM CARBONATE 600 MG: 600 CAPSULE ORAL at 21:16

## 2025-05-05 RX ADMIN — HYDROXYZINE HYDROCHLORIDE 100 MG: 50 TABLET ORAL at 21:15

## 2025-05-05 RX ADMIN — ACETAMINOPHEN 975 MG: 325 TABLET ORAL at 21:16

## 2025-05-05 RX ADMIN — ACETAMINOPHEN 975 MG: 325 TABLET ORAL at 13:13

## 2025-05-05 RX ADMIN — DICLOFENAC SODIUM TOPICAL GEL, 1% 2 G: 10 GEL TOPICAL at 22:25

## 2025-05-05 RX ADMIN — HYDROMORPHONE HYDROCHLORIDE 4 MG: 4 TABLET ORAL at 22:23

## 2025-05-05 RX ADMIN — HYDROMORPHONE HYDROCHLORIDE 4 MG: 4 TABLET ORAL at 15:55

## 2025-05-05 RX ADMIN — LAMOTRIGINE 50 MG: 25 TABLET ORAL at 15:55

## 2025-05-05 RX ADMIN — PRAZOSIN HYDROCHLORIDE 2 MG: 1 CAPSULE ORAL at 08:41

## 2025-05-05 RX ADMIN — CLONAZEPAM 0.5 MG: 0.5 TABLET ORAL at 15:55

## 2025-05-05 RX ADMIN — POLYETHYLENE GLYCOL 3350 17 G: 17 POWDER, FOR SOLUTION ORAL at 21:17

## 2025-05-05 RX ADMIN — HYDROMORPHONE HYDROCHLORIDE 0.3 MG: 1 INJECTION, SOLUTION INTRAMUSCULAR; INTRAVENOUS; SUBCUTANEOUS at 00:00

## 2025-05-05 RX ADMIN — FUROSEMIDE 20 MG: 20 TABLET ORAL at 08:40

## 2025-05-05 RX ADMIN — PREGABALIN 150 MG: 75 CAPSULE ORAL at 08:40

## 2025-05-05 RX ADMIN — PREGABALIN 150 MG: 75 CAPSULE ORAL at 18:42

## 2025-05-05 RX ADMIN — DICLOFENAC SODIUM TOPICAL GEL, 1% 2 G: 10 GEL TOPICAL at 18:42

## 2025-05-05 RX ADMIN — QUETIAPINE FUMARATE 300 MG: 100 TABLET ORAL at 21:16

## 2025-05-05 RX ADMIN — PREGABALIN 150 MG: 75 CAPSULE ORAL at 11:22

## 2025-05-05 ASSESSMENT — ACTIVITIES OF DAILY LIVING (ADL)
ADLS_ACUITY_SCORE: 28

## 2025-05-05 NOTE — PLAN OF CARE
Goal Outcome Evaluation:    VS:     /70 (BP Location: Left arm)   Pulse 68   Temp 97.7  F (36.5  C) (Oral)   Resp 18   LMP 04/17/2025 (Exact Date)   SpO2 96%         Output:     Voids spontaneously without  difficulty in the toilet .       Activity:        Ind      Skin: Bilateral edema and LLE biopsy       Pain:     Pain managed with PRN Dilaudid q6hrs and  ZANAFLEX.       CMS:     Neuro's are intact. C/o numbness and tingling in LE       Dressing:     Left lower extremity biopsy       Diet:     Regular/thin       LDA:     PIV RT SL    Equipment:      Bilateral heels are elevated off the bed.      Plan:        Pt is able to make needs known and the call light is within the pt's reach. Continue to monitor.       Additional Info:     Pain management.

## 2025-05-05 NOTE — PROGRESS NOTES
Brief Rheumatology Note           ASSESSMENT:  Caprice Kline is a 29 year old with a hx of bipoalr, migraine, chronic back pain s/p discectomy  that presents with pain and weakness throughout her upper and lower extremities and elevated CK     Diagnosis:  B/l soft tissue edema over LE muscles   Elevated CK, resolved   Bilateral upper and lower extremity weakness   Lower Extremity pain, bialteral  Hx of chronic back pain s/p microdiscectomy   Hx of Bipolar   Hx of Migraines on ajovy     Admitted for chronic back pain presented with 6-8 months of intermittent weakness of bilateral upper and lower extremities and elevated CK to 2788. Previous work up: negative DOM, myositis panel negative-- Cristina-1, pl7, pl12, ej, oj, srp, MI-2, MDA5, TIF1, NXP-2.MRI of bilateral hip demonstrated patchy soft tissue edema on multiple muscles bilaterally. She underwent muscle biopsy. Results are pending. Her CK levels have been normalized. No indication for steroid treatment. Rheumatology okay with the discharge if medically cleared by the primary team. We will arrange outpatient follow up. Patient has been explained and expressed the understanding.      PLAN:  -- Follow muscle biopsy result  -- Follow HMGCR antibody , anti CN1A results  -- We will arrange for the outpatient rheumatology follow up to discuss the biopsy result.   -- Follow up with outpatient Neurology    Case discussed with Dr. Lopez.     Rheumatology will sign off. Thank you for involving rheumatology in the care of this patient, Pelase contact if any questions.     Grupo Tello MD  PGY2, Internal Medicine  May 5, 2025

## 2025-05-05 NOTE — PLAN OF CARE
Goal Outcome Evaluation:      Plan of Care Reviewed With: patient    Overall Patient Progress: improving    Outcome Evaluation: Pt will discharge when med ready.    VS:       Pt A/O X 4. Afebrile. VSS.BP 97/63 (BP Location: Left arm)   Pulse 67   Temp 97.7  F (36.5  C) (Oral)   Resp 16   LMP 04/17/2025 (Exact Date)   SpO2 94%   Lungs- clear bilaterally with both anterior and posterior.  Denies nausea, shortness of breath, and chest pain.     Output:       Bowels- active in all four quadrants. Voids spontaneously without difficulty in the bathroom.      Activity:       Pt up independently.     Skin:   Skin intact.     Pain:       Has pain in the L hip/extremity and given oral dilaudid, heat applied, and is tolerating well.      CMS:       CMS and Neuro's are intact. Denies numbness and tingling in all extremities.      Dressing:       No incisional dressing.      Diet:       Pt is on a regular diet and appetite was good this shift.       LDA:       PIV is patent in the R hand and SL.      Equipment:       Pt belongings in the room. Bilateral heels are elevated off the bed.     Plan:       Pt is able to make needs known and the call light is within the pt's reach. Continue POC.     Additional Info:

## 2025-05-05 NOTE — PROGRESS NOTES
[facility]    Medicine Progress Note - hospitals Family Medicine Service    Date of Admission:  4/27/2025    Assessment & Plan   Caprice Kline is a 29 year old female with a complex PMHx of lumbar radiculopathy, fibromyalgia, migraines, and complex psychiatric history transferred from [facility] ED and admitted on 4/27/2025 for pain control, and further evaluation of myositis.      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 Rheumatology, neurology, infectious disease were all consulted and extensive workup was completed (see previous note for full workup and overall unremarkable except mildly positive TEODORO). At the request of multiple consulting specialists, muscle biopsy was completed on 5/2/2025. Histopathology results are still pending. As for pain management, it is unclear whether her pain is changing acutely given chronic gradually worsening pain of the area. She has demonstrated mild sedation, and has required dynamic changes to pain regimen to adequetly control pain and address sedation. Of note, her OP Neura provider recommended transitioning her back to her PTA morphine regimen prior to discharge.     Management  - Pain control  - HOLD PTA morphine IR tablet 15mg TID PRN  - PTA pregabalin 100mg at bedtime + 150mg TID   - PTA Tizanidine 4mg TID PRN        - Dilaudid PO 4mg Q6H PRN   - Dilaudid IV 0.3mg IV daily PRN severe pain  - Lidocaine cream   - Naloxone ordered  - Tylenol 975mg TID (resolution of elevated LFTs)  - IV Dilaudid 0.5mg q6h PRN discontinued on 4/29     Monitoring  - strict I&O  - continuous pulse ox  - Daily CBC and CMP  - Daily CK    Consults: rheumatology,  neurology, ID (prev had IR, Ortho, GenSurg, and Nephro s/o)    #Malaise  #Fatigue  Malaise, fatigue, chills could represent viral illness or deconditioning while in the hospital. Unlikely UTI or CAP without dysuria, SOB or fever. RSV/COVID/Flu negative. 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 Will continue home meds.               - PTA Adderall XR 30mg every day PRN              - PTA Clonazepam 0.5mg 1 tab PRN anxiety              - PTA Doxepin 10mg 1 tab at bedtime              - PTA Lamictal 100mg every morning & 50mg daily at bedtime               - PTA Lithium 600mg 1 capsule at bedtime               - PTA Prazosin 2mg qam and 5mg qpm               - PTA Quetiapine 300mg at bedtime     # Subclinical hypothyroidism  Admission labs notable for TSH 5.13 and free T4 1.04. Asymptomatic. Re-check in 3-6 months or sooner if symptoms develop. CTM    # Transaminitis - resolved  , ALT 95 noted on 4/28 AM labs. Now improved to AST 27 and 29. T bili and INR normal. Asymptomatic. Holding Tylenol for now. CTM    # Bilateral edema - chronic   Chronic and managed with furosemide 20mg. LE swelling unchanged since admission, suspect worsening 2/2 low physical activity. Of note, has been taking Lyrica for some time, which may also be contributing to LE edema. Renal function has been stable.   - PTA Furosemide 20mg daily      # Non-intractable migraine - chronic   # Tremor   Sees outpatient neurology for these concerns. Reports baseline headache that has not acutely changed. No tremors noted on exam.   - PTA Ubrogepant 100mg 1 tab at onset of headache (brought from home)    # Hx of Urinary retention  # Hx of Urinary hesitancy   # Pelvic pain - chronic   Evaluated on 11/5/2024 for acute urinary retention and was discharged home from ED with fisher catheter. Does not have catheter  "in place, nor has needed to self-cath thereafter. No new urinary and voiding concerns.   - Intermittent catheterization PRN  - Minimize anticholinergic meds, including opioids  - PTA Flomax 0.4mg at bedtime     # Hypoxia 2/2 atelectasis - resolved  During admit had occasional pulse oximetry readings have been ranging from 92-94% considered to be 2/2 atelectasis with immobilization and deconditioning. SatO2 subsequently consistently 95+ on RA with regular incentive spirometer use. CTM.         Diet: Regular Diet Adult    DVT Prophylaxis: Low Risk/Ambulatory with no VTE prophylaxis indicated  Cronin Catheter: Not present  Lines: None     Cardiac Monitoring: None  Code Status: Full Code      Clinically Significant Risk Factors                              # Obesity: Estimated body mass index is 35.74 kg/m  as calculated from the following:    Height as of an earlier encounter on 4/27/25: 1.753 m (5' 9\").    Weight as of an earlier encounter on 4/27/25: 109.8 kg (242 lb).             Social Drivers of Health    Depression: At risk (4/22/2025)    PHQ-2     PHQ-2 Score: 6   Tobacco Use: Medium Risk (4/22/2025)    Patient History     Smoking Tobacco Use: Former     Smokeless Tobacco Use: Never     Passive Exposure: Current   Physical Activity: Inactive (12/9/2024)    Exercise Vital Sign     Days of Exercise per Week: 0 days     Minutes of Exercise per Session: 0 min   Stress: Stress Concern Present (12/9/2024)    [place] [employer] - Occupational Stress Questionnaire     Feeling of Stress : Very much   Social Connections: Unknown (12/9/2024)    Social Connection and Isolation Panel [NHANES]     Frequency of Social Gatherings with Friends and Family: Once a week          Disposition Plan   Medically Ready for Discharge: Anticipated in 2-4 Days         The patient's care was discussed with the Attending Physician, Dr. Donato DO .    [facility]  [facility]" [facility]  Securely message with [employer] (more info)  Text page via [employer] Paging/Directory   See signed in provider for up to date coverage information  ______________________________________________________________________    Interval History   No acute events overnight.     Continues to have pain in the hips, not acutely changed from yesterday. Endorses L groin pain, which has been present since admission. Notes tenderness to palpation on the lateral aspect of R ankle. Feels her ankles are more swollen.      Physical Exam   Vital Signs: Temp: 97.7  F (36.5  C) Temp src: Oral BP: 97/63 Pulse: 67   Resp: 16 SpO2: 94 % O2 Device: None (Room air)    Weight: 0 lbs 0 oz    Physical Exam  Vitals reviewed.   Constitutional:       Appearance: Normal appearance.   HENT:      Head: Normocephalic and atraumatic.   Eyes:      Extraocular Movements: Extraocular movements intact.      Conjunctiva/sclera: Conjunctivae normal.      Pupils: Pupils are equal, round, and reactive to light.   Pulmonary:      Effort: Pulmonary effort is normal. No respiratory distress.   Musculoskeletal:      Right lower leg: Edema present.      Left lower leg: Edema present.      Comments: Tenderness to palpation of lateral malleoli R > L   Neurological:      General: No focal deficit present.      Mental Status: She is alert and oriented to person, place, and time.   Psychiatric:         Mood and Affect: Mood normal.         Behavior: Behavior normal.       Medical Decision Making   See A&P for MDM        Data     I have personally reviewed the following data over the past 24 hrs:    6.3  \   12.6   / 310     136 100 11.0 /  138 (H)   3.6 25 0.80 \     ALT: 25 AST: 20 AP: 60 TBILI: 0.3   ALB: 3.8 TOT PROTEIN: 6.2 (L) LIPASE: N/A       Imaging results reviewed over the past 24 hrs:   No results found for this or any previous visit (from the past 24 hours).

## 2025-05-06 ENCOUNTER — APPOINTMENT (OUTPATIENT)
Dept: PHYSICAL THERAPY | Facility: CLINIC | Age: 30
End: 2025-05-06
Attending: STUDENT IN AN ORGANIZED HEALTH CARE EDUCATION/TRAINING PROGRAM
Payer: MEDICAID

## 2025-05-06 VITALS
HEART RATE: 68 BPM | SYSTOLIC BLOOD PRESSURE: 98 MMHG | RESPIRATION RATE: 16 BRPM | TEMPERATURE: 97.3 F | OXYGEN SATURATION: 95 % | DIASTOLIC BLOOD PRESSURE: 58 MMHG

## 2025-05-06 LAB
ALBUMIN SERPL BCG-MCNC: 4 G/DL (ref 3.5–5.2)
ALP SERPL-CCNC: 61 U/L (ref 40–150)
ALT SERPL W P-5'-P-CCNC: 23 U/L (ref 0–50)
ANION GAP SERPL CALCULATED.3IONS-SCNC: 9 MMOL/L (ref 7–15)
AST SERPL W P-5'-P-CCNC: 25 U/L (ref 0–45)
BASOPHILS # BLD AUTO: 0.1 10E3/UL (ref 0–0.2)
BASOPHILS NFR BLD AUTO: 1 %
BILIRUB SERPL-MCNC: 0.4 MG/DL
BUN SERPL-MCNC: 11.2 MG/DL (ref 6–20)
CALCIUM SERPL-MCNC: 9.9 MG/DL (ref 8.8–10.4)
CHLORIDE SERPL-SCNC: 103 MMOL/L (ref 98–107)
CK SERPL-CCNC: 58 U/L (ref 26–192)
CREAT SERPL-MCNC: 0.82 MG/DL (ref 0.51–0.95)
EGFRCR SERPLBLD CKD-EPI 2021: >90 ML/MIN/1.73M2
EOSINOPHIL # BLD AUTO: 0.4 10E3/UL (ref 0–0.7)
EOSINOPHIL NFR BLD AUTO: 9 %
ERYTHROCYTE [DISTWIDTH] IN BLOOD BY AUTOMATED COUNT: 14.5 % (ref 10–15)
GLUCOSE SERPL-MCNC: 96 MG/DL (ref 70–99)
HBV CORE AB SERPL QL IA: NONREACTIVE
HBV SURFACE AB SERPL IA-ACNC: <3.5 M[IU]/ML
HBV SURFACE AB SERPL IA-ACNC: NONREACTIVE M[IU]/ML
HBV SURFACE AG SERPL QL IA: NONREACTIVE
HCO3 SERPL-SCNC: 28 MMOL/L (ref 22–29)
HCT VFR BLD AUTO: 40.8 % (ref 35–47)
HCV AB SERPL QL IA: NONREACTIVE
HGB BLD-MCNC: 13.3 G/DL (ref 11.7–15.7)
HIV 1+2 AB+HIV1 P24 AG SERPL QL IA: NONREACTIVE
IMM GRANULOCYTES # BLD: 0 10E3/UL
IMM GRANULOCYTES NFR BLD: 1 %
LYMPHOCYTES # BLD AUTO: 1.9 10E3/UL (ref 0.8–5.3)
LYMPHOCYTES NFR BLD AUTO: 39 %
MAYO MISC RESULT: NORMAL
MAYO MISC RESULT: NORMAL
MCH RBC QN AUTO: 25.1 PG (ref 26.5–33)
MCHC RBC AUTO-ENTMCNC: 32.6 G/DL (ref 31.5–36.5)
MCV RBC AUTO: 77 FL (ref 78–100)
MONOCYTES # BLD AUTO: 0.4 10E3/UL (ref 0–1.3)
MONOCYTES NFR BLD AUTO: 7 %
NEUTROPHILS # BLD AUTO: 2.2 10E3/UL (ref 1.6–8.3)
NEUTROPHILS NFR BLD AUTO: 43 %
NRBC # BLD AUTO: 0 10E3/UL
NRBC BLD AUTO-RTO: 0 /100
PLATELET # BLD AUTO: 304 10E3/UL (ref 150–450)
POTASSIUM SERPL-SCNC: 4.1 MMOL/L (ref 3.4–5.3)
PROT SERPL-MCNC: 6.3 G/DL (ref 6.4–8.3)
RBC # BLD AUTO: 5.3 10E6/UL (ref 3.8–5.2)
SODIUM SERPL-SCNC: 140 MMOL/L (ref 135–145)
WBC # BLD AUTO: 5 10E3/UL (ref 4–11)

## 2025-05-06 PROCEDURE — 250N000013 HC RX MED GY IP 250 OP 250 PS 637

## 2025-05-06 PROCEDURE — 250N000011 HC RX IP 250 OP 636

## 2025-05-06 PROCEDURE — 250N000013 HC RX MED GY IP 250 OP 250 PS 637: Performed by: FAMILY MEDICINE

## 2025-05-06 PROCEDURE — 97116 GAIT TRAINING THERAPY: CPT | Mod: GP | Performed by: PHYSICAL THERAPIST

## 2025-05-06 PROCEDURE — 87389 HIV-1 AG W/HIV-1&-2 AB AG IA: CPT

## 2025-05-06 PROCEDURE — 86803 HEPATITIS C AB TEST: CPT

## 2025-05-06 PROCEDURE — 36415 COLL VENOUS BLD VENIPUNCTURE: CPT

## 2025-05-06 PROCEDURE — 99233 SBSQ HOSP IP/OBS HIGH 50: CPT | Performed by: STUDENT IN AN ORGANIZED HEALTH CARE EDUCATION/TRAINING PROGRAM

## 2025-05-06 PROCEDURE — 85025 COMPLETE CBC W/AUTO DIFF WBC: CPT

## 2025-05-06 PROCEDURE — 82550 ASSAY OF CK (CPK): CPT

## 2025-05-06 PROCEDURE — 87340 HEPATITIS B SURFACE AG IA: CPT

## 2025-05-06 PROCEDURE — 99239 HOSP IP/OBS DSCHRG MGMT >30: CPT | Mod: GC

## 2025-05-06 PROCEDURE — 82247 BILIRUBIN TOTAL: CPT

## 2025-05-06 RX ADMIN — ACETAMINOPHEN 975 MG: 325 TABLET ORAL at 13:20

## 2025-05-06 RX ADMIN — TIZANIDINE 4 MG: 2 TABLET ORAL at 08:26

## 2025-05-06 RX ADMIN — PREGABALIN 150 MG: 75 CAPSULE ORAL at 12:04

## 2025-05-06 RX ADMIN — PREGABALIN 150 MG: 75 CAPSULE ORAL at 08:27

## 2025-05-06 RX ADMIN — PRAZOSIN HYDROCHLORIDE 2 MG: 1 CAPSULE ORAL at 08:27

## 2025-05-06 RX ADMIN — HYDROXYZINE HYDROCHLORIDE 100 MG: 50 TABLET ORAL at 13:21

## 2025-05-06 RX ADMIN — PROPRANOLOL HYDROCHLORIDE 80 MG: 80 CAPSULE, EXTENDED RELEASE ORAL at 08:27

## 2025-05-06 RX ADMIN — HYDROMORPHONE HYDROCHLORIDE 4 MG: 4 TABLET ORAL at 14:37

## 2025-05-06 RX ADMIN — POLYETHYLENE GLYCOL 3350 17 G: 17 POWDER, FOR SOLUTION ORAL at 08:27

## 2025-05-06 RX ADMIN — HYDROXYZINE HYDROCHLORIDE 100 MG: 50 TABLET ORAL at 08:27

## 2025-05-06 RX ADMIN — LAMOTRIGINE 100 MG: 100 TABLET ORAL at 08:27

## 2025-05-06 RX ADMIN — ACETAMINOPHEN 975 MG: 325 TABLET ORAL at 08:26

## 2025-05-06 RX ADMIN — FUROSEMIDE 20 MG: 20 TABLET ORAL at 08:27

## 2025-05-06 RX ADMIN — HYDROMORPHONE HYDROCHLORIDE 0.3 MG: 1 INJECTION, SOLUTION INTRAMUSCULAR; INTRAVENOUS; SUBCUTANEOUS at 04:22

## 2025-05-06 RX ADMIN — HYDROMORPHONE HYDROCHLORIDE 4 MG: 4 TABLET ORAL at 08:26

## 2025-05-06 ASSESSMENT — ACTIVITIES OF DAILY LIVING (ADL)
ADLS_ACUITY_SCORE: 30

## 2025-05-06 NOTE — DISCHARGE INSTRUCTIONS
Follow-up care is a key part of your treatment and safety. Be sure to make and go to all appointments, and call your doctor if you are having problems. It's also a good idea to know your test results and keep a list of the medicines you take.     Follow up with primary care provider, Junior Roe, within 7 days for hospital follow- up and to follow up on results.  The following labs/tests are recommended: CK, CBC, BMP, Hep B antibody, Hep B core antibody and HIV/Hep C /Hep B surface antigen (if hospital results not resulted).    Follow up with rheumatology at soonest available to discuss result. You should receive a call to schedule if you do not please call [phone] ([phone]) to schedule.    Follow up with neurology July 10 as scheduled. Neurology will move this appointment up sooner pending the results of the biopsy.    Follow up with Devendra in 1-2 weeks. Also recommend follow up [facility]. Referral was faxed from hospital on 5/6/2025. You can call [phone] to follow up on this referral and if any issues recommend discussing with PCP who can provide more details on your past medical history and further referrals.    Appointments on [place] and/or [facility] (with [facility] or [facility] provider or service). Call [phone] if you haven't heard regarding these appointments within 7 days of discharge.    How can you care for yourself at home?  Take pain medicines exactly as directed.  If the doctor gave you a prescription medicine for pain, take it as prescribed.  If you are not taking a prescription pain medicine, ask your doctor if you can take an over-the-counter medicine.  Talk to your doctor about whether you need to stop taking any medicines. Follow your doctor's instructions about stopping medicines.  Drink plenty of fluids. If you have kidney, heart, or liver disease and have to limit fluids, talk with your doctor before you increase the amount of fluids you drink.  When  should you call for help?   Call your doctor now or seek immediate medical care if:    You have new or worse muscle pain.     You have less urine than normal or no urine.     You have new swelling in your arms or feet.     You have blood in your urine.   Watch closely for changes in your health, and be sure to contact your doctor if you do not get better as expected.

## 2025-05-06 NOTE — PLAN OF CARE
Physical Therapy Discharge Summary    Reason for therapy discharge:    All goals and outcomes met, no further needs identified.    Progress towards therapy goal(s). See goals on Care Plan in [facility] electronic health record for goal details.  Goals met    Therapy recommendation(s):    Continued therapy is recommended.  Rationale/Recommendations:  Pt would benefit from outpatient PT to help pt address chronic pain.

## 2025-05-06 NOTE — PLAN OF CARE
Goal Outcome Evaluation:      Plan of Care Reviewed With: patient    Overall Patient Progress: improving    Outcome Evaluation: Pt. up in room, swelling on b/l feet managed with ice packs overnight. Dressing changed on L hip, removed lidocaine patch. Pain reported 8-10 throughout PM shift, managed with PRN tizanidine and dilaudid q6hr. Pt will DC when medically ready.  VS:     /84 (Patient Position: Sitting, Cuff Size: Adult Regular)   Pulse 77   Temp 97.8  F (36.6  C) (Oral)   Resp 16   LMP 04/17/2025 (Exact Date)   SpO2 96%    Pt A/O X 4. Afebrile. VSS. Lungs- clear bilaterally with equal expansion. Denies nausea, shortness of breath, and chest pain.     Output:       Bowels- active in all four quadrants. Voids spontaneously without   difficulty in the bathroom.      Activity:       Pt up independently.      Skin:   Visible skin intact.     Pain:       Has pain in the L hip/extremity and R outer ankle and given dilaudid q6hr, ICE applied, heat applied and is tolerating moderately.      CMS:       CMS and Neuro's are intact. Denies numbness and tingling in all extremities.          Diet:       Pt is on a regular diet and appetite was poor this shift.  Only ate ice cream for dinner.      LDA:       PIV is patent in the R hand and SL.      Equipment:       Pt. Belongings in room. B/L heels elevated on pillow, ice applied to b/l feet due to swelling.      Plan:       Pt is able to make needs known and the call light is within the pt's reach. Continue to monitor.       Additional Info:       B/L feet still swollen with ice application overnight and elevation of legs. To put compression socks on during AM shift today, if pt willing.

## 2025-05-06 NOTE — PROGRESS NOTES
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   S/p muscle biopsy 5/2 - pending  Elevated CK, resolved  Transaminitis, resolved  Bilateral lower extremity edema, chronic  History of HSV1 - vaginal  Vaping  Obesity    Recommendations:  No indication for antibiotics  Follow up muscle biopsy  I d not see results for HIV, HCV, or HBV. This was previously pending, though appears was cancelled by lab due to insufficient quantity.   Please re-order these for completeness, however do not need to wait for results for discharge. Results can be followed by PCP.  No ID clinic follow up needed    Assessment:  Caprice Kline is a 29 year old female with PMHx significant for bipolar disorder, lumbar radiculopathy and chronic back pain s/p discectomy, fibromyalgia, and migraines admitted for pain and weakness of extremities x6-8 months (hip, lower > upper), found to have elevated CK and subsequent LE MRI with soft tissue edema, with associated rim enhancement c/f possible myonecrosis and transaminitis. ID consulted for further management.      Negative workup to date:DOM, myositis panel (Cristina-1, pl7, pl12, ej, oj, srp, MI-2, MDA5, TIF1, NXP-2), ssDNA, dsDNA, Smith, RNP, Ssa, Ssb, RF, CCP, chromatin, aldolase. Anaplasma, ehrlichia, babesia PCRs. Lyme PCR and Abs. CMV, EBV PCRs. Treponema Ab, Toxoplasma Ab. Serum ACE. NMS1 necrotizing myopathy panel.   Positive workup to date: TEODORO 1:160 (I could not see in our EMR). Low IgG (436) with low IgG1 and IgG2. Peripheral smear with polycythemia with increased RBC regeneration, no hemolysis.   Pending labs: HMGCR Ab, anti-CN1A, HIV, HBV, HCV.      Overall, we have very low suspicion for infectious etiology such as pyomyositis, necrotizing fasciitis given clinical stability and no systemic signs of infection, low CRP, and lack of progression/decompensation of antibiotics and no inciting trauma to site. US additionally did not identify MRI findings, nor any abscess/fluid collection at L hip. No indication for antibiotics. No significant travel or exposure history, though does have cats. A viral-related myositis is a consideration, however would not expect to have a prolonged 6-8+ month course with progressive worsening. She has no current respiratory viral symptoms to explain her acute rhabdomyolysis and worsening acute on chronic lower extremity symptoms. HBV, HCV, and HIV need collection, though again we remain with low suspicion for infectious etiology. No history of uncontrolled diabetes to suggests diabetic myonecrosis. Rhabdomyolysis may explain her MRI findings of myonecrosis, though we remain without a clear etiology of her rhabdomyolysis. Agree with pending workup and ongoing considerations of inflammatory myositis. Muscle biopsy completed 5/2 by IR - pathology pending and all cultures remain negative to date.     ID will sign off. Recommendations discussed with primary team.    Tamara Oliva PA-C  Infectious Diseases  Contact via [employer] or [employer] Paging/Directory    50 MINUTES SPENT BY ME on the date of service doing chart review, history, exam, documentation & further activities per the note.         Interim History and Events:     GLADYS. Remains with pain - more neuropathic in nature with tingling on dorsal R foot primarily. Some brief relief with lidocaine patch. Is able to stand and walk in room.     Muscle biopsy pending.   Infectious workup negative to date. Biopsy cultures negative.          ROS:   -Focused 5 point ROS completed, pertinent positives and negatives listed above.      Physical Examination:  Temp: 97.3  F (36.3  C) Temp src: Oral  BP: 98/58 Pulse: 68   Resp: 16 SpO2: 95 % O2 Device: None (Room air)      There were no vitals filed for this visit.    Constitutional: Pleasant adult female seen sitting up in bed, in NAD. Alert and interactive.   HEENT: NCAT, anicteric sclerae, conjunctiva clear. Moist mucous membranes without lesions or thrush. Dentition intact/well cared for. No cervical or supraclavicular LAD  Respiratory: Non-labored breathing, good air exchange. Lungs are clear to auscultation bilaterally, without wheezing, crackles or rhonchi. No cough noted.   Cardiovascular: Regular rate and rhythm with no murmur, rub or gallop.  GI: Normoactive BS. Abdomen is soft, obese, nontender to palpation, non-distended. No rigidity or guarding. No rebound tenderness.  Skin: Warm and dry. No new rashes or lesions on exposed surfaces. +multiple tattoos noted.  Musculoskeletal: Extremities grossly normal in appearance. Tender to palpation of L gluteus - though no focal swelling, warmth, or erythema. No peripheral edema present.   Neurologic: A &O x3, speech normal, answering questions appropriately. Moves all extremities spontaneously, limited by pain in L > R leg at hip. Grossly non-focal.  Neuropsychiatric: Mentation and affect normal/appropriate.  VAD: PIV is c/d/i with no erythema, drainage, or tenderness.    Medications:  Current Facility-Administered Medications   Medication Dose Route Frequency Provider Last Rate Last Admin    acetaminophen (TYLENOL) tablet 975 mg  975 mg Oral TID [facility]   975 mg at 05/06/25 0826    doxepin (SINEquan) capsule 10 mg  10 mg Oral At Bedtime [facility], DO   10 mg at 05/05/25 2116    furosemide (LASIX) tablet 20 mg  20 [facility]   20 mg at 05/06/25 0827    hydrOXYzine HCl (ATARAX) tablet 100 mg  100 mg Oral TID [facility], DO   100 mg at 05/06/25 0827    lamoTRIgine (LaMICtal) tablet 100 mg  100 mg Oral Sujit Pascal MD   100 mg at 05/06/25 0827     "lamoTRIgine (LaMICtal) tablet 50 mg  50 mg Oral Daily at 4 pm Sujit Andrade MD   50 mg at 05/05/25 1555    Lidocaine (LIDOCARE) 4 % Patch 1 patch  1 patch Transdermal Q24H Emmy Jewell MD   1 patch at 05/05/25 1122    lithium (ESKALITH) capsule 600 mg  600 mg Oral At Bedtime [facility], DO   600 mg at 05/05/25 2116    polyethylene glycol (MIRALAX) Packet 17 g  17 g Oral BID [facility], DO   17 g at 05/06/25 0827    prazosin (MINIPRESS) capsule 2 mg  2 mg Oral QAM [facility], DO   2 mg at 05/06/25 0827    prazosin (MINIPRESS) capsule 5 mg  5 mg Oral At Bedtime [facility], DO   5 mg at 05/05/25 2116    pregabalin (LYRICA) capsule 100 mg  100 mg Oral At Bedtime [facility], DO   100 mg at 05/05/25 2116    pregabalin (LYRICA) capsule 150 mg  150 mg Oral TID [facility], DO   150 mg at 05/06/25 0827    propranolol ER (INDERAL LA) 24 hr capsule 80 mg  80 mg Oral Daily [facility], DO   80 mg at 05/06/25 0827    QUEtiapine (SEROquel) tablet 300 mg  300 mg Oral At Bedtime [facility], DO   300 mg at 05/05/25 2116    sodium chloride (PF) 0.9% PF flush 3 mL  3 mL Intracatheter Q8H Chrissie Campuzano, DO   3 mL at 05/05/25 2225    tamsulosin (FLOMAX) capsule 0.4 mg  0.4 mg Oral QPM [facility], DO   0.4 mg at 05/05/25 2116       Infusions/Drips:  Current Facility-Administered Medications   Medication Dose Route Frequency Provider Last Rate Last Admin       Laboratory Data:   No results found for: \"ACD4\"    Inflammatory Markers    Recent Labs   Lab Test 04/27/25  2111   SED 6       Metabolic Studies       Recent Labs   Lab Test 05/05/25  0546 05/04/25  0820 05/04/25  0751 05/03/25  0709 05/02/25  0642 05/01/25  0557 04/30/25  1050 04/30/25  0629 04/29/25  0549 04/28/25  1645 04/28/25  1533 04/28/25  0727 04/22/25  1213 06/20/24  1716     --  139 138 139 136  --  137   < >  --    < > 136   < > 139   POTASSIUM 3.6  --  3.9 3.6 3.7 3.7  --  " 4.5   < >  --    < > 3.9   < > 3.9   CHLORIDE 100  --  104 100 102 101  --  101   < >  --    < > 102   < > 102   CO2 25  --  27 28 27 27  --  30*   < >  --    < > 27   < > 25   ANIONGAP 11  --  8 10 10 8  --  6*   < >  --    < > 7   < > 12   BUN 11.0  --  9.2 12.6 12.0 14.1  --  9.2   < >  --    < > 7.1   < > 16.4   CR 0.80  --  0.76 0.74 0.76 0.74  --  0.83   < >  --    < > 0.79   < > 0.92   GFRESTIMATED >90  --  >90 >90 >90 >90  --  >90   < >  --    < > >90   < > 86   *  --  103* 108* 114* 104*  --  110*   < >  --    < > 98   < > 90   A1C  --   --   --   --   --   --   --   --   --   --   --   --   --  4.7   REMA 9.6  --  9.4 9.3 9.3 9.4  --  9.9   < >  --    < > 9.8   < > 9.7   PHOS  --   --   --   --   --   --   --   --   --   --   --  4.5  --   --    MAG  --   --   --   --   --   --   --   --   --   --   --  1.7  --   --    LACT  --   --   --   --   --   --   --   --   --  0.7  --   --   --   --    CKT 62 89 87  --   --   --    < >  --    < >  --    < > 2,788*   < >  --     < > = values in this interval not displayed.       Hepatic Studies    Recent Labs   Lab Test 05/05/25  0546 05/04/25  0751 05/03/25  0709 05/02/25  0642 05/01/25  0557 04/30/25  0629 04/23/25  0003 04/22/25  1213   BILITOTAL 0.3 0.3 0.2 0.3 0.2 0.3   < > <0.2   ALKPHOS 60 60 65 63 60 63   < > 72   ALBUMIN 3.8 3.6 3.6 3.7 3.7 3.7   < > 4.4   AST 20 24 27 30 37 47*   < > 37   ALT 25 23 29 34 38 47   < > 22   LDH  --   --   --   --   --   --   --  195    < > = values in this interval not displayed.       Pancreatitis testing    Recent Labs   Lab Test 06/20/24  1716   TRIG 156*       Hematology Studies      Recent Labs   Lab Test 05/06/25  0834 05/05/25  0546 05/04/25  0751 05/03/25  0709 05/02/25  0642 05/01/25  0557   WBC 5.0 6.3 5.4 7.2 8.0 6.5   ANEU 2.2 2.9 2.9 4.5 5.0 3.3   ALYM 1.9 2.2 1.6 1.7 1.8 2.1   KRISTEN 0.4 0.5 0.3 0.5 0.5 0.6   AEOS 0.4 0.6 0.5 0.5 0.6 0.5   HGB 13.3 12.6 11.9 12.1 12.2 12.2   HCT 40.8 38.3 36.4 36.5 37.3 36.7  "   310 262 259 260 253       Arterial Blood Gas Testing  No lab results found.     Urine Studies     Recent Labs   Lab Test 04/28/25  1820 04/27/25  1123 03/10/25  1105   URINEPH 5.5 6.5 6.0   NITRITE Negative Negative Negative   LEUKEST Large* Small* Negative   WBCU 30* 15* 0-5       Vancomycin Levels     No lab results found.    Invalid input(s): \"VANCO\"    Tobramycin levels     No lab results found.    Gentamicin levels    No lab results found.    CSF testing   No lab results found.      Microbiology:  Culture   Date Value Ref Range Status   05/02/2025 No growth after 3 days  Preliminary   05/02/2025 No anaerobic organisms isolated after 3 days  Preliminary   04/28/2025 <10,000 CFU/mL Mixture of Urogenital Elyssa  Final   04/27/2025 10,000-50,000 CFU/mL Mixture of urogenital elyssa  Final       Last check of C difficile  No results found for: \"CDBPCT\"    Imaging:  CT Pelvis Soft Tissue w Contrast  Result Date: 5/1/2025  IMPRESSION: Stable ill-defined peripherally enhancing lesion centered about the left gluteus minimus musculature when compared to 4/29/2025 MRI.    US Lower Extremity Non Vascular Left  Result Date: 4/30/2025  Impression: The focal abnormalities about the left hip seen on recent MRI are not appreciated sonographically.     US Lower Extremity Non Vascular Left  Result Date: 4/30/2025  Impression: The focal abnormalities about the left hip seen on recent MRI are not appreciated sonographically.      MR Femur Bilateral w &wo Contrast  Result Date: 4/29/2025  Findings: Osseous structures Osseous structures: Prominent red marrow within the bones. Tiny bone islands within the left side the tibial plateau. No fracture, stress reaction, avascular necrosis, or focal osseous lesion is seen. Joint and periarticular soft tissue Internal derangement of joints are not well assessed owing to chosen field of view. Muscles and tendons Muscles: No substantial fatty replacement in the muscles. Left: Patchy " edema within the proximal quadriceps femoris, gluteus minimus and quadratus femoris. There is associated rim enhancement particularly in the gluteus minimus and quadratus femoris (stipple sign). Perifascial edema and small fluid particularly along the proximal quadriceps femoris. Compared to MRI from 1/23/2025 findings have progressed around left hip. Right: Trace residual edema in the gluteus medius; improved from 1/23/2025. Interval significant resolution of the edema in the adductor musculature and gluteus tj. Perifascial edema involving quadriceps muscle particularly in the lateral aspect. Small areas of edema and enhancement in the distal biceps femoris and distal adductor perry. Tendons: No partial or complete tear. Nerves: Grossly intact sciatic nerves. Other Findings: Multiple predominantly peripherally located subcentimeter cysts in the ovaries; questionable for polycystic appearance.      Impression: 1. Left: Patchy soft tissue edema within the proximal quadriceps/vastus lateralis , gluteus minimus and quadratus femoris muscles There is associated rim enhancement particularly in the gluteus minimus and quadratus femoris suggesting myonecrosis. Perifascial edema and small perifascial fluid particularly along the proximal quadriceps muscles. Compared to MRI from 1/23/2025 findings have progressed around the left hip. Right: Trace residual soft tissue edema in the gluteus medius; improved from 1/23/2025. Interval significant resolution of the edema in the adductor musculature and gluteus tj. Perifascial edema involving quadriceps muscle particularly along the lateral aspect. Small areas of edema and enhancement in the distal biceps femoris and distal adductor perry. 2. No substantial fatty replacement in the muscles.      US Venous Competency Bilateral  Result Date: 4/24/2025  IMPRESSION: 1.  No deep venous thrombosis of either lower extremity. 2.  The left proximal to mid femoral vein is  incompetent. Remaining deep vein segments in both lower extremities are competent. 3.  Right great saphenous vein is diffusely competent. Left great saphenous vein is incompetent from the saphenofemoral junction to the level of the knee. 4.  Both small saphenous veins, anterior and posterior accessory saphenous veins are patent and competent.

## 2025-05-06 NOTE — PLAN OF CARE
Possible discharge home today.       VS:        Pt A/O X 4. Afebrile. VSS. BP 98/58 (BP Location: Left arm)   Pulse 68   Temp 97.3  F (36.3  C) (Oral)   Resp 16   LMP 04/17/2025 (Exact Date)   SpO2 95%    Lungs- clear bilaterally with both anterior and posterior.  Denies nausea, shortness of breath, and chest pain.      Output:        Bowels- active in all four quadrants. Voids spontaneously without difficulty in the bathroom.       Activity:        Pt up independently.      Skin:    Skin intact.      Pain:        Has pain in the L hip/extremity and given oral dilaudid, heat applied, and is tolerating well.       CMS:        CMS and Neuro's are intact. Denies numbness and tingling in all extremities.      Dressing:        No incisional dressing.       Diet:        Pt is on a regular diet and appetite was good this shift.        LDA:        PIV is patent in the R hand and SL.       Equipment:        Pt belongings in the room. Bilateral heels are elevated off the bed.      Plan:        Pt is able to make needs known and the call light is within the pt's reach. Continue POC.      Additional Info:                      DISCHARGE SUMMARY    Pt discharging to: Home   Transportation: Family member  AVS given and discussed: Yes, no further questions.   Medications given: Yes, discussed. No further questions.   Belongings returned: Yes, ensured all belongings packed and sent with pt. No items in security.   Comments: Escorted safely to elevators.

## 2025-05-07 ENCOUNTER — RESULTS FOLLOW-UP (OUTPATIENT)
Dept: FAMILY MEDICINE | Facility: CLINIC | Age: 30
End: 2025-05-07

## 2025-05-07 ENCOUNTER — TELEPHONE (OUTPATIENT)
Dept: RHEUMATOLOGY | Facility: CLINIC | Age: 30
End: 2025-05-07
Payer: COMMERCIAL

## 2025-05-07 ENCOUNTER — TELEPHONE (OUTPATIENT)
Dept: NEUROLOGY | Facility: CLINIC | Age: 30
End: 2025-05-07
Payer: COMMERCIAL

## 2025-05-07 LAB
BACTERIA TISS BX CULT: NO GROWTH
GRAM STAIN RESULT: NORMAL
GRAM STAIN RESULT: NORMAL
PATH REPORT.COMMENTS IMP SPEC: NORMAL
PATH REPORT.FINAL DX SPEC: NORMAL
PATH REPORT.GROSS SPEC: NORMAL
PATH REPORT.MICROSCOPIC SPEC OTHER STN: NORMAL
PATH REPORT.RELEVANT HX SPEC: NORMAL
PHOTO IMAGE: NORMAL

## 2025-05-07 NOTE — TELEPHONE ENCOUNTER
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 PA:  Previously Tried and Failed:  see chart  Rationale:  see chart    Insurance   Primary: Medicaid [place]  Insurance ID:  [id number]     Secondary (if applicable):NA  Insurance ID:  NA    Pharmacy Information (if different than what is on RX)  Name:  [facility] [place]  Phone:  [phone]   Fax:[phone]     Clinic Information  Preferred routing pool for dept communication: p [id number]

## 2025-05-07 NOTE — TELEPHONE ENCOUNTER
Patient confirmed scheduled appointment:  Date: May 14th, 2025  Time: 11a  Visit type: Return Rheumatology  Provider: Dr. Skinner  Location: [facility]  Testing/imaging: NA  Additional notes: NA

## 2025-05-08 ENCOUNTER — PATIENT OUTREACH (OUTPATIENT)
Dept: CARE COORDINATION | Facility: CLINIC | Age: 30
End: 2025-05-08
Payer: COMMERCIAL

## 2025-05-08 LAB — BACTERIA TISS BX CULT: NORMAL

## 2025-05-08 NOTE — PROGRESS NOTES
Clinic Care Coordination Contact  [facility]/Voicemail    Clinical Data: Care Coordinator Outreach    Outreach Documentation Number of Outreach Attempt   5/8/2025  11:40 AM 2       Left message on patient's voicemail with call back information and requested return call.      Care Coordinator will do no further outreaches at this time.    Conchita Paz, [facility]  Clinic Care Coordination  [facility]   Phone: [phone]  [email or web address]

## 2025-05-09 LAB — BACTERIA TISS BX CULT: NORMAL

## 2025-05-09 NOTE — TELEPHONE ENCOUNTER
Retail Pharmacy Prior Authorization Team   Phone: [phone]    PA Initiation    Medication: UBRELVY 100 MG PO TABS  Insurance Company: [employer] - Phone [phone] Fax [phone]  Pharmacy Filling the Rx: [id number] - [facility] [place], [place] - [address] AT [facility]  Filling Pharmacy Phone: [phone]  Filling Pharmacy Fax:    Start Date: 5/9/2025    JUNI LAFLEUR (Tony: [id number])

## 2025-05-10 ENCOUNTER — NURSE TRIAGE (OUTPATIENT)
Dept: NURSING | Facility: CLINIC | Age: 30
End: 2025-05-10
Payer: COMMERCIAL

## 2025-05-10 NOTE — TELEPHONE ENCOUNTER
In [facility] x 1+1/2 weeks. Dc'd Tuesday: her legs are still numb, has not been able to urinate since yesterday.   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  Abnormal CK (high). It decreased to 58 on day of discharge. Last night she had to straight cath @ 9-10pm. Has still not been able to pee since then. She does not want to return to the ER.   She uses a walker or a cane to ambulate.   Her bladder feels full now, and she cannot urinate. She has been trying to urinate for the last 2+1/2 hrs and can't. She is drinking fluids. She states her bladder feels full.      Triaged to a disposition of Go to ED now. She states she has difficulty catheterizing herself. She does not currently have home care services.   Patient agrees with this plan.     Nancy Fuentes RN Triage Nurse Advisor 9:03 AM 5/10/2025    Reason for Disposition   [1] Unable to urinate (or only a few drops) > 4 hours AND [2] bladder feels very full (e.g., palpable bladder or strong urge to urinate)    Additional Information   Negative: Shock suspected (e.g., cold/pale/clammy skin, too weak to stand, low BP, rapid pulse)   Negative: Sounds like a life-threatening emergency to the triager   Negative: Followed a female genital area injury (e.g., labia, vagina, vulva)   Negative: Followed a male genital area injury (e.g., penis, scrotum)   Negative: Vaginal discharge   Negative: Pus (white, yellow) or bloody discharge from end of penis   Negative: [1] Taking antibiotic for urinary tract infection (UTI) AND [2] female   Negative: [1] Taking antibiotic for urinary tract infection (UTI) AND [2] male   Negative: [1] Pain or burning with passing urine (urination) AND [2] pregnant   Negative: [1] Pain or burning with passing urine (urination) AND [2] postpartum (from 0 to 6 weeks after delivery)   Negative: [1] Pain or burning with passing urine (urination) AND [2] female   Negative: [1] Pain or burning with passing urine (urination) AND [2]  male   Negative: Pain or itching in the vulvar area   Negative: Pain in scrotum is main symptom   Negative: Blood in the urine is main symptom   Negative: Symptoms arising from use of a urinary catheter (e.g., Coude, Cronin)    Protocols used: Urinary Symptoms-A-AH

## 2025-05-12 NOTE — TELEPHONE ENCOUNTER
Prior Authorization Approval    Authorization Effective Date: 5/9/2025  Authorization Expiration Date: 5/9/2026  Medication: ubrogepant (UBRELVY) 100 MG tablet - APPROVED  Approved Dose/Quantity:    Reference #:     Insurance Company: [employer] - Phone [phone] Fax [phone]  Expected CoPay:       CoPay Card Available:      Foundation Assistance Needed:    Which Pharmacy is filling the prescription (Not needed for infusion/clinic administered): [id number] - [place], [place] - [facility] [address] AT [facility]  Pharmacy Notified:  YES  Patient Notified:  YES

## 2025-05-14 ENCOUNTER — OFFICE VISIT (OUTPATIENT)
Dept: RHEUMATOLOGY | Facility: CLINIC | Age: 30
End: 2025-05-14
Attending: STUDENT IN AN ORGANIZED HEALTH CARE EDUCATION/TRAINING PROGRAM
Payer: COMMERCIAL

## 2025-05-14 VITALS
BODY MASS INDEX: 34.9 KG/M2 | RESPIRATION RATE: 18 BRPM | SYSTOLIC BLOOD PRESSURE: 128 MMHG | DIASTOLIC BLOOD PRESSURE: 86 MMHG | HEART RATE: 74 BPM | WEIGHT: 235.6 LBS | HEIGHT: 69 IN | OXYGEN SATURATION: 98 %

## 2025-05-14 DIAGNOSIS — M62.89 MYONECROSIS (H): ICD-10-CM

## 2025-05-14 DIAGNOSIS — M79.7 FIBROMYALGIA: Primary | ICD-10-CM

## 2025-05-14 DIAGNOSIS — I96 MYONECROSIS (H): ICD-10-CM

## 2025-05-14 PROCEDURE — 3079F DIAST BP 80-89 MM HG: CPT | Performed by: STUDENT IN AN ORGANIZED HEALTH CARE EDUCATION/TRAINING PROGRAM

## 2025-05-14 PROCEDURE — 1125F AMNT PAIN NOTED PAIN PRSNT: CPT | Performed by: STUDENT IN AN ORGANIZED HEALTH CARE EDUCATION/TRAINING PROGRAM

## 2025-05-14 PROCEDURE — 3074F SYST BP LT 130 MM HG: CPT | Performed by: STUDENT IN AN ORGANIZED HEALTH CARE EDUCATION/TRAINING PROGRAM

## 2025-05-14 PROCEDURE — 99214 OFFICE O/P EST MOD 30 MIN: CPT | Mod: GC | Performed by: STUDENT IN AN ORGANIZED HEALTH CARE EDUCATION/TRAINING PROGRAM

## 2025-05-14 PROCEDURE — G2211 COMPLEX E/M VISIT ADD ON: HCPCS | Performed by: STUDENT IN AN ORGANIZED HEALTH CARE EDUCATION/TRAINING PROGRAM

## 2025-05-14 PROCEDURE — G0463 HOSPITAL OUTPT CLINIC VISIT: HCPCS | Performed by: STUDENT IN AN ORGANIZED HEALTH CARE EDUCATION/TRAINING PROGRAM

## 2025-05-14 ASSESSMENT — PAIN SCALES - GENERAL: PAINLEVEL_OUTOF10: SEVERE PAIN (10)

## 2025-05-14 NOTE — PATIENT INSTRUCTIONS
Please follow with your neurologist for further evaluation and management. There are no signs currently that an autoimmune explanation is behind this. Follow up as needed.

## 2025-05-14 NOTE — PROGRESS NOTES
[facility] Outpatient Rheumatology Follow-up  Date of service: May 14, 2025    Patient name: Caprice Kline  YOB: 1995  MRN: [id number]    Reason for f/up: Elevated CK, extremity weakness, extremity pain, ill-defined peripherally enhancing lesion of the left gluteus minimus    ---------------------------------------------------    HISTORY OF PRESENT ILLNESS:  Caprice Kline is a 29yoF seen by inpatient rheumatology from problem above who is following up in clinic now.    Rheum history:  The patient has a history of chronic back pain. She presented to the hospital and was admitted from 4/27 through 5/6. She reported 6-8 months of intermittent weakness of bilateral upper and lower extremities. Using a walker to ambulate and difficulty with ADLs. She developed an elevated CK to 2788 . Prior MRI in January that demonstrated some concern for underlying muscle edema. Her TSH was normal. She has negative DOM, myositis panel negative-- Cristina-1, pl7, pl12, ej, oj, srp, MI-2, MDA5, TIF1, NXP-2. No other signs of CTD. Does have some difficulty initiating swallowing but otherwise no other symptoms.  MRI was repeated showing patchy soft tissue edema in proximal quadriceps/vastus lateralis, gluteus minimus and quadratus femoris muscles in the left with interval resolution of edema in the right adductor muscles and gluteus tj.  There was evidence of a rim enhancement of the gluteus minimus and quadratus femoris suggesting myonecrosis.  She underwent IR guided core biopsy on 5/2/2025. Results revealed benign skeletal muscle with myonecrosis and associated granulation tissue.    Interval history:  Right foot is burning across the top. Both feet and especially left foot is swollen. She continues to feel deep pain in her bilateral legs. She has some burning in her right arm but no deep pains in her upper extremities. Legs feel like trunks and they are heavy.    Symptoms were bad while hospitalized but now feels  "like they are worse. She is having deep shooting pains too that starts at the left buttock and radiates to posterolateral thigh to the knee and then to the outer side of the left calf.    Weakness feels the same, doesn't think she is any better really. With walking feels like her legs are going to give out. She has a walker downstairs in her house but cannot carry it up herself as her boyfriend is out of town. She is having sweating of her back and feels chills.     Not eating much as it takes a lot of effort for her to stand up long enough to make food for herself. She is drinking gatorade and water. No problems swallowing. Feels that she can only take shallow breaths.    Rheumatology ROS: Negative, otherwise as above.    Past Medical History:  Past Medical History:   Diagnosis Date    Bipolar disorder (H)     Complication of anesthesia     \"freaking out when I wake up\"    Depressive disorder 2011       Past Surgical History:  Past Surgical History:   Procedure Laterality Date    DISCECTOMY LUMBAR POSTERIOR MICROSCOPIC ONE LEVEL Left 08/24/2021    Procedure: Minimally invasive left Lumbar 5 to Sacral 1 microdiscectomy  ;  Surgeon: Destin Junior MD;  Location: [facility] OR    ENT SURGERY  Mar 17, 17    Tonsillectomy    GENITOURINARY SURGERY  2012    Fallopian tube removal and cyst removal    GYN SURGERY Right     cystectomy - with salpingectomy    HEAD & NECK SURGERY      tonsillectomy    HEAD & NECK SURGERY      wisdom teeth extraction    IR SOFT TISSUE BIOPSY  05/02/2025       Medications:  Current Outpatient Medications   Medication Sig Dispense Refill    albuterol (PROAIR HFA/PROVENTIL HFA/VENTOLIN HFA) 108 (90 Base) MCG/ACT inhaler Inhale 2 puffs into the lungs every 6 hours as needed for shortness of breath, wheezing or cough 18 g 0    amphetamine-dextroamphetamine (ADDERALL XR) 30 MG 24 hr capsule Take 30 mg by mouth daily as needed.      azelaic acid (FINACIA) 15 % external gel Apply topically 2 times daily as " needed.      clobetasol (TEMOVATE) 0.05 % external solution APPLY TOPICALLY TO THE AFFECTED AREA TWICE DAILY FOR UP TO 21 DAYS. DO NOT APPLY TO NORMAL SKIN      clonazePAM (KLONOPIN) 0.5 MG tablet Take 0.5 mg by mouth daily as needed for anxiety.      diazepam (VALIUM) 10 MG tablet PLACE 1 TABLET VAGINALLY AS NEEDED FOR URINARY RETENTION OR FOR PAIN 15 tablet 0    diclofenac (VOLTAREN) 1 % topical gel Apply 2 g topically 4 times daily. Max 34 grams per day. 340 g 1    docusate sodium (COLACE) 100 MG capsule Take 200 mg by mouth 2 times daily.      doxepin (SINEQUAN) 10 MG capsule Take 10 mg by mouth At Bedtime      EPINEPHrine (ANY BX GENERIC EQUIV) 0.3 MG/0.3ML injection 2-pack INJECT 0.3 ML INTO THE MUSCLE AS NEEDED FOR ANAPHYLAXIS 2 each 1    furosemide (LASIX) 20 MG tablet Take 1 tablet (20 mg) by mouth daily. 90 tablet 0    glucosamine-chondroitin 500-400 MG CAPS per capsule Take 1 capsule by mouth daily.      hydrOXYzine (VISTARIL) 50 MG capsule Take 100 mg by mouth 3 times daily.      ipratropium-albuterol (COMBIVENT RESPIMAT)  MCG/ACT inhaler Inhale 1 puff into the lungs 4 times daily as needed for shortness of breath, wheezing or cough 4 g 0    ketoconazole (NIZORAL) 2 % external cream Apply topically. 3 times every week      ketoconazole (NIZORAL) 2 % external shampoo Apply topically twice a week.      lamoTRIgine (LAMICTAL) 100 MG tablet Take  mg by mouth 2 times daily. 100 mg AM / 50 mg PM as of 4/27 - pt in process of titrating back up      lidocaine (LIDODERM) 5 % patch Place 1 patch over 12 hours onto the skin every 24 hours. To prevent lidocaine toxicity, patient should be patch free for 12 hrs daily. 30 patch 1    lithium (ESKALITH) 600 MG capsule Take 600 mg by mouth at bedtime.      magnesium oxide (MAG-OX) 400 MG tablet Take 400 mg by mouth daily.      morphine (MSIR) 15 MG IR tablet Take 15 mg by mouth 3 times daily as needed for pain.      NARCAN 4 MG/0.1ML nasal spray CALL 911. SPR  CONTENTS OF ONE SPRAYER (0.1ML) INTO ONE NOSTRIL. REPEAT IN 2-3 MIN IF SYMPTOMS OF OPIOID EMERGENCY PERSIST, ALTERNATE NOSTRILS      ondansetron (ZOFRAN ODT) 4 MG ODT tab DISSOLVE 1 TABLET(4 MG) ON THE TONGUE EVERY 8 HOURS AS NEEDED FOR NAUSEA 90 tablet 0    prazosin (MINIPRESS) 2 MG capsule Take 2 mg by mouth every morning.      prazosin (MINIPRESS) 5 MG capsule Take 5 mg by mouth at bedtime.      pregabalin (LYRICA) 100 MG capsule Take 100 mg by mouth at bedtime.      pregabalin (LYRICA) 150 MG capsule Take 150 mg by mouth 3 times daily. AM / noon / dinner      propranolol (INDERAL) 20 MG tablet Take 1 tablet (20 mg) by mouth 2 times daily as needed (tremor). 180 tablet 2    propranolol ER (INDERAL LA) 80 MG 24 hr capsule Take 1 capsule (80 mg) by mouth daily. 90 capsule 1    QUEtiapine (SEROQUEL) 300 MG tablet Take 300 mg by mouth At Bedtime      RETIN-A 0.025 % external cream APPLY SPARINGLY TO FACE EVERY OTHER NIGHT FOR 14 NIGHTS THEN NIGHTLY THEREAFTER      Semaglutide-Weight Management (WEGOVY) 0.5 MG/0.5ML pen Inject 0.5 mg subcutaneously once a week. 2 mL 0    sulfacetamide sodium, Acne, 10 % lotion APPLY IT TO THE FACE ONCE DAILY IN THE MORNING X3 MONTHS      tamsulosin (FLOMAX) 0.4 MG capsule Take 1 capsule (0.4 mg) by mouth every evening. 30 capsule 11    tiZANidine (ZANAFLEX) 4 MG tablet TAKE 1 TABLET BY MOUTH EVERY 8 TO 12 HOURS AS NEEDED. NOT TO EXCEED 3 DOSES IN 24 HOURS      triamcinolone (KENALOG) 0.1 % external cream Apply topically 2 times daily as needed.      ubrogepant (UBRELVY) 100 MG tablet Take 1 tablet (100 mg) by mouth at onset of headache. 8 tablet 11     No current facility-administered medications for this visit.       Allergies:  Allergies   Allergen Reactions    Metronidazole Anaphylaxis and Hives     Other reaction(s): Throat swelling  Throat swelling 6 hrs after exposure  Itching and hives 2 hrs after exposure    Shellfish Allergy Anxiety, Diarrhea, Difficulty breathing, Hives,  "Itching, Rash, Shortness Of Breath and Swelling    Codeine Headache    Fish-Derived Products      Stopped fish oil and less stomach discomfort  Rash after eating fish.     Hydrocodone-Acetaminophen Other (See Comments)    Sertraline Other (See Comments)     Patient was foggy and \"high\" feeling    Shellfish-Derived Products      Lips get numb, throat gets scratchy, rashes       ---------------------------------------------------    OBJECTIVE:  /86   Pulse 74   Resp 18   Ht 1.753 m (5' 9\")   Wt 106.9 kg (235 lb 9.6 oz)   LMP 04/17/2025 (Exact Date)   SpO2 98%   BMI 34.79 kg/m      GEN: NAD  HEENT: No facial rash, sclera clear  CV: RRR, normal S1 and S2, no m/r/g  Pulm: CTAB, no crackles, wheezing or rhonchi  MSK: Full ROM of b/l shoulders, elbows, wrists, MCPs, PIPs, DIPs, hips, knees, ankles. Makes full fists b/l with good strength. No synovitis of any joints. No dactylitis. No digital pitting. No nail changes. No sclerodactyly. Diffuse tender points TTP of the bilateral upper back, upper arms, forearms, thighs and lower legs.  Skin: No acute cutaneous changes over exposed skin  Neuro: Gait normal. Strength testing of lower extremities show initial adequate contraction with resistance to  then quick muscle relaxation with hip flexion primarily but also with knee flexion and extension.    Labs:  WBC Count   Date Value Ref Range Status   05/06/2025 5.0 4.0 - 11.0 10e3/uL Final     Hemoglobin   Date Value Ref Range Status   05/06/2025 13.3 11.7 - 15.7 g/dL Final     Platelet Count   Date Value Ref Range Status   05/06/2025 304 150 - 450 10e3/uL Final     Creatinine   Date Value Ref Range Status   05/06/2025 0.82 0.51 - 0.95 mg/dL Final     Lab Results   Component Value Date    ALKPHOS 61 05/06/2025     AST   Date Value Ref Range Status   05/06/2025 25 0 - 45 U/L Final     Lab Results   Component Value Date    ALT 23 05/06/2025     Erythrocyte Sedimentation Rate   Date Value Ref Range Status " "  2025 6 0 - 20 mm/hr Final     No results found for: \"CRP\"  UA RESULTS:  Recent Labs   Lab Test 25  1820   COLOR Light Yellow   APPEARANCE Slightly Cloudy*   URINEGLC Negative   URINEBILI Negative   URINEKETONE Negative   SG 1.013   UBLD Negative   URINEPH 5.5   PROTEIN Negative   NITRITE Negative   LEUKEST Large*   RBCU 3*   WBCU 30*      Aldolase   Date Value Ref Range Status   2025 21.5 (H) 1.2 - 7.6 U/L Final     Comment:     REFERENCE INTERVAL: Aldolase    Access complete set of age- and/or gender-specific   reference intervals for this test in the [facility]   Test Directory ([email or web address]).  Performed By: [facility]  [address] [place] [id number]  : Moi Hua MD, PhD  CLIA Number: [id number]     Pending labs during hospitalization:  HMGCoA Reductase Ab - Negative  SRP IFA screen - Negative  Anti-cN-1A - Negative    Imagin2025 MR femur bilateral  Impression:  1. Left: Patchy soft tissue edema within the proximal  quadriceps/vastus lateralis , gluteus minimus and quadratus femoris  muscles There is associated rim enhancement particularly in the  gluteus minimus and quadratus femoris suggesting myonecrosis.  Perifascial edema and small perifascial fluid particularly along the  proximal quadriceps muscles. Compared to MRI from 2025 findings  have progressed around the left hip.      Right: Trace residual soft tissue edema in the gluteus medius;  improved from 2025. Interval significant resolution of the edema  in the adductor musculature and gluteus tj. Perifascial edema  involving quadriceps muscle particularly along the lateral aspect.  Small areas of edema and enhancement in the distal biceps femoris and  distal adductor perry.      2. No substantial fatty replacement in the muscles.    2025 CT pelvis  IMPRESSION:   Stable ill-defined peripherally enhancing lesion centered about the  left gluteus minimus " musculature when compared to 4/29/2025 MRI.       5/2/2024 Muscle core biopsy Left gluteus minimus  Final Diagnosis   Skeletal muscle, left gluteus minimus biopsy:  - Benign skeletal muscle with  myonecrosis and associated granulation tissue formation  - See comment     ---------------------------------------------------    ASSESSMENT & PLAN    Caprice Kline is a 29yoF who is presenting to rheumatology clinic for evaluation of:    Left gluteus medius myonecrosis  Elevated CK levels - resolved  Diffuse myofascial tender points  Caprice displays lack of stigmata of connective tissue disease with unremarkable serologic workup including myositis panel and additional HMGCR and cN-1A. Pathology results show finding of myonecrosis with granulation tissue. Altogether this does not suggest an autoimmune process. Her examination is suggestive of a central sensitization process that may be exacerbating her current process.    Plan (discussed with patient):  -- Keep follow-up with neurology, defer further workup/management to neurology  -- No indications from rheumatologic perspective for immunosuppressive therapy    Follow-up: Not indicated for f/up with rheumatology at this time    Patient seen and staffed with Dr. Carlos Skinner, DO  Rheumatology Fellow  [id number]    35 min was spent on date of the encounter doing chart review, history and exam, documentation and further activities as noted above. Any prior notes, outside records, laboratory results, and imaging studies were reviewed if relevant.

## 2025-05-14 NOTE — PROGRESS NOTES
Rheumatology Attending:    This patient was interviewed and examined in the presence of the medical fellow, and this note reflects our mutual impression. My additional thoughts are as follows:    History of localized Myonecrosis of L gluteus minimus without any additional features of connective tissue disease. I doubt the presence of unifying rheumatic disease process.     Collins Gonzalez MD  Professor of Medicine  Director, Division of Rheumatic and Autoimmune Diseases    A total of 28 minutes was spent in face to face patient interaction and chart review on the day of service.    The longitudinal plan of care for the diagnosis(es)/condition(s) as documented were addressed during this visit. Due to the added complexity in care, I will continue to support Caprice in the subsequent management and with ongoing continuity of care.      Component      Latest Ref Rng 5/6/2025  8:34 AM   WBC      4.0 - 11.0 10e3/uL 5.0    RBC Count      3.80 - 5.20 10e6/uL 5.30 (H)    Hemoglobin      11.7 - 15.7 g/dL 13.3    Hematocrit      35.0 - 47.0 % 40.8    MCV      78 - 100 fL 77 (L)    MCH      26.5 - 33.0 pg 25.1 (L)    MCHC      31.5 - 36.5 g/dL 32.6    RDW      10.0 - 15.0 % 14.5    Platelet Count      150 - 450 10e3/uL 304    % Neutrophils      % 43    % Lymphocytes      % 39    % Monocytes      % 7    % Eosinophils      % 9    % Basophils      % 1    % Immature Granulocytes      % 1    NRBC/W      <1 /100 0    Absolute Neutrophil      1.6 - 8.3 10e3/uL 2.2    Absolute Lymphocytes      0.8 - 5.3 10e3/uL 1.9    Absolute Monocytes      0.0 - 1.3 10e3/uL 0.4    Absolute Eosinophils      0.0 - 0.7 10e3/uL 0.4    Absolute Basophils      0.0 - 0.2 10e3/uL 0.1    Absolute Immature Granulocytes      <=0.4 10e3/uL 0.0    Absolute NRBCs      10e3/uL 0.0    Sodium      135 - 145 mmol/L 140    Potassium      3.4 - 5.3 mmol/L 4.1    Carbon Dioxide (CO2)      22 - 29 mmol/L 28    Anion Gap      7 - 15 mmol/L 9    Urea Nitrogen      6.0 -  20.0 mg/dL 11.2    Creatinine      0.51 - 0.95 mg/dL 0.82    GFR Estimate      >60 mL/min/1.73m2 >90    Calcium      8.8 - 10.4 mg/dL 9.9    Chloride      98 - 107 mmol/L 103    Glucose      70 - 99 mg/dL 96    Alkaline Phosphatase      40 - 150 U/L 61    AST      0 - 45 U/L 25    ALT      0 - 50 U/L 23    Protein Total      6.4 - 8.3 g/dL 6.3 (L)    Albumin      3.5 - 5.2 g/dL 4.0    Bilirubin Total      <=1.2 mg/dL 0.4    CK Total      26 - 192 U/L 58    HIV Antigen Antibody Combo      Nonreactive  Nonreactive    Hep B Surface Agn      Nonreactive  Nonreactive    Hepatitis C Antibody      Nonreactive  Nonreactive       Legend:  (H) High  (L) Low  Study Result    Narrative & Impression   EXAMINATION: CT PELVIS SOFT TISSUE W CONTRAST, 5/1/2025 9:35 AM     TECHNIQUE:  Helical CT images from the umbilicus through the symphysis  pubis were obtained with contrast.  Coronal reformatted images were  generated at a workstation for further assessment.     COMPARISON: MRI 4/29/2025, 1/23/2025.     HISTORY: Myonecrosis and ring-enhancing lesions of hip musculature     FINDINGS:     Soft tissues: Redemonstration of ill-defined lesion centered about the  left gluteus minimus measuring up to 3.1 cm with faint peripheral  enhancement (series 2, image 80).     Bones: No acute or suspicious osseous lesions. Left acetabular bone  island.     Bladder: Unremarkable.      Pelvic organs: Unremarkable.     Gastrointestinal tract: No dilated loops of bowel or bowel wall  thickening. The appendix is unremarkable.     Lymph nodes: No suspicious lymphadenopathy.     Peritoneum/mesentery: No free fluid. No free air..     Vessels: Patent major abdominal arterial vasculature. Portal, splenic  and superior mesenteric veins are patent.  No significant  atherosclerotic disease.     Heart and lung bases: Clear.                                                                       IMPRESSION:   Stable ill-defined peripherally enhancing lesion  centered about the  left gluteus minimus musculature when compared to 4/29/2025 MRI.      I have personally reviewed the examination and initial interpretation  and I agree with the findings.     CRYSTAL PINA MD

## 2025-05-14 NOTE — NURSING NOTE
"Chief Complaint   Patient presents with    RECHECK     Follow up visit     Gerson Cooper, CMA CMA at 11:26 AM on 5/14/2025     /86   Pulse 74   Resp 18   Ht 1.753 m (5' 9\")   Wt 106.9 kg (235 lb 9.6 oz)   LMP 04/17/2025 (Exact Date)   SpO2 98%   BMI 34.79 kg/m      "

## 2025-05-14 NOTE — LETTER
5/14/2025       RE: Caprice Kline  [address] [facility] [address]     Dear Colleague,    Thank you for referring your patient, Caprice Kline, to the [facility] [place] at [facility]. Please see a copy of my visit note below.    [facility] Outpatient Rheumatology Follow-up  Date of service: May 14, 2025    Patient name: Caprice Kline  YOB: 1995  MRN: [id number]    Reason for f/up: Elevated CK, extremity weakness, extremity pain, ill-defined peripherally enhancing lesion of the left gluteus minimus    ---------------------------------------------------    HISTORY OF PRESENT ILLNESS:  Caprice Kline is a 29yoF seen by inpatient rheumatology from problem above who is following up in clinic now.    Rheum history:  The patient has a history of chronic back pain. She presented to the hospital and was admitted from 4/27 through 5/6. She reported 6-8 months of intermittent weakness of bilateral upper and lower extremities. Using a walker to ambulate and difficulty with ADLs. She developed an elevated CK to 2788 . Prior MRI in January that demonstrated some concern for underlying muscle edema. Her TSH was normal. She has negative DOM, myositis panel negative-- Cristina-1, pl7, pl12, ej, oj, srp, MI-2, MDA5, TIF1, NXP-2. No other signs of CTD. Does have some difficulty initiating swallowing but otherwise no other symptoms.  MRI was repeated showing patchy soft tissue edema in proximal quadriceps/vastus lateralis, gluteus minimus and quadratus femoris muscles in the left with interval resolution of edema in the right adductor muscles and gluteus tj.  There was evidence of a rim enhancement of the gluteus minimus and quadratus femoris suggesting myonecrosis.  She underwent IR guided core biopsy on 5/2/2025. Results revealed benign skeletal muscle with myonecrosis and associated granulation tissue.    Interval history:  Right  "foot is burning across the top. Both feet and especially left foot is swollen. She continues to feel deep pain in her bilateral legs. She has some burning in her right arm but no deep pains in her upper extremities. Legs feel like trunks and they are heavy.    Symptoms were bad while hospitalized but now feels like they are worse. She is having deep shooting pains too that starts at the left buttock and radiates to posterolateral thigh to the knee and then to the outer side of the left calf.    Weakness feels the same, doesn't think she is any better really. With walking feels like her legs are going to give out. She has a walker downstairs in her house but cannot carry it up herself as her boyfriend is out of town. She is having sweating of her back and feels chills.     Not eating much as it takes a lot of effort for her to stand up long enough to make food for herself. She is drinking gatorade and water. No problems swallowing. Feels that she can only take shallow breaths.    Rheumatology ROS: Negative, otherwise as above.    Past Medical History:  Past Medical History:   Diagnosis Date     Bipolar disorder (H)      Complication of anesthesia     \"freaking out when I wake up\"     Depressive disorder 2011       Past Surgical History:  Past Surgical History:   Procedure Laterality Date     DISCECTOMY LUMBAR POSTERIOR MICROSCOPIC ONE LEVEL Left 08/24/2021    Procedure: Minimally invasive left Lumbar 5 to Sacral 1 microdiscectomy  ;  Surgeon: Destin Junior MD;  Location:  OR     ENT SURGERY  Mar 17, 17    Tonsillectomy     GENITOURINARY SURGERY  2012    Fallopian tube removal and cyst removal     GYN SURGERY Right     cystectomy - with salpingectomy     HEAD & NECK SURGERY      tonsillectomy     HEAD & NECK SURGERY      wisdom teeth extraction     IR SOFT TISSUE BIOPSY  05/02/2025       Medications:  Current Outpatient Medications   Medication Sig Dispense Refill     albuterol (PROAIR HFA/PROVENTIL " HFA/VENTOLIN HFA) 108 (90 Base) MCG/ACT inhaler Inhale 2 puffs into the lungs every 6 hours as needed for shortness of breath, wheezing or cough 18 g 0     amphetamine-dextroamphetamine (ADDERALL XR) 30 MG 24 hr capsule Take 30 mg by mouth daily as needed.       azelaic acid (FINACIA) 15 % external gel Apply topically 2 times daily as needed.       clobetasol (TEMOVATE) 0.05 % external solution APPLY TOPICALLY TO THE AFFECTED AREA TWICE DAILY FOR UP TO 21 DAYS. DO NOT APPLY TO NORMAL SKIN       clonazePAM (KLONOPIN) 0.5 MG tablet Take 0.5 mg by mouth daily as needed for anxiety.       diazepam (VALIUM) 10 MG tablet PLACE 1 TABLET VAGINALLY AS NEEDED FOR URINARY RETENTION OR FOR PAIN 15 tablet 0     diclofenac (VOLTAREN) 1 % topical gel Apply 2 g topically 4 times daily. Max 34 grams per day. 340 g 1     docusate sodium (COLACE) 100 MG capsule Take 200 mg by mouth 2 times daily.       doxepin (SINEQUAN) 10 MG capsule Take 10 mg by mouth At Bedtime       EPINEPHrine (ANY BX GENERIC EQUIV) 0.3 MG/0.3ML injection 2-pack INJECT 0.3 ML INTO THE MUSCLE AS NEEDED FOR ANAPHYLAXIS 2 each 1     furosemide (LASIX) 20 MG tablet Take 1 tablet (20 mg) by mouth daily. 90 tablet 0     glucosamine-chondroitin 500-400 MG CAPS per capsule Take 1 capsule by mouth daily.       hydrOXYzine (VISTARIL) 50 MG capsule Take 100 mg by mouth 3 times daily.       ipratropium-albuterol (COMBIVENT RESPIMAT)  MCG/ACT inhaler Inhale 1 puff into the lungs 4 times daily as needed for shortness of breath, wheezing or cough 4 g 0     ketoconazole (NIZORAL) 2 % external cream Apply topically. 3 times every week       ketoconazole (NIZORAL) 2 % external shampoo Apply topically twice a week.       lamoTRIgine (LAMICTAL) 100 MG tablet Take  mg by mouth 2 times daily. 100 mg AM / 50 mg PM as of 4/27 - pt in process of titrating back up       lidocaine (LIDODERM) 5 % patch Place 1 patch over 12 hours onto the skin every 24 hours. To prevent  lidocaine toxicity, patient should be patch free for 12 hrs daily. 30 patch 1     lithium (ESKALITH) 600 MG capsule Take 600 mg by mouth at bedtime.       magnesium oxide (MAG-OX) 400 MG tablet Take 400 mg by mouth daily.       morphine (MSIR) 15 MG IR tablet Take 15 mg by mouth 3 times daily as needed for pain.       NARCAN 4 MG/0.1ML nasal spray CALL 911. SPR CONTENTS OF ONE SPRAYER (0.1ML) INTO ONE NOSTRIL. REPEAT IN 2-3 MIN IF SYMPTOMS OF OPIOID EMERGENCY PERSIST, ALTERNATE NOSTRILS       ondansetron (ZOFRAN ODT) 4 MG ODT tab DISSOLVE 1 TABLET(4 MG) ON THE TONGUE EVERY 8 HOURS AS NEEDED FOR NAUSEA 90 tablet 0     prazosin (MINIPRESS) 2 MG capsule Take 2 mg by mouth every morning.       prazosin (MINIPRESS) 5 MG capsule Take 5 mg by mouth at bedtime.       pregabalin (LYRICA) 100 MG capsule Take 100 mg by mouth at bedtime.       pregabalin (LYRICA) 150 MG capsule Take 150 mg by mouth 3 times daily. AM / noon / dinner       propranolol (INDERAL) 20 MG tablet Take 1 tablet (20 mg) by mouth 2 times daily as needed (tremor). 180 tablet 2     propranolol ER (INDERAL LA) 80 MG 24 hr capsule Take 1 capsule (80 mg) by mouth daily. 90 capsule 1     QUEtiapine (SEROQUEL) 300 MG tablet Take 300 mg by mouth At Bedtime       RETIN-A 0.025 % external cream APPLY SPARINGLY TO FACE EVERY OTHER NIGHT FOR 14 NIGHTS THEN NIGHTLY THEREAFTER       Semaglutide-Weight Management (WEGOVY) 0.5 MG/0.5ML pen Inject 0.5 mg subcutaneously once a week. 2 mL 0     sulfacetamide sodium, Acne, 10 % lotion APPLY IT TO THE FACE ONCE DAILY IN THE MORNING X3 MONTHS       tamsulosin (FLOMAX) 0.4 MG capsule Take 1 capsule (0.4 mg) by mouth every evening. 30 capsule 11     tiZANidine (ZANAFLEX) 4 MG tablet TAKE 1 TABLET BY MOUTH EVERY 8 TO 12 HOURS AS NEEDED. NOT TO EXCEED 3 DOSES IN 24 HOURS       triamcinolone (KENALOG) 0.1 % external cream Apply topically 2 times daily as needed.       ubrogepant (UBRELVY) 100 MG tablet Take 1 tablet (100 mg) by  "mouth at onset of headache. 8 tablet 11     No current facility-administered medications for this visit.       Allergies:  Allergies   Allergen Reactions     Metronidazole Anaphylaxis and Hives     Other reaction(s): Throat swelling  Throat swelling 6 hrs after exposure  Itching and hives 2 hrs after exposure     Shellfish Allergy Anxiety, Diarrhea, Difficulty breathing, Hives, Itching, Rash, Shortness Of Breath and Swelling     Codeine Headache     Fish-Derived Products      Stopped fish oil and less stomach discomfort  Rash after eating fish.      Hydrocodone-Acetaminophen Other (See Comments)     Sertraline Other (See Comments)     Patient was foggy and \"high\" feeling     Shellfish-Derived Products      Lips get numb, throat gets scratchy, rashes       ---------------------------------------------------    OBJECTIVE:  /86   Pulse 74   Resp 18   Ht 1.753 m (5' 9\")   Wt 106.9 kg (235 lb 9.6 oz)   LMP 04/17/2025 (Exact Date)   SpO2 98%   BMI 34.79 kg/m      GEN: NAD  HEENT: No facial rash, sclera clear  CV: RRR, normal S1 and S2, no m/r/g  Pulm: CTAB, no crackles, wheezing or rhonchi  MSK: Full ROM of b/l shoulders, elbows, wrists, MCPs, PIPs, DIPs, hips, knees, ankles. Makes full fists b/l with good strength. No synovitis of any joints. No dactylitis. No digital pitting. No nail changes. No sclerodactyly. Diffuse tender points TTP of the bilateral upper back, upper arms, forearms, thighs and lower legs.  Skin: No acute cutaneous changes over exposed skin  Neuro: Gait normal. Strength testing of lower extremities show initial adequate contraction with resistance to  then quick muscle relaxation with hip flexion primarily but also with knee flexion and extension.    Labs:  WBC Count   Date Value Ref Range Status   05/06/2025 5.0 4.0 - 11.0 10e3/uL Final     Hemoglobin   Date Value Ref Range Status   05/06/2025 13.3 11.7 - 15.7 g/dL Final     Platelet Count   Date Value Ref Range Status " "  2025 304 150 - 450 10e3/uL Final     Creatinine   Date Value Ref Range Status   2025 0.82 0.51 - 0.95 mg/dL Final     Lab Results   Component Value Date    ALKPHOS 61 2025     AST   Date Value Ref Range Status   2025 25 0 - 45 U/L Final     Lab Results   Component Value Date    ALT 23 2025     Erythrocyte Sedimentation Rate   Date Value Ref Range Status   2025 6 0 - 20 mm/hr Final     No results found for: \"CRP\"  UA RESULTS:  Recent Labs   Lab Test 25  1820   COLOR Light Yellow   APPEARANCE Slightly Cloudy*   URINEGLC Negative   URINEBILI Negative   URINEKETONE Negative   SG 1.013   UBLD Negative   URINEPH 5.5   PROTEIN Negative   NITRITE Negative   LEUKEST Large*   RBCU 3*   WBCU 30*      Aldolase   Date Value Ref Range Status   2025 21.5 (H) 1.2 - 7.6 U/L Final     Comment:     REFERENCE INTERVAL: Aldolase    Access complete set of age- and/or gender-specific   reference intervals for this test in the [facility]   Test Directory ([email or web address]).  Performed By: [facility]  [address] [place] [id number]  : Moi Hua MD, PhD  CLIA Number: [id number]     Pending labs during hospitalization:  HMGCoA Reductase Ab - Negative  SRP IFA screen - Negative  Anti-cN-1A - Negative    Imagin2025 MR femur bilateral  Impression:  1. Left: Patchy soft tissue edema within the proximal  quadriceps/vastus lateralis , gluteus minimus and quadratus femoris  muscles There is associated rim enhancement particularly in the  gluteus minimus and quadratus femoris suggesting myonecrosis.  Perifascial edema and small perifascial fluid particularly along the  proximal quadriceps muscles. Compared to MRI from 2025 findings  have progressed around the left hip.      Right: Trace residual soft tissue edema in the gluteus medius;  improved from 2025. Interval significant resolution of the edema  in the adductor musculature and " gluteus tj. Perifascial edema  involving quadriceps muscle particularly along the lateral aspect.  Small areas of edema and enhancement in the distal biceps femoris and  distal adductor perry.      2. No substantial fatty replacement in the muscles.    5/1/2025 CT pelvis  IMPRESSION:   Stable ill-defined peripherally enhancing lesion centered about the  left gluteus minimus musculature when compared to 4/29/2025 MRI.       5/2/2024 Muscle core biopsy Left gluteus minimus  Final Diagnosis   Skeletal muscle, left gluteus minimus biopsy:  - Benign skeletal muscle with  myonecrosis and associated granulation tissue formation  - See comment     ---------------------------------------------------    ASSESSMENT & PLAN    Caprice Kline is a 29yoF who is presenting to rheumatology clinic for evaluation of:    Left gluteus medius myonecrosis  Elevated CK levels - resolved  Diffuse myofascial tender points  Caprice displays lack of stigmata of connective tissue disease with unremarkable serologic workup including myositis panel and additional HMGCR and cN-1A. Pathology results show finding of myonecrosis with granulation tissue. Altogether this does not suggest an autoimmune process. Her examination is suggestive of a central sensitization process that may be exacerbating her current process.    Plan (discussed with patient):  -- Keep follow-up with neurology, defer further workup/management to neurology  -- No indications from rheumatologic perspective for immunosuppressive therapy    Follow-up: Not indicated for f/up with rheumatology at this time    Patient seen and staffed with Dr. Carlos Skinner, DO  Rheumatology Fellow  [id number]    35 min was spent on date of the encounter doing chart review, history and exam, documentation and further activities as noted above. Any prior notes, outside records, laboratory results, and imaging studies were reviewed if relevant.          Attestation signed by  Collins Gonzalez MD at 5/15/2025  6:41 AM:  Collins Gonzalez MD  Professor of Medicine  Director, Division of Rheumatic and Autoimmune Diseases      Rheumatology Attending:    This patient was interviewed and examined in the presence of the medical fellow, and this note reflects our mutual impression. My additional thoughts are as follows:    History of localized Myonecrosis of L gluteus minimus without any additional features of connective tissue disease. I doubt the presence of unifying rheumatic disease process.     Collins Gonzalez MD  Professor of Medicine  Director, Division of Rheumatic and Autoimmune Diseases    A total of 28 minutes was spent in face to face patient interaction and chart review on the day of service.    The longitudinal plan of care for the diagnosis(es)/condition(s) as documented were addressed during this visit. Due to the added complexity in care, I will continue to support Caprice in the subsequent management and with ongoing continuity of care.      Component      Latest Ref Rng 5/6/2025  8:34 AM   WBC      4.0 - 11.0 10e3/uL 5.0    RBC Count      3.80 - 5.20 10e6/uL 5.30 (H)    Hemoglobin      11.7 - 15.7 g/dL 13.3    Hematocrit      35.0 - 47.0 % 40.8    MCV      78 - 100 fL 77 (L)    MCH      26.5 - 33.0 pg 25.1 (L)    MCHC      31.5 - 36.5 g/dL 32.6    RDW      10.0 - 15.0 % 14.5    Platelet Count      150 - 450 10e3/uL 304    % Neutrophils      % 43    % Lymphocytes      % 39    % Monocytes      % 7    % Eosinophils      % 9    % Basophils      % 1    % Immature Granulocytes      % 1    NRBC/W      <1 /100 0    Absolute Neutrophil      1.6 - 8.3 10e3/uL 2.2    Absolute Lymphocytes      0.8 - 5.3 10e3/uL 1.9    Absolute Monocytes      0.0 - 1.3 10e3/uL 0.4    Absolute Eosinophils      0.0 - 0.7 10e3/uL 0.4    Absolute Basophils      0.0 - 0.2 10e3/uL 0.1    Absolute Immature Granulocytes      <=0.4 10e3/uL 0.0    Absolute NRBCs      10e3/uL 0.0    Sodium      135 - 145 mmol/L 140     Potassium      3.4 - 5.3 mmol/L 4.1    Carbon Dioxide (CO2)      22 - 29 mmol/L 28    Anion Gap      7 - 15 mmol/L 9    Urea Nitrogen      6.0 - 20.0 mg/dL 11.2    Creatinine      0.51 - 0.95 mg/dL 0.82    GFR Estimate      >60 mL/min/1.73m2 >90    Calcium      8.8 - 10.4 mg/dL 9.9    Chloride      98 - 107 mmol/L 103    Glucose      70 - 99 mg/dL 96    Alkaline Phosphatase      40 - 150 U/L 61    AST      0 - 45 U/L 25    ALT      0 - 50 U/L 23    Protein Total      6.4 - 8.3 g/dL 6.3 (L)    Albumin      3.5 - 5.2 g/dL 4.0    Bilirubin Total      <=1.2 mg/dL 0.4    CK Total      26 - 192 U/L 58    HIV Antigen Antibody Combo      Nonreactive  Nonreactive    Hep B Surface Agn      Nonreactive  Nonreactive    Hepatitis C Antibody      Nonreactive  Nonreactive       Legend:  (H) High  (L) Low  Study Result    Narrative & Impression   EXAMINATION: CT PELVIS SOFT TISSUE W CONTRAST, 5/1/2025 9:35 AM     TECHNIQUE:  Helical CT images from the umbilicus through the symphysis  pubis were obtained with contrast.  Coronal reformatted images were  generated at a workstation for further assessment.     COMPARISON: MRI 4/29/2025, 1/23/2025.     HISTORY: Myonecrosis and ring-enhancing lesions of hip musculature     FINDINGS:     Soft tissues: Redemonstration of ill-defined lesion centered about the  left gluteus minimus measuring up to 3.1 cm with faint peripheral  enhancement (series 2, image 80).     Bones: No acute or suspicious osseous lesions. Left acetabular bone  island.     Bladder: Unremarkable.      Pelvic organs: Unremarkable.     Gastrointestinal tract: No dilated loops of bowel or bowel wall  thickening. The appendix is unremarkable.     Lymph nodes: No suspicious lymphadenopathy.     Peritoneum/mesentery: No free fluid. No free air..     Vessels: Patent major abdominal arterial vasculature. Portal, splenic  and superior mesenteric veins are patent.  No significant  atherosclerotic disease.     Heart and lung  bases: Clear.                                                                       IMPRESSION:   Stable ill-defined peripherally enhancing lesion centered about the  left gluteus minimus musculature when compared to 4/29/2025 MRI.      I have personally reviewed the examination and initial interpretation  and I agree with the findings.     CRYSTAL PINA MD           Again, thank you for allowing me to participate in the care of your patient.      Sincerely,    [place] Skinner, DO

## 2025-05-15 ENCOUNTER — VIRTUAL VISIT (OUTPATIENT)
Dept: NEUROLOGY | Facility: CLINIC | Age: 30
End: 2025-05-15
Payer: COMMERCIAL

## 2025-05-15 DIAGNOSIS — G43.719 INTRACTABLE CHRONIC MIGRAINE WITHOUT AURA AND WITHOUT STATUS MIGRAINOSUS: ICD-10-CM

## 2025-05-15 DIAGNOSIS — M62.89 MYONECROSIS (H): ICD-10-CM

## 2025-05-15 DIAGNOSIS — R52 DIFFUSE PAIN: ICD-10-CM

## 2025-05-15 DIAGNOSIS — M79.651 BILATERAL THIGH PAIN: ICD-10-CM

## 2025-05-15 DIAGNOSIS — R74.8 ELEVATED CK: Primary | ICD-10-CM

## 2025-05-15 DIAGNOSIS — M79.652 BILATERAL THIGH PAIN: ICD-10-CM

## 2025-05-15 DIAGNOSIS — I96 MYONECROSIS (H): ICD-10-CM

## 2025-05-15 PROCEDURE — 98007 SYNCH AUDIO-VIDEO EST HI 40: CPT | Performed by: STUDENT IN AN ORGANIZED HEALTH CARE EDUCATION/TRAINING PROGRAM

## 2025-05-15 PROCEDURE — G2211 COMPLEX E/M VISIT ADD ON: HCPCS | Mod: 95 | Performed by: STUDENT IN AN ORGANIZED HEALTH CARE EDUCATION/TRAINING PROGRAM

## 2025-05-15 RX ORDER — NORTRIPTYLINE HYDROCHLORIDE 10 MG/1
10 CAPSULE ORAL AT BEDTIME
Qty: 90 CAPSULE | Refills: 1 | Status: SHIPPED | OUTPATIENT
Start: 2025-05-15

## 2025-05-15 RX ORDER — METHYLPREDNISOLONE 4 MG/1
TABLET ORAL
Qty: 21 TABLET | Refills: 0 | Status: SHIPPED | OUTPATIENT
Start: 2025-05-15

## 2025-05-15 NOTE — PROGRESS NOTES
[facility]  Section of General Neurology  Return Patient  Virtual Visit    Caprice Kline MRN# [id number]   Age: 29 year old YOB: 1995              Assessment and Plan:   Assessment:  Caprice Kline is a 29 year old female who presents today in follow up.  She has a PMH of bipolar, depression with h/o SI, BPD, recent preseptal cellulitis among other PMH as below.  We previously were working more so on migraine headaches but lately she has had escalation of more diffuse body pains.  She also had a transient CK elevation correlative with lower limb pains.  CK has normalized.  Blood work did not show a systemic cause of CK elevation.  Muscle Biopsy showed more of a focal injury perhaps but will review with our neuromuscular team further.  If no good reason found I would consider this a good prognostic sign as transient CK elevations can occur for a variety of self limiting reasons.    Discussed we have more limited pain options for diffuse pain given previous trials and current medications utilized would value her checking in with a pain clinic in these regards.      Plan:  EMG given continued diffuse symptoms to further exclude occult myopathy (less likely), neuropathy  Nortriptyline 10 mg at bedtime , can go higher as tolerated/needed, to stop doxepin  Medrol dose pack to help break any possible inflammation contributing  Pain clinic referral  No other changes to previous migraine regimen  Has follow up scheduled    Video data:  Pt location :home  Provider location: off site  Used: [facility]  Timing 323-346 PM    Louie Goldman MD   of Neurology   [facility]      Interval history:     Reviewed data  R foot swollen and burning  L foot tight and heavy   Dose of lyrica was 150 mg TID +100 at bedtime, now 200 mg TID   Diffuse episodic body pains persist too  Pain clinic--referral to be placed  Previously on belbuca, morphine    A/P at last  visit  Caprice Kline is a 29 year old female who presents today in close follow up.  She has a PMH of bipolar, depression with h/o SI, BPD, recent preseptal cellulitis among other PMH as below.  We previously saw her for migraine headaches (improved on ajovy, ubrelvy, re discussed teratogenicity data, no immediate plans for pregnancy), propranolol has helped tremors which we also saw her for previously.  She now has elevated CK of unclear origins, worsening diffuse pains where she is working with rheumatology to find a cause.  I agree a muscle biopsy would be a good next step. CK is trending down though luckily.  Will continue to help as best we can from a neurological perspective.   Also given worsening probable syncope will obtain autonomic testing, discussed meds that need to be held for 24 hours, 48 hours, prior     Plan:  Inflammatory myopathy panel  Will work to facilitate muscle biopsy as well (left vastus lateralis)--filling out form to upload into media.   No changes to ajovy, Ubrelvy, propranolol for now  Autonomic testing  Has close rheumatological follow up  Follow up with me in 2-3 months virtually, to reach out sooner with any issues questions or changes     Interval rheumatology note  Rheumatology Attending:     This patient was interviewed and examined in the presence of the medical fellow, and this note reflects our mutual impression. My additional thoughts are as follows:     History of localized Myonecrosis of L gluteus minimus without any additional features of connective tissue disease. I doubt the presence of unifying rheumatic disease process.      Collins Gonzalez MD  Professor of Medicine  Director, Division of Rheumatic and Autoimmune Diseases      Caprice Kline is a 29yoF who is presenting to rheumatology clinic for evaluation of:     Left gluteus medius myonecrosis  Elevated CK levels - resolved  Diffuse myofascial tender points  Caprice displays lack of stigmata of connective tissue  "disease with unremarkable serologic workup including myositis panel and additional HMGCR and cN-1A. Pathology results show finding of myonecrosis with granulation tissue. Altogether this does not suggest an autoimmune process. Her examination is suggestive of a central sensitization process that may be exacerbating her current process.     Plan (discussed with patient):  -- Keep follow-up with neurology, defer further workup/management to neurology  -- No indications from rheumatologic perspective for immunosuppressive therapy     Follow-up: Not indicated for f/up with rheumatology at this time     Patient seen and staffed with Dr. Carlos Skinner, DO  Rheumatology Fellow  PGY5        Past Medical History:     Patient Active Problem List   Diagnosis    Lumbar radiculopathy    Severe episode of recurrent major depressive disorder, without psychotic features (H)    Suicide attempt (H)    Bipolar II disorder (H)    Borderline personality disorder in adult (H)    FH: colon cancer    Anaphylactic shock due to shellfish, initial encounter    Left-sided low back pain with left-sided sciatica    Genital herpes    Fibromyalgia    Bilateral thigh pain    Elevated CK    Myonecrosis (H)     Past Medical History:   Diagnosis Date    Bipolar disorder (H)     Complication of anesthesia     \"freaking out when I wake up\"    Depressive disorder 2011        Past Surgical History:     Past Surgical History:   Procedure Laterality Date    DISCECTOMY LUMBAR POSTERIOR MICROSCOPIC ONE LEVEL Left 08/24/2021    Procedure: Minimally invasive left Lumbar 5 to Sacral 1 microdiscectomy  ;  Surgeon: Destin Junior MD;  Location:  OR    ENT SURGERY  Mar 17, 17    Tonsillectomy    GENITOURINARY SURGERY  2012    Fallopian tube removal and cyst removal    GYN SURGERY Right     cystectomy - with salpingectomy    HEAD & NECK SURGERY      tonsillectomy    HEAD & NECK SURGERY      wisdom teeth extraction    IR SOFT TISSUE " BIOPSY  05/02/2025        Social History:     Social History     Tobacco Use    Smoking status: Former     Current packs/day: 0.00     Types: Cigarettes     Passive exposure: Current    Smokeless tobacco: Never   Vaping Use    Vaping status: Former    Substances: Nicotine    Devices: RefFreeLunchedble tank   Substance Use Topics    Alcohol use: Not Currently     Comment: 2 drinks every other day     Drug use: Not Currently        Family History:     Family History   Problem Relation Age of Onset    Hypertension Mother     Colon Polyps Mother 50    Colon Cancer Mother     Substance Abuse Father     Colon Cancer Father 60    Diabetes Maternal Grandmother     Hypertension Maternal Grandmother     Colon Cancer Maternal Grandmother     Cancer Maternal Grandmother     Nephrolithiasis Maternal Grandmother     Diabetes Paternal Grandmother     Breast Cancer Paternal Grandmother     Cancer Paternal Grandmother         Medications:     Current Outpatient Medications   Medication Sig Dispense Refill    albuterol (PROAIR HFA/PROVENTIL HFA/VENTOLIN HFA) 108 (90 Base) MCG/ACT inhaler Inhale 2 puffs into the lungs every 6 hours as needed for shortness of breath, wheezing or cough 18 g 0    amphetamine-dextroamphetamine (ADDERALL XR) 30 MG 24 hr capsule Take 30 mg by mouth daily as needed.      azelaic acid (FINACIA) 15 % external gel Apply topically 2 times daily as needed.      clobetasol (TEMOVATE) 0.05 % external solution APPLY TOPICALLY TO THE AFFECTED AREA TWICE DAILY FOR UP TO 21 DAYS. DO NOT APPLY TO NORMAL SKIN      clonazePAM (KLONOPIN) 0.5 MG tablet Take 0.5 mg by mouth daily as needed for anxiety.      diazepam (VALIUM) 10 MG tablet PLACE 1 TABLET VAGINALLY AS NEEDED FOR URINARY RETENTION OR FOR PAIN 15 tablet 0    diclofenac (VOLTAREN) 1 % topical gel Apply 2 g topically 4 times daily. Max 34 grams per day. 340 g 1    docusate sodium (COLACE) 100 MG capsule Take 200 mg by mouth 2 times daily.      doxepin (SINEQUAN) 10 MG  capsule Take 10 mg by mouth At Bedtime      EPINEPHrine (ANY BX GENERIC EQUIV) 0.3 MG/0.3ML injection 2-pack INJECT 0.3 ML INTO THE MUSCLE AS NEEDED FOR ANAPHYLAXIS 2 each 1    furosemide (LASIX) 20 MG tablet Take 1 tablet (20 mg) by mouth daily. 90 tablet 0    glucosamine-chondroitin 500-400 MG CAPS per capsule Take 1 capsule by mouth daily.      hydrOXYzine (VISTARIL) 50 MG capsule Take 100 mg by mouth 3 times daily.      ipratropium-albuterol (COMBIVENT RESPIMAT)  MCG/ACT inhaler Inhale 1 puff into the lungs 4 times daily as needed for shortness of breath, wheezing or cough 4 g 0    ketoconazole (NIZORAL) 2 % external cream Apply topically. 3 times every week      ketoconazole (NIZORAL) 2 % external shampoo Apply topically twice a week.      lamoTRIgine (LAMICTAL) 100 MG tablet Take  mg by mouth 2 times daily. 100 mg AM / 50 mg PM as of 4/27 - pt in process of titrating back up      lidocaine (LIDODERM) 5 % patch Place 1 patch over 12 hours onto the skin every 24 hours. To prevent lidocaine toxicity, patient should be patch free for 12 hrs daily. 30 patch 1    lithium (ESKALITH) 600 MG capsule Take 600 mg by mouth at bedtime.      magnesium oxide (MAG-OX) 400 MG tablet Take 400 mg by mouth daily.      morphine (MSIR) 15 MG IR tablet Take 15 mg by mouth 3 times daily as needed for pain.      NARCAN 4 MG/0.1ML nasal spray CALL 911. SPR CONTENTS OF ONE SPRAYER (0.1ML) INTO ONE NOSTRIL. REPEAT IN 2-3 MIN IF SYMPTOMS OF OPIOID EMERGENCY PERSIST, ALTERNATE NOSTRILS      ondansetron (ZOFRAN ODT) 4 MG ODT tab DISSOLVE 1 TABLET(4 MG) ON THE TONGUE EVERY 8 HOURS AS NEEDED FOR NAUSEA 90 tablet 0    prazosin (MINIPRESS) 2 MG capsule Take 2 mg by mouth every morning.      prazosin (MINIPRESS) 5 MG capsule Take 5 mg by mouth at bedtime.      pregabalin (LYRICA) 100 MG capsule Take 100 mg by mouth at bedtime.      pregabalin (LYRICA) 150 MG capsule Take 150 mg by mouth 3 times daily. AM / noon / dinner       "propranolol (INDERAL) 20 MG tablet Take 1 tablet (20 mg) by mouth 2 times daily as needed (tremor). 180 tablet 2    propranolol ER (INDERAL LA) 80 MG 24 hr capsule Take 1 capsule (80 mg) by mouth daily. 90 capsule 1    QUEtiapine (SEROQUEL) 300 MG tablet Take 300 mg by mouth At Bedtime      RETIN-A 0.025 % external cream APPLY SPARINGLY TO FACE EVERY OTHER NIGHT FOR 14 NIGHTS THEN NIGHTLY THEREAFTER      Semaglutide-Weight Management (WEGOVY) 0.5 MG/0.5ML pen Inject 0.5 mg subcutaneously once a week. 2 mL 0    sulfacetamide sodium, Acne, 10 % lotion APPLY IT TO THE FACE ONCE DAILY IN THE MORNING X3 MONTHS      tamsulosin (FLOMAX) 0.4 MG capsule Take 1 capsule (0.4 mg) by mouth every evening. 30 capsule 11    tiZANidine (ZANAFLEX) 4 MG tablet TAKE 1 TABLET BY MOUTH EVERY 8 TO 12 HOURS AS NEEDED. NOT TO EXCEED 3 DOSES IN 24 HOURS      triamcinolone (KENALOG) 0.1 % external cream Apply topically 2 times daily as needed.      ubrogepant (UBRELVY) 100 MG tablet Take 1 tablet (100 mg) by mouth at onset of headache. 8 tablet 11     No current facility-administered medications for this visit.        Allergies:     Allergies   Allergen Reactions    Metronidazole Anaphylaxis and Hives     Other reaction(s): Throat swelling  Throat swelling 6 hrs after exposure  Itching and hives 2 hrs after exposure    Shellfish Allergy Anxiety, Diarrhea, Difficulty breathing, Hives, Itching, Rash, Shortness Of Breath and Swelling    Codeine Headache    Fish-Derived Products      Stopped fish oil and less stomach discomfort  Rash after eating fish.     Hydrocodone-Acetaminophen Other (See Comments)    Sertraline Other (See Comments)     Patient was foggy and \"high\" feeling    Shellfish-Derived Products      Lips get numb, throat gets scratchy, rashes        Review of Systems:   As noted above     Physical Exam:   General: Seated comfortably in no acute distress.  Neurologic:     Mental Status: Fully alert, attentive and oriented. Speech clear " and fluent, no paraphasic errors.     Cranial Nerves: EOM appear intact. Facial movements symmetric. Hearing not formally tested but intact to conversation.  No dysarthria.     Motor: difficult to test virtually         Data: Pertinent prior to visit   Pending labs during hospitalization:  HMGCoA Reductase Ab - Negative  SRP IFA screen - Negative  Anti-cN-1A - Negative     Imagin2025 MR femur bilateral  Impression:  1. Left: Patchy soft tissue edema within the proximal  quadriceps/vastus lateralis , gluteus minimus and quadratus femoris  muscles There is associated rim enhancement particularly in the  gluteus minimus and quadratus femoris suggesting myonecrosis.  Perifascial edema and small perifascial fluid particularly along the  proximal quadriceps muscles. Compared to MRI from 2025 findings  have progressed around the left hip.      Right: Trace residual soft tissue edema in the gluteus medius;  improved from 2025. Interval significant resolution of the edema  in the adductor musculature and gluteus tj. Perifascial edema  involving quadriceps muscle particularly along the lateral aspect.  Small areas of edema and enhancement in the distal biceps femoris and  distal adductor perry.      2. No substantial fatty replacement in the muscles.     2025 CT pelvis  IMPRESSION:   Stable ill-defined peripherally enhancing lesion centered about the  left gluteus minimus musculature when compared to 2025 MRI.         2024 Muscle core biopsy Left gluteus minimus  Final Diagnosis   Skeletal muscle, left gluteus minimus biopsy:  - Benign skeletal muscle with  myonecrosis and associated granulation tissue formation  - See comment     Labs:      Necrotizing Myopathy Evaluation, S     Necrotizing Myopathy Interp, S       SEE NOTE                             A negative result does not exclude the clinical diagnosis       of necrotizing autoimmune myopathy (NAM). Muscle biopsy       should be  considered if clinically indicated.       ----------------------------------------------------------------------   Anti-cN-1A (NT5c1A) [employer]             <20                Units  <20           This test was developed and its performance characteristics       determined by [employer]. It has not been cleared or       approved by the Food and Drug Administration.                            Negative:                   <20                           Weak Positive:          20 - 39                            Moderate Positive:      40 - 80                            Strong Positive:            >80            HMGCOA reductase negative            The total time of this encounter today amounted to 40 minutes in total. This time included time spent with the patient, prep work, ordering tests, and performing post visit documentation.    The longitudinal plan of care for headaches among other conditions was addressed during this visit. Due to the added complexity in care, I will continue to support Ms Kline in the subsequent management of this condition(s) and with the ongoing continuity of care of this condition(s).

## 2025-05-15 NOTE — LETTER
5/15/2025      Caprice Kline  [address] [facility] [address]      Dear Colleague,    Thank you for referring your patient, Caprice Kline, to the [facility] [place]. Please see a copy of my visit note below.    [facility]  Section of General Neurology  Return Patient  Virtual Visit    Caprice Kline MRN# [id number]   Age: 29 year old YOB: 1995              Assessment and Plan:   Assessment:  Caprice Kline is a 29 year old female who presents today in follow up.  She has a PMH of bipolar, depression with h/o SI, BPD, recent preseptal cellulitis among other PMH as below.  We previously were working more so on migraine headaches but lately she has had escalation of more diffuse body pains.  She also had a transient CK elevation correlative with lower limb pains.  CK has normalized.  Blood work did not show a systemic cause of CK elevation.  Muscle Biopsy showed more of a focal injury perhaps but will review with our neuromuscular team further.  If no good reason found I would consider this a good prognostic sign as transient CK elevations can occur for a variety of self limiting reasons.    Discussed we have more limited pain options for diffuse pain given previous trials and current medications utilized would value her checking in with a pain clinic in these regards.      Plan:  EMG given continued diffuse symptoms to further exclude occult myopathy (less likely), neuropathy  Nortriptyline 10 mg at bedtime , can go higher as tolerated/needed, to stop doxepin  Medrol dose pack to help break any possible inflammation contributing  Pain clinic referral  No other changes to previous migraine regimen  Has follow up scheduled    Video data:  Pt location :home  Provider location: off site  Used: [facility]  Timing 323-346 PM    Louie Goldman MD   of Neurology   [facility]      Interval history:     Reviewed  data  R foot swollen and burning  L foot tight and heavy   Dose of lyrica was 150 mg TID +100 at bedtime, now 200 mg TID   Diffuse episodic body pains persist too  Pain clinic--referral to be placed  Previously on belbuca, morphine    A/P at last visit  Caprice Kline is a 29 year old female who presents today in close follow up.  She has a PMH of bipolar, depression with h/o SI, BPD, recent preseptal cellulitis among other PMH as below.  We previously saw her for migraine headaches (improved on ajovy, ubrelvy, re discussed teratogenicity data, no immediate plans for pregnancy), propranolol has helped tremors which we also saw her for previously.  She now has elevated CK of unclear origins, worsening diffuse pains where she is working with rheumatology to find a cause.  I agree a muscle biopsy would be a good next step. CK is trending down though luckily.  Will continue to help as best we can from a neurological perspective.   Also given worsening probable syncope will obtain autonomic testing, discussed meds that need to be held for 24 hours, 48 hours, prior     Plan:  Inflammatory myopathy panel  Will work to facilitate muscle biopsy as well (left vastus lateralis)--filling out form to upload into media.   No changes to ajovy, Ubrelvy, propranolol for now  Autonomic testing  Has close rheumatological follow up  Follow up with me in 2-3 months virtually, to reach out sooner with any issues questions or changes     Interval rheumatology note  Rheumatology Attending:     This patient was interviewed and examined in the presence of the medical fellow, and this note reflects our mutual impression. My additional thoughts are as follows:     History of localized Myonecrosis of L gluteus minimus without any additional features of connective tissue disease. I doubt the presence of unifying rheumatic disease process.      Collins Gonzalez MD  Professor of Medicine  Director, Division of Rheumatic and Autoimmune  "Diseases      Caprice Kline is a 29yoF who is presenting to rheumatology clinic for evaluation of:     Left gluteus medius myonecrosis  Elevated CK levels - resolved  Diffuse myofascial tender points  Caprice displays lack of stigmata of connective tissue disease with unremarkable serologic workup including myositis panel and additional HMGCR and cN-1A. Pathology results show finding of myonecrosis with granulation tissue. Altogether this does not suggest an autoimmune process. Her examination is suggestive of a central sensitization process that may be exacerbating her current process.     Plan (discussed with patient):  -- Keep follow-up with neurology, defer further workup/management to neurology  -- No indications from rheumatologic perspective for immunosuppressive therapy     Follow-up: Not indicated for f/up with rheumatology at this time     Patient seen and staffed with Dr. Carlos Skinner,   Rheumatology Fellow  PGY5        Past Medical History:     Patient Active Problem List   Diagnosis     Lumbar radiculopathy     Severe episode of recurrent major depressive disorder, without psychotic features (H)     Suicide attempt (H)     Bipolar II disorder (H)     Borderline personality disorder in adult (H)     FH: colon cancer     Anaphylactic shock due to shellfish, initial encounter     Left-sided low back pain with left-sided sciatica     Genital herpes     Fibromyalgia     Bilateral thigh pain     Elevated CK     Myonecrosis (H)     Past Medical History:   Diagnosis Date     Bipolar disorder (H)      Complication of anesthesia     \"freaking out when I wake up\"     Depressive disorder 2011        Past Surgical History:     Past Surgical History:   Procedure Laterality Date     DISCECTOMY LUMBAR POSTERIOR MICROSCOPIC ONE LEVEL Left 08/24/2021    Procedure: Minimally invasive left Lumbar 5 to Sacral 1 microdiscectomy  ;  Surgeon: Destin Junior MD;  Location:  OR     ENT SURGERY  " Mar 17, 17    Tonsillectomy     GENITOURINARY SURGERY  2012    Fallopian tube removal and cyst removal     GYN SURGERY Right     cystectomy - with salpingectomy     HEAD & NECK SURGERY      tonsillectomy     HEAD & NECK SURGERY      wisdom teeth extraction     IR SOFT TISSUE BIOPSY  05/02/2025        Social History:     Social History     Tobacco Use     Smoking status: Former     Current packs/day: 0.00     Types: Cigarettes     Passive exposure: Current     Smokeless tobacco: Never   Vaping Use     Vaping status: Former     Substances: Nicotine     Devices: Zaplee tank   Substance Use Topics     Alcohol use: Not Currently     Comment: 2 drinks every other day      Drug use: Not Currently        Family History:     Family History   Problem Relation Age of Onset     Hypertension Mother      Colon Polyps Mother 50     Colon Cancer Mother      Substance Abuse Father      Colon Cancer Father 60     Diabetes Maternal Grandmother      Hypertension Maternal Grandmother      Colon Cancer Maternal Grandmother      Cancer Maternal Grandmother      Nephrolithiasis Maternal Grandmother      Diabetes Paternal Grandmother      Breast Cancer Paternal Grandmother      Cancer Paternal Grandmother         Medications:     Current Outpatient Medications   Medication Sig Dispense Refill     albuterol (PROAIR HFA/PROVENTIL HFA/VENTOLIN HFA) 108 (90 Base) MCG/ACT inhaler Inhale 2 puffs into the lungs every 6 hours as needed for shortness of breath, wheezing or cough 18 g 0     amphetamine-dextroamphetamine (ADDERALL XR) 30 MG 24 hr capsule Take 30 mg by mouth daily as needed.       azelaic acid (FINACIA) 15 % external gel Apply topically 2 times daily as needed.       clobetasol (TEMOVATE) 0.05 % external solution APPLY TOPICALLY TO THE AFFECTED AREA TWICE DAILY FOR UP TO 21 DAYS. DO NOT APPLY TO NORMAL SKIN       clonazePAM (KLONOPIN) 0.5 MG tablet Take 0.5 mg by mouth daily as needed for anxiety.       diazepam (VALIUM) 10 MG  tablet PLACE 1 TABLET VAGINALLY AS NEEDED FOR URINARY RETENTION OR FOR PAIN 15 tablet 0     diclofenac (VOLTAREN) 1 % topical gel Apply 2 g topically 4 times daily. Max 34 grams per day. 340 g 1     docusate sodium (COLACE) 100 MG capsule Take 200 mg by mouth 2 times daily.       doxepin (SINEQUAN) 10 MG capsule Take 10 mg by mouth At Bedtime       EPINEPHrine (ANY BX GENERIC EQUIV) 0.3 MG/0.3ML injection 2-pack INJECT 0.3 ML INTO THE MUSCLE AS NEEDED FOR ANAPHYLAXIS 2 each 1     furosemide (LASIX) 20 MG tablet Take 1 tablet (20 mg) by mouth daily. 90 tablet 0     glucosamine-chondroitin 500-400 MG CAPS per capsule Take 1 capsule by mouth daily.       hydrOXYzine (VISTARIL) 50 MG capsule Take 100 mg by mouth 3 times daily.       ipratropium-albuterol (COMBIVENT RESPIMAT)  MCG/ACT inhaler Inhale 1 puff into the lungs 4 times daily as needed for shortness of breath, wheezing or cough 4 g 0     ketoconazole (NIZORAL) 2 % external cream Apply topically. 3 times every week       ketoconazole (NIZORAL) 2 % external shampoo Apply topically twice a week.       lamoTRIgine (LAMICTAL) 100 MG tablet Take  mg by mouth 2 times daily. 100 mg AM / 50 mg PM as of 4/27 - pt in process of titrating back up       lidocaine (LIDODERM) 5 % patch Place 1 patch over 12 hours onto the skin every 24 hours. To prevent lidocaine toxicity, patient should be patch free for 12 hrs daily. 30 patch 1     lithium (ESKALITH) 600 MG capsule Take 600 mg by mouth at bedtime.       magnesium oxide (MAG-OX) 400 MG tablet Take 400 mg by mouth daily.       morphine (MSIR) 15 MG IR tablet Take 15 mg by mouth 3 times daily as needed for pain.       NARCAN 4 MG/0.1ML nasal spray CALL 911. SPR CONTENTS OF ONE SPRAYER (0.1ML) INTO ONE NOSTRIL. REPEAT IN 2-3 MIN IF SYMPTOMS OF OPIOID EMERGENCY PERSIST, ALTERNATE NOSTRILS       ondansetron (ZOFRAN ODT) 4 MG ODT tab DISSOLVE 1 TABLET(4 MG) ON THE TONGUE EVERY 8 HOURS AS NEEDED FOR NAUSEA 90 tablet 0      prazosin (MINIPRESS) 2 MG capsule Take 2 mg by mouth every morning.       prazosin (MINIPRESS) 5 MG capsule Take 5 mg by mouth at bedtime.       pregabalin (LYRICA) 100 MG capsule Take 100 mg by mouth at bedtime.       pregabalin (LYRICA) 150 MG capsule Take 150 mg by mouth 3 times daily. AM / noon / dinner       propranolol (INDERAL) 20 MG tablet Take 1 tablet (20 mg) by mouth 2 times daily as needed (tremor). 180 tablet 2     propranolol ER (INDERAL LA) 80 MG 24 hr capsule Take 1 capsule (80 mg) by mouth daily. 90 capsule 1     QUEtiapine (SEROQUEL) 300 MG tablet Take 300 mg by mouth At Bedtime       RETIN-A 0.025 % external cream APPLY SPARINGLY TO FACE EVERY OTHER NIGHT FOR 14 NIGHTS THEN NIGHTLY THEREAFTER       Semaglutide-Weight Management (WEGOVY) 0.5 MG/0.5ML pen Inject 0.5 mg subcutaneously once a week. 2 mL 0     sulfacetamide sodium, Acne, 10 % lotion APPLY IT TO THE FACE ONCE DAILY IN THE MORNING X3 MONTHS       tamsulosin (FLOMAX) 0.4 MG capsule Take 1 capsule (0.4 mg) by mouth every evening. 30 capsule 11     tiZANidine (ZANAFLEX) 4 MG tablet TAKE 1 TABLET BY MOUTH EVERY 8 TO 12 HOURS AS NEEDED. NOT TO EXCEED 3 DOSES IN 24 HOURS       triamcinolone (KENALOG) 0.1 % external cream Apply topically 2 times daily as needed.       ubrogepant (UBRELVY) 100 MG tablet Take 1 tablet (100 mg) by mouth at onset of headache. 8 tablet 11     No current facility-administered medications for this visit.        Allergies:     Allergies   Allergen Reactions     Metronidazole Anaphylaxis and Hives     Other reaction(s): Throat swelling  Throat swelling 6 hrs after exposure  Itching and hives 2 hrs after exposure     Shellfish Allergy Anxiety, Diarrhea, Difficulty breathing, Hives, Itching, Rash, Shortness Of Breath and Swelling     Codeine Headache     Fish-Derived Products      Stopped fish oil and less stomach discomfort  Rash after eating fish.      Hydrocodone-Acetaminophen Other (See Comments)     Sertraline  "Other (See Comments)     Patient was foggy and \"high\" feeling     Shellfish-Derived Products      Lips get numb, throat gets scratchy, rashes        Review of Systems:   As noted above     Physical Exam:   General: Seated comfortably in no acute distress.  Neurologic:     Mental Status: Fully alert, attentive and oriented. Speech clear and fluent, no paraphasic errors.     Cranial Nerves: EOM appear intact. Facial movements symmetric. Hearing not formally tested but intact to conversation.  No dysarthria.     Motor: difficult to test virtually         Data: Pertinent prior to visit   Pending labs during hospitalization:  HMGCoA Reductase Ab - Negative  SRP IFA screen - Negative  Anti-cN-1A - Negative     Imagin2025 MR femur bilateral  Impression:  1. Left: Patchy soft tissue edema within the proximal  quadriceps/vastus lateralis , gluteus minimus and quadratus femoris  muscles There is associated rim enhancement particularly in the  gluteus minimus and quadratus femoris suggesting myonecrosis.  Perifascial edema and small perifascial fluid particularly along the  proximal quadriceps muscles. Compared to MRI from 2025 findings  have progressed around the left hip.      Right: Trace residual soft tissue edema in the gluteus medius;  improved from 2025. Interval significant resolution of the edema  in the adductor musculature and gluteus tj. Perifascial edema  involving quadriceps muscle particularly along the lateral aspect.  Small areas of edema and enhancement in the distal biceps femoris and  distal adductor perry.      2. No substantial fatty replacement in the muscles.     2025 CT pelvis  IMPRESSION:   Stable ill-defined peripherally enhancing lesion centered about the  left gluteus minimus musculature when compared to 2025 MRI.         2024 Muscle core biopsy Left gluteus minimus  Final Diagnosis   Skeletal muscle, left gluteus minimus biopsy:  - Benign skeletal muscle with "  myonecrosis and associated granulation tissue formation  - See comment     Labs:      Necrotizing Myopathy Evaluation, S     Necrotizing Myopathy Interp, S       SEE NOTE                             A negative result does not exclude the clinical diagnosis       of necrotizing autoimmune myopathy (NAM). Muscle biopsy       should be considered if clinically indicated.       ----------------------------------------------------------------------   Anti-cN-1A (NT5c1A) [employer]             <20                Units  <20           This test was developed and its performance characteristics       determined by [employer]. It has not been cleared or       approved by the Food and Drug Administration.                            Negative:                   <20                           Weak Positive:          20 - 39                            Moderate Positive:      40 - 80                            Strong Positive:            >80            HMGCOA reductase negative            The total time of this encounter today amounted to 40 minutes in total. This time included time spent with the patient, prep work, ordering tests, and performing post visit documentation.    The longitudinal plan of care for headaches among other conditions was addressed during this visit. Due to the added complexity in care, I will continue to support Ms Kline in the subsequent management of this condition(s) and with the ongoing continuity of care of this condition(s).      Again, thank you for allowing me to participate in the care of your patient.        Sincerely,        Karl Goldman MD    Electronically signed

## 2025-05-16 ENCOUNTER — MYC MEDICAL ADVICE (OUTPATIENT)
Dept: NEUROLOGY | Facility: CLINIC | Age: 30
End: 2025-05-16
Payer: COMMERCIAL

## 2025-05-18 ENCOUNTER — APPOINTMENT (OUTPATIENT)
Dept: ULTRASOUND IMAGING | Facility: CLINIC | Age: 30
End: 2025-05-18
Attending: FAMILY MEDICINE
Payer: COMMERCIAL

## 2025-05-18 ENCOUNTER — HOSPITAL ENCOUNTER (EMERGENCY)
Facility: CLINIC | Age: 30
Discharge: HOME | End: 2025-05-18
Attending: FAMILY MEDICINE | Admitting: FAMILY MEDICINE
Payer: COMMERCIAL

## 2025-05-18 VITALS
TEMPERATURE: 97.6 F | HEART RATE: 70 BPM | SYSTOLIC BLOOD PRESSURE: 107 MMHG | BODY MASS INDEX: 34.07 KG/M2 | DIASTOLIC BLOOD PRESSURE: 66 MMHG | RESPIRATION RATE: 16 BRPM | OXYGEN SATURATION: 96 % | WEIGHT: 230 LBS | HEIGHT: 69 IN

## 2025-05-18 DIAGNOSIS — R20.0 NUMBNESS OF UPPER EXTREMITY: ICD-10-CM

## 2025-05-18 LAB
ALBUMIN SERPL BCG-MCNC: 4.4 G/DL (ref 3.5–5.2)
ALP SERPL-CCNC: 68 U/L (ref 40–150)
ALT SERPL W P-5'-P-CCNC: 25 U/L (ref 0–50)
ANION GAP SERPL CALCULATED.3IONS-SCNC: 14 MMOL/L (ref 7–15)
AST SERPL W P-5'-P-CCNC: 37 U/L (ref 0–45)
BASOPHILS # BLD AUTO: 0.1 10E3/UL (ref 0–0.2)
BASOPHILS NFR BLD AUTO: 1 %
BILIRUB SERPL-MCNC: 0.4 MG/DL
BUN SERPL-MCNC: 15.7 MG/DL (ref 6–20)
CALCIUM SERPL-MCNC: 10.1 MG/DL (ref 8.8–10.4)
CHLORIDE SERPL-SCNC: 100 MMOL/L (ref 98–107)
CK SERPL-CCNC: 72 U/L (ref 26–192)
CREAT SERPL-MCNC: 0.79 MG/DL (ref 0.51–0.95)
CRP SERPL-MCNC: 3.94 MG/L
D DIMER PPP FEU-MCNC: 0.29 UG/ML FEU (ref 0–0.5)
EGFRCR SERPLBLD CKD-EPI 2021: >90 ML/MIN/1.73M2
EOSINOPHIL # BLD AUTO: 1 10E3/UL (ref 0–0.7)
EOSINOPHIL NFR BLD AUTO: 15 %
ERYTHROCYTE [DISTWIDTH] IN BLOOD BY AUTOMATED COUNT: 15.6 % (ref 10–15)
ERYTHROCYTE [SEDIMENTATION RATE] IN BLOOD BY WESTERGREN METHOD: 2 MM/HR (ref 0–20)
GLUCOSE SERPL-MCNC: 117 MG/DL (ref 70–99)
HCG INTACT+B SERPL-ACNC: <1 MIU/ML
HCO3 SERPL-SCNC: 21 MMOL/L (ref 22–29)
HCT VFR BLD AUTO: 38.5 % (ref 35–47)
HGB BLD-MCNC: 12.7 G/DL (ref 11.7–15.7)
IMM GRANULOCYTES # BLD: 0 10E3/UL
IMM GRANULOCYTES NFR BLD: 1 %
LYMPHOCYTES # BLD AUTO: 2.5 10E3/UL (ref 0.8–5.3)
LYMPHOCYTES NFR BLD AUTO: 36 %
MCH RBC QN AUTO: 25.3 PG (ref 26.5–33)
MCHC RBC AUTO-ENTMCNC: 33 G/DL (ref 31.5–36.5)
MCV RBC AUTO: 77 FL (ref 78–100)
MONOCYTES # BLD AUTO: 0.5 10E3/UL (ref 0–1.3)
MONOCYTES NFR BLD AUTO: 7 %
NEUTROPHILS # BLD AUTO: 2.8 10E3/UL (ref 1.6–8.3)
NEUTROPHILS NFR BLD AUTO: 41 %
NRBC # BLD AUTO: 0 10E3/UL
NRBC BLD AUTO-RTO: 0 /100
PLATELET # BLD AUTO: 262 10E3/UL (ref 150–450)
POTASSIUM SERPL-SCNC: 4.8 MMOL/L (ref 3.4–5.3)
PROT SERPL-MCNC: 6.7 G/DL (ref 6.4–8.3)
RBC # BLD AUTO: 5.01 10E6/UL (ref 3.8–5.2)
SODIUM SERPL-SCNC: 135 MMOL/L (ref 135–145)
WBC # BLD AUTO: 6.9 10E3/UL (ref 4–11)

## 2025-05-18 PROCEDURE — 99284 EMERGENCY DEPT VISIT MOD MDM: CPT | Mod: 25 | Performed by: FAMILY MEDICINE

## 2025-05-18 PROCEDURE — 85652 RBC SED RATE AUTOMATED: CPT | Performed by: FAMILY MEDICINE

## 2025-05-18 PROCEDURE — 36415 COLL VENOUS BLD VENIPUNCTURE: CPT | Performed by: FAMILY MEDICINE

## 2025-05-18 PROCEDURE — 84155 ASSAY OF PROTEIN SERUM: CPT | Performed by: FAMILY MEDICINE

## 2025-05-18 PROCEDURE — 93976 VASCULAR STUDY: CPT | Mod: XU

## 2025-05-18 PROCEDURE — 36416 COLLJ CAPILLARY BLOOD SPEC: CPT | Performed by: FAMILY MEDICINE

## 2025-05-18 PROCEDURE — 250N000013 HC RX MED GY IP 250 OP 250 PS 637: Performed by: STUDENT IN AN ORGANIZED HEALTH CARE EDUCATION/TRAINING PROGRAM

## 2025-05-18 PROCEDURE — 85004 AUTOMATED DIFF WBC COUNT: CPT | Performed by: FAMILY MEDICINE

## 2025-05-18 PROCEDURE — 84702 CHORIONIC GONADOTROPIN TEST: CPT | Performed by: FAMILY MEDICINE

## 2025-05-18 PROCEDURE — 82550 ASSAY OF CK (CPK): CPT | Performed by: FAMILY MEDICINE

## 2025-05-18 PROCEDURE — 99285 EMERGENCY DEPT VISIT HI MDM: CPT | Performed by: FAMILY MEDICINE

## 2025-05-18 PROCEDURE — 86140 C-REACTIVE PROTEIN: CPT | Performed by: FAMILY MEDICINE

## 2025-05-18 PROCEDURE — 85379 FIBRIN DEGRADATION QUANT: CPT | Performed by: FAMILY MEDICINE

## 2025-05-18 RX ORDER — OXYCODONE HYDROCHLORIDE 5 MG/1
5 TABLET ORAL ONCE
Refills: 0 | Status: COMPLETED | OUTPATIENT
Start: 2025-05-18 | End: 2025-05-18

## 2025-05-18 RX ADMIN — OXYCODONE 5 MG: 5 TABLET ORAL at 09:42

## 2025-05-18 ASSESSMENT — ACTIVITIES OF DAILY LIVING (ADL)
ADLS_ACUITY_SCORE: 56
ADLS_ACUITY_SCORE: 54

## 2025-05-18 NOTE — DISCHARGE INSTRUCTIONS
Case was discussed with neurology on-call who were reassured by her lab work today and recommend continued following up with your neurology and rheumatology teams.  Return if any other acute concerns otherwise or follow-up with rheumatology and neurology teams further.  Regular medications as prescribed.

## 2025-05-18 NOTE — ED TRIAGE NOTES
PT hear two weeks ago with similar complaints of numbness in legs but now she has numbness in her hands - very painful. Also concerned she might have cysts on ovaries - bilateral abd pain.     Triage Assessment (Adult)       Row Name 05/18/25 0537          Triage Assessment    Airway WDL WDL        Respiratory WDL    Respiratory WDL WDL        Skin Circulation/Temperature WDL    Skin Circulation/Temperature WDL WDL        Cardiac WDL    Cardiac WDL WDL        Peripheral/Neurovascular WDL    Peripheral Neurovascular WDL neurovascular assessment lower;neurovascular assessment upper;all     Pulse Assessment dorsalis pedis        Pulse Dorsalis Pedis    Left Dorsalis Pedis Pulse 2+ (normal)     Right Dorsalis Pedis Pulse 2+ (normal)        [place] Coma Scale    Best Eye Response 4-->(E4) spontaneous     Best Motor Response 6-->(M6) obeys commands     Best Verbal Response 5-->(V5) oriented     [place] Coma Scale Score 15        LUE Neurovascular Assessment    Temperature LUE warm     Color LUE no discoloration     Sensation LUE numbness present        RUE Neurovascular Assessment    Temperature RUE warm     Color RUE no discoloration     Sensation RUE numbness present        LLE Neurovascular Assessment    Temperature LLE warm     Color LLE no discoloration     Sensation LLE numbness present        RLE Neurovascular Assessment    Temperature RLE warm     Color RLE no discoloration     Sensation RLE numbness present

## 2025-05-18 NOTE — ED PROVIDER NOTES
History     Chief Complaint   Patient presents with    Numbness     HPI  Caprice Kline is a 29 year old female who presents to the ED with lower abdominal pain and concerns about recurrent ovarian cyst.  She has had these before and this feels similar.  Last menstrual period is 4/17/2025 but her menses are irregular.  She is sexually active and not using any form of birth control.  Does not think she is pregnant but we will check.    Also having more numbness in her arms along the ulnar aspects.  Left 4th and 5th finger and into the thumb and then the right index and long fingers.  She was hospitalized [facility] recently with elevated CK and had an extensive rheumatological and neurological workup.  Had a muscle biopsy of her left gluteus minimus and is slated to have an outpatient EMG.    She was on gabapentin for anxiety in the past but then switched to Lyrica.  Does not seem to be helping her nerve pain.  She feels a burning sensation from the dorsum of her right foot up her shin.  That is on top of the baseline numbness.  Her left foot tends to swell a little bit more than the right.  She was told she has varicose veins but no DVT.    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 Pertinent history prior to hospitalization includes multiple pain interventions for back pain, recently failed a SCS trial. Prior to admit patient workup with  MRI (on 4/22/25) revealed more pronounced edema in distal R gluteus medius, US (on 4/25 US)  without DVT, positive TEODORO 1:160 with negative DOM, negative myositis panel, neg RF/CCP, and normal complement / LFTs / CK / aldolase / inflammatory markers. Patient was transferred from [facility] ED for worsening pain and elevated CK. During admit highest CK was 2788 and femur MRI on 4/29 revealed myonecrosis in L gluteus minimus and L quadratus femoris and associated rim enhancement in the L gluteus minimus and L quadratus femoris. Rheumatology, neurology, infectious disease were all consulted and extensive workup was completed and remained overall unremarkable except mildly positive TEODORO. At the request of multiple consulting specialists, muscle biopsy was completed on 5/2/2025. CK gradually normalized and pain stabilized. On day of discharge CK levels were normal for four days without steroid or antibiotic treatment. Multiple specialists teams were in agreement with plan for discharge with close follow up with PCP and neurology for further workup as indicated by histopathology results. At time of histopathology results were still pending, and to be followed by PCP and neurology outpatient. On discharge patient symptoms had stabilized without IV antibiotics or IV  steroids and patient was transitioned back to her PTA morphine on discharge as discussed with OP Neura provider with plan for follow up.        #Foot Numbness  Intermittent foot numbness and swelling present during admit with 2+ DP pulses, normal capillary refill and normal strength raising concern for peripheral neuropathy but without acute findings c/f the need for additional inpatient workup.  - recommend outpatient neurology follow up  - continued PTA pregabalin 100mg at bedtime + 150mg TID      #Malaise  #Fatigue  Malaise, fatigue, chills could represent viral illness or deconditioning while in the hospital. Unlikely UTI or CAP without dysuria, SOB or fever. RSV/COVID/Flu negative. CTM     # Hx of Urinary retention  # Hx of Urinary hesitancy   # Pelvic pain   Evaluated on 11/5/2024 for acute urinary retention and was discharged home from ED with fisher catheter. Does not have catheter in place, nor has needed to self-cath thereafter. No new urinary and voiding concerns while admitted. Continued PTA Flomax 0.4mg at bedtime.      # Transaminitis, resolved\  , ALT 95 noted on 4/28 AM labs T bili and INR normal. Remained asymptomatic. Held Tylenol.  ALT 25 AST 20 on 5/6/2025.     # Subclinical hypothyroidism  Admission labs notable for TSH 5.13 and free T4 1.04. Asymptomatic. Re-check in 3-6 months or sooner if symptoms develop.     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 Continued home meds.               - PTA Adderall XR 30mg every day PRN              - PTA Clonazepam 0.5mg 1 tab PRN anxiety              - PTA Doxepin 10mg 1 tab at bedtime              - PTA Lamictal 100mg every morning & 50mg daily at bedtime               - PTA Lithium 600mg 1 capsule at bedtime               - PTA Prazosin 2mg qam and 5mg qpm               - PTA Quetiapine 300mg at bedtime     #  "Bilateral edema - chronic   Per patient report, chronic and managed with furosemide 20mg. Of note, had been taking Lyrica for some time, which may also have contributed to LE edema. Exercised caution with Furosemide administration in the setting of rising CK and potential for renal injury.   - PTA Furosemide 20mg daily      # Non-intractable migraine   # Tremor   Sees outpatient neurology for these concerns. Baseline headaches have not acutely changed.  - PTA Ubrogepant 100mg 1 tab at onset of headache (brought from home)             Allergies:  Allergies   Allergen Reactions    Metronidazole Anaphylaxis and Hives     Other reaction(s): Throat swelling  Throat swelling 6 hrs after exposure  Itching and hives 2 hrs after exposure    Shellfish Allergy Anxiety, Diarrhea, Difficulty breathing, Hives, Itching, Rash, Shortness Of Breath and Swelling    Codeine Headache    Fish-Derived Products      Stopped fish oil and less stomach discomfort  Rash after eating fish.     Hydrocodone-Acetaminophen Other (See Comments)    Sertraline Other (See Comments)     Patient was foggy and \"high\" feeling    Shellfish-Derived Products      Lips get numb, throat gets scratchy, rashes       Problem List:    Patient Active Problem List    Diagnosis Date Noted    Myonecrosis (H) 05/04/2025     Priority: Medium    Elevated CK 04/28/2025     Priority: Medium    Bilateral thigh pain 04/27/2025     Priority: Medium    Fibromyalgia 01/29/2025     Priority: Medium    Genital herpes 11/03/2024     Priority: Medium    Left-sided low back pain with left-sided sciatica 03/08/2023     Priority: Medium    FH: colon cancer 06/22/2022     Priority: Medium     Will need colonoscopy at 34 years old.       Anaphylactic shock due to shellfish, initial encounter 06/22/2022     Priority: Medium    Bipolar II disorder (H) 04/28/2022     Priority: Medium    Borderline personality disorder in adult (H) 04/28/2022     Priority: Medium    Severe episode of " recurrent major depressive disorder, without psychotic features (H) 10/15/2021     Priority: Medium    Lumbar radiculopathy 07/19/2021     Priority: Medium     Added automatically from request for surgery [id number]      Suicide attempt (H) 11/01/2011     Priority: Medium     Last 2/16/19 with intentional benadryl overdose          Past Medical History:    Past Medical History:   Diagnosis Date    Bipolar disorder (H)     Complication of anesthesia     Depressive disorder 2011       Past Surgical History:    Past Surgical History:   Procedure Laterality Date    DISCECTOMY LUMBAR POSTERIOR MICROSCOPIC ONE LEVEL Left 08/24/2021    Procedure: Minimally invasive left Lumbar 5 to Sacral 1 microdiscectomy  ;  Surgeon: Destin Junior MD;  Location: [facility] OR    ENT SURGERY  Mar 17, 17    Tonsillectomy    GENITOURINARY SURGERY  2012    Fallopian tube removal and cyst removal    GYN SURGERY Right     cystectomy - with salpingectomy    HEAD & NECK SURGERY      tonsillectomy    HEAD & NECK SURGERY      wisdom teeth extraction    IR SOFT TISSUE BIOPSY  05/02/2025       Family History:    Family History   Problem Relation Age of Onset    Hypertension Mother     Colon Polyps Mother 50    Colon Cancer Mother     Substance Abuse Father     Colon Cancer Father 60    Diabetes Maternal Grandmother     Hypertension Maternal Grandmother     Colon Cancer Maternal Grandmother     Cancer Maternal Grandmother     Nephrolithiasis Maternal Grandmother     Diabetes Paternal Grandmother     Breast Cancer Paternal Grandmother     Cancer Paternal Grandmother        Social History:  Marital Status:  Single [1]  Social History     Tobacco Use    Smoking status: Former     Current packs/day: 0.00     Types: Cigarettes     Passive exposure: Current    Smokeless tobacco: Never   Vaping Use    Vaping status: Former    Substances: Nicotine    Devices: Refillable tank   Substance Use Topics    Alcohol use: Not Currently     Comment: 2 drinks every other day  "    Drug use: Not Currently        Medications:    albuterol (PROAIR HFA/PROVENTIL HFA/VENTOLIN HFA) 108 (90 Base) MCG/ACT inhaler  amphetamine-dextroamphetamine (ADDERALL XR) 30 MG 24 hr capsule  azelaic acid (FINACIA) 15 % external gel  clobetasol (TEMOVATE) 0.05 % external solution  clonazePAM (KLONOPIN) 0.5 MG tablet  diazepam (VALIUM) 10 MG tablet  diclofenac (VOLTAREN) 1 % topical gel  docusate sodium (COLACE) 100 MG capsule  EPINEPHrine (ANY BX GENERIC EQUIV) 0.3 MG/0.3ML injection 2-pack  furosemide (LASIX) 20 MG tablet  glucosamine-chondroitin 500-400 MG CAPS per capsule  hydrOXYzine (VISTARIL) 50 MG capsule  ipratropium-albuterol (COMBIVENT RESPIMAT)  MCG/ACT inhaler  ketoconazole (NIZORAL) 2 % external cream  ketoconazole (NIZORAL) 2 % external shampoo  lamoTRIgine (LAMICTAL) 100 MG tablet  lidocaine (LIDODERM) 5 % patch  lithium (ESKALITH) 600 MG capsule  magnesium oxide (MAG-OX) 400 MG tablet  methylPREDNISolone (MEDROL DOSEPAK) 4 MG tablet therapy pack  morphine (MSIR) 15 MG IR tablet  NARCAN 4 MG/0.1ML nasal spray  nortriptyline (PAMELOR) 10 MG capsule  ondansetron (ZOFRAN ODT) 4 MG ODT tab  prazosin (MINIPRESS) 2 MG capsule  prazosin (MINIPRESS) 5 MG capsule  pregabalin (LYRICA) 100 MG capsule  pregabalin (LYRICA) 150 MG capsule  propranolol (INDERAL) 20 MG tablet  propranolol ER (INDERAL LA) 80 MG 24 hr capsule  QUEtiapine (SEROQUEL) 300 MG tablet  RETIN-A 0.025 % external cream  Semaglutide-Weight Management (WEGOVY) 0.5 MG/0.5ML pen  sulfacetamide sodium, Acne, 10 % lotion  tamsulosin (FLOMAX) 0.4 MG capsule  tiZANidine (ZANAFLEX) 4 MG tablet  triamcinolone (KENALOG) 0.1 % external cream  ubrogepant (UBRELVY) 100 MG tablet          Review of Systems   All other systems reviewed and are negative.      Physical Exam   BP: 104/65  Pulse: 68  Temp: 97.6  F (36.4  C)  Resp: 16  Height: 175.3 cm (5' 9\")  Weight: 104.3 kg (230 lb)  SpO2: 98 %      Physical Exam  Constitutional:       General: She " is not in acute distress.  Eyes:      Extraocular Movements: Extraocular movements intact.      Pupils: Pupils are equal, round, and reactive to light.   Cardiovascular:      Rate and Rhythm: Normal rate and regular rhythm.      Pulses: Normal pulses.   Pulmonary:      Effort: Pulmonary effort is normal.      Breath sounds: Normal breath sounds.   Abdominal:      Palpations: Abdomen is soft.      Tenderness: There is abdominal tenderness (moderate lower).   Musculoskeletal:      Right lower leg: Edema (trace) present.      Left lower leg: Edema (trace-1+) present.   Skin:     General: Skin is warm and dry.      Capillary Refill: Capillary refill takes less than 2 seconds.   Neurological:      Mental Status: She is alert.      Cranial Nerves: Cranial nerves 2-12 are intact.      Sensory: Sensory deficit (I had an extensive decrease sensation to light touch and pinprick in both forearms and hands) present.      Motor: Weakness (no focal weakness) present.      Deep Tendon Reflexes:      Reflex Scores:       Patellar reflexes are 2+ on the right side and 2+ on the left side.       Achilles reflexes are 1+ on the right side and 1+ on the left side.     Comments: Hypersensitive to light touch over the dorsum of the right foot and anterior shin.         ED Course        Procedures              Critical Care time:  none     None         No results found for this or any previous visit (from the past 24 hours).    Medications - No data to display    Assessments & Plan (with Medical Decision Making)  29-year-old female in now with increasing numbness in both forearms and hands.  She states she was told to come in if this were to happen.  She was hospitalized at the [facility] a few weeks ago with elevated CK levels, weakness foot numbness fatigue and ended up with a muscle biopsy of the left gluteus tj.  She was seen by neurology rheumatology and infectious disease and had extensive workup.  That continues as  an outpatient.  She also has lower abdominal/pelvic pain and is concerned that she might have recurrent ovarian cysts which she has had in the past.  Last menstrual period was 4/17/2020 provide but her menses are irregular.  Will repeat her labs including a CK and get a pelvic ultrasound to look for ovarian cysts.  She is already plugged in with rheumatology and neurology.  Dr. Segundo will assume her care at change of shift.     I have reviewed the nursing notes.    I have reviewed the findings, diagnosis, plan and need for follow up with the patient.           Medical Decision Making  The patient's presentation was of high complexity (a chronic illness severe exacerbation, progression, or side effect of treatment).    The patient's evaluation involved:  review of external note(s) from 3+ sources (multiple notes from her recent hospital stay)  ordering and/or review of 3+ test(s) in this encounter (see separate area of note for details)    The patient's management necessitated further care after sign-out to Dr Segundo (see their note for further management).        New Prescriptions    No medications on file           5/18/2025   [facility] EMERGENCY DEPT       Man Crawford MD  05/18/25 [id number]

## 2025-05-18 NOTE — ED NOTES
Patient handed off by overnight physician Dr. Mercer.    Goal is to continue to follow-up on lab work with disposition planning and imaging.    Ultimately presentation is twofold 1 with the new left-sided upper extremity numbness.  Was told to be reevaluated via [facility] for any further neurological symptoms after hospitalization.  Also with noted pelvic discomfort with a history of ovarian cyst.      Patient recently hospitalized from 4 27-5/6 secondary to bilateral thigh pain anterior leg pain and elevated CK.  Extensive workup was completed with muscle biopsies and outpatient EMG.    Her symptoms are concern for may be an underlying carpal tunnel picture but her lab work shows the findings below    Labs Ordered and Resulted from Time of ED Arrival to Time of ED Departure   COMPREHENSIVE METABOLIC PANEL - Abnormal       Result Value    Sodium 135      Potassium 4.8      Carbon Dioxide (CO2) 21 (*)     Anion Gap 14      Urea Nitrogen 15.7      Creatinine 0.79      GFR Estimate >90      Calcium 10.1      Chloride 100      Glucose 117 (*)     Alkaline Phosphatase 68      AST 37      ALT 25      Protein Total 6.7      Albumin 4.4      Bilirubin Total 0.4     CBC WITH PLATELETS AND DIFFERENTIAL - Abnormal    WBC Count 6.9      RBC Count 5.01      Hemoglobin 12.7      Hematocrit 38.5      MCV 77 (*)     MCH 25.3 (*)     MCHC 33.0      RDW 15.6 (*)     Platelet Count 262      % Neutrophils 41      % Lymphocytes 36      % Monocytes 7      % Eosinophils 15      % Basophils 1      % Immature Granulocytes 1      NRBCs per 100 WBC 0      Absolute Neutrophils 2.8      Absolute Lymphocytes 2.5      Absolute Monocytes 0.5      Absolute Eosinophils 1.0 (*)     Absolute Basophils 0.1      Absolute Immature Granulocytes 0.0      Absolute NRBCs 0.0     CK TOTAL - Normal    CK 72     HCG QUANTITATIVE PREGNANCY - Normal    hCG Quantitative <1     CRP INFLAMMATION - Normal    CRP Inflammation 3.94     ERYTHROCYTE SEDIMENTATION RATE AUTO  - Normal    Erythrocyte Sedimentation Rate 2     D DIMER QUANTITATIVE - Normal    D-Dimer Quantitative 0.29     ROUTINE UA WITH MICROSCOPIC REFLEX TO CULTURE     Ultimately ESR CRP CK D-dimer and hCG were unremarkable.  CMP and CBC are also unremarkable.  Reviewed patient's previous lab work from hospitalization with a mild change in thyroid however do not believe repeating this today would be appropriate this is within less than 6 weeks.  Ultimately discussed the case with Dr. Juan José Albarado as this is completely sensory and no obvious other neurologic symptoms are currently present aside from her baseline neurological complaints has been already addressed with rheumatology neurology extensively 3 of them do not believe further imaging or lab work can be necessary at this time Dr. Lemus is in agreement as he has already have EMG upcoming with no significant new findings aside from the numbness and otherwise not being associated weakness or other acute changes feels comfortable with plan to discharge home with outpatient follow-up with neurology.    Patient was provided 1 dose of oxycodone for concerns of continued pain which is chronic.  Ultimately discharged with boyfriend for a ride home after reprovide oxycodone.  Patient is understanding of the plan and discharged home     Darnell Segundo MD  05/18/25 [id number]

## 2025-05-19 ENCOUNTER — VIRTUAL VISIT (OUTPATIENT)
Dept: UROLOGY | Facility: CLINIC | Age: 30
End: 2025-05-19
Payer: COMMERCIAL

## 2025-05-19 ENCOUNTER — TELEPHONE (OUTPATIENT)
Dept: UROLOGY | Facility: CLINIC | Age: 30
End: 2025-05-19

## 2025-05-19 DIAGNOSIS — R33.9 URINARY RETENTION: Primary | ICD-10-CM

## 2025-05-19 NOTE — PROGRESS NOTES
Reason for visit:  f/u on urinary symptoms.    Clinical Data:  Caprice Kline is a 29 year old female with hx of urinary retention.  She notices this is worse when she is nervous.  She tried pelvic floor PT but it did not help her.  She was started on flomax and taught how to perform CIC.  She finds that the Flomax is helpful and in times when her pain is very bad then she may need to perform CIC and that has been going well.    She was also given vaginal valium which helps a little with the pain but it does help her pelvic floor muscles relax.    Of note she was hospitalized for myonecrosis of the left gluteus and left quadratus femoris of unclear etiology.      11/26/24 CT lumbar spine:  Impression    1.  No fracture or posttraumatic subluxation.  2.  No high-grade spinal canal or neural foraminal stenosis.    11/26/24 CT abd/pelvis:  Impression    1.  No acute pathology identified in abdomen or pelvis.  2.  2 mm nonobstructing calyceal stone in the right kidney. No ureteral stones or hydronephrosis.        Assessment & Plan   28 y/o female with some pelvic floor dysfunction and urinary retention and urinary hesitancy in the setting of fibromyalgia and chronic pain.  She has tried PFPT and failed.  We discussed trying flomax and CIC, she would like to do this.  -continue Flomax 0.4 mg nightly  -continue with CIC as needed, she will need a Rx for catheters.  -continue with vaginal valium  -will hold off on UDS for now  -f/u with me in 6 months or so virtually, for symptoms check.    Teresa Mckee MD  [facility]

## 2025-05-19 NOTE — TELEPHONE ENCOUNTER
Mailed out bladder sling consent form to patient's address.    Once received back into clinic will place into STAT scanning folder to be placed into patient's chart.     Tamara Luis MA on 5/19/2025 at 3:01 PM

## 2025-05-19 NOTE — TELEPHONE ENCOUNTER
Yeah I though that EMG at the [facility] was usually booking out 2-3 months?  I guess that isn't true.  We should see about wait list spots--I bet if we keep a look out at [facility] something would pop up- if not we could refer out to [facility] to get it done sooner if she would like

## 2025-05-19 NOTE — TELEPHONE ENCOUNTER
Oh interesting.  I thought we were discussing EMG at her visit but maybe she heard autonomic testing.  She has so much going on that perhaps both would be pertinent.  But the timing I was quoting her was for EMG.  I have an order in now.  Sorry for all of the confusion!  We didn't address her autonomic symptoms as much at our most recent visit

## 2025-05-19 NOTE — NURSING NOTE
Current patient location: [address] [place] [id number]    Is the patient currently in the state of [place]? YES    Visit mode: VIDEO    If the visit is dropped, the patient can be reconnected by:VIDEO VISIT: Text to cell phone:   Telephone Information:   Mobile [phone]       Will anyone else be joining the visit? NO  (If patient encounters technical issues they should call [phone]    Are changes needed to the allergy or medication list? No and Pt stated no med changes    Are refills needed on medications prescribed by this physician? NO    Rooming Documentation:  Not applicable    Reason for visit: TIMOTHY PADRON

## 2025-05-19 NOTE — PROGRESS NOTES
Virtual Visit Details    Type of service:  Video Visit   Video Start Time: 1:09 PM  Video End Time:1:19 PM    Originating Location (pt. Location): Home    Distant Location (provider location):  Off-site  Platform used for Video Visit: Tiffanie

## 2025-05-19 NOTE — PATIENT INSTRUCTIONS
-continue Flomax 0.4 mg nightly  -continue with CIC as needed, she will need a Rx for catheters.  -continue with vaginal valium  -will hold off on UDS for now  -f/u with me in 6 months or so virtually, for symptoms check.

## 2025-05-20 ENCOUNTER — TELEPHONE (OUTPATIENT)
Dept: UROLOGY | Facility: CLINIC | Age: 30
End: 2025-05-20
Payer: COMMERCIAL

## 2025-05-20 DIAGNOSIS — R33.9 URINARY RETENTION: ICD-10-CM

## 2025-05-20 DIAGNOSIS — R10.2 PELVIC PAIN IN FEMALE: ICD-10-CM

## 2025-05-20 NOTE — TELEPHONE ENCOUNTER
Teresa Mckee MD  [facility]  Can we get this patient more catheters, perhaps around 6 per month to have.    Thank you    Received the above message from Dr. Mckee. My chart message sent to patient to discuss further.    Layla Ayala RN, BSN

## 2025-05-20 NOTE — TELEPHONE ENCOUNTER
Per Dr. Mckee, patient to complete CIC 1 time per day with use of 14 Persian catheter for diagnosis of urinary retention, which is expected to last greater than 90 days.    Layla Ayala RN, BSN

## 2025-05-21 ENCOUNTER — VIRTUAL VISIT (OUTPATIENT)
Dept: NEUROLOGY | Facility: CLINIC | Age: 30
End: 2025-05-21
Payer: COMMERCIAL

## 2025-05-21 ENCOUNTER — DOCUMENTATION ONLY (OUTPATIENT)
Dept: UROLOGY | Facility: CLINIC | Age: 30
End: 2025-05-21

## 2025-05-21 DIAGNOSIS — R52 DIFFUSE PAIN: Primary | ICD-10-CM

## 2025-05-21 DIAGNOSIS — M79.671 PAIN IN BOTH FEET: ICD-10-CM

## 2025-05-21 DIAGNOSIS — M79.672 PAIN IN BOTH FEET: ICD-10-CM

## 2025-05-21 RX ORDER — DIAZEPAM 10 MG/1
TABLET ORAL
Qty: 15 TABLET | Refills: 0 | Status: SHIPPED | OUTPATIENT
Start: 2025-05-21

## 2025-05-21 RX ORDER — NORTRIPTYLINE HYDROCHLORIDE 25 MG/1
25 CAPSULE ORAL AT BEDTIME
Qty: 90 CAPSULE | Refills: 1 | Status: SHIPPED | OUTPATIENT
Start: 2025-05-21

## 2025-05-21 NOTE — LETTER
5/21/2025      Caprice Kline  [address] [facility] [address] [address]      Dear Colleague,    Thank you for referring your patient, Caprice Kline, to the [facility] [place]. Please see a copy of my visit note below.    [facility]  Section of General Neurology  Return Patient  Virtual Visit    Caprice Kline MRN# [id number]   Age: 29 year old YOB: 1995            Assessment and Plan:   Assessment:  Caprice Kline is a 29 year old female who presents today in follow up.  She has a PMH of bipolar, depression with h/o SI, BPD, recent preseptal cellulitis among other PMH as noted previously.  She was seen last week to discuss transient CK elevation with unrevealing biopsy for a clear autoimmune or persistent cause.  Blood work also reassuring.  She now has intense burning in feet prompting return.  This could be post viral in terms of pain, transient CK elevation but difficult to prove.    Discussed should get in person exam when able if a rash persists, she had to cancel an appointment burnign pain in feet is so bad  Ultimately I think she needs a pain team to help with this given how refractory to conventional medications this has become but we discussed options from a neurology perspective for diffuse pain, plan as below     Plan:  Gabapentin cream Prn to compounding pharmacy  Increase nortriptyline to 25 mg at bedtime, re-discussed side effects, medication interactions at length, will proceed given limited options otherwise  Remains on max dose lyrica (200 mg TID)  Will give belbuca from other clinic some time to potentially kick in too when covered, we do not prescribe this in neurology clinic  Await EMG from previous plan as well        Louie Goldman MD   of Neurology   [facility]      Interval history:     She is seen in expedited follow up to discuss worsening foot pain  Rash-->discussed to review with her  regular doctor too to exclude non neurological causes   Discussed viral illnesses    She has a 6/10 pain appointment now which is excellent news  Medrol dose pack --on day 2    Current pain meds--lyrica 200 mg TID, propranolol for headaches, nortriptyline 10 mg at bedtime , on seroquel, adderral notably also    Has July EMG appointment    Cymbalta/effexor/depakote   Or just higher nortriptyline  Cymbalta--thinks she felt weird   Feels like coals on feet constantly    Was prescribed belbuca recently --waiting on appeal for coverage though      A/P at visit last week  Caprice Kline is a 29 year old female who presents today in follow up.  She has a PMH of bipolar, depression with h/o SI, BPD, recent preseptal cellulitis among other PMH as below.  We previously were working more so on migraine headaches but lately she has had escalation of more diffuse body pains.  She also had a transient CK elevation correlative with lower limb pains.  CK has normalized.  Blood work did not show a systemic cause of CK elevation.  Muscle Biopsy showed more of a focal injury perhaps but will review with our neuromuscular team further.  If no good reason found I would consider this a good prognostic sign as transient CK elevations can occur for a variety of self limiting reasons.    Discussed we have more limited pain options for diffuse pain given previous trials and current medications utilized would value her checking in with a pain clinic in these regards.      Plan:  EMG given continued diffuse symptoms to further exclude occult myopathy (less likely), neuropathy  Nortriptyline 10 mg at bedtime , can go higher as tolerated/needed, to stop doxepin  Medrol dose pack to help break any possible inflammation contributing  Pain clinic referral  No other changes to previous migraine regimen  Has follow up scheduled      Past Medical History:     Patient Active Problem List   Diagnosis     Lumbar radiculopathy     Severe episode of  "recurrent major depressive disorder, without psychotic features (H)     Suicide attempt (H)     Bipolar II disorder (H)     Borderline personality disorder in adult (H)     FH: colon cancer     Anaphylactic shock due to shellfish, initial encounter     Left-sided low back pain with left-sided sciatica     Genital herpes     Fibromyalgia     Bilateral thigh pain     Elevated CK     Myonecrosis (H)     Past Medical History:   Diagnosis Date     Bipolar disorder (H)      Complication of anesthesia     \"freaking out when I wake up\"     Depressive disorder 2011        Past Surgical History:     Past Surgical History:   Procedure Laterality Date     DISCECTOMY LUMBAR POSTERIOR MICROSCOPIC ONE LEVEL Left 08/24/2021    Procedure: Minimally invasive left Lumbar 5 to Sacral 1 microdiscectomy  ;  Surgeon: Destin Junior MD;  Location: [facility] OR     ENT SURGERY  Mar 17, 17    Tonsillectomy     GENITOURINARY SURGERY  2012    Fallopian tube removal and cyst removal     GYN SURGERY Right     cystectomy - with salpingectomy     HEAD & NECK SURGERY      tonsillectomy     HEAD & NECK SURGERY      wisdom teeth extraction     IR SOFT TISSUE BIOPSY  05/02/2025        Social History:     Social History     Tobacco Use     Smoking status: Former     Current packs/day: 0.00     Types: Cigarettes     Passive exposure: Current     Smokeless tobacco: Never   Vaping Use     Vaping status: Former     Substances: Nicotine     Devices: Refillable tank   Substance Use Topics     Alcohol use: Not Currently     Comment: 2 drinks every other day      Drug use: Not Currently        Family History:     Family History   Problem Relation Age of Onset     Hypertension Mother      Colon Polyps Mother 50     Colon Cancer Mother      Substance Abuse Father      Colon Cancer Father 60     Diabetes Maternal Grandmother      Hypertension Maternal Grandmother      Colon Cancer Maternal Grandmother      Cancer Maternal Grandmother      Nephrolithiasis Maternal " Grandmother      Diabetes Paternal Grandmother      Breast Cancer Paternal Grandmother      Cancer Paternal Grandmother         Medications:     Current Outpatient Medications   Medication Sig Dispense Refill     albuterol (PROAIR HFA/PROVENTIL HFA/VENTOLIN HFA) 108 (90 Base) MCG/ACT inhaler Inhale 2 puffs into the lungs every 6 hours as needed for shortness of breath, wheezing or cough 18 g 0     amphetamine-dextroamphetamine (ADDERALL XR) 30 MG 24 hr capsule Take 30 mg by mouth daily as needed.       azelaic acid (FINACIA) 15 % external gel Apply topically 2 times daily as needed.       clobetasol (TEMOVATE) 0.05 % external solution APPLY TOPICALLY TO THE AFFECTED AREA TWICE DAILY FOR UP TO 21 DAYS. DO NOT APPLY TO NORMAL SKIN       clonazePAM (KLONOPIN) 0.5 MG tablet Take 0.5 mg by mouth daily as needed for anxiety.       diazepam (VALIUM) 10 MG tablet PLACE 1 TABLET VAGINALLY AS NEEDED FOR URINARY RETENTION OR FOR PAIN 15 tablet 0     diclofenac (VOLTAREN) 1 % topical gel Apply 2 g topically 4 times daily. Max 34 grams per day. 340 g 1     docusate sodium (COLACE) 100 MG capsule Take 200 mg by mouth 2 times daily.       EPINEPHrine (ANY BX GENERIC EQUIV) 0.3 MG/0.3ML injection 2-pack INJECT 0.3 ML INTO THE MUSCLE AS NEEDED FOR ANAPHYLAXIS 2 each 1     furosemide (LASIX) 20 MG tablet Take 1 tablet (20 mg) by mouth daily. 90 tablet 0     glucosamine-chondroitin 500-400 MG CAPS per capsule Take 1 capsule by mouth daily.       hydrOXYzine (VISTARIL) 50 MG capsule Take 100 mg by mouth 3 times daily.       ipratropium-albuterol (COMBIVENT RESPIMAT)  MCG/ACT inhaler Inhale 1 puff into the lungs 4 times daily as needed for shortness of breath, wheezing or cough 4 g 0     ketoconazole (NIZORAL) 2 % external cream Apply topically. 3 times every week       ketoconazole (NIZORAL) 2 % external shampoo Apply topically twice a week.       lamoTRIgine (LAMICTAL) 100 MG tablet Take  mg by mouth 2 times daily. 100 mg  AM / 50 mg PM as of 4/27 - pt in process of titrating back up       lidocaine (LIDODERM) 5 % patch Place 1 patch over 12 hours onto the skin every 24 hours. To prevent lidocaine toxicity, patient should be patch free for 12 hrs daily. 30 patch 1     lithium (ESKALITH) 600 MG capsule Take 600 mg by mouth at bedtime.       magnesium oxide (MAG-OX) 400 MG tablet Take 400 mg by mouth daily.       methylPREDNISolone (MEDROL DOSEPAK) 4 MG tablet therapy pack Follow Package Directions 21 tablet 0     morphine (MSIR) 15 MG IR tablet Take 15 mg by mouth 3 times daily as needed for pain.       NARCAN 4 MG/0.1ML nasal spray CALL 911. SPR CONTENTS OF ONE SPRAYER (0.1ML) INTO ONE NOSTRIL. REPEAT IN 2-3 MIN IF SYMPTOMS OF OPIOID EMERGENCY PERSIST, ALTERNATE NOSTRILS       nortriptyline (PAMELOR) 10 MG capsule Take 1 capsule (10 mg) by mouth at bedtime. 90 capsule 1     ondansetron (ZOFRAN ODT) 4 MG ODT tab DISSOLVE 1 TABLET(4 MG) ON THE TONGUE EVERY 8 HOURS AS NEEDED FOR NAUSEA 90 tablet 0     prazosin (MINIPRESS) 2 MG capsule Take 2 mg by mouth every morning.       prazosin (MINIPRESS) 5 MG capsule Take 5 mg by mouth at bedtime.       pregabalin (LYRICA) 100 MG capsule Take 100 mg by mouth at bedtime.       pregabalin (LYRICA) 150 MG capsule Take 150 mg by mouth 3 times daily. AM / noon / dinner       propranolol (INDERAL) 20 MG tablet Take 1 tablet (20 mg) by mouth 2 times daily as needed (tremor). 180 tablet 2     propranolol ER (INDERAL LA) 80 MG 24 hr capsule Take 1 capsule (80 mg) by mouth daily. 90 capsule 1     QUEtiapine (SEROQUEL) 300 MG tablet Take 300 mg by mouth At Bedtime       RETIN-A 0.025 % external cream APPLY SPARINGLY TO FACE EVERY OTHER NIGHT FOR 14 NIGHTS THEN NIGHTLY THEREAFTER       Semaglutide-Weight Management (WEGOVY) 0.5 MG/0.5ML pen Inject 0.5 mg subcutaneously once a week. 2 mL 0     sulfacetamide sodium, Acne, 10 % lotion APPLY IT TO THE FACE ONCE DAILY IN THE MORNING X3 MONTHS       tamsulosin  "(FLOMAX) 0.4 MG capsule Take 1 capsule (0.4 mg) by mouth every evening. 30 capsule 11     tiZANidine (ZANAFLEX) 4 MG tablet TAKE 1 TABLET BY MOUTH EVERY 8 TO 12 HOURS AS NEEDED. NOT TO EXCEED 3 DOSES IN 24 HOURS       triamcinolone (KENALOG) 0.1 % external cream Apply topically 2 times daily as needed.       ubrogepant (UBRELVY) 100 MG tablet Take 1 tablet (100 mg) by mouth at onset of headache. 8 tablet 11     No current facility-administered medications for this visit.        Allergies:     Allergies   Allergen Reactions     Metronidazole Anaphylaxis and Hives     Other reaction(s): Throat swelling  Throat swelling 6 hrs after exposure  Itching and hives 2 hrs after exposure     Shellfish Allergy Anxiety, Diarrhea, Difficulty breathing, Hives, Itching, Rash, Shortness Of Breath and Swelling     Codeine Headache     Fish-Derived Products      Stopped fish oil and less stomach discomfort  Rash after eating fish.      Hydrocodone-Acetaminophen Other (See Comments)     Sertraline Other (See Comments)     Patient was foggy and \"high\" feeling     Shellfish-Derived Products      Lips get numb, throat gets scratchy, rashes        Review of Systems:   As noted above     Physical Exam:   General: Seated comfortably in no acute distress.  Neurologic:     Mental Status: Fully alert, attentive and oriented. Speech clear and fluent, no paraphasic errors.     Cranial Nerves: EOM appear intact. Facial movements symmetric. Hearing not formally tested but intact to conversation.  No dysarthria.                The total time of this encounter today amounted to 40 minutes in total. This time included time spent with the patient, prep work, extensive chart review, coordinating care, ordering tests, and performing post visit documentation.          Caprice is a 29 year old who is being evaluated via a billable video visit.    How would you like to obtain your AVS? MyChart  If the video visit is dropped, the invitation should be resent " by: Text to cell phone: [phone]  Will anyone else be joining your video visit? No  {If patient encounters technical issues they should call [phone] :[id number]}  Video-Visit Details    Type of service:  Video Visit   Originating Location (pt. Location): Home  {PROVIDER LOCATION On-site should be selected for visits conducted from your clinic location or adjoining [facility] hospital, academic office, or other nearby [facility] building. Off-site should be selected for all other provider locations, including home:[id number]}  Distant Location (provider location):  On-site  Timin-316 PM on video    Again, thank you for allowing me to participate in the care of your patient.        Sincerely,        Karl Goldman MD    Electronically signed

## 2025-05-21 NOTE — PROGRESS NOTES
Caprice is a 29 year old who is being evaluated via a billable video visit.    How would you like to obtain your AVS? [employer]hart  If the video visit is dropped, the invitation should be resent by: Text to cell phone: [phone]  Will anyone else be joining your video visit? No    Video-Visit Details    Type of service:  Video Visit   Originating Location (pt. Location): Home    Distant Location (provider location):  On-site  Timin-316 PM on video

## 2025-05-21 NOTE — TELEPHONE ENCOUNTER
Submitted catheter order to [address] [place] [address] through the website portal.     Tamara Luis MA on 5/21/2025 at 11:04 AM

## 2025-05-21 NOTE — PROGRESS NOTES
[facility]  Section of General Neurology  Return Patient  Virtual Visit    Caprice Kline MRN# [id number]   Age: 29 year old YOB: 1995            Assessment and Plan:   Assessment:  Caprice Kline is a 29 year old female who presents today in follow up.  She has a PMH of bipolar, depression with h/o SI, BPD, recent preseptal cellulitis among other PMH as noted previously.  She was seen last week to discuss transient CK elevation with unrevealing biopsy for a clear autoimmune or persistent cause.  Blood work also reassuring.  She now has intense burning in feet prompting return.  This could be post viral in terms of pain, transient CK elevation but difficult to prove.    Discussed should get in person exam when able if a rash persists, she had to cancel an appointment burnign pain in feet is so bad  Ultimately I think she needs a pain team to help with this given how refractory to conventional medications this has become but we discussed options from a neurology perspective for diffuse pain, plan as below     Plan:  Gabapentin cream Prn to compounding pharmacy  Increase nortriptyline to 25 mg at bedtime, re-discussed side effects, medication interactions at length, will proceed given limited options otherwise  Remains on max dose lyrica (200 mg TID)  Will give belbuca from other clinic some time to potentially kick in too when covered, we do not prescribe this in neurology clinic  Await EMG from previous plan as well        Louie Goldman MD   of Neurology   [facility]      Interval history:     She is seen in expedited follow up to discuss worsening foot pain  Rash-->discussed to review with her regular doctor too to exclude non neurological causes   Discussed viral illnesses    She has a 6/10 pain appointment now which is excellent news  Medrol dose pack --on day 2    Current pain meds--lyrica 200 mg TID, propranolol for headaches,  nortriptyline 10 mg at bedtime , on seroquel, adderral notably also    Has July EMG appointment    Cymbalta/effexor/depakote   Or just higher nortriptyline  Cymbalta--thinks she felt weird   Feels like coals on feet constantly    Was prescribed belbuca recently --waiting on appeal for coverage though      A/P at visit last week  Caprice Kline is a 29 year old female who presents today in follow up.  She has a PMH of bipolar, depression with h/o SI, BPD, recent preseptal cellulitis among other PMH as below.  We previously were working more so on migraine headaches but lately she has had escalation of more diffuse body pains.  She also had a transient CK elevation correlative with lower limb pains.  CK has normalized.  Blood work did not show a systemic cause of CK elevation.  Muscle Biopsy showed more of a focal injury perhaps but will review with our neuromuscular team further.  If no good reason found I would consider this a good prognostic sign as transient CK elevations can occur for a variety of self limiting reasons.    Discussed we have more limited pain options for diffuse pain given previous trials and current medications utilized would value her checking in with a pain clinic in these regards.      Plan:  EMG given continued diffuse symptoms to further exclude occult myopathy (less likely), neuropathy  Nortriptyline 10 mg at bedtime , can go higher as tolerated/needed, to stop doxepin  Medrol dose pack to help break any possible inflammation contributing  Pain clinic referral  No other changes to previous migraine regimen  Has follow up scheduled      Past Medical History:     Patient Active Problem List   Diagnosis    Lumbar radiculopathy    Severe episode of recurrent major depressive disorder, without psychotic features (H)    Suicide attempt (H)    Bipolar II disorder (H)    Borderline personality disorder in adult (H)    FH: colon cancer    Anaphylactic shock due to shellfish, initial encounter     "Left-sided low back pain with left-sided sciatica    Genital herpes    Fibromyalgia    Bilateral thigh pain    Elevated CK    Myonecrosis (H)     Past Medical History:   Diagnosis Date    Bipolar disorder (H)     Complication of anesthesia     \"freaking out when I wake up\"    Depressive disorder 2011        Past Surgical History:     Past Surgical History:   Procedure Laterality Date    DISCECTOMY LUMBAR POSTERIOR MICROSCOPIC ONE LEVEL Left 08/24/2021    Procedure: Minimally invasive left Lumbar 5 to Sacral 1 microdiscectomy  ;  Surgeon: Destin Junior MD;  Location: [facility] OR    ENT SURGERY  Mar 17, 17    Tonsillectomy    GENITOURINARY SURGERY  2012    Fallopian tube removal and cyst removal    GYN SURGERY Right     cystectomy - with salpingectomy    HEAD & NECK SURGERY      tonsillectomy    HEAD & NECK SURGERY      wisdom teeth extraction    IR SOFT TISSUE BIOPSY  05/02/2025        Social History:     Social History     Tobacco Use    Smoking status: Former     Current packs/day: 0.00     Types: Cigarettes     Passive exposure: Current    Smokeless tobacco: Never   Vaping Use    Vaping status: Former    Substances: Nicotine    Devices: [employer] tank   Substance Use Topics    Alcohol use: Not Currently     Comment: 2 drinks every other day     Drug use: Not Currently        Family History:     Family History   Problem Relation Age of Onset    Hypertension Mother     Colon Polyps Mother 50    Colon Cancer Mother     Substance Abuse Father     Colon Cancer Father 60    Diabetes Maternal Grandmother     Hypertension Maternal Grandmother     Colon Cancer Maternal Grandmother     Cancer Maternal Grandmother     Nephrolithiasis Maternal Grandmother     Diabetes Paternal Grandmother     Breast Cancer Paternal Grandmother     Cancer Paternal Grandmother         Medications:     Current Outpatient Medications   Medication Sig Dispense Refill    albuterol (PROAIR HFA/PROVENTIL HFA/VENTOLIN HFA) 108 (90 Base) MCG/ACT " inhaler Inhale 2 puffs into the lungs every 6 hours as needed for shortness of breath, wheezing or cough 18 g 0    amphetamine-dextroamphetamine (ADDERALL XR) 30 MG 24 hr capsule Take 30 mg by mouth daily as needed.      azelaic acid (FINACIA) 15 % external gel Apply topically 2 times daily as needed.      clobetasol (TEMOVATE) 0.05 % external solution APPLY TOPICALLY TO THE AFFECTED AREA TWICE DAILY FOR UP TO 21 DAYS. DO NOT APPLY TO NORMAL SKIN      clonazePAM (KLONOPIN) 0.5 MG tablet Take 0.5 mg by mouth daily as needed for anxiety.      diazepam (VALIUM) 10 MG tablet PLACE 1 TABLET VAGINALLY AS NEEDED FOR URINARY RETENTION OR FOR PAIN 15 tablet 0    diclofenac (VOLTAREN) 1 % topical gel Apply 2 g topically 4 times daily. Max 34 grams per day. 340 g 1    docusate sodium (COLACE) 100 MG capsule Take 200 mg by mouth 2 times daily.      EPINEPHrine (ANY BX GENERIC EQUIV) 0.3 MG/0.3ML injection 2-pack INJECT 0.3 ML INTO THE MUSCLE AS NEEDED FOR ANAPHYLAXIS 2 each 1    furosemide (LASIX) 20 MG tablet Take 1 tablet (20 mg) by mouth daily. 90 tablet 0    glucosamine-chondroitin 500-400 MG CAPS per capsule Take 1 capsule by mouth daily.      hydrOXYzine (VISTARIL) 50 MG capsule Take 100 mg by mouth 3 times daily.      ipratropium-albuterol (COMBIVENT RESPIMAT)  MCG/ACT inhaler Inhale 1 puff into the lungs 4 times daily as needed for shortness of breath, wheezing or cough 4 g 0    ketoconazole (NIZORAL) 2 % external cream Apply topically. 3 times every week      ketoconazole (NIZORAL) 2 % external shampoo Apply topically twice a week.      lamoTRIgine (LAMICTAL) 100 MG tablet Take  mg by mouth 2 times daily. 100 mg AM / 50 mg PM as of 4/27 - pt in process of titrating back up      lidocaine (LIDODERM) 5 % patch Place 1 patch over 12 hours onto the skin every 24 hours. To prevent lidocaine toxicity, patient should be patch free for 12 hrs daily. 30 patch 1    lithium (ESKALITH) 600 MG capsule Take 600 mg by  mouth at bedtime.      magnesium oxide (MAG-OX) 400 MG tablet Take 400 mg by mouth daily.      methylPREDNISolone (MEDROL DOSEPAK) 4 MG tablet therapy pack Follow Package Directions 21 tablet 0    morphine (MSIR) 15 MG IR tablet Take 15 mg by mouth 3 times daily as needed for pain.      NARCAN 4 MG/0.1ML nasal spray CALL 911. SPR CONTENTS OF ONE SPRAYER (0.1ML) INTO ONE NOSTRIL. REPEAT IN 2-3 MIN IF SYMPTOMS OF OPIOID EMERGENCY PERSIST, ALTERNATE NOSTRILS      nortriptyline (PAMELOR) 10 MG capsule Take 1 capsule (10 mg) by mouth at bedtime. 90 capsule 1    ondansetron (ZOFRAN ODT) 4 MG ODT tab DISSOLVE 1 TABLET(4 MG) ON THE TONGUE EVERY 8 HOURS AS NEEDED FOR NAUSEA 90 tablet 0    prazosin (MINIPRESS) 2 MG capsule Take 2 mg by mouth every morning.      prazosin (MINIPRESS) 5 MG capsule Take 5 mg by mouth at bedtime.      pregabalin (LYRICA) 100 MG capsule Take 100 mg by mouth at bedtime.      pregabalin (LYRICA) 150 MG capsule Take 150 mg by mouth 3 times daily. AM / noon / dinner      propranolol (INDERAL) 20 MG tablet Take 1 tablet (20 mg) by mouth 2 times daily as needed (tremor). 180 tablet 2    propranolol ER (INDERAL LA) 80 MG 24 hr capsule Take 1 capsule (80 mg) by mouth daily. 90 capsule 1    QUEtiapine (SEROQUEL) 300 MG tablet Take 300 mg by mouth At Bedtime      RETIN-A 0.025 % external cream APPLY SPARINGLY TO FACE EVERY OTHER NIGHT FOR 14 NIGHTS THEN NIGHTLY THEREAFTER      Semaglutide-Weight Management (WEGOVY) 0.5 MG/0.5ML pen Inject 0.5 mg subcutaneously once a week. 2 mL 0    sulfacetamide sodium, Acne, 10 % lotion APPLY IT TO THE FACE ONCE DAILY IN THE MORNING X3 MONTHS      tamsulosin (FLOMAX) 0.4 MG capsule Take 1 capsule (0.4 mg) by mouth every evening. 30 capsule 11    tiZANidine (ZANAFLEX) 4 MG tablet TAKE 1 TABLET BY MOUTH EVERY 8 TO 12 HOURS AS NEEDED. NOT TO EXCEED 3 DOSES IN 24 HOURS      triamcinolone (KENALOG) 0.1 % external cream Apply topically 2 times daily as needed.      ubrogepant  "(UBRELVY) 100 MG tablet Take 1 tablet (100 mg) by mouth at onset of headache. 8 tablet 11     No current facility-administered medications for this visit.        Allergies:     Allergies   Allergen Reactions    Metronidazole Anaphylaxis and Hives     Other reaction(s): Throat swelling  Throat swelling 6 hrs after exposure  Itching and hives 2 hrs after exposure    Shellfish Allergy Anxiety, Diarrhea, Difficulty breathing, Hives, Itching, Rash, Shortness Of Breath and Swelling    Codeine Headache    Fish-Derived Products      Stopped fish oil and less stomach discomfort  Rash after eating fish.     Hydrocodone-Acetaminophen Other (See Comments)    Sertraline Other (See Comments)     Patient was foggy and \"high\" feeling    Shellfish-Derived Products      Lips get numb, throat gets scratchy, rashes        Review of Systems:   As noted above     Physical Exam:   General: Seated comfortably in no acute distress.  Neurologic:     Mental Status: Fully alert, attentive and oriented. Speech clear and fluent, no paraphasic errors.     Cranial Nerves: EOM appear intact. Facial movements symmetric. Hearing not formally tested but intact to conversation.  No dysarthria.                The total time of this encounter today amounted to 40 minutes in total. This time included time spent with the patient, prep work, extensive chart review, coordinating care, ordering tests, and performing post visit documentation.        "

## 2025-05-21 NOTE — CONFIDENTIAL NOTE
5/21 Catheter order sent to [facility] through website portal, along with additional documentation.    Tamara Luis MA on 5/21/2025 at 8:49 AM

## 2025-05-22 NOTE — TELEPHONE ENCOUNTER
Per chart review, Dr. Mckee ordered refill of vaginal diazepam to [facility] in [place].     Layla Ayala RN, BSN

## 2025-05-30 ENCOUNTER — MYC MEDICAL ADVICE (OUTPATIENT)
Dept: FAMILY MEDICINE | Facility: OTHER | Age: 30
End: 2025-05-30
Payer: COMMERCIAL

## 2025-06-04 DIAGNOSIS — R60.0 PERIPHERAL EDEMA: ICD-10-CM

## 2025-06-04 RX ORDER — FUROSEMIDE 20 MG/1
20 TABLET ORAL DAILY
Qty: 90 TABLET | Refills: 0 | OUTPATIENT
Start: 2025-06-04

## 2025-06-05 ASSESSMENT — PAIN SCALES - PAIN ENJOYMENT GENERAL ACTIVITY SCALE (PEG)
AVG_PAIN_PASTWEEK: 10 - PAIN AS BAD AS YOU CAN IMAGINE
INTERFERED_GENERAL_ACTIVITY: 10 - COMPLETELY INTERFERES
INTERFERED_GENERAL_ACTIVITY: 10
INTERFERED_ENJOYMENT_LIFE: 10 - COMPLETELY INTERFERES
PEG_TOTALSCORE: 10
AVG_PAIN_PASTWEEK: 10
INTERFERED_ENJOYMENT_LIFE: 10

## 2025-06-07 ENCOUNTER — LAB (OUTPATIENT)
Dept: LAB | Facility: CLINIC | Age: 30
End: 2025-06-07
Payer: COMMERCIAL

## 2025-06-07 DIAGNOSIS — R53.1 WEAKNESS GENERALIZED: ICD-10-CM

## 2025-06-07 DIAGNOSIS — R74.8 ELEVATED CK: ICD-10-CM

## 2025-06-07 LAB
BASOPHILS # BLD AUTO: 0 10E3/UL (ref 0–0.2)
BASOPHILS NFR BLD AUTO: 0 %
EOSINOPHIL # BLD AUTO: 1.8 10E3/UL (ref 0–0.7)
EOSINOPHIL NFR BLD AUTO: 24 %
ERYTHROCYTE [DISTWIDTH] IN BLOOD BY AUTOMATED COUNT: 15.2 % (ref 10–15)
HCT VFR BLD AUTO: 43.2 % (ref 35–47)
HGB BLD-MCNC: 14.1 G/DL (ref 11.7–15.7)
IMM GRANULOCYTES # BLD: 0 10E3/UL
IMM GRANULOCYTES NFR BLD: 0 %
LYMPHOCYTES # BLD AUTO: 1.3 10E3/UL (ref 0.8–5.3)
LYMPHOCYTES NFR BLD AUTO: 17 %
MCH RBC QN AUTO: 25.3 PG (ref 26.5–33)
MCHC RBC AUTO-ENTMCNC: 32.6 G/DL (ref 31.5–36.5)
MCV RBC AUTO: 78 FL (ref 78–100)
MONOCYTES # BLD AUTO: 0.3 10E3/UL (ref 0–1.3)
MONOCYTES NFR BLD AUTO: 4 %
NEUTROPHILS # BLD AUTO: 4.2 10E3/UL (ref 1.6–8.3)
NEUTROPHILS NFR BLD AUTO: 55 %
PLATELET # BLD AUTO: 274 10E3/UL (ref 150–450)
RBC # BLD AUTO: 5.57 10E6/UL (ref 3.8–5.2)
WBC # BLD AUTO: 7.8 10E3/UL (ref 4–11)

## 2025-06-07 PROCEDURE — 80048 BASIC METABOLIC PNL TOTAL CA: CPT

## 2025-06-07 PROCEDURE — 85025 COMPLETE CBC W/AUTO DIFF WBC: CPT

## 2025-06-07 PROCEDURE — 99000 SPECIMEN HANDLING OFFICE-LAB: CPT

## 2025-06-07 PROCEDURE — 82550 ASSAY OF CK (CPK): CPT

## 2025-06-07 PROCEDURE — 83516 IMMUNOASSAY NONANTIBODY: CPT | Mod: 90

## 2025-06-07 PROCEDURE — 36415 COLL VENOUS BLD VENIPUNCTURE: CPT

## 2025-06-07 PROCEDURE — 86235 NUCLEAR ANTIGEN ANTIBODY: CPT | Mod: 90

## 2025-06-07 PROCEDURE — 86039 ANTINUCLEAR ANTIBODIES (ANA): CPT | Mod: 90

## 2025-06-07 PROCEDURE — 84182 PROTEIN WESTERN BLOT TEST: CPT | Mod: 90

## 2025-06-08 LAB
ANION GAP SERPL CALCULATED.3IONS-SCNC: 12 MMOL/L (ref 7–15)
BUN SERPL-MCNC: 18.8 MG/DL (ref 6–20)
CALCIUM SERPL-MCNC: 10.4 MG/DL (ref 8.8–10.4)
CHLORIDE SERPL-SCNC: 102 MMOL/L (ref 98–107)
CK SERPL-CCNC: 42 U/L (ref 26–192)
CREAT SERPL-MCNC: 0.89 MG/DL (ref 0.51–0.95)
EGFRCR SERPLBLD CKD-EPI 2021: 89 ML/MIN/1.73M2
GLUCOSE SERPL-MCNC: 95 MG/DL (ref 70–99)
HCO3 SERPL-SCNC: 25 MMOL/L (ref 22–29)
POTASSIUM SERPL-SCNC: 4.1 MMOL/L (ref 3.4–5.3)
SODIUM SERPL-SCNC: 139 MMOL/L (ref 135–145)

## 2025-06-09 NOTE — PROGRESS NOTES
[facility]    6/10/2025      Chief complaint:   -she started having pain in both feet once she was discharged from the hospital (hospitalized 4/27-5/6/2025 at the [facility])  -has stomach pain, has vomited  -her feet are described as burning pain  -she states she has had loss of bowel control at night 3 times in the past week  -left buttock and left lateral thigh pain               Interval history:  Caprice Kline 29 year old female is known to me for:  Chronic left sided low back pain with left sided sciatica  Lumbar radicular pain (left leg)  Meralgia paresthetica on the left side  Lumbar DDD  Myofascial pain  Muscle spasm  Chronic intractable pain  Bipolar disorder, in partial remission, most recent episode mixed  PMHx includes: depressive disorder, complication of anesthesia (freaking out when I wake up), bipolar disorder  PSHx includes: Minimally invasive left Lumbar 5 to Sacral 1 microdiscectomy (2021), cystectomy with  salpingectomy on the right side, tonsillectomy, wisdom teeth extraction      Recommendations/plan at the last visit on 11/4/2025 included:  Physical Therapy: none at present  Clinical Health Psychologist to address issues of relaxation, behavioral change, coping style, and other factors important to improvement: referral to [facility]   Diagnostic Studies: none  Medication Management:   Stop metaxalone  START orphenadrine ER 100mg every 12 hours as needed for muscle relaxation  We can consider future use of Butrans (skin patch wear for 7 days, then change) or Belbuca (mouth film wear for 20 minutes every 12 hours). Both of these are buprenorphine products. If we use this, I would need a UDS and have you sign a controlled substance agreement  Further procedures recommended: none at present, we can talk about this again if and when you are open to it.   Acupuncture: I am okay with this  Recommendations/follow-up for PCP:  see above  Release of information: none  "  Follow up: as scheduled unless you determine you prefer care with [facility] and Dr. South.   Check out [place] on You Tube, short simple exercise videos, 30-90 seconds long in the short form.          Since female last visit, Caprice Kline reports:    Interval history Sarahy 10, 2025  -in the interim, she has been seen by [facility]. Initially seen by Dr. South for intake and then managed by Norris Castillo an NP. She had been on MSIR and then tried Belbuca through [facility]. States she had a poor experience with Belbuca (states she wasn't told to not take the morphine).  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   -[facility] did a trial for intrathecal pain pump and was supposed to have a intrathecal pain pump placed on 5/5/2025. Decided not to proceed with intrathecal pain pump.   -now with pain that is burning in her feet started after discharge from hospital.  Pain in left buttock and left thigh (myonecrosis)  She has pain in bilateral lateral hips and pain goes down the legs to the feet.   -EMG has been ordered by Dr. Goldman in neurology and scheduled for 7/9/2025.   -patient is frustrated as she feels no one can figure out exactly what is wrong with her.   -care is also fragmented with some cares with [facility] an outside pain clinic.   -patient not a great historian, not certain who she  has seen and when.   -no new or known injury  -notes loss of bowel control 3 times at night   -discussed I do not recommend continuing morphine given she is on 2 benzodiazepines (klonopin 0.5mg as needed for anxiety appears to receive about #30 tabs per month and Valium 10mg tabs vaginally PRN urinary retention).   -reviewed heightened risk for opiate overdose and death combining mu opiate agonists and benzodiazepines.         Interval history November 4, 2024  -\"feels like my right hip is breaking glass\"  -she had issues with both legs collapsing out from under her this weekend. " "  -she fainted twice this past weekend.   -having pain in the low back bilaterally and into the left leg to the calf level and the right leg into the front of the thigh.   -metaxalone was not at all helpful  -she has also been seen at [facility] pain clinic and they ordered a MRI scan, this will be done at [facility] radiology.   -cannot get comfortable any way. She feels a \"slipping\" sensation in her low back.   -she received Percocet from Dr. South at [facility] pain clinic.   -we discussed that I recommend that she decide which pain clinic she is going to follow with. Better to only have one clinic managing medications, etc.     -she has had migraines her whole life, worse lately.   -migraine pain behind her eyes, up and over the skull and into the neck.   -has both phono and photophobia.   -no aura with migraine  -Ubrelvy is very helpful for her migraines.   -she has been having migraines nearly everyday  -she sees Dr. Goldman in neurology re: her migraines and tremor    Pain history collected at initial consult on 9/17/2024  Chronic left sided low back pain with left leg pain that extends posterolaterally down the left leg to the level of the knee.   This pain has been present since prior to August 2021 when she had back surgery with Dr. Junior of neurosurgery. This helped for a short period of time, thinks it was for about 2-3 months.    -she had a good experience with Dr. Junior and his team.   -she is unsure if her leg is weak, she knows she favors the left leg.   -she does have numbness and tingling in the left leg to the lateral hip and thigh.   -denies saddle anesthesia, no loss of bowel or bladder.   -she had COVID last week. Was laid off from her job on her birthday.     -left knee pain present since high school. Injured during basketball. Her left knee pops and his painful.         At this point, the patient's participation with our multidisciplinary team includes:  The patient has been compliant with the program.  PT " "-not ordered  Health Psych - not ordered            Pain right now is 9/10.   Describes pain as \" burning, cramping, sharp, numbness, cutting, dull, aching, throbbing, pressure, my right hip feels like broken glass.\"  Pain is constant.        Current pain-related medication treatments include:  -clonazepam 0.5mg PRN (uses sparingly, #10 last 4-6 weeks)  -doxepin 10mg at HS (helpful for sleep)  -hydroxyzine 50-100mg TID  (uses 100mg TID, very helpful)  -propranolol ER 80mg every day (recently started, migraine and tremor treatment)  -ubrelvy 100mg at onset of headache (has been a lifesaver)  -Diazepam 10mg PRN urinary retention (uses this twice per week)  -gabapentin 8% in vanicream (somewhat helpful)  -MSIR 15mg TID PRN (currently managed by [facility] YIP)  -Narcan nasal spray   -nortriptyline 25mg at bedtime (somewhat helpful)  -tizanidine 4mg Q 8 hours PRN (somewhat helpful)  -Ubrelvy 100mg at onset of headache (helpful)      Other pertinent medications:  -Adderall XR 30 mg once a day  -Dulcolax 5 mg daily as needed constipation  -lamotrigine 100mg BID (helpful)  -Lithium 600mg every day (helpful)  -ondansetron 4mg Q 8 hours PRN nausea (helpful, doesn't need often)  -prazosin 2mg BID (helpful for nightmares and PTSD)  -prazosin 5mg at bedtime (helpful)  -Quetiapine 300mg at HS (helpful)  -semiglutide 0.5mg weekly subcutaneous injection (too soon to tell)    Previous medication treatments included:    OPIATES:hydrocodone (causes headaches), Oxycodone (helpful), MSIR (somewhat helpful), Belbuca (didn't like this)  NSAIDS: ibuprofen (cannot take, on lithium), Naproxen (cannot take, on lithium), meloxicam (not helpful)  MUSCLE RELAXANTS: cyclobenzaprine (not helpful, \"I hated it\"), methocarbamol (not helpful), tizanidine (not helpful), metaxalone (not at all helpful),  orphenadrine (not helpful)  ANTI-MIGRAINE MEDS: prednisone (not helpful), rizatriptan (not helpful), sumatriptan (not helpful), Ubrelvy (has been a life " "saver)  ANTI-DEPRESSANTS: amitriptyline (unsure), bupropion (caused nausea), citalopram (not helpful), Doxepin (helpful), Duloxetine (unsure), fluoxetine (not helpful), sertraline (allergic), trazodone (nightmares)  SLEEP AIDS: alprazolam (somewhat helpful), clonazepam (helpful, uses sparingly), diazepam (not helpful), hydroxyzine (very helpful),   ANTI-CONVULSANTS: gabapentin (not helpful), pregabalin (helpful), Topiramate (didn't like how she felt)  TOPICALS: lidoderm patch (short term benefit), other gels/creams/ointments (sometimes)  ANXIOLYTICS: Clonanazepam  (helpful), hydroxyzine (helpful), valium (helpful for urinary retention)  MEDICAL CANNABIS: Medical cannabis cream (helpful)  Other meds: Tylenol (not helpful)    Medications and Allergies reviewed. Yes     Other treatments have included:  Caprice Kline has not been seen at a pain clinic in the past.      Physical therapy: Yes  not helpful  Psychologist: No   Chiropractor: Yes not helpful  Acupuncture: Yes somewhat helpful  Pharmacotherapy:    Opioids: No     Non-opioids:  Yes helpful  TENs Unit/electric stim: Yes helpful at chiropractor  Heat/Cold: Yes   Exercise: yes       Injections:   -10/24/2023 left SI joint injection per Dr. Moi Gary (no pain relief whatsoever)    Pertinent Surgeries:  8/21/2021 Lumbar microdiscectomy L5-S1 with Dr. Junior     Past Medical History:  Past Medical History:   Diagnosis Date    Bipolar disorder (H)     Complication of anesthesia     \"freaking out when I wake up\"    Depressive disorder 2011     Past Surgical History:  Past Surgical History:   Procedure Laterality Date    DISCECTOMY LUMBAR POSTERIOR MICROSCOPIC ONE LEVEL Left 08/24/2021    Procedure: Minimally invasive left Lumbar 5 to Sacral 1 microdiscectomy  ;  Surgeon: Destin Junior MD;  Location: [facility] OR    ENT SURGERY  Mar 17, 17    Tonsillectomy    GENITOURINARY SURGERY  2012    Fallopian tube removal and cyst removal    GYN SURGERY Right     cystectomy " - with salpingectomy    HEAD & NECK SURGERY      tonsillectomy    HEAD & NECK SURGERY      wisdom teeth extraction    IR SOFT TISSUE BIOPSY  05/02/2025     Medications:  Current Outpatient Medications   Medication Sig Dispense Refill    albuterol (PROAIR HFA/PROVENTIL HFA/VENTOLIN HFA) 108 (90 Base) MCG/ACT inhaler Inhale 2 puffs into the lungs every 6 hours as needed for shortness of breath, wheezing or cough 18 g 0    amphetamine-dextroamphetamine (ADDERALL XR) 30 MG 24 hr capsule Take 30 mg by mouth daily as needed.      azelaic acid (FINACIA) 15 % external gel Apply topically 2 times daily as needed.      clobetasol (TEMOVATE) 0.05 % external solution APPLY TOPICALLY TO THE AFFECTED AREA TWICE DAILY FOR UP TO 21 DAYS. DO NOT APPLY TO NORMAL SKIN      clobetasol propionate (CLOBEX) 0.05 % external shampoo Apply topically.      clonazePAM (KLONOPIN) 0.5 MG tablet Take 0.5 mg by mouth daily as needed for anxiety.      diazepam (VALIUM) 10 MG tablet PLACE 1 TABLET VAGINALLY AS NEEDED FOR URINARY RETENTION OR FOR PAIN 15 tablet 0    diclofenac (VOLTAREN) 1 % topical gel Apply 2 g topically 4 times daily. Max 34 grams per day. 340 g 1    docusate sodium (COLACE) 100 MG capsule Take 200 mg by mouth 2 times daily.      EPINEPHrine (ANY BX GENERIC EQUIV) 0.3 MG/0.3ML injection 2-pack INJECT 0.3 ML INTO THE MUSCLE AS NEEDED FOR ANAPHYLAXIS 2 each 1    furosemide (LASIX) 20 MG tablet Take 1 tablet (20 mg) by mouth daily. 90 tablet 0    gabapentin 8% in vanicream (Compounded) Apply 4 clicks (1 g) topically nightly as needed. 30 g 5    glucosamine-chondroitin 500-400 MG CAPS per capsule Take 1 capsule by mouth daily.      hydrocortisone 2.5 % cream Apply topically 2 times daily.      hydrOXYzine (VISTARIL) 50 MG capsule Take 100 mg by mouth 3 times daily.      ipratropium-albuterol (COMBIVENT RESPIMAT)  MCG/ACT inhaler Inhale 1 puff into the lungs 4 times daily as needed for shortness of breath, wheezing or cough 4 g 0     ketoconazole (NIZORAL) 2 % external cream Apply topically. 3 times every week      ketoconazole (NIZORAL) 2 % external shampoo Apply topically twice a week.      lamoTRIgine (LAMICTAL) 100 MG tablet Take  mg by mouth 2 times daily. 100 mg AM / 50 mg PM as of 4/27 - pt in process of titrating back up      lidocaine (LIDODERM) 5 % patch Place 1 patch over 12 hours onto the skin every 24 hours. To prevent lidocaine toxicity, patient should be patch free for 12 hrs daily. 30 patch 1    lithium (ESKALITH) 600 MG capsule Take 600 mg by mouth at bedtime.      magnesium oxide (MAG-OX) 400 MG tablet Take 400 mg by mouth daily.      methylPREDNISolone (MEDROL DOSEPAK) 4 MG tablet therapy pack Follow Package Directions 21 tablet 0    morphine (MSIR) 15 MG IR tablet Take 15 mg by mouth 3 times daily as needed for pain.      mupirocin (BACTROBAN) 2 % external ointment Apply topically as needed.      NARCAN 4 MG/0.1ML nasal spray CALL 911. SPR CONTENTS OF ONE SPRAYER (0.1ML) INTO ONE NOSTRIL. REPEAT IN 2-3 MIN IF SYMPTOMS OF OPIOID EMERGENCY PERSIST, ALTERNATE NOSTRILS      nortriptyline (PAMELOR) 10 MG capsule Take 1 capsule (10 mg) by mouth at bedtime. 90 capsule 1    nortriptyline (PAMELOR) 25 MG capsule Take 1 capsule (25 mg) by mouth at bedtime. 90 capsule 1    ondansetron (ZOFRAN ODT) 4 MG ODT tab DISSOLVE 1 TABLET(4 MG) ON THE TONGUE EVERY 8 HOURS AS NEEDED FOR NAUSEA 90 tablet 0    prazosin (MINIPRESS) 2 MG capsule Take 2 mg by mouth every morning.      prazosin (MINIPRESS) 5 MG capsule Take 5 mg by mouth at bedtime.      propranolol (INDERAL) 20 MG tablet Take 1 tablet (20 mg) by mouth 2 times daily as needed (tremor). 180 tablet 2    propranolol ER (INDERAL LA) 80 MG 24 hr capsule Take 1 capsule (80 mg) by mouth daily. 90 capsule 1    QUEtiapine (SEROQUEL) 300 MG tablet Take 300 mg by mouth At Bedtime      RETIN-A 0.025 % external cream APPLY SPARINGLY TO FACE EVERY OTHER NIGHT FOR 14 NIGHTS THEN NIGHTLY  "THEREAFTER      Roflumilast (ZORYVE) 0.3 % FOAM Externally apply topically.      Semaglutide-Weight Management (WEGOVY) 0.5 MG/0.5ML pen Inject 0.5 mg subcutaneously once a week. 2 mL 0    sulfacetamide sodium, Acne, 10 % lotion Apply topically.      sulfacetamide sodium, Acne, 10 % lotion APPLY IT TO THE FACE ONCE DAILY IN THE MORNING X3 MONTHS      tamsulosin (FLOMAX) 0.4 MG capsule Take 1 capsule (0.4 mg) by mouth every evening. 30 capsule 11    tiZANidine (ZANAFLEX) 4 MG tablet TAKE 1 TABLET BY MOUTH EVERY 8 TO 12 HOURS AS NEEDED. NOT TO EXCEED 3 DOSES IN 24 HOURS      tretinoin (RETIN-A) 0.025 % external cream Apply topically at bedtime.      triamcinolone (KENALOG) 0.1 % external cream Apply topically 2 times daily as needed.      ubrogepant (UBRELVY) 100 MG tablet Take 1 tablet (100 mg) by mouth at onset of headache. 8 tablet 11    pregabalin (LYRICA) 150 MG capsule Take 150 mg by mouth 3 times daily. AM / noon / dinner       Allergies:     Allergies   Allergen Reactions    Metronidazole Anaphylaxis and Hives     Other reaction(s): Throat swelling  Throat swelling 6 hrs after exposure  Itching and hives 2 hrs after exposure    Shellfish Allergy Anxiety, Diarrhea, Difficulty breathing, Hives, Itching, Rash, Shortness Of Breath and Swelling    Codeine Headache    Fish-Derived Products      Stopped fish oil and less stomach discomfort  Rash after eating fish.     Hydrocodone-Acetaminophen Other (See Comments)    Sertraline Other (See Comments)     Patient was foggy and \"high\" feeling    Shellfish-Derived Products      Lips get numb, throat gets scratchy, rashes     Social History:  Home situation: lives with boyfriend. 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  Tobacco use: vapes  Alcohol use: minimal, monthly use  Drug use: now none, used marijuana in the past  History of chemical dependency treatment: none    Family " history:  Family History   Problem Relation Age of Onset    Hypertension Mother     Colon Polyps Mother 50    Colon Cancer Mother     Substance Abuse Father     Colon Cancer Father 60    Diabetes Maternal Grandmother     Hypertension Maternal Grandmother     Colon Cancer Maternal Grandmother     Cancer Maternal Grandmother     Nephrolithiasis Maternal Grandmother     Diabetes Paternal Grandmother     Breast Cancer Paternal Grandmother     Cancer Paternal Grandmother                Physical Exam:  Vitals:    06/10/25 0754   BP: 119/82   Pulse: 101       Behavioral observations:  Awake, alert, conversant and cooperative     Gait:  slow    Musculoskeletal exam:  strength grossly equal throughout    Neuro exam:  deferred    Skin/vascular/autonomic:  No suspicious lesions on exposed skin.     Other:  na         IMAGING:  EXAM: MR LUMBAR SPINE W/O & W CONTRAST  2/28/2023 9:49 AM      HISTORY:  Low back pain; hx Spine surgery; No r/o hardware failure;  Worsening low back pain; No known/automatically detected potential  contraindications to imaging; Lumbar radiculopathy; Chronic bilateral  low back pain with left-sided sciatica; Chronic bilateral low back  pain with left-sided sciatica        COMPARISON:  MRI 7/13/2021, 9/27/2021     TECHNIQUE: Sagittal T1-weighted, sagittal STIR, axial T1-weighted, and  3-D volumetric T2-weighted images of the lumbar spine were obtained  without intravenous contrast. Post intravenous contrast using  gadolinium axial and sagittal T1-weighted images were obtained with  fat saturation. Post intravenous contrast using gadolinium, axial and  sagittal T1-weighted images were obtained with fat saturation.     CONTRAST: 9ml Gadavist.     FINDINGS:  There are 5 lumbar type vertebrae, used for the purposes of this  dictation. Conus tip at approximately L1. Normal lumbar vertebral  alignment. L5-S1 disc dehydration with opposing L5-S1 endplate  degenerative edema. No loss of vertebral body height.  Normal marrow  signal. Normal cauda equina.     On a level by level basis, the findings are as follows:     L1-2: No spinal canal or neural foraminal stenosis.     L2-3: No spinal canal or neural foraminal stenosis.     L3-4: No spinal canal or neural foraminal stenosis. Stable disc bulge.        L4-5: No spinal canal or neural foraminal stenosis. Stable disc bulge.     L5-S1: Left hemilaminectomy changes with nonmasslike postcontrast  enhancement surrounding the left L5-S1 facet joint and within the left  epidural space similar surrounding the traversing S1 nerve root  without any significant mass effect. Posterior paraspinal fluid  collection has resolved. No spinal canal or neural foraminal stenosis.     The visualized paraspinous soft tissues are within normal limits.                                                                       IMPRESSION:   1. L5-S1 hemilaminectomy, microdiscectomy changes. Left facet  periarticular, left epidural space granulation tissue formation  surrounding the left descending S1 within the lateral recess without  significant compression or abnormal nerve signal. Previous  postoperative fluid collection has resolved.  2. No substantial degenerative change at any other level within the  lumbar spine.     I have personally reviewed the examination and initial interpretation  and I agree with the findings.     ELLIE GLORIA MD       Thoracic MRI scan without contrast at [facility] completed on 11/13/2024  Interpretation: Thoracic cord shows normal caliber and signal intensity throughout conus is normal tapering and terminates at L1.  There are no spinal canal collections or intradural masses.  Maintain thoracic kyphotic curvature.  Vertebral body height and marrow signal are well-preserved throughout.  There is no disc protrusions, significant spinal canal or neuroforaminal stenosis.  No substantial degenerative discovertebral change seen.  Included proximal ribs and paraspinous  soft tissues are unremarkable.  Scant pleural effusions considered to be within physiologic limits.  Conclusion: Unremarkable thoracic spine.        MRI lumbar spine without contrast completed at [facility] radiology on 11/6/2024  Interpretation: Segmentation, alignment and osseous structures: Lumbar hypolordosis with preserved vertebral body heights.  Type I discogenic endplate edema seen mostly to the left at L5-S1.  Remainder of endplates intact and marrow signal normal.  No significant segmental listhesis.  Sacrum and sacroiliac joints: Included sacrum intact and superior iliac joints unremarkable.    L5-S1: Mild to moderate disc degeneration with left laminotomy.  Shallow right paracentral disc contour abnormality but no large disc protrusion, indenting spinal canal stenosis or transiting nerve compression.  Neuroforamina adequately patent.  045 through T12-L1: Disc height and hydration preserved.  No disc contour abnormality, spinal canal or foraminal stenosis identified.  Facet joints are normal.  Neurologic structures: The conus maintains normal caliber and intrinsic signal termination is L1.  No spinal canal collections or masses.  Cauda equina nerve roots thickness and distribution normal, no adhesive arachnoiditis  Paraspinous soft tissues: No paraspinous soft tissue masses or collections.  Conclusion: Mild to moderate L5-S1 disc degeneration with microdiscectomy.  No recurrent disc protrusion, significant stenosis or neural impingement at any lumbar level.      Lumbar spine x-rays 4 views at [facility] radiology completed 1/23/2025  Interpretation: Lordotic alignment of 5 lumbar type vertebrae with mild listing of the upper lumbar spine to the right.  Moderate disc space narrowing at L5-S1.  Preservation of disc space height at each of the remaining levels.  No spondylolisthesis.  Mild sclerosis over the anterior superior right iliac joint is most likely degenerative in origin.  Lateral views with limited  flexion extension show no abnormal interbody motion.  No acute fracture or avulsion.  No osteolytic or destructive bone lesions.  Conclusion:  1.  Moderate single level lumbar disc degeneration at L5-S1.  2.  No spondylolisthesis and no segmental hypermobility with flexion extension.      MRI of the left hip without contrast at [facility] completed 1/23/2025  Findings:  Pelvis osseous structures:  Sacrum: No fracture or destructive osseous lesion is seen in the imaged portions of the sacrum  Sacroiliac joints: No convincing evidence of sacroiliitis of the imaged portions of the sacroiliac joints.  Pubic rami: Unremarkable.  Symphysis pubis: There is no evidence of acute osteitis pubis.  Lumbar spine: Degenerative disc disease is noted of the lower lumbar spine which is incompletely evaluated on this examination.  Correlate with lumbar spine MRI from the same day.  Labrum:  Focal irregular tear of the anterior superior labrum extending from 1:00 to 2:00 (sagittal images 23 through 25.  Tiny paralabral cyst measuring 1 mm is noted anteriorly (axial 23).  Hip joint:  Physiologic amount of joint fluid.  No chondral loss is seen although it must be noted that the cartilage is not optimally evaluated by this nonarthrogram study.  No subchondral bone marrow edema or subchondral cystic changes seen.  Proximal femur:  No fracture, osseous stress injury, avascular necrosis, or suspicious bone marrow signal abnormality is seen.  No discrete cam lesion.  Alpha angle is within normal limits, measuring 45 degrees (series 9, image 15)  Acetabulum:  No subchondral cysts, periacetabular ossicles or marrow edema.  Coverage: Left lateral center edge (CE) angle measures approximately 36 degrees, midline coronal series 4, image 20.  Ligamentum teres: Unremarkable.  Myotendinous structures:  Gluteus abductors: Gluteal tendons are intact.  Minimal edema surrounding the gluteus minimus tendon, compatible with minimal peritendinitis.   Edema is noted deep to the iliotibial band and within the greater trochanter bursa overlying.  In addition there is fluid like signal along the far anterior aspect of the gluteus tj (axial images 26 through 32).  Rectus abdominis-adductor longus aponeurosis, adductors, and rectus abdominis: The Apotone neurosis and rectus abdominis are unremarkable.  Pectineus muscle, adductor longus, and adductor brevis are intact and unremarkable.  There is patchy edema tracking along the musculotendinous junction of the ischial tuberosity origin of the abductor perry (actual image just 39 through 45.   Hamstrings: Unremarkable.  Flexors: The iliopsoas and rectus femoris tendons are intact.  Quadratus for Jaswinder muscle: Unremarkable.  Piriformis muscle: Unremarkable.  Gluteal aponeurotic fascia and IT band: Unremarkable.  In addition to the muscle signal abnormality as noted above, there is more extensive patchy intramuscular signal alteration noted along the right gluteus tj, proximal vastus lateralis, quadratus for Jaswinder, and adductor perry.  This is described in detail on dedicated dictation of the right hip MRI.  Pelvic soft tissues: Unremarkable  Impression: 1 extensive bilateral intramuscular signal alteration, right greater than left which is most prominent along the right distal gluteus tj, gluteus minimus, proximal vastus lateralis, quadratus for Jaswinder, and adductor perry as well as the left gluteus tj and abductor perry.  Given location near the musculotendinous junction of these muscles, this has an appearance of muscle strain injury.  Delayed onset of muscle soreness would also be in the differential diagnosis and if there is clinical concern for DOMS, evaluate creatinine kinase levels.  The involvement of multiple pulm muscle groups and bilateral nature of the edema and fluid suggest a more systemic process.  Inflammatory and infectious etiologies could be considered; however this does not  have a typical appearance of infectious myositis/pyomyositis.  Polymyositis and other inflammatory myopathies could be considered as well as an autoimmune workup may be indicated.  Denervation edema could be considered at the late acute stage; however, there is no atrophy to suggest chronic denervation.  Drug-induced myopathy is thought less likely.  2.  No evidence of tendon tear.  3.  Focal tear of the anterosuperior labrum extending from 1:00 to 2:00 with a tiny/1 mm paralabral cyst          LABS:  Creatinine   Date Value Ref Range Status   06/07/2025 0.89 0.51 - 0.95 mg/dL Final     GFR Estimate   Date Value Ref Range Status   06/07/2025 89 >60 mL/min/1.73m2 Final     Comment:     eGFR calculated using 2021 CKD-EPI equation.     Alkaline Phosphatase   Date Value Ref Range Status   05/18/2025 68 40 - 150 U/L Final     AST   Date Value Ref Range Status   05/18/2025 37 0 - 45 U/L Final     Comment:     Specimen is hemolyzed which can falsely elevate AST. Analysis of a non-hemolyzed specimen may result in a lower value.     ALT   Date Value Ref Range Status   05/18/2025 25 0 - 50 U/L Final      6/7/2025  CK total 42    Screening tools:     DIRE Score for ongoing opioid management is calculated as follows:    Diagnosis = 2    Intractability = 2    Risk: Psych = 2  Chem Hlth = 2  Reliability = 2  Social = 2    Efficacy = 2    Total DIRE Score = 14 (14 or higher predicts good candidate for ongoing opioid management; 13 or lower predicts poor candidate for opioid management)       [place]:  [place]ed [place]  6/10/2025- no concerning fills. Not on opiates.   Modesta MADRID, RN CNP, FNP  [facility]  [place] location          Assessment:  Left buttock pain  Pain of left thigh  Bilateral foot pain  Neuropathic pain  Chronic pain syndrome  Encounter for long-term use of opiate analgesic  Bipolar disorder, in partial remission, most recent episode mixed  PMHx includes: depressive  disorder, complication of anesthesia (freaking out when I wake up), bipolar disorder  PSHx includes: Minimally invasive left Lumbar 5 to Sacral 1 microdiscectomy (2021), cystectomy with  salpingectomy on the right side, tonsillectomy, wisdom teeth extraction        Plan:  Physical Therapy: none at present  Clinical Health Psychologist to address issues of relaxation, behavioral change, coping style, and other factors important to improvement: previous referral to [facility]   Diagnostic Studies: none  Medication Management:   We will likely consider Butrans (buprenorphine patch) for your pain.   If I take over opiate meds, you would sign an opiate agreement. You have to see me every 3 months while on opiate medication  Further procedures recommended: none at present, we can talk about this again if and when you are open to it.   Recommendations/follow-up for PCP:  see above  UDT today, last took morphine yesterday about 1pm. Had an alcoholic drink on Saturday.   Release of information: none   Follow up: you have an hour appt with me on Wednesday 6/18/2025   Check out [place] [facility] on You Tube, short simple exercise videos, 30-90 seconds long in the short form.         ASSESSMENT AND PLAN:  (M79.18) Left buttock pain  (primary encounter diagnosis)  (M79.652) Pain of left thigh  (M79.671,  M79.672) Bilateral foot pain  (M79.2) Neuropathic pain  (G89.4) Chronic pain syndrome  Plan: will be seen in follow up next week. I need time to review outside notes and synthesize results   (Z79.891) Encounter for long-term use of opiate analgesic  Comment:   Plan: Drug Confirmation Panel Urine with Creat,         Ethanol urine, Ethyl Glucuronide Screen with         Reflex to Confirmation, Urine             BILLING TIME DOCUMENTATION:   TOTAL TIME includes:   Time spent preparing to see the patient: 1 minutes (reviewing records and tests)  Time spend face to face with the patient: 40 minutes  Time spent ordering tests,  medications, procedures and referrals: 0 minutes  Time spent Referring and communicating with other healthcare professionals: 0 minutes  Documenting clinical information in Epic: 30  minutes including extensive review and dictation of outside imaging results into this note.     The total TIME spent on this patient on the day of the appointment was 71 minutes.       Modesta MADRID, RN CNP, FNP  [facility]  [facility] [place]

## 2025-06-10 ENCOUNTER — OFFICE VISIT (OUTPATIENT)
Dept: PALLIATIVE MEDICINE | Facility: CLINIC | Age: 30
End: 2025-06-10
Payer: COMMERCIAL

## 2025-06-10 ENCOUNTER — ANCILLARY PROCEDURE (OUTPATIENT)
Dept: GENERAL RADIOLOGY | Facility: CLINIC | Age: 30
End: 2025-06-10
Attending: EMERGENCY MEDICINE
Payer: COMMERCIAL

## 2025-06-10 ENCOUNTER — LAB (OUTPATIENT)
Dept: LAB | Facility: CLINIC | Age: 30
End: 2025-06-10
Payer: COMMERCIAL

## 2025-06-10 ENCOUNTER — MYC MEDICAL ADVICE (OUTPATIENT)
Dept: PALLIATIVE MEDICINE | Facility: CLINIC | Age: 30
End: 2025-06-10

## 2025-06-10 ENCOUNTER — OFFICE VISIT (OUTPATIENT)
Dept: URGENT CARE | Facility: URGENT CARE | Age: 30
End: 2025-06-10
Payer: COMMERCIAL

## 2025-06-10 VITALS
SYSTOLIC BLOOD PRESSURE: 124 MMHG | TEMPERATURE: 99.5 F | HEART RATE: 82 BPM | RESPIRATION RATE: 18 BRPM | DIASTOLIC BLOOD PRESSURE: 84 MMHG | OXYGEN SATURATION: 97 %

## 2025-06-10 VITALS — HEART RATE: 101 BPM | DIASTOLIC BLOOD PRESSURE: 82 MMHG | SYSTOLIC BLOOD PRESSURE: 119 MMHG

## 2025-06-10 DIAGNOSIS — M79.18 LEFT BUTTOCK PAIN: Primary | ICD-10-CM

## 2025-06-10 DIAGNOSIS — R15.9 INCONTINENCE OF FECES, UNSPECIFIED FECAL INCONTINENCE TYPE: ICD-10-CM

## 2025-06-10 DIAGNOSIS — J06.9 UPPER RESPIRATORY TRACT INFECTION, UNSPECIFIED TYPE: Primary | ICD-10-CM

## 2025-06-10 DIAGNOSIS — M79.2 NEUROPATHIC PAIN: ICD-10-CM

## 2025-06-10 DIAGNOSIS — M79.652 PAIN OF LEFT THIGH: ICD-10-CM

## 2025-06-10 DIAGNOSIS — M79.671 BILATERAL FOOT PAIN: ICD-10-CM

## 2025-06-10 DIAGNOSIS — G89.4 CHRONIC PAIN SYNDROME: ICD-10-CM

## 2025-06-10 DIAGNOSIS — M54.16 LUMBAR RADICULOPATHY: ICD-10-CM

## 2025-06-10 DIAGNOSIS — J06.9 UPPER RESPIRATORY TRACT INFECTION, UNSPECIFIED TYPE: ICD-10-CM

## 2025-06-10 DIAGNOSIS — Z79.891 ENCOUNTER FOR LONG-TERM USE OF OPIATE ANALGESIC: ICD-10-CM

## 2025-06-10 DIAGNOSIS — M79.672 BILATERAL FOOT PAIN: ICD-10-CM

## 2025-06-10 PROCEDURE — 3074F SYST BP LT 130 MM HG: CPT | Performed by: NURSE PRACTITIONER

## 2025-06-10 PROCEDURE — 71046 X-RAY EXAM CHEST 2 VIEWS: CPT | Mod: TC | Performed by: RADIOLOGY

## 2025-06-10 PROCEDURE — 3079F DIAST BP 80-89 MM HG: CPT | Performed by: EMERGENCY MEDICINE

## 2025-06-10 PROCEDURE — 99215 OFFICE O/P EST HI 40 MIN: CPT | Performed by: EMERGENCY MEDICINE

## 2025-06-10 PROCEDURE — 3074F SYST BP LT 130 MM HG: CPT | Performed by: EMERGENCY MEDICINE

## 2025-06-10 PROCEDURE — 1125F AMNT PAIN NOTED PAIN PRSNT: CPT | Performed by: NURSE PRACTITIONER

## 2025-06-10 PROCEDURE — 99215 OFFICE O/P EST HI 40 MIN: CPT | Performed by: NURSE PRACTITIONER

## 2025-06-10 PROCEDURE — 99417 PROLNG OP E/M EACH 15 MIN: CPT | Performed by: NURSE PRACTITIONER

## 2025-06-10 PROCEDURE — 3079F DIAST BP 80-89 MM HG: CPT | Performed by: NURSE PRACTITIONER

## 2025-06-10 RX ORDER — ROFLUMILAST 3 MG/G
AEROSOL, FOAM TOPICAL
COMMUNITY

## 2025-06-10 RX ORDER — HYDROCORTISONE 25 MG/G
CREAM TOPICAL 2 TIMES DAILY
COMMUNITY

## 2025-06-10 RX ORDER — MUPIROCIN 2 %
OINTMENT (GRAM) TOPICAL PRN
COMMUNITY

## 2025-06-10 RX ORDER — PREGABALIN 200 MG/1
200 CAPSULE ORAL 3 TIMES DAILY
COMMUNITY

## 2025-06-10 RX ORDER — CLOBETASOL PROPIONATE 0.05 G/100ML
SHAMPOO TOPICAL
COMMUNITY

## 2025-06-10 RX ORDER — SULFACETAMIDE SODIUM 100 MG/ML
LOTION TOPICAL
COMMUNITY

## 2025-06-10 RX ORDER — TRETINOIN 0.25 MG/G
CREAM TOPICAL AT BEDTIME
COMMUNITY

## 2025-06-10 RX ORDER — BENZONATATE 100 MG/1
100 CAPSULE ORAL 3 TIMES DAILY PRN
Qty: 21 CAPSULE | Refills: 0 | Status: SHIPPED | OUTPATIENT
Start: 2025-06-10

## 2025-06-10 ASSESSMENT — PAIN SCALES - GENERAL: PAINLEVEL_OUTOF10: SEVERE PAIN (9)

## 2025-06-10 NOTE — PROGRESS NOTES
Assessment & Plan     Diagnosis:    ICD-10-CM    1. Upper respiratory tract infection, unspecified type  J06.9 XR Chest 2 Views     benzonatate (TESSALON) 100 MG capsule      2. Lumbar radiculopathy  M54.16       3. Incontinence of feces, unspecified fecal incontinence type  R15.9           Medical Decision Making:  This patient presented with back pain.  The patient did not sustain any focal trauma, therefore x-rays are not necessary due to the low likelihood of fracture or subluxation. The patient has not had a fever, saddle/perineal anesthesia, bilateral foot numbness. Does have new bowel incontinence which has not happened in the past.     Moreover, she also being seen for upper respiratory and symptoms, chest x-ray is clear.  No signs of pneumonia and I suspect a viral upper respiratory syndrome.    The patient was directed to avoid heavy lifting, bending or twisting. The patient was told to go to the ER for:  increasing pain, numbness, weakness, or further bowel or bladder dysfunction.  The patient will follow up at the [facility] tomorrow for MRI of the lumbar spine and further evaluation. Patient verbalizes understanding and agreement with the plan including reasons to go to the ER tonight. All questions answered.       Referral to Acute and Diagnostic Services    [phone] ([place] [address]    Transition to Acute & Diagnostic Services Clinic has been discussed with patient, and she agrees with next level of care.   Patient understands that evaluation/treatment at [facility] typically takes significantly longer than in clinic/urgent care (>2 hours).  The [facility] has been contacted by provider/staff to confirm patient acceptance.         Special issues:      None                     The following provider has assessed this patient for intervention at [facility], and directed the patient for referral: Serge Rosenbaum,  CLARICE Rosenbaum PA-C  [facility]    Subjective     Caprice Kline is a 29 year old female who presents to clinic today for the following health issues:  Chief Complaint   Patient presents with    Urgent Care    Cough     Patient states symptoms started a couple of days ago having cough, chest congestion, burning in chest, vomiting from coughing, and unable to hold bowels        HPI  Patient reports last couple of days she has been experiencing cough, chest congestion, burning in her chest, having so much cough she vomits from time to time.  No shortness of breath, chest pain, difficulty swallowing or breathing at rest or with minimal exertion.  She also notes that this seems to be affecting her bowels, she has had 2 episodes of bowel incontinence.  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  She is always had some numbness and tingling in her legs, lately does not see much change but maybe a little more on the right leg.  She has never had bowel incontinence.  She had 2 episodes where she could not make to the bathroom in the middle of the night over the last 2 nights.  She also had some urinary retention issues, does straight cathing intermittently but has not had to do this much over the last couple of weeks.  Back pain is not necessarily worsening, it is always bad per patient.    Review of Systems    See HPI    Objective      Vitals: /84   Pulse 82   Temp 99.5  F (37.5  C) (Tympanic)   Resp 18   LMP 04/17/2025 (Exact Date)   SpO2 97%       Vital signs reviewed by: Serge Rosenbaum PA-C    Physical Exam   Constitutional: Patient is alert and cooperative. Mild acute distress.  Cardiovascular: Regular rate and rhythm  Pulmonary/Chest: Lungs are clear to auscultation throughout. Effort normal. No respiratory distress. No wheezes, rales or rhonchi.  GI: Abdomen is soft and  non-tender throughout.  Neurological: Alert and oriented x3. CN 3-7 and 9-12 intact. Strength diminished (3/5) symmetrically in the legs with hip and knee extension/flexion. Sensation are intact and grossly symmetric in the bilateral lower extremities.   Skin: No rash noted on visualized skin.  Psychiatric:The patient has a normal mood and affect.     Labs/Imaging:  EXAM: XR CHEST 2 VIEWS  LOCATION: [facility] [place]  DATE: 6/10/2025     INDICATION:  Upper respiratory tract infection, unspecified type  COMPARISON: Chest CT 3/24/2025                                                                      IMPRESSION: Negative chest.    Reading per radiology      Serge Rosenbaum PA-C, Sarahy 10, 2025

## 2025-06-10 NOTE — NURSING NOTE
Call received from laboratory noting that patient initially was unable to leave specimen for UDS and returned to give sample and was provided with hat and all supplies and returned cold sample to laboratory staff that was described as being similar to a specimen that came from a fridge.       Upon further discussion note that patient does complete self-catheterizations.   Lab  to rub specimen.     .Nneka Canales RN

## 2025-06-10 NOTE — PROGRESS NOTES
Urgent Care Clinic Visit    Chief Complaint   Patient presents with    Urgent Care    Cough     Patient states symptoms started a couple of days ago having cough, chest congestion, burning in chest, vomiting from coughing, and unable to hold bowels                6/10/2025    12:26 PM   Additional Questions   Roomed by LILIA Bowling   Accompanied by Self         6/10/2025   Declines Weight   Did patient decline having their weight taken? Yes

## 2025-06-10 NOTE — PATIENT INSTRUCTIONS
Plan:  Physical Therapy: none at present  Clinical Health Psychologist to address issues of relaxation, behavioral change, coping style, and other factors important to improvement: referral to [facility]   Diagnostic Studies: none  Medication Management:   We will likely consider Butrans (buprenorphine patch) for your pain.   Further procedures recommended: none at present, we can talk about this again if and when you are open to it.   Recommendations/follow-up for PCP:  see above  UDT today, last took morphine yesterday about 1pm. Had an alcoholic drink on Saturday.   Release of information: none   Follow up: you have an hour appt with me on Wednesday 6/18/2025   Check out [place]ald on You Tube, short simple exercise videos, 30-90 seconds long in the short form.    ----------------------------------------------------------------  Clinic Number:  [phone]   Call with any questions about your care and for scheduling assistance.   Calls are returned Monday through Friday between 8 AM and 4:30 PM. We usually get back to you within 2 business days depending on the issue/request.    If we are prescribing your medications:  For opioid medication refills, call the clinic or send a Feastie message 7 days in advance.  Please include:  Name of requested medication  Name of the pharmacy.  For non-opioid medications, call your pharmacy directly to request a refill. Please allow 3-4 days to be processed.   Per [place] State Law:  All controlled substance prescriptions must be filled within 30 days of being written.    For those controlled substances allowing refills, pickup must occur within 30 days of last fill.      We believe regular attendance is key to your success in our program!    Any time you are unable to keep your appointment we ask that you call us at least 24 hours in advance to cancel.This will allow us to offer the appointment time to another patient.   Multiple missed appointments may lead to dismissal  from the clinic.    Attending Attestation (For Attendings USE Only)...

## 2025-06-11 ENCOUNTER — LAB (OUTPATIENT)
Dept: LAB | Facility: CLINIC | Age: 30
End: 2025-06-11
Payer: COMMERCIAL

## 2025-06-11 DIAGNOSIS — Z79.891 ENCOUNTER FOR LONG-TERM USE OF OPIATE ANALGESIC: ICD-10-CM

## 2025-06-12 ENCOUNTER — PATIENT OUTREACH (OUTPATIENT)
Dept: CARE COORDINATION | Facility: CLINIC | Age: 30
End: 2025-06-12
Payer: COMMERCIAL

## 2025-06-13 ASSESSMENT — PAIN SCALES - PAIN ENJOYMENT GENERAL ACTIVITY SCALE (PEG)
INTERFERED_GENERAL_ACTIVITY: 10 - COMPLETELY INTERFERES
INTERFERED_ENJOYMENT_LIFE: 10
INTERFERED_GENERAL_ACTIVITY: 10
AVG_PAIN_PASTWEEK: 8
PEG_TOTALSCORE: 9.33
INTERFERED_ENJOYMENT_LIFE: 10 - COMPLETELY INTERFERES

## 2025-06-14 ENCOUNTER — LAB (OUTPATIENT)
Dept: LAB | Facility: CLINIC | Age: 30
End: 2025-06-14
Payer: COMMERCIAL

## 2025-06-14 DIAGNOSIS — Z79.891 ENCOUNTER FOR LONG-TERM USE OF OPIATE ANALGESIC: ICD-10-CM

## 2025-06-14 LAB — CREAT UR-MCNC: 188 MG/DL

## 2025-06-14 PROCEDURE — 80307 DRUG TEST PRSMV CHEM ANLYZR: CPT | Mod: 90

## 2025-06-14 PROCEDURE — 99000 SPECIMEN HANDLING OFFICE-LAB: CPT

## 2025-06-14 PROCEDURE — G0482 DRUG TEST DEF 15-21 CLASSES: HCPCS

## 2025-06-15 LAB
ANA PAT SER IF-IMP: NORMAL
ANA SER QL: NEGATIVE
ANNOTATION COMMENT IMP: NORMAL
EJ AB SER QL: NEGATIVE
ENA JO1 IGG SER-ACNC: 1 AU/ML
HA AB SER QL LINE BLOT: NEGATIVE
KS AB SER QL LINE BLOT: NEGATIVE
MDA5 AB SER QL LINE BLOT: NEGATIVE
MI2 AB SER QL: NEGATIVE
MJ AB SER QL LINE BLOT: NEGATIVE
NUCLEAR IGG SER QL IF: NORMAL
OJ AB SER QL: NEGATIVE
PL12 AB SER QL: NEGATIVE
PL7 AB SER QL: NEGATIVE
SAE1 AB SER QL LINE BLOT: NEGATIVE
SRP AB SERPL QL: NEGATIVE
TIF1-GAMMA AB SER QL LINE BLOT: NEGATIVE
ZO AB SER QL LINE BLOT: NEGATIVE

## 2025-06-16 LAB — ETHYL GLUCURONIDE UR QL SCN: NEGATIVE NG/ML

## 2025-06-17 ENCOUNTER — TELEPHONE (OUTPATIENT)
Dept: UROLOGY | Facility: CLINIC | Age: 30
End: 2025-06-17
Payer: COMMERCIAL

## 2025-06-17 NOTE — PROGRESS NOTES
[facility]    6/18/2025      Chief complaint:   -burning in the feet right > Left   -pain in mostly lateral hips especially left side  -bilateral low back pain with pain that is in both legs  -can shoot down the left leg to the foot  -has EMG scheduled with Dr. Guevara on 7/9/2025            Interval history:  Caprice Kline 29 year old female is known to me for:  Chronic left sided low back pain with left sided sciatica  Lumbar radicular pain (left leg)  Meralgia paresthetica on the left side  Lumbar DDD  Myofascial pain  Muscle spasm  Chronic intractable pain  Bipolar disorder, in partial remission, most recent episode mixed  PMHx includes: depressive disorder, complication of anesthesia (freaking out when I wake up), bipolar disorder  PSHx includes: Minimally invasive left Lumbar 5 to Sacral 1 microdiscectomy (2021), cystectomy with  salpingectomy on the right side, tonsillectomy, wisdom teeth extraction      Recommendations/plan at the last visit on 6/10/2025 included:  Physical Therapy: none at present  Clinical Health Psychologist to address issues of relaxation, behavioral change, coping style, and other factors important to improvement: previous referral to [facility]   Diagnostic Studies: none  Medication Management:   We will likely consider Butrans (buprenorphine patch) for your pain.   If I take over opiate meds, you would sign an opiate agreement. You have to see me every 3 months while on opiate medication  Further procedures recommended: none at present, we can talk about this again if and when you are open to it.   Recommendations/follow-up for PCP:  see above  UDT today, last took morphine yesterday about 1pm. Had an alcoholic drink on Saturday.   Release of information: none   Follow up: you have an hour appt with me on Wednesday 6/18/2025   Check out De Archibald on You Tube, short simple exercise videos, 30-90 seconds long in the short form.         Since  "female last visit, Caprice Kline reports:    Interval history June 18, 2025  -burning in the feet right > Left   -pain in mostly lateral hips especially left side  -bilateral low back pain with pain that is in both legs  -can shoot down the left leg to the foot  -describes burning pain in the feet, particularly the top of the right foot.   -briefly discussed referral to podiatry, patient declines at present time.  -has EMG scheduled with Dr. Guevara on 7/9/2025, this was requested by Dr. Goldman of neurology for the left UPPER EXTREMITY and Left LOWER EXTREMITY   -seen by [facility] re: the labral tear in the hips, she states that they said they would not do anything for her for fear of making \"things worse\" but states they did not specify what would get worse. Those office notes are not available for review.   -discussed seeing [facility] re: hip pain and labral tears, patient open to referral.   -discussed working cause of pain is diffuse myofascial pain syndrome/fibromyalgia and how opiate medications are not indicated for this. Reviewed that fibro is a syndrome and she has migraines, depression, stomach issues, sleep issues all of which can be present in fibromyalgia as well. Patient states she does not believe that she has fibromyalgia. Reviewed that again, this is the best working diagnosis that I have for her chronic widespread pain.   -discussed use of low dose naltrexone for diffuse myofacial pain syndrome.  -did AnagranedEx drug interaction search with all of patients oral and vaginal medications. There are no contraindications or drug-drug interactions with naltrexone and her current extensive list of medications.         Interval history Sarahy 10, 2025  -in the interim, she has been seen by [facility]. Initially seen by Dr. South for intake and then managed by Norris chavez NP. She had been on MSIR and then tried Belbuca through [facility]. States she had a poor experience with Belbuca (states she wasn't told " "to not take the morphine).  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   -[facility] did a trial for intrathecal pain pump and was supposed to have a intrathecal pain pump placed on 5/5/2025. Decided not to proceed with intrathecal pain pump.   -now with pain that is burning in her feet started after discharge from hospital.  Pain in left buttock and left thigh (myonecrosis)  She has pain in bilateral lateral hips and pain goes down the legs to the feet.   -EMG has been ordered by Dr. Goldman in neurology and scheduled for 7/9/2025.   -patient is frustrated as she feels no one can figure out exactly what is wrong with her.   -care is also fragmented with some cares with [facility] an outside pain clinic.   -patient not a great historian, not certain who she  has seen and when.   -no new or known injury  -notes loss of bowel control 3 times at night   -discussed I do not recommend continuing morphine given she is on 2 benzodiazepines (klonopin 0.5mg as needed for anxiety appears to receive about #30 tabs per month and Valium 10mg tabs vaginally PRN urinary retention).   -reviewed heightened risk for opiate overdose and death combining mu opiate agonists and benzodiazepines.     Interval history November 4, 2024  -\"feels like my right hip is breaking glass\"  -she had issues with both legs collapsing out from under her this weekend.   -she fainted twice this past weekend.   -having pain in the low back bilaterally and into the left leg to the calf level and the right leg into the front of the thigh.   -metaxalone was not at all helpful  -she has also been seen at [facility] pain clinic and they ordered a MRI scan, this will be done at [facility] radiology.   -cannot get comfortable any way. She feels a \"slipping\" sensation in her low back.   -she received Percocet from Dr. South at [facility] pain clinic.   -we discussed that I recommend that she decide which pain clinic she is going to " "follow with. Better to only have one clinic managing medications, etc.     -she has had migraines her whole life, worse lately.   -migraine pain behind her eyes, up and over the skull and into the neck.   -has both phono and photophobia.   -no aura with migraine  -Ubrelvy is very helpful for her migraines.   -she has been having migraines nearly everyday  -she sees Dr. Goldman in neurology re: her migraines and tremor    Pain history collected at initial consult on 9/17/2024  Chronic left sided low back pain with left leg pain that extends posterolaterally down the left leg to the level of the knee.   This pain has been present since prior to August 2021 when she had back surgery with Dr. Junior of neurosurgery. This helped for a short period of time, thinks it was for about 2-3 months.    -she had a good experience with Dr. Junior and his team.   -she is unsure if her leg is weak, she knows she favors the left leg.   -she does have numbness and tingling in the left leg to the lateral hip and thigh.   -denies saddle anesthesia, no loss of bowel or bladder.   -she had COVID last week. Was laid off from her job on her birthday.     -left knee pain present since high school. Injured during basketball. Her left knee pops and his painful.         At this point, the patient's participation with our multidisciplinary team includes:  The patient has been compliant with the program.  PT -not ordered  Health Psych - not ordered            Pain right now is 10/10.   Describes pain as \" burning, cramping, sharp, numbness, cutting, dull, aching, throbbing, pressure, my right hip feels like broken glass.\"  Pain is constant.        Current pain-related medication treatments include:  -clonazepam 0.5mg PRN (uses sparingly, #10 last 4-6 weeks)  -doxepin 10mg at HS (helpful for sleep)  -hydroxyzine 50-100mg TID  (uses 100mg TID, very helpful)  -propranolol ER 80mg every day (recently started, migraine and tremor treatment)  -ubrelvy " "100mg at onset of headache (has been a lifesaver)  -Diazepam 10mg PRN urinary retention (uses this twice per week)  -gabapentin 8% in vanicream (somewhat helpful)  -Narcan nasal spray   -nortriptyline 25mg at bedtime (somewhat helpful)  -tizanidine 4mg Q 8 hours PRN (somewhat helpful)  -Ubrelvy 100mg at onset of headache (helpful)      Other pertinent medications:  -Adderall XR 30 mg once a day  -Dulcolax 5 mg daily as needed constipation  -lamotrigine 100mg BID (helpful)  -Lithium 600mg every day (helpful)  -ondansetron 4mg Q 8 hours PRN nausea (helpful, doesn't need often)  -prazosin 2mg BID (helpful for nightmares and PTSD)  -prazosin 5mg at bedtime (helpful)  -Quetiapine 300mg at HS (helpful)  -semiglutide 0.5mg weekly subcutaneous injection (too soon to tell)    Previous medication treatments included:    OPIATES:hydrocodone (causes headaches), Oxycodone (helpful), MSIR (somewhat helpful), Belbuca (didn't like this)  NSAIDS: ibuprofen (cannot take, on lithium), Naproxen (cannot take, on lithium), meloxicam (not helpful)  MUSCLE RELAXANTS: cyclobenzaprine (not helpful, \"I hated it\"), methocarbamol (not helpful), tizanidine (not helpful), metaxalone (not at all helpful),  orphenadrine (not helpful)  ANTI-MIGRAINE MEDS: prednisone (not helpful), rizatriptan (not helpful), sumatriptan (not helpful), Ubrelvy (has been a life saver)  ANTI-DEPRESSANTS: amitriptyline (unsure), bupropion (caused nausea), citalopram (not helpful), Doxepin (helpful), Duloxetine (unsure), fluoxetine (not helpful), sertraline (allergic), trazodone (nightmares)  SLEEP AIDS: alprazolam (somewhat helpful), clonazepam (helpful, uses sparingly), diazepam (not helpful), hydroxyzine (very helpful),   ANTI-CONVULSANTS: gabapentin (not helpful), pregabalin (helpful), Topiramate (didn't like how she felt)  TOPICALS: lidoderm patch (short term benefit), other gels/creams/ointments (sometimes)  ANXIOLYTICS: Clonanazepam  (helpful), hydroxyzine " "(helpful), valium (helpful for urinary retention)  MEDICAL CANNABIS: Medical cannabis cream (helpful)  Other meds: Tylenol (not helpful)    Medications and Allergies reviewed. Yes     Other treatments have included:  Caprice Kline has not been seen at a pain clinic in the past.      Physical therapy: Yes  not helpful  Psychologist: No   Chiropractor: Yes not helpful  Acupuncture: Yes somewhat helpful  Pharmacotherapy:    Opioids: No     Non-opioids:  Yes helpful  TENs Unit/electric stim: Yes helpful at chiropractor  Heat/Cold: Yes   Exercise: yes       Injections:   -10/24/2023 left SI joint injection per Dr. Moi Gary (no pain relief whatsoever)    Pertinent Surgeries:  8/21/2021 Lumbar microdiscectomy L5-S1 with Dr. Junior     Past Medical History:  Past Medical History:   Diagnosis Date    Bipolar disorder (H)     Complication of anesthesia     \"freaking out when I wake up\"    Depressive disorder 2011     Past Surgical History:  Past Surgical History:   Procedure Laterality Date    DISCECTOMY LUMBAR POSTERIOR MICROSCOPIC ONE LEVEL Left 08/24/2021    Procedure: Minimally invasive left Lumbar 5 to Sacral 1 microdiscectomy  ;  Surgeon: Destin Junior MD;  Location: [facility] OR    ENT SURGERY  Mar 17, 17    Tonsillectomy    GENITOURINARY SURGERY  2012    Fallopian tube removal and cyst removal    GYN SURGERY Right     cystectomy - with salpingectomy    HEAD & NECK SURGERY      tonsillectomy    HEAD & NECK SURGERY      wisdom teeth extraction    IR SOFT TISSUE BIOPSY  05/02/2025     Medications:  Current Outpatient Medications   Medication Sig Dispense Refill    albuterol (PROAIR HFA/PROVENTIL HFA/VENTOLIN HFA) 108 (90 Base) MCG/ACT inhaler Inhale 2 puffs into the lungs every 6 hours as needed for shortness of breath, wheezing or cough 18 g 0    amphetamine-dextroamphetamine (ADDERALL XR) 30 MG 24 hr capsule Take 30 mg by mouth daily as needed.      azelaic acid (FINACIA) 15 % external gel Apply topically 2 times " daily as needed.      benzonatate (TESSALON) 100 MG capsule Take 1 capsule (100 mg) by mouth 3 times daily as needed for cough. 21 capsule 0    clobetasol (TEMOVATE) 0.05 % external solution APPLY TOPICALLY TO THE AFFECTED AREA TWICE DAILY FOR UP TO 21 DAYS. DO NOT APPLY TO NORMAL SKIN      clobetasol propionate (CLOBEX) 0.05 % external shampoo Apply topically.      clonazePAM (KLONOPIN) 0.5 MG tablet Take 0.5 mg by mouth daily as needed for anxiety.      diazepam (VALIUM) 10 MG tablet PLACE 1 TABLET VAGINALLY AS NEEDED FOR URINARY RETENTION OR FOR PAIN 15 tablet 0    diclofenac (VOLTAREN) 1 % topical gel Apply 2 g topically 4 times daily. Max 34 grams per day. 340 g 1    docusate sodium (COLACE) 100 MG capsule Take 200 mg by mouth 2 times daily.      EPINEPHrine (ANY BX GENERIC EQUIV) 0.3 MG/0.3ML injection 2-pack INJECT 0.3 ML INTO THE MUSCLE AS NEEDED FOR ANAPHYLAXIS 2 each 1    furosemide (LASIX) 20 MG tablet Take 1 tablet (20 mg) by mouth daily. 90 tablet 0    gabapentin 8% in vanicream (Compounded) Apply 4 clicks (1 g) topically nightly as needed. 30 g 5    glucosamine-chondroitin 500-400 MG CAPS per capsule Take 1 capsule by mouth daily.      hydrocortisone 2.5 % cream Apply topically 2 times daily.      hydrOXYzine (VISTARIL) 50 MG capsule Take 100 mg by mouth 3 times daily.      ipratropium-albuterol (COMBIVENT RESPIMAT)  MCG/ACT inhaler Inhale 1 puff into the lungs 4 times daily as needed for shortness of breath, wheezing or cough 4 g 0    ketoconazole (NIZORAL) 2 % external cream Apply topically. 3 times every week      ketoconazole (NIZORAL) 2 % external shampoo Apply topically twice a week.      lamoTRIgine (LAMICTAL) 100 MG tablet Take  mg by mouth 2 times daily. 100 mg AM / 50 mg PM as of 4/27 - pt in process of titrating back up      lidocaine (LIDODERM) 5 % patch Place 1 patch over 12 hours onto the skin every 24 hours. To prevent lidocaine toxicity, patient should be patch free for 12  hrs daily. 30 patch 1    lithium (ESKALITH) 600 MG capsule Take 600 mg by mouth at bedtime.      magnesium oxide (MAG-OX) 400 MG tablet Take 400 mg by mouth daily.      morphine (MSIR) 15 MG IR tablet Take 15 mg by mouth 3 times daily as needed for pain.      mupirocin (BACTROBAN) 2 % external ointment Apply topically as needed.      NARCAN 4 MG/0.1ML nasal spray CALL 911. SPR CONTENTS OF ONE SPRAYER (0.1ML) INTO ONE NOSTRIL. REPEAT IN 2-3 MIN IF SYMPTOMS OF OPIOID EMERGENCY PERSIST, ALTERNATE NOSTRILS      nortriptyline (PAMELOR) 25 MG capsule Take 1 capsule (25 mg) by mouth at bedtime. 90 capsule 1    ondansetron (ZOFRAN ODT) 4 MG ODT tab DISSOLVE 1 TABLET(4 MG) ON THE TONGUE EVERY 8 HOURS AS NEEDED FOR NAUSEA 90 tablet 0    prazosin (MINIPRESS) 2 MG capsule Take 2 mg by mouth every morning.      prazosin (MINIPRESS) 5 MG capsule Take 5 mg by mouth at bedtime.      Pregabalin (LYRICA) 200 MG capsule Take 200 mg by mouth 3 times daily.      propranolol (INDERAL) 20 MG tablet Take 1 tablet (20 mg) by mouth 2 times daily as needed (tremor). 180 tablet 2    propranolol ER (INDERAL LA) 80 MG 24 hr capsule Take 1 capsule (80 mg) by mouth daily. 90 capsule 1    QUEtiapine (SEROQUEL) 300 MG tablet Take 300 mg by mouth At Bedtime      RETIN-A 0.025 % external cream APPLY SPARINGLY TO FACE EVERY OTHER NIGHT FOR 14 NIGHTS THEN NIGHTLY THEREAFTER      Roflumilast (ZORYVE) 0.3 % FOAM Externally apply topically.      Semaglutide-Weight Management (WEGOVY) 0.5 MG/0.5ML pen Inject 0.5 mg subcutaneously once a week. 2 mL 0    sulfacetamide sodium, Acne, 10 % lotion Apply topically.      sulfacetamide sodium, Acne, 10 % lotion APPLY IT TO THE FACE ONCE DAILY IN THE MORNING X3 MONTHS      tamsulosin (FLOMAX) 0.4 MG capsule Take 1 capsule (0.4 mg) by mouth every evening. 30 capsule 11    tiZANidine (ZANAFLEX) 4 MG tablet TAKE 1 TABLET BY MOUTH EVERY 8 TO 12 HOURS AS NEEDED. NOT TO EXCEED 3 DOSES IN 24 HOURS      tretinoin (RETIN-A)  "0.025 % external cream Apply topically at bedtime.      triamcinolone (KENALOG) 0.1 % external cream Apply topically 2 times daily as needed.      ubrogepant (UBRELVY) 100 MG tablet Take 1 tablet (100 mg) by mouth at onset of headache. 8 tablet 11     Allergies:     Allergies   Allergen Reactions    Metronidazole Anaphylaxis and Hives     Other reaction(s): Throat swelling  Throat swelling 6 hrs after exposure  Itching and hives 2 hrs after exposure    Shellfish Allergy Anxiety, Diarrhea, Difficulty breathing, Hives, Itching, Rash, Shortness Of Breath and Swelling    Codeine Headache    Fish-Derived Products      Stopped fish oil and less stomach discomfort  Rash after eating fish.     Hydrocodone-Acetaminophen Other (See Comments)    Sertraline Other (See Comments)     Patient was foggy and \"high\" feeling    Shellfish-Derived Products      Lips get numb, throat gets scratchy, rashes     Social History:  Home situation: lives with boyfriend. 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  Tobacco use: vapes  Alcohol use: minimal, monthly use  Drug use: now none, used marijuana in the past  History of chemical dependency treatment: none    Family history:  Family History   Problem Relation Age of Onset    Hypertension Mother     Colon Polyps Mother 50    Colon Cancer Mother     Substance Abuse Father     Colon Cancer Father 60    Diabetes Maternal Grandmother     Hypertension Maternal Grandmother     Colon Cancer Maternal Grandmother     Cancer Maternal Grandmother     Nephrolithiasis Maternal Grandmother     Diabetes Paternal Grandmother     Breast Cancer Paternal Grandmother     Cancer Paternal Grandmother                Physical Exam:  Vitals:    06/18/25 0904   BP: 126/85   Pulse: 86       Behavioral observations:  Awake, alert, conversant and cooperative     Gait:  slow, takes careful steps. Uses single ended cane    Musculoskeletal exam: "  diffuse tenderness  Tender over bilateral  SI joints, piriformis and greater trochanters to gentle palpation.   Feet tender to gentle palpation to check pulses right > Left   Strength is grossly equal throughout    Neuro exam:  deferred    Skin/vascular/autonomic:  No suspicious lesions on exposed skin.   Bilateral feet are pink, warm, dry without rashes. Both DP and posterior tibial pulses are palpable.     Other:  na      2010 ACR Criteria for fibromyalgia - scoring    Widespread pain index of > or equal to 7 - yes  Symptoms present for at least 3 months - present for years  No other disorder to explain their pain - no other disorder  Symptoms Severity scale score > or equal to 5  Fatigue (0-3) - 3  Waking unrefreshed (0-3) - 3  Cognitive symptoms (0-3) - 3  Somatic Symptoms - 3  (None - 0, Few - 1, Moderate - 2, Great deal - 3)  Rule in for fibromyalgia       IMAGING:  EXAM: MR LUMBAR SPINE W/O & W CONTRAST  2/28/2023 9:49 AM      HISTORY:  Low back pain; hx Spine surgery; No r/o hardware failure;  Worsening low back pain; No known/automatically detected potential  contraindications to imaging; Lumbar radiculopathy; Chronic bilateral  low back pain with left-sided sciatica; Chronic bilateral low back  pain with left-sided sciatica        COMPARISON:  MRI 7/13/2021, 9/27/2021     TECHNIQUE: Sagittal T1-weighted, sagittal STIR, axial T1-weighted, and  3-D volumetric T2-weighted images of the lumbar spine were obtained  without intravenous contrast. Post intravenous contrast using  gadolinium axial and sagittal T1-weighted images were obtained with  fat saturation. Post intravenous contrast using gadolinium, axial and  sagittal T1-weighted images were obtained with fat saturation.     CONTRAST: 9ml Gadavist.     FINDINGS:  There are 5 lumbar type vertebrae, used for the purposes of this  dictation. Conus tip at approximately L1. Normal lumbar vertebral  alignment. L5-S1 disc dehydration with opposing L5-S1  endplate  degenerative edema. No loss of vertebral body height. Normal marrow  signal. Normal cauda equina.     On a level by level basis, the findings are as follows:     L1-2: No spinal canal or neural foraminal stenosis.     L2-3: No spinal canal or neural foraminal stenosis.     L3-4: No spinal canal or neural foraminal stenosis. Stable disc bulge.        L4-5: No spinal canal or neural foraminal stenosis. Stable disc bulge.     L5-S1: Left hemilaminectomy changes with nonmasslike postcontrast  enhancement surrounding the left L5-S1 facet joint and within the left  epidural space similar surrounding the traversing S1 nerve root  without any significant mass effect. Posterior paraspinal fluid  collection has resolved. No spinal canal or neural foraminal stenosis.     The visualized paraspinous soft tissues are within normal limits.                                                                       IMPRESSION:   1. L5-S1 hemilaminectomy, microdiscectomy changes. Left facet  periarticular, left epidural space granulation tissue formation  surrounding the left descending S1 within the lateral recess without  significant compression or abnormal nerve signal. Previous  postoperative fluid collection has resolved.  2. No substantial degenerative change at any other level within the  lumbar spine.     I have personally reviewed the examination and initial interpretation  and I agree with the findings.     ELLIE GLORIA MD       Thoracic MRI scan without contrast at [facility] completed on 11/13/2024  Interpretation: Thoracic cord shows normal caliber and signal intensity throughout conus is normal tapering and terminates at L1.  There are no spinal canal collections or intradural masses.  Maintain thoracic kyphotic curvature.  Vertebral body height and marrow signal are well-preserved throughout.  There is no disc protrusions, significant spinal canal or neuroforaminal stenosis.  No substantial degenerative  discovertebral change seen.  Included proximal ribs and paraspinous soft tissues are unremarkable.  Scant pleural effusions considered to be within physiologic limits.  Conclusion: Unremarkable thoracic spine.        MRI lumbar spine without contrast completed at [facility] radiology on 11/6/2024  Interpretation: Segmentation, alignment and osseous structures: Lumbar hypolordosis with preserved vertebral body heights.  Type I discogenic endplate edema seen mostly to the left at L5-S1.  Remainder of endplates intact and marrow signal normal.  No significant segmental listhesis.  Sacrum and sacroiliac joints: Included sacrum intact and superior iliac joints unremarkable.    L5-S1: Mild to moderate disc degeneration with left laminotomy.  Shallow right paracentral disc contour abnormality but no large disc protrusion, indenting spinal canal stenosis or transiting nerve compression.  Neuroforamina adequately patent.  045 through T12-L1: Disc height and hydration preserved.  No disc contour abnormality, spinal canal or foraminal stenosis identified.  Facet joints are normal.  Neurologic structures: The conus maintains normal caliber and intrinsic signal termination is L1.  No spinal canal collections or masses.  Cauda equina nerve roots thickness and distribution normal, no adhesive arachnoiditis  Paraspinous soft tissues: No paraspinous soft tissue masses or collections.  Conclusion: Mild to moderate L5-S1 disc degeneration with microdiscectomy.  No recurrent disc protrusion, significant stenosis or neural impingement at any lumbar level.      Lumbar spine x-rays 4 views at [facility] radiology completed 1/23/2025  Interpretation: Lordotic alignment of 5 lumbar type vertebrae with mild listing of the upper lumbar spine to the right.  Moderate disc space narrowing at L5-S1.  Preservation of disc space height at each of the remaining levels.  No spondylolisthesis.  Mild sclerosis over the anterior superior right iliac joint is  most likely degenerative in origin.  Lateral views with limited flexion extension show no abnormal interbody motion.  No acute fracture or avulsion.  No osteolytic or destructive bone lesions.  Conclusion:  1.  Moderate single level lumbar disc degeneration at L5-S1.  2.  No spondylolisthesis and no segmental hypermobility with flexion extension.      MRI of the left hip without contrast at [facility] completed 1/23/2025  Findings:  Pelvis osseous structures:  Sacrum: No fracture or destructive osseous lesion is seen in the imaged portions of the sacrum  Sacroiliac joints: No convincing evidence of sacroiliitis of the imaged portions of the sacroiliac joints.  Pubic rami: Unremarkable.  Symphysis pubis: There is no evidence of acute osteitis pubis.  Lumbar spine: Degenerative disc disease is noted of the lower lumbar spine which is incompletely evaluated on this examination.  Correlate with lumbar spine MRI from the same day.  Labrum:  Focal irregular tear of the anterior superior labrum extending from 1:00 to 2:00 (sagittal images 23 through 25.  Tiny paralabral cyst measuring 1 mm is noted anteriorly (axial 23).  Hip joint:  Physiologic amount of joint fluid.  No chondral loss is seen although it must be noted that the cartilage is not optimally evaluated by this nonarthrogram study.  No subchondral bone marrow edema or subchondral cystic changes seen.  Proximal femur:  No fracture, osseous stress injury, avascular necrosis, or suspicious bone marrow signal abnormality is seen.  No discrete cam lesion.  Alpha angle is within normal limits, measuring 45 degrees (series 9, image 15)  Acetabulum:  No subchondral cysts, periacetabular ossicles or marrow edema.  Coverage: Left lateral center edge (CE) angle measures approximately 36 degrees, midline coronal series 4, image 20.  Ligamentum teres: Unremarkable.  Myotendinous structures:  Gluteus abductors: Gluteal tendons are intact.  Minimal edema surrounding the  gluteus minimus tendon, compatible with minimal peritendinitis.  Edema is noted deep to the iliotibial band and within the greater trochanter bursa overlying.  In addition there is fluid like signal along the far anterior aspect of the gluteus tj (axial images 26 through 32).  Rectus abdominis-adductor longus aponeurosis, adductors, and rectus abdominis: The Apotone neurosis and rectus abdominis are unremarkable.  Pectineus muscle, adductor longus, and adductor brevis are intact and unremarkable.  There is patchy edema tracking along the musculotendinous junction of the ischial tuberosity origin of the abductor perry (actual image just 39 through 45.   Hamstrings: Unremarkable.  Flexors: The iliopsoas and rectus femoris tendons are intact.  Quadratus for Jaswinder muscle: Unremarkable.  Piriformis muscle: Unremarkable.  Gluteal aponeurotic fascia and IT band: Unremarkable.  In addition to the muscle signal abnormality as noted above, there is more extensive patchy intramuscular signal alteration noted along the right gluteus tj, proximal vastus lateralis, quadratus for Jaswinder, and adductor perry.  This is described in detail on dedicated dictation of the right hip MRI.  Pelvic soft tissues: Unremarkable  Impression: 1 extensive bilateral intramuscular signal alteration, right greater than left which is most prominent along the right distal gluteus tj, gluteus minimus, proximal vastus lateralis, quadratus for Jaswinder, and adductor perry as well as the left gluteus tj and abductor perry.  Given location near the musculotendinous junction of these muscles, this has an appearance of muscle strain injury.  Delayed onset of muscle soreness would also be in the differential diagnosis and if there is clinical concern for DOMS, evaluate creatinine kinase levels.  The involvement of multiple pulm muscle groups and bilateral nature of the edema and fluid suggest a more systemic process.  Inflammatory and  "infectious etiologies could be considered; however this does not have a typical appearance of infectious myositis/pyomyositis.  Polymyositis and other inflammatory myopathies could be considered as well as an autoimmune workup may be indicated.  Denervation edema could be considered at the late acute stage; however, there is no atrophy to suggest chronic denervation.  Drug-induced myopathy is thought less likely.  2.  No evidence of tendon tear.  3.  Focal tear of the anterosuperior labrum extending from 1:00 to 2:00 with a tiny/1 mm paralabral cyst      [facility] 3/31/2025  \" Interpretation:  Hip joint: There is no evidence of hip joint effusion or loose body.  No subchondral edema signal or cystic change.  No discrete lesion identified to indicate AVN.  No evidence of marrow edema pattern to indicate fracture or stress reaction of the femoral neck.  No evidence for a chondral defect of the right hip joint.  No other advanced hip joint chondromalacia.  There is tearing identified involving the anterosuperior aspect of the labrum as also was present previously.  No evidence for a paralabral cyst.  No appreciable decreased offset involving the femoral head/neck junction.  No evidence for acetabular retroversion.  Unremarkable and intact appearance of the gluteus tendon insertions onto the greater trochanter.  Previously identified marked edema signal within the anterolateral most fibers of the gluteus tj muscle has largely resolved.  There is a similar or slightly more pronounced appearance of edema signal within the adjacent posterior portion of the distal gluteus medius muscle belly.  Significantly less pronounced edema signal within the posterior fibers of the vastus lateralis origin.  Intact appearance of the iliopsoas muscle and tendon insertions.  No evidence for iliopsoas bursitis.  Bones and joints: No evidence for an occult fracture/stress reaction involving the sacrum.  No " "appreciable changes of SI joint arthrosis.  No evidence for fracture.  No abnormal marrow edema pattern to indicate occult stress reaction or stress fracture.  Musculotendinous structures: Previously identified edema signal involving the abductor musculature has resolved.   Previously identified edema signal within the musculature lateral to the contralateral, left sided greater trochanter has significantly improved.  Intrapelvic contents: No free fluid seen within the pelvis.  No discrete intrapelvic masses identified.  Neurovascular structures: No discrete cyst, mass or other compression upon the portions visualized of sciatic or femoral nerves.  Conclusion:  1.  Resolution of previously identified edema signal within the abductor musculature.  Resolution of previously identified edema signal within the anterolateral aspect of the right gluteus tj muscle belly.  Significant improvement of edema signal within the proximal right sided vastus lateralis muscle belly as well as within the musculature superficial to the left sided greater trochanter.  2.  Similar or slightly more pronounced edema signal within the posterior fibers of the distal right sided gluteus medius muscle belly.  Unremarkable and intact appearance of the gluteus tendon insertions onto the greater trochanter.  3.  Tearing is again identified involving the anterosuperior aspect of the labrum.  No convincing MRI evidence of morphologic predisposition to femoral acetabular impingement.  Numeral 4.  No evidence for a right hip joint chondral defect or other advanced hip joint chondromalacia.  5.  There are no new areas of muscle belly edema signal identified.\"      MR bilateral thigh  without and with contrast 4/29/2025 7:49 AM  Techniques: Multiplanar multisequence imaging of the bilateral thighs  was obtained without  and with administration of intra-articular or  intravenous contrast using routine protocol.     Contrast: 10mL Gadavist IV   "   History: elevated CK, history of abnomal muscle edema in adductors.  Now with elevated CK, assess for myositis in thighs/ pattern of  involvement.     Additional History from EMR: Medication induced myopathy (ajovy,  lamotrigine; both with rare case reports) versus immune mediated  process (inclusion body myositis) versus underlying genetic  myopathies. thyroid level normal. Considered malignancy but appears to  be uptodate on cancer screening (pap with HPV negative october 2024).  Less likely CTD without other signs/symptoms. Will start by obtaining  MRI of bilateral thighs to assist with potential muscle biopsy  location.      Comparison: Bilateral hip MRI 1/23/2025     Findings:     Osseous structures  Osseous structures: Prominent red marrow within the bones. Tiny bone  islands within the left side the tibial plateau. No fracture, stress  reaction, avascular necrosis, or focal osseous lesion is seen.     Joint and periarticular soft tissue     Internal derangement of joints are not well assessed owing to chosen  field of view.     Muscles and tendons  Muscles: No substantial fatty replacement in the muscles.     Left: Patchy edema within the proximal quadriceps femoris, gluteus  minimus and quadratus femoris. There is associated rim enhancement  particularly in the gluteus minimus and quadratus femoris (stipple  sign). Perifascial edema and small fluid particularly along the  proximal quadriceps femoris. Compared to MRI from 1/23/2025 findings  have progressed around left hip.      Right: Trace residual edema in the gluteus medius; improved from  1/23/2025. Interval significant resolution of the edema in the  adductor musculature and gluteus tj. Perifascial edema involving  quadriceps muscle particularly in the lateral aspect. Small areas of  edema and enhancement in the distal biceps femoris and distal adductor  perry.      Tendons: No partial or complete tear.     Nerves:  Grossly intact sciatic  nerves.     Other Findings:  Multiple predominantly peripherally located subcentimeter cysts in the  ovaries; questionable for polycystic appearance.                                                                      Impression:  1. Left: Patchy soft tissue edema within the proximal  quadriceps/vastus lateralis , gluteus minimus and quadratus femoris  muscles There is associated rim enhancement particularly in the  gluteus minimus and quadratus femoris suggesting myonecrosis.  Perifascial edema and small perifascial fluid particularly along the  proximal quadriceps muscles. Compared to MRI from 1/23/2025 findings  have progressed around the left hip.      Right: Trace residual soft tissue edema in the gluteus medius;  improved from 1/23/2025. Interval significant resolution of the edema  in the adductor musculature and gluteus tj. Perifascial edema  involving quadriceps muscle particularly along the lateral aspect.  Small areas of edema and enhancement in the distal biceps femoris and  distal adductor perry.      2. No substantial fatty replacement in the muscles.     I have personally reviewed the examination and initial interpretation  and I agree with the findings.     KRISTOPHER JC MD        EXAMINATION: CT PELVIS SOFT TISSUE W CONTRAST, 5/1/2025 9:35 AM     TECHNIQUE:  Helical CT images from the umbilicus through the symphysis  pubis were obtained with contrast.  Coronal reformatted images were  generated at a workstation for further assessment.     COMPARISON: MRI 4/29/2025, 1/23/2025.     HISTORY: Myonecrosis and ring-enhancing lesions of hip musculature     FINDINGS:     Soft tissues: Redemonstration of ill-defined lesion centered about the  left gluteus minimus measuring up to 3.1 cm with faint peripheral  enhancement (series 2, image 80).     Bones: No acute or suspicious osseous lesions. Left acetabular bone  island.     Bladder: Unremarkable.      Pelvic organs: Unremarkable.      Gastrointestinal tract: No dilated loops of bowel or bowel wall  thickening. The appendix is unremarkable.     Lymph nodes: No suspicious lymphadenopathy.     Peritoneum/mesentery: No free fluid. No free air..     Vessels: Patent major abdominal arterial vasculature. Portal, splenic  and superior mesenteric veins are patent.  No significant  atherosclerotic disease.     Heart and lung bases: Clear.                                                                       Muscle Biopsy dated 5/2/2025 at [facility]  IMPRESSION:   Stable ill-defined peripherally enhancing lesion centered about the  left gluteus minimus musculature when compared to 4/29/2025 MRI.      I have personally reviewed the examination and initial interpretation  and I agree with the findings.     CRYSTAL PINA MD         Case Report   Surgical Pathology Report                         Case: [id number]                                   Authorizing Provider:  Isabel Haque DO     Collected:           05/02/2025 01:54 PM           Ordering Location:     [facility]               Received:            05/02/2025 02:46 PM           Pathologist:           Lily Vasquez MD                                                           Specimen:    Other, Muscle                                                                              Final Diagnosis   Skeletal muscle, left gluteus minimus biopsy:  - Benign skeletal muscle with  myonecrosis and associated granulation tissue formation  - See comment   Electronically signed by Lily Vasquez MD on 5/7/2025 at 1922 CDT   Comment  [facility]   Consultation with Dr. Marcell Valverde, Director of Neuromuscular Pathology, Dept. of Neurology will be obtained and additional information, if any, will be included in an addendum.     As clinically necessary, a Neurology consult for the patient with specialized neuromuscular pathology tissue procurement protocols are recommended for appropriate workup and  "diagnosis.    Clinical Information  [facility]   Myonecrosis of L gluteus minimus   Gross Description  [facility]   A(A). Other, Muscle:  The specimen is received in formalin with proper patient identification labeled \" muscle\".  The specimen consists of multiple tan-red tissue cores and tissue core fragments, ranging from 0.1-0.6 cm in length and averaging 0.1 cm in diameter.  Wrapped and entirely submitted in A1-A2.      Microscopic Description  [facility]   Microscopic examination is performed.   Performing Labs  [facility]   The technical component of this testing was completed at [facility].     Stain controls for all stains resulted within this report have been reviewed and show appropriate reactivity.          LABS:  Creatinine   Date Value Ref Range Status   06/07/2025 0.89 0.51 - 0.95 mg/dL Final     GFR Estimate   Date Value Ref Range Status   06/07/2025 89 >60 mL/min/1.73m2 Final     Comment:     eGFR calculated using 2021 CKD-EPI equation.     Alkaline Phosphatase   Date Value Ref Range Status   05/18/2025 68 40 - 150 U/L Final     AST   Date Value Ref Range Status   05/18/2025 37 0 - 45 U/L Final     Comment:     Specimen is hemolyzed which can falsely elevate AST. Analysis of a non-hemolyzed specimen may result in a lower value.     ALT   Date Value Ref Range Status   05/18/2025 25 0 - 50 U/L Final      6/7/2025  CK total 42    Screening tools:     DIRE Score for ongoing opioid management is calculated as follows:    Diagnosis = 2    Intractability = 2    Risk: Psych = 2  Chem Hlth = 2  Reliability = 2  Social = 2    Efficacy = 2    Total DIRE Score = 14 (14 or higher predicts good candidate for ongoing opioid management; 13 or lower predicts poor candidate for opioid management)       [place]:  [place]ed [place]  6/18/2025- no concerning fills. Not on opiates.   Modesta MADRID, RN CNP, FNP  [facility]  [place] " location          Assessment:  Bilateral hip pain  Tear of left acetabular labrum, subsequent encounter  Tear of right acetabular labrum, subsequent encounter  Diffuse myofascial pain syndrome  Fibromyalgia  Neuropathic pain  Chronic intractable pain    Encounter for long-term use of opiate analgesic  Bipolar disorder, in partial remission, most recent episode mixed  PMHx includes: depressive disorder, complication of anesthesia (freaking out when I wake up), bipolar disorder  PSHx includes: Minimally invasive left Lumbar 5 to Sacral 1 microdiscectomy (2021), cystectomy with  salpingectomy on the right side, tonsillectomy, wisdom teeth extraction        Plan:   Physical Therapy: referral to [facility]  Clinical Health Psychologist to address issues of relaxation, behavioral change, coping style, and other factors important to improvement: previous referral to [facility]   Diagnostic Studies: none  Medication Management:   START naltrexone 2.5mg once daily by mouth. Pharmacy will call you with cost as this is custom compounded  Further procedures recommended: none at present  Referral to Sports Med re: bilateral hip pain  Recommendations/follow-up for PCP:  see above  Release of information: none   Follow up: you have an hour appt with me on Wednesday 7/16/2025  Check out [place] [facility] on You Tube, short simple exercise videos, 30-90 seconds long in the short form.         ASSESSMENT AND PLAN:  (M25.551,  M25.552) Bilateral hip pain  (primary encounter diagnosis)  Comment:   Plan: Orthopedic  Referral, Pain PT Eval and        Treat  Referral, Adult Pain Clinic         Follow-Up Order, CANCELED: Pain PT Eval and         Treat  Referral            (S73.192D) Tear of left acetabular labrum, subsequent encounter  Comment:   Plan: Orthopedic  Referral, Pain PT Eval and        Treat  Referral, Adult Pain Clinic         Follow-Up Order, CANCELED: Pain PT Eval and          Treat  Referral            (S73.191D) Tear of right acetabular labrum, subsequent encounter  Comment:   Plan: Orthopedic  Referral, Pain PT Eval and        Treat  Referral, Adult Pain Clinic         Follow-Up Order, CANCELED: Pain PT Eval and         Treat  Referral            (M79.18) Diffuse myofascial pain syndrome  Comment:   Plan: COMPOUNDED NON-CONTROLLED SUBSTANCE (CMPD RX) -        PHARMACY TO MIX COMPOUNDED MEDICATION, Pain PT         Eval and Treat  Referral, Adult Pain         Clinic Follow-Up Order, CANCELED: Pain PT Eval         and Treat  Referral            (M79.7) Fibromyalgia  Comment:   Plan: COMPOUNDED NON-CONTROLLED SUBSTANCE (CMPD RX) -        PHARMACY TO MIX COMPOUNDED MEDICATION, Pain PT         Eval and Treat  Referral, Adult Pain         Clinic Follow-Up Order, CANCELED: Pain PT Eval         and Treat  Referral            (M79.2) Neuropathic pain  Comment:   Plan:     (G89.29) Chronic intractable pain  Comment:   Plan: COMPOUNDED NON-CONTROLLED SUBSTANCE (CMPD RX) -        PHARMACY TO MIX COMPOUNDED MEDICATION, Pain PT         Eval and Treat  Referral, Adult Pain         Clinic Follow-Up Order, CANCELED: Pain PT Eval         and Treat  Referral              BILLING TIME DOCUMENTATION:   TOTAL TIME includes:   Time spent preparing to see the patient: 2 minutes (reviewing records and tests)  Time spend face to face with the patient: 58 minutes  Time spent ordering tests, medications, procedures and referrals: 0 minutes  Time spent Referring and communicating with other healthcare professionals: 0 minutes  Documenting clinical information in Epic: 20 minutes    The total TIME spent on this patient on the day of the appointment was 80 minutes.           Modesta MADRID, RN CNP, FNP  [facility]  [facility] [place]

## 2025-06-17 NOTE — TELEPHONE ENCOUNTER
Patient confirmed scheduled appointment:  Date: 11/12  Time: 1:30 VV  Visit type: RTN rescheduled  Provider: Rylee  Location: VV  Testing/imaging: NA  Additional notes: NA

## 2025-06-17 NOTE — TELEPHONE ENCOUNTER
Information noted.     Modesta MADRID, RN CNP, FNP  [facility]  [facility] [place]

## 2025-06-18 ENCOUNTER — OFFICE VISIT (OUTPATIENT)
Dept: PALLIATIVE MEDICINE | Facility: CLINIC | Age: 30
End: 2025-06-18
Payer: COMMERCIAL

## 2025-06-18 ENCOUNTER — RESULTS FOLLOW-UP (OUTPATIENT)
Dept: PALLIATIVE MEDICINE | Facility: CLINIC | Age: 30
End: 2025-06-18

## 2025-06-18 VITALS — HEART RATE: 86 BPM | SYSTOLIC BLOOD PRESSURE: 126 MMHG | DIASTOLIC BLOOD PRESSURE: 85 MMHG

## 2025-06-18 DIAGNOSIS — S73.192D TEAR OF LEFT ACETABULAR LABRUM, SUBSEQUENT ENCOUNTER: ICD-10-CM

## 2025-06-18 DIAGNOSIS — M79.7 FIBROMYALGIA: ICD-10-CM

## 2025-06-18 DIAGNOSIS — M79.18 DIFFUSE MYOFASCIAL PAIN SYNDROME: ICD-10-CM

## 2025-06-18 DIAGNOSIS — M25.552 BILATERAL HIP PAIN: Primary | ICD-10-CM

## 2025-06-18 DIAGNOSIS — G89.29 CHRONIC INTRACTABLE PAIN: ICD-10-CM

## 2025-06-18 DIAGNOSIS — R33.9 URINARY RETENTION: ICD-10-CM

## 2025-06-18 DIAGNOSIS — M79.2 NEUROPATHIC PAIN: ICD-10-CM

## 2025-06-18 DIAGNOSIS — R10.2 PELVIC PAIN IN FEMALE: ICD-10-CM

## 2025-06-18 DIAGNOSIS — S73.191D TEAR OF RIGHT ACETABULAR LABRUM, SUBSEQUENT ENCOUNTER: ICD-10-CM

## 2025-06-18 DIAGNOSIS — M25.551 BILATERAL HIP PAIN: Primary | ICD-10-CM

## 2025-06-18 LAB
7AMINOCLONAZEPAM UR QL CFM: PRESENT
HYDROMORPHONE UR CFM-MCNC: 121 NG/ML
HYDROMORPHONE/CREAT UR: 64 NG/MG {CREAT}
MORPHINE UR CFM-MCNC: >7000 NG/ML
MORPHINE/CREAT UR: ABNORMAL
NORDIAZEPAM UR QL CFM: PRESENT
OXAZEPAM UR QL CFM: PRESENT
PREGABALIN UR QL CFM: PRESENT
TEMAZEPAM UR QL CFM: PRESENT

## 2025-06-18 PROCEDURE — 99215 OFFICE O/P EST HI 40 MIN: CPT | Performed by: NURSE PRACTITIONER

## 2025-06-18 PROCEDURE — 3074F SYST BP LT 130 MM HG: CPT | Performed by: NURSE PRACTITIONER

## 2025-06-18 PROCEDURE — 3079F DIAST BP 80-89 MM HG: CPT | Performed by: NURSE PRACTITIONER

## 2025-06-18 PROCEDURE — 99417 PROLNG OP E/M EACH 15 MIN: CPT | Performed by: NURSE PRACTITIONER

## 2025-06-18 PROCEDURE — 1125F AMNT PAIN NOTED PAIN PRSNT: CPT | Performed by: NURSE PRACTITIONER

## 2025-06-18 ASSESSMENT — PAIN SCALES - GENERAL: PAINLEVEL_OUTOF10: SEVERE PAIN (10)

## 2025-06-18 NOTE — PATIENT INSTRUCTIONS
Plan:   Physical Therapy: referral to [facility]  Clinical Health Psychologist to address issues of relaxation, behavioral change, coping style, and other factors important to improvement: previous referral to [facility] Ramos   Diagnostic Studies: none  Medication Management:   START naltrexone 2.5mg once daily by mouth. Pharmacy will call you with cost as this is custom compounded  Further procedures recommended: none at present  Referral to Sports Med re: bilateral hip pain  Recommendations/follow-up for PCP:  see above  Release of information: none   Follow up: you have an hour appt with me on Wednesday 7/16/2025  Check out De Archibald on You Tube, short simple exercise videos, 30-90 seconds long in the short form.     ----------------------------------------------------------------  Clinic Number:  [phone]   Call with any questions about your care and for scheduling assistance.   Calls are returned Monday through Friday between 8 AM and 4:30 PM. We usually get back to you within 2 business days depending on the issue/request.    If we are prescribing your medications:  For opioid medication refills, call the clinic or send a C-sam message 7 days in advance.  Please include:  Name of requested medication  Name of the pharmacy.  For non-opioid medications, call your pharmacy directly to request a refill. Please allow 3-4 days to be processed.   Per [place] State Law:  All controlled substance prescriptions must be filled within 30 days of being written.    For those controlled substances allowing refills, pickup must occur within 30 days of last fill.      We believe regular attendance is key to your success in our program!    Any time you are unable to keep your appointment we ask that you call us at least 24 hours in advance to cancel.This will allow us to offer the appointment time to another patient.   Multiple missed appointments may lead to dismissal from the clinic.

## 2025-06-19 ENCOUNTER — PATIENT OUTREACH (OUTPATIENT)
Dept: CARE COORDINATION | Facility: CLINIC | Age: 30
End: 2025-06-19
Payer: COMMERCIAL

## 2025-06-20 NOTE — TELEPHONE ENCOUNTER
Medication Requested:  diazepam (VALIUM) 10 MG tablet 15 tablet 0 5/21/2025 -- No   Sig: PLACE 1 TABLET VAGINALLY AS NEEDED FOR URINARY RETENTION OR FOR PAIN           ----------------------  Last Office Visit : 5/19/2025  [facility] Office visit:    11/12/2025 1:30 PM (15 min)  Ravindra    Arrive by:  1:15 PM   RETURN PATIENT    [facility] ([facility])   Teresa cMkee MD     ----------------------        Refill decision: Refill pended and routed to the provider for review/determination due to the following criteria not met:     Medication not on [facility], [facility]LLERMO, [facility] refill protocol / Controlled Medication Request

## 2025-06-23 ENCOUNTER — PATIENT OUTREACH (OUTPATIENT)
Dept: CARE COORDINATION | Facility: CLINIC | Age: 30
End: 2025-06-23
Payer: COMMERCIAL

## 2025-06-23 RX ORDER — DIAZEPAM 10 MG/1
TABLET ORAL
Qty: 15 TABLET | Refills: 3 | Status: SHIPPED | OUTPATIENT
Start: 2025-06-23

## 2025-06-25 ENCOUNTER — TRANSFERRED RECORDS (OUTPATIENT)
Dept: HEALTH INFORMATION MANAGEMENT | Facility: CLINIC | Age: 30
End: 2025-06-25
Payer: COMMERCIAL

## 2025-06-26 ENCOUNTER — OFFICE VISIT (OUTPATIENT)
Dept: GASTROENTEROLOGY | Facility: CLINIC | Age: 30
End: 2025-06-26
Attending: PHYSICIAN ASSISTANT
Payer: COMMERCIAL

## 2025-06-26 ENCOUNTER — ANCILLARY PROCEDURE (OUTPATIENT)
Dept: GENERAL RADIOLOGY | Facility: CLINIC | Age: 30
End: 2025-06-26
Attending: PHYSICIAN ASSISTANT
Payer: COMMERCIAL

## 2025-06-26 ENCOUNTER — TELEPHONE (OUTPATIENT)
Dept: GASTROENTEROLOGY | Facility: CLINIC | Age: 30
End: 2025-06-26

## 2025-06-26 VITALS
OXYGEN SATURATION: 98 % | BODY MASS INDEX: 32.92 KG/M2 | SYSTOLIC BLOOD PRESSURE: 113 MMHG | WEIGHT: 222.3 LBS | DIASTOLIC BLOOD PRESSURE: 83 MMHG | HEART RATE: 81 BPM | HEIGHT: 69 IN

## 2025-06-26 DIAGNOSIS — R12 HEARTBURN: ICD-10-CM

## 2025-06-26 DIAGNOSIS — R19.5 LOOSE STOOLS: ICD-10-CM

## 2025-06-26 DIAGNOSIS — R15.9 INCONTINENCE OF FECES, UNSPECIFIED FECAL INCONTINENCE TYPE: ICD-10-CM

## 2025-06-26 DIAGNOSIS — R11.2 NAUSEA AND VOMITING, UNSPECIFIED VOMITING TYPE: ICD-10-CM

## 2025-06-26 DIAGNOSIS — R10.30 LOWER ABDOMINAL PAIN: Primary | ICD-10-CM

## 2025-06-26 RX ORDER — OMEPRAZOLE 40 MG/1
40 CAPSULE, DELAYED RELEASE ORAL DAILY
Qty: 30 CAPSULE | Refills: 3 | Status: SHIPPED | OUTPATIENT
Start: 2025-06-26 | End: 2025-10-24

## 2025-06-26 RX ORDER — ONDANSETRON 4 MG/1
4 TABLET, FILM COATED ORAL EVERY 8 HOURS PRN
Qty: 60 TABLET | Refills: 1 | Status: SHIPPED | OUTPATIENT
Start: 2025-06-26 | End: 2025-08-05

## 2025-06-26 ASSESSMENT — PAIN SCALES - GENERAL: PAINLEVEL_OUTOF10: MODERATE PAIN (4)

## 2025-06-26 NOTE — NURSING NOTE
"Chief Complaint   Patient presents with    New Patient     New consult for nausea, vomiting, constipation and fecal incontinence.     She requests these members of her care team be copied on today's visit information:  PCP:  Junior Roe PA-C  [address] [place] [id number]    Vitals:    06/26/25 1151   BP: 113/83   BP Location: Left arm   Patient Position: Sitting   Cuff Size: Adult Large   Pulse: 81   SpO2: 98%   Weight: 100.8 kg (222 lb 4.8 oz)   Height: 1.753 m (5' 9\")     Body mass index is 32.83 kg/m .    Medications were reconciled.        Marianna Donis CMA        "

## 2025-06-26 NOTE — PATIENT INSTRUCTIONS
It was a pleasure meeting with you today and discussing your healthcare plan. Below is a summary of what we covered:    --please schedule upper endoscopy and colonoscopy.   --obtain labs and stool studies.   --can take zofran as needed for nausea.  --please trial omeprazole 40mg daily, 30-60 minutes before your first meal in the morning, stop this two weeks before upper endoscopy    Lifestyle modifications for gastroesophageal reflux disease (GERD).     1. Change your eating habits.  -- It's best to eat several small meals instead of two or three large meals.  -- After you eat, wait 2 to 3 hours before you lie down. Late-night snacks aren't a good idea.  -- Chocolate, mint, and alcohol can make GERD worse. They relax the valve between the esophagus and the stomach.  -- Spicy foods, fatty foods, foods that have a lot of acid (like tomatoes and oranges), and coffee can make GERD symptoms worse in some people. If your symptoms are worse after you eat a certain food, you may want to stop eating that food to see if your symptoms get better.    2. Do not smoke or chew tobacco.    3. If you have GERD symptoms at night, raise the head of your bed 6 in. (15 cm) to 8 in. (20 cm) by putting the frame on blocks or placing a foam wedge under the head of your mattress. (Adding extra pillows does not work.)    4. Avoid or reduce pressure on your stomach. Don't wear tight clothing around your middle.    5. Lose weight if you need to. Losing just 5 to 10 pounds can help.    Follow up in clinic in 3 months.     Please see below for any additional questions and scheduling guidelines.    Sign up for [employer]: [employer] patient portal serves as a secure platform for accessing your medical records from the [facility]. Additionally, [employer] facilitates easy, timely, and secure messaging with your care team. If you have not signed up, you may do so by using the provided code or calling [phone].    Coordinating your care  after your visit:  There are multiple options for scheduling your follow-up care based on your provider's recommendation.    How do I schedule a follow-up clinic appointment:   After your appointment, you may receive scheduling assistance with the Clinic Coordinators by having a seat in the waiting room and a Clinic Coordinator will call you up to schedule.  Virtual visits or after you leave the clinic:  Your provider has placed a follow-up order in the [employer] portal for scheduling your return appointment. A member of the scheduling team will contact you to schedule.  [employer] Scheduling: Timely scheduling through [employer] is advised to ensure appointment availability.   Call to schedule: You may schedule your follow-up appointment(s) by calling [phone], option 1.    How do I schedule my endoscopy or colonoscopy procedure:  If a procedure, such as a colonoscopy or upper endoscopy was ordered by your provider, the scheduling team will contact you to schedule this procedure. Or you may choose to call to schedule at   [phone], option 2.  Please allow 20-30 minutes when scheduling a procedure.    How do I get my blood work done? To get your blood work done, you need to schedule a lab appointment at an [facility]. There are multiple ways to schedule:   At the clinic: The Clinic Coordinator you meet after your visit can help you schedule a lab appointment.   [employer] scheduling: [employer] offers online lab scheduling at all [facility] laboratory locations.   Call to schedule: You can call [phone] to schedule your lab appointment.    How do I schedule my imaging study: To schedule imaging studies, such as CT scans, ultrasounds, MRIs, or X-rays, contact Imaging Services at [phone].    How do I schedule a referral to another doctor: If your provider recommended a referral to another specialist(s), the referral order was placed by your provider. You will receive a phone call to  schedule this referral, or you may choose to call the number attached to the referral to self-schedule.    For Post-Visit Question(s):  For any inquiries following today's visit:  Please utilize [employer] messaging and allow 48 hours for reply or contact the Call Center during normal business hours at [phone], option 3.  For Emergent After-hours questions, contact the On-Call GI Fellow through the [facility]  at [phone].  In addition, you may contact your Nurse directly using the provided contact information.    Test Results:  Test results will be accessible via [employer] in compliance with the 21st Century Cures Act. This means that your results will be available to you at the same time as your provider. Often you may see your results before your provider does. Results are reviewed by staff within two weeks with communication follow-up. Results may be released in the patient portal prior to your care team review.    Prescription Refill(s):  Medication prescribed by your provider will be addressed during your visit. For future refills, please coordinate with your pharmacy. If you have not had a recent clinic visit or routine labs, for your safety, your provider may not be able to refill your prescription.

## 2025-06-26 NOTE — PROGRESS NOTES
"GI CLINIC VISIT    CC/REFERRING MD:  Junior Reo      ASSESSMENT/PLAN:    #nausea and vomiting  #heartburn  Reports chronic nausea, will vomit in the middle of the night. Has suffered from heartburn for years, does not take anything regularly. Will plan to have her trial omeprazole 40mg daily x 8 weeks. Will also obtain EGD for further evaluation. Discussed that constipation can play a role in nausea as well. She can take zofran as needed for nausea. If above work up is unrevealing, will obtain GES, could consider cross sectional imaging of the abdomen. We did discuss disorders of the gut brain axis and that symptoms may be functional in nature as well    #variable bowel pattern  #alternate between diarrhea and constipation  #abdominal pain  #fecal incontinence  Has struggled with a variable stool pattern, can have liquid stools and hard stools as well as fecal incontinence. She does straight cath at times for urinary retention (unknown cause). She has previously been on chronic opioids for pain, but has not taken for the last week or so, and will not be taking anymore opioids. Will plan to obtain celiac serologies as well as a fecal calprotectin. Will also obtain colonoscopy given fecal incontinence as well as description of \"black tar like stools\". I suspect constipation is playing a large role in her symptoms, will obtain AXR. Will provide further recommendations after testing.      RTC after testing.    Thank you for this consultation.  It was a pleasure to participate in the care of this patient; please contact us with any further questions.       45 minutes spent on the date of the encounter doing chart review, review of outside records, review of test results, patient visit, and documentation    This note was created with voice recognition software, and while reviewed for accuracy, typos may remain.    Nolberto Waters PA-C  Division of Gastroenterology, Hepatology and Nutrition  [facility] and " "[facility]      HPI  30 y/o F presents for evaluation of multiple GI complaints.    Patient reports she developed nausea and vomiting 3-4 months ago, she notes that she will generally vomit in the middle of the night. She will now vomit every other week, she will wake up in the middle of the night, and vomit. Emesis is described as bilious in nature, can have bright red blood blood in the emesis. She reports heartburn which has been present for many years, symptoms are intermittent in nature, but feels a little worse over the last couple of days. Feels \"it sits in her throat\". She previously has taken omeprazole 20mg daily, stopped this a few years ago. Heartburn symptoms are not present daily. She denies odynophagia. She reports that \"sometimes I cannot swallow, where food will sit in my mouth, and I cannot swallow\".  Denies dysphagia, but reports that at times when \"liquid goes down it can burn\".  She reports lower abdominal pain, localized to underneath umbilicus -- this has been ongoing for many years, she reports \"if you ask my parents I have always had a stomach ache\". She notes that she has abdominal pain constantly, described as dull in nature, cannot idenitfy any exacerbating or relieving factors. She reports nausea daily, for which she takes zofran.     In regards to her bowel movements, she struggles with both constipation and diarrhea. She can have anywhere between 0-7 BM daily, stools can be range between hard and liquid. At times described as \"black tar\". She takes colace , 4 tabs daily. She has previously taken miralax for constipation, and does feels this has been helpful. She last took opioid medications, last took this last week Tuesday. She has had episodes of fecal incontinence, generally occurring at night. She does endorse fecal urgency.       Denies daily NSAIDs. Takes Tylenol. Patient is on wegovy. She is on a vitamin patch \"thrive\".     Denies use of ETOH and tobacco " "products. No recreational drug use.     Maternal grandmother had colon cancer. Denies family history of IBD/celiac disease.     ROS:    +fevers/chills ---reports \"hot and cold chills\".   No weight loss  No odynophagia or dysphagia  No anxiety or depression    PROBLEM LIST  Patient Active Problem List    Diagnosis Date Noted    Myonecrosis (H) 05/04/2025     Priority: Medium    Elevated CK 04/28/2025     Priority: Medium    Bilateral thigh pain 04/27/2025     Priority: Medium    Fibromyalgia 01/29/2025     Priority: Medium    Genital herpes 11/03/2024     Priority: Medium    Left-sided low back pain with left-sided sciatica 03/08/2023     Priority: Medium    FH: colon cancer 06/22/2022     Priority: Medium     Will need colonoscopy at 34 years old.       Anaphylactic shock due to shellfish, initial encounter 06/22/2022     Priority: Medium    Bipolar II disorder (H) 04/28/2022     Priority: Medium    Borderline personality disorder in adult (H) 04/28/2022     Priority: Medium    Severe episode of recurrent major depressive disorder, without psychotic features (H) 10/15/2021     Priority: Medium    Lumbar radiculopathy 07/19/2021     Priority: Medium     Added automatically from request for surgery [id number]      Suicide attempt (H) 11/01/2011     Priority: Medium     Last 2/16/19 with intentional benadryl overdose         PERTINENT PAST MEDICAL HISTORY:    Past Medical History:   Diagnosis Date    Bipolar disorder (H)     Complication of anesthesia     \"freaking out when I wake up\"    Depressive disorder 2011       PREVIOUS SURGERIES:    Past Surgical History:   Procedure Laterality Date    DISCECTOMY LUMBAR POSTERIOR MICROSCOPIC ONE LEVEL Left 08/24/2021    Procedure: Minimally invasive left Lumbar 5 to Sacral 1 microdiscectomy  ;  Surgeon: Destin Junior MD;  Location: [facility] OR    ENT SURGERY  Mar 17, 17    Tonsillectomy    GENITOURINARY SURGERY  2012    Fallopian tube removal and cyst removal    GYN SURGERY Right  " "   cystectomy - with salpingectomy    HEAD & NECK SURGERY      tonsillectomy    HEAD & NECK SURGERY      wisdom teeth extraction    IR SOFT TISSUE BIOPSY  05/02/2025       PREVIOUS ENDOSCOPY:  None.    ALLERGIES:     Allergies   Allergen Reactions    Metronidazole Anaphylaxis and Hives     Other reaction(s): Throat swelling  Throat swelling 6 hrs after exposure  Itching and hives 2 hrs after exposure    Shellfish Allergy Anxiety, Diarrhea, Difficulty breathing, Hives, Itching, Rash, Shortness Of Breath and Swelling    Codeine Headache    Fish-Derived Products      Stopped fish oil and less stomach discomfort  Rash after eating fish.     Hydrocodone-Acetaminophen Other (See Comments)    Sertraline Other (See Comments)     Patient was foggy and \"high\" feeling    Shellfish-Derived Products      Lips get numb, throat gets scratchy, rashes       PERTINENT MEDICATIONS:    Current Outpatient Medications:     albuterol (PROAIR HFA/PROVENTIL HFA/VENTOLIN HFA) 108 (90 Base) MCG/ACT inhaler, Inhale 2 puffs into the lungs every 6 hours as needed for shortness of breath, wheezing or cough, Disp: 18 g, Rfl: 0    amphetamine-dextroamphetamine (ADDERALL XR) 30 MG 24 hr capsule, Take 30 mg by mouth daily as needed., Disp: , Rfl:     azelaic acid (FINACIA) 15 % external gel, Apply topically 2 times daily as needed., Disp: , Rfl:     benzonatate (TESSALON) 100 MG capsule, Take 1 capsule (100 mg) by mouth 3 times daily as needed for cough., Disp: 21 capsule, Rfl: 0    clobetasol (TEMOVATE) 0.05 % external solution, APPLY TOPICALLY TO THE AFFECTED AREA TWICE DAILY FOR UP TO 21 DAYS. DO NOT APPLY TO NORMAL SKIN, Disp: , Rfl:     clobetasol propionate (CLOBEX) 0.05 % external shampoo, Apply topically., Disp: , Rfl:     clonazePAM (KLONOPIN) 0.5 MG tablet, Take 0.5 mg by mouth daily as needed for anxiety., Disp: , Rfl:     cloNIDine (CATAPRES) 0.1 MG tablet, Take 1 tablet (0.1 mg) by mouth 2 times daily as needed (racing heart, anxiety, " agitation)., Disp: 8 tablet, Rfl: 0    COMPOUNDED NON-CONTROLLED SUBSTANCE (CMPD RX) - PHARMACY TO MIX COMPOUNDED MEDICATION, Compound naltrexone into 2.5mg capsules and patient to use 2.5mg by mouth once daily, Disp: 0.75 g, Rfl: 0    diazepam (VALIUM) 10 MG tablet, PLACE 1 TABLET VAGINALLY AS NEEDED FOR URINARY RETENTION OR FOR PAIN, Disp: 15 tablet, Rfl: 3    diclofenac (VOLTAREN) 1 % topical gel, Apply 2 g topically 4 times daily. Max 34 grams per day., Disp: 340 g, Rfl: 1    docusate sodium (COLACE) 100 MG capsule, Take 200 mg by mouth 2 times daily., Disp: , Rfl:     EPINEPHrine (ANY BX GENERIC EQUIV) 0.3 MG/0.3ML injection 2-pack, INJECT 0.3 ML INTO THE MUSCLE AS NEEDED FOR ANAPHYLAXIS, Disp: 2 each, Rfl: 1    furosemide (LASIX) 20 MG tablet, Take 1 tablet (20 mg) by mouth daily., Disp: 90 tablet, Rfl: 0    gabapentin (NEURONTIN) 100 MG capsule, Take 1 capsule (100 mg) by mouth 3 times daily. Agitation, anxiety, Disp: 12 capsule, Rfl: 0    gabapentin 8% in vanicream (Compounded), Apply 4 clicks (1 g) topically nightly as needed., Disp: 30 g, Rfl: 5    glucosamine-chondroitin 500-400 MG CAPS per capsule, Take 1 capsule by mouth daily., Disp: , Rfl:     hydrocortisone 2.5 % cream, Apply topically 2 times daily., Disp: , Rfl:     hydrOXYzine (VISTARIL) 50 MG capsule, Take 100 mg by mouth 3 times daily., Disp: , Rfl:     ipratropium-albuterol (COMBIVENT RESPIMAT)  MCG/ACT inhaler, Inhale 1 puff into the lungs 4 times daily as needed for shortness of breath, wheezing or cough, Disp: 4 g, Rfl: 0    ketoconazole (NIZORAL) 2 % external cream, Apply topically. 3 times every week, Disp: , Rfl:     ketoconazole (NIZORAL) 2 % external shampoo, Apply topically twice a week., Disp: , Rfl:     lamoTRIgine (LAMICTAL) 100 MG tablet, Take  mg by mouth 2 times daily. 100 mg AM / 50 mg PM as of 4/27 - pt in process of titrating back up, Disp: , Rfl:     lidocaine (LIDODERM) 5 % patch, Place 1 patch over 12 hours onto  the skin every 24 hours. To prevent lidocaine toxicity, patient should be patch free for 12 hrs daily., Disp: 30 patch, Rfl: 1    lithium (ESKALITH) 600 MG capsule, Take 600 mg by mouth at bedtime., Disp: , Rfl:     magnesium oxide (MAG-OX) 400 MG tablet, Take 400 mg by mouth daily., Disp: , Rfl:     morphine (MSIR) 15 MG IR tablet, Take 15 mg by mouth 3 times daily as needed for pain., Disp: , Rfl:     mupirocin (BACTROBAN) 2 % external ointment, Apply topically as needed., Disp: , Rfl:     NARCAN 4 MG/0.1ML nasal spray, CALL 911. SPR CONTENTS OF ONE SPRAYER (0.1ML) INTO ONE NOSTRIL. REPEAT IN 2-3 MIN IF SYMPTOMS OF OPIOID EMERGENCY PERSIST, ALTERNATE NOSTRILS, Disp: , Rfl:     nortriptyline (PAMELOR) 25 MG capsule, Take 1 capsule (25 mg) by mouth at bedtime., Disp: 90 capsule, Rfl: 1    ondansetron (ZOFRAN ODT) 4 MG ODT tab, DISSOLVE 1 TABLET(4 MG) ON THE TONGUE EVERY 8 HOURS AS NEEDED FOR NAUSEA, Disp: 90 tablet, Rfl: 0    ondansetron (ZOFRAN) 4 MG tablet, Take 1 tablet (4 mg) by mouth every 8 hours as needed for nausea., Disp: 12 tablet, Rfl: 0    prazosin (MINIPRESS) 2 MG capsule, Take 2 mg by mouth every morning., Disp: , Rfl:     prazosin (MINIPRESS) 5 MG capsule, Take 5 mg by mouth at bedtime., Disp: , Rfl:     Pregabalin (LYRICA) 200 MG capsule, Take 200 mg by mouth 3 times daily., Disp: , Rfl:     propranolol (INDERAL) 20 MG tablet, Take 1 tablet (20 mg) by mouth 2 times daily as needed (tremor)., Disp: 180 tablet, Rfl: 2    propranolol ER (INDERAL LA) 80 MG 24 hr capsule, Take 1 capsule (80 mg) by mouth daily., Disp: 90 capsule, Rfl: 1    QUEtiapine (SEROQUEL) 300 MG tablet, Take 300 mg by mouth At Bedtime, Disp: , Rfl:     RETIN-A 0.025 % external cream, APPLY SPARINGLY TO FACE EVERY OTHER NIGHT FOR 14 NIGHTS THEN NIGHTLY THEREAFTER, Disp: , Rfl:     Roflumilast (ZORYVE) 0.3 % FOAM, Externally apply topically., Disp: , Rfl:     Semaglutide-Weight Management (WEGOVY) 0.5 MG/0.5ML pen, Inject 0.5 mg  subcutaneously once a week., Disp: 2 mL, Rfl: 0    sulfacetamide sodium, Acne, 10 % lotion, Apply topically., Disp: , Rfl:     sulfacetamide sodium, Acne, 10 % lotion, APPLY IT TO THE FACE ONCE DAILY IN THE MORNING X3 MONTHS, Disp: , Rfl:     tamsulosin (FLOMAX) 0.4 MG capsule, Take 1 capsule (0.4 mg) by mouth every evening., Disp: 30 capsule, Rfl: 11    tiZANidine (ZANAFLEX) 4 MG tablet, TAKE 1 TABLET BY MOUTH EVERY 8 TO 12 HOURS AS NEEDED. NOT TO EXCEED 3 DOSES IN 24 HOURS, Disp: , Rfl:     tretinoin (RETIN-A) 0.025 % external cream, Apply topically at bedtime., Disp: , Rfl:     triamcinolone (KENALOG) 0.1 % external cream, Apply topically 2 times daily as needed., Disp: , Rfl:     ubrogepant (UBRELVY) 100 MG tablet, Take 1 tablet (100 mg) by mouth at onset of headache., Disp: 8 tablet, Rfl: 11    SOCIAL HISTORY:    Social History     Socioeconomic History    Marital status: Single     Spouse name: Not on file    Number of children: Not on file    Years of education: Not on file    Highest education level: Not on file   Occupational History    Not on file   Tobacco Use    Smoking status: Former     Current packs/day: 0.00     Types: Cigarettes     Passive exposure: Current    Smokeless tobacco: Never   Vaping Use    Vaping status: Former    Substances: Nicotine    Devices: Refillable tank   Substance and Sexual Activity    Alcohol use: Not Currently     Comment: 2 drinks every other day     Drug use: Not Currently    Sexual activity: Yes     Partners: Female, Male     Birth control/protection: None   Other Topics Concern    Parent/sibling w/ CABG, MI or angioplasty before 65F 55M? No   Social History Narrative    Not on file     Social Drivers of Health     Financial Resource Strain: Low Risk  (5/4/2025)    Financial Resource Strain     Within the past 12 months, have you or your family members you live with been unable to get utilities (heat, electricity) when it was really needed?: No   Food Insecurity: Low Risk   (5/4/2025)    Food Insecurity     Within the past 12 months, did you worry that your food would run out before you got money to buy more?: No     Within the past 12 months, did the food you bought just not last and you didn t have money to get more?: No   Transportation Needs: Low Risk  (5/4/2025)    Transportation Needs     Within the past 12 months, has lack of transportation kept you from medical appointments, getting your medicines, non-medical meetings or appointments, work, or from getting things that you need?: No   Physical Activity: Inactive (12/9/2024)    Exercise Vital Sign     Days of Exercise per Week: 0 days     Minutes of Exercise per Session: 0 min   Stress: Stress Concern Present (12/9/2024)    [employer] - Occupational Stress Questionnaire     Feeling of Stress : Very much   Social Connections: Unknown (12/9/2024)    Social Connection and Isolation Panel [NHANES]     Frequency of Communication with Friends and Family: Not on file     Frequency of Social Gatherings with Friends and Family: Once a week     Attends [employer] Services: Not on file     Active Member of Clubs or Organizations: Not on file     Attends Club or Organization Meetings: Not on file     Marital Status: Not on file   Interpersonal Safety: Low Risk  (4/27/2025)    Interpersonal Safety     Do you feel physically and emotionally safe where you currently live?: Yes     Within the past 12 months, have you been hit, slapped, kicked or otherwise physically hurt by someone?: No     Within the past 12 months, have you been humiliated or emotionally abused in other ways by your partner or ex-partner?: No   Housing Stability: Low Risk  (5/4/2025)    Housing Stability     Do you have housing? : Yes     Are you worried about losing your housing?: No       FAMILY HISTORY:  FH of CRC: grandmother, mother has colon polyps  FH of IBD: none  Family History   Problem Relation Age of Onset    Hypertension Mother      "Colon Polyps Mother 50    Colon Cancer Mother     Substance Abuse Father     Colon Cancer Father 60    Diabetes Maternal Grandmother     Hypertension Maternal Grandmother     Colon Cancer Maternal Grandmother     Cancer Maternal Grandmother     Nephrolithiasis Maternal Grandmother     Diabetes Paternal Grandmother     Breast Cancer Paternal Grandmother     Cancer Paternal Grandmother        Past/family/social history reviewed and no changes    PHYSICAL EXAMINATION:  Constitutional: aaox3, cooperative, pleasant, not dyspneic/diaphoretic, no acute distress  Vitals reviewed: /83 (BP Location: Left arm, Patient Position: Sitting, Cuff Size: Adult Large)   Pulse 81   Ht 1.753 m (5' 9\")   Wt 100.8 kg (222 lb 4.8 oz)   SpO2 98%   BMI 32.83 kg/m    Wt:   Wt Readings from Last 2 Encounters:   06/26/25 100.8 kg (222 lb 4.8 oz)   05/18/25 104.3 kg (230 lb)      Eyes: Sclera anicteric/injected  Respiratory: Unlabored breathing  Abd: soft, diffusely tender, no rebound/guarding  Skin: warm, perfused, no jaundice  Psych: Normal affect          "

## 2025-06-26 NOTE — LETTER
"6/26/2025      Caprice Kline  [address] [facility] [address]      Dear Colleague,    Thank you for referring your patient, Caprice Kline, to the [facility]. Please see a copy of my visit note below.    GI CLINIC VISIT    CC/REFERRING MD:  Junior Roe      ASSESSMENT/PLAN:    #nausea and vomiting  #heartburn  Reports chronic nausea, will vomit in the middle of the night. Has suffered from heartburn for years, does not take anything regularly. Will plan to have her trial omeprazole 40mg daily x 8 weeks. Will also obtain EGD for further evaluation. Discussed that constipation can play a role in nausea as well. She can take zofran as needed for nausea. If above work up is unrevealing, will obtain GES, could consider cross sectional imaging of the abdomen. We did discuss disorders of the gut brain axis and that symptoms may be functional in nature as well    #variable bowel pattern  #alternate between diarrhea and constipation  #abdominal pain  #fecal incontinence  Has struggled with a variable stool pattern, can have liquid stools and hard stools as well as fecal incontinence. She does straight cath at times for urinary retention (unknown cause). She has previously been on chronic opioids for pain, but has not taken for the last week or so, and will not be taking anymore opioids. Will plan to obtain celiac serologies as well as a fecal calprotectin. Will also obtain colonoscopy given fecal incontinence as well as description of \"black tar like stools\". I suspect constipation is playing a large role in her symptoms, will obtain AXR. Will provide further recommendations after testing.      RTC after testing.    Thank you for this consultation.  It was a pleasure to participate in the care of this patient; please contact us with any further questions.       45 minutes spent on the date of the encounter doing chart review, review of outside records, review of test results, patient visit, and " "documentation    This note was created with voice recognition software, and while reviewed for accuracy, typos may remain.    Nolberto Waters PA-C  Division of Gastroenterology, Hepatology and Nutrition  [facility]      HPI  30 y/o F presents for evaluation of multiple GI complaints.    Patient reports she developed nausea and vomiting 3-4 months ago, she notes that she will generally vomit in the middle of the night. She will now vomit every other week, she will wake up in the middle of the night, and vomit. Emesis is described as bilious in nature, can have bright red blood blood in the emesis. She reports heartburn which has been present for many years, symptoms are intermittent in nature, but feels a little worse over the last couple of days. Feels \"it sits in her throat\". She previously has taken omeprazole 20mg daily, stopped this a few years ago. Heartburn symptoms are not present daily. She denies odynophagia. She reports that \"sometimes I cannot swallow, where food will sit in my mouth, and I cannot swallow\".  Denies dysphagia, but reports that at times when \"liquid goes down it can burn\".  She reports lower abdominal pain, localized to underneath umbilicus -- this has been ongoing for many years, she reports \"if you ask my parents I have always had a stomach ache\". She notes that she has abdominal pain constantly, described as dull in nature, cannot idenitfy any exacerbating or relieving factors. She reports nausea daily, for which she takes zofran.     In regards to her bowel movements, she struggles with both constipation and diarrhea. She can have anywhere between 0-7 BM daily, stools can be range between hard and liquid. At times described as \"black tar\". She takes colace , 4 tabs daily. She has previously taken miralax for constipation, and does feels this has been helpful. She last took opioid medications, last took this last week Tuesday. She has had " "episodes of fecal incontinence, generally occurring at night. She does endorse fecal urgency.       Denies daily NSAIDs. Takes Tylenol. Patient is on wegovy. She is on a vitamin patch \"thrive\".     Denies use of ETOH and tobacco products. No recreational drug use.     Maternal grandmother had colon cancer. Denies family history of IBD/celiac disease.     ROS:    +fevers/chills ---reports \"hot and cold chills\".   No weight loss  No odynophagia or dysphagia  No anxiety or depression    PROBLEM LIST  Patient Active Problem List    Diagnosis Date Noted     Myonecrosis (H) 05/04/2025     Priority: Medium     Elevated CK 04/28/2025     Priority: Medium     Bilateral thigh pain 04/27/2025     Priority: Medium     Fibromyalgia 01/29/2025     Priority: Medium     Genital herpes 11/03/2024     Priority: Medium     Left-sided low back pain with left-sided sciatica 03/08/2023     Priority: Medium     FH: colon cancer 06/22/2022     Priority: Medium     Will need colonoscopy at 34 years old.        Anaphylactic shock due to shellfish, initial encounter 06/22/2022     Priority: Medium     Bipolar II disorder (H) 04/28/2022     Priority: Medium     Borderline personality disorder in adult (H) 04/28/2022     Priority: Medium     Severe episode of recurrent major depressive disorder, without psychotic features (H) 10/15/2021     Priority: Medium     Lumbar radiculopathy 07/19/2021     Priority: Medium     Added automatically from request for surgery [id number]       Suicide attempt (H) 11/01/2011     Priority: Medium     Last 2/16/19 with intentional benadryl overdose         PERTINENT PAST MEDICAL HISTORY:    Past Medical History:   Diagnosis Date     Bipolar disorder (H)      Complication of anesthesia     \"freaking out when I wake up\"     Depressive disorder 2011       PREVIOUS SURGERIES:    Past Surgical History:   Procedure Laterality Date     DISCECTOMY LUMBAR POSTERIOR MICROSCOPIC ONE LEVEL Left 08/24/2021    Procedure: " "Minimally invasive left Lumbar 5 to Sacral 1 microdiscectomy  ;  Surgeon: Destin Junior MD;  Location: [facility] OR     ENT SURGERY  Mar 17, 17    Tonsillectomy     GENITOURINARY SURGERY  2012    Fallopian tube removal and cyst removal     GYN SURGERY Right     cystectomy - with salpingectomy     HEAD & NECK SURGERY      tonsillectomy     HEAD & NECK SURGERY      wisdom teeth extraction     IR SOFT TISSUE BIOPSY  05/02/2025       PREVIOUS ENDOSCOPY:  None.    ALLERGIES:     Allergies   Allergen Reactions     Metronidazole Anaphylaxis and Hives     Other reaction(s): Throat swelling  Throat swelling 6 hrs after exposure  Itching and hives 2 hrs after exposure     Shellfish Allergy Anxiety, Diarrhea, Difficulty breathing, Hives, Itching, Rash, Shortness Of Breath and Swelling     Codeine Headache     Fish-Derived Products      Stopped fish oil and less stomach discomfort  Rash after eating fish.      Hydrocodone-Acetaminophen Other (See Comments)     Sertraline Other (See Comments)     Patient was foggy and \"high\" feeling     Shellfish-Derived Products      Lips get numb, throat gets scratchy, rashes       PERTINENT MEDICATIONS:    Current Outpatient Medications:      albuterol (PROAIR HFA/PROVENTIL HFA/VENTOLIN HFA) 108 (90 Base) MCG/ACT inhaler, Inhale 2 puffs into the lungs every 6 hours as needed for shortness of breath, wheezing or cough, Disp: 18 g, Rfl: 0     amphetamine-dextroamphetamine (ADDERALL XR) 30 MG 24 hr capsule, Take 30 mg by mouth daily as needed., Disp: , Rfl:      azelaic acid (FINACIA) 15 % external gel, Apply topically 2 times daily as needed., Disp: , Rfl:      benzonatate (TESSALON) 100 MG capsule, Take 1 capsule (100 mg) by mouth 3 times daily as needed for cough., Disp: 21 capsule, Rfl: 0     clobetasol (TEMOVATE) 0.05 % external solution, APPLY TOPICALLY TO THE AFFECTED AREA TWICE DAILY FOR UP TO 21 DAYS. DO NOT APPLY TO NORMAL SKIN, Disp: , Rfl:      clobetasol propionate (CLOBEX) 0.05 % " external shampoo, Apply topically., Disp: , Rfl:      clonazePAM (KLONOPIN) 0.5 MG tablet, Take 0.5 mg by mouth daily as needed for anxiety., Disp: , Rfl:      cloNIDine (CATAPRES) 0.1 MG tablet, Take 1 tablet (0.1 mg) by mouth 2 times daily as needed (racing heart, anxiety, agitation)., Disp: 8 tablet, Rfl: 0     COMPOUNDED NON-CONTROLLED SUBSTANCE (CMPD RX) - PHARMACY TO MIX COMPOUNDED MEDICATION, Compound naltrexone into 2.5mg capsules and patient to use 2.5mg by mouth once daily, Disp: 0.75 g, Rfl: 0     diazepam (VALIUM) 10 MG tablet, PLACE 1 TABLET VAGINALLY AS NEEDED FOR URINARY RETENTION OR FOR PAIN, Disp: 15 tablet, Rfl: 3     diclofenac (VOLTAREN) 1 % topical gel, Apply 2 g topically 4 times daily. Max 34 grams per day., Disp: 340 g, Rfl: 1     docusate sodium (COLACE) 100 MG capsule, Take 200 mg by mouth 2 times daily., Disp: , Rfl:      EPINEPHrine (ANY BX GENERIC EQUIV) 0.3 MG/0.3ML injection 2-pack, INJECT 0.3 ML INTO THE MUSCLE AS NEEDED FOR ANAPHYLAXIS, Disp: 2 each, Rfl: 1     furosemide (LASIX) 20 MG tablet, Take 1 tablet (20 mg) by mouth daily., Disp: 90 tablet, Rfl: 0     gabapentin (NEURONTIN) 100 MG capsule, Take 1 capsule (100 mg) by mouth 3 times daily. Agitation, anxiety, Disp: 12 capsule, Rfl: 0     gabapentin 8% in vanicream (Compounded), Apply 4 clicks (1 g) topically nightly as needed., Disp: 30 g, Rfl: 5     glucosamine-chondroitin 500-400 MG CAPS per capsule, Take 1 capsule by mouth daily., Disp: , Rfl:      hydrocortisone 2.5 % cream, Apply topically 2 times daily., Disp: , Rfl:      hydrOXYzine (VISTARIL) 50 MG capsule, Take 100 mg by mouth 3 times daily., Disp: , Rfl:      ipratropium-albuterol (COMBIVENT RESPIMAT)  MCG/ACT inhaler, Inhale 1 puff into the lungs 4 times daily as needed for shortness of breath, wheezing or cough, Disp: 4 g, Rfl: 0     ketoconazole (NIZORAL) 2 % external cream, Apply topically. 3 times every week, Disp: , Rfl:      ketoconazole (NIZORAL) 2 %  external shampoo, Apply topically twice a week., Disp: , Rfl:      lamoTRIgine (LAMICTAL) 100 MG tablet, Take  mg by mouth 2 times daily. 100 mg AM / 50 mg PM as of 4/27 - pt in process of titrating back up, Disp: , Rfl:      lidocaine (LIDODERM) 5 % patch, Place 1 patch over 12 hours onto the skin every 24 hours. To prevent lidocaine toxicity, patient should be patch free for 12 hrs daily., Disp: 30 patch, Rfl: 1     lithium (ESKALITH) 600 MG capsule, Take 600 mg by mouth at bedtime., Disp: , Rfl:      magnesium oxide (MAG-OX) 400 MG tablet, Take 400 mg by mouth daily., Disp: , Rfl:      morphine (MSIR) 15 MG IR tablet, Take 15 mg by mouth 3 times daily as needed for pain., Disp: , Rfl:      mupirocin (BACTROBAN) 2 % external ointment, Apply topically as needed., Disp: , Rfl:      NARCAN 4 MG/0.1ML nasal spray, CALL 911. SPR CONTENTS OF ONE SPRAYER (0.1ML) INTO ONE NOSTRIL. REPEAT IN 2-3 MIN IF SYMPTOMS OF OPIOID EMERGENCY PERSIST, ALTERNATE NOSTRILS, Disp: , Rfl:      nortriptyline (PAMELOR) 25 MG capsule, Take 1 capsule (25 mg) by mouth at bedtime., Disp: 90 capsule, Rfl: 1     ondansetron (ZOFRAN ODT) 4 MG ODT tab, DISSOLVE 1 TABLET(4 MG) ON THE TONGUE EVERY 8 HOURS AS NEEDED FOR NAUSEA, Disp: 90 tablet, Rfl: 0     ondansetron (ZOFRAN) 4 MG tablet, Take 1 tablet (4 mg) by mouth every 8 hours as needed for nausea., Disp: 12 tablet, Rfl: 0     prazosin (MINIPRESS) 2 MG capsule, Take 2 mg by mouth every morning., Disp: , Rfl:      prazosin (MINIPRESS) 5 MG capsule, Take 5 mg by mouth at bedtime., Disp: , Rfl:      Pregabalin (LYRICA) 200 MG capsule, Take 200 mg by mouth 3 times daily., Disp: , Rfl:      propranolol (INDERAL) 20 MG tablet, Take 1 tablet (20 mg) by mouth 2 times daily as needed (tremor)., Disp: 180 tablet, Rfl: 2     propranolol ER (INDERAL LA) 80 MG 24 hr capsule, Take 1 capsule (80 mg) by mouth daily., Disp: 90 capsule, Rfl: 1     QUEtiapine (SEROQUEL) 300 MG tablet, Take 300 mg by mouth At  Bedtime, Disp: , Rfl:      RETIN-A 0.025 % external cream, APPLY SPARINGLY TO FACE EVERY OTHER NIGHT FOR 14 NIGHTS THEN NIGHTLY THEREAFTER, Disp: , Rfl:      Roflumilast (ZORYVE) 0.3 % FOAM, Externally apply topically., Disp: , Rfl:      Semaglutide-Weight Management (WEGOVY) 0.5 MG/0.5ML pen, Inject 0.5 mg subcutaneously once a week., Disp: 2 mL, Rfl: 0     sulfacetamide sodium, Acne, 10 % lotion, Apply topically., Disp: , Rfl:      sulfacetamide sodium, Acne, 10 % lotion, APPLY IT TO THE FACE ONCE DAILY IN THE MORNING X3 MONTHS, Disp: , Rfl:      tamsulosin (FLOMAX) 0.4 MG capsule, Take 1 capsule (0.4 mg) by mouth every evening., Disp: 30 capsule, Rfl: 11     tiZANidine (ZANAFLEX) 4 MG tablet, TAKE 1 TABLET BY MOUTH EVERY 8 TO 12 HOURS AS NEEDED. NOT TO EXCEED 3 DOSES IN 24 HOURS, Disp: , Rfl:      tretinoin (RETIN-A) 0.025 % external cream, Apply topically at bedtime., Disp: , Rfl:      triamcinolone (KENALOG) 0.1 % external cream, Apply topically 2 times daily as needed., Disp: , Rfl:      ubrogepant (UBRELVY) 100 MG tablet, Take 1 tablet (100 mg) by mouth at onset of headache., Disp: 8 tablet, Rfl: 11    SOCIAL HISTORY:    Social History     Socioeconomic History     Marital status: Single     Spouse name: Not on file     Number of children: Not on file     Years of education: Not on file     Highest education level: Not on file   Occupational History     Not on file   Tobacco Use     Smoking status: Former     Current packs/day: 0.00     Types: Cigarettes     Passive exposure: Current     Smokeless tobacco: Never   Vaping Use     Vaping status: Former     Substances: Nicotine     Devices: [employer] Mediable tank   Substance and Sexual Activity     Alcohol use: Not Currently     Comment: 2 drinks every other day      Drug use: Not Currently     Sexual activity: Yes     Partners: Female, Male     Birth control/protection: None   Other Topics Concern     Parent/sibling w/ CABG, MI or angioplasty before 65F 55M? No    Social History Narrative     Not on file     Social Drivers of Health     Financial Resource Strain: Low Risk  (5/4/2025)    Financial Resource Strain      Within the past 12 months, have you or your family members you live with been unable to get utilities (heat, electricity) when it was really needed?: No   Food Insecurity: Low Risk  (5/4/2025)    Food Insecurity      Within the past 12 months, did you worry that your food would run out before you got money to buy more?: No      Within the past 12 months, did the food you bought just not last and you didn t have money to get more?: No   Transportation Needs: Low Risk  (5/4/2025)    Transportation Needs      Within the past 12 months, has lack of transportation kept you from medical appointments, getting your medicines, non-medical meetings or appointments, work, or from getting things that you need?: No   Physical Activity: Inactive (12/9/2024)    Exercise Vital Sign      Days of Exercise per Week: 0 days      Minutes of Exercise per Session: 0 min   Stress: Stress Concern Present (12/9/2024)    [employer] [place] [employer] - Occupational Stress Questionnaire      Feeling of Stress : Very much   Social Connections: Unknown (12/9/2024)    Social Connection and Isolation Panel [NHANES]      Frequency of Communication with Friends and Family: Not on file      Frequency of Social Gatherings with Friends and Family: Once a week      Attends Religion Services: Not on file      Active Member of Clubs or Organizations: Not on file      Attends Club or Organization Meetings: Not on file      Marital Status: Not on file   Interpersonal Safety: Low Risk  (4/27/2025)    Interpersonal Safety      Do you feel physically and emotionally safe where you currently live?: Yes      Within the past 12 months, have you been hit, slapped, kicked or otherwise physically hurt by someone?: No      Within the past 12 months, have you been humiliated or emotionally abused in  "other ways by your partner or ex-partner?: No   Housing Stability: Low Risk  (5/4/2025)    Housing Stability      Do you have housing? : Yes      Are you worried about losing your housing?: No       FAMILY HISTORY:  FH of CRC: grandmother, mother has colon polyps  FH of IBD: none  Family History   Problem Relation Age of Onset     Hypertension Mother      Colon Polyps Mother 50     Colon Cancer Mother      Substance Abuse Father      Colon Cancer Father 60     Diabetes Maternal Grandmother      Hypertension Maternal Grandmother      Colon Cancer Maternal Grandmother      Cancer Maternal Grandmother      Nephrolithiasis Maternal Grandmother      Diabetes Paternal Grandmother      Breast Cancer Paternal Grandmother      Cancer Paternal Grandmother        Past/family/social history reviewed and no changes    PHYSICAL EXAMINATION:  Constitutional: aaox3, cooperative, pleasant, not dyspneic/diaphoretic, no acute distress  Vitals reviewed: /83 (BP Location: Left arm, Patient Position: Sitting, Cuff Size: Adult Large)   Pulse 81   Ht 1.753 m (5' 9\")   Wt 100.8 kg (222 lb 4.8 oz)   SpO2 98%   BMI 32.83 kg/m    Wt:   Wt Readings from Last 2 Encounters:   06/26/25 100.8 kg (222 lb 4.8 oz)   05/18/25 104.3 kg (230 lb)      Eyes: Sclera anicteric/injected  Respiratory: Unlabored breathing  Abd: soft, diffusely tender, no rebound/guarding  Skin: warm, perfused, no jaundice  Psych: Normal affect            Again, thank you for allowing me to participate in the care of your patient.        Sincerely,        Nolberto Waters PA-C    Electronically signed"

## 2025-06-26 NOTE — TELEPHONE ENCOUNTER
"Endoscopy Scheduling Screen    Caller: patient    Have you had any respiratory illness or flu-like symptoms in the last 10 days?  No    Patient is ACTIVE on [employer].  Inform patient that all appointment instructions will be sent via [employer].    Review patient's insurance for any non participating payor.    Ordering/Referring Provider:     TANA OLIVEIRA      (If ordering provider performs procedure, schedule with ordering provider unless otherwise instructed. )    BMI: Estimated body mass index is 32.83 kg/m  as calculated from the following:    Height as of an earlier encounter on 6/26/25: 1.753 m (5' 9\").    Weight as of an earlier encounter on 6/26/25: 100.8 kg (222 lb 4.8 oz).     Sedation Ordered  MAC/deep sedation.   BMI<= 45 45 < BMI <= 48 48 < BMI < = 50  BMI > 50   No Restrictions No [facility]  No [facility]  [facility] with exceptions Hospital Only OR Only       Do you have a history of malignant hyperthermia?  No    (Females) Are you currently pregnant?   No     Have you been diagnosed or told you have pulmonary hypertension?   No    Do you have an LVAD?  No    Have you been told you have moderate to severe sleep apnea?  No.    Have you been told you have COPD, asthma, or any other lung disease?  No    Has your doctor ordered any cardiac tests like echo, angiogram, stress test, ablation, or EKG, that you have not completed yet?  No    Do you  have a history of any heart conditions?  No     Have you ever had or are you waiting for an organ transplant?  No. Continue scheduling, no site restrictions.    Have you had a stroke or transient ischemic attack (TIA aka \"mini stroke\") in the last 2 years?   No.    Have you been diagnosed with or been told you have cirrhosis of the liver?   No.    Are you currently on dialysis?   No    Do you need assistance transferring?   No    BMI: Estimated body mass index is 32.83 kg/m  as calculated from the following:    Height as of an earlier encounter on 6/26/25: 1.753 m (5' 9\").    " Weight as of an earlier encounter on 6/26/25: 100.8 kg (222 lb 4.8 oz).     Is patients BMI > 40 and scheduling location [facility]?  No    Do you take an injectable or oral medication for weight loss or diabetes (excluding insulin)?  No    Do you take the medication Naltrexone?  No    Do you take blood thinners?  No       Prep   Are you currently on dialysis or do you have chronic kidney disease?  No    Do you have a diagnosis of diabetes?  No    Do you have a diagnosis of cystic fibrosis (CF)?  No    On a regular basis do you go 3 -5 days between bowel movements?  No    BMI > 40?  No    Preferred Pharmacy:      [id number] - [place], [place] - [facility] [address] AT [place] [place] [address] [place] [address]  Phone: [phone] Fax: [phone]      Final Scheduling Details     Procedure scheduled  Colonoscopy / Upper endoscopy (EGD)    Surgeon:  Ramya     Date of procedure:   07/17/2025     Pre-OP / PAC:   No - Not required for this site.    Location  [facility] - Patient preference.    Sedation   MAC/Deep Sedation - Per order.      Patient Reminders:   You will receive a call from a Nurse to review instructions and health history.  This assessment must be completed prior to your procedure.  Failure to complete the Nurse assessment may result in the procedure being cancelled.      On the day of your procedure, please designate an adult(s) who can drive you home stay with you for the next 24 hours. The medicines used in the exam will make you sleepy. You will not be able to drive.      You cannot take public transportation, ride share services, or non-medical taxi service without a responsible caregiver.  Medical transport services are allowed with the requirement that a responsible caregiver will receive you at your destination.  We require that drivers and caregivers are confirmed prior to your procedure.

## 2025-06-27 ENCOUNTER — RESULTS FOLLOW-UP (OUTPATIENT)
Dept: GASTROENTEROLOGY | Facility: CLINIC | Age: 30
End: 2025-06-27

## 2025-06-28 DIAGNOSIS — M60.9 MYOSITIS OF LOWER EXTREMITY, UNSPECIFIED LATERALITY, UNSPECIFIED MYOSITIS TYPE: ICD-10-CM

## 2025-06-28 LAB
TTG IGA SER-ACNC: <0.2 U/ML
TTG IGG SER-ACNC: <0.6 U/ML

## 2025-06-30 ENCOUNTER — THERAPY VISIT (OUTPATIENT)
Dept: PHYSICAL THERAPY | Facility: CLINIC | Age: 30
End: 2025-06-30
Payer: COMMERCIAL

## 2025-06-30 ENCOUNTER — TELEPHONE (OUTPATIENT)
Dept: GASTROENTEROLOGY | Facility: CLINIC | Age: 30
End: 2025-06-30

## 2025-06-30 DIAGNOSIS — R19.5 LOOSE STOOLS: Primary | ICD-10-CM

## 2025-06-30 DIAGNOSIS — E66.811 CLASS 1 OBESITY WITH SERIOUS COMORBIDITY AND BODY MASS INDEX (BMI) OF 30.0 TO 30.9 IN ADULT, UNSPECIFIED OBESITY TYPE: ICD-10-CM

## 2025-06-30 DIAGNOSIS — M79.7 FIBROMYALGIA: ICD-10-CM

## 2025-06-30 DIAGNOSIS — M79.18 DIFFUSE MYOFASCIAL PAIN SYNDROME: ICD-10-CM

## 2025-06-30 DIAGNOSIS — G89.29 CHRONIC INTRACTABLE PAIN: ICD-10-CM

## 2025-06-30 DIAGNOSIS — S73.192D TEAR OF LEFT ACETABULAR LABRUM, SUBSEQUENT ENCOUNTER: ICD-10-CM

## 2025-06-30 DIAGNOSIS — M25.552 BILATERAL HIP PAIN: ICD-10-CM

## 2025-06-30 DIAGNOSIS — M25.551 BILATERAL HIP PAIN: ICD-10-CM

## 2025-06-30 DIAGNOSIS — S73.191D TEAR OF RIGHT ACETABULAR LABRUM, SUBSEQUENT ENCOUNTER: ICD-10-CM

## 2025-06-30 LAB — CALPROTECTIN STL-MCNT: 9.8 MG/KG (ref 0–49.9)

## 2025-06-30 PROCEDURE — 97110 THERAPEUTIC EXERCISES: CPT | Mod: GP | Performed by: PHYSICAL THERAPIST

## 2025-06-30 PROCEDURE — 97112 NEUROMUSCULAR REEDUCATION: CPT | Mod: GP | Performed by: PHYSICAL THERAPIST

## 2025-06-30 PROCEDURE — 97162 PT EVAL MOD COMPLEX 30 MIN: CPT | Mod: GP | Performed by: PHYSICAL THERAPIST

## 2025-06-30 RX ORDER — SEMAGLUTIDE 0.5 MG/.5ML
INJECTION, SOLUTION SUBCUTANEOUS
Qty: 2 ML | Refills: 0 | Status: SHIPPED | OUTPATIENT
Start: 2025-06-30

## 2025-06-30 RX ORDER — LIDOCAINE 50 MG/G
PATCH TOPICAL
Qty: 30 PATCH | Refills: 1 | Status: SHIPPED | OUTPATIENT
Start: 2025-06-30

## 2025-06-30 RX ORDER — CLONAZEPAM 0.5 MG/1
0.5 TABLET ORAL DAILY PRN
Status: CANCELLED | OUTPATIENT
Start: 2025-06-30

## 2025-06-30 NOTE — TELEPHONE ENCOUNTER
Received fax request from [facility] pharmacy requesting refill(s) for clonazePAM (KLONOPIN) 0.5 MG tablet     Last refilled on 06/21/25    Pt last seen on 06/18/25  Next appt scheduled for 07/16/25    Will facilitate refill.

## 2025-06-30 NOTE — TELEPHONE ENCOUNTER
Central Prior Authorization Team - Phone: [phone]     Prior Authorization Retail Medication Request    Medication/Dose: psyllium (METAMUCIL/KONSYL) 58.6 % powder

## 2025-06-30 NOTE — PROGRESS NOTES
PHYSICAL THERAPY EVALUATION  Type of Visit: Evaluation       Fall Risk Screen:  Have you fallen 2 or more times in the past year?: No  Have you fallen and had an injury in the past year?: No    Subjective         Presenting condition or subjective complaint: Low back, both hips, legs & feet, full body pain  Date of onset: 06/18/25    Relevant medical history: Bladder or bowel problems; Cold or hot arm or leg; Depression; Dizziness; Fibromyalgia; Incontinence; Mental Illness; Migraines or headaches; Non-healing wounds; Ongoing fever or chills; Overweight; Pain at night or rest; Severe dizziness; Severe headaches; Significant weakness; Sleep disorder like apnea; Vision problems   Dates & types of surgery: In the last 5 years[facility]    Prior diagnostic imaging/testing results: MRI; CT scan; X-ray     Prior therapy history for the same diagnosis, illness or injury: Yes It was at [facility] and [place].    Chief complaint:   -burning in the feet right > Left   -pain in mostly lateral hips especially left side  -bilateral low back pain with pain that is in both legs  -can shoot down the left leg to the foot  -has EMG scheduled with Dr. Guevara on 7/9/2025     Chronic left sided low back pain with left sided sciatica  Lumbar radicular pain (left leg)  Meralgia paresthetica on the left side  Lumbar DDD  Myofascial pain  Muscle spasm  Chronic intractable pain  Bipolar disorder, in partial remission, most recent episode mixed  PMHx includes: depressive disorder, complication of anesthesia (freaking out when I wake up), bipolar disorder  PSHx includes: Minimally invasive left Lumbar 5 to Sacral 1 microdiscectomy (2021), cystectomy with  salpingectomy on the right side, tonsillectomy, wisdom teeth extraction    Living Environment  Social support: With a significant other or spouse   Type of home: House; Apartment/condo; Multi-level; Basement   Stairs to enter the home: Yes  "12 Is there a railing: Yes     Ramp: No   Stairs inside the home: No       Help at home: Emergency call system  Equipment owned: Four-point cane; Walker with wheels     Employment: No    Hobbies/Interests: Its gettting to be nkthing. Reading and watching tv    Patient goals for therapy: Anything i feel like i cant do anything anymore due to pain, tolerate daily routine/ADLS better, stand longer       Objective   EVALUATION  POSTURE: Sitting Posture: Rounded shoulders, Forward head, Thoracic kyphosis increased, assumes slumped sitting posture  GAIT:   Assistive Device(s): Cane (quad), Walker (four wheeled)  Gait Deviations: Antalgic  Stride length decreased  BALANCE/PROPRIOCEPTION: Single Leg Stance Eyes Open (seconds): 2\" R and L  ROM: lumbar flex 25%, ext unable  STRENGTH: unable to heel walk, toe walk.  Difficulty with seated heel raises, toe raises.    Assessment & Plan   CLINICAL IMPRESSIONS  Medical Diagnosis: B hip pain, tear of L and R acetabular labrum, diffuse myofascial pain syndrome, fibromyalgia, chronic intractable pain    Treatment Diagnosis: B hip pain, tear of L and R acetabular labrum, diffuse myofascial pain syndrome, fibromyalgia, chronic intractable pain   Impression/Assessment: Patient is a 29 year old female with multiple joint pain, fibromyalgia complaints.  The following significant findings have been identified: Pain, Decreased ROM/flexibility, Decreased strength, Impaired gait, Impaired muscle performance, Decreased activity tolerance, and Impaired posture. These impairments interfere with their ability to perform self care tasks, recreational activities, household chores, and household mobility as compared to previous level of function.     Clinical Decision Making (Complexity):  Clinical Presentation: Evolving/Changing  Clinical Presentation Rationale: based on medical and personal factors listed in PT evaluation  Clinical Decision Making (Complexity): Moderate complexity    PLAN OF " CARE  Treatment Interventions:  Interventions: Manual Therapy, Neuromuscular Re-education, Therapeutic Activity, Therapeutic Exercise, Self-Care/Home Management    Long Term Goals     PT Goal 1  Goal Identifier: Standing  Goal Description: Able to consistently stand for 20 minutes  Rationale: to maximize safety and independence with performance of ADLs and functional tasks;to maximize safety and independence within the home;to maximize safety and independence within the community;to maximize safety and independence with self cares  Target Date: 09/30/25  PT Goal 2  Goal Identifier: Fatigue  Goal Description: Improve FACIT score to 8 (initial score 0)  Rationale: to maximize safety and independence with performance of ADLs and functional tasks;to maximize safety and independence within the home;to maximize safety and independence within the community;to maximize safety and independence with transportation;to maximize safety and independence with self cares  Target Date: 10/30/25      Frequency of Treatment: 1x wk x 4 wks, 2x month x 3 months  Duration of Treatment: 4 months    Education Assessment:        Risks and benefits of evaluation/treatment have been explained.   Patient/Family/caregiver agrees with Plan of Care.     Evaluation Time:     PT Eval, Moderate Complexity Minutes (74220): 35     Signing Clinician: Jordne Salinas, PT        [facility]                                                                                   OUTPATIENT PHYSICAL THERAPY      PLAN OF TREATMENT FOR OUTPATIENT REHABILITATION   Patient's Last Name, First Name, Caprice Lange YOB: 1995   Provider's Name   [facility]   Medical Record No.  [id number]     Onset Date: 06/18/25  Start of Care Date: 06/30/25     Medical Diagnosis:  B hip pain, tear of L and R acetabular labrum, diffuse myofascial pain syndrome, fibromyalgia, chronic intractable pain      PT  Treatment Diagnosis:  B hip pain, tear of L and R acetabular labrum, diffuse myofascial pain syndrome, fibromyalgia, chronic intractable pain Plan of Treatment  Frequency/Duration: 1x wk x 4 wks, 2x month x 3 months/ 4 months    Certification date from 06/30/25 to 09/27/25         See note for plan of treatment details and functional goals     Jorden Salinas, PT                         I CERTIFY THE NEED FOR THESE SERVICES FURNISHED UNDER        THIS PLAN OF TREATMENT AND WHILE UNDER MY CARE     (Physician attestation of this document indicates review and certification of the therapy plan).              Referring Provider:  Modesta Holliday    Initial Assessment  See Epic Evaluation- Start of Care Date: 06/30/25

## 2025-06-30 NOTE — TELEPHONE ENCOUNTER
See [employer] message sent to patient.     Modesta MADRID RN CNP, FNP  [facility]

## 2025-06-30 NOTE — TELEPHONE ENCOUNTER
I do not manage clonazepam.     I sent patient a [employer] message, clonazepam has been managed by Onur Curtis and she needs to contact that prescriber to continue to manage clonazepam.     Thanks  Modesta MADRID RN CNP, FNP  [facility]  [facility]

## 2025-07-01 ENCOUNTER — TELEPHONE (OUTPATIENT)
Dept: FAMILY MEDICINE | Facility: OTHER | Age: 30
End: 2025-07-01
Payer: COMMERCIAL

## 2025-07-01 NOTE — TELEPHONE ENCOUNTER
Retail Pharmacy Prior Authorization Team   Phone: [phone]    Prior Authorization Not Needed per Insurance    Medication: METAMUCIL SMOOTH TEXTURE 58.6 % PO POWD  Insurance Company: [employer] - Phone [phone] Fax [phone]  Expected CoPay: $  0.00  Pharmacy Filling the Rx: [id number] - [place], [place] [address] AT [facility]  Pharmacy Notified: YES, SPOKE TO [facility]. PAID CLAIM WITH $0 COPAY.   Patient Notified: ALREADY PICKED UP

## 2025-07-02 ENCOUNTER — TRANSFERRED RECORDS (OUTPATIENT)
Dept: HEALTH INFORMATION MANAGEMENT | Facility: CLINIC | Age: 30
End: 2025-07-02

## 2025-07-02 NOTE — TELEPHONE ENCOUNTER
Retail Pharmacy Prior Authorization Team  Phone: [phone]    Retail Pharmacy Prior Authorization Team  Phone: [phone]    PRIOR AUTHORIZATION DENIED    Medication: WEGOVY 0.5 MG/0.5ML SC SOAJ  Insurance Company: [employer] - Phone [phone] Fax [phone]  Denial Date: 7/1/2025  Denial Reason(s):           Appeal Information:           Patient Notified: NO- Unfortunately, we cannot call the patient with denials because we do not know what next steps the MD will take nor can we give medical advice, please notify the patient of what they are to expect for the continuation of their therapy from the provider.

## 2025-07-02 NOTE — TELEPHONE ENCOUNTER
Spoke with patient and informed of note below. She is going to call her insurance to see what is covered or speak with pharmacy if she can afford self pay.     Closing encounter.   Michela Zepeda MA

## 2025-07-09 ENCOUNTER — OFFICE VISIT (OUTPATIENT)
Dept: NEUROLOGY | Facility: CLINIC | Age: 30
End: 2025-07-09
Payer: COMMERCIAL

## 2025-07-09 ENCOUNTER — PRE VISIT (OUTPATIENT)
Dept: MATERNAL FETAL MEDICINE | Facility: CLINIC | Age: 30
End: 2025-07-09

## 2025-07-09 DIAGNOSIS — M79.651 BILATERAL THIGH PAIN: ICD-10-CM

## 2025-07-09 DIAGNOSIS — I96 MYONECROSIS (H): ICD-10-CM

## 2025-07-09 DIAGNOSIS — M79.652 BILATERAL THIGH PAIN: ICD-10-CM

## 2025-07-09 DIAGNOSIS — R74.8 ELEVATED CK: ICD-10-CM

## 2025-07-09 DIAGNOSIS — M62.89 MYONECROSIS (H): ICD-10-CM

## 2025-07-09 DIAGNOSIS — R52 DIFFUSE PAIN: ICD-10-CM

## 2025-07-09 NOTE — LETTER
7/9/2025       RE: Caprice Kline  [address] [address]     Dear Colleague,    Thank you for referring your patient, Caprice Kline, to the [facility]. Please see a copy of my visit note below.    See procedure note.     Again, thank you for allowing me to participate in the care of your patient.      Sincerely,    Jorden Guevara MD

## 2025-07-09 NOTE — PROCEDURES
[facility]  Electrodiagnostic Laboratory                 Department of Neurology                                                                                                         Test Date:  2025    Patient: Caprice Kline : 1995 Physician: Jorden Guevara MD   Sex: Female AGE: 29 year Ref Phys: Karl Goldman MD   ID#: [id number]   Technician: Layla Blackmon     History and Examination:  Caprice Kline is a 29 year old woman with paresthesias, myalgias, and a history of a transient elevation in CK. She is referred for evaluation of possible polyneuropathy and myopathy.    Techniques:  Motor conduction studies were done with surface recording electrodes. Sensory conduction studies were performed with surface electrodes, unless indicated otherwise by (n), designating the use of subdermal recording electrodes. Temperature was monitored and recorded throughout the study. Upper extremities were maintained at a temperature of 32 degrees Centigrade or higher.  EMG was done with a concentric needle electrode.     Results:  Sural sensory nerve action potentials were mildly attenuated for a person of this age. Superficial fibular sensory nerve action potentials were absent on the right and present on the left. Left median, ulnar, and radial sensory conduction studies were normal. Fibular compound muscle action potentials were moderately and marginally attenuated on the right and left, respectively. Left tibial, median, and ulnar motor conduction studies were normal. All F-response studies were normal. Electromyography was normal.     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 This may reflect a distal, symmetric, sensorimotor axonal polyneuropathy, although fibular abnormalities are more severe on the right. Clinical correlation will be  helpful.   No evidence of myopathy.      _____________________________  Jorden Guevara MD  Board Certified in Clinical Neurophysiology and Neuromuscular Medicine        Nerve Conduction Studies  Motor Sites      Latency Amplitude Neg. Amp Diff Segment Distance Velocity Neg. Dur Neg Area Diff Temperature Comment   Site (ms) Norm (mV) Norm (%)  cm m/s Norm (ms) (%) ( C)    Left Fibular (EDB) Motor   Ankle 4.4  < 6.0 2.2  > 2.5  Ankle-EDB 8   7.0  31.8 moved G1   Bel Fib Head 13.2 - 1.75 - -20 Bel Fib Head-Ankle 33 38  > 38 8.4 -12 31.5    Pop Fossa 15.0 - 1.67 - -5 Pop Fossa-Bel Fib Head 8 44  > 38 8.6 0 31.4    Right Fibular (EDB) Motor   Ankle 6.1  < 6.0 0.57  > 2.5  Ankle-EDB 8   7.6  31.4 moved G1   Bel Fib Head 13.8 - 0.29 - -49 Bel Fib Head-Ankle 34.9 45  > 38 10.8 -47 31.3    Pop Fossa 15.3 - 0.49 - 69 Pop Fossa-Bel Fib Head 7.5 50  > 38 11.1 106 31.3    Left Median (APB) Motor   Wrist 3.7  < 4.4 5.0  > 5.0  Wrist-APB 8   4.4  32    Elbow 7.8 - 4.2  > 5.0 -16 Elbow-Wrist 20.3 50  > 48 4.5 -18 32.5    Left Tibial (AHB) Motor   Ankle 3.9  < 6.5 5.7  > 5.0  Ankle-AHB 6.5   7.6  31.3    Knee 13.8 - 4.6 - -19 Knee-Ankle 39.6 40  > 38 8.0 -9 31.3    Left Ulnar (ADM) Motor   Wrist 3.0  < 3.5 8.8  > 5.0  Wrist-ADM 8   4.3  32.5    Bel Elbow 6.6 - 8.3 - -6 Bel Elbow-Wrist 20.2 56  > 48 4.4 -4 32.4    Abv Elbow 8.2 - 7.2 - -13 Abv Elbow-Bel Elbow 9.7 61  > 48 4.4 -12 32.4      F-Wave Sites      Min F-Lat Max-Min F-Lat Mean F-Lat   Site (ms) (ms) (ms)   Left Fibular F-Wave   Ankle 59.6 - -   Left Median F-Wave   Wrist 28.1 4.2 30.0   Left Tibial F-Wave   Ankle 52.3 39.6 -   Left Ulnar F-Wave   Wrist 27.9 2.4 28.7     Sensory Sites      Onset Lat Peak Lat Neg Peak - Start Amp Amp (P-P) Segment Distance Velocity Temperature Comment   Site ms (ms) ( V) Norm ( V)  cm m/s Norm ( C)    Left Median Sensory   Wrist-Dig II 2.5 3.2 53  > 10 72 Wrist-Dig II 14 56  > 48 34.8    Left Radial Sensory   Forearm-Wrist 1.78 2.3 29  > 15 34  Forearm-Wrist 10 56 - 34.9    Left Superficial Fibular Sensory   Lower Leg-Ankle 3.1 3.8 6  > 3 4 Lower Leg-Ankle 12.5 40  > 38 31.2    Right Superficial Fibular Sensory   Lower Leg-Ankle NR NR NR  > 3 NR Lower Leg-Ankle 12.5 NR  > 38 31.4    Left Sural Sensory   Calf-Lat Mall 3.0 3.8 7  > 5 10 Calf-Lat Mall 14 47  > 38 31.7    Right Sural Sensory   Calf-Lat Mall 3.4 4.2 7  > 5 9 Calf-Lat Mall 14 41  > 38 31.1    Left Ulnar Sensory   Wrist-Dig V 2.6 3.3 19  > 8 27 Wrist-Dig V 12.5 48  > 48 36.4        Electromyography     Side Muscle Ins Act Fibs/PSW Fasc HF Amp Dur Poly Recrt Int Pat   Left Tib Anterior Nml None Nml 0 Nml Nml 0 Nml Nml   Left Vastus Lat Nml None Nml 0 Nml Nml 0 Nml Nml   Left Pronator Teres Nml None Nml 0 Nml Nml 0 Nml Nml   Left Biceps Nml None Nml 0 Nml Nml 0 Nml Nml   Left FDI Nml None Nml 0 Nml Nml 0 Nml Nml           NCS Waveforms:    Motor                  Sensory                         F-Wave

## 2025-07-10 ENCOUNTER — VIRTUAL VISIT (OUTPATIENT)
Dept: NEUROLOGY | Facility: CLINIC | Age: 30
End: 2025-07-10
Payer: COMMERCIAL

## 2025-07-10 DIAGNOSIS — R52 DIFFUSE PAIN: ICD-10-CM

## 2025-07-10 DIAGNOSIS — G62.9 LENGTH-DEPENDENT PERIPHERAL NEUROPATHY: Primary | ICD-10-CM

## 2025-07-10 DIAGNOSIS — I96 MYONECROSIS (H): ICD-10-CM

## 2025-07-10 DIAGNOSIS — G43.719 INTRACTABLE CHRONIC MIGRAINE WITHOUT AURA AND WITHOUT STATUS MIGRAINOSUS: ICD-10-CM

## 2025-07-10 DIAGNOSIS — M62.89 MYONECROSIS (H): ICD-10-CM

## 2025-07-10 PROCEDURE — 98007 SYNCH AUDIO-VIDEO EST HI 40: CPT | Performed by: STUDENT IN AN ORGANIZED HEALTH CARE EDUCATION/TRAINING PROGRAM

## 2025-07-10 PROCEDURE — 3044F HG A1C LEVEL LT 7.0%: CPT | Performed by: STUDENT IN AN ORGANIZED HEALTH CARE EDUCATION/TRAINING PROGRAM

## 2025-07-10 PROCEDURE — G2211 COMPLEX E/M VISIT ADD ON: HCPCS | Performed by: STUDENT IN AN ORGANIZED HEALTH CARE EDUCATION/TRAINING PROGRAM

## 2025-07-10 RX ORDER — NORTRIPTYLINE HYDROCHLORIDE 50 MG/1
50 CAPSULE ORAL AT BEDTIME
Qty: 90 CAPSULE | Refills: 1 | Status: SHIPPED | OUTPATIENT
Start: 2025-07-10

## 2025-07-10 NOTE — PATIENT INSTRUCTIONS
Increase nortriptyline to 50 mg and assess response  Let psychiatry team know about these changes    I agree that tramadol would be reasonable to trial    Neuropathy blood work ordered    Return this fall to review autonomic testing/ in 3-4 months

## 2025-07-10 NOTE — PROGRESS NOTES
Caprice is a 29 year old who is being evaluated via a billable video visit.    How would you like to obtain your AVS? MyChart  If the video visit is dropped, the invitation should be resent by: Text to cell phone: [phone]  Will anyone else be joining your video visit? No    Video-Visit Details    Type of service:  Video Visit   Originating Location (pt. Location): Home    Distant Location (provider location):  Off-site    Video timing 1254-123 PM    Louie Goldman MD   of Neurology  [facility]

## 2025-07-10 NOTE — LETTER
7/10/2025      Caprice Kline  [address] [facility] [address]      Dear Colleague,    Thank you for referring your patient, Caprice Kline, to the [facility] [place]. Please see a copy of my visit note below.    [facility]  Section of General Neurology  Return Patient  Virtual Visit    Caprice Kline MRN# [id number]   Age: 29 year old YOB: 1995            Assessment and Plan:   Assessment:  Caprice Kline is a 29 year old female who presents today in follow up.  She has a PMH of bipolar, depression with h/o SI, BPD, recent preseptal cellulitis among other PMH as noted previously.  She was recently to discuss transient CK elevation with unrevealing biopsy for a clear autoimmune or persistent cause.  Blood work also reassuring.   We now have new EMG data which further showed no evidence of diffuse myopathy but did show evidence of a length dependent neuropathy which aligns with the pain in her feet.  She is working with her pain clinic in this regard but we discussed some other options as well. Would also obtain further blood work in light of the neuropathy seen on EMG.  Migraines remain in a reasonable place.  Will not make changes in this regard specifically     Plan:  Increase nortriptyline to 50 mg and assess response, discussed side effect profile  I agree that tramadol would be reasonable to trial per pain team/if indicated  Neuropathy blood work ordered  No changes to lyrica, propranolol  No changes to Ajovy Monthly, Ubrelvy rescue for migraines  Return this fall to review autonomic testing/ in 3-4 months    Louie Goldman MD   of Neurology   [facility]      Interval history:     Reviewed new EMG data  Re discussed previous myonecrosis, no further evidence of myopathy  Now off of morphine, on naltrexone  Not on belbuca   Fibromyalgia has been thrown around  Gabapentin cream --use on feet   She  would like to think about tramadol   No longer on prozac, bipolar made it untenable.    Ubrelvy remains effective for rescue  Ajovy monthly     Going to neuro ophthalmology soon re a lazy eye       A/P at last visit  Assessment:  Caprice Kline is a 29 year old female who presents today in follow up.  She has a PMH of bipolar, depression with h/o SI, BPD, recent preseptal cellulitis among other PMH as noted previously.  She was seen last week to discuss transient CK elevation with unrevealing biopsy for a clear autoimmune or persistent cause.  Blood work also reassuring.  She now has intense burning in feet prompting return.  This could be post viral in terms of pain, transient CK elevation but difficult to prove.    Discussed should get in person exam when able if a rash persists, she had to cancel an appointment burnign pain in feet is so bad  Ultimately I think she needs a pain team to help with this given how refractory to conventional medications this has become but we discussed options from a neurology perspective for diffuse pain, plan as below     Plan:  Gabapentin cream Prn to compounding pharmacy  Increase nortriptyline to 25 mg at bedtime, re-discussed side effects, medication interactions at length, will proceed given limited options otherwise  Remains on max dose lyrica (200 mg TID)  Will give belbuca from other clinic some time to potentially kick in too when covered, we do not prescribe this in neurology clinic  Await EMG from previous plan as well      Past Medical History:     Patient Active Problem List   Diagnosis     Lumbar radiculopathy     Severe episode of recurrent major depressive disorder, without psychotic features (H)     Suicide attempt (H)     Bipolar II disorder (H)     Borderline personality disorder in adult (H)     FH: colon cancer     Anaphylactic shock due to shellfish, initial encounter     Left-sided low back pain with left-sided sciatica     Genital herpes     Fibromyalgia      "Bilateral thigh pain     Elevated CK     Myonecrosis (H)     Past Medical History:   Diagnosis Date     Bipolar disorder (H)      Complication of anesthesia     \"freaking out when I wake up\"     Depressive disorder 2011        Past Surgical History:     Past Surgical History:   Procedure Laterality Date     DISCECTOMY LUMBAR POSTERIOR MICROSCOPIC ONE LEVEL Left 08/24/2021    Procedure: Minimally invasive left Lumbar 5 to Sacral 1 microdiscectomy  ;  Surgeon: Destin Junior MD;  Location: [facility] OR     ENT SURGERY  Mar 17, 17    Tonsillectomy     GENITOURINARY SURGERY  2012    Fallopian tube removal and cyst removal     GYN SURGERY Right     cystectomy - with salpingectomy     HEAD & NECK SURGERY      tonsillectomy     HEAD & NECK SURGERY      wisdom teeth extraction     IR SOFT TISSUE BIOPSY  05/02/2025        Social History:     Social History     Tobacco Use     Smoking status: Former     Current packs/day: 0.00     Types: Cigarettes     Passive exposure: Current     Smokeless tobacco: Never   Vaping Use     Vaping status: Former     Substances: Nicotine     Devices: Insightfulincble tank   Substance Use Topics     Alcohol use: Not Currently     Comment: 2 drinks every other day      Drug use: Not Currently        Family History:     Family History   Problem Relation Age of Onset     Hypertension Mother      Colon Polyps Mother 50     Colon Cancer Mother      Substance Abuse Father      Colon Cancer Father 60     Diabetes Maternal Grandmother      Hypertension Maternal Grandmother      Colon Cancer Maternal Grandmother      Cancer Maternal Grandmother      Nephrolithiasis Maternal Grandmother      Diabetes Paternal Grandmother      Breast Cancer Paternal Grandmother      Cancer Paternal Grandmother         Medications:     Current Outpatient Medications   Medication Sig Dispense Refill     albuterol (PROAIR HFA/PROVENTIL HFA/VENTOLIN HFA) 108 (90 Base) MCG/ACT inhaler Inhale 2 puffs into the lungs every 6 hours as " needed for shortness of breath, wheezing or cough 18 g 0     amphetamine-dextroamphetamine (ADDERALL XR) 30 MG 24 hr capsule Take 30 mg by mouth daily as needed.       azelaic acid (FINACIA) 15 % external gel Apply topically 2 times daily as needed.       benzonatate (TESSALON) 100 MG capsule Take 1 capsule (100 mg) by mouth 3 times daily as needed for cough. 21 capsule 0     bisacodyl (DULCOLAX) 5 MG EC tablet Two days prior to exam take two (2) tablets at 4pm. One day prior to exam take two (2) tablets at 4pm 4 tablet 0     clobetasol (TEMOVATE) 0.05 % external solution APPLY TOPICALLY TO THE AFFECTED AREA TWICE DAILY FOR UP TO 21 DAYS. DO NOT APPLY TO NORMAL SKIN       clobetasol propionate (CLOBEX) 0.05 % external shampoo Apply topically.       clonazePAM (KLONOPIN) 0.5 MG tablet Take 0.5 mg by mouth daily as needed for anxiety.       cloNIDine (CATAPRES) 0.1 MG tablet Take 1 tablet (0.1 mg) by mouth 2 times daily as needed (racing heart, anxiety, agitation). 8 tablet 0     COMPOUNDED NON-CONTROLLED SUBSTANCE (CMPD RX) - PHARMACY TO MIX COMPOUNDED MEDICATION Compound naltrexone into 2.5mg capsules and patient to use 2.5mg by mouth once daily 0.75 g 0     diazepam (VALIUM) 10 MG tablet PLACE 1 TABLET VAGINALLY AS NEEDED FOR URINARY RETENTION OR FOR PAIN 15 tablet 3     diclofenac (VOLTAREN) 1 % topical gel APPLY 2 GRAMS TOPICALLY TO THE AFFECTED AREA FOUR TIMES DAILY. MAX 34 GM DAILY 300 g 0     docusate sodium (COLACE) 100 MG capsule Take 200 mg by mouth 2 times daily.       EPINEPHrine (ANY BX GENERIC EQUIV) 0.3 MG/0.3ML injection 2-pack INJECT 0.3 ML INTO THE MUSCLE AS NEEDED FOR ANAPHYLAXIS 2 each 1     furosemide (LASIX) 20 MG tablet Take 1 tablet (20 mg) by mouth daily. 90 tablet 0     gabapentin (NEURONTIN) 100 MG capsule Take 1 capsule (100 mg) by mouth 3 times daily. Agitation, anxiety 12 capsule 0     gabapentin 8% in vanicream (Compounded) Apply 4 clicks (1 g) topically nightly as needed. 30 g 5      glucosamine-chondroitin 500-400 MG CAPS per capsule Take 1 capsule by mouth daily.       hydrocortisone 2.5 % cream Apply topically 2 times daily.       hydrOXYzine (VISTARIL) 50 MG capsule Take 100 mg by mouth 3 times daily.       ipratropium-albuterol (COMBIVENT RESPIMAT)  MCG/ACT inhaler Inhale 1 puff into the lungs 4 times daily as needed for shortness of breath, wheezing or cough 4 g 0     ketoconazole (NIZORAL) 2 % external cream Apply topically. 3 times every week       ketoconazole (NIZORAL) 2 % external shampoo Apply topically twice a week.       lamoTRIgine (LAMICTAL) 100 MG tablet Take  mg by mouth 2 times daily. 100 mg AM / 50 mg PM as of 4/27 - pt in process of titrating back up       lidocaine (LIDODERM) 5 % patch UNWRAP AND APPLY 1 PATCH OVER 12 HOURS ONTO SKIN EVERY 24 HOURS,TO PREVENT LIDOCAINE TOXICITY YOU SHOULD BE PATCH FREE FOR 12 HOURS DAILY 30 patch 1     lithium (ESKALITH) 600 MG capsule Take 600 mg by mouth at bedtime.       magnesium oxide (MAG-OX) 400 MG tablet Take 400 mg by mouth daily.       morphine (MSIR) 15 MG IR tablet Take 15 mg by mouth 3 times daily as needed for pain.       mupirocin (BACTROBAN) 2 % external ointment Apply topically as needed.       NARCAN 4 MG/0.1ML nasal spray CALL 911. SPR CONTENTS OF ONE SPRAYER (0.1ML) INTO ONE NOSTRIL. REPEAT IN 2-3 MIN IF SYMPTOMS OF OPIOID EMERGENCY PERSIST, ALTERNATE NOSTRILS       nortriptyline (PAMELOR) 25 MG capsule Take 1 capsule (25 mg) by mouth at bedtime. 90 capsule 1     omeprazole (PRILOSEC) 40 MG DR capsule Take 1 capsule (40 mg) by mouth daily. 30 capsule 3     ondansetron (ZOFRAN ODT) 4 MG ODT tab DISSOLVE 1 TABLET(4 MG) ON THE TONGUE EVERY 8 HOURS AS NEEDED FOR NAUSEA 90 tablet 0     ondansetron (ZOFRAN) 4 MG tablet Take 1 tablet (4 mg) by mouth every 8 hours as needed for nausea. 60 tablet 1     ondansetron (ZOFRAN) 4 MG tablet Take 1 tablet (4 mg) by mouth every 8 hours as needed for nausea. 12 tablet 0      polyethylene glycol (GOLYTELY) 236 g suspension Two days before procedure at 5PM fill first container with water. Mix and drink an 8 oz glass every 15 minutes until HALF of the container is gone. Place the remainder in the refrigerator. One day before procedure at 5PM drink second half of bowel prep. Drink an 8 oz glass every 15 minutes until it is gone. Day of procedure 6 hours before arrival time fill the 2nd container with water. Mix and drink an 8 oz glass every 15 minutes until HALF of the container is gone. Discard the remaining solution. 8000 mL 0     prazosin (MINIPRESS) 2 MG capsule Take 2 mg by mouth every morning.       prazosin (MINIPRESS) 5 MG capsule Take 5 mg by mouth at bedtime.       Pregabalin (LYRICA) 200 MG capsule Take 200 mg by mouth 3 times daily.       propranolol (INDERAL) 20 MG tablet Take 1 tablet (20 mg) by mouth 2 times daily as needed (tremor). 180 tablet 2     propranolol ER (INDERAL LA) 80 MG 24 hr capsule Take 1 capsule (80 mg) by mouth daily. 90 capsule 1     psyllium (METAMUCIL/KONSYL) 58.6 % powder Take 18 g (1 Tablespoonful) by mouth daily. 660 g 2     QUEtiapine (SEROQUEL) 300 MG tablet Take 300 mg by mouth At Bedtime       RETIN-A 0.025 % external cream APPLY SPARINGLY TO FACE EVERY OTHER NIGHT FOR 14 NIGHTS THEN NIGHTLY THEREAFTER       Roflumilast (ZORYVE) 0.3 % FOAM Externally apply topically.       sulfacetamide sodium, Acne, 10 % lotion Apply topically.       sulfacetamide sodium, Acne, 10 % lotion APPLY IT TO THE FACE ONCE DAILY IN THE MORNING X3 MONTHS       tamsulosin (FLOMAX) 0.4 MG capsule Take 1 capsule (0.4 mg) by mouth every evening. 30 capsule 11     tiZANidine (ZANAFLEX) 4 MG tablet TAKE 1 TABLET BY MOUTH EVERY 8 TO 12 HOURS AS NEEDED. NOT TO EXCEED 3 DOSES IN 24 HOURS       tretinoin (RETIN-A) 0.025 % external cream Apply topically at bedtime.       triamcinolone (KENALOG) 0.1 % external cream Apply topically 2 times daily as needed.       ubrogepant (UBRELVY)  "100 MG tablet Take 1 tablet (100 mg) by mouth at onset of headache. 8 tablet 11     WEGOVY 0.5 MG/0.5ML pen ADMINISTER 0.5 MG UNDER THE SKIN 1 TIME A WEEK 2 mL 0     No current facility-administered medications for this visit.        Allergies:     Allergies   Allergen Reactions     Metronidazole Anaphylaxis and Hives     Other reaction(s): Throat swelling  Throat swelling 6 hrs after exposure  Itching and hives 2 hrs after exposure     Shellfish Allergy Anxiety, Diarrhea, Difficulty breathing, Hives, Itching, Rash, Shortness Of Breath and Swelling     Codeine Headache     Fish-Derived Products      Stopped fish oil and less stomach discomfort  Rash after eating fish.      Hydrocodone-Acetaminophen Other (See Comments)     Sertraline Other (See Comments)     Patient was foggy and \"high\" feeling     Shellfish-Derived Products      Lips get numb, throat gets scratchy, rashes        Review of Systems:   As noted above     Physical Exam:   General: Seated comfortably in no acute distress.  Neurologic:     Mental Status: Fully alert, attentive and oriented. Speech clear and fluent, no paraphasic errors.     Cranial Nerves: EOM appear intact. Facial movements symmetric. Hearing not formally tested but intact to conversation.  No dysarthria.     Motor: No tremors or other abnormal movements observed.      Sensory:Not able to be tested virtually     Coordination: Not tested     Gait: Not tested         Data: Pertinent prior to visit   Pending labs during hospitalization:  HMGCoA Reductase Ab - Negative  SRP IFA screen - Negative  Anti-cN-1A - Negative     Imagin2025 MR femur bilateral  Impression:  1. Left: Patchy soft tissue edema within the proximal  quadriceps/vastus lateralis , gluteus minimus and quadratus femoris  muscles There is associated rim enhancement particularly in the  gluteus minimus and quadratus femoris suggesting myonecrosis.  Perifascial edema and small perifascial fluid particularly along " the  proximal quadriceps muscles. Compared to MRI from 1/23/2025 findings  have progressed around the left hip.      Right: Trace residual soft tissue edema in the gluteus medius;  improved from 1/23/2025. Interval significant resolution of the edema  in the adductor musculature and gluteus tj. Perifascial edema  involving quadriceps muscle particularly along the lateral aspect.  Small areas of edema and enhancement in the distal biceps femoris and  distal adductor perry.      2. No substantial fatty replacement in the muscles.     5/1/2025 CT pelvis  IMPRESSION:   Stable ill-defined peripherally enhancing lesion centered about the  left gluteus minimus musculature when compared to 4/29/2025 MRI.         5/2/2024 Muscle core biopsy Left gluteus minimus  Final Diagnosis   Skeletal muscle, left gluteus minimus biopsy:  - Benign skeletal muscle with  myonecrosis and associated granulation tissue formation  - See comment      Labs:       Necrotizing Myopathy Evaluation, S     Necrotizing Myopathy Interp, S       SEE NOTE                             A negative result does not exclude the clinical diagnosis       of necrotizing autoimmune myopathy (NAM). Muscle biopsy       should be considered if clinically indicated.         ----------------------------------------------------------------------   Anti-cN-1A (NT5c1A) [employer]             <20                Units  <20           This test was developed and its performance characteristics       determined by [facility]. It has not been cleared or       approved by the Food and Drug Administration.                            Negative:                   <20                           Weak Positive:          20 - 39                            Moderate Positive:      40 - 80                            Strong Positive:            >80             HMGCOA reductase negative                Laboratory:         Newer data: EMG  History and Examination:  Caprice Kline is a 29 year old  woman with paresthesias, myalgias, and a history of a transient elevation in CK. She is referred for evaluation of possible polyneuropathy and myopathy.     Techniques:  Motor conduction studies were done with surface recording electrodes. Sensory conduction studies were performed with surface electrodes, unless indicated otherwise by (n), designating the use of subdermal recording electrodes. Temperature was monitored and recorded throughout the study. Upper extremities were maintained at a temperature of 32 degrees Centigrade or higher.  EMG was done with a concentric needle electrode.      Results:  Sural sensory nerve action potentials were mildly attenuated for a person of this age. Superficial fibular sensory nerve action potentials were absent on the right and present on the left. Left median, ulnar, and radial sensory conduction studies were normal. Fibular compound muscle action potentials were moderately and marginally attenuated on the right and left, respectively. Left tibial, median, and ulnar motor conduction studies were normal. All F-response studies were normal. Electromyography was normal.      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 This may reflect a distal, symmetric, sensorimotor axonal polyneuropathy, although fibular abnormalities are more severe on the right. Clinical correlation will be helpful.   No evidence of myopathy.        _____________________________  Jorden Guevara MD  Board Certified in Clinical Neurophysiology and Neuromuscular Medicine              The total time of this encounter today amounted to 43 minutes in total. This time included time spent with the patient, prep work, ordering tests, and performing post visit documentation.    The longitudinal plan of care for migraines, other issues was addressed during this visit. Due to the  added complexity in care, I will continue to support Ms Kline  in the subsequent management of this condition(s) and with the ongoing continuity of care of this condition(s).      Caprice is a 29 year old who is being evaluated via a billable video visit.    How would you like to obtain your AVS? MyChart  If the video visit is dropped, the invitation should be resent by: Text to cell phone: [phone]  Will anyone else be joining your video visit? No    Video-Visit Details    Type of service:  Video Visit   Originating Location (pt. Location): Home    Distant Location (provider location):  Off-site    Video timing 1254-123 PM    Louie Goldman MD   of Neurology  [facility]          Again, thank you for allowing me to participate in the care of your patient.        Sincerely,        Karl Goldman MD    Electronically signed

## 2025-07-10 NOTE — PROGRESS NOTES
[facility]  Section of General Neurology  Return Patient  Virtual Visit    Caprice Kline MRN# [id number]   Age: 29 year old YOB: 1995            Assessment and Plan:   Assessment:  Caprice Kline is a 29 year old female who presents today in follow up.  She has a PMH of bipolar, depression with h/o SI, BPD, recent preseptal cellulitis among other PMH as noted previously.  She was recently to discuss transient CK elevation with unrevealing biopsy for a clear autoimmune or persistent cause.  Blood work also reassuring.   We now have new EMG data which further showed no evidence of diffuse myopathy but did show evidence of a length dependent neuropathy which aligns with the pain in her feet.  She is working with her pain clinic in this regard but we discussed some other options as well. Would also obtain further blood work in light of the neuropathy seen on EMG.  Migraines remain in a reasonable place.  Will not make changes in this regard specifically     Plan:  Increase nortriptyline to 50 mg and assess response, discussed side effect profile  I agree that tramadol would be reasonable to trial per pain team/if indicated  Neuropathy blood work ordered  No changes to lyrica, propranolol  No changes to Ajovy Monthly, Ubrelvy rescue for migraines  Return this fall to review autonomic testing/ in 3-4 months    Louie Goldman MD   of Neurology   [facility]      Interval history:     Reviewed new EMG data  Re discussed previous myonecrosis, no further evidence of myopathy  Now off of morphine, on naltrexone  Not on belbuca   Fibromyalgia has been thrown around  Gabapentin cream --use on feet   She would like to think about tramadol   No longer on prozac, bipolar made it untenable.    Ubrelvy remains effective for rescue  Ajovy monthly     Going to neuro ophthalmology soon re a lazy eye       A/P at last visit  Assessment:  Caprice Kline is  "a 29 year old female who presents today in follow up.  She has a PMH of bipolar, depression with h/o SI, BPD, recent preseptal cellulitis among other PMH as noted previously.  She was seen last week to discuss transient CK elevation with unrevealing biopsy for a clear autoimmune or persistent cause.  Blood work also reassuring.  She now has intense burning in feet prompting return.  This could be post viral in terms of pain, transient CK elevation but difficult to prove.    Discussed should get in person exam when able if a rash persists, she had to cancel an appointment burnign pain in feet is so bad  Ultimately I think she needs a pain team to help with this given how refractory to conventional medications this has become but we discussed options from a neurology perspective for diffuse pain, plan as below     Plan:  Gabapentin cream Prn to compounding pharmacy  Increase nortriptyline to 25 mg at bedtime, re-discussed side effects, medication interactions at length, will proceed given limited options otherwise  Remains on max dose lyrica (200 mg TID)  Will give belbuca from other clinic some time to potentially kick in too when covered, we do not prescribe this in neurology clinic  Await EMG from previous plan as well      Past Medical History:     Patient Active Problem List   Diagnosis    Lumbar radiculopathy    Severe episode of recurrent major depressive disorder, without psychotic features (H)    Suicide attempt (H)    Bipolar II disorder (H)    Borderline personality disorder in adult (H)    FH: colon cancer    Anaphylactic shock due to shellfish, initial encounter    Left-sided low back pain with left-sided sciatica    Genital herpes    Fibromyalgia    Bilateral thigh pain    Elevated CK    Myonecrosis (H)     Past Medical History:   Diagnosis Date    Bipolar disorder (H)     Complication of anesthesia     \"freaking out when I wake up\"    Depressive disorder 2011        Past Surgical History:     Past " Surgical History:   Procedure Laterality Date    DISCECTOMY LUMBAR POSTERIOR MICROSCOPIC ONE LEVEL Left 08/24/2021    Procedure: Minimally invasive left Lumbar 5 to Sacral 1 microdiscectomy  ;  Surgeon: Destin Junior MD;  Location: [facility] OR    ENT SURGERY  Mar 17, 17    Tonsillectomy    GENITOURINARY SURGERY  2012    Fallopian tube removal and cyst removal    GYN SURGERY Right     cystectomy - with salpingectomy    HEAD & NECK SURGERY      tonsillectomy    HEAD & NECK SURGERY      wisdom teeth extraction    IR SOFT TISSUE BIOPSY  05/02/2025        Social History:     Social History     Tobacco Use    Smoking status: Former     Current packs/day: 0.00     Types: Cigarettes     Passive exposure: Current    Smokeless tobacco: Never   Vaping Use    Vaping status: Former    Substances: Nicotine    Devices: InteraXon tank   Substance Use Topics    Alcohol use: Not Currently     Comment: 2 drinks every other day     Drug use: Not Currently        Family History:     Family History   Problem Relation Age of Onset    Hypertension Mother     Colon Polyps Mother 50    Colon Cancer Mother     Substance Abuse Father     Colon Cancer Father 60    Diabetes Maternal Grandmother     Hypertension Maternal Grandmother     Colon Cancer Maternal Grandmother     Cancer Maternal Grandmother     Nephrolithiasis Maternal Grandmother     Diabetes Paternal Grandmother     Breast Cancer Paternal Grandmother     Cancer Paternal Grandmother         Medications:     Current Outpatient Medications   Medication Sig Dispense Refill    albuterol (PROAIR HFA/PROVENTIL HFA/VENTOLIN HFA) 108 (90 Base) MCG/ACT inhaler Inhale 2 puffs into the lungs every 6 hours as needed for shortness of breath, wheezing or cough 18 g 0    amphetamine-dextroamphetamine (ADDERALL XR) 30 MG 24 hr capsule Take 30 mg by mouth daily as needed.      azelaic acid (FINACIA) 15 % external gel Apply topically 2 times daily as needed.      benzonatate (TESSALON) 100 MG capsule  Take 1 capsule (100 mg) by mouth 3 times daily as needed for cough. 21 capsule 0    bisacodyl (DULCOLAX) 5 MG EC tablet Two days prior to exam take two (2) tablets at 4pm. One day prior to exam take two (2) tablets at 4pm 4 tablet 0    clobetasol (TEMOVATE) 0.05 % external solution APPLY TOPICALLY TO THE AFFECTED AREA TWICE DAILY FOR UP TO 21 DAYS. DO NOT APPLY TO NORMAL SKIN      clobetasol propionate (CLOBEX) 0.05 % external shampoo Apply topically.      clonazePAM (KLONOPIN) 0.5 MG tablet Take 0.5 mg by mouth daily as needed for anxiety.      cloNIDine (CATAPRES) 0.1 MG tablet Take 1 tablet (0.1 mg) by mouth 2 times daily as needed (racing heart, anxiety, agitation). 8 tablet 0    COMPOUNDED NON-CONTROLLED SUBSTANCE (CMPD RX) - PHARMACY TO MIX COMPOUNDED MEDICATION Compound naltrexone into 2.5mg capsules and patient to use 2.5mg by mouth once daily 0.75 g 0    diazepam (VALIUM) 10 MG tablet PLACE 1 TABLET VAGINALLY AS NEEDED FOR URINARY RETENTION OR FOR PAIN 15 tablet 3    diclofenac (VOLTAREN) 1 % topical gel APPLY 2 GRAMS TOPICALLY TO THE AFFECTED AREA FOUR TIMES DAILY. MAX 34 GM DAILY 300 g 0    docusate sodium (COLACE) 100 MG capsule Take 200 mg by mouth 2 times daily.      EPINEPHrine (ANY BX GENERIC EQUIV) 0.3 MG/0.3ML injection 2-pack INJECT 0.3 ML INTO THE MUSCLE AS NEEDED FOR ANAPHYLAXIS 2 each 1    furosemide (LASIX) 20 MG tablet Take 1 tablet (20 mg) by mouth daily. 90 tablet 0    gabapentin (NEURONTIN) 100 MG capsule Take 1 capsule (100 mg) by mouth 3 times daily. Agitation, anxiety 12 capsule 0    gabapentin 8% in vanicream (Compounded) Apply 4 clicks (1 g) topically nightly as needed. 30 g 5    glucosamine-chondroitin 500-400 MG CAPS per capsule Take 1 capsule by mouth daily.      hydrocortisone 2.5 % cream Apply topically 2 times daily.      hydrOXYzine (VISTARIL) 50 MG capsule Take 100 mg by mouth 3 times daily.      ipratropium-albuterol (COMBIVENT RESPIMAT)  MCG/ACT inhaler Inhale 1 puff  into the lungs 4 times daily as needed for shortness of breath, wheezing or cough 4 g 0    ketoconazole (NIZORAL) 2 % external cream Apply topically. 3 times every week      ketoconazole (NIZORAL) 2 % external shampoo Apply topically twice a week.      lamoTRIgine (LAMICTAL) 100 MG tablet Take  mg by mouth 2 times daily. 100 mg AM / 50 mg PM as of 4/27 - pt in process of titrating back up      lidocaine (LIDODERM) 5 % patch UNWRAP AND APPLY 1 PATCH OVER 12 HOURS ONTO SKIN EVERY 24 HOURS,TO PREVENT LIDOCAINE TOXICITY YOU SHOULD BE PATCH FREE FOR 12 HOURS DAILY 30 patch 1    lithium (ESKALITH) 600 MG capsule Take 600 mg by mouth at bedtime.      magnesium oxide (MAG-OX) 400 MG tablet Take 400 mg by mouth daily.      morphine (MSIR) 15 MG IR tablet Take 15 mg by mouth 3 times daily as needed for pain.      mupirocin (BACTROBAN) 2 % external ointment Apply topically as needed.      NARCAN 4 MG/0.1ML nasal spray CALL 911. SPR CONTENTS OF ONE SPRAYER (0.1ML) INTO ONE NOSTRIL. REPEAT IN 2-3 MIN IF SYMPTOMS OF OPIOID EMERGENCY PERSIST, ALTERNATE NOSTRILS      nortriptyline (PAMELOR) 25 MG capsule Take 1 capsule (25 mg) by mouth at bedtime. 90 capsule 1    omeprazole (PRILOSEC) 40 MG DR capsule Take 1 capsule (40 mg) by mouth daily. 30 capsule 3    ondansetron (ZOFRAN ODT) 4 MG ODT tab DISSOLVE 1 TABLET(4 MG) ON THE TONGUE EVERY 8 HOURS AS NEEDED FOR NAUSEA 90 tablet 0    ondansetron (ZOFRAN) 4 MG tablet Take 1 tablet (4 mg) by mouth every 8 hours as needed for nausea. 60 tablet 1    ondansetron (ZOFRAN) 4 MG tablet Take 1 tablet (4 mg) by mouth every 8 hours as needed for nausea. 12 tablet 0    polyethylene glycol (GOLYTELY) 236 g suspension Two days before procedure at 5PM fill first container with water. Mix and drink an 8 oz glass every 15 minutes until HALF of the container is gone. Place the remainder in the refrigerator. One day before procedure at 5PM drink second half of bowel prep. Drink an 8 oz glass every  15 minutes until it is gone. Day of procedure 6 hours before arrival time fill the 2nd container with water. Mix and drink an 8 oz glass every 15 minutes until HALF of the container is gone. Discard the remaining solution. 8000 mL 0    prazosin (MINIPRESS) 2 MG capsule Take 2 mg by mouth every morning.      prazosin (MINIPRESS) 5 MG capsule Take 5 mg by mouth at bedtime.      Pregabalin (LYRICA) 200 MG capsule Take 200 mg by mouth 3 times daily.      propranolol (INDERAL) 20 MG tablet Take 1 tablet (20 mg) by mouth 2 times daily as needed (tremor). 180 tablet 2    propranolol ER (INDERAL LA) 80 MG 24 hr capsule Take 1 capsule (80 mg) by mouth daily. 90 capsule 1    psyllium (METAMUCIL/KONSYL) 58.6 % powder Take 18 g (1 Tablespoonful) by mouth daily. 660 g 2    QUEtiapine (SEROQUEL) 300 MG tablet Take 300 mg by mouth At Bedtime      RETIN-A 0.025 % external cream APPLY SPARINGLY TO FACE EVERY OTHER NIGHT FOR 14 NIGHTS THEN NIGHTLY THEREAFTER      Roflumilast (ZORYVE) 0.3 % FOAM Externally apply topically.      sulfacetamide sodium, Acne, 10 % lotion Apply topically.      sulfacetamide sodium, Acne, 10 % lotion APPLY IT TO THE FACE ONCE DAILY IN THE MORNING X3 MONTHS      tamsulosin (FLOMAX) 0.4 MG capsule Take 1 capsule (0.4 mg) by mouth every evening. 30 capsule 11    tiZANidine (ZANAFLEX) 4 MG tablet TAKE 1 TABLET BY MOUTH EVERY 8 TO 12 HOURS AS NEEDED. NOT TO EXCEED 3 DOSES IN 24 HOURS      tretinoin (RETIN-A) 0.025 % external cream Apply topically at bedtime.      triamcinolone (KENALOG) 0.1 % external cream Apply topically 2 times daily as needed.      ubrogepant (UBRELVY) 100 MG tablet Take 1 tablet (100 mg) by mouth at onset of headache. 8 tablet 11    WEGOVY 0.5 MG/0.5ML pen ADMINISTER 0.5 MG UNDER THE SKIN 1 TIME A WEEK 2 mL 0     No current facility-administered medications for this visit.        Allergies:     Allergies   Allergen Reactions    Metronidazole Anaphylaxis and Hives     Other reaction(s):  "Throat swelling  Throat swelling 6 hrs after exposure  Itching and hives 2 hrs after exposure    Shellfish Allergy Anxiety, Diarrhea, Difficulty breathing, Hives, Itching, Rash, Shortness Of Breath and Swelling    Codeine Headache    Fish-Derived Products      Stopped fish oil and less stomach discomfort  Rash after eating fish.     Hydrocodone-Acetaminophen Other (See Comments)    Sertraline Other (See Comments)     Patient was foggy and \"high\" feeling    Shellfish-Derived Products      Lips get numb, throat gets scratchy, rashes        Review of Systems:   As noted above     Physical Exam:   General: Seated comfortably in no acute distress.  Neurologic:     Mental Status: Fully alert, attentive and oriented. Speech clear and fluent, no paraphasic errors.     Cranial Nerves: EOM appear intact. Facial movements symmetric. Hearing not formally tested but intact to conversation.  No dysarthria.     Motor: No tremors or other abnormal movements observed.      Sensory:Not able to be tested virtually     Coordination: Not tested     Gait: Not tested         Data: Pertinent prior to visit   Pending labs during hospitalization:  HMGCoA Reductase Ab - Negative  SRP IFA screen - Negative  Anti-cN-1A - Negative     Imagin2025 MR femur bilateral  Impression:  1. Left: Patchy soft tissue edema within the proximal  quadriceps/vastus lateralis , gluteus minimus and quadratus femoris  muscles There is associated rim enhancement particularly in the  gluteus minimus and quadratus femoris suggesting myonecrosis.  Perifascial edema and small perifascial fluid particularly along the  proximal quadriceps muscles. Compared to MRI from 2025 findings  have progressed around the left hip.      Right: Trace residual soft tissue edema in the gluteus medius;  improved from 2025. Interval significant resolution of the edema  in the adductor musculature and gluteus tj. Perifascial edema  involving quadriceps muscle " particularly along the lateral aspect.  Small areas of edema and enhancement in the distal biceps femoris and  distal adductor perry.      2. No substantial fatty replacement in the muscles.     5/1/2025 CT pelvis  IMPRESSION:   Stable ill-defined peripherally enhancing lesion centered about the  left gluteus minimus musculature when compared to 4/29/2025 MRI.         5/2/2024 Muscle core biopsy Left gluteus minimus  Final Diagnosis   Skeletal muscle, left gluteus minimus biopsy:  - Benign skeletal muscle with  myonecrosis and associated granulation tissue formation  - See comment      Labs:       Necrotizing Myopathy Evaluation, S     Necrotizing Myopathy Interp, S       SEE NOTE                             A negative result does not exclude the clinical diagnosis       of necrotizing autoimmune myopathy (NAM). Muscle biopsy       should be considered if clinically indicated.         ----------------------------------------------------------------------   Anti-cN-1A (NT5c1A) [employer]             <20                Units  <20           This test was developed and its performance characteristics       determined by [facility]. It has not been cleared or       approved by the Food and Drug Administration.                            Negative:                   <20                           Weak Positive:          20 - 39                            Moderate Positive:      40 - 80                            Strong Positive:            >80             HMGCOA reductase negative                Laboratory:         Newer data: EMG  History and Examination:  Caprice Kline is a 29 year old woman with paresthesias, myalgias, and a history of a transient elevation in CK. She is referred for evaluation of possible polyneuropathy and myopathy.     Techniques:  Motor conduction studies were done with surface recording electrodes. Sensory conduction studies were performed with surface electrodes, unless indicated otherwise by (n),  designating the use of subdermal recording electrodes. Temperature was monitored and recorded throughout the study. Upper extremities were maintained at a temperature of 32 degrees Centigrade or higher.  EMG was done with a concentric needle electrode.      Results:  Sural sensory nerve action potentials were mildly attenuated for a person of this age. Superficial fibular sensory nerve action potentials were absent on the right and present on the left. Left median, ulnar, and radial sensory conduction studies were normal. Fibular compound muscle action potentials were moderately and marginally attenuated on the right and left, respectively. Left tibial, median, and ulnar motor conduction studies were normal. All F-response studies were normal. Electromyography was normal.      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 This may reflect a distal, symmetric, sensorimotor axonal polyneuropathy, although fibular abnormalities are more severe on the right. Clinical correlation will be helpful.   No evidence of myopathy.        _____________________________  Jorden Guevara MD  Board Certified in Clinical Neurophysiology and Neuromuscular Medicine              The total time of this encounter today amounted to 43 minutes in total. This time included time spent with the patient, prep work, ordering tests, and performing post visit documentation.    The longitudinal plan of care for migraines, other issues was addressed during this visit. Due to the added complexity in care, I will continue to support Ms Kline  in the subsequent management of this condition(s) and with the ongoing continuity of care of this condition(s).

## 2025-07-11 ASSESSMENT — SLEEP AND FATIGUE QUESTIONNAIRES
HOW LIKELY ARE YOU TO NOD OFF OR FALL ASLEEP WHILE LYING DOWN TO REST IN THE AFTERNOON WHEN CIRCUMSTANCES PERMIT: HIGH CHANCE OF DOZING
HOW LIKELY ARE YOU TO NOD OFF OR FALL ASLEEP WHILE SITTING AND TALKING TO SOMEONE: WOULD NEVER DOZE
HOW LIKELY ARE YOU TO NOD OFF OR FALL ASLEEP WHILE SITTING AND READING: MODERATE CHANCE OF DOZING
HOW LIKELY ARE YOU TO NOD OFF OR FALL ASLEEP WHILE WATCHING TV: MODERATE CHANCE OF DOZING
HOW LIKELY ARE YOU TO NOD OFF OR FALL ASLEEP WHEN YOU ARE A PASSENGER IN A CAR FOR AN HOUR WITHOUT A BREAK: MODERATE CHANCE OF DOZING
HOW LIKELY ARE YOU TO NOD OFF OR FALL ASLEEP WHILE SITTING QUIETLY AFTER LUNCH WITHOUT ALCOHOL: SLIGHT CHANCE OF DOZING
HOW LIKELY ARE YOU TO NOD OFF OR FALL ASLEEP WHILE SITTING INACTIVE IN A PUBLIC PLACE: SLIGHT CHANCE OF DOZING
HOW LIKELY ARE YOU TO NOD OFF OR FALL ASLEEP IN A CAR, WHILE STOPPED FOR A FEW MINUTES IN TRAFFIC: WOULD NEVER DOZE

## 2025-07-11 ASSESSMENT — PAIN SCALES - PAIN ENJOYMENT GENERAL ACTIVITY SCALE (PEG)
INTERFERED_ENJOYMENT_LIFE: 10
INTERFERED_ENJOYMENT_LIFE: 10 - COMPLETELY INTERFERES
PEG_TOTALSCORE: 9.67
INTERFERED_GENERAL_ACTIVITY: 10
INTERFERED_GENERAL_ACTIVITY: 10 - COMPLETELY INTERFERES
AVG_PAIN_PASTWEEK: 9

## 2025-07-11 NOTE — PROGRESS NOTES
"Virtual Visit Details    Type of service:  Video Visit   Video Start Time: 8:58AM  Video End Time:9:26AM  Originating Location (pt. Location): Home    Distant Location (provider location):  Off-site  Platform used for Video Visit: [facility]   Outpatient Sleep Medicine  Jul 14, 2025       Name: Caprice Kline MRN# [id number]   Age: 29 year old YOB: 1995            Assessment and Plan:   1. Nocturnal hypoxemia (Primary)  2. Shortness of breath  3. Insomnia, unspecified type    During this visit, we reviewed the summary of the study including stage, position, event distribution, oximetry and heart rate. Moderate snoring was reported but no evidence of clinically significant sleep apnea with AHI 1.7, RDI 2.1.  There was mild hypoxemia with baseline oxygen 91.7%, isidoro 84%, 19.2 minutes spent less than or equal to 88%.  Sleep architecture showed all stages of sleep present without significant disruption, normal arousal index 7.5 and normal sleep efficiency 91.7%.  There were no abnormal movements or behaviors noted during sleep.  Normal sinus rhythm on cardiac monitoring.    Patient was reassured by largely normal sleep study however we did discuss the finding of hypoxemia and I recommended we pursue overnight oximetry testing to verify hypoxemia.  Low normal baseline reading so this could be artifactual.  Patient reports daytime shortness of breath and \"just always in general feeling like I cannot breathe\".  Given daytime symptoms also discussed we could have her meet with pulmonology for a further workup and she prefers this.  Referral placed today.  - Adult Pulmonary Medicine  Referral; Future    Discussed some behavioral modification she can make at her consult visit to help improve sleep but also discussed could set her up with my colleague Dr. Portillo in sleep psychology for formal CBT-I and she was interested in this, referral placed today.  - Behavioral " "Sleep Medicine  Referral; Future    Follow-up with me as needed going forward.          Chief Complaint      Chief Complaint   Patient presents with    RECHECK          History of Present Illness:   Caprice Lafleur is a 29 year old female who presents to the clinic for results of recent sleep study completed on 3/20/2025. Study was completed to evaluate for LISA.      Reviewed results of sleep study with patient as follows:    SLEEP STUDY INTERPRETATION  DIAGNOSTIC POLYSOMNOGRAPHY REPORT      Patient: CAPRICE LAFLEUR  YOB: 1995  Study Date: 3/20/2025  MRN: [id number]  Referring Provider: Junior Roe  Ordering Provider: Eitan Melgoza     Indications for Polysomnography: The patient is a 29 year old Female who is 5' 9\" and weighs 229.0 lbs. Her BMI is 33.9, [place] sleepiness scale 9/24 and neck circumference is 40 cm. A diagnostic polysomnogram was performed to evaluate for sleep apnea/parasomnia.     Polysomnogram Data: A full night polysomnogram recorded the standard physiologic parameters including EEG, EOG, EMG, ECG, nasal and oral airflow. Respiratory parameters of chest and abdominal movements were recorded with respiratory inductance plethysmography. Oxygen saturation was recorded by pulse oximetry. Hypopnea scoring rule used: 1B 4%.     Sleep Architecture: The total recording time of the polysomnogram was 551.0 minutes. The total sleep time was 505.5 minutes. Sleep latency was normal at 14.0 minutes without the use of a sleep aid. REM latency was 30.0 minutes. Arousal index was normal at 7.5 arousals per hour. Sleep efficiency was normal at 91.7%. Wake after sleep onset was 31.5 minutes. The patient spent 1.7% of total sleep time in Stage N1, 35.8% in Stage N2, 38.1% in Stage N3, and 24.4% in REM. Time in REM supine was 97.5 minutes.     Respiration: Events ? The polysomnogram revealed a presence of 1 obstructive, 10 central, and - mixed apneas resulting in an apnea index of " 1.3 events per hour. There were 3 obstructive hypopneas and - central hypopneas resulting in an obstructive hypopnea index of 0.4 and central hypopnea index of - events per hour. The combined apnea/hypopnea index was 1.7 events per hour (central apnea/hypopnea index was 1.2 events per hour). The REM AHI was 2.9 events per hour. The supine AHI was 1.8 events per hour. The RERA index was 0.5 events per hour.  The RDI was 2.1 events per hour.  Snoring - was reported as moderate.  Respiratory rate and pattern - was notable for normal respiratory rate and pattern.  Sustained Sleep Associated Hypoventilation - Transcutaneous carbon dioxide monitoring was not used.  Sleep Associated Hypoxemia - (Greater than 5 minutes O2 sat at or below 88%) was present. Baseline oxygen saturation was 91.7%. Lowest oxygen saturation was 84.0%. Time spent less than or equal to 88% was 19.2 minutes. Time spent less than or equal to 89% was 31.0 minutes.     Movement Activity: Negative for clinically significant periodic limb movements of sleep.   Periodic Limb Activity - There were 19 PLMs during the entire study. The PLM index was 2.3 movements per hour. The PLM Arousal Index was 0.9 per hour.  REM EMG Activity - Excessive transient/sustained muscle activity was not present.  Nocturnal Behavior - Abnormal sleep related behaviors were not noted during/arising out of NREM / REM sleep.   Bruxism - None apparent.     Cardiac Summary: Sinus rhythm.   The average pulse rate was 60.6 bpm. The minimum pulse rate was 54.0 bpm while the maximum pulse rate was 75.0 bpm.  Arrhythmias were not noted.     Assessment:   This sleep study is negative for clinically significant sleep apnea, hypoxemia or movement abnormalities. This was a comprehensive test for sleep apnea that captured supine REM sleep.   Muscle atonia was preserved in REM and there were no clear dream enactment behaviors to indicate REM sleep behavioral disorder. There was an instance of  "vocalization in REM sleep which can be consistent with patient's history of nightmares.   Mild sleep associated hypoxemia was reported. However, veracity of low O2 saturation may need to be verified.      Recommendations:  Consider corroborating hypoxemia with overnight oximetry testing. If confirmed, consider further investigation of etiology based on clinical situation.   Suggest optimizing sleep schedule and avoiding sleep deprivation.      Past medical/surgical history, family history, social history, medications and allergies were reviewed.           Physical Examination:   /87   Ht 1.753 m (5' 9\")   Wt 99.8 kg (220 lb)   BMI 32.49 kg/m    General appearance: Awake, alert, cooperative. Well groomed. Sitting comfortably in chair. In no apparent distress.  HEENT: Head: Normocephalic, atraumatic. Eyes:Conjunctiva clear. Sclera normal. Nose: External appearance without deformity.   Pulmonary:  Able to speak easily in full sentences. No cough or wheeze.   Skin:  No rashes or significant lesions on visible skin.   Neurologic: Alert, oriented x3.   Psychiatric: Mood euthymic. Affect congruent with full range and intensity.      CC:  Junior Roe PA-C  Jul 14, 2025     [facility]  [address] [place], [place] [id number] [place] [address] [facility]  [address] [address]    Chart documentation was completed, in part, with [employer] voice-recognition software. Even though reviewed, some grammatical, spelling, and word errors may remain.    38 minutes spent on day of encounter doing chart review, history and exam, documentation, and further activities as noted above    "

## 2025-07-13 ENCOUNTER — LAB (OUTPATIENT)
Dept: LAB | Facility: CLINIC | Age: 30
End: 2025-07-13
Payer: COMMERCIAL

## 2025-07-13 DIAGNOSIS — G62.9 LENGTH-DEPENDENT PERIPHERAL NEUROPATHY: ICD-10-CM

## 2025-07-13 LAB
EST. AVERAGE GLUCOSE BLD GHB EST-MCNC: 94 MG/DL
HBA1C MFR BLD: 4.9 % (ref 0–5.6)
VIT B12 SERPL-MCNC: 474 PG/ML (ref 232–1245)

## 2025-07-13 PROCEDURE — 86235 NUCLEAR ANTIGEN ANTIBODY: CPT

## 2025-07-13 PROCEDURE — 82607 VITAMIN B-12: CPT

## 2025-07-13 PROCEDURE — 83036 HEMOGLOBIN GLYCOSYLATED A1C: CPT

## 2025-07-13 PROCEDURE — 36415 COLL VENOUS BLD VENIPUNCTURE: CPT

## 2025-07-13 PROCEDURE — 86334 IMMUNOFIX E-PHORESIS SERUM: CPT | Performed by: PATHOLOGY

## 2025-07-13 PROCEDURE — 99000 SPECIMEN HANDLING OFFICE-LAB: CPT

## 2025-07-13 PROCEDURE — 86364 TISS TRNSGLTMNASE EA IG CLAS: CPT

## 2025-07-13 PROCEDURE — 84425 ASSAY OF VITAMIN B-1: CPT | Mod: 90

## 2025-07-14 ENCOUNTER — VIRTUAL VISIT (OUTPATIENT)
Dept: SLEEP MEDICINE | Facility: CLINIC | Age: 30
End: 2025-07-14
Payer: COMMERCIAL

## 2025-07-14 ENCOUNTER — TELEPHONE (OUTPATIENT)
Dept: GASTROENTEROLOGY | Facility: CLINIC | Age: 30
End: 2025-07-14

## 2025-07-14 ENCOUNTER — TRANSCRIBE ORDERS (OUTPATIENT)
Dept: OTHER | Age: 30
End: 2025-07-14

## 2025-07-14 VITALS
DIASTOLIC BLOOD PRESSURE: 87 MMHG | SYSTOLIC BLOOD PRESSURE: 111 MMHG | WEIGHT: 220 LBS | HEIGHT: 69 IN | BODY MASS INDEX: 32.58 KG/M2

## 2025-07-14 DIAGNOSIS — R06.02 SHORTNESS OF BREATH: ICD-10-CM

## 2025-07-14 DIAGNOSIS — G47.00 INSOMNIA, UNSPECIFIED TYPE: ICD-10-CM

## 2025-07-14 DIAGNOSIS — H51.11 CONVERGENCE INSUFFICIENCY: ICD-10-CM

## 2025-07-14 DIAGNOSIS — G47.34 NOCTURNAL HYPOXEMIA: Primary | ICD-10-CM

## 2025-07-14 DIAGNOSIS — H50.21 VERTICAL STRABISMUS, RIGHT EYE: ICD-10-CM

## 2025-07-14 DIAGNOSIS — H52.229 REGULAR ASTIGMATISM: ICD-10-CM

## 2025-07-14 DIAGNOSIS — H52.13 MYOPIA, BILATERAL: Primary | ICD-10-CM

## 2025-07-14 LAB
ENA SS-A AB SER IA-ACNC: <0.5 U/ML
ENA SS-A AB SER IA-ACNC: NEGATIVE
ENA SS-B IGG SER IA-ACNC: <0.6 U/ML
ENA SS-B IGG SER IA-ACNC: NEGATIVE
PROT PATTERN SERPL IFE-IMP: NORMAL
TTG IGA SER-ACNC: <0.2 U/ML
TTG IGG SER-ACNC: <0.6 U/ML

## 2025-07-14 PROCEDURE — 3079F DIAST BP 80-89 MM HG: CPT | Mod: 95 | Performed by: PHYSICIAN ASSISTANT

## 2025-07-14 PROCEDURE — 1125F AMNT PAIN NOTED PAIN PRSNT: CPT | Mod: 95 | Performed by: PHYSICIAN ASSISTANT

## 2025-07-14 PROCEDURE — 3074F SYST BP LT 130 MM HG: CPT | Mod: 95 | Performed by: PHYSICIAN ASSISTANT

## 2025-07-14 PROCEDURE — 98006 SYNCH AUDIO-VIDEO EST MOD 30: CPT | Performed by: PHYSICIAN ASSISTANT

## 2025-07-14 ASSESSMENT — PAIN SCALES - GENERAL: PAINLEVEL_OUTOF10: SEVERE PAIN (10)

## 2025-07-14 NOTE — NURSING NOTE
Current patient location: [address] [place] [id number]    Is the patient currently in the state of [place]? YES    Visit mode: VIDEO    If the visit is dropped, the patient can be reconnected by:VIDEO VISIT: Text to cell phone:   Telephone Information:   Mobile [phone]       Will anyone else be joining the visit? NO  (If patient encounters technical issues they should call [phone]    Are changes needed to the allergy or medication list? No    Are refills needed on medications prescribed by this physician? NO    Rooming Documentation:  Complete    Reason for visit: RECHECK    Hansa AWADF

## 2025-07-15 ENCOUNTER — PATIENT OUTREACH (OUTPATIENT)
Dept: CARE COORDINATION | Facility: CLINIC | Age: 30
End: 2025-07-15
Payer: COMMERCIAL

## 2025-07-15 ENCOUNTER — TELEPHONE (OUTPATIENT)
Dept: PULMONOLOGY | Facility: CLINIC | Age: 30
End: 2025-07-15
Payer: COMMERCIAL

## 2025-07-15 NOTE — TELEPHONE ENCOUNTER
Health Call Center    Phone Message    May a detailed message be left on voicemail: yes     Reason for Call: Appointment Intake    Referring Provider Name: Rhona Laird PA-C from  Sleep clinic  Diagnosis and/or Symptoms: Shortness of breath/nocturnal hypoxemia    Pt is scheduled 8/1/25 with Dr. Musa to establish care. Had PFTs done 3/20/25-please notify pt if additional PFT's are needed? (pended)    Note: Per pt, she has already been evaluated at sleep clinic, and it was her sleep doctor that referred her to pulmonology.    Action Taken: Other: Pulm    Travel Screening: Not Applicable

## 2025-07-16 ENCOUNTER — OFFICE VISIT (OUTPATIENT)
Dept: PALLIATIVE MEDICINE | Facility: CLINIC | Age: 30
End: 2025-07-16
Payer: COMMERCIAL

## 2025-07-16 ENCOUNTER — ANESTHESIA EVENT (OUTPATIENT)
Dept: GASTROENTEROLOGY | Facility: CLINIC | Age: 30
End: 2025-07-16
Payer: COMMERCIAL

## 2025-07-16 VITALS — SYSTOLIC BLOOD PRESSURE: 102 MMHG | DIASTOLIC BLOOD PRESSURE: 70 MMHG | HEART RATE: 72 BPM

## 2025-07-16 DIAGNOSIS — M79.671 BILATERAL FOOT PAIN: ICD-10-CM

## 2025-07-16 DIAGNOSIS — M79.2 NEUROPATHIC PAIN: ICD-10-CM

## 2025-07-16 DIAGNOSIS — M79.672 BILATERAL FOOT PAIN: ICD-10-CM

## 2025-07-16 DIAGNOSIS — M79.7 FIBROMYALGIA: ICD-10-CM

## 2025-07-16 DIAGNOSIS — S73.192D TEAR OF LEFT ACETABULAR LABRUM, SUBSEQUENT ENCOUNTER: ICD-10-CM

## 2025-07-16 DIAGNOSIS — M25.551 BILATERAL HIP PAIN: Primary | ICD-10-CM

## 2025-07-16 DIAGNOSIS — F11.90 CHRONIC, CONTINUOUS USE OF OPIOIDS: ICD-10-CM

## 2025-07-16 DIAGNOSIS — M25.552 BILATERAL HIP PAIN: Primary | ICD-10-CM

## 2025-07-16 DIAGNOSIS — M79.18 DIFFUSE MYOFASCIAL PAIN SYNDROME: ICD-10-CM

## 2025-07-16 DIAGNOSIS — S73.191D TEAR OF RIGHT ACETABULAR LABRUM, SUBSEQUENT ENCOUNTER: ICD-10-CM

## 2025-07-16 PROCEDURE — 1125F AMNT PAIN NOTED PAIN PRSNT: CPT | Performed by: NURSE PRACTITIONER

## 2025-07-16 PROCEDURE — 3074F SYST BP LT 130 MM HG: CPT | Performed by: NURSE PRACTITIONER

## 2025-07-16 PROCEDURE — G2211 COMPLEX E/M VISIT ADD ON: HCPCS | Performed by: NURSE PRACTITIONER

## 2025-07-16 PROCEDURE — 99215 OFFICE O/P EST HI 40 MIN: CPT | Performed by: NURSE PRACTITIONER

## 2025-07-16 PROCEDURE — 3078F DIAST BP <80 MM HG: CPT | Performed by: NURSE PRACTITIONER

## 2025-07-16 RX ORDER — BUPRENORPHINE 5 UG/H
1 PATCH TRANSDERMAL
Qty: 4 PATCH | Refills: 0 | Status: SHIPPED | OUTPATIENT
Start: 2025-07-16

## 2025-07-16 ASSESSMENT — PAIN SCALES - GENERAL: PAINLEVEL_OUTOF10: SEVERE PAIN (9)

## 2025-07-16 ASSESSMENT — LIFESTYLE VARIABLES: TOBACCO_USE: 1

## 2025-07-16 NOTE — NURSING NOTE
Reviewed Controlled Substance Agreement with patient and all questions were answered.     Pt provided with copy     Controlled Substance Agreement given to provider for Signature    Tamara Krishnamurthy RN

## 2025-07-16 NOTE — LETTER
Opioid / Opioid Plus Controlled Substance Agreement    This is an agreement between you and your provider about the safe and appropriate use of controlled substance/opioids prescribed by your care team. Controlled substances are medicines that can cause physical and mental dependence (abuse).    There are strict laws about having and using these medicines. We here at [facility] are committing to working with you in your efforts to get better. To support you in this work, we ll help you schedule regular office appointments for medicine refills. If we must cancel or change your appointment for any reason, we ll make sure you have enough medicine to last until your next appointment.     As a Provider, I will:  Listen carefully to your concerns and treat you with respect.   Recommend a treatment plan that I believe is in your best interest. This plan may involve therapies other than opioid pain medication.   Talk with you often about the possible benefits, and the risk of harm of any medicine that we prescribe for you.   Provide a plan on how to taper (discontinue or go off) using this medicine if the decision is made to stop its use.    As a Patient, I understand that opioid(s):   Are a controlled substance prescribed by my care team to help me function or work and manage my condition(s).   Are strong medicines and can cause serious side effects such as:  Drowsiness, which can seriously affect my driving ability  A lower breathing rate, enough to cause death  Harm to my thinking ability   Depression   Abuse of and addiction to this medicine  Need to be taken exactly as prescribed. Combining opioids with certain medicines or chemicals (such as illegal drugs, sedatives, sleeping pills, and benzodiazepines) can be dangerous or even fatal. If I stop opioids suddenly, I may have severe withdrawal symptoms.  Do not work for all types of pain nor for all patients. If they re not helpful, I may be asked to stop  them.        The risks, benefits and side effects of these medicine(s) were explained to me. I agree that:  I will take part in other treatments as advised by my care team. This may be psychiatry or counseling, physical therapy, behavioral therapy, group treatment or a referral to a specialist.     I will keep all my appointments. I understand that this is part of the monitoring of opioids. My care team may require an office visit for EVERY opioid/controlled substance refill. If I miss appointments or don t follow instructions, my care team may stop my medicine.    I will take my medicines as prescribed. I will not change the dose or schedule unless my care team tells me to. There will be no refills if I run out early.     I may be asked to come to the clinic and complete a urine drug test or complete a pill count at any time. If I don t give a urine sample or participate in a pill count, the care team may stop my medicine.    I will only receive prescriptions from this clinic for chronic pain. If I am treated by another provider for acute pain issues, I will tell them that I am taking opioid pain medication for chronic pain and that I have a treatment agreement with this provider. I will inform my [facility] care team within one business day if I am given a prescription for any pain medication by another healthcare provider. My [facility] care team can contact other providers and pharmacists about my use of any medicines.    It is up to me to make sure that I don t run out of my medicines on weekends or holidays. If my care team is willing to refill my opioid prescription without a visit, I must request refills only during office hours. Refills may take up to 3 business days to process. I will use one pharmacy to fill all my opioid and other controlled substance prescriptions. I will notify the clinic about any changes to my insurance or medication availability.    I am responsible for my  prescriptions. If the medicine/prescription is lost, stolen or destroyed, it will not be replaced. I also agree not to share controlled substance medicines with anyone.    I am aware I should not use any illegal or recreational drugs. I agree not to drink alcohol unless my care team says I can.       If I enroll in the [place] [facility] Cannabis program, I will tell my care team prior to my next refill.     I will tell my care team right away if I become pregnant, have a new medical problem treated outside of my regular clinic, or have a change in my medications.    I understand that this medicine can affect my thinking, judgment and reaction time. Alcohol and drugs affect the brain and body, which can affect the safety of my driving. Being under the influence of alcohol or drugs can affect my decision-making, behaviors, personal safety, and the safety of others. Driving while impaired (DWI) can occur if a person is driving, operating, or in physical control of a car, motorcycle, boat, snowmobile, ATV, motorbike, off-road vehicle, or any other motor vehicle ([place] Statute 169A.20). I understand the risk if I choose to drive or operate any vehicle or machinery.    I understand that if I do not follow any of the conditions above, my prescriptions or treatment may be stopped or changed.          Opioids  What You Need to Know    What are opioids?   Opioids are pain medicines that must be prescribed by a doctor. They are also known as narcotics.     Examples are:   morphine ([place] Contin, Maddy)  oxycodone (Oxycontin)  oxycodone and acetaminophen (Percocet)  hydrocodone and acetaminophen (Vicodin, Norco)   fentanyl patch (Duragesic)   hydromorphone (Dilaudid)   methadone  codeine (Tylenol #3)     What do opioids do well?   Opioids are best for severe short-term pain such as after a surgery or injury. They may work well for cancer pain. They may help some people with long-lasting (chronic) pain.     What do opioids NOT do  well?   Opioids never get rid of pain entirely, and they don t work well for most patients with chronic pain. Opioids don t reduce swelling, one of the causes of pain.                                    Other ways to manage chronic pain and improve function include:     Treat the health problem that may be causing pain  Anti-inflammation medicines, which reduce swelling and tenderness, such as ibuprofen (Advil, Motrin) or naproxen (Aleve)  Acetaminophen (Tylenol)  Antidepressants and anti-seizure medicines, especially for nerve pain  Topical treatments such as patches or creams  Injections or nerve blocks  Chiropractic or osteopathic treatment  Acupuncture, massage, deep breathing, meditation, visual imagery, aromatherapy  Use heat or ice at the pain site  Physical therapy   Exercise  Stop smoking  Take part in therapy       Risks and side effects     Talk to your doctor before you start or decide to keep taking opioids. Possible side effects include:    Lowering your breathing rate enough to cause death  Overdose, including death, especially if taking higher than prescribed doses  Worse depression symptoms; less pleasure in things you usually enjoy  Feeling tired or sluggish  Slower thoughts or cloudy thinking  Being more sensitive to pain over time; pain is harder to control  Trouble sleeping or restless sleep  Changes in hormone levels (for example, less testosterone)  Changes in sex drive or ability to have sex  Constipation  Unsafe driving  Itching and sweating  Dizziness  Nausea, throwing up and dry mouth    What else should I know about opioids?    Opioids may lead to dependence, tolerance, or addiction.    Dependence means that if you stop or reduce the medicine too quickly, you will have withdrawal symptoms. These include loose poop (diarrhea), jitters, flu-like symptoms, nervousness and tremors. Dependence is not the same as addiction.                     Tolerance means needing higher doses over time to  get the same effect. This may increase the chance of serious side effects.    Addiction is when people improperly use a substance that harms their body, their mind or their relations with others. Use of opiates can cause a relapse of addiction if you have a history of drug or alcohol abuse.    People who have used opioids for a long time may have a lower quality of life, worse depression, higher levels of pain and more visits to doctors.    You can overdose on opioids. Take these steps to lower your risk of overdose:    Recognize the signs:  Signs of overdose include decrease or loss of consciousness (blackout), slowed breathing, trouble waking up and blue lips. If someone is worried about overdose, they should call 911.    Talk to your doctor about Narcan (naloxone).   If you are at risk for overdose, you may be given a prescription for Narcan. This medicine very quickly reverses the effects of opioids.   If you overdose, a friend or family member can give you Narcan while waiting for the ambulance. They need to know the signs of overdose and how to give Narcan.     Don't use alcohol or street drugs.   Taking them with opioids can cause death.    Do not take any of these medicines unless your doctor says it s OK. Taking these with opioids can cause death:  Benzodiazepines, such as lorazepam (Ativan), alprazolam (Xanax) or diazepam (Valium)  Muscle relaxers, such as cyclobenzaprine (Flexeril)  Sleeping pills like zolpidem (Ambien)   Other opioids      How to keep you and other people safe while taking opioids:    Never share your opioids with others.  Opioid medicines are regulated by the Drug Enforcement Agency ([employer]). Selling or sharing medications is a criminal act.    2. Be sure to store opioids in a secure place, locked up if possible. Young children can easily swallow them and overdose.    3. When you are traveling with your medicines, keep them in the original bottles. If you use a pill box, be sure you also  carry a copy of your medicine list from your clinic or pharmacy.    4. Safe disposal of opioids    Most pharmacies have places to get rid of medicine, called disposal kiosks. Medicine disposal options are also available in every [facility]. Search your county and  medication disposal  to find more options. You can find more details at:  https://www.[email or web address]./living-green/managing-unwanted-medications     I agree that my provider, clinic care team, and pharmacy may work with any city, state or federal law enforcement agency that investigates the misuse, sale, or other diversion of my controlled medicine. I will allow my provider to discuss my care with, or share a copy of, this agreement with any other treating provider, pharmacy or emergency room where I receive care.    I have read this agreement and have asked questions about anything I did not understand.    _______________________________________________________  Patient Signature - Caprice Kline _____________________                   Date     _______________________________________________________  Provider Signature - JULIUS Valerio CNP   _____________________                   Date     _______________________________________________________  Witness Signature (required if provider not present while patient signing)   _____________________                   Date

## 2025-07-16 NOTE — PROGRESS NOTES
"[facility]    7/16/2025      Chief complaint:   -burning pain in the feet and feels like it is going up her legs  -bilateral hips  -lower back pain              Interval history:  Caprice Kline 29 year old female is known to me for:  Chronic left sided low back pain with left sided sciatica  Lumbar radicular pain (left leg)  Meralgia paresthetica on the left side  Lumbar DDD  Myofascial pain  Muscle spasm  Chronic intractable pain  Bipolar disorder, in partial remission, most recent episode mixed  PMHx includes: depressive disorder, complication of anesthesia (freaking out when I wake up), bipolar disorder  PSHx includes: Minimally invasive left Lumbar 5 to Sacral 1 microdiscectomy (2021), cystectomy with  salpingectomy on the right side, tonsillectomy, wisdom teeth extraction      Recommendations/plan at the last visit on 6/18/2025 included:  Physical Therapy: referral to [facility]  Clinical Health Psychologist to address issues of relaxation, behavioral change, coping style, and other factors important to improvement: previous referral to [facility] Ramos   Diagnostic Studies: none  Medication Management:   START naltrexone 2.5mg once daily by mouth. Pharmacy will call you with cost as this is custom compounded  Further procedures recommended: none at present  Referral to Sports Med re: bilateral hip pain  Recommendations/follow-up for PCP:  see above  Release of information: none   Follow up: you have an hour appt with me on Wednesday 7/16/2025  Check out De Archibald on You Tube, short simple exercise videos, 30-90 seconds long in the short form.       Since female last visit, Caprice Kline reports:    Interval history July 16, 2025  --burning pain in the feet and feels like it is going up her legs  -bilateral hips  -lower back pain   -has tried low dose naltrexone and states \"I am a bitch.\"   -interested in Tramadol, discussed I do not recommend this with her use of 2 " "different benzos  -discussed stopping naltrexone and trial of low dose buprenorphine, she prefers to trial the patch form. This was ordered.   -she does not feel that the cane is working as well  -feels that she should use her walker  -she missed her nephew's birthday party due to having too much pain (could not drive there or be in the hot weather)  -has had many recent appointments  -she saw somewhat with mental health issues with [employer] and she felt that they did not listen and they had \"over 1000 pages of my history.\"  -sleep has been on and off weird.      Interval history June 18, 2025  -burning in the feet right > Left   -pain in mostly lateral hips especially left side  -bilateral low back pain with pain that is in both legs  -can shoot down the left leg to the foot  -describes burning pain in the feet, particularly the top of the right foot.   -briefly discussed referral to podiatry, patient declines at present time.  -has EMG scheduled with Dr. Guevara on 7/9/2025, this was requested by Dr. Goldman of neurology for the left UPPER EXTREMITY and Left LOWER EXTREMITY   -seen by [facility] re: the labral tear in the hips, she states that they said they would not do anything for her for fear of making \"things worse\" but states they did not specify what would get worse. Those office notes are not available for review.   -discussed seeing [facility] re: hip pain and labral tears, patient open to referral.   -discussed working cause of pain is diffuse myofascial pain syndrome/fibromyalgia and how opiate medications are not indicated for this. Reviewed that fibro is a syndrome and she has migraines, depression, stomach issues, sleep issues all of which can be present in fibromyalgia as well. Patient states she does not believe that she has fibromyalgia. Reviewed that again, this is the best working diagnosis that I have for her chronic widespread pain.   -discussed use of low dose naltrexone for diffuse " "myofacial pain syndrome.  -did [employer] drug interaction search with all of patients oral and vaginal medications. There are no contraindications or drug-drug interactions with naltrexone and her current extensive list of medications.       Interval history Sarahy 10, 2025  -in the interim, she has been seen by [facility]. Initially seen by Dr. South for intake and then managed by Norris chavez NP. She had been on MSIR and then tried Belbuca through [facility]. States she had a poor experience with Belbuca (states she wasn't told to not take the morphine).  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   -[facility] did a trial for intrathecal pain pump and was supposed to have a intrathecal pain pump placed on 5/5/2025. Decided not to proceed with intrathecal pain pump.   -now with pain that is burning in her feet started after discharge from hospital.  Pain in left buttock and left thigh (myonecrosis)  She has pain in bilateral lateral hips and pain goes down the legs to the feet.   -EMG has been ordered by Dr. Goldman in neurology and scheduled for 7/9/2025.   -patient is frustrated as she feels no one can figure out exactly what is wrong with her.   -care is also fragmented with some cares with [facility] an outside pain clinic.   -patient not a great historian, not certain who she  has seen and when.   -no new or known injury  -notes loss of bowel control 3 times at night   -discussed I do not recommend continuing morphine given she is on 2 benzodiazepines (klonopin 0.5mg as needed for anxiety appears to receive about #30 tabs per month and Valium 10mg tabs vaginally PRN urinary retention).   -reviewed heightened risk for opiate overdose and death combining mu opiate agonists and benzodiazepines.     Interval history November 4, 2024  -\"feels like my right hip is breaking glass\"  -she had issues with both legs collapsing out from under her this weekend.   -she fainted twice " "this past weekend.   -having pain in the low back bilaterally and into the left leg to the calf level and the right leg into the front of the thigh.   -metaxalone was not at all helpful  -she has also been seen at [facility] pain clinic and they ordered a MRI scan, this will be done at [facility] radiology.   -cannot get comfortable any way. She feels a \"slipping\" sensation in her low back.   -she received Percocet from Dr. South at [facility] pain clinic.   -we discussed that I recommend that she decide which pain clinic she is going to follow with. Better to only have one clinic managing medications, etc.     -she has had migraines her whole life, worse lately.   -migraine pain behind her eyes, up and over the skull and into the neck.   -has both phono and photophobia.   -no aura with migraine  -Ubrelvy is very helpful for her migraines.   -she has been having migraines nearly everyday  -she sees Dr. Goldman in neurology re: her migraines and tremor    Pain history collected at initial consult on 9/17/2024  Chronic left sided low back pain with left leg pain that extends posterolaterally down the left leg to the level of the knee.   This pain has been present since prior to August 2021 when she had back surgery with Dr. Junior of neurosurgery. This helped for a short period of time, thinks it was for about 2-3 months.    -she had a good experience with Dr. Junior and his team.   -she is unsure if her leg is weak, she knows she favors the left leg.   -she does have numbness and tingling in the left leg to the lateral hip and thigh.   -denies saddle anesthesia, no loss of bowel or bladder.   -she had COVID last week. Was laid off from her job on her birthday.     -left knee pain present since high school. Injured during basketball. Her left knee pops and his painful.         At this point, the patient's participation with our multidisciplinary team includes:  The patient has been compliant with the program.  PT -not ordered  Health " "Psych - not ordered            Pain right now is 9/10.   Average pain is rated 9.1/10.   Pain range 7-10+/10.   Describes pain as \" burning, cramping, sharp, numbness, cutting, dull, aching, throbbing, pressure, my bilateral hip feels like broken glass.\"  Pain is constant.        Current pain-related medication treatments include:  -clonazepam 0.5mg PRN (uses sparingly, #10 last 4-6 weeks)  -doxepin 10mg at HS (helpful for sleep)  -hydroxyzine 50-100mg TID  (uses 100mg TID, very helpful)  -propranolol ER 80mg every day (recently started, migraine and tremor treatment)  -ubrelvy 100mg at onset of headache (has been a lifesaver)  -Diazepam 10mg PRN urinary retention (uses this twice per week)  -gabapentin 8% in vanicream (somewhat helpful)  -Narcan nasal spray   -nortriptyline 25mg at bedtime (somewhat helpful)  -tizanidine 4mg Q 8 hours PRN (somewhat helpful)  -Ubrelvy 100mg at onset of headache (helpful)      Other pertinent medications:  -Adderall XR 30 mg once a day  -Dulcolax 5 mg daily as needed constipation  -lamotrigine 100mg BID (helpful)  -Lithium 600mg every day (helpful)  -ondansetron 4mg Q 8 hours PRN nausea (helpful, doesn't need often)  -prazosin 2mg BID (helpful for nightmares and PTSD)  -prazosin 5mg at bedtime (helpful)  -Quetiapine 300mg at HS (helpful)  -semiglutide 0.5mg weekly subcutaneous injection (too soon to tell)    Previous medication treatments included:    OPIATES:hydrocodone (causes headaches), Oxycodone (helpful), MSIR (somewhat helpful), Belbuca (didn't like this)  NSAIDS: ibuprofen (cannot take, on lithium), Naproxen (cannot take, on lithium), meloxicam (not helpful)  MUSCLE RELAXANTS: cyclobenzaprine (not helpful, \"I hated it\"), methocarbamol (not helpful), tizanidine (not helpful), metaxalone (not at all helpful),  orphenadrine (not helpful)  ANTI-MIGRAINE MEDS: prednisone (not helpful), rizatriptan (not helpful), sumatriptan (not helpful), Ubrelvy (has been a life " "saver)  ANTI-DEPRESSANTS: amitriptyline (unsure), bupropion (caused nausea), citalopram (not helpful), Doxepin (helpful), Duloxetine (unsure), fluoxetine (not helpful), sertraline (allergic), trazodone (nightmares)  SLEEP AIDS: alprazolam (somewhat helpful), clonazepam (helpful, uses sparingly), diazepam (not helpful), hydroxyzine (very helpful),   ANTI-CONVULSANTS: gabapentin (not helpful), pregabalin (helpful), Topiramate (didn't like how she felt)  TOPICALS: lidoderm patch (short term benefit), other gels/creams/ointments (sometimes)  ANXIOLYTICS: Clonanazepam  (helpful), hydroxyzine (helpful), valium (helpful for urinary retention)  MEDICAL CANNABIS: Medical cannabis cream (helpful)  Other meds: Tylenol (not helpful), low dose naltrexone (not helpful, felt \"bitchy\")    Medications and Allergies reviewed. Yes     Other treatments have included:  Caprice Kline has not been seen at a pain clinic in the past.      Physical therapy: Yes  not helpful  Psychologist: No   Chiropractor: Yes not helpful  Acupuncture: Yes somewhat helpful  Pharmacotherapy:    Opioids: No     Non-opioids:  Yes helpful  TENs Unit/electric stim: Yes helpful at chiropractor  Heat/Cold: Yes   Exercise: yes       Injections:   -10/24/2023 left SI joint injection per Dr. Moi Gary (no pain relief whatsoever)    Pertinent Surgeries:  8/21/2021 Lumbar microdiscectomy L5-S1 with Dr. Junior     Past Medical History:  Past Medical History:   Diagnosis Date    Bipolar disorder (H)     Complication of anesthesia     \"freaking out when I wake up\"    Depressive disorder 2011     Past Surgical History:  Past Surgical History:   Procedure Laterality Date    DISCECTOMY LUMBAR POSTERIOR MICROSCOPIC ONE LEVEL Left 08/24/2021    Procedure: Minimally invasive left Lumbar 5 to Sacral 1 microdiscectomy  ;  Surgeon: Destin Junior MD;  Location: [facility] OR    ENT SURGERY  Mar 17, 17    Tonsillectomy    GENITOURINARY SURGERY  2012    Fallopian tube removal and " cyst removal    GYN SURGERY Right     cystectomy - with salpingectomy    HEAD & NECK SURGERY      tonsillectomy    HEAD & NECK SURGERY      wisdom teeth extraction    IR SOFT TISSUE BIOPSY  05/02/2025     Medications:  Current Outpatient Medications   Medication Sig Dispense Refill    albuterol (PROAIR HFA/PROVENTIL HFA/VENTOLIN HFA) 108 (90 Base) MCG/ACT inhaler Inhale 2 puffs into the lungs every 6 hours as needed for shortness of breath, wheezing or cough 18 g 0    amphetamine-dextroamphetamine (ADDERALL XR) 30 MG 24 hr capsule Take 30 mg by mouth daily as needed.      azelaic acid (FINACIA) 15 % external gel Apply topically 2 times daily as needed.      benzonatate (TESSALON) 100 MG capsule Take 1 capsule (100 mg) by mouth 3 times daily as needed for cough. 21 capsule 0    bisacodyl (DULCOLAX) 5 MG EC tablet Two days prior to exam take two (2) tablets at 4pm. One day prior to exam take two (2) tablets at 4pm 4 tablet 0    clobetasol (TEMOVATE) 0.05 % external solution APPLY TOPICALLY TO THE AFFECTED AREA TWICE DAILY FOR UP TO 21 DAYS. DO NOT APPLY TO NORMAL SKIN      clobetasol propionate (CLOBEX) 0.05 % external shampoo Apply topically.      clonazePAM (KLONOPIN) 0.5 MG tablet Take 0.5 mg by mouth daily as needed for anxiety.      COMPOUNDED NON-CONTROLLED SUBSTANCE (CMPD RX) - PHARMACY TO MIX COMPOUNDED MEDICATION Compound naltrexone into 2.5mg capsules and patient to use 2.5mg by mouth once daily 0.75 g 0    diazepam (VALIUM) 10 MG tablet PLACE 1 TABLET VAGINALLY AS NEEDED FOR URINARY RETENTION OR FOR PAIN 15 tablet 3    diclofenac (VOLTAREN) 1 % topical gel APPLY 2 GRAMS TOPICALLY TO THE AFFECTED AREA FOUR TIMES DAILY. MAX 34 GM DAILY 300 g 0    docusate sodium (COLACE) 100 MG capsule Take 200 mg by mouth 2 times daily.      EPINEPHrine (ANY BX GENERIC EQUIV) 0.3 MG/0.3ML injection 2-pack INJECT 0.3 ML INTO THE MUSCLE AS NEEDED FOR ANAPHYLAXIS 2 each 1    furosemide (LASIX) 20 MG tablet Take 1 tablet (20 mg)  by mouth daily. 90 tablet 0    gabapentin 8% in vanicream (Compounded) Apply 4 clicks (1 g) topically nightly as needed. 30 g 5    glucosamine-chondroitin 500-400 MG CAPS per capsule Take 1 capsule by mouth daily.      hydrocortisone 2.5 % cream Apply topically 2 times daily.      hydrOXYzine (VISTARIL) 50 MG capsule Take 100 mg by mouth 3 times daily.      ipratropium-albuterol (COMBIVENT RESPIMAT)  MCG/ACT inhaler Inhale 1 puff into the lungs 4 times daily as needed for shortness of breath, wheezing or cough 4 g 0    ketoconazole (NIZORAL) 2 % external cream Apply topically. 3 times every week      ketoconazole (NIZORAL) 2 % external shampoo Apply topically twice a week.      lamoTRIgine (LAMICTAL) 100 MG tablet Take  mg by mouth 2 times daily. 100 mg AM / 50 mg PM as of 4/27 - pt in process of titrating back up      lidocaine (LIDODERM) 5 % patch UNWRAP AND APPLY 1 PATCH OVER 12 HOURS ONTO SKIN EVERY 24 HOURS,TO PREVENT LIDOCAINE TOXICITY YOU SHOULD BE PATCH FREE FOR 12 HOURS DAILY 30 patch 1    lithium (ESKALITH) 600 MG capsule Take 600 mg by mouth at bedtime.      magnesium oxide (MAG-OX) 400 MG tablet Take 400 mg by mouth daily.      morphine (MSIR) 15 MG IR tablet Take 15 mg by mouth 3 times daily as needed for pain.      mupirocin (BACTROBAN) 2 % external ointment Apply topically as needed.      NARCAN 4 MG/0.1ML nasal spray CALL 911. SPR CONTENTS OF ONE SPRAYER (0.1ML) INTO ONE NOSTRIL. REPEAT IN 2-3 MIN IF SYMPTOMS OF OPIOID EMERGENCY PERSIST, ALTERNATE NOSTRILS      nortriptyline (PAMELOR) 50 MG capsule Take 1 capsule (50 mg) by mouth at bedtime. 90 capsule 1    omeprazole (PRILOSEC) 40 MG DR capsule Take 1 capsule (40 mg) by mouth daily. 30 capsule 3    ondansetron (ZOFRAN ODT) 4 MG ODT tab DISSOLVE 1 TABLET(4 MG) ON THE TONGUE EVERY 8 HOURS AS NEEDED FOR NAUSEA 90 tablet 0    ondansetron (ZOFRAN) 4 MG tablet Take 1 tablet (4 mg) by mouth every 8 hours as needed for nausea. 60 tablet 1     ondansetron (ZOFRAN) 4 MG tablet Take 1 tablet (4 mg) by mouth every 8 hours as needed for nausea. 12 tablet 0    polyethylene glycol (GOLYTELY) 236 g suspension Two days before procedure at 5PM fill first container with water. Mix and drink an 8 oz glass every 15 minutes until HALF of the container is gone. Place the remainder in the refrigerator. One day before procedure at 5PM drink second half of bowel prep. Drink an 8 oz glass every 15 minutes until it is gone. Day of procedure 6 hours before arrival time fill the 2nd container with water. Mix and drink an 8 oz glass every 15 minutes until HALF of the container is gone. Discard the remaining solution. 8000 mL 0    prazosin (MINIPRESS) 2 MG capsule Take 2 mg by mouth every morning.      prazosin (MINIPRESS) 5 MG capsule Take 5 mg by mouth at bedtime.      Pregabalin (LYRICA) 200 MG capsule Take 200 mg by mouth 3 times daily.      propranolol (INDERAL) 20 MG tablet Take 1 tablet (20 mg) by mouth 2 times daily as needed (tremor). 180 tablet 2    propranolol ER (INDERAL LA) 80 MG 24 hr capsule Take 1 capsule (80 mg) by mouth daily. 90 capsule 1    psyllium (METAMUCIL/KONSYL) 58.6 % powder Take 18 g (1 Tablespoonful) by mouth daily. 660 g 2    QUEtiapine (SEROQUEL) 300 MG tablet Take 300 mg by mouth At Bedtime      RETIN-A 0.025 % external cream APPLY SPARINGLY TO FACE EVERY OTHER NIGHT FOR 14 NIGHTS THEN NIGHTLY THEREAFTER      Roflumilast (ZORYVE) 0.3 % FOAM Externally apply topically.      sulfacetamide sodium, Acne, 10 % lotion Apply topically.      sulfacetamide sodium, Acne, 10 % lotion APPLY IT TO THE FACE ONCE DAILY IN THE MORNING X3 MONTHS      tamsulosin (FLOMAX) 0.4 MG capsule Take 1 capsule (0.4 mg) by mouth every evening. 30 capsule 11    tiZANidine (ZANAFLEX) 4 MG tablet TAKE 1 TABLET BY MOUTH EVERY 8 TO 12 HOURS AS NEEDED. NOT TO EXCEED 3 DOSES IN 24 HOURS      tretinoin (RETIN-A) 0.025 % external cream Apply topically at bedtime.      triamcinolone  "(KENALOG) 0.1 % external cream Apply topically 2 times daily as needed.      ubrogepant (UBRELVY) 100 MG tablet Take 1 tablet (100 mg) by mouth at onset of headache. 8 tablet 11    WEGOVY 0.5 MG/0.5ML pen ADMINISTER 0.5 MG UNDER THE SKIN 1 TIME A WEEK 2 mL 0     Allergies:     Allergies   Allergen Reactions    Metronidazole Anaphylaxis and Hives     Other reaction(s): Throat swelling  Throat swelling 6 hrs after exposure  Itching and hives 2 hrs after exposure    Shellfish Allergy Anxiety, Diarrhea, Difficulty breathing, Hives, Itching, Rash, Shortness Of Breath and Swelling    Codeine Headache    Fish-Derived Products      Stopped fish oil and less stomach discomfort  Rash after eating fish.     Hydrocodone-Acetaminophen Other (See Comments)    Sertraline Other (See Comments)     Patient was foggy and \"high\" feeling    Shellfish-Derived Products      Lips get numb, throat gets scratchy, rashes     Social History:  Home situation: lives with boyfriend. 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  Tobacco use: vapes  Alcohol use: minimal, monthly use  Drug use: now none, used marijuana in the past  History of chemical dependency treatment: none    Family history:  Family History   Problem Relation Age of Onset    Hypertension Mother     Colon Polyps Mother 50    Colon Cancer Mother     Substance Abuse Father     Colon Cancer Father 60    Diabetes Maternal Grandmother     Hypertension Maternal Grandmother     Colon Cancer Maternal Grandmother     Cancer Maternal Grandmother     Nephrolithiasis Maternal Grandmother     Diabetes Paternal Grandmother     Breast Cancer Paternal Grandmother     Cancer Paternal Grandmother                Physical Exam:  Vitals:    07/16/25 0855   BP: 102/70   Pulse: 72       Behavioral observations:  Awake, alert, conversant and cooperative     Gait:  slow, antalgic    Musculoskeletal exam:  strength grossly equal " throughout      Neuro exam:  deferred    Skin/vascular/autonomic:  No suspicious lesions on exposed skin.     Other:  na          2010 ACR Criteria for fibromyalgia - scoring    Widespread pain index of > or equal to 7 - yes  Symptoms present for at least 3 months - present for years  No other disorder to explain their pain - no other disorder  Symptoms Severity scale score > or equal to 5  Fatigue (0-3) - 3  Waking unrefreshed (0-3) - 3  Cognitive symptoms (0-3) - 3  Somatic Symptoms - 3  (None - 0, Few - 1, Moderate - 2, Great deal - 3)  Rule in for fibromyalgia       IMAGING:  EXAM: MR LUMBAR SPINE W/O & W CONTRAST  2/28/2023 9:49 AM      HISTORY:  Low back pain; hx Spine surgery; No r/o hardware failure;  Worsening low back pain; No known/automatically detected potential  contraindications to imaging; Lumbar radiculopathy; Chronic bilateral  low back pain with left-sided sciatica; Chronic bilateral low back  pain with left-sided sciatica        COMPARISON:  MRI 7/13/2021, 9/27/2021     TECHNIQUE: Sagittal T1-weighted, sagittal STIR, axial T1-weighted, and  3-D volumetric T2-weighted images of the lumbar spine were obtained  without intravenous contrast. Post intravenous contrast using  gadolinium axial and sagittal T1-weighted images were obtained with  fat saturation. Post intravenous contrast using gadolinium, axial and  sagittal T1-weighted images were obtained with fat saturation.     CONTRAST: 9ml Gadavist.     FINDINGS:  There are 5 lumbar type vertebrae, used for the purposes of this  dictation. Conus tip at approximately L1. Normal lumbar vertebral  alignment. L5-S1 disc dehydration with opposing L5-S1 endplate  degenerative edema. No loss of vertebral body height. Normal marrow  signal. Normal cauda equina.     On a level by level basis, the findings are as follows:     L1-2: No spinal canal or neural foraminal stenosis.     L2-3: No spinal canal or neural foraminal stenosis.     L3-4: No spinal canal  or neural foraminal stenosis. Stable disc bulge.        L4-5: No spinal canal or neural foraminal stenosis. Stable disc bulge.     L5-S1: Left hemilaminectomy changes with nonmasslike postcontrast  enhancement surrounding the left L5-S1 facet joint and within the left  epidural space similar surrounding the traversing S1 nerve root  without any significant mass effect. Posterior paraspinal fluid  collection has resolved. No spinal canal or neural foraminal stenosis.     The visualized paraspinous soft tissues are within normal limits.                                                                       IMPRESSION:   1. L5-S1 hemilaminectomy, microdiscectomy changes. Left facet  periarticular, left epidural space granulation tissue formation  surrounding the left descending S1 within the lateral recess without  significant compression or abnormal nerve signal. Previous  postoperative fluid collection has resolved.  2. No substantial degenerative change at any other level within the  lumbar spine.     I have personally reviewed the examination and initial interpretation  and I agree with the findings.     ELLIE GLORIA MD       Thoracic MRI scan without contrast at [facility] completed on 11/13/2024  Interpretation: Thoracic cord shows normal caliber and signal intensity throughout conus is normal tapering and terminates at L1.  There are no spinal canal collections or intradural masses.  Maintain thoracic kyphotic curvature.  Vertebral body height and marrow signal are well-preserved throughout.  There is no disc protrusions, significant spinal canal or neuroforaminal stenosis.  No substantial degenerative discovertebral change seen.  Included proximal ribs and paraspinous soft tissues are unremarkable.  Scant pleural effusions considered to be within physiologic limits.  Conclusion: Unremarkable thoracic spine.        MRI lumbar spine without contrast completed at [facility] on  11/6/2024  Interpretation: Segmentation, alignment and osseous structures: Lumbar hypolordosis with preserved vertebral body heights.  Type I discogenic endplate edema seen mostly to the left at L5-S1.  Remainder of endplates intact and marrow signal normal.  No significant segmental listhesis.  Sacrum and sacroiliac joints: Included sacrum intact and superior iliac joints unremarkable.    L5-S1: Mild to moderate disc degeneration with left laminotomy.  Shallow right paracentral disc contour abnormality but no large disc protrusion, indenting spinal canal stenosis or transiting nerve compression.  Neuroforamina adequately patent.  045 through T12-L1: Disc height and hydration preserved.  No disc contour abnormality, spinal canal or foraminal stenosis identified.  Facet joints are normal.  Neurologic structures: The conus maintains normal caliber and intrinsic signal termination is L1.  No spinal canal collections or masses.  Cauda equina nerve roots thickness and distribution normal, no adhesive arachnoiditis  Paraspinous soft tissues: No paraspinous soft tissue masses or collections.  Conclusion: Mild to moderate L5-S1 disc degeneration with microdiscectomy.  No recurrent disc protrusion, significant stenosis or neural impingement at any lumbar level.      Lumbar spine x-rays 4 views at [facility] radiology completed 1/23/2025  Interpretation: Lordotic alignment of 5 lumbar type vertebrae with mild listing of the upper lumbar spine to the right.  Moderate disc space narrowing at L5-S1.  Preservation of disc space height at each of the remaining levels.  No spondylolisthesis.  Mild sclerosis over the anterior superior right iliac joint is most likely degenerative in origin.  Lateral views with limited flexion extension show no abnormal interbody motion.  No acute fracture or avulsion.  No osteolytic or destructive bone lesions.  Conclusion:  1.  Moderate single level lumbar disc degeneration at L5-S1.  2.  No  spondylolisthesis and no segmental hypermobility with flexion extension.      MRI of the left hip without contrast at [facility] completed 1/23/2025  Findings:  Pelvis osseous structures:  Sacrum: No fracture or destructive osseous lesion is seen in the imaged portions of the sacrum  Sacroiliac joints: No convincing evidence of sacroiliitis of the imaged portions of the sacroiliac joints.  Pubic rami: Unremarkable.  Symphysis pubis: There is no evidence of acute osteitis pubis.  Lumbar spine: Degenerative disc disease is noted of the lower lumbar spine which is incompletely evaluated on this examination.  Correlate with lumbar spine MRI from the same day.  Labrum:  Focal irregular tear of the anterior superior labrum extending from 1:00 to 2:00 (sagittal images 23 through 25.  Tiny paralabral cyst measuring 1 mm is noted anteriorly (axial 23).  Hip joint:  Physiologic amount of joint fluid.  No chondral loss is seen although it must be noted that the cartilage is not optimally evaluated by this nonarthrogram study.  No subchondral bone marrow edema or subchondral cystic changes seen.  Proximal femur:  No fracture, osseous stress injury, avascular necrosis, or suspicious bone marrow signal abnormality is seen.  No discrete cam lesion.  Alpha angle is within normal limits, measuring 45 degrees (series 9, image 15)  Acetabulum:  No subchondral cysts, periacetabular ossicles or marrow edema.  Coverage: Left lateral center edge (CE) angle measures approximately 36 degrees, midline coronal series 4, image 20.  Ligamentum teres: Unremarkable.  Myotendinous structures:  Gluteus abductors: Gluteal tendons are intact.  Minimal edema surrounding the gluteus minimus tendon, compatible with minimal peritendinitis.  Edema is noted deep to the iliotibial band and within the greater trochanter bursa overlying.  In addition there is fluid like signal along the far anterior aspect of the gluteus tj (axial images 26 through  32).  Rectus abdominis-adductor longus aponeurosis, adductors, and rectus abdominis: The Apotone neurosis and rectus abdominis are unremarkable.  Pectineus muscle, adductor longus, and adductor brevis are intact and unremarkable.  There is patchy edema tracking along the musculotendinous junction of the ischial tuberosity origin of the abductor perry (actual image just 39 through 45.   Hamstrings: Unremarkable.  Flexors: The iliopsoas and rectus femoris tendons are intact.  Quadratus for Jaswinder muscle: Unremarkable.  Piriformis muscle: Unremarkable.  Gluteal aponeurotic fascia and IT band: Unremarkable.  In addition to the muscle signal abnormality as noted above, there is more extensive patchy intramuscular signal alteration noted along the right gluteus tj, proximal vastus lateralis, quadratus for Jaswinder, and adductor perry.  This is described in detail on dedicated dictation of the right hip MRI.  Pelvic soft tissues: Unremarkable  Impression: 1 extensive bilateral intramuscular signal alteration, right greater than left which is most prominent along the right distal gluteus tj, gluteus minimus, proximal vastus lateralis, quadratus for Jaswinder, and adductor perry as well as the left gluteus tj and abductor perry.  Given location near the musculotendinous junction of these muscles, this has an appearance of muscle strain injury.  Delayed onset of muscle soreness would also be in the differential diagnosis and if there is clinical concern for DOMS, evaluate creatinine kinase levels.  The involvement of multiple pulm muscle groups and bilateral nature of the edema and fluid suggest a more systemic process.  Inflammatory and infectious etiologies could be considered; however this does not have a typical appearance of infectious myositis/pyomyositis.  Polymyositis and other inflammatory myopathies could be considered as well as an autoimmune workup may be indicated.  Denervation edema could be  "considered at the late acute stage; however, there is no atrophy to suggest chronic denervation.  Drug-induced myopathy is thought less likely.  2.  No evidence of tendon tear.  3.  Focal tear of the anterosuperior labrum extending from 1:00 to 2:00 with a tiny/1 mm paralabral cyst      MRI of right hip at [facility] on 3/31/2025  \" Interpretation:  Hip joint: There is no evidence of hip joint effusion or loose body.  No subchondral edema signal or cystic change.  No discrete lesion identified to indicate AVN.  No evidence of marrow edema pattern to indicate fracture or stress reaction of the femoral neck.  No evidence for a chondral defect of the right hip joint.  No other advanced hip joint chondromalacia.  There is tearing identified involving the anterosuperior aspect of the labrum as also was present previously.  No evidence for a paralabral cyst.  No appreciable decreased offset involving the femoral head/neck junction.  No evidence for acetabular retroversion.  Unremarkable and intact appearance of the gluteus tendon insertions onto the greater trochanter.  Previously identified marked edema signal within the anterolateral most fibers of the gluteus tj muscle has largely resolved.  There is a similar or slightly more pronounced appearance of edema signal within the adjacent posterior portion of the distal gluteus medius muscle belly.  Significantly less pronounced edema signal within the posterior fibers of the vastus lateralis origin.  Intact appearance of the iliopsoas muscle and tendon insertions.  No evidence for iliopsoas bursitis.  Bones and joints: No evidence for an occult fracture/stress reaction involving the sacrum.  No appreciable changes of SI joint arthrosis.  No evidence for fracture.  No abnormal marrow edema pattern to indicate occult stress reaction or stress fracture.  Musculotendinous structures: Previously identified edema signal involving the abductor musculature has resolved. " "  Previously identified edema signal within the musculature lateral to the contralateral, left sided greater trochanter has significantly improved.  Intrapelvic contents: No free fluid seen within the pelvis.  No discrete intrapelvic masses identified.  Neurovascular structures: No discrete cyst, mass or other compression upon the portions visualized of sciatic or femoral nerves.  Conclusion:  1.  Resolution of previously identified edema signal within the abductor musculature.  Resolution of previously identified edema signal within the anterolateral aspect of the right gluteus tj muscle belly.  Significant improvement of edema signal within the proximal right sided vastus lateralis muscle belly as well as within the musculature superficial to the left sided greater trochanter.  2.  Similar or slightly more pronounced edema signal within the posterior fibers of the distal right sided gluteus medius muscle belly.  Unremarkable and intact appearance of the gluteus tendon insertions onto the greater trochanter.  3.  Tearing is again identified involving the anterosuperior aspect of the labrum.  No convincing MRI evidence of morphologic predisposition to femoral acetabular impingement.  Numeral 4.  No evidence for a right hip joint chondral defect or other advanced hip joint chondromalacia.  5.  There are no new areas of muscle belly edema signal identified.\"      MR bilateral thigh  without and with contrast 4/29/2025 7:49 AM  Techniques: Multiplanar multisequence imaging of the bilateral thighs  was obtained without  and with administration of intra-articular or  intravenous contrast using routine protocol.     Contrast: 10mL Gadavist IV     History: elevated CK, history of abnomal muscle edema in adductors.  Now with elevated CK, assess for myositis in thighs/ pattern of  involvement.     Additional History from EMR: Medication induced myopathy (ajovy,  lamotrigine; both with rare case reports) versus immune " mediated  process (inclusion body myositis) versus underlying genetic  myopathies. thyroid level normal. Considered malignancy but appears to  be uptodate on cancer screening (pap with HPV negative october 2024).  Less likely CTD without other signs/symptoms. Will start by obtaining  MRI of bilateral thighs to assist with potential muscle biopsy  location.      Comparison: Bilateral hip MRI 1/23/2025     Findings:     Osseous structures  Osseous structures: Prominent red marrow within the bones. Tiny bone  islands within the left side the tibial plateau. No fracture, stress  reaction, avascular necrosis, or focal osseous lesion is seen.     Joint and periarticular soft tissue     Internal derangement of joints are not well assessed owing to chosen  field of view.     Muscles and tendons  Muscles: No substantial fatty replacement in the muscles.     Left: Patchy edema within the proximal quadriceps femoris, gluteus  minimus and quadratus femoris. There is associated rim enhancement  particularly in the gluteus minimus and quadratus femoris (stipple  sign). Perifascial edema and small fluid particularly along the  proximal quadriceps femoris. Compared to MRI from 1/23/2025 findings  have progressed around left hip.      Right: Trace residual edema in the gluteus medius; improved from  1/23/2025. Interval significant resolution of the edema in the  adductor musculature and gluteus tj. Perifascial edema involving  quadriceps muscle particularly in the lateral aspect. Small areas of  edema and enhancement in the distal biceps femoris and distal adductor  perry.      Tendons: No partial or complete tear.     Nerves:  Grossly intact sciatic nerves.     Other Findings:  Multiple predominantly peripherally located subcentimeter cysts in the  ovaries; questionable for polycystic appearance.                                                                      Impression:  1. Left: Patchy soft tissue edema within the  proximal  quadriceps/vastus lateralis , gluteus minimus and quadratus femoris  muscles There is associated rim enhancement particularly in the  gluteus minimus and quadratus femoris suggesting myonecrosis.  Perifascial edema and small perifascial fluid particularly along the  proximal quadriceps muscles. Compared to MRI from 1/23/2025 findings  have progressed around the left hip.      Right: Trace residual soft tissue edema in the gluteus medius;  improved from 1/23/2025. Interval significant resolution of the edema  in the adductor musculature and gluteus tj. Perifascial edema  involving quadriceps muscle particularly along the lateral aspect.  Small areas of edema and enhancement in the distal biceps femoris and  distal adductor perry.      2. No substantial fatty replacement in the muscles.     I have personally reviewed the examination and initial interpretation  and I agree with the findings.     KRISTOPHER JC MD        EXAMINATION: CT PELVIS SOFT TISSUE W CONTRAST, 5/1/2025 9:35 AM     TECHNIQUE:  Helical CT images from the umbilicus through the symphysis  pubis were obtained with contrast.  Coronal reformatted images were  generated at a workstation for further assessment.     COMPARISON: MRI 4/29/2025, 1/23/2025.     HISTORY: Myonecrosis and ring-enhancing lesions of hip musculature     FINDINGS:     Soft tissues: Redemonstration of ill-defined lesion centered about the  left gluteus minimus measuring up to 3.1 cm with faint peripheral  enhancement (series 2, image 80).     Bones: No acute or suspicious osseous lesions. Left acetabular bone  island.     Bladder: Unremarkable.      Pelvic organs: Unremarkable.     Gastrointestinal tract: No dilated loops of bowel or bowel wall  thickening. The appendix is unremarkable.     Lymph nodes: No suspicious lymphadenopathy.     Peritoneum/mesentery: No free fluid. No free air..     Vessels: Patent major abdominal arterial vasculature. Portal, splenic  and  "superior mesenteric veins are patent.  No significant  atherosclerotic disease.     Heart and lung bases: Clear.                                                                       Muscle Biopsy dated 5/2/2025 at [facility]  IMPRESSION:   Stable ill-defined peripherally enhancing lesion centered about the  left gluteus minimus musculature when compared to 4/29/2025 MRI.      I have personally reviewed the examination and initial interpretation  and I agree with the findings.     CRYSTAL PINA MD         Case Report   Surgical Pathology Report                         Case: [id number]                                   Authorizing Provider:  Isabel Haque DO     Collected:           05/02/2025 01:54 PM           Ordering Location:     [facility] [address]               Received:            05/02/2025 02:46 PM           Pathologist:           Lily Vasquez MD                                                           Specimen:    Other, Muscle                                                                              Final Diagnosis   Skeletal muscle, left gluteus minimus biopsy:  - Benign skeletal muscle with  myonecrosis and associated granulation tissue formation  - See comment   Electronically signed by Lily Vasquez MD on 5/7/2025 at 1922 CDT   Comment  [facility]   Consultation with Dr. Marcell Valverde, Director of Neuromuscular Pathology, Dept. of Neurology will be obtained and additional information, if any, will be included in an addendum.     As clinically necessary, a Neurology consult for the patient with specialized neuromuscular pathology tissue procurement protocols are recommended for appropriate workup and diagnosis.    Clinical Information  [facility]   Myonecrosis of L gluteus minimus   Gross Description  [facility]   A(A). Other, Muscle:  The specimen is received in formalin with proper patient identification labeled \" muscle\".  The specimen consists of multiple tan-red tissue cores and tissue core " fragments, ranging from 0.1-0.6 cm in length and averaging 0.1 cm in diameter.  Wrapped and entirely submitted in A1-A2.      Microscopic Description  [facility]   Microscopic examination is performed.   Performing Labs  [facility]   The technical component of this testing was completed at [facility].     Stain controls for all stains resulted within this report have been reviewed and show appropriate reactivity.            Test Date:  2025     Patient: Caprice Kline : 1995 Physician: Jorden Guevara MD   Sex: Female AGE: 29 year Ref Phys: Karl Goldman MD   ID#: [id number]     Technician: Layla Blackmon      History and Examination:  Caprice Kline is a 29 year old woman with paresthesias, myalgias, and a history of a transient elevation in CK. She is referred for evaluation of possible polyneuropathy and myopathy.     Techniques:  Motor conduction studies were done with surface recording electrodes. Sensory conduction studies were performed with surface electrodes, unless indicated otherwise by (n), designating the use of subdermal recording electrodes. Temperature was monitored and recorded throughout the study. Upper extremities were maintained at a temperature of 32 degrees Centigrade or higher.  EMG was done with a concentric needle electrode.      Results:  Sural sensory nerve action potentials were mildly attenuated for a person of this age. Superficial fibular sensory nerve action potentials were absent on the right and present on the left. Left median, ulnar, and radial sensory conduction studies were normal. Fibular compound muscle action potentials were moderately and marginally attenuated on the right and left, respectively. Left tibial, median, and ulnar motor conduction studies were normal. All F-response studies were normal. Electromyography was normal.      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 This may reflect a distal, symmetric, sensorimotor axonal polyneuropathy, although fibular abnormalities are more severe on the right. Clinical correlation will be helpful.   No evidence of myopathy.        _____________________________  Jorden Guevara MD  Board Certified in Clinical Neurophysiology and Neuromuscular Medicine    LABS:  Creatinine   Date Value Ref Range Status   06/07/2025 0.89 0.51 - 0.95 mg/dL Final     GFR Estimate   Date Value Ref Range Status   06/07/2025 89 >60 mL/min/1.73m2 Final     Comment:     eGFR calculated using 2021 CKD-EPI equation.     Alkaline Phosphatase   Date Value Ref Range Status   05/18/2025 68 40 - 150 U/L Final     AST   Date Value Ref Range Status   05/18/2025 37 0 - 45 U/L Final     Comment:     Specimen is hemolyzed which can falsely elevate AST. Analysis of a non-hemolyzed specimen may result in a lower value.     ALT   Date Value Ref Range Status   05/18/2025 25 0 - 50 U/L Final      6/7/2025  CK total 42    Screening tools:     DIRE Score for ongoing opioid management is calculated as follows:    Diagnosis = 2    Intractability = 2    Risk: Psych = 2  Chem Hlth = 2  Reliability = 2  Social = 2    Efficacy = 2    Total DIRE Score = 14 (14 or higher predicts good candidate for ongoing opioid management; 13 or lower predicts poor candidate for opioid management)       [place]   review:  Reviewed [place]  7/16/2025- no concerning fills. Not on opiates.   Modesta MADRID, RN CNP, FNP  [facility]  [place] location          Assessment:  Bilateral hip pain  Tear of left acetabular labrum, subsequent encounter  Tear of right acetabular labrum, subsequent encounter  Diffuse myofascial pain syndrome  Fibromyalgia  Neuropathic pain  Bilateral foot pain  Chronic intractable pain    Encounter for long-term use of opiate analgesic  Bipolar disorder, in  partial remission, most recent episode mixed  PMHx includes: depressive disorder, complication of anesthesia (freaking out when I wake up), bipolar disorder  PSHx includes: Minimally invasive left Lumbar 5 to Sacral 1 microdiscectomy (2021), cystectomy with  salpingectomy on the right side, tonsillectomy, wisdom teeth extraction        Plan:   Physical Therapy: referral to Jorden dent  Clinical Health Psychologist to address issues of relaxation, behavioral change, coping style, and other factors important to improvement: previous referral to Neela Ramos   Diagnostic Studies: none  Medication Management:   STOP naltrexone  START Butrans (buprenorphine) 5mcg/hr transdermal patch, apply to upper chest, upper arm or upper back. If you are sweaty, apply spray antiperspirant first. If you have any skin irritation,  you may use either Benadryl spray or Flonase nasal spray on the skin and let dry, then apply patch. Don't use lotions or creamy soap. May use tegaderm over the patch if issues with adhesion  Further procedures recommended: none at present  Referral to Sports Med re: bilateral hip pain  Recommendations/follow-up for PCP:  see above  Release of information: none   Follow up: one hour in-person appt on Monday August 4th at 2:30 with Modesta MADRID RN CNP, FNP  Check out [place] on You Tube, short simple exercise videos, 30-90 seconds long in the short form.         ASSESSMENT AND PLAN:  (M24.581,  M25.552) Bilateral hip pain  (primary encounter diagnosis)  Comment:   Plan: buprenorphine (BUTRANS) 5 MCG/HR WK patch            (S73.192D) Tear of left acetabular labrum, subsequent encounter  Comment:   Plan: buprenorphine (BUTRANS) 5 MCG/HR WK patch            (S73.191D) Tear of right acetabular labrum, subsequent encounter  Comment:   Plan: buprenorphine (BUTRANS) 5 MCG/HR WK patch            (M79.18) Diffuse myofascial pain syndrome  Comment:   Plan: buprenorphine (BUTRANS) 5 MCG/HR WK patch             (M79.7) Fibromyalgia  Comment:   Plan: buprenorphine (BUTRANS) 5 MCG/HR WK patch            (M79.2) Neuropathic pain  Comment:   Plan: buprenorphine (BUTRANS) 5 MCG/HR WK patch            (M79.671,  M79.672) Bilateral foot pain  Comment:   Plan: buprenorphine (BUTRANS) 5 MCG/HR WK patch            (F11.90) Chronic, continuous use of opioids  Comment:   Plan: buprenorphine (BUTRANS) 5 MCG/HR WK patch             BILLING TIME DOCUMENTATION:   TOTAL TIME includes:   Time spent preparing to see the patient: 7 minutes (reviewing records and tests)  Time spend face to face with the patient: 49 minutes  Time spent ordering tests, medications, procedures and referrals: 0 minutes  Time spent Referring and communicating with other healthcare professionals: 0 minutes  Documenting clinical information in Epic: 2 minutes    The total TIME spent on this patient on the day of the appointment was 58 minutes.                  Modesta MADRID, RN CNP, FNP  [facility]  [facility]

## 2025-07-16 NOTE — PATIENT INSTRUCTIONS
Administration of Butrans    Butrans is for transdermal use (on intact skin) only.   Each Butrans patch is intended to be worn for 7 days  Do not to use Butrans if the pouch seal is broken or the patch is cut, damaged, or changed in any way  Do not to cut the Butrans patch  Apply immediately after removal from the individually sealed pouch  Apply Butrans to the upper outer arm, upper chest, upper back or the side of the chest. These 4 sites (each present on both sides of the body) provide 8 possible application sites. Rotate Butrans among the 8 described skin sites. After Butrans removal, wait a minimum of 21 days before reapplying to the same skin site          For the use of 2 patches, remove your current patch, and apply the 2 new patches adjacent to one another at a different application site  Apply Butrans to a hairless or nearly hairless skin site.   If none are available, the hair at the site should be clipped, not shaven.   Do not apply Butrans to irritated skin.   If the application site must be cleaned, clean the site with water only. Do not use soaps, alcohol, oils, lotions, or abrasive devices.   Allow the skin to dry before applying Butrans  It is OK to bathe or shower with the patch on. Lightly pat it dry.   If problems with adhesion of Butrans occur, the edges may be taped with first aid tape.   If problems with lack of adhesion continue, the patch may be covered with waterproof or semipermeable adhesive dressings (Tegaderm) suitable for 7 days of wear.   If Butrans falls off during the 7-day dosing interval, dispose of the transdermal system properly and place a new Butrans patch on at a different skin site.   Dispose of used patches by folding the adhesive side of the patch to itself, then flushing the patch down the toilet immediately upon removal.   Unused patches should be removed from their pouches, the protective liners removed, the patches folded so that the adhesive side of the patch adheres  to itself, and immediately flushed down the toilet  Dispose of any patches remaining from a prescription as soon as they are no longer needed    Butrans is supplied in cartons containing 4 individually packaged systems and   a pouch containing 4 Patch-Disposal Units        Plan:   Physical Therapy: referral to [facility]  Clinical Health Psychologist to address issues of relaxation, behavioral change, coping style, and other factors important to improvement: previous referral to Neela Ramos   Diagnostic Studies: none  Medication Management:   STOP naltrexone  START Butrans (buprenorphine) 5mcg/hr transdermal patch, apply to upper chest, upper arm or upper back. If you are sweaty, apply spray antiperspirant first. If you have any skin irritation,  you may use either Benadryl spray or Flonase nasal spray on the skin and let dry, then apply patch. Don't use lotions or creamy soap. May use tegaderm over the patch if issues with adhesion  Further procedures recommended: none at present  Referral to Sports Med re: bilateral hip pain  Recommendations/follow-up for PCP:  see above  Release of information: none   Follow up: one hour in-person appt on Monday August 4th at 2:30 with Modesta MADRID RN CNP, FNP  Signed CSA with me today with nurses  Check out [place] on You Tube, short simple exercise videos, 30-90 seconds long in the short form.     ----------------------------------------------------------------  Clinic Number:  [phone]   Call with any questions about your care and for scheduling assistance.   Calls are returned Monday through Friday between 8 AM and 4:30 PM. We usually get back to you within 2 business days depending on the issue/request.    If we are prescribing your medications:  For opioid medication refills, call the clinic or send a [employer] message 7 days in advance.  Please include:  Name of requested medication  Name of the pharmacy.  For non-opioid medications, call your pharmacy  directly to request a refill. Please allow 3-4 days to be processed.   Per [place] State Law:  All controlled substance prescriptions must be filled within 30 days of being written.    For those controlled substances allowing refills, pickup must occur within 30 days of last fill.      We believe regular attendance is key to your success in our program!    Any time you are unable to keep your appointment we ask that you call us at least 24 hours in advance to cancel.This will allow us to offer the appointment time to another patient.   Multiple missed appointments may lead to dismissal from the clinic.

## 2025-07-17 ENCOUNTER — ANESTHESIA (OUTPATIENT)
Dept: GASTROENTEROLOGY | Facility: CLINIC | Age: 30
End: 2025-07-17
Payer: COMMERCIAL

## 2025-07-17 ENCOUNTER — HOSPITAL ENCOUNTER (OUTPATIENT)
Facility: CLINIC | Age: 30
Discharge: HOME | End: 2025-07-17
Attending: SURGERY | Admitting: SURGERY
Payer: COMMERCIAL

## 2025-07-17 ENCOUNTER — RESULTS FOLLOW-UP (OUTPATIENT)
Dept: GASTROENTEROLOGY | Facility: CLINIC | Age: 30
End: 2025-07-17

## 2025-07-17 ENCOUNTER — OFFICE VISIT (OUTPATIENT)
Dept: MATERNAL FETAL MEDICINE | Facility: CLINIC | Age: 30
End: 2025-07-17
Attending: OBSTETRICS & GYNECOLOGY
Payer: COMMERCIAL

## 2025-07-17 ENCOUNTER — TELEPHONE (OUTPATIENT)
Dept: PALLIATIVE MEDICINE | Facility: CLINIC | Age: 30
End: 2025-07-17

## 2025-07-17 VITALS
BODY MASS INDEX: 32.49 KG/M2 | DIASTOLIC BLOOD PRESSURE: 107 MMHG | TEMPERATURE: 97.9 F | SYSTOLIC BLOOD PRESSURE: 120 MMHG | OXYGEN SATURATION: 98 % | RESPIRATION RATE: 16 BRPM | HEART RATE: 60 BPM | WEIGHT: 220 LBS

## 2025-07-17 DIAGNOSIS — Z31.69 ENCOUNTER FOR PRECONCEPTION CONSULTATION: ICD-10-CM

## 2025-07-17 DIAGNOSIS — Z31.5 ENCOUNTER FOR PROCREATIVE GENETIC COUNSELING: Primary | ICD-10-CM

## 2025-07-17 LAB
COLONOSCOPY: NORMAL
UPPER GI ENDOSCOPY: NORMAL
VIT B1 PYROPHOSHATE BLD-SCNC: 227 NMOL/L

## 2025-07-17 PROCEDURE — 88305 TISSUE EXAM BY PATHOLOGIST: CPT | Mod: 26 | Performed by: PATHOLOGY

## 2025-07-17 PROCEDURE — 88342 IMHCHEM/IMCYTCHM 1ST ANTB: CPT | Mod: 26 | Performed by: PATHOLOGY

## 2025-07-17 PROCEDURE — 45378 DIAGNOSTIC COLONOSCOPY: CPT | Performed by: SURGERY

## 2025-07-17 PROCEDURE — 43239 EGD BIOPSY SINGLE/MULTIPLE: CPT | Performed by: SURGERY

## 2025-07-17 PROCEDURE — 250N000013 HC RX MED GY IP 250 OP 250 PS 637: Performed by: NURSE ANESTHETIST, CERTIFIED REGISTERED

## 2025-07-17 PROCEDURE — 250N000009 HC RX 250: Performed by: NURSE ANESTHETIST, CERTIFIED REGISTERED

## 2025-07-17 PROCEDURE — 250N000011 HC RX IP 250 OP 636: Performed by: NURSE ANESTHETIST, CERTIFIED REGISTERED

## 2025-07-17 PROCEDURE — 96041 GENETIC COUNSELING SVC EA 30: CPT | Performed by: GENETIC COUNSELOR, MS

## 2025-07-17 PROCEDURE — 258N000003 HC RX IP 258 OP 636: Performed by: NURSE ANESTHETIST, CERTIFIED REGISTERED

## 2025-07-17 PROCEDURE — 88342 IMHCHEM/IMCYTCHM 1ST ANTB: CPT | Mod: TC | Performed by: SURGERY

## 2025-07-17 PROCEDURE — 370N000017 HC ANESTHESIA TECHNICAL FEE, PER MIN: Performed by: SURGERY

## 2025-07-17 RX ORDER — PROPOFOL 10 MG/ML
INJECTION, EMULSION INTRAVENOUS CONTINUOUS PRN
Status: DISCONTINUED | OUTPATIENT
Start: 2025-07-17 | End: 2025-07-17

## 2025-07-17 RX ORDER — LIDOCAINE HYDROCHLORIDE 20 MG/ML
INJECTION, SOLUTION INFILTRATION; PERINEURAL PRN
Status: DISCONTINUED | OUTPATIENT
Start: 2025-07-17 | End: 2025-07-17

## 2025-07-17 RX ORDER — PROPOFOL 10 MG/ML
INJECTION, EMULSION INTRAVENOUS PRN
Status: DISCONTINUED | OUTPATIENT
Start: 2025-07-17 | End: 2025-07-17

## 2025-07-17 RX ORDER — SODIUM CHLORIDE, SODIUM LACTATE, POTASSIUM CHLORIDE, CALCIUM CHLORIDE 600; 310; 30; 20 MG/100ML; MG/100ML; MG/100ML; MG/100ML
INJECTION, SOLUTION INTRAVENOUS CONTINUOUS
Status: DISCONTINUED | OUTPATIENT
Start: 2025-07-17 | End: 2025-07-17 | Stop reason: HOSPADM

## 2025-07-17 RX ORDER — NALOXONE HYDROCHLORIDE 0.4 MG/ML
0.2 INJECTION, SOLUTION INTRAMUSCULAR; INTRAVENOUS; SUBCUTANEOUS
Status: DISCONTINUED | OUTPATIENT
Start: 2025-07-17 | End: 2025-07-17 | Stop reason: HOSPADM

## 2025-07-17 RX ORDER — LIDOCAINE 40 MG/G
CREAM TOPICAL
Status: DISCONTINUED | OUTPATIENT
Start: 2025-07-17 | End: 2025-07-17 | Stop reason: HOSPADM

## 2025-07-17 RX ORDER — FLUMAZENIL 0.1 MG/ML
0.2 INJECTION, SOLUTION INTRAVENOUS
Status: DISCONTINUED | OUTPATIENT
Start: 2025-07-17 | End: 2025-07-17 | Stop reason: HOSPADM

## 2025-07-17 RX ORDER — PROCHLORPERAZINE MALEATE 5 MG/1
10 TABLET ORAL EVERY 6 HOURS PRN
Status: DISCONTINUED | OUTPATIENT
Start: 2025-07-17 | End: 2025-07-17 | Stop reason: HOSPADM

## 2025-07-17 RX ORDER — ONDANSETRON 4 MG/1
4 TABLET, ORALLY DISINTEGRATING ORAL EVERY 6 HOURS PRN
Status: DISCONTINUED | OUTPATIENT
Start: 2025-07-17 | End: 2025-07-17 | Stop reason: HOSPADM

## 2025-07-17 RX ORDER — NALOXONE HYDROCHLORIDE 0.4 MG/ML
0.4 INJECTION, SOLUTION INTRAMUSCULAR; INTRAVENOUS; SUBCUTANEOUS
Status: DISCONTINUED | OUTPATIENT
Start: 2025-07-17 | End: 2025-07-17 | Stop reason: HOSPADM

## 2025-07-17 RX ORDER — ONDANSETRON 2 MG/ML
4 INJECTION INTRAMUSCULAR; INTRAVENOUS EVERY 6 HOURS PRN
Status: DISCONTINUED | OUTPATIENT
Start: 2025-07-17 | End: 2025-07-17 | Stop reason: HOSPADM

## 2025-07-17 RX ORDER — GLYCOPYRROLATE 0.2 MG/ML
INJECTION, SOLUTION INTRAMUSCULAR; INTRAVENOUS PRN
Status: DISCONTINUED | OUTPATIENT
Start: 2025-07-17 | End: 2025-07-17

## 2025-07-17 RX ORDER — ONDANSETRON 2 MG/ML
4 INJECTION INTRAMUSCULAR; INTRAVENOUS
Status: DISCONTINUED | OUTPATIENT
Start: 2025-07-17 | End: 2025-07-17 | Stop reason: HOSPADM

## 2025-07-17 RX ORDER — CITRIC ACID/SODIUM CITRATE 334-500MG
SOLUTION, ORAL ORAL PRN
Status: DISCONTINUED | OUTPATIENT
Start: 2025-07-17 | End: 2025-07-17

## 2025-07-17 RX ADMIN — PROPOFOL 100 MG: 10 INJECTION, EMULSION INTRAVENOUS at 14:05

## 2025-07-17 RX ADMIN — GLYCOPYRROLATE 0.2 MG: 0.2 INJECTION, SOLUTION INTRAMUSCULAR; INTRAVENOUS at 14:05

## 2025-07-17 RX ADMIN — LIDOCAINE HYDROCHLORIDE 100 MG: 20 INJECTION, SOLUTION INFILTRATION; PERINEURAL at 14:04

## 2025-07-17 RX ADMIN — PROPOFOL 50 MG: 10 INJECTION, EMULSION INTRAVENOUS at 14:06

## 2025-07-17 RX ADMIN — LIDOCAINE HYDROCHLORIDE 0.5 ML: 10 INJECTION, SOLUTION EPIDURAL; INFILTRATION; INTRACAUDAL; PERINEURAL at 13:44

## 2025-07-17 RX ADMIN — FAMOTIDINE 20 MG: 10 INJECTION, SOLUTION INTRAVENOUS at 13:55

## 2025-07-17 RX ADMIN — PROPOFOL 200 MCG/KG/MIN: 10 INJECTION, EMULSION INTRAVENOUS at 14:11

## 2025-07-17 RX ADMIN — SODIUM CITRATE AND CITRIC ACID MONOHYDRATE 30 ML: 500; 334 SOLUTION ORAL at 13:55

## 2025-07-17 RX ADMIN — SODIUM CHLORIDE, SODIUM LACTATE, POTASSIUM CHLORIDE, AND CALCIUM CHLORIDE: .6; .31; .03; .02 INJECTION, SOLUTION INTRAVENOUS at 13:43

## 2025-07-17 ASSESSMENT — ACTIVITIES OF DAILY LIVING (ADL)
ADLS_ACUITY_SCORE: 54

## 2025-07-17 NOTE — DISCHARGE INSTRUCTIONS
[facility]    Home Care Following Endoscopy          Activity:  You have just undergone an endoscopic procedure usually performed with conscious sedation.  Do not work or operate machinery (including a car) for at least 12 hours.    I encourage you to walk and attempt to pass this air as soon as possible.    Diet:  Return to the diet you were on before your procedure but eat lightly for the first 12-24 hours.  Drink plenty of water.  Resume any regular medications unless otherwise advised by your physician.  Please begin any new medication prescribed as a result of your procedure as directed by your physician.   If you had any biopsy or polyp removed please refrain from aspirin or aspirin products for 2 days.  If on Coumadin please restart as instructed by your physician.   Pain:  You may take Tylenol as needed for pain.  Expected Recovery:  You can expect some mild abdominal fullness and/or discomfort due to the air used to inflate your intestinal tract. It is also normal to have a mild sore throat after upper endoscopy.    Call Your Physician if You Have:  After Upper Endoscopy:  Shoulder, back or chest pain.  Difficulty breathing or swallowing.  Vomiting blood.  After Colonoscopy:  Worsening persisting abdominal pain which is worse with activity.  Fevers (>101 degrees F), chills or shakes.  Passage of continued blood with bowel movements.     Any questions or concerns about your recovery, please call [phone] or after hours [phone] ([phone]) Nurse Advice Line.    Follow-up Care:  You did have polyps/biopsy tissue sample(s) removed.  The polyps/biopsy tissue sample(s) will be sent to pathology.    You should receive letter in your My Chart from Dr. Bui with your results within 1-2 weeks. If you do not participate in My Chart a physical letter will come in the mail in 2-3 weeks.  Please call if you have not received a notification of your results.  If asked to return to clinic  please make an appointment 1 week after your procedure.  Call [phone].

## 2025-07-17 NOTE — TELEPHONE ENCOUNTER
Incoming call from patient.Patient has colonoscopy scheduled for today (7/17/25) at , 1400.    Patient states she received a call 7/16/25, no TE noted, form a caller stating her arrival time had changed.    Advised patient to be at  @1300, per  policy.    All questions answered and concerns addressed    Donna Slaughter RN  Endoscopy Procedure Pre-Assessment RN   [phone] opt 3

## 2025-07-17 NOTE — ANESTHESIA PREPROCEDURE EVALUATION
"Anesthesia Pre-Procedure Evaluation    Patient: Caprice Kline   MRN: [id number] : 1995          Procedure : Procedure(s):  Colonoscopy  Esophagoscopy, gastroscopy, duodenoscopy (EGD), combined         Past Medical History:   Diagnosis Date    Bipolar disorder (H)     Complication of anesthesia     \"freaking out when I wake up\"    Depressive disorder       Past Surgical History:   Procedure Laterality Date    DISCECTOMY LUMBAR POSTERIOR MICROSCOPIC ONE LEVEL Left 2021    Procedure: Minimally invasive left Lumbar 5 to Sacral 1 microdiscectomy  ;  Surgeon: Destin Junior MD;  Location: [facility] OR    ENT SURGERY  Mar 17, 17    Tonsillectomy    GENITOURINARY SURGERY      Fallopian tube removal and cyst removal    GYN SURGERY Right     cystectomy - with salpingectomy    HEAD & NECK SURGERY      tonsillectomy    HEAD & NECK SURGERY      wisdom teeth extraction    IR SOFT TISSUE BIOPSY  2025      Allergies   Allergen Reactions    Metronidazole Anaphylaxis and Hives     Other reaction(s): Throat swelling  Throat swelling 6 hrs after exposure  Itching and hives 2 hrs after exposure    Shellfish Allergy Anxiety, Diarrhea, Difficulty breathing, Hives, Itching, Rash, Shortness Of Breath and Swelling    Codeine Headache    Fish-Derived Products      Stopped fish oil and less stomach discomfort  Rash after eating fish.     Hydrocodone-Acetaminophen Other (See Comments)    Sertraline Other (See Comments)     Patient was foggy and \"high\" feeling    Shellfish-Derived Products      Lips get numb, throat gets scratchy, rashes      Social History     Tobacco Use    Smoking status: Former     Current packs/day: 0.00     Types: Cigarettes     Passive exposure: Current    Smokeless tobacco: Never   Substance Use Topics    Alcohol use: Not Currently     Comment: 2 drinks every other day       Wt Readings from Last 1 Encounters:   25 99.8 kg (220 lb)        Anesthesia Evaluation   Pt has had prior " anesthetic. Type: General.        ROS/MED HX  ENT/Pulmonary:     (+)                tobacco use, Past use,    Intermittent, asthma  Treatment: Inhaler prn,                 Neurologic:     (+)    peripheral neuropathy,                            Cardiovascular:     (+) Dyslipidemia - -   -  - -                                      METS/Exercise Tolerance:     Hematologic:  - neg hematologic  ROS     Musculoskeletal: Comment: Fibromyalgia       GI/Hepatic:     (+) GERD,       bowel prep,            Renal/Genitourinary:  - neg Renal ROS     Endo:  - neg endo ROS     Psychiatric/Substance Use:     (+) psychiatric history anxiety, bipolar and depression       Infectious Disease:  - neg infectious disease ROS     Malignancy:  - neg malignancy ROS     Other:      (+)  , H/O Chronic Pain,           Physical Exam  Airway  Mallampati: II  TM distance: >3 FB  Neck ROM: full  Upper bite lip test: II  Mouth opening: >= 4 cm    Cardiovascular - normal exam  Rhythm: regular  Rate: normal rate     Dental     Pulmonary - normal examBreath sounds clear to auscultation        Neurological - normal exam  She appears awake, alert and oriented x3.    Other Findings       OUTSIDE LABS:  CBC:   Lab Results   Component Value Date    WBC 7.8 06/07/2025    WBC 6.9 05/18/2025    HGB 14.1 06/07/2025    HGB 12.7 05/18/2025    HCT 43.2 06/07/2025    HCT 38.5 05/18/2025     06/07/2025     05/18/2025     BMP:   Lab Results   Component Value Date     06/07/2025     05/18/2025    POTASSIUM 4.1 06/07/2025    POTASSIUM 4.8 05/18/2025    CHLORIDE 102 06/07/2025    CHLORIDE 100 05/18/2025    CO2 25 06/07/2025    CO2 21 (L) 05/18/2025    BUN 18.8 06/07/2025    BUN 15.7 05/18/2025    CR 0.89 06/07/2025    CR 0.79 05/18/2025    GLC 95 06/07/2025     (H) 05/18/2025     COAGS:   Lab Results   Component Value Date    PTT 31 04/28/2025    INR 1.01 04/28/2025    FIBR 383 04/28/2025     POC:   Lab Results   Component Value Date     "HCG Negative 04/30/2025     HEPATIC:   Lab Results   Component Value Date    ALBUMIN 4.4 05/18/2025    PROTTOTAL 6.7 05/18/2025    ALT 25 05/18/2025    AST 37 05/18/2025    ALKPHOS 68 05/18/2025    BILITOTAL 0.4 05/18/2025    MARI 22 04/28/2025     OTHER:   Lab Results   Component Value Date    LACT 0.7 04/28/2025    A1C 4.9 07/13/2025    REMA 10.4 06/07/2025    PHOS 4.5 04/28/2025    MAG 1.7 04/28/2025    TSH 5.13 (H) 04/27/2025    T4 1.04 04/27/2025    SED 2 05/18/2025       Anesthesia Plan    ASA Status:  2      NPO Status: NPO Appropriate   Anesthesia Type: MAC.  Airway: natural airway.  Induction: intravenous.  Maintenance: TIVA.   Techniques and Equipment:       - Monitoring Plan: standard ASA monitoring     Consents    Anesthesia Plan(s) and associated risks, benefits, and realistic alternatives discussed. Questions answered and patient/representative(s) expressed understanding.     - Discussed: CRNA     - Discussed with:  Patient        - Pt is DNR/DNI Status: no DNR          Postoperative Care         Comments:    Other Comments: The risks and benefits of anesthesia, and the alternatives where applicable, have been discussed with the patient, and they wish to proceed.                  Marcell Dorantes, JULIUS CRNA    I have reviewed the pertinent notes and labs in the chart from the past 30 days and (re)examined the patient.  Any updates or changes from those notes are reflected in this note.    Clinically Significant Risk Factors Present on Admission                             # Obesity: Estimated body mass index is 32.49 kg/m  as calculated from the following:    Height as of 7/14/25: 1.753 m (5' 9\").    Weight as of 7/14/25: 99.8 kg (220 lb).                    "

## 2025-07-17 NOTE — PROGRESS NOTES
"[facility]  Genetic Counseling Consult    Patient:  Caprice Kline YOB: 1995   Date of Service:  25   MRN: [id number]    Caprice was seen at the [facility] for preconception MFM and genetic counseling consultations due to her complex medical history and medication. The patient was unaccompanied to today's visit.     IMPRESSION/ PLAN   1. Caprice had a preconception genetic counseling consultation today. We reviewed pregnancy, medical, and family history information. We reviewed screening and diagnostic testing options available in a future pregnancy as well as expanded carrier screening options. Caprice did not elect to pursue any testing today. Given her complex medical and medication history, she also had a MFM consultation, see corresponding report. She has also previously met with medication therapy management (see pharmacist encounter from 25).     PREGNANCY HISTORY   /Parity:    Age: 29 year old       Caprice's pregnancy history is significant for:   SAB with prior partner  SAB with current partner    MEDICAL HISTORY   Caprice has a complex medical and medication history, she also had a MFM consultation, see corresponding report. She has also previously met with medication therapy management (see pharmacist encounter from 25).        FAMILY HISTORY   A three-generation pedigree was obtained today and is scanned under the \"Media\" tab in Epic. It is important to note that the family history provided is based on the patient's recollection and reported in the absence of medical records. If the family history changes or if more information is obtained, the patient should contact our clinic as this may alter recommendations.     The reported family history is unremarkable for multiple miscarriages, stillbirths, birth defects, intellectual disabilities, known genetic conditions, and " consanguinity.       CARRIER SCREENING   Expanded carrier screening is available to screen for autosomal recessive conditions and X-linked conditions in a large list of genes. Autosomal recessive conditions happen when a mutation has been inherited from the egg and sperm and include conditions like cystic fibrosis, thalassemia, hearing loss, spinal muscular atrophy, and more. X-linked conditions happen when a mutation has been inherited from the egg and include conditions like fragile X syndrome. Caprice did not elect to pursue the carrier screening options discussed today, however is aware carrier screening remains available.        RISK ASSESSMENT FOR CHROMOSOME CONDITIONS   We explained that the risk for fetal chromosome abnormalities increases with age of the egg. We discussed specific features of common chromosome abnormalities, including Down syndrome, trisomy 13, trisomy 18, and sex chromosome trisomies.    At age 29 at midtrimester, the risk to have a baby with Down syndrome is 1 in 690.   At age 29 at midtrimester, the risk to have a baby with any chromosome abnormality is 1 in 345.     GENETIC TESTING OPTIONS   Genetic testing during a pregnancy includes screening and diagnostic procedures. We reviewed that the following would be available in any future pregnancy.     We discussed the following screening options:   Non-invasive prenatal testing (NIPT)  Also called cell-free DNA screening because it detects chromosomes from the placenta in the pregnant person's blood  Can be done any time after 10 weeks gestation  Screens for trisomy 21, trisomy 18, trisomy 13, and sex chromosome aneuploidies  Cannot screen for open neural tube defects, maternal serum AFP after 15 weeks is recommended    We discussed the following ultrasound options:  Nuchal translucency (NT) ultrasound  Ultrasound between [id number] that includes nuchal translucency measurement and nasal bone assessments  Nuchal translucency refers to the  space at the back of the neck where fluid builds up. All babies at this stage have fluid and there is only concern if there is too much fluid  Nasal bone refers to the small bone in the nose. There is concern for conditions like Down syndrome if the bone cannot be seen at all  This ultrasound can be done as part of first trimester screening, at the same time as another screen (NIPT), at the same time as a [facility], or if the patients does not want genetic screening.  Markers on ultrasound detects about 70% of pregnancies with aneuploidy  Abnormalities on NT ultrasound can also increase the risk for a birth defect, like a heart defect    Comprehensive level II ultrasound (Fetal Anatomy Ultrasound)  Ultrasound done between 18-20 weeks gestation  Screens for major birth defects and markers for aneuploidy (like trisomy 21 and trisomy 18)  Includes looking at the fetus/baby's growth, heart, organs (stomach, kidneys), placenta, and amniotic fluid    Fetal Echocardiogram  Ultrasound done between 22-24 weeks gestation  Screen for heart defects  Recommended if there are concerns about the heart or other indications like IVF pregnancy or maternal diabetes    We discussed the following diagnostic options:   Chorionic villus sampling (CVS)  Invasive diagnostic procedure done between 10w0d and 13w6d  The procedure collects a small sample from the placenta for the purpose of chromosomal testing and/or other genetic testing  Diagnostic result; more than 99% sensitivity for fetal chromosome abnormalities  Cannot screen for open neural tube defects, maternal serum AFP after 15 weeks is recommended    Amniocentesis  Invasive diagnostic procedure done after 15 weeks gestation  The procedure collects a small sample of amniotic fluid for the purpose of chromosomal testing and/or other genetic testing  Diagnostic result; more than 99% sensitivity for fetal chromosome abnormalities  Testing for AFP in the amniotic fluid can test for open  neural tube defects    The benefits and limitations of all testing was reviewed. Screening tests provide a risk assessment specific to the pregnancy for certain fetal abnormalities but cannot definitively diagnose or exclude a fetal abnormality. Follow-up genetic counseling and consideration of diagnostic testing is recommended with any abnormal screening result. Diagnostic testing during a pregnancy is more certain and can test for more conditions. However, diagnostic procedures have associated risks that require careful consideration. These tests can detect fetal genetic abnormalities with high accuracy. Results can be compromised by many factors such as maternal cell contamination or mosaicism and are limited by the resolution of current genetic testing technology and understanding of medical genetics. There is no screening or diagnostic test that detects all forms of birth defects or intellectual disability. There is the possibility of incidental findings with genetic testing.     It was a pleasure to be involved with Caprice meneses care. I spent 35 minutes on the date of the encounter doing chart review, obtaining history, test coordination, documentation, and further activities as noted above.    Shahnaz Olivier MS, [facility]  Board Certified and Licensed Genetic Counselor  [facility]  Maternal Fetal Medicine  Ph: [phone]  [email or web address]

## 2025-07-17 NOTE — TELEPHONE ENCOUNTER
PRIOR AUTHORIZATION DENIED    Medication: BUPRENORPHINE 5 MCG/HR TD PTWK  Insurance Company: [employer] - Phone [phone] Fax [phone]  Denial Date: 7/17/2025  Denial Reason(s): No documention that  was checked      Appeal Information:       Patient Notified: No

## 2025-07-17 NOTE — ANESTHESIA CARE TRANSFER NOTE
Patient: Caprice Kline    Procedure: Procedure(s):  Colonoscopy  ESOPHAGOGASTRODUODENOSCOPY, WITH BIOPSY       Diagnosis: Nausea and vomiting, unspecified vomiting type [R11.2]  Loose stools [R19.5]  Heartburn [R12]  Diagnosis Additional Information: No value filed.    Anesthesia Type:   MAC     Note:    Oropharynx: oropharynx clear of all foreign objects and spontaneously breathing  Level of Consciousness: drowsy  Oxygen Supplementation: room air    Independent Airway: airway patency satisfactory and stable  Dentition: dentition unchanged  Vital Signs Stable: post-procedure vital signs reviewed and stable  Report to RN Given: handoff report given  Patient transferred to: PACU    Handoff Report: Identifed the Patient, Identified the Reponsible Provider, Reviewed the pertinent medical history, Discussed the surgical course, Reviewed Intra-OP anesthesia mangement and issues during anesthesia, Set expectations for post-procedure period and Allowed opportunity for questions and acknowledgement of understanding      Vitals:  Vitals Value Taken Time   BP     Temp     Pulse     Resp     SpO2 94 % 07/17/25 14:36   Vitals shown include unfiled device data.    Electronically Signed By: JULIUS Dial CRNA  July 17, 2025  2:37 PM

## 2025-07-17 NOTE — ANESTHESIA POSTPROCEDURE EVALUATION
Patient: Caprice Kline    Procedure: Procedure(s):  Colonoscopy  ESOPHAGOGASTRODUODENOSCOPY, WITH BIOPSY       Anesthesia Type:  MAC    Note:  Disposition: Outpatient   Postop Pain Control: Uneventful            Sign Out: Well controlled pain   PONV: No   Neuro/Psych: Uneventful            Sign Out: Acceptable/Baseline neuro status   Airway/Respiratory: Uneventful            Sign Out: Acceptable/Baseline resp. status   CV/Hemodynamics: Uneventful            Sign Out: Acceptable CV status   Other NRE: NONE   DID A NON-ROUTINE EVENT OCCUR? No    Event details/Postop Comments:  Pt was happy with anesthesia care.  No complications.  I will follow up with the pt if needed.           Last vitals:  Vitals Value Taken Time   BP 96/74 07/17/25 14:40   Temp     Pulse 59 07/17/25 14:40   Resp     SpO2 96 % 07/17/25 14:44   Vitals shown include unfiled device data.    Electronically Signed By: JULIUS Dial CRNA  July 17, 2025  2:45 PM

## 2025-07-17 NOTE — H&P
"Patient seen for Endoscopy    HPI:  Patient is a 29 year old female here for endoscopy. Not currently taking blood thinning medications/held for procedure if they do. No recent MI or CVA history. No issues with previous sedation. No recent acute illness.    Review Of Systems    Skin: negative  Ears/Nose/Throat: negative  Respiratory: No shortness of breath, dyspnea on exertion, cough, or hemoptysis  Cardiovascular: negative  Gastrointestinal: negative  Genitourinary: negative  Musculoskeletal: negative  Neurologic: negative  Hematologic/Lymphatic/Immunologic: negative  Endocrine: negative      Past Medical History:   Diagnosis Date    Bipolar disorder (H)     Complication of anesthesia     \"freaking out when I wake up\"    Depressive disorder 2011       Past Surgical History:   Procedure Laterality Date    DISCECTOMY LUMBAR POSTERIOR MICROSCOPIC ONE LEVEL Left 08/24/2021    Procedure: Minimally invasive left Lumbar 5 to Sacral 1 microdiscectomy  ;  Surgeon: Destin Junior MD;  Location: [facility] OR    ENT SURGERY  Mar 17, 17    Tonsillectomy    GENITOURINARY SURGERY  2012    Fallopian tube removal and cyst removal    GYN SURGERY Right     cystectomy - with salpingectomy    HEAD & NECK SURGERY      tonsillectomy    HEAD & NECK SURGERY      wisdom teeth extraction    IR SOFT TISSUE BIOPSY  05/02/2025       Family History   Problem Relation Age of Onset    Hypertension Mother     Colon Polyps Mother 50    Colon Cancer Mother     Substance Abuse Father     Colon Cancer Father 60    Diabetes Maternal Grandmother     Hypertension Maternal Grandmother     Colon Cancer Maternal Grandmother     Cancer Maternal Grandmother     Nephrolithiasis Maternal Grandmother     Diabetes Paternal Grandmother     Breast Cancer Paternal Grandmother     Cancer Paternal Grandmother        Social History     Socioeconomic History    Marital status: Single     Spouse name: Not on file    Number of children: Not on file    Years of education: Not " on file    Highest education level: Not on file   Occupational History    Not on file   Tobacco Use    Smoking status: Former     Current packs/day: 0.00     Types: Cigarettes     Passive exposure: Current    Smokeless tobacco: Never   Vaping Use    Vaping status: Former    Substances: Nicotine    Devices: Refillable tank   Substance and Sexual Activity    Alcohol use: Not Currently     Comment: 2 drinks every other day     Drug use: Not Currently    Sexual activity: Yes     Partners: Female, Male     Birth control/protection: None   Other Topics Concern    Parent/sibling w/ CABG, MI or angioplasty before 65F 55M? No   Social History Narrative    Not on file     Social Drivers of Health     Financial Resource Strain: Low Risk  (5/4/2025)    Financial Resource Strain     Within the past 12 months, have you or your family members you live with been unable to get utilities (heat, electricity) when it was really needed?: No   Food Insecurity: Low Risk  (5/4/2025)    Food Insecurity     Within the past 12 months, did you worry that your food would run out before you got money to buy more?: No     Within the past 12 months, did the food you bought just not last and you didn t have money to get more?: No   Transportation Needs: Low Risk  (5/4/2025)    Transportation Needs     Within the past 12 months, has lack of transportation kept you from medical appointments, getting your medicines, non-medical meetings or appointments, work, or from getting things that you need?: No   Physical Activity: Inactive (12/9/2024)    Exercise Vital Sign     Days of Exercise per Week: 0 days     Minutes of Exercise per Session: 0 min   Stress: Stress Concern Present (12/9/2024)    [employer] - Occupational Stress Questionnaire     Feeling of Stress : Very much   Social Connections: Unknown (12/9/2024)    Social Connection and Isolation Panel [NHANES]     Frequency of Communication with Friends and Family: Not on  file     Frequency of Social Gatherings with Friends and Family: Once a week     Attends [employer] Services: Not on file     Active Member of Clubs or Organizations: Not on file     Attends Club or Organization Meetings: Not on file     Marital Status: Not on file   Interpersonal Safety: Low Risk  (4/27/2025)    Interpersonal Safety     Do you feel physically and emotionally safe where you currently live?: Yes     Within the past 12 months, have you been hit, slapped, kicked or otherwise physically hurt by someone?: No     Within the past 12 months, have you been humiliated or emotionally abused in other ways by your partner or ex-partner?: No   Housing Stability: Low Risk  (5/4/2025)    Housing Stability     Do you have housing? : Yes     Are you worried about losing your housing?: No       No current outpatient medications on file.       Medications and history reviewed    Physical exam:  Vitals: There were no vitals taken for this visit.  BMI= There is no height or weight on file to calculate BMI.    Constitutional: Healthy, alert, non-distressed   Head: Normo-cephalic, atraumatic, no lesions, masses or tenderness   Cardiovascular: RRR, no new murmurs, +S1, +S2 heart sounds, no clicks, rubs or gallops   Respiratory: CTAB, no rales, rhonchi or wheezing, equal chest rise, good respiratory effort   Gastrointestinal: Soft, non-tender, non distended, no rebound rigidity or guarding, no masses or hernias palpated   : Deferred  Musculoskeletal: Moves all extremities, normal  strength, no deformities noted   Skin: No suspicious lesions or rashes   Psychiatric: Mentation appears normal, affect appropriate   Hematologic/Lymphatic/Immunologic: Normal cervical and supraclavicular lymph nodes   Patient able to get up on table without difficulty.    Labs show:  No results found for this or any previous visit (from the past 24 hours).    Assessment: Endoscopy  Plan: Pt cleared for anesthesia for proposed  procedure.    Yung Bui, DO

## 2025-07-22 ENCOUNTER — MYC MEDICAL ADVICE (OUTPATIENT)
Dept: GASTROENTEROLOGY | Facility: CLINIC | Age: 30
End: 2025-07-22
Payer: COMMERCIAL

## 2025-07-22 LAB
PATH REPORT.COMMENTS IMP SPEC: NORMAL
PATH REPORT.FINAL DX SPEC: NORMAL
PATH REPORT.GROSS SPEC: NORMAL
PATH REPORT.MICROSCOPIC SPEC OTHER STN: NORMAL
PATH REPORT.MICROSCOPIC SPEC OTHER STN: NORMAL
PATH REPORT.RELEVANT HX SPEC: NORMAL
PHOTO IMAGE: NORMAL

## 2025-07-23 SDOH — HEALTH STABILITY: PHYSICAL HEALTH: ON AVERAGE, HOW MANY DAYS PER WEEK DO YOU ENGAGE IN MODERATE TO STRENUOUS EXERCISE (LIKE A BRISK WALK)?: 0 DAYS

## 2025-07-23 SDOH — HEALTH STABILITY: PHYSICAL HEALTH: ON AVERAGE, HOW MANY MINUTES DO YOU ENGAGE IN EXERCISE AT THIS LEVEL?: 0 MIN

## 2025-07-24 ENCOUNTER — THERAPY VISIT (OUTPATIENT)
Dept: PHYSICAL THERAPY | Facility: CLINIC | Age: 30
End: 2025-07-24
Attending: NURSE PRACTITIONER
Payer: COMMERCIAL

## 2025-07-24 ENCOUNTER — MYC MEDICAL ADVICE (OUTPATIENT)
Dept: PALLIATIVE MEDICINE | Facility: CLINIC | Age: 30
End: 2025-07-24

## 2025-07-24 DIAGNOSIS — M79.7 FIBROMYALGIA: Primary | ICD-10-CM

## 2025-07-24 DIAGNOSIS — R26.89 IMPAIRED GAIT AND MOBILITY: Primary | ICD-10-CM

## 2025-07-25 ENCOUNTER — HOSPITAL ENCOUNTER (EMERGENCY)
Facility: CLINIC | Age: 30
Discharge: HOME | End: 2025-07-25
Attending: EMERGENCY MEDICINE | Admitting: EMERGENCY MEDICINE
Payer: COMMERCIAL

## 2025-07-25 VITALS
DIASTOLIC BLOOD PRESSURE: 73 MMHG | TEMPERATURE: 97.8 F | SYSTOLIC BLOOD PRESSURE: 105 MMHG | WEIGHT: 220 LBS | RESPIRATION RATE: 16 BRPM | OXYGEN SATURATION: 95 % | BODY MASS INDEX: 32.49 KG/M2 | HEART RATE: 72 BPM

## 2025-07-25 DIAGNOSIS — M54.41 CHRONIC BILATERAL LOW BACK PAIN WITH BILATERAL SCIATICA: Primary | ICD-10-CM

## 2025-07-25 DIAGNOSIS — M54.42 CHRONIC BILATERAL LOW BACK PAIN WITH BILATERAL SCIATICA: Primary | ICD-10-CM

## 2025-07-25 DIAGNOSIS — G89.29 CHRONIC BILATERAL LOW BACK PAIN WITH BILATERAL SCIATICA: Primary | ICD-10-CM

## 2025-07-25 PROCEDURE — 99285 EMERGENCY DEPT VISIT HI MDM: CPT | Performed by: EMERGENCY MEDICINE

## 2025-07-25 PROCEDURE — 250N000011 HC RX IP 250 OP 636: Performed by: EMERGENCY MEDICINE

## 2025-07-25 PROCEDURE — 99284 EMERGENCY DEPT VISIT MOD MDM: CPT | Performed by: EMERGENCY MEDICINE

## 2025-07-25 PROCEDURE — 96372 THER/PROPH/DIAG INJ SC/IM: CPT | Performed by: EMERGENCY MEDICINE

## 2025-07-25 PROCEDURE — 250N000013 HC RX MED GY IP 250 OP 250 PS 637: Performed by: EMERGENCY MEDICINE

## 2025-07-25 RX ORDER — BUPRENORPHINE 5 UG/H
1 PATCH TRANSDERMAL WEEKLY
Status: DISCONTINUED | OUTPATIENT
Start: 2025-07-25 | End: 2025-07-25 | Stop reason: HOSPADM

## 2025-07-25 RX ADMIN — HYDROMORPHONE HYDROCHLORIDE 1 MG: 1 INJECTION, SOLUTION INTRAMUSCULAR; INTRAVENOUS; SUBCUTANEOUS at 20:36

## 2025-07-25 RX ADMIN — BUPRENORPHINE 1 PATCH: 5 PATCH, EXTENDED RELEASE TRANSDERMAL at 20:41

## 2025-07-25 ASSESSMENT — ACTIVITIES OF DAILY LIVING (ADL)
ADLS_ACUITY_SCORE: 54
ADLS_ACUITY_SCORE: 54

## 2025-07-26 ENCOUNTER — OFFICE VISIT (OUTPATIENT)
Dept: ORTHOPEDICS | Facility: CLINIC | Age: 30
End: 2025-07-26
Attending: NURSE PRACTITIONER
Payer: COMMERCIAL

## 2025-07-26 DIAGNOSIS — S73.192D TEAR OF LEFT ACETABULAR LABRUM, SUBSEQUENT ENCOUNTER: ICD-10-CM

## 2025-07-26 DIAGNOSIS — M25.551 CHRONIC HIP PAIN, BILATERAL: Primary | ICD-10-CM

## 2025-07-26 DIAGNOSIS — G89.29 CHRONIC HIP PAIN, BILATERAL: Primary | ICD-10-CM

## 2025-07-26 DIAGNOSIS — M25.552 CHRONIC HIP PAIN, BILATERAL: Primary | ICD-10-CM

## 2025-07-26 DIAGNOSIS — S73.191D TEAR OF RIGHT ACETABULAR LABRUM, SUBSEQUENT ENCOUNTER: ICD-10-CM

## 2025-07-26 PROCEDURE — 99244 OFF/OP CNSLTJ NEW/EST MOD 40: CPT | Performed by: FAMILY MEDICINE

## 2025-07-26 NOTE — DISCHARGE INSTRUCTIONS
Follow-up with your pain doctor and your primary care doctor.  Your blood pressure remains close to 100 this evening before bed you can skip your blood pressure medicine.  If you have new or worsening symptoms please return to the ER for reevaluation.  Remember that the patch that we put on today lasted a week.  If you are able to get a refill on the patches Monday or Tuesday please keep that in mind.  As discussed there is not a lot that we can do in the emergency department for chronic pain other than try to give you something to help while you are here.  I hope you find some answers from [facility].

## 2025-07-26 NOTE — ED TRIAGE NOTES
Patient reports bilateral low back pain with pain radiating down both hips/legs worsening the past few days. Tylenol last at 1630.      Triage Assessment (Adult)       Row Name 07/25/25 1946          Triage Assessment    Airway WDL WDL        Respiratory WDL    Respiratory WDL WDL        Skin Circulation/Temperature WDL    Skin Circulation/Temperature WDL WDL

## 2025-07-26 NOTE — ED PROVIDER NOTES
"JOHN Vasques is a 29-year-old female with a history of chronic pain, lumbar radiculopathy, borderline personality disorder, myonecrosis, fibromyalgia, bipolar disorder, presents with chronic pain affecting back, hip, legs, and feet that has significantly worsened over the past week. Patient describes neuropathy in lower half of body and reports arms feel like \"trunks,\" describing overall sensation as feeling like she's \"been hit by a bus.\" Patient has always had pain but states it isn't usually this severe. Associated symptoms include burning sensation rising from feet, numbness in feet that she describes as \"hot coals,\" and weakness in legs. 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 Pain management doctor abruptly discontinued morphine (3 times daily) approximately one month ago without tapering, resulting in withdrawal symptoms. Patient continues to experience residual \"hangover feeling\" from withdrawal. Current pain level is described as severe, with patient stating \"it's not really a life to live\" and expressing readiness for hospital admission due to pain severity.    MEDICATIONS/ALLEVIATING FACTORS    Patient currently taking only Tylenol for pain management. Buprenorphine patches were prescribed but patient unable to obtain due to pharmacy availability issues and insurance appeal complications. Patient previously tried Belbuca (buprenorphine strips) but did not tolerate well. Pain management doctor prescribed Gabapentin but patient cannot take due to concurrent Lyrica use.    ROS: 10 point review of systems performed and is otherwise negative    Positive for chronic back, hip, leg, and foot pain with recent worsening. Positive for neuropathy in lower extremities with burning sensation and numbness. Positive for bilateral arm heaviness. Positive for weakness and difficulty ambulating. Positive for residual withdrawal symptoms including \"hangover feeling.\"    EXAM     Vitals reviewed   " GEN: NAD, appears in distress due to pain   HEENT: not documented   CV: not documented     Pulm: no respiratory distress noted   Extremities: Patient able to lift legs off bed with some difficulty and pain. Tenderness to palpation of right thigh. Numbness noted in feet with inability to feel touch on top of feet. Burning sensation in lower extremities.   Neuro: Weakness in lower extremities, numbness in feet, otherwise motor and sensation not fully documented    PERTINENT PAST MEDICAL HISTORY     Chronic pain with myonecrosis in legs, bilateral hip labral tears, neuropathy in lower extremities. History of morphine use for pain management (3 times daily) discontinued abruptly one month ago. Currently on Lyrica. Previous hospitalization at [facility] where myonecrosis was discovered. Scheduled for evaluation at [facility] in September.    DIAGNOSTICS     none      MDM    Patient presents with chronic pain exacerbation affecting back, hips, legs, and feet with associated neuropathy. Suspected etiology: Chronic pain syndrome with recent worsening likely related to abrupt discontinuation of opioid therapy and underlying conditions including myonecrosis and bilateral hip labral tears. Supporting evidence includes history of morphine withdrawal one month ago, known structural abnormalities, and current severe pain symptoms with functional impairment. Differential diagnosis includes chronic pain exacerbation, opioid withdrawal syndrome, neuropathic pain, and complications from underlying myonecrosis and hip pathology.    A/P    1. Chronic pain exacerbation     - Severe pain affecting back, hips, legs, and feet     - History of abrupt opioid discontinuation with withdrawal     - Administered Dilaudid injection for acute pain relief     - buprenorphine patch patch applied.      - Continue current pain management follow-up     - [facility] evaluation scheduled September 11-12     - Return precautions  discussed    DISPOSITION    Discharge home, return to er precautions discussed.      Collins Huber MD  07/25/25 3112

## 2025-07-26 NOTE — ED NOTES
Writer notified by [facility] that PT has some concerns of her blood pressure. Writer went to PT's room and re-assessed her, PT Hypotensive (See VS Flowsheet). Provider at bedside.

## 2025-07-29 ENCOUNTER — MYC REFILL (OUTPATIENT)
Dept: FAMILY MEDICINE | Facility: OTHER | Age: 30
End: 2025-07-29

## 2025-07-29 ENCOUNTER — PATIENT OUTREACH (OUTPATIENT)
Dept: CARE COORDINATION | Facility: CLINIC | Age: 30
End: 2025-07-29

## 2025-07-29 DIAGNOSIS — R60.0 PERIPHERAL EDEMA: ICD-10-CM

## 2025-07-29 RX ORDER — FUROSEMIDE 20 MG/1
20 TABLET ORAL DAILY
Qty: 90 TABLET | Refills: 0 | Status: SHIPPED | OUTPATIENT
Start: 2025-07-29

## 2025-07-29 NOTE — PROGRESS NOTES
Connected Care Resource Center Contact  [facility]/Voicemail     Clinical Data: Post-Discharge Outreach     Outreach attempted x 2.  Left message on patient's voicemail, providing [facility]'s central phone number of [phone] ([phone]) for questions/concerns and/or to schedule an appt with an [facility] provider, if they do not have a PCP.      Plan:  [facility] will do no further outreaches at this time.       CHERELLE Pérez  [facility] Resource Center, [facility]    *Connected Care Resource Team does NOT follow patient ongoing. Referrals are identified based on internal discharge reports and the outreach is to ensure patient has an understanding of their discharge instructions.

## 2025-07-30 ENCOUNTER — HOSPITAL ENCOUNTER (EMERGENCY)
Facility: CLINIC | Age: 30
Discharge: HOME | End: 2025-07-30
Attending: PHYSICIAN ASSISTANT | Admitting: PHYSICIAN ASSISTANT
Payer: COMMERCIAL

## 2025-07-30 VITALS
SYSTOLIC BLOOD PRESSURE: 123 MMHG | RESPIRATION RATE: 20 BRPM | HEART RATE: 70 BPM | DIASTOLIC BLOOD PRESSURE: 85 MMHG | HEIGHT: 69 IN | WEIGHT: 228.8 LBS | TEMPERATURE: 98.4 F | BODY MASS INDEX: 33.89 KG/M2 | OXYGEN SATURATION: 98 %

## 2025-07-30 DIAGNOSIS — M25.551 CHRONIC PAIN OF BOTH HIPS: Primary | ICD-10-CM

## 2025-07-30 DIAGNOSIS — G89.29 CHRONIC PAIN OF BOTH HIPS: Primary | ICD-10-CM

## 2025-07-30 DIAGNOSIS — S73.199A LABRAL TEAR OF HIP JOINT: ICD-10-CM

## 2025-07-30 DIAGNOSIS — M25.552 CHRONIC PAIN OF BOTH HIPS: Primary | ICD-10-CM

## 2025-07-30 PROCEDURE — 99284 EMERGENCY DEPT VISIT MOD MDM: CPT | Performed by: PHYSICIAN ASSISTANT

## 2025-07-30 PROCEDURE — 96372 THER/PROPH/DIAG INJ SC/IM: CPT | Performed by: PHYSICIAN ASSISTANT

## 2025-07-30 PROCEDURE — 250N000011 HC RX IP 250 OP 636: Performed by: PHYSICIAN ASSISTANT

## 2025-07-30 RX ORDER — ORPHENADRINE CITRATE 30 MG/ML
60 INJECTION INTRAMUSCULAR; INTRAVENOUS ONCE
Status: COMPLETED | OUTPATIENT
Start: 2025-07-30 | End: 2025-07-30

## 2025-07-30 RX ORDER — KETOROLAC TROMETHAMINE 30 MG/ML
30 INJECTION, SOLUTION INTRAMUSCULAR; INTRAVENOUS ONCE
Status: COMPLETED | OUTPATIENT
Start: 2025-07-30 | End: 2025-07-30

## 2025-07-30 RX ADMIN — HYDROMORPHONE HYDROCHLORIDE 1 MG: 1 INJECTION, SOLUTION INTRAMUSCULAR; INTRAVENOUS; SUBCUTANEOUS at 20:13

## 2025-07-30 RX ADMIN — KETOROLAC TROMETHAMINE 30 MG: 30 INJECTION, SOLUTION INTRAMUSCULAR at 20:16

## 2025-07-30 RX ADMIN — ORPHENADRINE CITRATE 60 MG: 60 INJECTION INTRAMUSCULAR; INTRAVENOUS at 20:19

## 2025-07-30 ASSESSMENT — ACTIVITIES OF DAILY LIVING (ADL)
ADLS_ACUITY_SCORE: 54

## 2025-07-30 NOTE — ED PROVIDER NOTES
History     Chief Complaint   Patient presents with    Hip Pain     HPI  Caprice Kline is a 29 year old female with a history of chronic hip pain, myonecrosis by biopsy, and fibromyalgia presenting for evaluation of ongoing hip pain despite recent Butrans patch application recommended by her pain clinic.  Patient is very frustrated that she feels she is not getting answers regarding her hip pain and her pain is currently rated 9-10 on a scale of 10.  She had her morphine abruptly discontinued about a month ago, and she struggled with withdrawals after that.  She recently saw sports medicine, but she states the physician did not even touch her and just came into the room and told her that he could not help her.  She reports no acute injury or fall within the past few weeks.  She has chronic numbness/tingling of her lower extremities which is more notable on the right side which she relates to her previously diagnosed neuropathy.  She denies any fevers or chills.  Denies any lower extremity edema.  She denies any change in her back discomfort.  Her hip pain is now to the point that she walks around with crutches.  She reports having an MRI of her hips back in January through [facility] which showed bilateral labral tears.        Excerpt from her ED evaluation on 7/25/2025:  PERTINENT PAST MEDICAL HISTORY      Chronic pain with myonecrosis in legs, bilateral hip labral tears, neuropathy in lower extremities. History of morphine use for pain management (3 times daily) discontinued abruptly one month ago. Currently on Lyrica. Previous hospitalization at [facility] where myonecrosis was discovered. Scheduled for evaluation at [facility] in September.     DIAGNOSTICS      none        MDM     Patient presents with chronic pain exacerbation affecting back, hips, legs, and feet with associated neuropathy. Suspected etiology: Chronic pain syndrome with recent worsening likely related to abrupt  discontinuation of opioid therapy and underlying conditions including myonecrosis and bilateral hip labral tears. Supporting evidence includes history of morphine withdrawal one month ago, known structural abnormalities, and current severe pain symptoms with functional impairment. Differential diagnosis includes chronic pain exacerbation, opioid withdrawal syndrome, neuropathic pain, and complications from underlying myonecrosis and hip pathology.     A/P     1. Chronic pain exacerbation     - Severe pain affecting back, hips, legs, and feet     - History of abrupt opioid discontinuation with withdrawal     - Administered Dilaudid injection for acute pain relief     - buprenorphine patch patch applied.      - Continue current pain management follow-up     - [facility] evaluation scheduled September 11-12     - Return precautions discussed     DISPOSITION     Discharge home, return to er precautions discussed.      Collins Huber MD  07/25/25 0440         Excerpt from her most recent sports medicine evaluation on 7/26/2025:    Instructions    # Chronic Hip/Leg Pain: Caprice Kline was seen today for chronic hip/leg pain. Symptoms had been going on for 6+ mon in the setting of having fibromyalgia. She has had a thorough work up with MRI showing irritated gluteal tendons and hip joints, myonecrosis with biopsy unrevealing. On examination there are positive findings of diffuse tenderness to palpation over the hip joints and greater trochanter regions as well as thighs/back, lower legs. Unable to localize one area of more significant pain. Imaging findings showed irritated gluteal tendon, hip joint wear and tear. Uncertain cause of her pain though may be multifactorial without focal area of pain. She does have an appointment with [facility] this fall which may provide further insight to the cause of her pain. Previous cortisone injections haven't helped. No red flag symptoms/signs on history or examination. Counseled  patient on nature of condition and treatment options.  Given this plan as below, follow-up with Pain and [facility].  Image Findings: reviewed hip/femur MRI studies  Treatment: Activities as tolerated, Pool therapy referral   Medications/Injections: Per pain, none today  Follow-up: As needed      Please call [phone]   Ask for my team if you have any questions or concerns     If you have not yet received the influenza vaccine but would like to get one, please call  [phone] or you can schedule via [employer]     It was great seeing you today!     Lucas Barth MD, CAQSM            Excerpt from her most recent pain clinic evaluation on 7/16/2025:  Assessment:  Bilateral hip pain  Tear of left acetabular labrum, subsequent encounter  Tear of right acetabular labrum, subsequent encounter  Diffuse myofascial pain syndrome  Fibromyalgia  Neuropathic pain  Bilateral foot pain  Chronic intractable pain     Encounter for long-term use of opiate analgesic  Bipolar disorder, in partial remission, most recent episode mixed  PMHx includes: depressive disorder, complication of anesthesia (freaking out when I wake up), bipolar disorder  PSHx includes: Minimally invasive left Lumbar 5 to Sacral 1 microdiscectomy (2021), cystectomy with  salpingectomy on the right side, tonsillectomy, wisdom teeth extraction           Plan:   Physical Therapy: referral to [facility]  Clinical Health Psychologist to address issues of relaxation, behavioral change, coping style, and other factors important to improvement: previous referral to [facility]   Diagnostic Studies: none  Medication Management:   STOP naltrexone  START Butrans (buprenorphine) 5mcg/hr transdermal patch, apply to upper chest, upper arm or upper back. If you are sweaty, apply spray antiperspirant first. If you have any skin irritation,  you may use either Benadryl spray or Flonase nasal spray on the skin and let dry, then apply patch. Don't use lotions or  creamy soap. May use tegaderm over the patch if issues with adhesion  Further procedures recommended: none at present  Referral to Sports Med re: bilateral hip pain  Recommendations/follow-up for PCP:  see above  Release of information: none   Follow up: one hour in-person appt on Monday August 4th at 2:30 with Modesta MADRID RN CNP, FNP  Check out [place] on You Tube, short simple exercise videos, 30-90 seconds long in the short form.            ASSESSMENT AND PLAN:  (M25.551,  M25.552) Bilateral hip pain  (primary encounter diagnosis)  Comment:   Plan: buprenorphine (BUTRANS) 5 MCG/HR WK patch             (S73.192D) Tear of left acetabular labrum, subsequent encounter  Comment:   Plan: buprenorphine (BUTRANS) 5 MCG/HR WK patch             (S73.191D) Tear of right acetabular labrum, subsequent encounter  Comment:   Plan: buprenorphine (BUTRANS) 5 MCG/HR WK patch             (M79.18) Diffuse myofascial pain syndrome  Comment:   Plan: buprenorphine (BUTRANS) 5 MCG/HR WK patch             (M79.7) Fibromyalgia  Comment:   Plan: buprenorphine (BUTRANS) 5 MCG/HR WK patch             (M79.2) Neuropathic pain  Comment:   Plan: buprenorphine (BUTRANS) 5 MCG/HR WK patch             (M79.671,  M79.672) Bilateral foot pain  Comment:   Plan: buprenorphine (BUTRANS) 5 MCG/HR WK patch             (F11.90) Chronic, continuous use of opioids  Comment:   Plan: buprenorphine (BUTRANS) 5 MCG/HR WK patch               BILLING TIME DOCUMENTATION:   TOTAL TIME includes:   Time spent preparing to see the patient: 7 minutes (reviewing records and tests)  Time spend face to face with the patient: 49 minutes  Time spent ordering tests, medications, procedures and referrals: 0 minutes  Time spent Referring and communicating with other healthcare professionals: 0 minutes  Documenting clinical information in Epic: 2 minutes     The total TIME spent on this patient on the day of the appointment was 58 minutes.                     "  Modesta MADRID, RN CNP, FNP  [facility]  [place] location    Allergies:  Allergies   Allergen Reactions    Metronidazole Anaphylaxis and Hives     Other reaction(s): Throat swelling  Throat swelling 6 hrs after exposure  Itching and hives 2 hrs after exposure    Shellfish Allergy Anxiety, Diarrhea, Difficulty breathing, Hives, Itching, Rash, Shortness Of Breath and Swelling    Codeine Headache    Fish-Derived Products      Stopped fish oil and less stomach discomfort  Rash after eating fish.     Hydrocodone-Acetaminophen Other (See Comments)     Pt reports no problems tolerating Tylenol/Acetaminophen    Sertraline Other (See Comments)     Patient was foggy and \"high\" feeling    Shellfish-Derived Products      Lips get numb, throat gets scratchy, rashes       Problem List:    Patient Active Problem List    Diagnosis Date Noted    Myonecrosis (H) 05/04/2025     Priority: Medium    Elevated CK 04/28/2025     Priority: Medium    Bilateral thigh pain 04/27/2025     Priority: Medium    Fibromyalgia 01/29/2025     Priority: Medium    Genital herpes 11/03/2024     Priority: Medium    Left-sided low back pain with left-sided sciatica 03/08/2023     Priority: Medium    FH: colon cancer 06/22/2022     Priority: Medium     Will need colonoscopy at 34 years old.       Anaphylactic shock due to shellfish, initial encounter 06/22/2022     Priority: Medium    Bipolar II disorder (H) 04/28/2022     Priority: Medium    Borderline personality disorder in adult (H) 04/28/2022     Priority: Medium    Severe episode of recurrent major depressive disorder, without psychotic features (H) 10/15/2021     Priority: Medium    Lumbar radiculopathy 07/19/2021     Priority: Medium     Added automatically from request for surgery [id number]      Suicide attempt (H) 11/01/2011     Priority: Medium     Last 2/16/19 with intentional benadryl overdose          Past Medical History:    Past Medical History:   Diagnosis " Date    Bipolar disorder (H)     Complication of anesthesia     Depressive disorder 2011       Past Surgical History:    Past Surgical History:   Procedure Laterality Date    COLONOSCOPY N/A 7/17/2025    Procedure: Colonoscopy;  Surgeon: Yung Bui DO;  Location:  GI    DISCECTOMY LUMBAR POSTERIOR MICROSCOPIC ONE LEVEL Left 08/24/2021    Procedure: Minimally invasive left Lumbar 5 to Sacral 1 microdiscectomy  ;  Surgeon: Destin Junior MD;  Location:  OR    ENT SURGERY  Mar 17, 17    Tonsillectomy    ESOPHAGOSCOPY, GASTROSCOPY, DUODENOSCOPY (EGD), COMBINED N/A 7/17/2025    Procedure: ESOPHAGOGASTRODUODENOSCOPY, WITH BIOPSY;  Surgeon: Yung Bui DO;  Location:  GI    GENITOURINARY SURGERY  2012    Fallopian tube removal and cyst removal    GYN SURGERY Right     cystectomy - with salpingectomy    HEAD & NECK SURGERY      tonsillectomy    HEAD & NECK SURGERY      wisdom teeth extraction    IR SOFT TISSUE BIOPSY  05/02/2025       Family History:    Family History   Problem Relation Age of Onset    Hypertension Mother     Colon Polyps Mother 50    Colon Cancer Mother     Substance Abuse Father     Colon Cancer Father 60    Diabetes Maternal Grandmother     Hypertension Maternal Grandmother     Colon Cancer Maternal Grandmother     Cancer Maternal Grandmother     Nephrolithiasis Maternal Grandmother     Diabetes Paternal Grandmother     Breast Cancer Paternal Grandmother     Cancer Paternal Grandmother        Social History:  Marital Status:  Single [1]  Social History     Tobacco Use    Smoking status: Former     Current packs/day: 0.00     Types: Cigarettes, Vaping Device     Passive exposure: Current    Smokeless tobacco: Never   Vaping Use    Vaping status: Former    Substances: Nicotine    Devices: Refillable tank   Substance Use Topics    Alcohol use: Yes     Comment: 2 drinks every other day     Drug use: Not Currently        Medications:    albuterol (PROAIR HFA/PROVENTIL  HFA/VENTOLIN HFA) 108 (90 Base) MCG/ACT inhaler  amphetamine-dextroamphetamine (ADDERALL XR) 30 MG 24 hr capsule  azelaic acid (FINACIA) 15 % external gel  bisacodyl (DULCOLAX) 5 MG EC tablet  buprenorphine (BUTRANS) 5 MCG/HR WK patch  clobetasol (TEMOVATE) 0.05 % external solution  clobetasol propionate (CLOBEX) 0.05 % external shampoo  clonazePAM (KLONOPIN) 0.5 MG tablet  COMPOUNDED NON-CONTROLLED SUBSTANCE (CMPD RX) - PHARMACY TO MIX COMPOUNDED MEDICATION  diazepam (VALIUM) 10 MG tablet  diclofenac (VOLTAREN) 1 % topical gel  docusate sodium (COLACE) 100 MG capsule  EPINEPHrine (ANY BX GENERIC EQUIV) 0.3 MG/0.3ML injection 2-pack  furosemide (LASIX) 20 MG tablet  gabapentin 8% in vanicream (Compounded)  glucosamine-chondroitin 500-400 MG CAPS per capsule  hydrocortisone 2.5 % cream  hydrOXYzine (VISTARIL) 50 MG capsule  ipratropium-albuterol (COMBIVENT RESPIMAT)  MCG/ACT inhaler  ketoconazole (NIZORAL) 2 % external cream  ketoconazole (NIZORAL) 2 % external shampoo  lamoTRIgine (LAMICTAL) 100 MG tablet  lidocaine (LIDODERM) 5 % patch  lithium (ESKALITH) 600 MG capsule  magnesium oxide (MAG-OX) 400 MG tablet  mupirocin (BACTROBAN) 2 % external ointment  NARCAN 4 MG/0.1ML nasal spray  nortriptyline (PAMELOR) 50 MG capsule  omeprazole (PRILOSEC) 40 MG DR capsule  ondansetron (ZOFRAN ODT) 4 MG ODT tab  ondansetron (ZOFRAN) 4 MG tablet  ondansetron (ZOFRAN) 4 MG tablet  polyethylene glycol (GOLYTELY) 236 g suspension  prazosin (MINIPRESS) 2 MG capsule  prazosin (MINIPRESS) 5 MG capsule  Pregabalin (LYRICA) 200 MG capsule  propranolol (INDERAL) 20 MG tablet  propranolol ER (INDERAL LA) 80 MG 24 hr capsule  psyllium (METAMUCIL/KONSYL) 58.6 % powder  QUEtiapine (SEROQUEL) 300 MG tablet  RETIN-A 0.025 % external cream  Roflumilast (ZORYVE) 0.3 % FOAM  sulfacetamide sodium, Acne, 10 % lotion  sulfacetamide sodium, Acne, 10 % lotion  tamsulosin (FLOMAX) 0.4 MG capsule  tiZANidine (ZANAFLEX) 4 MG tablet  tretinoin  "(RETIN-A) 0.025 % external cream  triamcinolone (KENALOG) 0.1 % external cream  ubrogepant (UBRELVY) 100 MG tablet  WEGOVY 0.5 MG/0.5ML pen          Review of Systems   All other systems reviewed and are negative.      Physical Exam   BP: 127/87  Pulse: 78  Temp: 98.4  F (36.9  C)  Resp: 26  Height: 175.3 cm (5' 9\")  Weight: 103.8 kg (228 lb 12.8 oz)  SpO2: 98 %      Physical Exam  Vitals and nursing note reviewed.   Constitutional:       General: She is not in acute distress.     Appearance: She is not diaphoretic.   HENT:      Head: Normocephalic and atraumatic.      Right Ear: External ear normal.      Left Ear: External ear normal.      Nose: Nose normal.      Mouth/Throat:      Pharynx: No oropharyngeal exudate.   Eyes:      General: No scleral icterus.        Right eye: No discharge.         Left eye: No discharge.      Conjunctiva/sclera: Conjunctivae normal.      Pupils: Pupils are equal, round, and reactive to light.   Neck:      Thyroid: No thyromegaly.   Cardiovascular:      Rate and Rhythm: Normal rate and regular rhythm.      Heart sounds: Normal heart sounds. No murmur heard.  Pulmonary:      Effort: Pulmonary effort is normal. No respiratory distress.      Breath sounds: Normal breath sounds. No wheezing or rales.   Chest:      Chest wall: No tenderness.   Abdominal:      General: Bowel sounds are normal. There is no distension.      Palpations: Abdomen is soft. There is no mass.      Tenderness: There is no abdominal tenderness. There is no guarding or rebound.   Musculoskeletal:         General: Tenderness (Tenderness of the anterior and lateral pelvis area.  No crepitus, step-off, bruising, or swelling.  Pain with any range of motion of her bilateral hips.  There is no lower extremity edema.  No calf tenderness.  Negative Homans' sign.  .) present. No deformity. Normal range of motion.      Cervical back: Normal range of motion and neck supple.      Comments: Distal pulses 2+. Sensation intact to " "light touch. DTRs 2-4 without clonus. Negative SLR   Lymphadenopathy:      Cervical: No cervical adenopathy.   Skin:     General: Skin is warm and dry.      Capillary Refill: Capillary refill takes less than 2 seconds.      Findings: No erythema or rash.   Neurological:      Mental Status: She is alert and oriented to person, place, and time.      Cranial Nerves: No cranial nerve deficit.   Psychiatric:         Behavior: Behavior normal.         Thought Content: Thought content normal.         ED Course        Procedures              Critical Care time:  none     None         No results found for this or any previous visit (from the past 24 hours).    Medications   HYDROmorphone (DILAUDID) injection 1 mg (1 mg Intramuscular $Given 7/30/25 2013)   ketorolac (TORADOL) injection 30 mg (30 mg Intramuscular $Given 7/30/25 2016)   orphenadrine (NORFLEX) injection 60 mg (60 mg Intramuscular $Given 7/30/25 2019)       Assessments & Plan (with Medical Decision Making)     Chronic pain of both hips  Labral tear of hip joint     29 year old female presents for evaluation of chronic hip pain.  She is frustrated in regards to the level of pain that she is experiencing that is not improving with the Butrans patch that was applied within the past 6 days.  No new fall or injury.  Suffers from chronic neuropathy.  Chart was reviewed in detail as noted above including her most recent sports medicine evaluation, most recent ED evaluation, most recent pain clinic evaluation, and I also reviewed her MRI femur, recent biopsy results, and MRI bilateral hip.  She does have labrum tears noted.  /85   Pulse 70   Temp 98.4  F (36.9  C) (Temporal)   Resp 20   Ht 1.753 m (5' 9\")   Wt 103.8 kg (228 lb 12.8 oz)   LMP 07/22/2025 (Exact Date)   SpO2 98%   BMI 33.79 kg/m     Generally healthy-appearing female in no acute distress.  She is very slow with ambulation and uses crutches.  She appears to be in pain with any movement.  She " does have pain upon palpation of the anterior lateral pelvis/hip area.  Pain with range of motion of the hips.  No lower extremity edema.  No calf tenderness.  Negative Homans' sign.  Remainder of the exam is otherwise negative.  CMS intact.  Chart was reviewed in detail.  This appears to be an ongoing chronic problem.  She does have catching, locking, and feeling like glass is in the hip joints when she walks.  This certainly could be related to labrum tears.  She had MRI imaging confirming the diagnosis back in January which is almost 7 months ago.  I have recommended an orthopedic consultation in regards to these labrum tears to see if she would potentially be a surgical candidate given her lack of improvement with conservative management.  I reviewed this with her in detail.  We went through her chart together.  Ultimately, any ongoing pain management plan should be carried out with her pain management team and/or PCP.  I did treat her acute pain here in the emergency department with IM Dilaudid, IM Norflex, and IM Toradol.  I sent a staff message to her pain clinic provider to see if there is any possibility for follow-up within the next couple days for this patient to help her with her pain management prior to seeing orthopedics for a consultation.  Indications for ED return were reviewed with the patient in detail.  She was in agreement with this plan and was suitable for discharge.  Her boyfriend was present in the ED to drive her home.  I spent 40 minutes with this patient going over her scenario in detail.     I have reviewed the nursing notes.    I have reviewed the findings, diagnosis, plan and need for follow up with the patient.           Medical Decision Making  The patient's presentation was of high complexity (a chronic illness severe exacerbation, progression, or side effect of treatment).    The patient's evaluation involved:  review of external note(s) from 3+ sources (see separate area of note  for details)  review of 3+ test result(s) ordered prior to this encounter (see separate area of note for details)    The patient's management necessitated high risk (a parenteral controlled substance).        Discharge Medication List as of 7/30/2025  8:48 PM          Final diagnoses:   Chronic pain of both hips   Labral tear of hip joint     Disclaimer: This note consists of symbols derived from keyboarding, dictation and/or voice recognition software. As a result, there may be errors in the script that have gone undetected. Please consider this when interpreting information found in this chart.        7/30/2025   [facility] EMERGENCY DEPT       Victor Manuel Weber PA-C  07/30/25 7229

## 2025-07-30 NOTE — ED TRIAGE NOTES
29 year old female who presents in the ED for concerns of right hip pain that radiates down the right knee that started 3 days ago rates it as 10/10 pain score. Non-Trauma. PT was seen here on Friday for bilateral hip and bilateral leg pain.

## 2025-07-31 ENCOUNTER — THERAPY VISIT (OUTPATIENT)
Dept: PHYSICAL THERAPY | Facility: CLINIC | Age: 30
End: 2025-07-31
Attending: NURSE PRACTITIONER
Payer: COMMERCIAL

## 2025-07-31 ENCOUNTER — TRANSFERRED RECORDS (OUTPATIENT)
Dept: HEALTH INFORMATION MANAGEMENT | Facility: CLINIC | Age: 30
End: 2025-07-31

## 2025-07-31 DIAGNOSIS — M79.7 FIBROMYALGIA: Primary | ICD-10-CM

## 2025-07-31 NOTE — DISCHARGE INSTRUCTIONS
It was a pleasure working with you today.      I placed a referral with orthopedics.  They should be contacting you tomorrow with options.  Please see them for consultation regarding your hip labral tear.  Make sure that you have your MRI images available during the consultation.

## 2025-08-01 SDOH — HEALTH STABILITY: PHYSICAL HEALTH: ON AVERAGE, HOW MANY DAYS PER WEEK DO YOU ENGAGE IN MODERATE TO STRENUOUS EXERCISE (LIKE A BRISK WALK)?: 0 DAYS

## 2025-08-01 SDOH — HEALTH STABILITY: PHYSICAL HEALTH: ON AVERAGE, HOW MANY MINUTES DO YOU ENGAGE IN EXERCISE AT THIS LEVEL?: 0 MIN

## 2025-08-01 ASSESSMENT — PAIN SCALES - PAIN ENJOYMENT GENERAL ACTIVITY SCALE (PEG)
AVG_PAIN_PASTWEEK: 10
INTERFERED_ENJOYMENT_LIFE: 10 - COMPLETELY INTERFERES
PEG_TOTALSCORE: 10
AVG_PAIN_PASTWEEK: 10 - PAIN AS BAD AS YOU CAN IMAGINE
INTERFERED_GENERAL_ACTIVITY: 10
INTERFERED_ENJOYMENT_LIFE: 10
INTERFERED_GENERAL_ACTIVITY: 10 - COMPLETELY INTERFERES

## 2025-08-01 ASSESSMENT — SOCIAL DETERMINANTS OF HEALTH (SDOH): HOW OFTEN DO YOU GET TOGETHER WITH FRIENDS OR RELATIVES?: NEVER

## 2025-08-03 ENCOUNTER — HOSPITAL ENCOUNTER (EMERGENCY)
Facility: CLINIC | Age: 30
Discharge: HOME | End: 2025-08-03
Attending: FAMILY MEDICINE | Admitting: FAMILY MEDICINE
Payer: COMMERCIAL

## 2025-08-03 VITALS
TEMPERATURE: 97.9 F | WEIGHT: 220 LBS | HEART RATE: 83 BPM | SYSTOLIC BLOOD PRESSURE: 129 MMHG | OXYGEN SATURATION: 96 % | DIASTOLIC BLOOD PRESSURE: 95 MMHG | HEIGHT: 69 IN | RESPIRATION RATE: 18 BRPM | BODY MASS INDEX: 32.58 KG/M2

## 2025-08-03 DIAGNOSIS — M62.89 MYONECROSIS (H): ICD-10-CM

## 2025-08-03 DIAGNOSIS — I96 MYONECROSIS (H): ICD-10-CM

## 2025-08-03 DIAGNOSIS — G89.4 CHRONIC PAIN DISORDER: Primary | ICD-10-CM

## 2025-08-03 DIAGNOSIS — M79.605 BILATERAL LEG PAIN: ICD-10-CM

## 2025-08-03 DIAGNOSIS — M79.604 BILATERAL LEG PAIN: ICD-10-CM

## 2025-08-03 LAB
ALBUMIN UR-MCNC: NEGATIVE MG/DL
AMPHETAMINES UR QL SCN: ABNORMAL
APPEARANCE UR: CLEAR
BARBITURATES UR QL SCN: ABNORMAL
BENZODIAZ UR QL SCN: ABNORMAL
BILIRUB UR QL STRIP: NEGATIVE
BZE UR QL SCN: ABNORMAL
CANNABINOIDS UR QL SCN: ABNORMAL
COLOR UR AUTO: ABNORMAL
FENTANYL UR QL: ABNORMAL
GLUCOSE UR STRIP-MCNC: NEGATIVE MG/DL
HGB UR QL STRIP: NEGATIVE
KETONES UR STRIP-MCNC: NEGATIVE MG/DL
LEUKOCYTE ESTERASE UR QL STRIP: NEGATIVE
MUCOUS THREADS #/AREA URNS LPF: PRESENT /LPF
NITRATE UR QL: NEGATIVE
OPIATES UR QL SCN: ABNORMAL
PCP QUAL URINE (ROCHE): ABNORMAL
PH UR STRIP: 6.5 [PH] (ref 5–7)
RBC URINE: 2 /HPF
SP GR UR STRIP: 1.01 (ref 1–1.03)
SQUAMOUS EPITHELIAL: <1 /HPF
UROBILINOGEN UR STRIP-MCNC: NORMAL MG/DL
WBC URINE: 1 /HPF

## 2025-08-03 PROCEDURE — 81003 URINALYSIS AUTO W/O SCOPE: CPT | Performed by: FAMILY MEDICINE

## 2025-08-03 PROCEDURE — 99284 EMERGENCY DEPT VISIT MOD MDM: CPT | Performed by: FAMILY MEDICINE

## 2025-08-03 PROCEDURE — 96372 THER/PROPH/DIAG INJ SC/IM: CPT | Performed by: FAMILY MEDICINE

## 2025-08-03 PROCEDURE — 80307 DRUG TEST PRSMV CHEM ANLYZR: CPT | Performed by: FAMILY MEDICINE

## 2025-08-03 PROCEDURE — 250N000011 HC RX IP 250 OP 636: Performed by: FAMILY MEDICINE

## 2025-08-03 RX ORDER — ORPHENADRINE CITRATE 30 MG/ML
60 INJECTION INTRAMUSCULAR; INTRAVENOUS ONCE
Status: COMPLETED | OUTPATIENT
Start: 2025-08-03 | End: 2025-08-03

## 2025-08-03 RX ORDER — KETOROLAC TROMETHAMINE 30 MG/ML
60 INJECTION, SOLUTION INTRAMUSCULAR; INTRAVENOUS ONCE
Status: COMPLETED | OUTPATIENT
Start: 2025-08-03 | End: 2025-08-03

## 2025-08-03 RX ADMIN — ORPHENADRINE CITRATE 60 MG: 60 INJECTION INTRAMUSCULAR; INTRAVENOUS at 06:43

## 2025-08-03 RX ADMIN — KETOROLAC TROMETHAMINE 60 MG: 30 INJECTION, SOLUTION INTRAMUSCULAR at 06:44

## 2025-08-03 ASSESSMENT — ACTIVITIES OF DAILY LIVING (ADL)
ADLS_ACUITY_SCORE: 54

## 2025-08-03 ASSESSMENT — COLUMBIA-SUICIDE SEVERITY RATING SCALE - C-SSRS
6. HAVE YOU EVER DONE ANYTHING, STARTED TO DO ANYTHING, OR PREPARED TO DO ANYTHING TO END YOUR LIFE?: NO
2. HAVE YOU ACTUALLY HAD ANY THOUGHTS OF KILLING YOURSELF IN THE PAST MONTH?: NO
1. IN THE PAST MONTH, HAVE YOU WISHED YOU WERE DEAD OR WISHED YOU COULD GO TO SLEEP AND NOT WAKE UP?: NO

## 2025-08-03 ASSESSMENT — ENCOUNTER SYMPTOMS: FEVER: 0

## 2025-08-04 ENCOUNTER — OFFICE VISIT (OUTPATIENT)
Dept: PALLIATIVE MEDICINE | Facility: CLINIC | Age: 30
End: 2025-08-04
Payer: COMMERCIAL

## 2025-08-04 ENCOUNTER — MYC MEDICAL ADVICE (OUTPATIENT)
Dept: NEUROLOGY | Facility: CLINIC | Age: 30
End: 2025-08-04

## 2025-08-04 VITALS — DIASTOLIC BLOOD PRESSURE: 85 MMHG | HEART RATE: 70 BPM | SYSTOLIC BLOOD PRESSURE: 127 MMHG

## 2025-08-04 DIAGNOSIS — M54.50 CHRONIC BILATERAL LOW BACK PAIN WITHOUT SCIATICA: ICD-10-CM

## 2025-08-04 DIAGNOSIS — G89.29 CHRONIC BILATERAL LOW BACK PAIN WITHOUT SCIATICA: ICD-10-CM

## 2025-08-04 DIAGNOSIS — M79.651 BILATERAL THIGH PAIN: Primary | ICD-10-CM

## 2025-08-04 DIAGNOSIS — R53.1 WEAKNESS GENERALIZED: ICD-10-CM

## 2025-08-04 DIAGNOSIS — M25.551 BILATERAL HIP PAIN: ICD-10-CM

## 2025-08-04 DIAGNOSIS — M62.838 MUSCLE SPASM: ICD-10-CM

## 2025-08-04 DIAGNOSIS — G57.03 BILATERAL PIRIFORMIS SYNDROME: Primary | ICD-10-CM

## 2025-08-04 DIAGNOSIS — R74.8 ELEVATED CK: ICD-10-CM

## 2025-08-04 DIAGNOSIS — M79.672 PAIN IN BOTH FEET: ICD-10-CM

## 2025-08-04 DIAGNOSIS — M25.552 BILATERAL HIP PAIN: ICD-10-CM

## 2025-08-04 DIAGNOSIS — M79.18 MYOFASCIAL PAIN: ICD-10-CM

## 2025-08-04 DIAGNOSIS — M54.42 LEFT-SIDED LOW BACK PAIN WITH LEFT-SIDED SCIATICA, UNSPECIFIED CHRONICITY: ICD-10-CM

## 2025-08-04 DIAGNOSIS — M79.652 BILATERAL THIGH PAIN: Primary | ICD-10-CM

## 2025-08-04 DIAGNOSIS — M47.817 LUMBOSACRAL SPONDYLOSIS WITHOUT MYELOPATHY: ICD-10-CM

## 2025-08-04 DIAGNOSIS — M79.671 PAIN IN BOTH FEET: ICD-10-CM

## 2025-08-04 PROCEDURE — 3074F SYST BP LT 130 MM HG: CPT | Performed by: NURSE PRACTITIONER

## 2025-08-04 PROCEDURE — 3079F DIAST BP 80-89 MM HG: CPT | Performed by: NURSE PRACTITIONER

## 2025-08-04 PROCEDURE — 99417 PROLNG OP E/M EACH 15 MIN: CPT | Performed by: NURSE PRACTITIONER

## 2025-08-04 PROCEDURE — 99215 OFFICE O/P EST HI 40 MIN: CPT | Performed by: NURSE PRACTITIONER

## 2025-08-04 PROCEDURE — 1125F AMNT PAIN NOTED PAIN PRSNT: CPT | Performed by: NURSE PRACTITIONER

## 2025-08-04 PROCEDURE — G2211 COMPLEX E/M VISIT ADD ON: HCPCS | Performed by: NURSE PRACTITIONER

## 2025-08-04 RX ORDER — BACLOFEN 10 MG/1
10 TABLET ORAL 3 TIMES DAILY PRN
Qty: 45 TABLET | Refills: 1 | Status: SHIPPED | OUTPATIENT
Start: 2025-08-04 | End: 2025-08-19

## 2025-08-04 ASSESSMENT — PAIN SCALES - GENERAL: PAINLEVEL_OUTOF10: SEVERE PAIN (10)

## 2025-08-05 ENCOUNTER — OFFICE VISIT (OUTPATIENT)
Dept: URGENT CARE | Facility: URGENT CARE | Age: 30
End: 2025-08-05
Payer: COMMERCIAL

## 2025-08-05 VITALS
TEMPERATURE: 98 F | OXYGEN SATURATION: 97 % | HEART RATE: 73 BPM | RESPIRATION RATE: 16 BRPM | DIASTOLIC BLOOD PRESSURE: 85 MMHG | WEIGHT: 220 LBS | SYSTOLIC BLOOD PRESSURE: 122 MMHG | BODY MASS INDEX: 32.58 KG/M2 | HEIGHT: 69 IN

## 2025-08-05 DIAGNOSIS — J06.9 VIRAL URI WITH COUGH: Primary | ICD-10-CM

## 2025-08-05 DIAGNOSIS — J02.9 SORE THROAT: ICD-10-CM

## 2025-08-05 DIAGNOSIS — R52 BODY ACHES: ICD-10-CM

## 2025-08-05 DIAGNOSIS — M60.9 MYOSITIS OF LOWER EXTREMITY, UNSPECIFIED LATERALITY, UNSPECIFIED MYOSITIS TYPE: ICD-10-CM

## 2025-08-05 LAB
DEPRECATED S PYO AG THROAT QL EIA: NEGATIVE
S PYO DNA THROAT QL NAA+PROBE: NOT DETECTED

## 2025-08-05 PROCEDURE — 87651 STREP A DNA AMP PROBE: CPT | Performed by: NURSE PRACTITIONER

## 2025-08-05 PROCEDURE — 87635 SARS-COV-2 COVID-19 AMP PRB: CPT | Performed by: NURSE PRACTITIONER

## 2025-08-05 PROCEDURE — 3074F SYST BP LT 130 MM HG: CPT | Performed by: NURSE PRACTITIONER

## 2025-08-05 PROCEDURE — 3079F DIAST BP 80-89 MM HG: CPT | Performed by: NURSE PRACTITIONER

## 2025-08-05 PROCEDURE — 1125F AMNT PAIN NOTED PAIN PRSNT: CPT | Performed by: NURSE PRACTITIONER

## 2025-08-05 PROCEDURE — 99214 OFFICE O/P EST MOD 30 MIN: CPT | Performed by: NURSE PRACTITIONER

## 2025-08-05 RX ORDER — LIDOCAINE HYDROCHLORIDE 20 MG/ML
15 SOLUTION OROPHARYNGEAL
Qty: 100 ML | Refills: 0 | Status: SHIPPED | OUTPATIENT
Start: 2025-08-05

## 2025-08-05 ASSESSMENT — PATIENT HEALTH QUESTIONNAIRE - PHQ9
SUM OF ALL RESPONSES TO PHQ QUESTIONS 1-9: 25
SUM OF ALL RESPONSES TO PHQ QUESTIONS 1-9: 25
10. IF YOU CHECKED OFF ANY PROBLEMS, HOW DIFFICULT HAVE THESE PROBLEMS MADE IT FOR YOU TO DO YOUR WORK, TAKE CARE OF THINGS AT HOME, OR GET ALONG WITH OTHER PEOPLE: EXTREMELY DIFFICULT

## 2025-08-05 ASSESSMENT — PAIN SCALES - GENERAL: PAINLEVEL_OUTOF10: SEVERE PAIN (9)

## 2025-08-06 ENCOUNTER — OFFICE VISIT (OUTPATIENT)
Dept: FAMILY MEDICINE | Facility: OTHER | Age: 30
End: 2025-08-06
Payer: COMMERCIAL

## 2025-08-06 ENCOUNTER — OFFICE VISIT (OUTPATIENT)
Dept: GASTROENTEROLOGY | Facility: CLINIC | Age: 30
End: 2025-08-06
Attending: PHYSICIAN ASSISTANT
Payer: COMMERCIAL

## 2025-08-06 ENCOUNTER — MYC MEDICAL ADVICE (OUTPATIENT)
Dept: PALLIATIVE MEDICINE | Facility: CLINIC | Age: 30
End: 2025-08-06

## 2025-08-06 VITALS
TEMPERATURE: 99.2 F | SYSTOLIC BLOOD PRESSURE: 102 MMHG | DIASTOLIC BLOOD PRESSURE: 68 MMHG | RESPIRATION RATE: 26 BRPM | HEIGHT: 69 IN | BODY MASS INDEX: 34.21 KG/M2 | OXYGEN SATURATION: 92 % | HEART RATE: 98 BPM | WEIGHT: 231 LBS

## 2025-08-06 VITALS
DIASTOLIC BLOOD PRESSURE: 76 MMHG | HEIGHT: 69 IN | OXYGEN SATURATION: 94 % | WEIGHT: 220 LBS | HEART RATE: 89 BPM | BODY MASS INDEX: 32.58 KG/M2 | SYSTOLIC BLOOD PRESSURE: 116 MMHG

## 2025-08-06 DIAGNOSIS — Z00.00 ROUTINE GENERAL MEDICAL EXAMINATION AT A HEALTH CARE FACILITY: Primary | ICD-10-CM

## 2025-08-06 DIAGNOSIS — M25.559 CHRONIC HIP PAIN, UNSPECIFIED LATERALITY: ICD-10-CM

## 2025-08-06 DIAGNOSIS — M54.42 CHRONIC LEFT-SIDED LOW BACK PAIN WITH LEFT-SIDED SCIATICA: ICD-10-CM

## 2025-08-06 DIAGNOSIS — R60.0 PERIPHERAL EDEMA: ICD-10-CM

## 2025-08-06 DIAGNOSIS — R11.2 NAUSEA AND VOMITING, UNSPECIFIED VOMITING TYPE: Primary | ICD-10-CM

## 2025-08-06 DIAGNOSIS — G89.29 CHRONIC HIP PAIN, UNSPECIFIED LATERALITY: ICD-10-CM

## 2025-08-06 DIAGNOSIS — R19.5 LOOSE STOOLS: ICD-10-CM

## 2025-08-06 DIAGNOSIS — J06.9 VIRAL URI WITH COUGH: ICD-10-CM

## 2025-08-06 DIAGNOSIS — G89.29 CHRONIC LEFT-SIDED LOW BACK PAIN WITH LEFT-SIDED SCIATICA: ICD-10-CM

## 2025-08-06 DIAGNOSIS — F31.81 BIPOLAR II DISORDER (H): ICD-10-CM

## 2025-08-06 DIAGNOSIS — R12 HEARTBURN: ICD-10-CM

## 2025-08-06 DIAGNOSIS — R10.30 LOWER ABDOMINAL PAIN: ICD-10-CM

## 2025-08-06 DIAGNOSIS — M79.7 FIBROMYALGIA: ICD-10-CM

## 2025-08-06 LAB — SARS-COV-2 RNA RESP QL NAA+PROBE: NEGATIVE

## 2025-08-06 PROCEDURE — 1111F DSCHRG MED/CURRENT MED MERGE: CPT | Performed by: PHYSICIAN ASSISTANT

## 2025-08-06 PROCEDURE — G2211 COMPLEX E/M VISIT ADD ON: HCPCS | Performed by: PHYSICIAN ASSISTANT

## 2025-08-06 PROCEDURE — 3078F DIAST BP <80 MM HG: CPT | Performed by: PHYSICIAN ASSISTANT

## 2025-08-06 PROCEDURE — 1125F AMNT PAIN NOTED PAIN PRSNT: CPT | Performed by: PHYSICIAN ASSISTANT

## 2025-08-06 PROCEDURE — 3074F SYST BP LT 130 MM HG: CPT | Performed by: PHYSICIAN ASSISTANT

## 2025-08-06 PROCEDURE — 99213 OFFICE O/P EST LOW 20 MIN: CPT | Mod: 25 | Performed by: PHYSICIAN ASSISTANT

## 2025-08-06 PROCEDURE — 99395 PREV VISIT EST AGE 18-39: CPT | Performed by: PHYSICIAN ASSISTANT

## 2025-08-06 RX ORDER — GUAIFENESIN 600 MG/1
1200 TABLET, EXTENDED RELEASE ORAL 2 TIMES DAILY
Qty: 40 TABLET | Refills: 1 | Status: SHIPPED | OUTPATIENT
Start: 2025-08-06

## 2025-08-06 RX ORDER — DIPHENHYDRAMINE HCL 25 MG
TABLET ORAL
COMMUNITY

## 2025-08-06 RX ORDER — FUROSEMIDE 20 MG/1
20 TABLET ORAL DAILY
Qty: 90 TABLET | Refills: 1 | Status: SHIPPED | OUTPATIENT
Start: 2025-08-06

## 2025-08-06 RX ORDER — OMEPRAZOLE 40 MG/1
40 CAPSULE, DELAYED RELEASE ORAL 2 TIMES DAILY
Qty: 60 CAPSULE | Refills: 3 | Status: SHIPPED | OUTPATIENT
Start: 2025-08-06 | End: 2025-12-04

## 2025-08-06 RX ORDER — ALBUTEROL SULFATE 90 UG/1
2 INHALANT RESPIRATORY (INHALATION) EVERY 6 HOURS PRN
Qty: 18 G | Refills: 2 | Status: SHIPPED | OUTPATIENT
Start: 2025-08-06

## 2025-08-06 RX ORDER — FAMOTIDINE 20 MG/1
20 TABLET, FILM COATED ORAL
Qty: 30 TABLET | Refills: 3 | Status: SHIPPED | OUTPATIENT
Start: 2025-08-06 | End: 2025-12-04

## 2025-08-06 ASSESSMENT — PAIN SCALES - GENERAL
PAINLEVEL_OUTOF10: SEVERE PAIN (10)
PAINLEVEL_OUTOF10: SEVERE PAIN (10)

## 2025-08-06 ASSESSMENT — COLUMBIA-SUICIDE SEVERITY RATING SCALE - C-SSRS
6. HAVE YOU EVER DONE ANYTHING, STARTED TO DO ANYTHING, OR PREPARED TO DO ANYTHING TO END YOUR LIFE?: YES, LIFETIME
1. WITHIN THE PAST MONTH, HAVE YOU WISHED YOU WERE DEAD OR WISHED YOU COULD GO TO SLEEP AND NOT WAKE UP?: YES
2. IN THE PAST MONTH, HAVE YOU ACTUALLY HAD ANY THOUGHTS OF KILLING YOURSELF?: NO

## 2025-08-07 ENCOUNTER — MYC MEDICAL ADVICE (OUTPATIENT)
Dept: PULMONOLOGY | Facility: CLINIC | Age: 30
End: 2025-08-07

## 2025-08-07 ENCOUNTER — MYC MEDICAL ADVICE (OUTPATIENT)
Dept: PALLIATIVE MEDICINE | Facility: CLINIC | Age: 30
End: 2025-08-07

## 2025-08-07 DIAGNOSIS — M79.2 NEUROPATHIC PAIN: ICD-10-CM

## 2025-08-07 DIAGNOSIS — M25.551 BILATERAL HIP PAIN: ICD-10-CM

## 2025-08-07 DIAGNOSIS — M79.671 BILATERAL FOOT PAIN: ICD-10-CM

## 2025-08-07 DIAGNOSIS — M79.672 BILATERAL FOOT PAIN: ICD-10-CM

## 2025-08-07 DIAGNOSIS — M79.18 DIFFUSE MYOFASCIAL PAIN SYNDROME: ICD-10-CM

## 2025-08-07 DIAGNOSIS — M79.7 FIBROMYALGIA: ICD-10-CM

## 2025-08-07 DIAGNOSIS — S73.192D TEAR OF LEFT ACETABULAR LABRUM, SUBSEQUENT ENCOUNTER: ICD-10-CM

## 2025-08-07 DIAGNOSIS — S73.191D TEAR OF RIGHT ACETABULAR LABRUM, SUBSEQUENT ENCOUNTER: ICD-10-CM

## 2025-08-07 DIAGNOSIS — M25.552 BILATERAL HIP PAIN: ICD-10-CM

## 2025-08-07 DIAGNOSIS — F11.90 CHRONIC, CONTINUOUS USE OF OPIOIDS: ICD-10-CM

## 2025-08-08 ENCOUNTER — HOSPITAL ENCOUNTER (OUTPATIENT)
Dept: RADIOLOGY | Facility: HOSPITAL | Age: 30
Discharge: HOME | End: 2025-08-08
Attending: INTERNAL MEDICINE | Admitting: INTERNAL MEDICINE
Payer: COMMERCIAL

## 2025-08-08 DIAGNOSIS — R06.02 SHORTNESS OF BREATH: ICD-10-CM

## 2025-08-08 DIAGNOSIS — G47.34 NOCTURNAL HYPOXEMIA: ICD-10-CM

## 2025-08-08 PROCEDURE — 76000 FLUOROSCOPY <1 HR PHYS/QHP: CPT

## 2025-08-11 DIAGNOSIS — R74.8 ELEVATED CK: ICD-10-CM

## 2025-08-11 DIAGNOSIS — M79.651 BILATERAL THIGH PAIN: Primary | ICD-10-CM

## 2025-08-11 DIAGNOSIS — M54.42 LEFT-SIDED LOW BACK PAIN WITH LEFT-SIDED SCIATICA, UNSPECIFIED CHRONICITY: ICD-10-CM

## 2025-08-11 DIAGNOSIS — M79.672 PAIN IN BOTH FEET: ICD-10-CM

## 2025-08-11 DIAGNOSIS — M79.671 PAIN IN BOTH FEET: ICD-10-CM

## 2025-08-11 DIAGNOSIS — M79.652 BILATERAL THIGH PAIN: Primary | ICD-10-CM

## 2025-08-11 DIAGNOSIS — R53.1 WEAKNESS GENERALIZED: ICD-10-CM

## 2025-08-11 RX ORDER — BUPRENORPHINE 15 UG/H
1 PATCH TRANSDERMAL
Qty: 4 PATCH | Refills: 0 | Status: SHIPPED | OUTPATIENT
Start: 2025-08-11

## 2025-08-12 ENCOUNTER — TELEPHONE (OUTPATIENT)
Dept: UROLOGY | Facility: CLINIC | Age: 30
End: 2025-08-12
Payer: COMMERCIAL

## 2025-08-13 ENCOUNTER — THERAPY VISIT (OUTPATIENT)
Dept: PHYSICAL THERAPY | Facility: CLINIC | Age: 30
End: 2025-08-13
Payer: COMMERCIAL

## 2025-08-13 ENCOUNTER — HOSPITAL ENCOUNTER (EMERGENCY)
Facility: CLINIC | Age: 30
End: 2025-08-13
Payer: COMMERCIAL

## 2025-08-13 ENCOUNTER — HOSPITAL ENCOUNTER (OUTPATIENT)
Dept: CT IMAGING | Facility: CLINIC | Age: 30
Discharge: HOME | End: 2025-08-13
Attending: PHYSICIAN ASSISTANT
Payer: COMMERCIAL

## 2025-08-13 DIAGNOSIS — M79.7 FIBROMYALGIA: Primary | ICD-10-CM

## 2025-08-13 DIAGNOSIS — R11.2 NAUSEA AND VOMITING, UNSPECIFIED VOMITING TYPE: ICD-10-CM

## 2025-08-13 PROCEDURE — 97110 THERAPEUTIC EXERCISES: CPT | Mod: GP | Performed by: PHYSICAL THERAPIST

## 2025-08-13 PROCEDURE — 74177 CT ABD & PELVIS W/CONTRAST: CPT

## 2025-08-13 PROCEDURE — 250N000009 HC RX 250: Performed by: PHYSICIAN ASSISTANT

## 2025-08-13 PROCEDURE — 250N000011 HC RX IP 250 OP 636: Performed by: PHYSICIAN ASSISTANT

## 2025-08-13 PROCEDURE — 97112 NEUROMUSCULAR REEDUCATION: CPT | Mod: GP | Performed by: PHYSICAL THERAPIST

## 2025-08-13 PROCEDURE — 97530 THERAPEUTIC ACTIVITIES: CPT | Mod: GP | Performed by: PHYSICAL THERAPIST

## 2025-08-13 RX ORDER — IOPAMIDOL 755 MG/ML
500 INJECTION, SOLUTION INTRAVASCULAR ONCE
Status: COMPLETED | OUTPATIENT
Start: 2025-08-13 | End: 2025-08-13

## 2025-08-13 RX ADMIN — SODIUM CHLORIDE 60 ML: 9 INJECTION, SOLUTION INTRAVENOUS at 16:09

## 2025-08-13 RX ADMIN — IOPAMIDOL 100 ML: 755 INJECTION, SOLUTION INTRAVENOUS at 16:09

## 2025-08-14 ENCOUNTER — HOSPITAL ENCOUNTER (EMERGENCY)
Facility: CLINIC | Age: 30
Discharge: HOME | End: 2025-08-14
Attending: STUDENT IN AN ORGANIZED HEALTH CARE EDUCATION/TRAINING PROGRAM
Payer: COMMERCIAL

## 2025-08-14 VITALS
OXYGEN SATURATION: 98 % | TEMPERATURE: 97.8 F | DIASTOLIC BLOOD PRESSURE: 80 MMHG | HEART RATE: 79 BPM | RESPIRATION RATE: 16 BRPM | SYSTOLIC BLOOD PRESSURE: 113 MMHG

## 2025-08-14 DIAGNOSIS — M79.652 PAIN OF LEFT THIGH: Primary | ICD-10-CM

## 2025-08-14 PROCEDURE — 96375 TX/PRO/DX INJ NEW DRUG ADDON: CPT | Performed by: STUDENT IN AN ORGANIZED HEALTH CARE EDUCATION/TRAINING PROGRAM

## 2025-08-14 PROCEDURE — 99284 EMERGENCY DEPT VISIT MOD MDM: CPT | Mod: 25 | Performed by: STUDENT IN AN ORGANIZED HEALTH CARE EDUCATION/TRAINING PROGRAM

## 2025-08-14 PROCEDURE — 96374 THER/PROPH/DIAG INJ IV PUSH: CPT | Performed by: STUDENT IN AN ORGANIZED HEALTH CARE EDUCATION/TRAINING PROGRAM

## 2025-08-14 PROCEDURE — 250N000011 HC RX IP 250 OP 636: Performed by: STUDENT IN AN ORGANIZED HEALTH CARE EDUCATION/TRAINING PROGRAM

## 2025-08-14 PROCEDURE — 99284 EMERGENCY DEPT VISIT MOD MDM: CPT | Performed by: STUDENT IN AN ORGANIZED HEALTH CARE EDUCATION/TRAINING PROGRAM

## 2025-08-14 RX ORDER — ORPHENADRINE CITRATE 30 MG/ML
60 INJECTION INTRAMUSCULAR; INTRAVENOUS ONCE
Status: COMPLETED | OUTPATIENT
Start: 2025-08-14 | End: 2025-08-14

## 2025-08-14 RX ORDER — KETOROLAC TROMETHAMINE 30 MG/ML
30 INJECTION, SOLUTION INTRAMUSCULAR; INTRAVENOUS ONCE
Status: COMPLETED | OUTPATIENT
Start: 2025-08-14 | End: 2025-08-14

## 2025-08-14 RX ADMIN — KETOROLAC TROMETHAMINE 30 MG: 30 INJECTION, SOLUTION INTRAMUSCULAR at 00:53

## 2025-08-14 RX ADMIN — ORPHENADRINE CITRATE 60 MG: 60 INJECTION INTRAMUSCULAR; INTRAVENOUS at 00:52

## 2025-08-14 ASSESSMENT — ACTIVITIES OF DAILY LIVING (ADL): ADLS_ACUITY_SCORE: 54

## 2025-08-15 ENCOUNTER — HOSPITAL ENCOUNTER (OUTPATIENT)
Dept: MRI IMAGING | Facility: CLINIC | Age: 30
Discharge: HOME | End: 2025-08-15
Attending: STUDENT IN AN ORGANIZED HEALTH CARE EDUCATION/TRAINING PROGRAM
Payer: COMMERCIAL

## 2025-08-15 DIAGNOSIS — R74.8 ELEVATED CK: ICD-10-CM

## 2025-08-15 DIAGNOSIS — M79.671 PAIN IN BOTH FEET: ICD-10-CM

## 2025-08-15 DIAGNOSIS — M54.42 LEFT-SIDED LOW BACK PAIN WITH LEFT-SIDED SCIATICA, UNSPECIFIED CHRONICITY: ICD-10-CM

## 2025-08-15 DIAGNOSIS — M79.672 PAIN IN BOTH FEET: ICD-10-CM

## 2025-08-15 DIAGNOSIS — M79.652 BILATERAL THIGH PAIN: ICD-10-CM

## 2025-08-15 DIAGNOSIS — M79.651 BILATERAL THIGH PAIN: ICD-10-CM

## 2025-08-15 DIAGNOSIS — R53.1 WEAKNESS GENERALIZED: ICD-10-CM

## 2025-08-15 PROCEDURE — A9585 GADOBUTROL INJECTION: HCPCS | Performed by: STUDENT IN AN ORGANIZED HEALTH CARE EDUCATION/TRAINING PROGRAM

## 2025-08-15 PROCEDURE — 255N000002 HC RX 255 OP 636: Performed by: STUDENT IN AN ORGANIZED HEALTH CARE EDUCATION/TRAINING PROGRAM

## 2025-08-15 PROCEDURE — 73720 MRI LWR EXTREMITY W/O&W/DYE: CPT | Mod: 50

## 2025-08-15 PROCEDURE — 72148 MRI LUMBAR SPINE W/O DYE: CPT

## 2025-08-15 RX ORDER — GADOBUTROL 604.72 MG/ML
0.1 INJECTION INTRAVENOUS ONCE
Status: COMPLETED | OUTPATIENT
Start: 2025-08-15 | End: 2025-08-15

## 2025-08-15 RX ADMIN — GADOBUTROL 10 ML: 604.72 INJECTION INTRAVENOUS at 14:56

## 2025-08-18 DIAGNOSIS — R25.1 TREMOR: ICD-10-CM

## 2025-08-18 ASSESSMENT — PAIN SCALES - PAIN ENJOYMENT GENERAL ACTIVITY SCALE (PEG)
INTERFERED_GENERAL_ACTIVITY: 10 - COMPLETELY INTERFERES
AVG_PAIN_PASTWEEK: 10 - PAIN AS BAD AS YOU CAN IMAGINE
INTERFERED_ENJOYMENT_LIFE: 10 - COMPLETELY INTERFERES
INTERFERED_GENERAL_ACTIVITY: 10
AVG_PAIN_PASTWEEK: 10
PEG_TOTALSCORE: 10
INTERFERED_ENJOYMENT_LIFE: 10

## 2025-08-19 ENCOUNTER — OFFICE VISIT (OUTPATIENT)
Dept: PALLIATIVE MEDICINE | Facility: CLINIC | Age: 30
End: 2025-08-19
Payer: COMMERCIAL

## 2025-08-19 VITALS — DIASTOLIC BLOOD PRESSURE: 79 MMHG | HEART RATE: 79 BPM | SYSTOLIC BLOOD PRESSURE: 110 MMHG

## 2025-08-19 DIAGNOSIS — M79.7 FIBROMYALGIA: ICD-10-CM

## 2025-08-19 DIAGNOSIS — S73.192D TEAR OF LEFT ACETABULAR LABRUM, SUBSEQUENT ENCOUNTER: ICD-10-CM

## 2025-08-19 DIAGNOSIS — M79.671 BILATERAL FOOT PAIN: ICD-10-CM

## 2025-08-19 DIAGNOSIS — F41.9 ANXIETY DUE TO INVASIVE PROCEDURE: ICD-10-CM

## 2025-08-19 DIAGNOSIS — F11.90 CHRONIC, CONTINUOUS USE OF OPIOIDS: ICD-10-CM

## 2025-08-19 DIAGNOSIS — M79.18 MYOFASCIAL PAIN: ICD-10-CM

## 2025-08-19 DIAGNOSIS — M79.672 BILATERAL FOOT PAIN: ICD-10-CM

## 2025-08-19 DIAGNOSIS — M25.552 BILATERAL HIP PAIN: ICD-10-CM

## 2025-08-19 DIAGNOSIS — S73.191D TEAR OF RIGHT ACETABULAR LABRUM, SUBSEQUENT ENCOUNTER: ICD-10-CM

## 2025-08-19 DIAGNOSIS — M79.18 DIFFUSE MYOFASCIAL PAIN SYNDROME: ICD-10-CM

## 2025-08-19 DIAGNOSIS — G57.03 BILATERAL PIRIFORMIS SYNDROME: Primary | ICD-10-CM

## 2025-08-19 DIAGNOSIS — M25.551 BILATERAL HIP PAIN: ICD-10-CM

## 2025-08-19 DIAGNOSIS — M62.838 MUSCLE SPASM: ICD-10-CM

## 2025-08-19 DIAGNOSIS — M79.2 NEUROPATHIC PAIN: ICD-10-CM

## 2025-08-19 PROCEDURE — 99417 PROLNG OP E/M EACH 15 MIN: CPT | Performed by: NURSE PRACTITIONER

## 2025-08-19 PROCEDURE — 3074F SYST BP LT 130 MM HG: CPT | Performed by: NURSE PRACTITIONER

## 2025-08-19 PROCEDURE — 99215 OFFICE O/P EST HI 40 MIN: CPT | Performed by: NURSE PRACTITIONER

## 2025-08-19 PROCEDURE — 3078F DIAST BP <80 MM HG: CPT | Performed by: NURSE PRACTITIONER

## 2025-08-19 PROCEDURE — 1125F AMNT PAIN NOTED PAIN PRSNT: CPT | Performed by: NURSE PRACTITIONER

## 2025-08-19 RX ORDER — PROPRANOLOL HCL 20 MG
20 TABLET ORAL 2 TIMES DAILY PRN
Qty: 180 TABLET | Refills: 2 | Status: SHIPPED | OUTPATIENT
Start: 2025-08-19

## 2025-08-19 RX ORDER — BUPRENORPHINE 20 UG/H
1 PATCH TRANSDERMAL
Qty: 4 PATCH | Refills: 0 | Status: SHIPPED | OUTPATIENT
Start: 2025-08-19

## 2025-08-19 RX ORDER — BACLOFEN 10 MG/1
10-20 TABLET ORAL 3 TIMES DAILY PRN
Qty: 90 TABLET | Refills: 1 | Status: SHIPPED | OUTPATIENT
Start: 2025-08-19

## 2025-08-19 ASSESSMENT — PAIN SCALES - GENERAL: PAINLEVEL_OUTOF10: SEVERE PAIN (10)

## 2025-08-20 ENCOUNTER — TELEPHONE (OUTPATIENT)
Dept: NEUROLOGY | Facility: CLINIC | Age: 30
End: 2025-08-20

## 2025-08-20 ENCOUNTER — VIRTUAL VISIT (OUTPATIENT)
Dept: FAMILY MEDICINE | Facility: OTHER | Age: 30
End: 2025-08-20
Payer: COMMERCIAL

## 2025-08-20 DIAGNOSIS — M25.559 CHRONIC HIP PAIN, UNSPECIFIED LATERALITY: Primary | ICD-10-CM

## 2025-08-20 DIAGNOSIS — E66.811 CLASS 1 OBESITY WITH SERIOUS COMORBIDITY AND BODY MASS INDEX (BMI) OF 30.0 TO 30.9 IN ADULT, UNSPECIFIED OBESITY TYPE: ICD-10-CM

## 2025-08-20 DIAGNOSIS — G89.29 CHRONIC HIP PAIN, UNSPECIFIED LATERALITY: Primary | ICD-10-CM

## 2025-08-20 DIAGNOSIS — M54.42 CHRONIC LEFT-SIDED LOW BACK PAIN WITH LEFT-SIDED SCIATICA: ICD-10-CM

## 2025-08-20 DIAGNOSIS — G89.29 CHRONIC LEFT-SIDED LOW BACK PAIN WITH LEFT-SIDED SCIATICA: ICD-10-CM

## 2025-08-20 PROCEDURE — 98006 SYNCH AUDIO-VIDEO EST MOD 30: CPT | Performed by: PHYSICIAN ASSISTANT

## 2025-08-20 PROCEDURE — 1125F AMNT PAIN NOTED PAIN PRSNT: CPT | Mod: 95 | Performed by: PHYSICIAN ASSISTANT

## 2025-08-20 RX ORDER — CELECOXIB 100 MG/1
100 CAPSULE ORAL 2 TIMES DAILY
Qty: 60 CAPSULE | Refills: 0 | Status: SHIPPED | OUTPATIENT
Start: 2025-08-20

## 2025-08-21 DIAGNOSIS — M60.9 MYOSITIS OF LOWER EXTREMITY, UNSPECIFIED LATERALITY, UNSPECIFIED MYOSITIS TYPE: ICD-10-CM

## 2025-08-21 RX ORDER — LIDOCAINE 50 MG/G
PATCH TOPICAL
Qty: 30 PATCH | Refills: 1 | Status: SHIPPED | OUTPATIENT
Start: 2025-08-21

## 2025-08-23 ENCOUNTER — MYC MEDICAL ADVICE (OUTPATIENT)
Dept: FAMILY MEDICINE | Facility: OTHER | Age: 30
End: 2025-08-23
Payer: COMMERCIAL

## 2025-08-23 DIAGNOSIS — B00.9 HSV (HERPES SIMPLEX VIRUS) INFECTION: Primary | ICD-10-CM

## 2025-08-25 ENCOUNTER — OFFICE VISIT (OUTPATIENT)
Dept: GASTROENTEROLOGY | Facility: CLINIC | Age: 30
End: 2025-08-25
Payer: COMMERCIAL

## 2025-08-25 DIAGNOSIS — R19.5 LOOSE STOOLS: ICD-10-CM

## 2025-08-25 DIAGNOSIS — R11.2 NAUSEA AND VOMITING, UNSPECIFIED VOMITING TYPE: ICD-10-CM

## 2025-08-25 PROCEDURE — 99204 OFFICE O/P NEW MOD 45 MIN: CPT

## 2025-08-25 PROCEDURE — 97802 MEDICAL NUTRITION INDIV IN: CPT

## 2025-08-25 RX ORDER — VALACYCLOVIR HYDROCHLORIDE 1 G/1
2000 TABLET, FILM COATED ORAL 2 TIMES DAILY
Qty: 8 TABLET | Refills: 0 | Status: SHIPPED | OUTPATIENT
Start: 2025-08-25 | End: 2025-08-27

## 2025-08-26 ENCOUNTER — TRANSFERRED RECORDS (OUTPATIENT)
Dept: HEALTH INFORMATION MANAGEMENT | Facility: CLINIC | Age: 30
End: 2025-08-26
Payer: COMMERCIAL

## 2025-08-27 ENCOUNTER — MYC MEDICAL ADVICE (OUTPATIENT)
Dept: PHYSICAL THERAPY | Facility: CLINIC | Age: 30
End: 2025-08-27

## 2025-08-28 ENCOUNTER — OFFICE VISIT (OUTPATIENT)
Dept: URGENT CARE | Facility: URGENT CARE | Age: 30
End: 2025-08-28
Payer: COMMERCIAL

## 2025-08-28 ENCOUNTER — MYC MEDICAL ADVICE (OUTPATIENT)
Dept: PULMONOLOGY | Facility: CLINIC | Age: 30
End: 2025-08-28

## 2025-08-28 ENCOUNTER — ANCILLARY PROCEDURE (OUTPATIENT)
Dept: GENERAL RADIOLOGY | Facility: CLINIC | Age: 30
End: 2025-08-28
Attending: PHYSICIAN ASSISTANT
Payer: COMMERCIAL

## 2025-08-28 VITALS
OXYGEN SATURATION: 94 % | RESPIRATION RATE: 18 BRPM | DIASTOLIC BLOOD PRESSURE: 73 MMHG | HEART RATE: 78 BPM | SYSTOLIC BLOOD PRESSURE: 104 MMHG | TEMPERATURE: 99.4 F

## 2025-08-28 DIAGNOSIS — J18.9 PNEUMONIA OF RIGHT MIDDLE LOBE DUE TO INFECTIOUS ORGANISM: ICD-10-CM

## 2025-08-28 DIAGNOSIS — R05.1 ACUTE COUGH: Primary | ICD-10-CM

## 2025-08-28 DIAGNOSIS — G47.34 NOCTURNAL HYPOXEMIA: Primary | ICD-10-CM

## 2025-08-28 RX ORDER — AZITHROMYCIN 250 MG/1
TABLET, FILM COATED ORAL
Qty: 6 TABLET | Refills: 0 | Status: SHIPPED | OUTPATIENT
Start: 2025-08-28 | End: 2025-09-02

## 2025-08-28 ASSESSMENT — ENCOUNTER SYMPTOMS
VOMITING: 0
SORE THROAT: 0
COUGH: 1
FEVER: 0
NAUSEA: 0
DIARRHEA: 0

## 2025-08-29 ENCOUNTER — OFFICE VISIT (OUTPATIENT)
Dept: OPHTHALMOLOGY | Facility: CLINIC | Age: 30
End: 2025-08-29
Attending: FAMILY MEDICINE
Payer: COMMERCIAL

## 2025-08-29 DIAGNOSIS — H50.34 INTERMITTENT EXOTROPIA, ALTERNATING: ICD-10-CM

## 2025-08-29 DIAGNOSIS — H52.13 MYOPIA, BILATERAL: ICD-10-CM

## 2025-08-29 DIAGNOSIS — H52.229 REGULAR ASTIGMATISM: ICD-10-CM

## 2025-08-29 DIAGNOSIS — H50.21 VERTICAL STRABISMUS, RIGHT EYE: ICD-10-CM

## 2025-08-29 DIAGNOSIS — H50.52 EXOPHORIA: ICD-10-CM

## 2025-08-29 DIAGNOSIS — H53.10 SUBJECTIVE VISUAL DISTURBANCE: Primary | ICD-10-CM

## 2025-08-29 PROCEDURE — 99204 OFFICE O/P NEW MOD 45 MIN: CPT | Performed by: OPHTHALMOLOGY

## 2025-08-29 PROCEDURE — 92133 CPTRZD OPH DX IMG PST SGM ON: CPT | Mod: 26 | Performed by: OPHTHALMOLOGY

## 2025-08-29 PROCEDURE — 92060 SENSORIMOTOR EXAMINATION: CPT | Performed by: OPHTHALMOLOGY

## 2025-08-29 PROCEDURE — 92083 EXTENDED VISUAL FIELD XM: CPT | Performed by: OPHTHALMOLOGY

## 2025-08-29 PROCEDURE — 92060 SENSORIMOTOR EXAMINATION: CPT | Mod: 26 | Performed by: OPHTHALMOLOGY

## 2025-08-29 PROCEDURE — G0463 HOSPITAL OUTPT CLINIC VISIT: HCPCS | Performed by: OPHTHALMOLOGY

## 2025-08-29 PROCEDURE — 92083 EXTENDED VISUAL FIELD XM: CPT | Mod: 26 | Performed by: OPHTHALMOLOGY

## 2025-08-29 PROCEDURE — 92133 CPTRZD OPH DX IMG PST SGM ON: CPT | Performed by: OPHTHALMOLOGY

## 2025-08-29 ASSESSMENT — CONF VISUAL FIELD
OD_NORMAL: 1
OD_INFERIOR_TEMPORAL_RESTRICTION: 0
OD_INFERIOR_NASAL_RESTRICTION: 0
OD_SUPERIOR_TEMPORAL_RESTRICTION: 0
OS_SUPERIOR_NASAL_RESTRICTION: 0
OS_INFERIOR_NASAL_RESTRICTION: 0
OS_SUPERIOR_TEMPORAL_RESTRICTION: 0
OD_SUPERIOR_NASAL_RESTRICTION: 0
METHOD: COUNTING FINGERS
OS_NORMAL: 1
OS_INFERIOR_TEMPORAL_RESTRICTION: 0

## 2025-08-29 ASSESSMENT — TONOMETRY
OD_IOP_MMHG: 14
OS_IOP_MMHG: 12
IOP_METHOD: ICARE

## 2025-08-29 ASSESSMENT — VISUAL ACUITY
OD_CC+: -3
OS_CC: 20/25
METHOD: SNELLEN - LINEAR
OD_CC: 20/25
CORRECTION_TYPE: GLASSES

## 2025-08-29 ASSESSMENT — REFRACTION_WEARINGRX
SPECS_TYPE: SVL
OS_CYLINDER: +1.00
OS_SPHERE: -2.25
OD_CYLINDER: +1.25
OD_SPHERE: -2.25
OS_AXIS: 087
OD_AXIS: 092

## 2025-08-29 ASSESSMENT — SLIT LAMP EXAM - LIDS
COMMENTS: NORMAL
COMMENTS: NORMAL

## 2025-08-29 ASSESSMENT — EXTERNAL EXAM - LEFT EYE: OS_EXAM: NORMAL

## 2025-08-29 ASSESSMENT — CUP TO DISC RATIO
OS_RATIO: 0.3
OD_RATIO: 0.3

## 2025-08-29 ASSESSMENT — EXTERNAL EXAM - RIGHT EYE: OD_EXAM: NORMAL

## 2025-08-30 ENCOUNTER — HOSPITAL ENCOUNTER (EMERGENCY)
Facility: CLINIC | Age: 30
Discharge: HOME | End: 2025-08-30
Attending: FAMILY MEDICINE | Admitting: FAMILY MEDICINE
Payer: COMMERCIAL

## 2025-08-30 ENCOUNTER — APPOINTMENT (OUTPATIENT)
Dept: GENERAL RADIOLOGY | Facility: CLINIC | Age: 30
End: 2025-08-30
Attending: FAMILY MEDICINE
Payer: COMMERCIAL

## 2025-08-30 ENCOUNTER — MYC MEDICAL ADVICE (OUTPATIENT)
Dept: NEUROLOGY | Facility: CLINIC | Age: 30
End: 2025-08-30

## 2025-08-30 VITALS
BODY MASS INDEX: 35.13 KG/M2 | HEART RATE: 66 BPM | RESPIRATION RATE: 18 BRPM | DIASTOLIC BLOOD PRESSURE: 85 MMHG | WEIGHT: 237.2 LBS | SYSTOLIC BLOOD PRESSURE: 118 MMHG | TEMPERATURE: 97.8 F | OXYGEN SATURATION: 97 % | HEIGHT: 69 IN

## 2025-08-30 DIAGNOSIS — J18.9 PNEUMONIA OF RIGHT MIDDLE LOBE DUE TO INFECTIOUS ORGANISM: ICD-10-CM

## 2025-08-30 DIAGNOSIS — G43.719 INTRACTABLE CHRONIC MIGRAINE WITHOUT AURA AND WITHOUT STATUS MIGRAINOSUS: ICD-10-CM

## 2025-08-30 DIAGNOSIS — E86.0 DEHYDRATION: ICD-10-CM

## 2025-08-30 DIAGNOSIS — R74.8 ELEVATED CK: Primary | ICD-10-CM

## 2025-08-30 DIAGNOSIS — J18.9 PNEUMONIA OF RIGHT MIDDLE LOBE DUE TO INFECTIOUS ORGANISM: Primary | ICD-10-CM

## 2025-08-30 LAB
ANION GAP SERPL CALCULATED.3IONS-SCNC: 12 MMOL/L (ref 7–15)
BASOPHILS # BLD AUTO: 0.03 10E3/UL (ref 0–0.2)
BASOPHILS NFR BLD AUTO: 0.3 %
BUN SERPL-MCNC: 12.7 MG/DL (ref 6–20)
CALCIUM SERPL-MCNC: 9.3 MG/DL (ref 8.8–10.4)
CHLORIDE SERPL-SCNC: 105 MMOL/L (ref 98–107)
CK SERPL-CCNC: 274 U/L (ref 26–192)
CREAT SERPL-MCNC: 0.67 MG/DL (ref 0.51–0.95)
EGFRCR SERPLBLD CKD-EPI 2021: >90 ML/MIN/1.73M2
EOSINOPHIL # BLD AUTO: 0.06 10E3/UL (ref 0–0.7)
EOSINOPHIL NFR BLD AUTO: 0.7 %
ERYTHROCYTE [DISTWIDTH] IN BLOOD BY AUTOMATED COUNT: 16.4 % (ref 10–15)
GLUCOSE SERPL-MCNC: 107 MG/DL (ref 70–99)
HCO3 SERPL-SCNC: 24 MMOL/L (ref 22–29)
HCT VFR BLD AUTO: 34.8 % (ref 35–47)
HGB BLD-MCNC: 11.5 G/DL (ref 11.7–15.7)
IMM GRANULOCYTES # BLD: 0.05 10E3/UL
IMM GRANULOCYTES NFR BLD: 0.6 %
LYMPHOCYTES # BLD AUTO: 2.65 10E3/UL (ref 0.8–5.3)
LYMPHOCYTES NFR BLD AUTO: 29.7 %
MCH RBC QN AUTO: 26.4 PG (ref 26.5–33)
MCHC RBC AUTO-ENTMCNC: 33 G/DL (ref 31.5–36.5)
MCV RBC AUTO: 79.8 FL (ref 78–100)
MONOCYTES # BLD AUTO: 0.61 10E3/UL (ref 0–1.3)
MONOCYTES NFR BLD AUTO: 6.8 %
NEUTROPHILS # BLD AUTO: 5.52 10E3/UL (ref 1.6–8.3)
NEUTROPHILS NFR BLD AUTO: 61.9 %
NRBC # BLD AUTO: <0.03 10E3/UL
NRBC BLD AUTO-RTO: 0 /100
PLATELET # BLD AUTO: 281 10E3/UL (ref 150–450)
POTASSIUM SERPL-SCNC: 4 MMOL/L (ref 3.4–5.3)
RBC # BLD AUTO: 4.36 10E6/UL (ref 3.8–5.2)
SODIUM SERPL-SCNC: 141 MMOL/L (ref 135–145)
WBC # BLD AUTO: 8.92 10E3/UL (ref 4–11)

## 2025-08-30 PROCEDURE — 99284 EMERGENCY DEPT VISIT MOD MDM: CPT | Performed by: FAMILY MEDICINE

## 2025-08-30 PROCEDURE — 80048 BASIC METABOLIC PNL TOTAL CA: CPT | Performed by: FAMILY MEDICINE

## 2025-08-30 PROCEDURE — 85025 COMPLETE CBC W/AUTO DIFF WBC: CPT | Performed by: FAMILY MEDICINE

## 2025-08-30 PROCEDURE — 82550 ASSAY OF CK (CPK): CPT | Performed by: FAMILY MEDICINE

## 2025-08-30 PROCEDURE — 96360 HYDRATION IV INFUSION INIT: CPT | Performed by: FAMILY MEDICINE

## 2025-08-30 PROCEDURE — 36415 COLL VENOUS BLD VENIPUNCTURE: CPT | Performed by: FAMILY MEDICINE

## 2025-08-30 PROCEDURE — 71045 X-RAY EXAM CHEST 1 VIEW: CPT

## 2025-08-30 PROCEDURE — 99284 EMERGENCY DEPT VISIT MOD MDM: CPT | Mod: 25 | Performed by: FAMILY MEDICINE

## 2025-08-30 PROCEDURE — 258N000003 HC RX IP 258 OP 636: Performed by: FAMILY MEDICINE

## 2025-08-30 RX ADMIN — SODIUM CHLORIDE 1000 ML: 0.9 INJECTION, SOLUTION INTRAVENOUS at 03:27

## 2025-08-30 ASSESSMENT — ACTIVITIES OF DAILY LIVING (ADL)
ADLS_ACUITY_SCORE: 54

## 2025-09-01 ENCOUNTER — MYC REFILL (OUTPATIENT)
Dept: NEUROLOGY | Facility: CLINIC | Age: 30
End: 2025-09-01
Payer: COMMERCIAL

## 2025-09-01 DIAGNOSIS — M79.672 PAIN IN BOTH FEET: ICD-10-CM

## 2025-09-01 DIAGNOSIS — M79.671 PAIN IN BOTH FEET: ICD-10-CM

## 2025-09-01 DIAGNOSIS — R52 DIFFUSE PAIN: ICD-10-CM

## 2025-09-03 ENCOUNTER — TELEPHONE (OUTPATIENT)
Dept: NUTRITION | Facility: CLINIC | Age: 30
End: 2025-09-03
Payer: COMMERCIAL

## 2025-09-04 ENCOUNTER — LAB (OUTPATIENT)
Dept: LAB | Facility: CLINIC | Age: 30
End: 2025-09-04
Payer: COMMERCIAL

## 2025-09-04 ENCOUNTER — TELEPHONE (OUTPATIENT)
Dept: NEUROLOGY | Facility: CLINIC | Age: 30
End: 2025-09-04

## 2025-09-04 DIAGNOSIS — R74.8 ELEVATED CK: ICD-10-CM

## 2025-09-04 LAB
CK SERPL-CCNC: 32 U/L (ref 26–192)
HOLD SPECIMEN: NORMAL

## (undated) DEVICE — ESU PENCIL W/HOLSTER E2350H

## (undated) DEVICE — SPONGE SURGIFOAM 50

## (undated) DEVICE — LINEN TOWEL PACK X5 5464

## (undated) DEVICE — DRAPE MICROSCOPE LEICA 54X150" AR8033650

## (undated) DEVICE — DRAPE COVER C-ARM SEAMLESS SNAP-KAP 03-KP26 LATEX FREE

## (undated) DEVICE — TUBING SUCTION 12"X1/4" N612

## (undated) DEVICE — SYR EAR BULB 3OZ 0035830

## (undated) DEVICE — GLOVE PROTEXIS BLUE W/NEU-THERA 8.0  2D73EB80

## (undated) DEVICE — RX SURGIFLO HEMOSTATIC MATRIX W/THROMBIN 8ML 2994

## (undated) DEVICE — ADH LIQUID MASTISOL TOPICAL VIAL 2-3ML 0523-48

## (undated) DEVICE — NDL SPINAL 18GA 3.5" 405184

## (undated) DEVICE — ESU GROUND PAD UNIVERSAL W/O CORD

## (undated) DEVICE — ESU ELEC BLADE 6" COATED/INSULATED E1455-6

## (undated) DEVICE — LUBRICATING JELLY 4.25OZ

## (undated) DEVICE — TUBING SUCTION 6"X3/16" N56A

## (undated) DEVICE — ENDO FORCEP ENDOJAW BIOPSY 2.8MMX230CM FB-220U

## (undated) DEVICE — DRAPE SHEET REV FOLD 3/4 9349

## (undated) DEVICE — GLOVE PROTEXIS W/NEU-THERA 7.5  2D73TE75

## (undated) DEVICE — SU MONOCRYL 4-0 PS-2 18" UND Y496G

## (undated) DEVICE — TUBING SUCTION SOFT 20'X3/16" 0036570

## (undated) DEVICE — ESU ELEC BLADE 2.75" COATED/INSULATED E1455

## (undated) DEVICE — SU VICRYL 0 UR-6 27" J603H

## (undated) DEVICE — SOLUTION WATER 1000ML BOTTLE R5000-01

## (undated) DEVICE — KIT ENDO TURNOVER/PROCEDURE CARRY-ON 101822

## (undated) DEVICE — DRAPE MAYO STAND 23X54 8337

## (undated) DEVICE — TOOL DISSECT MIDAS MR8 14CM TELESC MATCH 2.5 MR8-T14MH25

## (undated) DEVICE — POSITIONER PT PRONESAFE HEAD REST W/DERMAPROX INSERT 40599

## (undated) DEVICE — SOL WATER IRRIG 1000ML BOTTLE 2F7114

## (undated) DEVICE — PACK SPINE SM CUSTOM SNE15SSFSK

## (undated) DEVICE — SU VICRYL 2-0 CT-2 CR 8X18" J726D

## (undated) DEVICE — SU VICRYL 0 CT-2 CR 8X18" J727D

## (undated) RX ORDER — HYDROMORPHONE HCL IN WATER/PF 6 MG/30 ML
PATIENT CONTROLLED ANALGESIA SYRINGE INTRAVENOUS
Status: DISPENSED
Start: 2021-08-24

## (undated) RX ORDER — BUPIVACAINE HYDROCHLORIDE AND EPINEPHRINE 5; 5 MG/ML; UG/ML
INJECTION, SOLUTION EPIDURAL; INTRACAUDAL; PERINEURAL
Status: DISPENSED
Start: 2021-08-24

## (undated) RX ORDER — DEXAMETHASONE SODIUM PHOSPHATE 4 MG/ML
INJECTION, SOLUTION INTRA-ARTICULAR; INTRALESIONAL; INTRAMUSCULAR; INTRAVENOUS; SOFT TISSUE
Status: DISPENSED
Start: 2021-08-24

## (undated) RX ORDER — GLYCOPYRROLATE 0.2 MG/ML
INJECTION, SOLUTION INTRAMUSCULAR; INTRAVENOUS
Status: DISPENSED
Start: 2021-08-24

## (undated) RX ORDER — ACETAMINOPHEN 500 MG
TABLET ORAL
Status: DISPENSED
Start: 2021-08-24

## (undated) RX ORDER — FENTANYL CITRATE 50 UG/ML
INJECTION, SOLUTION INTRAMUSCULAR; INTRAVENOUS
Status: DISPENSED
Start: 2021-08-24

## (undated) RX ORDER — GABAPENTIN 300 MG/1
CAPSULE ORAL
Status: DISPENSED
Start: 2021-08-24

## (undated) RX ORDER — NEOSTIGMINE METHYLSULFATE 1 MG/ML
VIAL (ML) INJECTION
Status: DISPENSED
Start: 2021-08-24

## (undated) RX ORDER — ONDANSETRON 2 MG/ML
INJECTION INTRAMUSCULAR; INTRAVENOUS
Status: DISPENSED
Start: 2021-08-24

## (undated) RX ORDER — OXYCODONE HYDROCHLORIDE 5 MG/1
TABLET ORAL
Status: DISPENSED
Start: 2021-08-24

## (undated) RX ORDER — FENTANYL CITRATE 50 UG/ML
INJECTION, SOLUTION INTRAMUSCULAR; INTRAVENOUS
Status: DISPENSED
Start: 2025-05-02

## (undated) RX ORDER — LIDOCAINE HYDROCHLORIDE 20 MG/ML
INJECTION, SOLUTION EPIDURAL; INFILTRATION; INTRACAUDAL; PERINEURAL
Status: DISPENSED
Start: 2021-08-24

## (undated) RX ORDER — CITRIC ACID/SODIUM CITRATE 334-500MG
SOLUTION, ORAL ORAL
Status: DISPENSED
Start: 2025-07-17

## (undated) RX ORDER — METHOCARBAMOL 750 MG/1
TABLET, FILM COATED ORAL
Status: DISPENSED
Start: 2021-08-24

## (undated) RX ORDER — METHYLPREDNISOLONE ACETATE 40 MG/ML
INJECTION, SUSPENSION INTRA-ARTICULAR; INTRALESIONAL; INTRAMUSCULAR; SOFT TISSUE
Status: DISPENSED
Start: 2021-08-24

## (undated) RX ORDER — HYDROMORPHONE HYDROCHLORIDE 1 MG/ML
INJECTION, SOLUTION INTRAMUSCULAR; INTRAVENOUS; SUBCUTANEOUS
Status: DISPENSED
Start: 2021-08-24

## (undated) RX ORDER — PROPOFOL 10 MG/ML
INJECTION, EMULSION INTRAVENOUS
Status: DISPENSED
Start: 2021-08-24

## (undated) RX ORDER — CEFAZOLIN SODIUM 2 G/100ML
INJECTION, SOLUTION INTRAVENOUS
Status: DISPENSED
Start: 2021-08-24

## (undated) RX ORDER — LIDOCAINE HYDROCHLORIDE 10 MG/ML
INJECTION, SOLUTION EPIDURAL; INFILTRATION; INTRACAUDAL; PERINEURAL
Status: DISPENSED
Start: 2025-05-02